# Patient Record
Sex: MALE | Race: WHITE | NOT HISPANIC OR LATINO | Employment: OTHER | URBAN - METROPOLITAN AREA
[De-identification: names, ages, dates, MRNs, and addresses within clinical notes are randomized per-mention and may not be internally consistent; named-entity substitution may affect disease eponyms.]

---

## 2023-04-13 PROCEDURE — 99222 1ST HOSP IP/OBS MODERATE 55: CPT | Performed by: INTERNAL MEDICINE

## 2023-04-14 PROCEDURE — 99238 HOSP IP/OBS DSCHRG MGMT 30/<: CPT | Performed by: INTERNAL MEDICINE

## 2023-04-24 DIAGNOSIS — D64.9 ANEMIA, UNSPECIFIED TYPE: Primary | ICD-10-CM

## 2023-04-24 RX ORDER — FERROUS SULFATE TAB 325 MG (65 MG ELEMENTAL FE) 325 (65 FE) MG
1 TAB ORAL DAILY
COMMUNITY
End: 2023-04-25 | Stop reason: SDUPTHER

## 2023-04-25 DIAGNOSIS — D64.9 ANEMIA, UNSPECIFIED: ICD-10-CM

## 2023-04-25 RX ORDER — FERROUS SULFATE TAB 325 MG (65 MG ELEMENTAL FE) 325 (65 FE) MG
1 TAB ORAL DAILY
Qty: 90 TABLET | Refills: 1 | Status: SHIPPED | OUTPATIENT
Start: 2023-04-25 | End: 2023-11-01 | Stop reason: SDUPTHER

## 2023-04-25 RX ORDER — FERROUS SULFATE TAB 325 MG (65 MG ELEMENTAL FE) 325 (65 FE) MG
TAB ORAL
Qty: 90 TABLET | Refills: 0 | Status: SHIPPED | OUTPATIENT
Start: 2023-04-25 | End: 2023-05-01 | Stop reason: SDUPTHER

## 2023-05-01 ENCOUNTER — LAB (OUTPATIENT)
Dept: LAB | Facility: LAB | Age: 73
End: 2023-05-01
Payer: MEDICARE

## 2023-05-01 ENCOUNTER — OFFICE VISIT (OUTPATIENT)
Dept: PRIMARY CARE | Facility: CLINIC | Age: 73
End: 2023-05-01
Payer: MEDICARE

## 2023-05-01 VITALS
DIASTOLIC BLOOD PRESSURE: 58 MMHG | SYSTOLIC BLOOD PRESSURE: 144 MMHG | WEIGHT: 159 LBS | HEIGHT: 68 IN | BODY MASS INDEX: 24.1 KG/M2

## 2023-05-01 DIAGNOSIS — E78.5 HYPERLIPIDEMIA, UNSPECIFIED HYPERLIPIDEMIA TYPE: ICD-10-CM

## 2023-05-01 DIAGNOSIS — I12.9 BENIGN HYPERTENSION WITH CKD (CHRONIC KIDNEY DISEASE) STAGE IV (MULTI): ICD-10-CM

## 2023-05-01 DIAGNOSIS — I10 PRIMARY HYPERTENSION: Primary | ICD-10-CM

## 2023-05-01 DIAGNOSIS — E11.22 TYPE 2 DIABETES MELLITUS WITH CHRONIC KIDNEY DISEASE ON CHRONIC DIALYSIS, WITH LONG-TERM CURRENT USE OF INSULIN (MULTI): ICD-10-CM

## 2023-05-01 DIAGNOSIS — I10 PRIMARY HYPERTENSION: ICD-10-CM

## 2023-05-01 DIAGNOSIS — Z99.2 TYPE 2 DIABETES MELLITUS WITH CHRONIC KIDNEY DISEASE ON CHRONIC DIALYSIS, WITH LONG-TERM CURRENT USE OF INSULIN (MULTI): ICD-10-CM

## 2023-05-01 DIAGNOSIS — Z79.4 TYPE 2 DIABETES MELLITUS WITH CHRONIC KIDNEY DISEASE ON CHRONIC DIALYSIS, WITH LONG-TERM CURRENT USE OF INSULIN (MULTI): ICD-10-CM

## 2023-05-01 DIAGNOSIS — N18.6 TYPE 2 DIABETES MELLITUS WITH CHRONIC KIDNEY DISEASE ON CHRONIC DIALYSIS, WITH LONG-TERM CURRENT USE OF INSULIN (MULTI): ICD-10-CM

## 2023-05-01 DIAGNOSIS — N18.4 BENIGN HYPERTENSION WITH CKD (CHRONIC KIDNEY DISEASE) STAGE IV (MULTI): ICD-10-CM

## 2023-05-01 DIAGNOSIS — I25.10 CORONARY ARTERY DISEASE INVOLVING NATIVE HEART WITHOUT ANGINA PECTORIS, UNSPECIFIED VESSEL OR LESION TYPE: ICD-10-CM

## 2023-05-01 DIAGNOSIS — G62.89 OTHER POLYNEUROPATHY: ICD-10-CM

## 2023-05-01 DIAGNOSIS — K21.9 GASTROESOPHAGEAL REFLUX DISEASE WITHOUT ESOPHAGITIS: ICD-10-CM

## 2023-05-01 PROBLEM — N18.5 CKD (CHRONIC KIDNEY DISEASE), STAGE V (MULTI): Status: ACTIVE | Noted: 2023-05-01

## 2023-05-01 PROBLEM — E11.9 DIABETES MELLITUS (MULTI): Status: ACTIVE | Noted: 2023-05-01

## 2023-05-01 PROBLEM — N18.30 STAGE III CHRONIC KIDNEY DISEASE (MULTI): Status: ACTIVE | Noted: 2023-05-01

## 2023-05-01 PROBLEM — R09.89 BRUIT: Status: ACTIVE | Noted: 2023-05-01

## 2023-05-01 PROBLEM — D64.9 ANEMIA: Status: ACTIVE | Noted: 2023-05-01

## 2023-05-01 PROBLEM — R05.9 COUGH: Status: ACTIVE | Noted: 2023-05-01

## 2023-05-01 PROBLEM — R31.21 ASYMPTOMATIC MICROSCOPIC HEMATURIA: Status: ACTIVE | Noted: 2023-05-01

## 2023-05-01 PROBLEM — R53.83 FATIGUE: Status: ACTIVE | Noted: 2023-05-01

## 2023-05-01 PROBLEM — R80.9 PROTEINURIA: Status: ACTIVE | Noted: 2023-05-01

## 2023-05-01 PROBLEM — R06.02 SHORTNESS OF BREATH: Status: ACTIVE | Noted: 2023-05-01

## 2023-05-01 PROBLEM — E55.9 VITAMIN D DEFICIENCY: Status: ACTIVE | Noted: 2023-05-01

## 2023-05-01 PROBLEM — D31.90 NEVUS OF EYE: Status: ACTIVE | Noted: 2023-05-01

## 2023-05-01 PROBLEM — K59.00 CONSTIPATION: Status: ACTIVE | Noted: 2023-05-01

## 2023-05-01 PROBLEM — M25.569 KNEE PAIN: Status: ACTIVE | Noted: 2023-05-01

## 2023-05-01 PROBLEM — I25.118 STABLE ANGINA PECTORIS DUE TO ARTERIOSCLEROSIS OF CORONARY ARTERY (CMS-HCC): Status: ACTIVE | Noted: 2023-05-01

## 2023-05-01 PROBLEM — N40.1 BPH WITH OBSTRUCTION/LOWER URINARY TRACT SYMPTOMS: Status: ACTIVE | Noted: 2023-05-01

## 2023-05-01 PROBLEM — G62.9 PERIPHERAL NEUROPATHY: Status: ACTIVE | Noted: 2023-05-01

## 2023-05-01 PROBLEM — R07.9 CHEST PAIN: Status: ACTIVE | Noted: 2023-05-01

## 2023-05-01 PROBLEM — H34.8110 CENTRAL RETINAL VEIN OCCLUSION WITH MACULAR EDEMA OF RIGHT EYE (CMS-HCC): Status: ACTIVE | Noted: 2023-05-01

## 2023-05-01 PROBLEM — R60.9 EDEMA: Status: ACTIVE | Noted: 2023-05-01

## 2023-05-01 PROBLEM — H40.009 GLAUCOMA SUSPECT: Status: ACTIVE | Noted: 2023-05-01

## 2023-05-01 PROBLEM — R97.20 ELEVATED PROSTATE SPECIFIC ANTIGEN (PSA): Status: ACTIVE | Noted: 2023-05-01

## 2023-05-01 PROBLEM — N13.8 BPH WITH OBSTRUCTION/LOWER URINARY TRACT SYMPTOMS: Status: ACTIVE | Noted: 2023-05-01

## 2023-05-01 PROBLEM — C61 PROSTATE CANCER (MULTI): Status: ACTIVE | Noted: 2023-05-01

## 2023-05-01 PROBLEM — R01.1 MURMUR, CARDIAC: Status: ACTIVE | Noted: 2023-05-01

## 2023-05-01 PROBLEM — Z97.3 WEARS EYEGLASSES: Status: ACTIVE | Noted: 2023-05-01

## 2023-05-01 PROBLEM — H25.13 AGE-RELATED NUCLEAR CATARACT, BILATERAL: Status: ACTIVE | Noted: 2023-05-01

## 2023-05-01 LAB
ALANINE AMINOTRANSFERASE (SGPT) (U/L) IN SER/PLAS: 9 U/L (ref 10–52)
ALBUMIN (G/DL) IN SER/PLAS: 4.4 G/DL (ref 3.4–5)
ALKALINE PHOSPHATASE (U/L) IN SER/PLAS: 91 U/L (ref 33–136)
ANION GAP IN SER/PLAS: 18 MMOL/L (ref 10–20)
ASPARTATE AMINOTRANSFERASE (SGOT) (U/L) IN SER/PLAS: 16 U/L (ref 9–39)
BILIRUBIN TOTAL (MG/DL) IN SER/PLAS: 0.6 MG/DL (ref 0–1.2)
CALCIDIOL (25 OH VITAMIN D3) (NG/ML) IN SER/PLAS: 88 NG/ML
CALCIUM (MG/DL) IN SER/PLAS: 9.4 MG/DL (ref 8.6–10.6)
CARBON DIOXIDE, TOTAL (MMOL/L) IN SER/PLAS: 24 MMOL/L (ref 21–32)
CHLORIDE (MMOL/L) IN SER/PLAS: 107 MMOL/L (ref 98–107)
CHOLESTEROL (MG/DL) IN SER/PLAS: 117 MG/DL (ref 0–199)
CHOLESTEROL IN HDL (MG/DL) IN SER/PLAS: 29.2 MG/DL
CHOLESTEROL/HDL RATIO: 4
CREATININE (MG/DL) IN SER/PLAS: 9.15 MG/DL (ref 0.5–1.3)
ERYTHROCYTE DISTRIBUTION WIDTH (RATIO) BY AUTOMATED COUNT: 15.9 % (ref 11.5–14.5)
ERYTHROCYTE MEAN CORPUSCULAR HEMOGLOBIN CONCENTRATION (G/DL) BY AUTOMATED: 32.3 G/DL (ref 32–36)
ERYTHROCYTE MEAN CORPUSCULAR VOLUME (FL) BY AUTOMATED COUNT: 100 FL (ref 80–100)
ERYTHROCYTES (10*6/UL) IN BLOOD BY AUTOMATED COUNT: 3.23 X10E12/L (ref 4.5–5.9)
ESTIMATED AVERAGE GLUCOSE FOR HBA1C: 174 MG/DL
GFR MALE: 6 ML/MIN/1.73M2
GLUCOSE (MG/DL) IN SER/PLAS: 190 MG/DL (ref 74–99)
HEMATOCRIT (%) IN BLOOD BY AUTOMATED COUNT: 32.2 % (ref 41–52)
HEMOGLOBIN (G/DL) IN BLOOD: 10.4 G/DL (ref 13.5–17.5)
HEMOGLOBIN A1C/HEMOGLOBIN TOTAL IN BLOOD: 7.7 %
LDL: 31 MG/DL (ref 0–99)
LEUKOCYTES (10*3/UL) IN BLOOD BY AUTOMATED COUNT: 9.1 X10E9/L (ref 4.4–11.3)
NON HDL CHOLESTEROL: 88 MG/DL
NRBC (PER 100 WBCS) BY AUTOMATED COUNT: 0 /100 WBC (ref 0–0)
PLATELETS (10*3/UL) IN BLOOD AUTOMATED COUNT: 222 X10E9/L (ref 150–450)
POTASSIUM (MMOL/L) IN SER/PLAS: 5.7 MMOL/L (ref 3.5–5.3)
PROTEIN TOTAL: 6.5 G/DL (ref 6.4–8.2)
SODIUM (MMOL/L) IN SER/PLAS: 143 MMOL/L (ref 136–145)
THYROTROPIN (MIU/L) IN SER/PLAS BY DETECTION LIMIT <= 0.05 MIU/L: 2.5 MIU/L (ref 0.44–3.98)
TRIGLYCERIDE (MG/DL) IN SER/PLAS: 285 MG/DL (ref 0–149)
UREA NITROGEN (MG/DL) IN SER/PLAS: 53 MG/DL (ref 6–23)
VLDL: 57 MG/DL (ref 0–40)

## 2023-05-01 PROCEDURE — 1159F MED LIST DOCD IN RCRD: CPT | Performed by: INTERNAL MEDICINE

## 2023-05-01 PROCEDURE — 36415 COLL VENOUS BLD VENIPUNCTURE: CPT

## 2023-05-01 PROCEDURE — 80061 LIPID PANEL: CPT

## 2023-05-01 PROCEDURE — 83036 HEMOGLOBIN GLYCOSYLATED A1C: CPT

## 2023-05-01 PROCEDURE — 99213 OFFICE O/P EST LOW 20 MIN: CPT | Performed by: INTERNAL MEDICINE

## 2023-05-01 PROCEDURE — 4010F ACE/ARB THERAPY RXD/TAKEN: CPT | Performed by: INTERNAL MEDICINE

## 2023-05-01 PROCEDURE — 3077F SYST BP >= 140 MM HG: CPT | Performed by: INTERNAL MEDICINE

## 2023-05-01 PROCEDURE — 84443 ASSAY THYROID STIM HORMONE: CPT

## 2023-05-01 PROCEDURE — 82306 VITAMIN D 25 HYDROXY: CPT

## 2023-05-01 PROCEDURE — 85027 COMPLETE CBC AUTOMATED: CPT

## 2023-05-01 PROCEDURE — 80053 COMPREHEN METABOLIC PANEL: CPT

## 2023-05-01 PROCEDURE — 3051F HG A1C>EQUAL 7.0%<8.0%: CPT | Performed by: INTERNAL MEDICINE

## 2023-05-01 PROCEDURE — 3078F DIAST BP <80 MM HG: CPT | Performed by: INTERNAL MEDICINE

## 2023-05-01 RX ORDER — GABAPENTIN 300 MG/1
300 CAPSULE ORAL 2 TIMES DAILY
Qty: 180 CAPSULE | Refills: 1 | Status: SHIPPED | OUTPATIENT
Start: 2023-05-01 | End: 2024-02-13 | Stop reason: SDUPTHER

## 2023-05-01 RX ORDER — LOSARTAN POTASSIUM 50 MG/1
1 TABLET ORAL DAILY
COMMUNITY
Start: 2022-10-20

## 2023-05-01 RX ORDER — GABAPENTIN 300 MG/1
300 CAPSULE ORAL 2 TIMES DAILY
COMMUNITY
Start: 2023-01-19 | End: 2023-05-01 | Stop reason: SDUPTHER

## 2023-05-01 RX ORDER — AMLODIPINE BESYLATE 5 MG/1
5 TABLET ORAL DAILY
COMMUNITY
Start: 2023-03-06 | End: 2023-12-07 | Stop reason: SDUPTHER

## 2023-05-01 RX ORDER — INSULIN LISPRO 100 [IU]/ML
10 INJECTION, SOLUTION INTRAVENOUS; SUBCUTANEOUS
COMMUNITY
Start: 2018-09-19

## 2023-05-01 RX ORDER — ALLOPURINOL 100 MG/1
100 TABLET ORAL DAILY
COMMUNITY
Start: 2013-01-26 | End: 2024-04-01

## 2023-05-01 RX ORDER — CLOPIDOGREL BISULFATE 75 MG/1
75 TABLET ORAL 2 TIMES DAILY
COMMUNITY
End: 2023-05-19 | Stop reason: ALTCHOICE

## 2023-05-01 RX ORDER — ERGOCALCIFEROL 1.25 MG/1
50000 CAPSULE ORAL
COMMUNITY

## 2023-05-01 RX ORDER — NITROGLYCERIN 0.4 MG/1
0.4 TABLET SUBLINGUAL EVERY 5 MIN PRN
COMMUNITY
Start: 2022-09-05

## 2023-05-01 RX ORDER — PEN NEEDLE, DIABETIC 31 GX5/16"
NEEDLE, DISPOSABLE MISCELLANEOUS
COMMUNITY
Start: 2022-10-17 | End: 2024-02-23 | Stop reason: SDUPTHER

## 2023-05-01 RX ORDER — EZETIMIBE 10 MG/1
10 TABLET ORAL DAILY
COMMUNITY
End: 2023-05-01 | Stop reason: SDUPTHER

## 2023-05-01 RX ORDER — PANTOPRAZOLE SODIUM 40 MG/1
40 TABLET, DELAYED RELEASE ORAL DAILY
COMMUNITY

## 2023-05-01 RX ORDER — CARVEDILOL 12.5 MG/1
12.5 TABLET ORAL
COMMUNITY
End: 2024-05-31 | Stop reason: SDUPTHER

## 2023-05-01 RX ORDER — ATORVASTATIN CALCIUM 80 MG/1
80 TABLET, FILM COATED ORAL NIGHTLY
COMMUNITY
End: 2024-01-02 | Stop reason: SDUPTHER

## 2023-05-01 RX ORDER — INSULIN GLARGINE 100 [IU]/ML
INJECTION, SOLUTION SUBCUTANEOUS
COMMUNITY
Start: 2014-01-25

## 2023-05-01 RX ORDER — SITAGLIPTIN 25 MG/1
25 TABLET, FILM COATED ORAL DAILY
COMMUNITY
End: 2023-10-03 | Stop reason: SDUPTHER

## 2023-05-01 RX ORDER — EZETIMIBE 10 MG/1
10 TABLET ORAL DAILY
Qty: 90 TABLET | Refills: 1 | Status: SHIPPED | OUTPATIENT
Start: 2023-05-01 | End: 2023-11-10 | Stop reason: ALTCHOICE

## 2023-05-01 RX ORDER — ASPIRIN 81 MG/1
81 TABLET ORAL DAILY
COMMUNITY
Start: 2020-10-01 | End: 2023-11-10 | Stop reason: ALTCHOICE

## 2023-05-01 NOTE — PROGRESS NOTES
Subjective   Patient ID: William Franke is a 72 y.o. male who presents for Hospital Follow-up.    HPI   Patient is here for hospital follow-up  He was admitted for bleeding AV fistula.  Required blood transfusion.  Needs refill on gabapentin and Zetia  Complains of having stiffness in the hands  Doing hemodialysis 3 times a week      Past recap patient was hospitalized and had another heart attack at [facility]  He is still complaining of having a lot of shortness of breath and cough  His kidney functions did deteriorate but because he is making urine did not get started on the dialysis yet  Is doing blood work today to reevaluate his kidney function  But his main concern is his cough        past recap  Patient went to the hospital and had MI  He was catheterized again and had stent placed  Since he came home he is having very poor appetite not eating drinking not feeling good and blood pressure is running high in 180s and 200s  He had stent placed 3 weeks ago     Patient here for follow-up on diabetes hypertension high cholesterol chronic kidney disease and anemia  Follow-up on blood work  Patient was hospitalized for another non-ST elevation MI  Since discharge his chest pain is doing better but he still getting off-and-on chest pain  Feels very anxious  Feels very limited in his activity        Patient is here with complaints of rectal bleed this morning he started bleeding did not realize his bleeding until it messed up his underwear and pants. He had similar episode 5 days ago  He is having some discomfort in the lower abdomen denies any fever or chills  He had problems with hemorrhoids many years ago. But he has no pain and denies constipation     Patient is here for hospital follow-up  He presented with chest pain and acute MI. He had to have cardiac cath done with close monitoring of kidney function  Cardiac stable  Patient had stent placed and did not require dialysis  He is here for follow-up on  "blood work for kidney  His blood sugars were doing better in the hospital. Now they are running high again  He is getting Procrit every 2 weeks            Virtual visit  Patient here for follow-up on diabetes   Patient is eating little better but not able to exercise because  Blood sugars are running high  The pressure is running little high but she is coming to see Dr. Ardon on Thursday  He is managing with his insulin blood sugar little better  Finished radiation treatment for prostate cancer  Works outdoors  Did blood work needs medications refilled   refuses to see the dietitian  Does not take Humalog insulin because of fear has appointment with the urologist   Review of Systems    Objective   /58   Ht 1.727 m (5' 8\")   Wt 72.1 kg (159 lb)   BMI 24.18 kg/m²     Physical Exam  Vitals reviewed.   Constitutional:       Appearance: Normal appearance.   HENT:      Head: Normocephalic and atraumatic.      Right Ear: Tympanic membrane, ear canal and external ear normal.      Left Ear: Tympanic membrane, ear canal and external ear normal.      Nose: Nose normal.      Mouth/Throat:      Pharynx: Oropharynx is clear.   Eyes:      Extraocular Movements: Extraocular movements intact.      Conjunctiva/sclera: Conjunctivae normal.      Pupils: Pupils are equal, round, and reactive to light.   Cardiovascular:      Rate and Rhythm: Normal rate and regular rhythm.      Pulses: Normal pulses.      Heart sounds: Normal heart sounds.   Pulmonary:      Effort: Pulmonary effort is normal.      Breath sounds: Normal breath sounds.   Abdominal:      General: Abdomen is flat. Bowel sounds are normal.      Palpations: Abdomen is soft.   Musculoskeletal:      Cervical back: Normal range of motion and neck supple.   Skin:     General: Skin is warm and dry.   Neurological:      General: No focal deficit present.      Mental Status: He is alert and oriented to person, place, and time.   Psychiatric:         Mood and Affect: Mood " normal.         Assessment/Plan   Problem List Items Addressed This Visit          Nervous    Peripheral neuropathy    Relevant Medications    gabapentin (Neurontin) 300 mg capsule       Circulatory    Benign hypertension with CKD (chronic kidney disease) stage IV (CMS/[facility])    Hypertension - Primary    Relevant Orders    Comprehensive Metabolic Panel (Completed)    TSH with reflex to Free T4 if abnormal (Completed)    CAD (coronary artery disease)    Relevant Medications    amLODIPine (Norvasc) 5 mg tablet    carvedilol (Coreg) 12.5 mg tablet    clopidogrel (Plavix) 75 mg tablet    nitroglycerin (Nitrostat) 0.4 mg SL tablet       Digestive    Esophageal reflux       Endocrine/Metabolic    Diabetes mellitus (CMS/[facility])    Relevant Orders    Hemoglobin A1C (Completed)    Vitamin D 25-Hydroxy,Total (Completed)    CBC (Completed)       Other    Hyperlipidemia    Relevant Medications    ezetimibe (Zetia) 10 mg tablet    Other Relevant Orders    Lipid Panel (Completed)           10/14  Call Dr. Palmer  He is trying to arrange outpatient follow-up for cardiac catheterization and carotid stent which she needs  Because of his anemia and severe kidney disease he is planning in stages  He asked me to order CBC BMP and he will call tomorrow to schedule for Next week discussed with the patient and the wife agreed with the plan  Advised him to go to the emergency room if symptoms persist  Continue diet exercise follow-up in 3 months     11/2  Concerned that patient might be getting anemic or his kidney function would be getting worse  Stat CBC BMP ordered  Increase Coreg to 2 tablets twice a day  Blood work results reviewed  Kidney functions are in fact better anemia stable  Patient could be not feeling good because of elevated blood pressure  Recheck in 2 weeks     9/9  Will get x-ray chest  Tessalon cough drops albuterol inhaler  Cholesterol medication refill  Blood pressure is stable  Will see if kidney functions looks okay  to hold off dialysis  Patient wife is planning to be able to go to [place] before start of dialysis    5/1/2023  Will order blood work to make sure anemia is stable  Patient is under care of endocrinologist for diabetes  He is under care of nephrologist for kidneys  He follows up with cardiology regularly and stable  Refills given on iron and gabapentin and Zetia

## 2023-05-15 ENCOUNTER — APPOINTMENT (OUTPATIENT)
Dept: PRIMARY CARE | Facility: CLINIC | Age: 73
End: 2023-05-15
Payer: MEDICARE

## 2023-05-19 ENCOUNTER — OFFICE VISIT (OUTPATIENT)
Dept: PRIMARY CARE | Facility: CLINIC | Age: 73
End: 2023-05-19
Payer: MEDICARE

## 2023-05-19 VITALS
HEIGHT: 68 IN | SYSTOLIC BLOOD PRESSURE: 126 MMHG | WEIGHT: 180 LBS | BODY MASS INDEX: 27.28 KG/M2 | DIASTOLIC BLOOD PRESSURE: 58 MMHG

## 2023-05-19 DIAGNOSIS — Z99.2 TYPE 2 DIABETES MELLITUS WITH CHRONIC KIDNEY DISEASE ON CHRONIC DIALYSIS, WITH LONG-TERM CURRENT USE OF INSULIN (MULTI): ICD-10-CM

## 2023-05-19 DIAGNOSIS — E55.9 VITAMIN D DEFICIENCY: ICD-10-CM

## 2023-05-19 DIAGNOSIS — I10 PRIMARY HYPERTENSION: ICD-10-CM

## 2023-05-19 DIAGNOSIS — E78.5 HYPERLIPIDEMIA, UNSPECIFIED HYPERLIPIDEMIA TYPE: ICD-10-CM

## 2023-05-19 DIAGNOSIS — Z79.4 TYPE 2 DIABETES MELLITUS WITH CHRONIC KIDNEY DISEASE ON CHRONIC DIALYSIS, WITH LONG-TERM CURRENT USE OF INSULIN (MULTI): ICD-10-CM

## 2023-05-19 DIAGNOSIS — E11.22 TYPE 2 DIABETES MELLITUS WITH CHRONIC KIDNEY DISEASE ON CHRONIC DIALYSIS, WITH LONG-TERM CURRENT USE OF INSULIN (MULTI): ICD-10-CM

## 2023-05-19 DIAGNOSIS — N18.6 TYPE 2 DIABETES MELLITUS WITH CHRONIC KIDNEY DISEASE ON CHRONIC DIALYSIS, WITH LONG-TERM CURRENT USE OF INSULIN (MULTI): ICD-10-CM

## 2023-05-19 PROBLEM — Z98.890 S/P ARTERIOVENOUS (AV) FISTULA CREATION: Status: ACTIVE | Noted: 2022-05-26

## 2023-05-19 PROBLEM — T82.858A ARTERIOVENOUS FISTULA STENOSIS (CMS-HCC): Status: ACTIVE | Noted: 2022-11-28

## 2023-05-19 PROBLEM — I25.10 CORONARY ARTERY DISEASE INVOLVING NATIVE HEART WITHOUT ANGINA PECTORIS: Status: ACTIVE | Noted: 2022-04-28

## 2023-05-19 PROBLEM — D63.8 ANEMIA OF CHRONIC DISEASE: Status: ACTIVE | Noted: 2022-04-28

## 2023-05-19 PROBLEM — K21.9 GASTROESOPHAGEAL REFLUX DISEASE: Status: ACTIVE | Noted: 2022-04-28

## 2023-05-19 PROBLEM — N20.0 KIDNEY STONES: Status: ACTIVE | Noted: 2022-04-28

## 2023-05-19 PROBLEM — M19.041 OSTEOARTHRITIS OF HANDS, BILATERAL: Status: ACTIVE | Noted: 2023-05-19

## 2023-05-19 PROBLEM — Z95.5 STENTED CORONARY ARTERY: Status: ACTIVE | Noted: 2022-04-28

## 2023-05-19 PROBLEM — N18.4 CHRONIC KIDNEY DISEASE (CKD), STAGE IV (SEVERE) (MULTI): Status: ACTIVE | Noted: 2022-03-21

## 2023-05-19 PROBLEM — T82.590A DIALYSIS AV FISTULA MALFUNCTION (CMS-HCC): Status: ACTIVE | Noted: 2023-04-17

## 2023-05-19 PROBLEM — M19.042 OSTEOARTHRITIS OF HANDS, BILATERAL: Status: ACTIVE | Noted: 2023-05-19

## 2023-05-19 PROCEDURE — 4010F ACE/ARB THERAPY RXD/TAKEN: CPT | Performed by: INTERNAL MEDICINE

## 2023-05-19 PROCEDURE — 1036F TOBACCO NON-USER: CPT | Performed by: INTERNAL MEDICINE

## 2023-05-19 PROCEDURE — 99213 OFFICE O/P EST LOW 20 MIN: CPT | Performed by: INTERNAL MEDICINE

## 2023-05-19 PROCEDURE — 1159F MED LIST DOCD IN RCRD: CPT | Performed by: INTERNAL MEDICINE

## 2023-05-19 PROCEDURE — 3074F SYST BP LT 130 MM HG: CPT | Performed by: INTERNAL MEDICINE

## 2023-05-19 PROCEDURE — 3078F DIAST BP <80 MM HG: CPT | Performed by: INTERNAL MEDICINE

## 2023-05-19 PROCEDURE — 3051F HG A1C>EQUAL 7.0%<8.0%: CPT | Performed by: INTERNAL MEDICINE

## 2023-05-19 RX ORDER — PROMETHAZINE HYDROCHLORIDE 25 MG/1
TABLET ORAL
COMMUNITY
Start: 2008-04-30

## 2023-05-19 RX ORDER — ESOMEPRAZOLE MAGNESIUM 20 MG/1
40 GRANULE, DELAYED RELEASE ORAL
COMMUNITY
Start: 2020-11-04 | End: 2023-05-19 | Stop reason: ALTCHOICE

## 2023-05-19 RX ORDER — ISOSORBIDE MONONITRATE 60 MG/1
60 TABLET, EXTENDED RELEASE ORAL
COMMUNITY
End: 2023-05-19 | Stop reason: ALTCHOICE

## 2023-05-19 RX ORDER — SODIUM BICARBONATE 650 MG/1
650 TABLET ORAL 3 TIMES DAILY
COMMUNITY
Start: 2022-11-01 | End: 2023-05-19 | Stop reason: ALTCHOICE

## 2023-05-19 RX ORDER — DILTIAZEM HYDROCHLORIDE 360 MG/1
CAPSULE, EXTENDED RELEASE ORAL
COMMUNITY
Start: 2008-04-30 | End: 2023-05-19 | Stop reason: ALTCHOICE

## 2023-05-19 RX ORDER — FUROSEMIDE 80 MG/1
1 TABLET ORAL 2 TIMES DAILY
COMMUNITY
Start: 2022-09-07 | End: 2023-05-19 | Stop reason: ALTCHOICE

## 2023-05-19 RX ORDER — HYDRALAZINE HYDROCHLORIDE 50 MG/1
100 TABLET, FILM COATED ORAL 2 TIMES DAILY
COMMUNITY
End: 2023-05-19 | Stop reason: ALTCHOICE

## 2023-05-19 RX ORDER — SEVELAMER CARBONATE 800 MG/1
TABLET, FILM COATED ORAL
COMMUNITY
Start: 2023-03-07

## 2023-05-19 RX ORDER — HYDROCHLOROTHIAZIDE 25 MG/1
TABLET ORAL
COMMUNITY
Start: 2008-04-30 | End: 2023-05-19 | Stop reason: ALTCHOICE

## 2023-05-26 LAB — PROSTATE SPECIFIC AG (NG/ML) IN SER/PLAS: 0.25 NG/ML (ref 0–4)

## 2023-06-09 NOTE — PROGRESS NOTES
Subjective   Patient ID: William Franke is a 72 y.o. male who presents for Follow-up and Med Refill.    Med Refill    Patient is here for follow-up  His fistula still has bleeding off-and-on but not as much  Follow-up on diabetes hypertension high cholesterol  He is getting dialysis 3 times a week  Overall doing fine     patient is here for hospital follow-up  He was admitted for bleeding AV fistula.  Required blood transfusion.  Needs refill on gabapentin and Zetia  Complains of having stiffness in the hands  Doing hemodialysis 3 times a week        Past recap patient was hospitalized and had another heart attack at [facility], [place], operated by [facility]  He is still complaining of having a lot of shortness of breath and cough  His kidney functions did deteriorate but because he is making urine did not get started on the dialysis yet  Is doing blood work today to reevaluate his kidney function  But his main concern is his cough        past recap  Patient went to the hospital and had MI  He was catheterized again and had stent placed  Since he came home he is having very poor appetite not eating drinking not feeling good and blood pressure is running high in 180s and 200s  He had stent placed 3 weeks ago     Patient here for follow-up on diabetes hypertension high cholesterol chronic kidney disease and anemia  Follow-up on blood work  Patient was hospitalized for another non-ST elevation MI  Since discharge his chest pain is doing better but he still getting off-and-on chest pain  Feels very anxious  Feels very limited in his activity        Patient is here with complaints of rectal bleed this morning he started bleeding did not realize his bleeding until it messed up his underwear and pants. He had similar episode 5 days ago  He is having some discomfort in the lower abdomen denies any fever or chills  He had problems with hemorrhoids many years ago. But he has no pain and denies constipation     Patient is here for hospital  "follow-up  He presented with chest pain and acute MI. He had to have cardiac cath done with close monitoring of kidney function  Cardiac stable  Patient had stent placed and did not require dialysis  He is here for follow-up on blood work for kidney  His blood sugars were doing better in the hospital. Now they are running high again  He is getting Procrit every 2 weeks            Virtual visit  Patient here for follow-up on diabetes   Patient is eating little better but not able to exercise because  Blood sugars are running high  The pressure is running little high but she is coming to see Dr. Ardon on Thursday  He is managing with his insulin blood sugar little better  Finished radiation treatment for prostate cancer  Works outdoors  Did blood work needs medications refilled   refuses to see the dietitian  Does not take Humalog insulin because of fear has appointment with the urologist     Review of Systems    Objective   /58   Ht 1.727 m (5' 8\")   Wt 81.6 kg (180 lb)   BMI 27.37 kg/m²     Physical Exam  Vitals reviewed.   Constitutional:       Appearance: Normal appearance.   HENT:      Head: Normocephalic and atraumatic.      Right Ear: Tympanic membrane, ear canal and external ear normal.      Left Ear: Tympanic membrane, ear canal and external ear normal.      Nose: Nose normal.      Mouth/Throat:      Pharynx: Oropharynx is clear.   Eyes:      Extraocular Movements: Extraocular movements intact.      Conjunctiva/sclera: Conjunctivae normal.      Pupils: Pupils are equal, round, and reactive to light.   Cardiovascular:      Rate and Rhythm: Normal rate and regular rhythm.      Pulses: Normal pulses.      Heart sounds: Normal heart sounds.   Pulmonary:      Effort: Pulmonary effort is normal.      Breath sounds: Normal breath sounds.   Abdominal:      General: Abdomen is flat. Bowel sounds are normal.      Palpations: Abdomen is soft.   Musculoskeletal:      Cervical back: Normal range of motion and " neck supple.   Skin:     General: Skin is warm and dry.   Neurological:      General: No focal deficit present.      Mental Status: He is alert and oriented to person, place, and time.   Psychiatric:         Mood and Affect: Mood normal.         Assessment/Plan   Problem List Items Addressed This Visit          Circulatory    Hypertension    Relevant Orders    CBC    Comprehensive Metabolic Panel    Hemoglobin A1C    Lipid Panel    Thyroid Stimulating Hormone       Endocrine/Metabolic    Diabetes mellitus (CMS/HCC)    Relevant Orders    CBC    Comprehensive Metabolic Panel    Hemoglobin A1C    Lipid Panel    Thyroid Stimulating Hormone    Vitamin D deficiency    Relevant Orders    CBC    Comprehensive Metabolic Panel    Hemoglobin A1C    Lipid Panel    Thyroid Stimulating Hormone       Other    Hyperlipidemia    Relevant Orders    CBC    Comprehensive Metabolic Panel    Hemoglobin A1C    Lipid Panel    Thyroid Stimulating Hormone                   10/14  Call Dr. Palmer  He is trying to arrange outpatient follow-up for cardiac catheterization and carotid stent which she needs  Because of his anemia and severe kidney disease he is planning in stages  He asked me to order CBC BMP and he will call tomorrow to schedule for Next week discussed with the patient and the wife agreed with the plan  Advised him to go to the emergency room if symptoms persist  Continue diet exercise follow-up in 3 months     11/2  Concerned that patient might be getting anemic or his kidney function would be getting worse  Stat CBC BMP ordered  Increase Coreg to 2 tablets twice a day  Blood work results reviewed  Kidney functions are in fact better anemia stable  Patient could be not feeling good because of elevated blood pressure  Recheck in 2 weeks     9/9  Will get x-ray chest  Tessalon cough drops albuterol inhaler  Cholesterol medication refill  Blood pressure is stable  Will see if kidney functions looks okay to hold off  dialysis  Patient wife is planning to be able to go to [place] before start of dialysis     5/1/2023  Will order blood work to make sure anemia is stable  Patient is under care of endocrinologist for diabetes  He is under care of nephrologist for kidneys  He follows up with cardiology regularly and stable  Refills given on iron and gabapentin and Zetia     5/19/2023  Clinically patient is stable blood pressure is stable  Routine blood work ordered for 3 months  His diabetes is doing better  Medications refilled

## 2023-06-23 ENCOUNTER — DOCUMENTATION (OUTPATIENT)
Dept: PRIMARY CARE | Facility: CLINIC | Age: 73
End: 2023-06-23
Payer: MEDICARE

## 2023-08-10 ENCOUNTER — APPOINTMENT (OUTPATIENT)
Dept: PRIMARY CARE | Facility: CLINIC | Age: 73
End: 2023-08-10
Payer: MEDICARE

## 2023-08-11 ENCOUNTER — APPOINTMENT (OUTPATIENT)
Dept: PRIMARY CARE | Facility: CLINIC | Age: 73
End: 2023-08-11
Payer: MEDICARE

## 2023-08-14 ENCOUNTER — LAB (OUTPATIENT)
Dept: LAB | Facility: LAB | Age: 73
End: 2023-08-14
Payer: MEDICARE

## 2023-08-14 DIAGNOSIS — Z99.2 TYPE 2 DIABETES MELLITUS WITH CHRONIC KIDNEY DISEASE ON CHRONIC DIALYSIS, WITH LONG-TERM CURRENT USE OF INSULIN (MULTI): ICD-10-CM

## 2023-08-14 DIAGNOSIS — E55.9 VITAMIN D DEFICIENCY: ICD-10-CM

## 2023-08-14 DIAGNOSIS — I10 PRIMARY HYPERTENSION: ICD-10-CM

## 2023-08-14 DIAGNOSIS — Z79.4 TYPE 2 DIABETES MELLITUS WITH CHRONIC KIDNEY DISEASE ON CHRONIC DIALYSIS, WITH LONG-TERM CURRENT USE OF INSULIN (MULTI): ICD-10-CM

## 2023-08-14 DIAGNOSIS — E11.22 TYPE 2 DIABETES MELLITUS WITH CHRONIC KIDNEY DISEASE ON CHRONIC DIALYSIS, WITH LONG-TERM CURRENT USE OF INSULIN (MULTI): ICD-10-CM

## 2023-08-14 DIAGNOSIS — E78.5 HYPERLIPIDEMIA, UNSPECIFIED HYPERLIPIDEMIA TYPE: ICD-10-CM

## 2023-08-14 DIAGNOSIS — N18.6 TYPE 2 DIABETES MELLITUS WITH CHRONIC KIDNEY DISEASE ON CHRONIC DIALYSIS, WITH LONG-TERM CURRENT USE OF INSULIN (MULTI): ICD-10-CM

## 2023-08-14 LAB
ALANINE AMINOTRANSFERASE (SGPT) (U/L) IN SER/PLAS: 14 U/L (ref 10–52)
ALBUMIN (G/DL) IN SER/PLAS: 4.3 G/DL (ref 3.4–5)
ALKALINE PHOSPHATASE (U/L) IN SER/PLAS: 92 U/L (ref 33–136)
ANION GAP IN SER/PLAS: 23 MMOL/L (ref 10–20)
ASPARTATE AMINOTRANSFERASE (SGOT) (U/L) IN SER/PLAS: 16 U/L (ref 9–39)
BILIRUBIN TOTAL (MG/DL) IN SER/PLAS: 0.6 MG/DL (ref 0–1.2)
CALCIUM (MG/DL) IN SER/PLAS: 9.4 MG/DL (ref 8.6–10.6)
CARBON DIOXIDE, TOTAL (MMOL/L) IN SER/PLAS: 28 MMOL/L (ref 21–32)
CHLORIDE (MMOL/L) IN SER/PLAS: 98 MMOL/L (ref 98–107)
CHOLESTEROL (MG/DL) IN SER/PLAS: 114 MG/DL (ref 0–199)
CHOLESTEROL IN HDL (MG/DL) IN SER/PLAS: 27.4 MG/DL
CHOLESTEROL/HDL RATIO: 4.2
CREATININE (MG/DL) IN SER/PLAS: 10.38 MG/DL (ref 0.5–1.3)
ERYTHROCYTE DISTRIBUTION WIDTH (RATIO) BY AUTOMATED COUNT: 15.8 % (ref 11.5–14.5)
ERYTHROCYTE MEAN CORPUSCULAR HEMOGLOBIN CONCENTRATION (G/DL) BY AUTOMATED: 32.5 G/DL (ref 32–36)
ERYTHROCYTE MEAN CORPUSCULAR VOLUME (FL) BY AUTOMATED COUNT: 99 FL (ref 80–100)
ERYTHROCYTES (10*6/UL) IN BLOOD BY AUTOMATED COUNT: 3.68 X10E12/L (ref 4.5–5.9)
ESTIMATED AVERAGE GLUCOSE FOR HBA1C: 171 MG/DL
GFR MALE: 5 ML/MIN/1.73M2
GLUCOSE (MG/DL) IN SER/PLAS: 155 MG/DL (ref 74–99)
HEMATOCRIT (%) IN BLOOD BY AUTOMATED COUNT: 36.3 % (ref 41–52)
HEMOGLOBIN (G/DL) IN BLOOD: 11.8 G/DL (ref 13.5–17.5)
HEMOGLOBIN A1C/HEMOGLOBIN TOTAL IN BLOOD: 7.6 %
LDL: 13 MG/DL (ref 0–99)
LEUKOCYTES (10*3/UL) IN BLOOD BY AUTOMATED COUNT: 7 X10E9/L (ref 4.4–11.3)
NON HDL CHOLESTEROL: 87 MG/DL
NRBC (PER 100 WBCS) BY AUTOMATED COUNT: 0 /100 WBC (ref 0–0)
PLATELETS (10*3/UL) IN BLOOD AUTOMATED COUNT: 163 X10E9/L (ref 150–450)
POTASSIUM (MMOL/L) IN SER/PLAS: 5.5 MMOL/L (ref 3.5–5.3)
PROTEIN TOTAL: 6.4 G/DL (ref 6.4–8.2)
SODIUM (MMOL/L) IN SER/PLAS: 143 MMOL/L (ref 136–145)
THYROTROPIN (MIU/L) IN SER/PLAS BY DETECTION LIMIT <= 0.05 MIU/L: 1.46 MIU/L (ref 0.44–3.98)
TRIGLYCERIDE (MG/DL) IN SER/PLAS: 370 MG/DL (ref 0–149)
UREA NITROGEN (MG/DL) IN SER/PLAS: 67 MG/DL (ref 6–23)
VLDL: 74 MG/DL (ref 0–40)

## 2023-08-14 PROCEDURE — 80053 COMPREHEN METABOLIC PANEL: CPT

## 2023-08-14 PROCEDURE — 84443 ASSAY THYROID STIM HORMONE: CPT

## 2023-08-14 PROCEDURE — 80061 LIPID PANEL: CPT

## 2023-08-14 PROCEDURE — 36415 COLL VENOUS BLD VENIPUNCTURE: CPT

## 2023-08-14 PROCEDURE — 85027 COMPLETE CBC AUTOMATED: CPT

## 2023-08-14 PROCEDURE — 83036 HEMOGLOBIN GLYCOSYLATED A1C: CPT

## 2023-08-18 ENCOUNTER — OFFICE VISIT (OUTPATIENT)
Dept: PRIMARY CARE | Facility: CLINIC | Age: 73
End: 2023-08-18
Payer: MEDICARE

## 2023-08-18 VITALS
WEIGHT: 181 LBS | BODY MASS INDEX: 27.43 KG/M2 | SYSTOLIC BLOOD PRESSURE: 116 MMHG | HEIGHT: 68 IN | DIASTOLIC BLOOD PRESSURE: 58 MMHG

## 2023-08-18 DIAGNOSIS — E78.49 OTHER HYPERLIPIDEMIA: ICD-10-CM

## 2023-08-18 DIAGNOSIS — N18.6 ESRD ON HEMODIALYSIS (MULTI): Primary | ICD-10-CM

## 2023-08-18 DIAGNOSIS — N18.6 TYPE 2 DIABETES MELLITUS WITH CHRONIC KIDNEY DISEASE ON CHRONIC DIALYSIS, WITH LONG-TERM CURRENT USE OF INSULIN (MULTI): ICD-10-CM

## 2023-08-18 DIAGNOSIS — Z99.2 ESRD ON HEMODIALYSIS (MULTI): Primary | ICD-10-CM

## 2023-08-18 DIAGNOSIS — E11.22 TYPE 2 DIABETES MELLITUS WITH CHRONIC KIDNEY DISEASE ON CHRONIC DIALYSIS, WITH LONG-TERM CURRENT USE OF INSULIN (MULTI): ICD-10-CM

## 2023-08-18 DIAGNOSIS — I10 PRIMARY HYPERTENSION: ICD-10-CM

## 2023-08-18 DIAGNOSIS — Z79.4 TYPE 2 DIABETES MELLITUS WITH CHRONIC KIDNEY DISEASE ON CHRONIC DIALYSIS, WITH LONG-TERM CURRENT USE OF INSULIN (MULTI): ICD-10-CM

## 2023-08-18 DIAGNOSIS — Z99.2 TYPE 2 DIABETES MELLITUS WITH CHRONIC KIDNEY DISEASE ON CHRONIC DIALYSIS, WITH LONG-TERM CURRENT USE OF INSULIN (MULTI): ICD-10-CM

## 2023-08-18 PROCEDURE — 3074F SYST BP LT 130 MM HG: CPT | Performed by: INTERNAL MEDICINE

## 2023-08-18 PROCEDURE — 99213 OFFICE O/P EST LOW 20 MIN: CPT | Performed by: INTERNAL MEDICINE

## 2023-08-18 PROCEDURE — 1126F AMNT PAIN NOTED NONE PRSNT: CPT | Performed by: INTERNAL MEDICINE

## 2023-08-18 PROCEDURE — 3078F DIAST BP <80 MM HG: CPT | Performed by: INTERNAL MEDICINE

## 2023-08-18 PROCEDURE — 1159F MED LIST DOCD IN RCRD: CPT | Performed by: INTERNAL MEDICINE

## 2023-08-18 PROCEDURE — 1036F TOBACCO NON-USER: CPT | Performed by: INTERNAL MEDICINE

## 2023-08-18 PROCEDURE — 3051F HG A1C>EQUAL 7.0%<8.0%: CPT | Performed by: INTERNAL MEDICINE

## 2023-08-18 PROCEDURE — 4010F ACE/ARB THERAPY RXD/TAKEN: CPT | Performed by: INTERNAL MEDICINE

## 2023-08-18 ASSESSMENT — ENCOUNTER SYMPTOMS
LOSS OF SENSATION IN FEET: 0
OCCASIONAL FEELINGS OF UNSTEADINESS: 0
DEPRESSION: 0

## 2023-08-19 NOTE — PROGRESS NOTES
Subjective   Patient ID: William A Franke is a 72 y.o. male who presents for Follow-up and Med Refill.    Med Refill    Patient is here for follow-up  Follow-up on diabetes hypertension high cholesterol  Doing hemodialysis for end-stage renal disease     patient is here for follow-up  His fistula still has bleeding off-and-on but not as much  Follow-up on diabetes hypertension high cholesterol  He is getting dialysis 3 times a week  Overall doing fine      patient is here for hospital follow-up  He was admitted for bleeding AV fistula.  Required blood transfusion.  Needs refill on gabapentin and Zetia  Complains of having stiffness in the hands  Doing hemodialysis 3 times a week    patient was hospitalized and had another heart attack at [facility]  He is still complaining of having a lot of shortness of breath and cough  His kidney functions did deteriorate but because he is making urine did not get started on the dialysis yet  Is doing blood work today to reevaluate his kidney function  But his main concern is his cough     Patient went to the hospital and had MI  He was catheterized again and had stent placed  Since he came home he is having very poor appetite not eating drinking not feeling good and blood pressure is running high in 180s and 200s  He had stent placed 3 weeks ago     Patient here for follow-up on diabetes hypertension high cholesterol chronic kidney disease and anemia  Follow-up on blood work  Patient was hospitalized for another non-ST elevation MI  Since discharge his chest pain is doing better but he still getting off-and-on chest pain  Feels very anxious  Feels very limited in his activity        Patient is here with complaints of rectal bleed this morning he started bleeding did not realize his bleeding until it messed up his underwear and pants. He had similar episode 5 days ago  He is having some discomfort in the lower abdomen denies any fever or chills  He had problems with  "hemorrhoids many years ago. But he has no pain and denies constipation     Patient is here for hospital follow-up  He presented with chest pain and acute MI. He had to have cardiac cath done with close monitoring of kidney function  Cardiac stable  Patient had stent placed and did not require dialysis  He is here for follow-up on blood work for kidney  His blood sugars were doing better in the hospital. Now they are running high again  He is getting Procrit every 2 weeks            Virtual visit  Patient here for follow-up on diabetes   Patient is eating little better but not able to exercise because  Blood sugars are running high  The pressure is running little high but she is coming to see Dr. rAdon on Thursday  He is managing with his insulin blood sugar little better  Finished radiation treatment for prostate cancer  Works outdoors  Did blood work needs medications refilled   refuses to see the dietitian  Does not take Humalog insulin because of fear has appointment with the urologist        Review of Systems    Objective   /58   Ht 1.727 m (5' 8\")   Wt 82.1 kg (181 lb)   BMI 27.52 kg/m²     Physical Exam  Vitals reviewed.   Constitutional:       Appearance: Normal appearance.   HENT:      Head: Normocephalic and atraumatic.      Right Ear: Tympanic membrane, ear canal and external ear normal.      Left Ear: Tympanic membrane, ear canal and external ear normal.      Nose: Nose normal.      Mouth/Throat:      Pharynx: Oropharynx is clear.   Eyes:      Extraocular Movements: Extraocular movements intact.      Conjunctiva/sclera: Conjunctivae normal.      Pupils: Pupils are equal, round, and reactive to light.   Cardiovascular:      Rate and Rhythm: Normal rate and regular rhythm.      Pulses: Normal pulses.      Heart sounds: Normal heart sounds.   Pulmonary:      Effort: Pulmonary effort is normal.      Breath sounds: Normal breath sounds.   Abdominal:      General: Abdomen is flat. Bowel sounds are " normal.      Palpations: Abdomen is soft.   Musculoskeletal:      Cervical back: Normal range of motion and neck supple.   Skin:     General: Skin is warm and dry.   Neurological:      General: No focal deficit present.      Mental Status: He is alert and oriented to person, place, and time.   Psychiatric:         Mood and Affect: Mood normal.         Assessment/Plan   Problem List Items Addressed This Visit          Cardiac and Vasculature    Hypertension    Relevant Orders    CBC    Comprehensive Metabolic Panel    Hemoglobin A1C    Lipid Panel    Hyperlipidemia    Relevant Orders    CBC    Comprehensive Metabolic Panel    Hemoglobin A1C    Lipid Panel       Endocrine/Metabolic    Diabetes mellitus (CMS/[facility])    Relevant Orders    CBC    Comprehensive Metabolic Panel    Hemoglobin A1C    Lipid Panel       Genitourinary and Reproductive    ESRD on hemodialysis (CMS/[facility]) - Primary   10/14  Call Dr. Palmer  He is trying to arrange outpatient follow-up for cardiac catheterization and carotid stent which she needs  Because of his anemia and severe kidney disease he is planning in stages  He asked me to order CBC BMP and he will call tomorrow to schedule for Next week discussed with the patient and the wife agreed with the plan  Advised him to go to the emergency room if symptoms persist  Continue diet exercise follow-up in 3 months     11/2  Concerned that patient might be getting anemic or his kidney function would be getting worse  Stat CBC BMP ordered  Increase Coreg to 2 tablets twice a day  Blood work results reviewed  Kidney functions are in fact better anemia stable  Patient could be not feeling good because of elevated blood pressure  Recheck in 2 weeks     9/9  Will get x-ray chest  Tessalon cough drops albuterol inhaler  Cholesterol medication refill  Blood pressure is stable  Will see if kidney functions looks okay to hold off dialysis  Patient wife is planning to be able to go to [place] before start of  dialysis     5/1/2023  Will order blood work to make sure anemia is stable  Patient is under care of endocrinologist for diabetes  He is under care of nephrologist for kidneys  He follows up with cardiology regularly and stable  Refills given on iron and gabapentin and Zetia     5/19/2023  Clinically patient is stable blood pressure is stable  Routine blood work ordered for 3 months  His diabetes is doing better  Medications refilled    8/18/2023  Blood work reviewed  A1c still high  Blood pressure stable  Cholesterol okay  Discussed different food options  Patient eats a lot of processed food and to eat out a lot  Discussed better options  Continue current medications  Follow-up blood work in 3 months

## 2023-11-01 ENCOUNTER — TELEPHONE (OUTPATIENT)
Dept: PRIMARY CARE | Facility: CLINIC | Age: 73
End: 2023-11-01
Payer: MEDICARE

## 2023-11-01 DIAGNOSIS — D64.9 ANEMIA, UNSPECIFIED TYPE: ICD-10-CM

## 2023-11-01 RX ORDER — FERROUS SULFATE TAB 325 MG (65 MG ELEMENTAL FE) 325 (65 FE) MG
1 TAB ORAL DAILY
Qty: 90 TABLET | Refills: 1 | Status: SHIPPED | OUTPATIENT
Start: 2023-11-01 | End: 2024-05-06 | Stop reason: SDUPTHER

## 2023-11-01 NOTE — TELEPHONE ENCOUNTER
----- Message from Marianna Galloway MD sent at 11/1/2023  2:28 PM EDT -----  Okay to order  ----- Message -----  From: Otilia Philip MA  Sent: 11/1/2023   1:09 PM EDT  To: Marianna Galloway MD    Patient is asking for a 90 day supply FeroSuL 325 mg (65 mg iron) tablet    LOV 08/08/2023

## 2023-11-06 ENCOUNTER — LAB (OUTPATIENT)
Dept: LAB | Facility: LAB | Age: 73
End: 2023-11-06
Payer: MEDICARE

## 2023-11-06 DIAGNOSIS — Z79.4 TYPE 2 DIABETES MELLITUS WITH CHRONIC KIDNEY DISEASE ON CHRONIC DIALYSIS, WITH LONG-TERM CURRENT USE OF INSULIN (MULTI): ICD-10-CM

## 2023-11-06 DIAGNOSIS — E78.49 OTHER HYPERLIPIDEMIA: ICD-10-CM

## 2023-11-06 DIAGNOSIS — I10 PRIMARY HYPERTENSION: ICD-10-CM

## 2023-11-06 DIAGNOSIS — E11.22 TYPE 2 DIABETES MELLITUS WITH CHRONIC KIDNEY DISEASE ON CHRONIC DIALYSIS, WITH LONG-TERM CURRENT USE OF INSULIN (MULTI): ICD-10-CM

## 2023-11-06 DIAGNOSIS — N18.6 TYPE 2 DIABETES MELLITUS WITH CHRONIC KIDNEY DISEASE ON CHRONIC DIALYSIS, WITH LONG-TERM CURRENT USE OF INSULIN (MULTI): ICD-10-CM

## 2023-11-06 DIAGNOSIS — Z99.2 TYPE 2 DIABETES MELLITUS WITH CHRONIC KIDNEY DISEASE ON CHRONIC DIALYSIS, WITH LONG-TERM CURRENT USE OF INSULIN (MULTI): ICD-10-CM

## 2023-11-06 LAB
ALBUMIN SERPL BCP-MCNC: 4.3 G/DL (ref 3.4–5)
ALP SERPL-CCNC: 103 U/L (ref 33–136)
ALT SERPL W P-5'-P-CCNC: 15 U/L (ref 10–52)
ANION GAP SERPL CALC-SCNC: 25 MMOL/L (ref 10–20)
AST SERPL W P-5'-P-CCNC: 16 U/L (ref 9–39)
BILIRUB SERPL-MCNC: 0.5 MG/DL (ref 0–1.2)
BUN SERPL-MCNC: 62 MG/DL (ref 6–23)
CALCIUM SERPL-MCNC: 9.3 MG/DL (ref 8.6–10.6)
CHLORIDE SERPL-SCNC: 95 MMOL/L (ref 98–107)
CHOLEST SERPL-MCNC: 114 MG/DL (ref 0–199)
CHOLESTEROL/HDL RATIO: 3.8
CO2 SERPL-SCNC: 26 MMOL/L (ref 21–32)
CREAT SERPL-MCNC: 11.93 MG/DL (ref 0.5–1.3)
ERYTHROCYTE [DISTWIDTH] IN BLOOD BY AUTOMATED COUNT: 14.6 % (ref 11.5–14.5)
EST. AVERAGE GLUCOSE BLD GHB EST-MCNC: 174 MG/DL
GFR SERPL CREATININE-BSD FRML MDRD: 4 ML/MIN/1.73M*2
GLUCOSE SERPL-MCNC: 201 MG/DL (ref 74–99)
HBA1C MFR BLD: 7.7 %
HCT VFR BLD AUTO: 32.8 % (ref 41–52)
HDLC SERPL-MCNC: 29.7 MG/DL
HGB BLD-MCNC: 10.5 G/DL (ref 13.5–17.5)
LDLC SERPL CALC-MCNC: 7 MG/DL
MCH RBC QN AUTO: 33.2 PG (ref 26–34)
MCHC RBC AUTO-ENTMCNC: 32 G/DL (ref 32–36)
MCV RBC AUTO: 104 FL (ref 80–100)
NON HDL CHOLESTEROL: 84 MG/DL (ref 0–149)
NRBC BLD-RTO: 0 /100 WBCS (ref 0–0)
PLATELET # BLD AUTO: 168 X10*3/UL (ref 150–450)
POTASSIUM SERPL-SCNC: 4.9 MMOL/L (ref 3.5–5.3)
PROT SERPL-MCNC: 6.4 G/DL (ref 6.4–8.2)
RBC # BLD AUTO: 3.16 X10*6/UL (ref 4.5–5.9)
SODIUM SERPL-SCNC: 141 MMOL/L (ref 136–145)
TRIGL SERPL-MCNC: 388 MG/DL (ref 0–149)
VLDL: 78 MG/DL (ref 0–40)
WBC # BLD AUTO: 10.4 X10*3/UL (ref 4.4–11.3)

## 2023-11-06 PROCEDURE — 36415 COLL VENOUS BLD VENIPUNCTURE: CPT

## 2023-11-06 PROCEDURE — 80061 LIPID PANEL: CPT

## 2023-11-06 PROCEDURE — 80053 COMPREHEN METABOLIC PANEL: CPT

## 2023-11-06 PROCEDURE — 85027 COMPLETE CBC AUTOMATED: CPT

## 2023-11-06 PROCEDURE — 83036 HEMOGLOBIN GLYCOSYLATED A1C: CPT

## 2023-11-10 ENCOUNTER — OFFICE VISIT (OUTPATIENT)
Dept: PRIMARY CARE | Facility: CLINIC | Age: 73
End: 2023-11-10
Payer: MEDICARE

## 2023-11-10 VITALS
SYSTOLIC BLOOD PRESSURE: 120 MMHG | DIASTOLIC BLOOD PRESSURE: 72 MMHG | BODY MASS INDEX: 28.1 KG/M2 | WEIGHT: 185.4 LBS | HEIGHT: 68 IN

## 2023-11-10 DIAGNOSIS — Z99.2 TYPE 2 DIABETES MELLITUS WITH CHRONIC KIDNEY DISEASE ON CHRONIC DIALYSIS, WITH LONG-TERM CURRENT USE OF INSULIN (MULTI): ICD-10-CM

## 2023-11-10 DIAGNOSIS — Z79.4 TYPE 2 DIABETES MELLITUS WITH CHRONIC KIDNEY DISEASE ON CHRONIC DIALYSIS, WITH LONG-TERM CURRENT USE OF INSULIN (MULTI): ICD-10-CM

## 2023-11-10 DIAGNOSIS — I10 PRIMARY HYPERTENSION: ICD-10-CM

## 2023-11-10 DIAGNOSIS — N18.6 ESRD ON HEMODIALYSIS (MULTI): ICD-10-CM

## 2023-11-10 DIAGNOSIS — E11.22 TYPE 2 DIABETES MELLITUS WITH CHRONIC KIDNEY DISEASE ON CHRONIC DIALYSIS, WITH LONG-TERM CURRENT USE OF INSULIN (MULTI): ICD-10-CM

## 2023-11-10 DIAGNOSIS — Z99.2 ESRD ON HEMODIALYSIS (MULTI): ICD-10-CM

## 2023-11-10 DIAGNOSIS — N18.6 TYPE 2 DIABETES MELLITUS WITH CHRONIC KIDNEY DISEASE ON CHRONIC DIALYSIS, WITH LONG-TERM CURRENT USE OF INSULIN (MULTI): ICD-10-CM

## 2023-11-10 DIAGNOSIS — I25.10 CORONARY ARTERY DISEASE INVOLVING NATIVE HEART WITHOUT ANGINA PECTORIS, UNSPECIFIED VESSEL OR LESION TYPE: Primary | ICD-10-CM

## 2023-11-10 DIAGNOSIS — E78.49 OTHER HYPERLIPIDEMIA: ICD-10-CM

## 2023-11-10 PROBLEM — E86.0 DEHYDRATION: Status: ACTIVE | Noted: 2023-11-10

## 2023-11-10 PROBLEM — F41.9 ANXIETY: Status: ACTIVE | Noted: 2023-11-10

## 2023-11-10 PROBLEM — K62.5 RECTAL HEMORRHAGE: Status: ACTIVE | Noted: 2023-11-10

## 2023-11-10 PROBLEM — R25.2 CRAMP IN LOWER LEG: Status: ACTIVE | Noted: 2023-11-10

## 2023-11-10 PROBLEM — E87.5 HYPERKALEMIA: Status: ACTIVE | Noted: 2023-11-10

## 2023-11-10 PROBLEM — G47.33 OSA (OBSTRUCTIVE SLEEP APNEA): Status: ACTIVE | Noted: 2023-09-20

## 2023-11-10 PROBLEM — R55 VASOVAGAL SYNCOPE: Status: ACTIVE | Noted: 2023-11-10

## 2023-11-10 PROBLEM — B34.2 CORONAVIRUS INFECTION: Status: ACTIVE | Noted: 2023-11-10

## 2023-11-10 PROBLEM — J81.1 PULMONARY EDEMA (HHS-HCC): Status: ACTIVE | Noted: 2023-11-10

## 2023-11-10 PROBLEM — K92.1 HEMATOCHEZIA: Status: ACTIVE | Noted: 2023-11-10

## 2023-11-10 PROBLEM — M62.82 RHABDOMYOLYSIS: Status: ACTIVE | Noted: 2023-11-10

## 2023-11-10 PROBLEM — I27.20 PULMONARY HYPERTENSION (MULTI): Status: ACTIVE | Noted: 2023-11-10

## 2023-11-10 PROBLEM — Z98.61 HISTORY OF PERCUTANEOUS TRANSLUMINAL CORONARY ANGIOPLASTY: Status: ACTIVE | Noted: 2023-11-10

## 2023-11-10 PROBLEM — M25.469 KNEE JOINT EFFUSION: Status: ACTIVE | Noted: 2023-11-10

## 2023-11-10 PROBLEM — I16.0 HYPERTENSIVE URGENCY: Status: ACTIVE | Noted: 2023-11-10

## 2023-11-10 PROBLEM — I50.9 CONGESTIVE HEART FAILURE (MULTI): Status: ACTIVE | Noted: 2023-11-10

## 2023-11-10 PROBLEM — I21.4 ACUTE NON-ST SEGMENT ELEVATION MYOCARDIAL INFARCTION (MULTI): Status: ACTIVE | Noted: 2023-11-10

## 2023-11-10 PROBLEM — E23.2 DIABETES INSIPIDUS (MULTI): Status: ACTIVE | Noted: 2023-11-10

## 2023-11-10 PROBLEM — S89.90XA INJURY OF KNEE: Status: ACTIVE | Noted: 2023-11-10

## 2023-11-10 PROBLEM — T82.858A: Status: ACTIVE | Noted: 2023-09-20

## 2023-11-10 PROBLEM — I35.0 AORTIC STENOSIS: Status: ACTIVE | Noted: 2023-09-20

## 2023-11-10 PROCEDURE — 1126F AMNT PAIN NOTED NONE PRSNT: CPT | Performed by: INTERNAL MEDICINE

## 2023-11-10 PROCEDURE — 99214 OFFICE O/P EST MOD 30 MIN: CPT | Performed by: INTERNAL MEDICINE

## 2023-11-10 PROCEDURE — 1159F MED LIST DOCD IN RCRD: CPT | Performed by: INTERNAL MEDICINE

## 2023-11-10 PROCEDURE — 4010F ACE/ARB THERAPY RXD/TAKEN: CPT | Performed by: INTERNAL MEDICINE

## 2023-11-10 PROCEDURE — 3051F HG A1C>EQUAL 7.0%<8.0%: CPT | Performed by: INTERNAL MEDICINE

## 2023-11-10 PROCEDURE — 3078F DIAST BP <80 MM HG: CPT | Performed by: INTERNAL MEDICINE

## 2023-11-10 PROCEDURE — 3074F SYST BP LT 130 MM HG: CPT | Performed by: INTERNAL MEDICINE

## 2023-11-10 PROCEDURE — 3048F LDL-C <100 MG/DL: CPT | Performed by: INTERNAL MEDICINE

## 2023-11-10 PROCEDURE — 1036F TOBACCO NON-USER: CPT | Performed by: INTERNAL MEDICINE

## 2023-11-10 RX ORDER — EZETIMIBE 10 MG/1
10 TABLET ORAL DAILY
Qty: 90 TABLET | Refills: 0 | Status: SHIPPED | OUTPATIENT
Start: 2023-11-10 | End: 2024-02-13 | Stop reason: SDUPTHER

## 2023-11-10 RX ORDER — FENOFIBRATE 145 MG/1
145 TABLET, FILM COATED ORAL DAILY
Qty: 90 TABLET | Refills: 0 | Status: SHIPPED | OUTPATIENT
Start: 2023-11-10 | End: 2024-02-13 | Stop reason: SDUPTHER

## 2023-11-10 ASSESSMENT — ENCOUNTER SYMPTOMS
OCCASIONAL FEELINGS OF UNSTEADINESS: 0
LOSS OF SENSATION IN FEET: 0
DEPRESSION: 0

## 2023-11-10 NOTE — PROGRESS NOTES
Subjective   Patient ID: William A Franke is a 72 y.o. male who presents for Follow-up.    Med Refill    Patient is here for follow-up on diabetes hypertension high cholesterol  He is not on any aspirin or any blood thinner because he bleeds during dialysis  He is worried about his stents closing up     Past recap   patient is here for follow-up  Follow-up on diabetes hypertension high cholesterol  Doing hemodialysis for end-stage renal disease     patient is here for follow-up  His fistula still has bleeding off-and-on but not as much  Follow-up on diabetes hypertension high cholesterol  He is getting dialysis 3 times a week  Overall doing fine      patient is here for hospital follow-up  He was admitted for bleeding AV fistula.  Required blood transfusion.  Needs refill on gabapentin and Zetia  Complains of having stiffness in the hands  Doing hemodialysis 3 times a week    patient was hospitalized and had another heart attack at [facility]  He is still complaining of having a lot of shortness of breath and cough  His kidney functions did deteriorate but because he is making urine did not get started on the dialysis yet  Is doing blood work today to reevaluate his kidney function  But his main concern is his cough     Patient went to the hospital and had MI  He was catheterized again and had stent placed  Since he came home he is having very poor appetite not eating drinking not feeling good and blood pressure is running high in 180s and 200s  He had stent placed 3 weeks ago     Patient here for follow-up on diabetes hypertension high cholesterol chronic kidney disease and anemia  Follow-up on blood work  Patient was hospitalized for another non-ST elevation MI  Since discharge his chest pain is doing better but he still getting off-and-on chest pain  Feels very anxious  Feels very limited in his activity        Patient is here with complaints of rectal bleed this morning he started bleeding did not  "realize his bleeding until it messed up his underwear and pants. He had similar episode 5 days ago  He is having some discomfort in the lower abdomen denies any fever or chills  He had problems with hemorrhoids many years ago. But he has no pain and denies constipation     Patient is here for hospital follow-up  He presented with chest pain and acute MI. He had to have cardiac cath done with close monitoring of kidney function  Cardiac stable  Patient had stent placed and did not require dialysis  He is here for follow-up on blood work for kidney  His blood sugars were doing better in the hospital. Now they are running high again  He is getting Procrit every 2 weeks            Virtual visit  Patient here for follow-up on diabetes   Patient is eating little better but not able to exercise because  Blood sugars are running high  The pressure is running little high but she is coming to see Dr. Ardon on Thursday  He is managing with his insulin blood sugar little better  Finished radiation treatment for prostate cancer  Works outdoors  Did blood work needs medications refilled   refuses to see the dietitian  Does not take Humalog insulin because of fear has appointment with the urologist        Review of Systems    Objective   /72   Ht 1.727 m (5' 8\")   Wt 84.1 kg (185 lb 6.4 oz)   BMI 28.19 kg/m²     Physical Exam  Vitals reviewed.   Constitutional:       Appearance: Normal appearance.   HENT:      Head: Normocephalic and atraumatic.      Right Ear: Tympanic membrane, ear canal and external ear normal.      Left Ear: Tympanic membrane, ear canal and external ear normal.      Nose: Nose normal.      Mouth/Throat:      Pharynx: Oropharynx is clear.   Eyes:      Extraocular Movements: Extraocular movements intact.      Conjunctiva/sclera: Conjunctivae normal.      Pupils: Pupils are equal, round, and reactive to light.   Cardiovascular:      Rate and Rhythm: Normal rate and regular rhythm.      Pulses: Normal " pulses.      Heart sounds: Normal heart sounds.   Pulmonary:      Effort: Pulmonary effort is normal.      Breath sounds: Normal breath sounds.   Abdominal:      General: Abdomen is flat. Bowel sounds are normal.      Palpations: Abdomen is soft.   Musculoskeletal:      Cervical back: Normal range of motion and neck supple.   Skin:     General: Skin is warm and dry.   Neurological:      General: No focal deficit present.      Mental Status: He is alert and oriented to person, place, and time.   Psychiatric:         Mood and Affect: Mood normal.         Assessment/Plan   Problem List Items Addressed This Visit          Cardiac and Vasculature    Hypertension    Relevant Medications    fenofibrate (Tricor) 145 mg tablet    Other Relevant Orders    CBC    Comprehensive metabolic panel    Hemoglobin A1c    CAD (coronary artery disease) - Primary    Hyperlipidemia    Relevant Medications    ezetimibe (Zetia) 10 mg tablet    fenofibrate (Tricor) 145 mg tablet    Other Relevant Orders    CBC    Comprehensive metabolic panel    Hemoglobin A1c       Endocrine/Metabolic    Diabetes mellitus (CMS/HCC)    Relevant Medications    fenofibrate (Tricor) 145 mg tablet    Other Relevant Orders    CBC    Comprehensive metabolic panel    Hemoglobin A1c       Genitourinary and Reproductive    ESRD on hemodialysis (CMS/HCC)   10/14  Call Dr. Palmer  He is trying to arrange outpatient follow-up for cardiac catheterization and carotid stent which she needs  Because of his anemia and severe kidney disease he is planning in stages  He asked me to order CBC BMP and he will call tomorrow to schedule for Next week discussed with the patient and the wife agreed with the plan  Advised him to go to the emergency room if symptoms persist  Continue diet exercise follow-up in 3 months     11/2  Concerned that patient might be getting anemic or his kidney function would be getting worse  Stat CBC BMP ordered  Increase Coreg to 2 tablets twice a  day  Blood work results reviewed  Kidney functions are in fact better anemia stable  Patient could be not feeling good because of elevated blood pressure  Recheck in 2 weeks     9/9  Will get x-ray chest  Tessalon cough drops albuterol inhaler  Cholesterol medication refill  Blood pressure is stable  Will see if kidney functions looks okay to hold off dialysis  Patient wife is planning to be able to go to [place] before start of dialysis     5/1/2023  Will order blood work to make sure anemia is stable  Patient is under care of endocrinologist for diabetes  He is under care of nephrologist for kidneys  He follows up with cardiology regularly and stable  Refills given on iron and gabapentin and Zetia     5/19/2023  Clinically patient is stable blood pressure is stable  Routine blood work ordered for 3 months  His diabetes is doing better  Medications refilled    8/18/2023  Blood work reviewed  A1c still high  Blood pressure stable  Cholesterol okay  Discussed different food options  Patient eats a lot of processed food and to eat out a lot  Discussed better options  Continue current medications  Follow-up blood work in 3 months    11/10/2023  Blood work reviewed  Triglycerides have gone up to 388  A1c still 7.7 patient is under care of endocrinologist  Start fenofibrate  Discussed high triglycerides this will put him at risk for blockage in coronary arteries at this time  Again discussed diet and exercise  Follow-up blood work in 3 months

## 2023-12-01 ENCOUNTER — TELEPHONE (OUTPATIENT)
Dept: RADIATION ONCOLOGY | Facility: HOSPITAL | Age: 73
End: 2023-12-01

## 2023-12-01 ENCOUNTER — LAB (OUTPATIENT)
Dept: LAB | Facility: LAB | Age: 73
End: 2023-12-01
Payer: MEDICARE

## 2023-12-01 DIAGNOSIS — R69 DIAGNOSIS UNKNOWN: Primary | ICD-10-CM

## 2023-12-01 DIAGNOSIS — C61 MALIGNANT NEOPLASM OF PROSTATE (MULTI): Primary | ICD-10-CM

## 2023-12-01 LAB — PSA SERPL-MCNC: 0.26 NG/ML

## 2023-12-01 PROCEDURE — 84153 ASSAY OF PSA TOTAL: CPT

## 2023-12-01 PROCEDURE — 36415 COLL VENOUS BLD VENIPUNCTURE: CPT

## 2023-12-06 ENCOUNTER — TELEPHONE (OUTPATIENT)
Dept: RADIATION ONCOLOGY | Facility: HOSPITAL | Age: 73
End: 2023-12-06
Payer: MEDICARE

## 2023-12-06 NOTE — PROGRESS NOTES
Cancer synopsis:  Rad/onc: Dr. Gordon/ Weisent CNP    73 -year-old male returns to the radiation oncology clinic for ongoing consultation regarding his unfavorable intermediate risk prostate cancer.  MRI of  the pelvis was completed on 1/4/2020 which demonstrated a 5.3 x 3.8 x 3.7 cm prostate.  There was a PIRADS 4 lesion in the right peripheral zone extending from the mid gland to the apex.  There was no evidence of extra prosthetic extension.  There was  no involvement of the seminal vesicles.  There was no evidence of an enlarged pelvic lymph nodes.  As stated at the prior visit, the patient denies any prior history of cancer, chemotherapy, radiotherapy, autoimmune or connective tissue disorder, or cardiac  device.  He completed a screening colonoscopy in November 2018.     05/05/2022: Prostate and SV VMAT    History of presenting illness:    Patient ID: [id number]     William A Franke is a 73 y.o. male who presents for his UIR prostate cancer GS 4+3=7, IPSA <10 now s/p RT    RT Site: Prostate and SV  RT Date: 05/05/2022  Hormone therapy: No  Hot Flushes: Denies  Fatigue: Denies  Bone pain: Denies  SAMARA: n/a   ED: Denies changes in erectile function since last visit.  ED medications: No  IPSS: n/a virtual  Urinary symptoms: Denies dysuria, hematuria, frequency, urgency or urine leakage. Recently had dialysis port placed and currently is receiving dialysis, does still produce urine per patient.  Urinary Medications: No  Rectal bleeding: Denies  Other systems: Denies SOB, CP or fever      Review of systems:  Review of Systems   Constitutional:  Negative for fatigue, fever and unexpected weight change.   Respiratory:  Negative for cough, chest tightness and shortness of breath.    Cardiovascular:  Negative for chest pain, palpitations and leg swelling.   Gastrointestinal:  Negative for abdominal pain, anal bleeding, blood in stool, constipation, diarrhea and rectal pain.   Endocrine: Negative for cold intolerance,  "heat intolerance and polyuria.   Genitourinary:  Negative for decreased urine volume, difficulty urinating, dysuria, frequency, hematuria and urgency.   Musculoskeletal:  Negative for arthralgias, back pain, gait problem and joint swelling.   Skin: Negative.    Allergic/Immunologic: Negative.    Neurological:  Negative for dizziness, syncope and weakness.   Psychiatric/Behavioral: Negative.         Past Medical history  Past Medical History:   Diagnosis Date    Personal history of other endocrine, nutritional and metabolic disease     History of hypercholesterolemia    Personal history of other specified conditions 08/28/2020    History of chest pain        Surgical/family history  No family history on file.   Past Surgical History:   Procedure Laterality Date    KNEE ARTHROSCOPY W/ DEBRIDEMENT  04/15/2013    Arthroscopy Knee    SHOULDER SURGERY  04/15/2013    Shoulder Surgery        Social History  Tobacco Use: Low Risk  (11/10/2023)    Patient History     Smoking Tobacco Use: Never     Smokeless Tobacco Use: Never     Passive Exposure: Not on file         Current med list:  Current Outpatient Medications   Medication Instructions    allopurinol (ZYLOPRIM) 100 mg, oral, Daily    amLODIPine (NORVASC) 5 mg, oral, Daily    atorvastatin (LIPITOR) 80 mg, oral, Nightly    BD Ultra-Fine Mini Pen Needle 31 gauge x 3/16\" needle USE AS DIRECTED 4 times a day    carvedilol (COREG) 12.5 mg, oral, 2 times daily with meals    ergocalciferol (VITAMIN D-2) 50,000 Units, oral, Weekly    ezetimibe (ZETIA) 10 mg, oral, Daily    fenofibrate (TRICOR) 145 mg, oral, Daily    FeroSuL 325 mg, oral, Daily    gabapentin (NEURONTIN) 300 mg, oral, 2 times daily    insulin glargine (Lantus) 100 unit/mL (3 mL) pen subcutaneous    insulin lispro (HUMALOG) 10 Units, subcutaneous, 3 times daily with meals    Januvia 25 mg, oral, Daily    losartan (Cozaar) 50 mg tablet 1 tablet, oral, Daily    nitroglycerin (NITROSTAT) 0.4 mg, sublingual, Every 5 " min PRN    pantoprazole (PROTONIX) 40 mg, oral, Daily    promethazine (Phenergan) 25 mg tablet oral    sevelamer carbonate (Renvela) 800 mg tablet take 1 tablet by mouth every day before meals        Last recorded vital:  N/a virtual    Physical exam  N/a virtual    Pertinent labs:  CBC Differential Path Review   Date/Time Value Ref Range Status   10/20/2022 05:45 AM GINA  Final     Comment:      By her/his signature above, the Pathologist   listed as making the final interpretation   certifies that she/he has personally reviewed    this case.  ----------------------------------------------   MICROCYTIC ANEMIA WITH ANISOPOIKILOCYTOSIS AND POLYCHROMASIA.  IRON, B12 AND FOLATE STUDIES MAYBE CONSIDERED IF CLINICALLY INDICATED.       Prostate Specific AG   Date/Time Value Ref Range Status   12/01/2023 03:13 PM 0.26 <=4.00 ng/mL Final         Dx:  Problem List Items Addressed This Visit    None  Visit Diagnoses       Malignant neoplasm of prostate (CMS/HCC)    -  Primary    Relevant Orders    Clinic Appointment Request Follow Up; [facility] (virtual); [facility] [address] [facility] (virtual)    Prostate Specific Antigen           PSA of 0.26 was reviewed and is stable. Review of latent SE including rectal bleeding, hematuria, urinary strictures, ED where reviewed as well as how to contact office if s/s present. Denies latent SE. NCCN guidelines where reviewed and routine FUV of every 3m for first year and every 6m for four years for a total of five years was discussed. Patient verbalized understanding.        PLAN:  FUV 6m  Labs PSA in 6m  Imaging none  FUV other providers: PCP for routine evals        Please contact office with any concerns:  [facility] CNP  [phone]

## 2023-12-07 ENCOUNTER — HOSPITAL ENCOUNTER (OUTPATIENT)
Dept: RADIATION ONCOLOGY | Facility: HOSPITAL | Age: 73
Setting detail: RADIATION/ONCOLOGY SERIES
Discharge: HOME | End: 2023-12-07
Payer: MEDICARE

## 2023-12-07 DIAGNOSIS — I10 HYPERTENSION: Primary | ICD-10-CM

## 2023-12-07 DIAGNOSIS — C61 MALIGNANT NEOPLASM OF PROSTATE (MULTI): Primary | ICD-10-CM

## 2023-12-07 PROCEDURE — 99213 OFFICE O/P EST LOW 20 MIN: CPT

## 2023-12-07 RX ORDER — AMLODIPINE BESYLATE 5 MG/1
5 TABLET ORAL DAILY
Qty: 90 TABLET | Refills: 3 | Status: SHIPPED | OUTPATIENT
Start: 2023-12-07 | End: 2024-05-31 | Stop reason: SDUPTHER

## 2023-12-07 ASSESSMENT — ENCOUNTER SYMPTOMS
FEVER: 0
ANAL BLEEDING: 0
ARTHRALGIAS: 0
HEMATURIA: 0
PSYCHIATRIC NEGATIVE: 1
DIZZINESS: 0
FREQUENCY: 0
BACK PAIN: 0
PALPITATIONS: 0
RECTAL PAIN: 0
SHORTNESS OF BREATH: 0
JOINT SWELLING: 0
COUGH: 0
WEAKNESS: 0
BLOOD IN STOOL: 0
DIARRHEA: 0
FATIGUE: 0
DIFFICULTY URINATING: 0
CHEST TIGHTNESS: 0
ABDOMINAL PAIN: 0
UNEXPECTED WEIGHT CHANGE: 0
CONSTIPATION: 0
DYSURIA: 0
ALLERGIC/IMMUNOLOGIC NEGATIVE: 1

## 2023-12-08 ENCOUNTER — APPOINTMENT (OUTPATIENT)
Dept: RADIATION ONCOLOGY | Facility: HOSPITAL | Age: 73
End: 2023-12-08
Payer: MEDICARE

## 2024-01-02 DIAGNOSIS — E78.5 HYPERLIPIDEMIA: Primary | ICD-10-CM

## 2024-01-02 RX ORDER — ATORVASTATIN CALCIUM 80 MG/1
80 TABLET, FILM COATED ORAL NIGHTLY
Qty: 90 TABLET | Refills: 3 | Status: SHIPPED | OUTPATIENT
Start: 2024-01-02 | End: 2024-05-31 | Stop reason: SDUPTHER

## 2024-01-22 ENCOUNTER — TELEPHONE (OUTPATIENT)
Dept: PRIMARY CARE | Facility: CLINIC | Age: 74
End: 2024-01-22

## 2024-02-06 PROBLEM — H25.9 SENILE CATARACT: Status: ACTIVE | Noted: 2023-11-27

## 2024-02-06 PROBLEM — H52.4 PRESBYOPIA: Status: ACTIVE | Noted: 2023-11-27

## 2024-02-06 PROBLEM — H43.811 PVD (POSTERIOR VITREOUS DETACHMENT), RIGHT EYE: Status: ACTIVE | Noted: 2023-11-29

## 2024-02-06 PROBLEM — H35.059 CHOROIDAL NEOVASCULARIZATION: Status: ACTIVE | Noted: 2023-11-27

## 2024-02-07 ENCOUNTER — LAB (OUTPATIENT)
Dept: LAB | Facility: LAB | Age: 74
End: 2024-02-07
Payer: MEDICARE

## 2024-02-07 DIAGNOSIS — E78.49 OTHER HYPERLIPIDEMIA: ICD-10-CM

## 2024-02-07 DIAGNOSIS — Z99.2 TYPE 2 DIABETES MELLITUS WITH CHRONIC KIDNEY DISEASE ON CHRONIC DIALYSIS, WITH LONG-TERM CURRENT USE OF INSULIN (MULTI): ICD-10-CM

## 2024-02-07 DIAGNOSIS — E11.22 TYPE 2 DIABETES MELLITUS WITH CHRONIC KIDNEY DISEASE ON CHRONIC DIALYSIS, WITH LONG-TERM CURRENT USE OF INSULIN (MULTI): ICD-10-CM

## 2024-02-07 DIAGNOSIS — Z79.4 TYPE 2 DIABETES MELLITUS WITH CHRONIC KIDNEY DISEASE ON CHRONIC DIALYSIS, WITH LONG-TERM CURRENT USE OF INSULIN (MULTI): ICD-10-CM

## 2024-02-07 DIAGNOSIS — N18.6 TYPE 2 DIABETES MELLITUS WITH CHRONIC KIDNEY DISEASE ON CHRONIC DIALYSIS, WITH LONG-TERM CURRENT USE OF INSULIN (MULTI): ICD-10-CM

## 2024-02-07 DIAGNOSIS — I10 PRIMARY HYPERTENSION: ICD-10-CM

## 2024-02-07 LAB
ALBUMIN SERPL BCP-MCNC: 4.2 G/DL (ref 3.4–5)
ALP SERPL-CCNC: 62 U/L (ref 33–136)
ALT SERPL W P-5'-P-CCNC: 14 U/L (ref 10–52)
ANION GAP SERPL CALC-SCNC: 18 MMOL/L (ref 10–20)
AST SERPL W P-5'-P-CCNC: 22 U/L (ref 9–39)
BILIRUB SERPL-MCNC: 0.5 MG/DL (ref 0–1.2)
BUN SERPL-MCNC: 41 MG/DL (ref 6–23)
CALCIUM SERPL-MCNC: 8.8 MG/DL (ref 8.6–10.6)
CHLORIDE SERPL-SCNC: 102 MMOL/L (ref 98–107)
CO2 SERPL-SCNC: 28 MMOL/L (ref 21–32)
CREAT SERPL-MCNC: 8.54 MG/DL (ref 0.5–1.3)
EGFRCR SERPLBLD CKD-EPI 2021: 6 ML/MIN/1.73M*2
ERYTHROCYTE [DISTWIDTH] IN BLOOD BY AUTOMATED COUNT: 17 % (ref 11.5–14.5)
EST. AVERAGE GLUCOSE BLD GHB EST-MCNC: 151 MG/DL
GLUCOSE SERPL-MCNC: 203 MG/DL (ref 74–99)
HBA1C MFR BLD: 6.9 %
HCT VFR BLD AUTO: 32.5 % (ref 41–52)
HGB BLD-MCNC: 10.3 G/DL (ref 13.5–17.5)
MCH RBC QN AUTO: 31.6 PG (ref 26–34)
MCHC RBC AUTO-ENTMCNC: 31.7 G/DL (ref 32–36)
MCV RBC AUTO: 100 FL (ref 80–100)
NRBC BLD-RTO: 0 /100 WBCS (ref 0–0)
PLATELET # BLD AUTO: 220 X10*3/UL (ref 150–450)
POTASSIUM SERPL-SCNC: 5.6 MMOL/L (ref 3.5–5.3)
PROT SERPL-MCNC: 6.2 G/DL (ref 6.4–8.2)
RBC # BLD AUTO: 3.26 X10*6/UL (ref 4.5–5.9)
SODIUM SERPL-SCNC: 142 MMOL/L (ref 136–145)
WBC # BLD AUTO: 5.4 X10*3/UL (ref 4.4–11.3)

## 2024-02-07 PROCEDURE — 83036 HEMOGLOBIN GLYCOSYLATED A1C: CPT

## 2024-02-07 PROCEDURE — 80053 COMPREHEN METABOLIC PANEL: CPT

## 2024-02-07 PROCEDURE — 36415 COLL VENOUS BLD VENIPUNCTURE: CPT

## 2024-02-07 PROCEDURE — 85027 COMPLETE CBC AUTOMATED: CPT

## 2024-02-12 ENCOUNTER — OFFICE VISIT (OUTPATIENT)
Dept: PRIMARY CARE | Facility: CLINIC | Age: 74
End: 2024-02-12
Payer: MEDICARE

## 2024-02-12 VITALS
DIASTOLIC BLOOD PRESSURE: 74 MMHG | SYSTOLIC BLOOD PRESSURE: 130 MMHG | WEIGHT: 188.8 LBS | HEIGHT: 68 IN | BODY MASS INDEX: 28.61 KG/M2

## 2024-02-12 DIAGNOSIS — N18.6 TYPE 2 DIABETES MELLITUS WITH CHRONIC KIDNEY DISEASE ON CHRONIC DIALYSIS, WITH LONG-TERM CURRENT USE OF INSULIN (MULTI): ICD-10-CM

## 2024-02-12 DIAGNOSIS — I10 PRIMARY HYPERTENSION: ICD-10-CM

## 2024-02-12 DIAGNOSIS — Z79.4 TYPE 2 DIABETES MELLITUS WITH CHRONIC KIDNEY DISEASE ON CHRONIC DIALYSIS, WITH LONG-TERM CURRENT USE OF INSULIN (MULTI): ICD-10-CM

## 2024-02-12 DIAGNOSIS — Z99.2 TYPE 2 DIABETES MELLITUS WITH CHRONIC KIDNEY DISEASE ON CHRONIC DIALYSIS, WITH LONG-TERM CURRENT USE OF INSULIN (MULTI): ICD-10-CM

## 2024-02-12 DIAGNOSIS — E11.22 TYPE 2 DIABETES MELLITUS WITH CHRONIC KIDNEY DISEASE ON CHRONIC DIALYSIS, WITH LONG-TERM CURRENT USE OF INSULIN (MULTI): ICD-10-CM

## 2024-02-12 DIAGNOSIS — E55.9 VITAMIN D DEFICIENCY: ICD-10-CM

## 2024-02-12 DIAGNOSIS — E78.2 MIXED HYPERLIPIDEMIA: ICD-10-CM

## 2024-02-12 PROCEDURE — 3078F DIAST BP <80 MM HG: CPT | Performed by: INTERNAL MEDICINE

## 2024-02-12 PROCEDURE — 1159F MED LIST DOCD IN RCRD: CPT | Performed by: INTERNAL MEDICINE

## 2024-02-12 PROCEDURE — 3075F SYST BP GE 130 - 139MM HG: CPT | Performed by: INTERNAL MEDICINE

## 2024-02-12 PROCEDURE — 99213 OFFICE O/P EST LOW 20 MIN: CPT | Performed by: INTERNAL MEDICINE

## 2024-02-12 PROCEDURE — 1036F TOBACCO NON-USER: CPT | Performed by: INTERNAL MEDICINE

## 2024-02-12 PROCEDURE — 1126F AMNT PAIN NOTED NONE PRSNT: CPT | Performed by: INTERNAL MEDICINE

## 2024-02-12 PROCEDURE — 4010F ACE/ARB THERAPY RXD/TAKEN: CPT | Performed by: INTERNAL MEDICINE

## 2024-02-12 PROCEDURE — 3044F HG A1C LEVEL LT 7.0%: CPT | Performed by: INTERNAL MEDICINE

## 2024-02-12 ASSESSMENT — ENCOUNTER SYMPTOMS
DEPRESSION: 0
OCCASIONAL FEELINGS OF UNSTEADINESS: 0
LOSS OF SENSATION IN FEET: 0

## 2024-02-13 DIAGNOSIS — Z99.2 TYPE 2 DIABETES MELLITUS WITH CHRONIC KIDNEY DISEASE ON CHRONIC DIALYSIS, WITH LONG-TERM CURRENT USE OF INSULIN (MULTI): ICD-10-CM

## 2024-02-13 DIAGNOSIS — G62.89 OTHER POLYNEUROPATHY: ICD-10-CM

## 2024-02-13 DIAGNOSIS — I10 PRIMARY HYPERTENSION: ICD-10-CM

## 2024-02-13 DIAGNOSIS — Z79.4 TYPE 2 DIABETES MELLITUS WITH CHRONIC KIDNEY DISEASE ON CHRONIC DIALYSIS, WITH LONG-TERM CURRENT USE OF INSULIN (MULTI): ICD-10-CM

## 2024-02-13 DIAGNOSIS — E78.49 OTHER HYPERLIPIDEMIA: ICD-10-CM

## 2024-02-13 DIAGNOSIS — E11.22 TYPE 2 DIABETES MELLITUS WITH CHRONIC KIDNEY DISEASE ON CHRONIC DIALYSIS, WITH LONG-TERM CURRENT USE OF INSULIN (MULTI): ICD-10-CM

## 2024-02-13 DIAGNOSIS — E78.5 HYPERLIPIDEMIA: ICD-10-CM

## 2024-02-13 DIAGNOSIS — I10 HYPERTENSION: ICD-10-CM

## 2024-02-13 DIAGNOSIS — N18.6 TYPE 2 DIABETES MELLITUS WITH CHRONIC KIDNEY DISEASE ON CHRONIC DIALYSIS, WITH LONG-TERM CURRENT USE OF INSULIN (MULTI): ICD-10-CM

## 2024-02-13 RX ORDER — FENOFIBRATE 145 MG/1
145 TABLET, FILM COATED ORAL DAILY
Qty: 90 TABLET | Refills: 0 | Status: SHIPPED | OUTPATIENT
Start: 2024-02-13 | End: 2025-02-12

## 2024-02-13 RX ORDER — GABAPENTIN 300 MG/1
300 CAPSULE ORAL 2 TIMES DAILY
Qty: 180 CAPSULE | Refills: 1 | Status: SHIPPED | OUTPATIENT
Start: 2024-02-13 | End: 2024-05-31 | Stop reason: SDUPTHER

## 2024-02-13 RX ORDER — FENOFIBRATE 145 MG/1
145 TABLET, FILM COATED ORAL DAILY
Qty: 90 TABLET | Refills: 0 | Status: SHIPPED | OUTPATIENT
Start: 2024-02-13 | End: 2024-05-31 | Stop reason: SDUPTHER

## 2024-02-13 RX ORDER — GABAPENTIN 300 MG/1
300 CAPSULE ORAL 2 TIMES DAILY
Qty: 180 CAPSULE | Refills: 0 | Status: SHIPPED | OUTPATIENT
Start: 2024-02-13

## 2024-02-13 RX ORDER — EZETIMIBE 10 MG/1
10 TABLET ORAL DAILY
Qty: 90 TABLET | Refills: 0 | Status: SHIPPED | OUTPATIENT
Start: 2024-02-13 | End: 2024-08-11

## 2024-02-13 RX ORDER — EZETIMIBE 10 MG/1
10 TABLET ORAL DAILY
Qty: 90 TABLET | Refills: 0 | Status: SHIPPED | OUTPATIENT
Start: 2024-02-13 | End: 2024-05-31 | Stop reason: SDUPTHER

## 2024-02-20 NOTE — PROGRESS NOTES
Subjective   Patient ID: William A Franke is a 73 y.o. male who presents for Follow-up and Med Refill.    Med Refill    Patient is here for follow-up on diabetes hypertension high cholesterol  He is not on any aspirin or any blood thinner because he bleeds during dialysis  He is worried about his stents closing up  Overall he is doing well  He manages his diabetes by taking extra insulin whenever he eats at home.  He does not watch his diet    Past recap   patient is here for follow-up  Follow-up on diabetes hypertension high cholesterol  Doing hemodialysis for end-stage renal disease     patient is here for follow-up  His fistula still has bleeding off-and-on but not as much  Follow-up on diabetes hypertension high cholesterol  He is getting dialysis 3 times a week  Overall doing fine      patient is here for hospital follow-up  He was admitted for bleeding AV fistula.  Required blood transfusion.  Needs refill on gabapentin and Zetia  Complains of having stiffness in the hands  Doing hemodialysis 3 times a week    patient was hospitalized and had another heart attack at [facility]  He is still complaining of having a lot of shortness of breath and cough  His kidney functions did deteriorate but because he is making urine did not get started on the dialysis yet  Is doing blood work today to reevaluate his kidney function  But his main concern is his cough     Patient went to the hospital and had MI  He was catheterized again and had stent placed  Since he came home he is having very poor appetite not eating drinking not feeling good and blood pressure is running high in 180s and 200s  He had stent placed 3 weeks ago     Patient here for follow-up on diabetes hypertension high cholesterol chronic kidney disease and anemia  Follow-up on blood work  Patient was hospitalized for another non-ST elevation MI  Since discharge his chest pain is doing better but he still getting off-and-on chest pain  Feels very  "anxious  Feels very limited in his activity        Patient is here with complaints of rectal bleed this morning he started bleeding did not realize his bleeding until it messed up his underwear and pants. He had similar episode 5 days ago  He is having some discomfort in the lower abdomen denies any fever or chills  He had problems with hemorrhoids many years ago. But he has no pain and denies constipation     Patient is here for hospital follow-up  He presented with chest pain and acute MI. He had to have cardiac cath done with close monitoring of kidney function  Cardiac stable  Patient had stent placed and did not require dialysis  He is here for follow-up on blood work for kidney  His blood sugars were doing better in the hospital. Now they are running high again  He is getting Procrit every 2 weeks            Virtual visit  Patient here for follow-up on diabetes   Patient is eating little better but not able to exercise because  Blood sugars are running high  The pressure is running little high but she is coming to see Dr. Ardon on Thursday  He is managing with his insulin blood sugar little better  Finished radiation treatment for prostate cancer  Works outdoors  Did blood work needs medications refilled   refuses to see the dietitian  Does not take Humalog insulin because of fear has appointment with the urologist        Review of Systems    Objective   /74   Ht 1.727 m (5' 8\")   Wt 85.6 kg (188 lb 12.8 oz)   BMI 28.71 kg/m²     Physical Exam  Vitals reviewed.   Constitutional:       Appearance: Normal appearance.   HENT:      Head: Normocephalic and atraumatic.      Right Ear: Tympanic membrane, ear canal and external ear normal.      Left Ear: Tympanic membrane, ear canal and external ear normal.      Nose: Nose normal.      Mouth/Throat:      Pharynx: Oropharynx is clear.   Eyes:      Extraocular Movements: Extraocular movements intact.      Conjunctiva/sclera: Conjunctivae normal.      Pupils: " Pupils are equal, round, and reactive to light.   Cardiovascular:      Rate and Rhythm: Normal rate and regular rhythm.      Pulses: Normal pulses.      Heart sounds: Normal heart sounds.   Pulmonary:      Effort: Pulmonary effort is normal.      Breath sounds: Normal breath sounds.   Abdominal:      General: Abdomen is flat. Bowel sounds are normal.      Palpations: Abdomen is soft.   Musculoskeletal:      Cervical back: Normal range of motion and neck supple.   Skin:     General: Skin is warm and dry.   Neurological:      General: No focal deficit present.      Mental Status: He is alert and oriented to person, place, and time.   Psychiatric:         Mood and Affect: Mood normal.         Assessment/Plan   Problem List Items Addressed This Visit          Cardiac and Vasculature    Hypertension    Relevant Orders    CBC    Comprehensive Metabolic Panel    Lipid Panel    Vitamin D 25-Hydroxy,Total (for eval of Vitamin D levels)    Hemoglobin A1C    Hyperlipidemia    Relevant Orders    CBC    Comprehensive Metabolic Panel    Lipid Panel    Vitamin D 25-Hydroxy,Total (for eval of Vitamin D levels)    Hemoglobin A1C       Endocrine/Metabolic    Diabetes mellitus (CMS/HCC)    Relevant Orders    CBC    Comprehensive Metabolic Panel    Lipid Panel    Vitamin D 25-Hydroxy,Total (for eval of Vitamin D levels)    Hemoglobin A1C    Vitamin D deficiency    Relevant Orders    CBC    Comprehensive Metabolic Panel    Lipid Panel    Vitamin D 25-Hydroxy,Total (for eval of Vitamin D levels)    Hemoglobin A1C   10/14  Call Dr. Palmer  He is trying to arrange outpatient follow-up for cardiac catheterization and carotid stent which she needs  Because of his anemia and severe kidney disease he is planning in stages  He asked me to order CBC BMP and he will call tomorrow to schedule for Next week discussed with the patient and the wife agreed with the plan  Advised him to go to the emergency room if symptoms persist  Continue diet  exercise follow-up in 3 months     11/2  Concerned that patient might be getting anemic or his kidney function would be getting worse  Stat CBC BMP ordered  Increase Coreg to 2 tablets twice a day  Blood work results reviewed  Kidney functions are in fact better anemia stable  Patient could be not feeling good because of elevated blood pressure  Recheck in 2 weeks     9/9  Will get x-ray chest  Tessalon cough drops albuterol inhaler  Cholesterol medication refill  Blood pressure is stable  Will see if kidney functions looks okay to hold off dialysis  Patient wife is planning to be able to go to [place] before start of dialysis     5/1/2023  Will order blood work to make sure anemia is stable  Patient is under care of endocrinologist for diabetes  He is under care of nephrologist for kidneys  He follows up with cardiology regularly and stable  Refills given on iron and gabapentin and Zetia     5/19/2023  Clinically patient is stable blood pressure is stable  Routine blood work ordered for 3 months  His diabetes is doing better  Medications refilled    8/18/2023  Blood work reviewed  A1c still high  Blood pressure stable  Cholesterol okay  Discussed different food options  Patient eats a lot of processed food and to eat out a lot  Discussed better options  Continue current medications  Follow-up blood work in 3 months    11/10/2023  Blood work reviewed  Triglycerides have gone up to 388  A1c still 7.7 patient is under care of endocrinologist  Start fenofibrate  Discussed high triglycerides this will put him at risk for blockage in coronary arteries at this time  Again discussed diet and exercise  Follow-up blood work in 3 months    2/12/2024  Blood work reviewed  A1c down to 6.9 anemia stable  Cholesterol good but triglycerides always higher  Follow-up blood work in 3 months continue current medications  Patient is doing dialysis

## 2024-02-23 ENCOUNTER — OFFICE VISIT (OUTPATIENT)
Dept: ENDOCRINOLOGY | Facility: CLINIC | Age: 74
End: 2024-02-23
Payer: MEDICARE

## 2024-02-23 VITALS — SYSTOLIC BLOOD PRESSURE: 134 MMHG | WEIGHT: 189 LBS | DIASTOLIC BLOOD PRESSURE: 76 MMHG | BODY MASS INDEX: 28.74 KG/M2

## 2024-02-23 DIAGNOSIS — Z99.2 TYPE 2 DIABETES MELLITUS WITH CHRONIC KIDNEY DISEASE ON CHRONIC DIALYSIS, WITH LONG-TERM CURRENT USE OF INSULIN (MULTI): Primary | ICD-10-CM

## 2024-02-23 DIAGNOSIS — N18.6 TYPE 2 DIABETES MELLITUS WITH CHRONIC KIDNEY DISEASE ON CHRONIC DIALYSIS, WITH LONG-TERM CURRENT USE OF INSULIN (MULTI): Primary | ICD-10-CM

## 2024-02-23 DIAGNOSIS — Z79.4 TYPE 2 DIABETES MELLITUS WITH CHRONIC KIDNEY DISEASE ON CHRONIC DIALYSIS, WITH LONG-TERM CURRENT USE OF INSULIN (MULTI): Primary | ICD-10-CM

## 2024-02-23 DIAGNOSIS — E11.22 TYPE 2 DIABETES MELLITUS WITH CHRONIC KIDNEY DISEASE ON CHRONIC DIALYSIS, WITH LONG-TERM CURRENT USE OF INSULIN (MULTI): Primary | ICD-10-CM

## 2024-02-23 DIAGNOSIS — I10 PRIMARY HYPERTENSION: ICD-10-CM

## 2024-02-23 DIAGNOSIS — E78.5 HYPERLIPIDEMIA, UNSPECIFIED HYPERLIPIDEMIA TYPE: ICD-10-CM

## 2024-02-23 PROCEDURE — 1126F AMNT PAIN NOTED NONE PRSNT: CPT | Performed by: INTERNAL MEDICINE

## 2024-02-23 PROCEDURE — 4010F ACE/ARB THERAPY RXD/TAKEN: CPT | Performed by: INTERNAL MEDICINE

## 2024-02-23 PROCEDURE — 3044F HG A1C LEVEL LT 7.0%: CPT | Performed by: INTERNAL MEDICINE

## 2024-02-23 PROCEDURE — 1036F TOBACCO NON-USER: CPT | Performed by: INTERNAL MEDICINE

## 2024-02-23 PROCEDURE — 3078F DIAST BP <80 MM HG: CPT | Performed by: INTERNAL MEDICINE

## 2024-02-23 PROCEDURE — 3075F SYST BP GE 130 - 139MM HG: CPT | Performed by: INTERNAL MEDICINE

## 2024-02-23 PROCEDURE — 1159F MED LIST DOCD IN RCRD: CPT | Performed by: INTERNAL MEDICINE

## 2024-02-23 PROCEDURE — 99214 OFFICE O/P EST MOD 30 MIN: CPT | Performed by: INTERNAL MEDICINE

## 2024-02-23 RX ORDER — PEN NEEDLE, DIABETIC 31 GX5/16"
NEEDLE, DISPOSABLE MISCELLANEOUS
Qty: 400 EACH | Refills: 2 | Status: SHIPPED | OUTPATIENT
Start: 2024-02-23

## 2024-02-23 NOTE — PROGRESS NOTES
"Patient ID: William A Franke is a 73 y.o. male who presents for Follow-up.  HPI  The patient comes in for follow up.    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    He continues on Lantus 0 to 30 units daily Humalog 4 to 5 units and Januvia.    He continues taking the mealtime insulin postmeal.    He has not been getting any low blood sugars.    He continues using the freestyle shahbaz but did not bring the reader.    Physically he has no complaints.    ROS  Comprehensive review of systems is negative.    Objective   Physical Exam  Visit Vitals  /76      Vitals:    02/23/24 0901   Weight: 85.7 kg (189 lb)      Body mass index is 28.74 kg/m².      Weight 189 up 6 pounds    ENT normal. No adenopathy  Fundi normal  Thyroid palpable and normal. No nodules  Chest clear to auscultation  Heart sounds are normal  Abdomen nontender. Bowel sounds normal. No organomegaly  Feet are okay  Normal vibration and monofilament sensation normal pulses, no lesions    Current Outpatient Medications   Medication Sig Dispense Refill    allopurinol (Zyloprim) 100 mg tablet Take 1 tablet (100 mg) by mouth once daily.      amLODIPine (Norvasc) 5 mg tablet Take 1 tablet (5 mg) by mouth once daily. 90 tablet 3    atorvastatin (Lipitor) 80 mg tablet Take 1 tablet (80 mg) by mouth once daily at bedtime. 90 tablet 3    BD Ultra-Fine Mini Pen Needle 31 gauge x 3/16\" needle USE AS DIRECTED 4 times a day      carvedilol (Coreg) 12.5 mg tablet Take 1 tablet (12.5 mg) by mouth 2 times a day with meals.      ergocalciferol (Vitamin D-2) 1.25 MG (89979 UT) capsule Take 1 capsule (50,000 Units) by mouth 1 (one) time per week.      ezetimibe (Zetia) 10 mg tablet Take 1 tablet (10 mg) by mouth once daily. 90 tablet 0    ezetimibe (Zetia) 10 mg tablet Take 1 tablet (10 mg) by mouth once daily. 90 tablet 0    fenofibrate (Tricor) 145 mg tablet Take 1 tablet (145 mg) by mouth once daily. 90 tablet 0    fenofibrate " (Tricor) 145 mg tablet Take 1 tablet (145 mg) by mouth once daily. 90 tablet 0    FeroSuL 325 mg (65 mg iron) tablet Take 1 tablet (325 mg) by mouth once daily. 90 tablet 1    gabapentin (Neurontin) 300 mg capsule Take 1 capsule (300 mg) by mouth 2 times a day. 180 capsule 1    gabapentin (Neurontin) 300 mg capsule Take 1 capsule (300 mg) by mouth 2 times a day. 180 capsule 0    insulin glargine (Lantus) 100 unit/mL (3 mL) pen Inject under the skin.      insulin lispro (HumaLOG) 100 unit/mL injection Inject 10 Units under the skin 3 times a day with meals.      Januvia 25 mg tablet Take 1 tablet (25 mg) by mouth once daily. 30 tablet 11    losartan (Cozaar) 50 mg tablet Take 1 tablet (50 mg) by mouth once daily.      nitroglycerin (Nitrostat) 0.4 mg SL tablet Place 1 tablet (0.4 mg) under the tongue every 5 minutes if needed for chest pain.      pantoprazole (ProtoNix) 40 mg EC tablet Take 1 tablet (40 mg) by mouth once daily.      promethazine (Phenergan) 25 mg tablet Take by mouth.      sevelamer carbonate (Renvela) 800 mg tablet take 1 tablet by mouth every day before meals       No current facility-administered medications for this visit.       Assessment/Plan     1.  Type 2 diabetes on insulin  2.  Renal insufficiency  3.  Hypertension  4.  Hyperlipidemia    We reviewed his most recent blood work.    He will continue his current regimen.    He will continue to keep a close eye on blood sugars and make adjustments if warranted.    He will follow-up with me in 6 months sooner as needed.

## 2024-03-08 ENCOUNTER — OFFICE VISIT (OUTPATIENT)
Dept: CARDIOLOGY | Facility: CLINIC | Age: 74
End: 2024-03-08
Payer: MEDICARE

## 2024-03-08 VITALS
OXYGEN SATURATION: 96 % | HEART RATE: 60 BPM | DIASTOLIC BLOOD PRESSURE: 65 MMHG | BODY MASS INDEX: 27.89 KG/M2 | WEIGHT: 184 LBS | HEIGHT: 68 IN | SYSTOLIC BLOOD PRESSURE: 125 MMHG

## 2024-03-08 DIAGNOSIS — I25.10 CORONARY ARTERY DISEASE INVOLVING NATIVE HEART WITHOUT ANGINA PECTORIS, UNSPECIFIED VESSEL OR LESION TYPE: Primary | ICD-10-CM

## 2024-03-08 PROCEDURE — 3044F HG A1C LEVEL LT 7.0%: CPT | Performed by: NURSE PRACTITIONER

## 2024-03-08 PROCEDURE — 1126F AMNT PAIN NOTED NONE PRSNT: CPT | Performed by: NURSE PRACTITIONER

## 2024-03-08 PROCEDURE — 1160F RVW MEDS BY RX/DR IN RCRD: CPT | Performed by: NURSE PRACTITIONER

## 2024-03-08 PROCEDURE — 4010F ACE/ARB THERAPY RXD/TAKEN: CPT | Performed by: NURSE PRACTITIONER

## 2024-03-08 PROCEDURE — 99214 OFFICE O/P EST MOD 30 MIN: CPT | Performed by: NURSE PRACTITIONER

## 2024-03-08 PROCEDURE — 3074F SYST BP LT 130 MM HG: CPT | Performed by: NURSE PRACTITIONER

## 2024-03-08 PROCEDURE — 3078F DIAST BP <80 MM HG: CPT | Performed by: NURSE PRACTITIONER

## 2024-03-08 PROCEDURE — 1159F MED LIST DOCD IN RCRD: CPT | Performed by: NURSE PRACTITIONER

## 2024-03-08 PROCEDURE — 1036F TOBACCO NON-USER: CPT | Performed by: NURSE PRACTITIONER

## 2024-03-08 RX ORDER — ASPIRIN 81 MG/1
81 TABLET ORAL DAILY
COMMUNITY

## 2024-03-08 ASSESSMENT — LIFESTYLE VARIABLES
HOW OFTEN DO YOU HAVE A DRINK CONTAINING ALCOHOL: MONTHLY OR LESS
SKIP TO QUESTIONS 9-10: 1
HOW MANY STANDARD DRINKS CONTAINING ALCOHOL DO YOU HAVE ON A TYPICAL DAY: 1 OR 2
HOW OFTEN DO YOU HAVE SIX OR MORE DRINKS ON ONE OCCASION: NEVER
AUDIT-C TOTAL SCORE: 1

## 2024-03-08 ASSESSMENT — ENCOUNTER SYMPTOMS
RESPIRATORY NEGATIVE: 1
LOSS OF SENSATION IN FEET: 0
OCCASIONAL FEELINGS OF UNSTEADINESS: 0
MUSCULOSKELETAL NEGATIVE: 1
NEUROLOGICAL NEGATIVE: 1
DEPRESSION: 0
GASTROINTESTINAL NEGATIVE: 1
CARDIOVASCULAR NEGATIVE: 1
CONSTITUTIONAL NEGATIVE: 1

## 2024-03-08 ASSESSMENT — PAIN SCALES - GENERAL: PAINLEVEL: 0-NO PAIN

## 2024-03-08 NOTE — PROGRESS NOTES
"Chief Complaint:   Follow-up (Last seen in 2022)    History Of Present Illness:    .Mr Franke returns in follow up.  Denies chest pain, sob, palpitations or pedal edema.  Has not been in for about 2 yrs.         Last Recorded Vitals:  Blood pressure 125/65, pulse 60, height 1.727 m (5' 8\"), weight 83.5 kg (184 lb), SpO2 96 %.     Past Medical History:  Past Medical History:   Diagnosis Date    Personal history of other endocrine, nutritional and metabolic disease     History of hypercholesterolemia    Personal history of other specified conditions 08/28/2020    History of chest pain        Past Surgical History:  Past Surgical History:   Procedure Laterality Date    KNEE ARTHROSCOPY W/ DEBRIDEMENT  04/15/2013    Arthroscopy Knee    SHOULDER SURGERY  04/15/2013    Shoulder Surgery       Social History:  Social History     Socioeconomic History    Marital status:      Spouse name: None    Number of children: None    Years of education: None    Highest education level: None   Occupational History    None   Tobacco Use    Smoking status: Never    Smokeless tobacco: Never   Substance and Sexual Activity    Alcohol use: Never    Drug use: Never    Sexual activity: None   Other Topics Concern    None   Social History Narrative    None     Social Determinants of Health     Financial Resource Strain: Not on file   Food Insecurity: Not on file   Transportation Needs: Not on file   Physical Activity: Not on file   Stress: Not on file   Social Connections: Not on file   Intimate Partner Violence: Not on file   Housing Stability: Not on file       Family History:  No family history on file.      Allergies:  Patient has no known allergies.    Outpatient Medications:  Current Outpatient Medications   Medication Sig Dispense Refill    allopurinol (Zyloprim) 100 mg tablet Take 1 tablet (100 mg) by mouth once daily.      amLODIPine (Norvasc) 5 mg tablet Take 1 tablet (5 mg) by mouth once daily. 90 tablet 3    aspirin 81 mg EC " "tablet Take 1 tablet (81 mg) by mouth once daily.      atorvastatin (Lipitor) 80 mg tablet Take 1 tablet (80 mg) by mouth once daily at bedtime. 90 tablet 3    B complex-vitamin C-folic acid (Nephro-Shea) 0.8 mg tablet Take 1 tablet by mouth once daily.      BD Ultra-Fine Mini Pen Needle 31 gauge x 3/16\" needle USE AS DIRECTED 4 times a day 400 each 2    carvedilol (Coreg) 12.5 mg tablet Take 1 tablet (12.5 mg) by mouth 2 times a day with meals.      ergocalciferol (Vitamin D-2) 1.25 MG (44596 UT) capsule Take 1 capsule (50,000 Units) by mouth 1 (one) time per week.      ezetimibe (Zetia) 10 mg tablet Take 1 tablet (10 mg) by mouth once daily. 90 tablet 0    ezetimibe (Zetia) 10 mg tablet Take 1 tablet (10 mg) by mouth once daily. 90 tablet 0    fenofibrate (Tricor) 145 mg tablet Take 1 tablet (145 mg) by mouth once daily. 90 tablet 0    fenofibrate (Tricor) 145 mg tablet Take 1 tablet (145 mg) by mouth once daily. 90 tablet 0    FeroSuL 325 mg (65 mg iron) tablet Take 1 tablet (325 mg) by mouth once daily. 90 tablet 1    gabapentin (Neurontin) 300 mg capsule Take 1 capsule (300 mg) by mouth 2 times a day. 180 capsule 1    gabapentin (Neurontin) 300 mg capsule Take 1 capsule (300 mg) by mouth 2 times a day. 180 capsule 0    insulin glargine (Lantus) 100 unit/mL (3 mL) pen Inject under the skin.      insulin lispro (HumaLOG) 100 unit/mL injection Inject 10 Units under the skin 3 times a day with meals.      Januvia 25 mg tablet Take 1 tablet (25 mg) by mouth once daily. 30 tablet 11    losartan (Cozaar) 50 mg tablet Take 1 tablet (50 mg) by mouth once daily.      nitroglycerin (Nitrostat) 0.4 mg SL tablet Place 1 tablet (0.4 mg) under the tongue every 5 minutes if needed for chest pain.      pantoprazole (ProtoNix) 40 mg EC tablet Take 1 tablet (40 mg) by mouth once daily.      promethazine (Phenergan) 25 mg tablet Take by mouth.      sevelamer carbonate (Renvela) 800 mg tablet take 1 tablet by mouth every day before " meals       No current facility-administered medications for this visit.        Physical Exam:  Cardiovascular:      PMI at left midclavicular line. Normal rate. Regular rhythm. Normal S1. Normal S2.       Murmurs: There is a grade 2/6 systolic murmur.      No gallop.  No click. No rub.   Pulses:     Intact distal pulses.   Edema:     Peripheral edema absent.         ROS:  Review of Systems   Constitutional: Negative.   Cardiovascular: Negative.    Respiratory: Negative.     Skin: Negative.    Musculoskeletal: Negative.    Gastrointestinal: Negative.    Genitourinary: Negative.    Neurological: Negative.           Last Labs:  CBC -  Lab Results   Component Value Date    WBC 5.4 02/07/2024    HGB 10.3 (L) 02/07/2024    HCT 32.5 (L) 02/07/2024     02/07/2024     02/07/2024       CMP -  Lab Results   Component Value Date    CALCIUM 8.8 02/07/2024    PHOS 5.3 (H) 10/31/2022    PROT 6.2 (L) 02/07/2024    ALBUMIN 4.2 02/07/2024    AST 22 02/07/2024    ALT 14 02/07/2024    ALKPHOS 62 02/07/2024    BILITOT 0.5 02/07/2024       LIPID PANEL -   Lab Results   Component Value Date    CHOL 114 11/06/2023    TRIG 388 (H) 11/06/2023    HDL 29.7 11/06/2023    CHHDL 3.8 11/06/2023    LDLF 13 08/14/2023    VLDL 78 (H) 11/06/2023    NHDL 84 11/06/2023       RENAL FUNCTION PANEL -   Lab Results   Component Value Date    GLUCOSE 203 (H) 02/07/2024     02/07/2024    K 5.6 (H) 02/07/2024     02/07/2024    CO2 28 02/07/2024    ANIONGAP 18 02/07/2024    BUN 41 (H) 02/07/2024    CREATININE 8.54 (H) 02/07/2024    GFRMALE 5 (A) 08/14/2023    CALCIUM 8.8 02/07/2024    PHOS 5.3 (H) 10/31/2022    ALBUMIN 4.2 02/07/2024        Lab Results   Component Value Date    HGBA1C 6.9 (H) 02/07/2024         Assessment/Plan   Problem List Items Addressed This Visit    None    Assessment:     1. Hypertension. The patient's blood pressure is elevated on atenolol 25 mg daily, amlodipine 10 mg daily, and Benicar HCT 20/12.5 mg daily.  The patient's recent lab work from 04/17/2020 shows some ongoing progression of chronic kidney disease and his creatinine is now 3.34 The patient completed a course of treatment for prostate carcinoma. He has noted some increased lower extremity edema. Given his progressing chronic kidney disease the benicar and furosemide were stopped. He wias started on hydralazine 50 mg twice a day and continue amlodipine 10 mg daily. He had an echocardiogram done 9/2/2020 which showed an EF of 65%, left atrium mild to moderately dilated with a PASP of 36 mmHg. The patient's blood pressure presently is well within the range of normal. He presently is on high-dose hydralazine 100 mg 3 times daily isosorbide mononitrate 120 mg daily amlodipine 5 mg daily carvedilol 12.5 mg twice daily. If blood pressure readings remained in current range may be able to reduce the dose of hydralazine and possibly the isosorbide as well. Is off both hydralazine and isosorbide.  Echo 08/2022  CONCLUSIONS:  1. Left ventricular systolic function is mildly decreased with a 45-50%  estimated ejection fraction.  2. There is mild hypokinesis of the anteroseptal wall.  3. Mild mitral valve regurgitation.  4. Mild to moderate tricuspid regurgitation.  5. Moderate to severely elevated right ventricular systolic pressure.  6. Moderate to severely elevated pulmonary artery pressure.  7. The estimated PASP is 59 mmHg.  Lexiscan   IMPRESSION:  1. Findings suggestive of mild ischemia involving the cardiac apex.  2. Normal-sized left ventricle, with mildly depressed systolic left  ventricular function and a calculated ejection fraction of 46 %.   ECG portion showed LVH.   2. Hyperlipidemia Recent lipid panel from 11/2020 includes cholesterol 192 LDL 96 HDL 37 triglyceride 289. These results are somewhat worse than previous lipid panels and may be related to the fact that the patient for uncertain reason is taking atorvastatin 10 mg daily rather than the intended 40  mg daily. The dose will be increased to 80 mg daily. Will add Zetia. Recent FLP was optimized.     3. Coronary artery disease status post multiple PCI procedures. Patient had a stress test in 2008 that was negative for ischemia. The patient had a CT score of 447.8. The patient did have a subsequent nuclear exercise treadmill stress test on 02/18/2019 which demonstrated normal myocardial perfusion. Cardiac cath done 09/22/2020 for angina showed triple-vessel CAD with proximal left anterior descending involvement, 90% ostial stenosis of first diagonal branch, middle third of the proximal circumflex coronary artery showed 10 to 30% stenosis, middle third of the mid right coronary artery showed 60% stenosis. Patient was seen by Dr. Montez Palmer to consider possible PCI. Medical management was initially recommended but then the patient was admitted to [facility] on 2/28/2021 with a non-STEMI. He was transferred to [facility] and on 2/24/2021 underwent PCI and stent deployment to the proximal and mid LAD and a [place] angioplasty of a ostial first diagonal branch. An echocardiogram performed at that time demonstrated a preserved LV ejection fraction at 60-65% with moderate left ventricular hypertrophy and severe left atrial enlargement with mild aortic valve stenosis and mild elevation of the PA systolic pressure. The patient presented back to [facility] emergency room on 9/12/2021 with intermittent chest discomfort. Of 2 weeks. The high-sensitivity troponins elevated and fashion consistent with a non-STEMI. EKG showed left ventricular hypertrophy with anterolateral downsloping ST abnormality consistent with either left ventricular repolarization abnormality versus ischemia. The patient underwent cardiac catheterization and this identified a probable non-STEMI related to a culprit high-grade proximal LCx stenosis that had significantly progressed since the previous study. The  patient was transferred to [facility] and on 9/17/2021 underwent a successful OCT guided PCI of a culprit LCx stenosis with deployment of a drug-eluting stent. A staged PCI to the RCA was recommended on an outpatient basis due to the patient's advanced chronic kidney disease. The patient had an echocardiogram performed 9/18/2021 which again confirmed preserved LV ejection fraction 60% with moderate concentric LV hypertrophy mild aortic valve stenosis. The patient was readmitted to [facility] on 10/6/2021 with recurrent anginal type chest discomfort marginal troponin elevation. His creatinine was stable at 3.9 and he had a chronic anemia with hematocrit of 25.4. He was continued on dual antiplatelet therapy given intravenous iron infusion. He subsequently was transferred to [facility] and on 10/20/2021 underwent successful OCT guided PCI with rotational atherectomy of a severely calcified 90% mid to distal RCA stenosis and deployment of a 3.0 x 38 mm Synergy drug-eluting stent. The patient is actually doing clinically quite well and is without anginal symptoms. He feels improved and has not required the use of nitroglycerin. He is going to the [facility] routinely in the mornings and riding a bike without any symptoms.     4. Type two diabetes He will continue to follow up with his primary care doctor.      5. GERD      6. History of gout the patient is currently on Allopurinol 300 mg daily but the dose will be reduced to 100 mg daily because of his progressive kidney disease.     7. History of shoulder surgery      8. Chronic kidney disease. The patient's most recent creatinine was 5.64 when checked recently. He also has chronic anemia most likely due to his chronic kidney disease. Has appointment with Dr Gomez. Is on procrit and iron tablets. H&H is improving. Most recent lab work from 1/14/2022 includes creatinine 4.80 hemoglobin 10.4. He is having his hemoglobin checked every other week and  will receive a Procrit injection for hemoglobins of less than 10. Has fistula to left arm.      9. Arthroscopic left knee surgery with Dr. Menard on June 21, 2016 at [facility].     10. Prostate carcinoma. The patient had a prostate biopsy in 11/25/2019 which was positive for the presence of carcinoma. MRI of the prostate in 01/04/2020 which evidently was negative for any evidence of metastatic disease. He subsequently underwent a 5 week course of external radiation therapy which was completed in mid 4/2020. His PSA is declining from a maximum value of 8.0 now at 3.66 and presumably declining.     11. Carotid US done 09/2020 showed < 50% stenosis.      12. Hx of covid-19 vaccine #1 and #2.      13. History of rectal bleeding. Patient was at [facility] April 2021 to receive 1 unit packed red blood cells. He complains of rectal bleeding x2 episodes and underwent a colonoscopy and found to have internal hemorrhoids and 2 polyps. He had a polypectomy done and required 2 clips.     14. Chronic anemia.            Elvia Brunner, APRN-CNP

## 2024-04-01 DIAGNOSIS — M1A.9XX0 CHRONIC GOUT WITHOUT TOPHUS, UNSPECIFIED CAUSE, UNSPECIFIED SITE: Primary | ICD-10-CM

## 2024-04-01 RX ORDER — ALLOPURINOL 100 MG/1
100 TABLET ORAL DAILY
Qty: 90 TABLET | Refills: 3 | Status: SHIPPED | OUTPATIENT
Start: 2024-04-01 | End: 2024-05-31 | Stop reason: SDUPTHER

## 2024-05-06 DIAGNOSIS — D64.9 ANEMIA, UNSPECIFIED TYPE: ICD-10-CM

## 2024-05-06 RX ORDER — FERROUS SULFATE TAB 325 MG (65 MG ELEMENTAL FE) 325 (65 FE) MG
1 TAB ORAL DAILY
Qty: 90 TABLET | Refills: 1 | Status: SHIPPED | OUTPATIENT
Start: 2024-05-06

## 2024-05-13 ENCOUNTER — APPOINTMENT (OUTPATIENT)
Dept: PRIMARY CARE | Facility: CLINIC | Age: 74
End: 2024-05-13
Payer: MEDICARE

## 2024-05-22 ENCOUNTER — APPOINTMENT (OUTPATIENT)
Dept: PRIMARY CARE | Facility: CLINIC | Age: 74
End: 2024-05-22
Payer: MEDICARE

## 2024-05-29 ENCOUNTER — LAB (OUTPATIENT)
Dept: LAB | Facility: LAB | Age: 74
End: 2024-05-29
Payer: MEDICARE

## 2024-05-29 DIAGNOSIS — I10 PRIMARY HYPERTENSION: ICD-10-CM

## 2024-05-29 DIAGNOSIS — E78.2 MIXED HYPERLIPIDEMIA: ICD-10-CM

## 2024-05-29 DIAGNOSIS — Z99.2 TYPE 2 DIABETES MELLITUS WITH CHRONIC KIDNEY DISEASE ON CHRONIC DIALYSIS, WITH LONG-TERM CURRENT USE OF INSULIN (MULTI): ICD-10-CM

## 2024-05-29 DIAGNOSIS — E55.9 VITAMIN D DEFICIENCY: ICD-10-CM

## 2024-05-29 DIAGNOSIS — N18.6 TYPE 2 DIABETES MELLITUS WITH CHRONIC KIDNEY DISEASE ON CHRONIC DIALYSIS, WITH LONG-TERM CURRENT USE OF INSULIN (MULTI): ICD-10-CM

## 2024-05-29 DIAGNOSIS — C61 MALIGNANT NEOPLASM OF PROSTATE (MULTI): ICD-10-CM

## 2024-05-29 DIAGNOSIS — E11.22 TYPE 2 DIABETES MELLITUS WITH CHRONIC KIDNEY DISEASE ON CHRONIC DIALYSIS, WITH LONG-TERM CURRENT USE OF INSULIN (MULTI): ICD-10-CM

## 2024-05-29 DIAGNOSIS — Z79.4 TYPE 2 DIABETES MELLITUS WITH CHRONIC KIDNEY DISEASE ON CHRONIC DIALYSIS, WITH LONG-TERM CURRENT USE OF INSULIN (MULTI): ICD-10-CM

## 2024-05-29 LAB
25(OH)D3 SERPL-MCNC: 48 NG/ML (ref 30–100)
ALBUMIN SERPL BCP-MCNC: 4 G/DL (ref 3.4–5)
ALP SERPL-CCNC: 49 U/L (ref 33–136)
ALT SERPL W P-5'-P-CCNC: 4 U/L (ref 10–52)
ANION GAP SERPL CALC-SCNC: 19 MMOL/L (ref 10–20)
AST SERPL W P-5'-P-CCNC: 21 U/L (ref 9–39)
BILIRUB SERPL-MCNC: 0.6 MG/DL (ref 0–1.2)
BUN SERPL-MCNC: 44 MG/DL (ref 6–23)
CALCIUM SERPL-MCNC: 8.8 MG/DL (ref 8.6–10.6)
CHLORIDE SERPL-SCNC: 99 MMOL/L (ref 98–107)
CHOLEST SERPL-MCNC: 121 MG/DL (ref 0–199)
CHOLESTEROL/HDL RATIO: 3.9
CO2 SERPL-SCNC: 29 MMOL/L (ref 21–32)
CREAT SERPL-MCNC: 8.6 MG/DL (ref 0.5–1.3)
EGFRCR SERPLBLD CKD-EPI 2021: 6 ML/MIN/1.73M*2
ERYTHROCYTE [DISTWIDTH] IN BLOOD BY AUTOMATED COUNT: 15.2 % (ref 11.5–14.5)
EST. AVERAGE GLUCOSE BLD GHB EST-MCNC: 157 MG/DL
GLUCOSE SERPL-MCNC: 215 MG/DL (ref 74–99)
HBA1C MFR BLD: 7.1 %
HCT VFR BLD AUTO: 28.8 % (ref 41–52)
HDLC SERPL-MCNC: 30.7 MG/DL
HGB BLD-MCNC: 9.1 G/DL (ref 13.5–17.5)
LDLC SERPL CALC-MCNC: 37 MG/DL
MCH RBC QN AUTO: 32.5 PG (ref 26–34)
MCHC RBC AUTO-ENTMCNC: 31.6 G/DL (ref 32–36)
MCV RBC AUTO: 103 FL (ref 80–100)
NON HDL CHOLESTEROL: 90 MG/DL (ref 0–149)
NRBC BLD-RTO: 0 /100 WBCS (ref 0–0)
PLATELET # BLD AUTO: 242 X10*3/UL (ref 150–450)
POTASSIUM SERPL-SCNC: 4.9 MMOL/L (ref 3.5–5.3)
PROT SERPL-MCNC: 6.1 G/DL (ref 6.4–8.2)
PSA SERPL-MCNC: 0.42 NG/ML
RBC # BLD AUTO: 2.8 X10*6/UL (ref 4.5–5.9)
SODIUM SERPL-SCNC: 142 MMOL/L (ref 136–145)
TRIGL SERPL-MCNC: 266 MG/DL (ref 0–149)
VLDL: 53 MG/DL (ref 0–40)
WBC # BLD AUTO: 5.5 X10*3/UL (ref 4.4–11.3)

## 2024-05-29 PROCEDURE — 36415 COLL VENOUS BLD VENIPUNCTURE: CPT

## 2024-05-29 PROCEDURE — 85027 COMPLETE CBC AUTOMATED: CPT

## 2024-05-29 PROCEDURE — 80061 LIPID PANEL: CPT

## 2024-05-29 PROCEDURE — 84153 ASSAY OF PSA TOTAL: CPT

## 2024-05-29 PROCEDURE — 80053 COMPREHEN METABOLIC PANEL: CPT

## 2024-05-29 PROCEDURE — 83036 HEMOGLOBIN GLYCOSYLATED A1C: CPT

## 2024-05-29 PROCEDURE — 82306 VITAMIN D 25 HYDROXY: CPT

## 2024-05-31 ENCOUNTER — OFFICE VISIT (OUTPATIENT)
Dept: PRIMARY CARE | Facility: CLINIC | Age: 74
End: 2024-05-31
Payer: MEDICARE

## 2024-05-31 VITALS
BODY MASS INDEX: 27.89 KG/M2 | WEIGHT: 184 LBS | DIASTOLIC BLOOD PRESSURE: 68 MMHG | SYSTOLIC BLOOD PRESSURE: 126 MMHG | HEIGHT: 68 IN

## 2024-05-31 DIAGNOSIS — Z79.4 TYPE 2 DIABETES MELLITUS WITH CHRONIC KIDNEY DISEASE ON CHRONIC DIALYSIS, WITH LONG-TERM CURRENT USE OF INSULIN (MULTI): ICD-10-CM

## 2024-05-31 DIAGNOSIS — E11.42 DIABETIC POLYNEUROPATHY ASSOCIATED WITH TYPE 2 DIABETES MELLITUS (MULTI): ICD-10-CM

## 2024-05-31 DIAGNOSIS — E11.22 TYPE 2 DIABETES MELLITUS WITH CHRONIC KIDNEY DISEASE ON CHRONIC DIALYSIS, WITH LONG-TERM CURRENT USE OF INSULIN (MULTI): ICD-10-CM

## 2024-05-31 DIAGNOSIS — I10 PRIMARY HYPERTENSION: ICD-10-CM

## 2024-05-31 DIAGNOSIS — M1A.9XX0 CHRONIC GOUT WITHOUT TOPHUS, UNSPECIFIED CAUSE, UNSPECIFIED SITE: ICD-10-CM

## 2024-05-31 DIAGNOSIS — L84 CALLUS: Primary | ICD-10-CM

## 2024-05-31 DIAGNOSIS — E78.49 OTHER HYPERLIPIDEMIA: ICD-10-CM

## 2024-05-31 DIAGNOSIS — Z99.2 TYPE 2 DIABETES MELLITUS WITH CHRONIC KIDNEY DISEASE ON CHRONIC DIALYSIS, WITH LONG-TERM CURRENT USE OF INSULIN (MULTI): ICD-10-CM

## 2024-05-31 DIAGNOSIS — I10 HYPERTENSION: ICD-10-CM

## 2024-05-31 DIAGNOSIS — E78.5 HYPERLIPIDEMIA: ICD-10-CM

## 2024-05-31 DIAGNOSIS — N18.6 TYPE 2 DIABETES MELLITUS WITH CHRONIC KIDNEY DISEASE ON CHRONIC DIALYSIS, WITH LONG-TERM CURRENT USE OF INSULIN (MULTI): ICD-10-CM

## 2024-05-31 PROCEDURE — 3048F LDL-C <100 MG/DL: CPT | Performed by: INTERNAL MEDICINE

## 2024-05-31 PROCEDURE — 3078F DIAST BP <80 MM HG: CPT | Performed by: INTERNAL MEDICINE

## 2024-05-31 PROCEDURE — 3051F HG A1C>EQUAL 7.0%<8.0%: CPT | Performed by: INTERNAL MEDICINE

## 2024-05-31 PROCEDURE — 99214 OFFICE O/P EST MOD 30 MIN: CPT | Performed by: INTERNAL MEDICINE

## 2024-05-31 PROCEDURE — 1159F MED LIST DOCD IN RCRD: CPT | Performed by: INTERNAL MEDICINE

## 2024-05-31 PROCEDURE — 4010F ACE/ARB THERAPY RXD/TAKEN: CPT | Performed by: INTERNAL MEDICINE

## 2024-05-31 PROCEDURE — 3074F SYST BP LT 130 MM HG: CPT | Performed by: INTERNAL MEDICINE

## 2024-05-31 RX ORDER — CARVEDILOL 12.5 MG/1
12.5 TABLET ORAL
Qty: 180 TABLET | Refills: 0 | Status: SHIPPED | OUTPATIENT
Start: 2024-05-31

## 2024-05-31 RX ORDER — FENOFIBRATE 145 MG/1
145 TABLET, FILM COATED ORAL DAILY
Qty: 90 TABLET | Refills: 0 | Status: SHIPPED | OUTPATIENT
Start: 2024-05-31 | End: 2025-05-31

## 2024-05-31 RX ORDER — AMLODIPINE BESYLATE 5 MG/1
5 TABLET ORAL DAILY
Qty: 90 TABLET | Refills: 0 | Status: SHIPPED | OUTPATIENT
Start: 2024-05-31 | End: 2025-05-31

## 2024-05-31 RX ORDER — ALLOPURINOL 100 MG/1
100 TABLET ORAL DAILY
Qty: 90 TABLET | Refills: 0 | Status: SHIPPED | OUTPATIENT
Start: 2024-05-31

## 2024-05-31 RX ORDER — GABAPENTIN 300 MG/1
300 CAPSULE ORAL 2 TIMES DAILY
Qty: 180 CAPSULE | Refills: 0 | Status: SHIPPED | OUTPATIENT
Start: 2024-05-31

## 2024-05-31 RX ORDER — ATORVASTATIN CALCIUM 80 MG/1
80 TABLET, FILM COATED ORAL NIGHTLY
Qty: 90 TABLET | Refills: 0 | Status: SHIPPED | OUTPATIENT
Start: 2024-05-31 | End: 2025-05-31

## 2024-05-31 RX ORDER — EZETIMIBE 10 MG/1
10 TABLET ORAL DAILY
Qty: 90 TABLET | Refills: 0 | Status: SHIPPED | OUTPATIENT
Start: 2024-05-31 | End: 2024-11-27

## 2024-06-07 ENCOUNTER — APPOINTMENT (OUTPATIENT)
Dept: RADIATION ONCOLOGY | Facility: HOSPITAL | Age: 74
End: 2024-06-07
Payer: MEDICARE

## 2024-06-11 NOTE — PROGRESS NOTES
Subjective   Patient ID: William A Franke is a 73 y.o. male who presents for Follow-up and Med Refill.    Med Refill    Patient is here for follow-up on diabetes hypertension high cholesterol  He is not on any aspirin or any blood thinner because he bleeds during dialysis  He is worried about his stents closing up  Overall he is doing well  He manages his diabetes by taking extra insulin whenever he eats at home.  He does not watch his diet  He needs diabetic shoes because he has neuropathy and having some calluses buildup    Past recap   patient is here for follow-up  Follow-up on diabetes hypertension high cholesterol  Doing hemodialysis for end-stage renal disease     patient is here for follow-up  His fistula still has bleeding off-and-on but not as much  Follow-up on diabetes hypertension high cholesterol  He is getting dialysis 3 times a week  Overall doing fine      patient is here for hospital follow-up  He was admitted for bleeding AV fistula.  Required blood transfusion.  Needs refill on gabapentin and Zetia  Complains of having stiffness in the hands  Doing hemodialysis 3 times a week    patient was hospitalized and had another heart attack at [facility]  He is still complaining of having a lot of shortness of breath and cough  His kidney functions did deteriorate but because he is making urine did not get started on the dialysis yet  Is doing blood work today to reevaluate his kidney function  But his main concern is his cough     Patient went to the hospital and had MI  He was catheterized again and had stent placed  Since he came home he is having very poor appetite not eating drinking not feeling good and blood pressure is running high in 180s and 200s  He had stent placed 3 weeks ago     Patient here for follow-up on diabetes hypertension high cholesterol chronic kidney disease and anemia  Follow-up on blood work  Patient was hospitalized for another non-ST elevation MI  Since discharge his  "chest pain is doing better but he still getting off-and-on chest pain  Feels very anxious  Feels very limited in his activity        Patient is here with complaints of rectal bleed this morning he started bleeding did not realize his bleeding until it messed up his underwear and pants. He had similar episode 5 days ago  He is having some discomfort in the lower abdomen denies any fever or chills  He had problems with hemorrhoids many years ago. But he has no pain and denies constipation     Patient is here for hospital follow-up  He presented with chest pain and acute MI. He had to have cardiac cath done with close monitoring of kidney function  Cardiac stable  Patient had stent placed and did not require dialysis  He is here for follow-up on blood work for kidney  His blood sugars were doing better in the hospital. Now they are running high again  He is getting Procrit every 2 weeks            Virtual visit  Patient here for follow-up on diabetes   Patient is eating little better but not able to exercise because  Blood sugars are running high  The pressure is running little high but she is coming to see Dr. Ardon on Thursday  He is managing with his insulin blood sugar little better  Finished radiation treatment for prostate cancer  Works outdoors  Did blood work needs medications refilled   refuses to see the dietitian  Does not take Humalog insulin because of fear has appointment with the urologist        Review of Systems    Objective   /68   Ht 1.727 m (5' 8\")   Wt 83.5 kg (184 lb)   BMI 27.98 kg/m²     Physical Exam  Vitals reviewed.   Constitutional:       Appearance: Normal appearance.   HENT:      Head: Normocephalic and atraumatic.      Right Ear: Tympanic membrane, ear canal and external ear normal.      Left Ear: Tympanic membrane, ear canal and external ear normal.      Nose: Nose normal.      Mouth/Throat:      Pharynx: Oropharynx is clear.   Eyes:      Extraocular Movements: Extraocular " movements intact.      Conjunctiva/sclera: Conjunctivae normal.      Pupils: Pupils are equal, round, and reactive to light.   Cardiovascular:      Rate and Rhythm: Normal rate and regular rhythm.      Pulses: Normal pulses.      Heart sounds: Normal heart sounds.   Pulmonary:      Effort: Pulmonary effort is normal.      Breath sounds: Normal breath sounds.   Abdominal:      General: Abdomen is flat. Bowel sounds are normal.      Palpations: Abdomen is soft.   Musculoskeletal:      Cervical back: Normal range of motion and neck supple.   Skin:     General: Skin is warm and dry.      Comments: Callus on the bottom of the feet   Neurological:      General: No focal deficit present.      Mental Status: He is alert and oriented to person, place, and time.   Psychiatric:         Mood and Affect: Mood normal.         Assessment/Plan   Problem List Items Addressed This Visit          Cardiac and Vasculature    Hypertension    Relevant Medications    fenofibrate (Tricor) 145 mg tablet    amLODIPine (Norvasc) 5 mg tablet    carvedilol (Coreg) 12.5 mg tablet    Other Relevant Orders    CBC    Comprehensive Metabolic Panel    Hemoglobin A1C    Lipid Panel    Thyroid Stimulating Hormone    CBC    Comprehensive Metabolic Panel    Hemoglobin A1C    Lipid Panel    Thyroid Stimulating Hormone    Hyperlipidemia    Relevant Medications    ezetimibe (Zetia) 10 mg tablet    fenofibrate (Tricor) 145 mg tablet    atorvastatin (Lipitor) 80 mg tablet    Other Relevant Orders    CBC    Comprehensive Metabolic Panel    Hemoglobin A1C    Lipid Panel    Thyroid Stimulating Hormone    CBC    Comprehensive Metabolic Panel    Hemoglobin A1C    Lipid Panel    Thyroid Stimulating Hormone       Endocrine/Metabolic    Diabetes mellitus (Multi)    Relevant Medications    fenofibrate (Tricor) 145 mg tablet    Other Relevant Orders    CBC    Comprehensive Metabolic Panel    Hemoglobin A1C    Lipid Panel    Thyroid Stimulating Hormone       Neuro     Peripheral neuropathy    Relevant Medications    gabapentin (Neurontin) 300 mg capsule     Other Visit Diagnoses       Callus    -  Primary    Chronic gout without tophus, unspecified cause, unspecified site        Relevant Medications    allopurinol (Zyloprim) 100 mg tablet    Other Relevant Orders    CBC    Comprehensive Metabolic Panel    Hemoglobin A1C    Lipid Panel    Thyroid Stimulating Hormone        10/14  Call Dr. Palmer  He is trying to arrange outpatient follow-up for cardiac catheterization and carotid stent which she needs  Because of his anemia and severe kidney disease he is planning in stages  He asked me to order CBC BMP and he will call tomorrow to schedule for Next week discussed with the patient and the wife agreed with the plan  Advised him to go to the emergency room if symptoms persist  Continue diet exercise follow-up in 3 months     11/2  Concerned that patient might be getting anemic or his kidney function would be getting worse  Stat CBC BMP ordered  Increase Coreg to 2 tablets twice a day  Blood work results reviewed  Kidney functions are in fact better anemia stable  Patient could be not feeling good because of elevated blood pressure  Recheck in 2 weeks     9/9  Will get x-ray chest  Tessalon cough drops albuterol inhaler  Cholesterol medication refill  Blood pressure is stable  Will see if kidney functions looks okay to hold off dialysis  Patient wife is planning to be able to go to [place] before start of dialysis     5/1/2023  Will order blood work to make sure anemia is stable  Patient is under care of endocrinologist for diabetes  He is under care of nephrologist for kidneys  He follows up with cardiology regularly and stable  Refills given on iron and gabapentin and Zetia     5/19/2023  Clinically patient is stable blood pressure is stable  Routine blood work ordered for 3 months  His diabetes is doing better  Medications refilled    8/18/2023  Blood work reviewed  A1c still  high  Blood pressure stable  Cholesterol okay  Discussed different food options  Patient eats a lot of processed food and to eat out a lot  Discussed better options  Continue current medications  Follow-up blood work in 3 months    11/10/2023  Blood work reviewed  Triglycerides have gone up to 388  A1c still 7.7 patient is under care of endocrinologist  Start fenofibrate  Discussed high triglycerides this will put him at risk for blockage in coronary arteries at this time  Again discussed diet and exercise  Follow-up blood work in 3 months    2/12/2024  Blood work reviewed  A1c down to 6.9 anemia stable  Cholesterol good but triglycerides always higher  Follow-up blood work in 3 months continue current medications  Patient is doing dialysis    5/31/2024  Blood pressure stable  Cardiac status is stable.  Patient denies any angina  A1c 7.1 well-controlled  Hemoglobin 9.1 stable gets epo shots  Triglycerides elevated  Discussed diet and exercise  Stable on current medications  Diabetic shoes ordered for diabetic neuropathy and callus issues  Follow-up blood work in 3 months

## 2024-06-14 ENCOUNTER — OFFICE VISIT (OUTPATIENT)
Dept: PRIMARY CARE | Facility: CLINIC | Age: 74
End: 2024-06-14
Payer: MEDICARE

## 2024-06-14 VITALS
BODY MASS INDEX: 27.28 KG/M2 | DIASTOLIC BLOOD PRESSURE: 64 MMHG | WEIGHT: 180 LBS | HEIGHT: 68 IN | SYSTOLIC BLOOD PRESSURE: 128 MMHG

## 2024-06-14 DIAGNOSIS — Z79.4 TYPE 2 DIABETES MELLITUS WITHOUT COMPLICATION, WITH LONG-TERM CURRENT USE OF INSULIN (MULTI): ICD-10-CM

## 2024-06-14 DIAGNOSIS — R05.9 COUGH, UNSPECIFIED TYPE: ICD-10-CM

## 2024-06-14 DIAGNOSIS — E11.9 TYPE 2 DIABETES MELLITUS WITHOUT COMPLICATION, WITH LONG-TERM CURRENT USE OF INSULIN (MULTI): ICD-10-CM

## 2024-06-14 DIAGNOSIS — J02.9 PHARYNGITIS, UNSPECIFIED ETIOLOGY: Primary | ICD-10-CM

## 2024-06-14 DIAGNOSIS — R09.82 POSTNASAL DRIP: ICD-10-CM

## 2024-06-14 PROCEDURE — 3074F SYST BP LT 130 MM HG: CPT | Performed by: INTERNAL MEDICINE

## 2024-06-14 PROCEDURE — 99214 OFFICE O/P EST MOD 30 MIN: CPT | Performed by: INTERNAL MEDICINE

## 2024-06-14 PROCEDURE — 4010F ACE/ARB THERAPY RXD/TAKEN: CPT | Performed by: INTERNAL MEDICINE

## 2024-06-14 PROCEDURE — 3048F LDL-C <100 MG/DL: CPT | Performed by: INTERNAL MEDICINE

## 2024-06-14 PROCEDURE — 3078F DIAST BP <80 MM HG: CPT | Performed by: INTERNAL MEDICINE

## 2024-06-14 PROCEDURE — 3051F HG A1C>EQUAL 7.0%<8.0%: CPT | Performed by: INTERNAL MEDICINE

## 2024-06-14 PROCEDURE — 1159F MED LIST DOCD IN RCRD: CPT | Performed by: INTERNAL MEDICINE

## 2024-06-14 RX ORDER — AMOXICILLIN AND CLAVULANATE POTASSIUM 875; 125 MG/1; MG/1
875 TABLET, FILM COATED ORAL 2 TIMES DAILY
Qty: 20 TABLET | Refills: 0 | Status: SHIPPED | OUTPATIENT
Start: 2024-06-14 | End: 2024-06-24

## 2024-06-14 RX ORDER — CARVEDILOL 6.25 MG/1
6.25 TABLET ORAL
COMMUNITY
Start: 2024-05-28

## 2024-06-14 RX ORDER — TENAPANOR 31.9 MG/1
1 TABLET, FILM COATED ORAL
COMMUNITY
Start: 2024-06-07

## 2024-06-14 ASSESSMENT — ENCOUNTER SYMPTOMS
OCCASIONAL FEELINGS OF UNSTEADINESS: 0
LOSS OF SENSATION IN FEET: 0
DEPRESSION: 0

## 2024-06-15 NOTE — PROGRESS NOTES
"Subjective   Patient ID: William A Franke is a 73 y.o. male who presents for sore throat, congestion, postnasal drip.    William Franke today came here for sore throat, congestion, postnasal drip going on for the last several days.  Over-the-counter medications not helping.  He does not recall any exposure to strep etc.  He came here for follow-up on various conditions.    I have personally reviewed the patient's Past Medical History, Medications, Allergies, Social History, and Family History in the EMR.    Review of Systems   All other systems reviewed and are negative.    Objective   /64   Ht 1.727 m (5' 8\")   Wt 81.6 kg (180 lb)   BMI 27.37 kg/m²     Physical Exam  Vitals reviewed.   HENT:      Nose:      Comments: Postnasal drip.     Mouth/Throat:      Comments: Throat congested.  Cardiovascular:      Heart sounds: Normal heart sounds, S1 normal and S2 normal. No murmur heard.     No friction rub.   Pulmonary:      Effort: Pulmonary effort is normal.      Breath sounds: Wheezing (Bilateral) present.   Abdominal:      Palpations: There is no hepatomegaly, splenomegaly or mass.   Musculoskeletal:      Right lower leg: No edema.      Left lower leg: No edema.   Lymphadenopathy:      Lower Body: No right inguinal adenopathy. No left inguinal adenopathy.   Neurological:      Cranial Nerves: Cranial nerves 2-12 are intact.      Sensory: No sensory deficit.      Motor: Motor function is intact.      Deep Tendon Reflexes: Reflexes are normal and symmetric.     LAB WORK:  Laboratory testing discussed.    Assessment/Plan   Problem List Items Addressed This Visit             ICD-10-CM       Endocrine/Metabolic    Diabetes mellitus (Multi) E11.9       Pulmonary and Pneumonias    Cough R05.9    Relevant Medications    amoxicillin-pot clavulanate (Augmentin) 875-125 mg tablet     Other Visit Diagnoses         Codes    Pharyngitis, unspecified etiology    -  Primary J02.9    Postnasal drip     R09.82        1. " Pharyngitis, postnasal drip, and congestion.  Advised saline gargles and steam inhalation.  Augmentin, Claritin, Robitussin, and Flonase.  Follow up in two weeks if not better.  Supportive care.  2. Diabetes.  Blood sugar okay.  3. He follows Mrs. Galloway and he will continue to do so.  I will be happy to serve him anytime if necessary.    Scribe Attestation  By signing my name below, I, Genesis Spencer attest that this documentation has been prepared under the direction and in the presence of Candido Galloway MD.

## 2024-06-17 ASSESSMENT — ENCOUNTER SYMPTOMS
FREQUENCY: 0
UNEXPECTED WEIGHT CHANGE: 0
CHEST TIGHTNESS: 0
WEAKNESS: 0
DIZZINESS: 0
RECTAL PAIN: 0
FATIGUE: 0
ARTHRALGIAS: 0
JOINT SWELLING: 0
CONSTIPATION: 0
ALLERGIC/IMMUNOLOGIC NEGATIVE: 1
SHORTNESS OF BREATH: 0
DIARRHEA: 0
HEMATURIA: 0
PSYCHIATRIC NEGATIVE: 1
FEVER: 0
BLOOD IN STOOL: 0
DIFFICULTY URINATING: 0
ANAL BLEEDING: 0
COUGH: 0
ABDOMINAL PAIN: 0
BACK PAIN: 0
PALPITATIONS: 0
DYSURIA: 0

## 2024-06-17 NOTE — PROGRESS NOTES
Cancer synopsis:  Rad/onc: Dr. Gordon/ Weisent CNP    73 -year-old male returns to the radiation oncology clinic for ongoing consultation regarding his unfavorable intermediate risk prostate cancer.  MRI of  the pelvis was completed on 1/4/2020 which demonstrated a 5.3 x 3.8 x 3.7 cm prostate.  There was a PIRADS 4 lesion in the right peripheral zone extending from the mid gland to the apex.  There was no evidence of extra prosthetic extension.  There was  no involvement of the seminal vesicles.  There was no evidence of an enlarged pelvic lymph nodes.  As stated at the prior visit, the patient denies any prior history of cancer, chemotherapy, radiotherapy, autoimmune or connective tissue disorder, or cardiac  device.  He completed a screening colonoscopy in November 2018.     05/05/2022: Prostate and SV VMAT    PMH:  DM2, constipation, CKD III (dialysis), glaucoma, HLD, BPH, pulmonary HTN, LIAM    Recent imaging:  none    History of presenting illness:    Patient ID: [id number]     William A Franke is a 73 y.o. male who presents for his UIR prostate cancer GS 4+3=7, IPSA <10 now s/p RT    RT Site: Prostate and SV  RT Date: 05/05/2022  Hormone therapy: No  Hot Flushes: Denies  Fatigue: Denies  Bone pain: Denies  SAMARA: n/a   ED: Denies changes in erectile function since last visit.  ED medications: No  IPSS: n/a virtual  Urinary symptoms: Denies dysuria, hematuria, frequency, urgency or urine leakage. Recently had dialysis port placed and currently is receiving dialysis, does still produce urine per patient.  Urinary Medications: No  Rectal bleeding: Denies  Colonoscopy: 04/2021: bleeding internal hemmoirds, 1 polyp removed next in 5yrs  Other systems: Denies SOB, CP or fever    Review of systems:  Review of Systems   Constitutional:  Negative for fatigue, fever and unexpected weight change.   Respiratory:  Negative for cough, chest tightness and shortness of breath.    Cardiovascular:  Negative for chest pain,  "palpitations and leg swelling.   Gastrointestinal:  Negative for abdominal pain, anal bleeding, blood in stool, constipation, diarrhea and rectal pain.   Endocrine: Negative for cold intolerance, heat intolerance and polyuria.   Genitourinary:  Negative for decreased urine volume, difficulty urinating, dysuria, frequency, hematuria and urgency.   Musculoskeletal:  Negative for arthralgias, back pain, gait problem and joint swelling.   Skin: Negative.    Allergic/Immunologic: Negative.    Neurological:  Negative for dizziness, syncope and weakness.   Psychiatric/Behavioral: Negative.         Past Medical history  Past Medical History:   Diagnosis Date    Personal history of other endocrine, nutritional and metabolic disease     History of hypercholesterolemia    Personal history of other specified conditions 08/28/2020    History of chest pain        Surgical/family history  No family history on file.   Past Surgical History:   Procedure Laterality Date    KNEE ARTHROSCOPY W/ DEBRIDEMENT  04/15/2013    Arthroscopy Knee    SHOULDER SURGERY  04/15/2013    Shoulder Surgery        Social History  Tobacco Use: Low Risk  (5/31/2024)    Patient History     Smoking Tobacco Use: Never     Smokeless Tobacco Use: Never     Passive Exposure: Not on file         Current med list:  Current Outpatient Medications   Medication Instructions    allopurinol (ZYLOPRIM) 100 mg, oral, Daily    amLODIPine (NORVASC) 10 mg, oral, Daily    amLODIPine (NORVASC) 5 mg, oral, Daily    amoxicillin-pot clavulanate (Augmentin) 875-125 mg tablet 875 mg, oral, 2 times daily    aspirin 81 mg, oral, Daily    atorvastatin (LIPITOR) 80 mg, oral, Nightly    B complex-vitamin C-folic acid (Nephro-Shea) 0.8 mg tablet 0.8 mg, oral, Daily    BD Ultra-Fine Mini Pen Needle 31 gauge x 3/16\" needle USE AS DIRECTED 4 times a day    carvedilol (COREG) 12.5 mg, oral, 2 times daily (morning and late afternoon)    carvedilol (COREG) 6.25 mg, oral, 2 times daily " (morning and late afternoon)    ergocalciferol (VITAMIN D-2) 50,000 Units, oral, Once Weekly    ezetimibe (ZETIA) 10 mg, oral, Daily    ezetimibe (ZETIA) 10 mg, oral, Daily    fenofibrate (TRICOR) 145 mg, oral, Daily    fenofibrate (TRICOR) 145 mg, oral, Daily    FeroSuL tablet 1 tablet, oral, Daily    gabapentin (NEURONTIN) 300 mg, oral, 2 times daily    gabapentin (NEURONTIN) 300 mg, oral, 2 times daily    insulin glargine (Lantus) 100 unit/mL (3 mL) pen subcutaneous    insulin lispro (HUMALOG) 10 Units, subcutaneous, 3 times daily (morning, midday, late afternoon)    Januvia 25 mg, oral, Daily    losartan (Cozaar) 50 mg tablet 1 tablet, oral, Daily    nitroglycerin (NITROSTAT) 0.4 mg, sublingual, Every 5 min PRN    pantoprazole (PROTONIX) 40 mg, oral, Daily    promethazine (Phenergan) 25 mg tablet oral    sevelamer carbonate (Renvela) 800 mg tablet take 1 tablet by mouth every day before meals    Xphozah 30 mg tablet 1 tablet, oral, Every 12 hours scheduled (0630,1830)        Last recorded vital:  N/a virtual    Physical exam  N/a virtual    Pertinent labs:  CBC Differential Path Review   Date/Time Value Ref Range Status   10/20/2022 05:45 AM GINA  Final     Comment:      By her/his signature above, the Pathologist   listed as making the final interpretation   certifies that she/he has personally reviewed    this case.  ----------------------------------------------   MICROCYTIC ANEMIA WITH ANISOPOIKILOCYTOSIS AND POLYCHROMASIA.  IRON, B12 AND FOLATE STUDIES MAYBE CONSIDERED IF CLINICALLY INDICATED.       Prostate Specific AG   Date/Time Value Ref Range Status   05/29/2024 06:55 AM 0.42 <=4.00 ng/mL Final     Dx:  Problem List Items Addressed This Visit    None  Visit Diagnoses       Malignant neoplasm of prostate (Multi)    -  Primary    Relevant Orders    Clinic Appointment Request Follow Up; RUPINDER ROBINS (virtual); [facility] [address] [facility] (virtual) (Completed)        PSA of 0.42 was reviewed and is increased  since last Psa however remains in normal range for patient. Will continue q6m PSA Review of latent SE including rectal bleeding, hematuria, urinary strictures, ED where reviewed as well as how to contact office if s/s present. Denies latent SE. NCCN guidelines where reviewed and routine FUV of every 3m for first year and every 6m for four years for a total of five years was discussed. Patient verbalized understanding.      PLAN:  FUV 6m  Labs PSA in 6m  Imaging none  FUV other providers: PCP for routine evals    Please contact office with any concerns:  Minh Armando CNP  [phone]    I performed this visit using realtime telehealth tools, including an audio/video OR telephone connection between  patient’s name and location Franke, William and Minh Samson CNP.    2. POS 10: Telehealth provided in patient's home.  o Patient is located in their home (which is a location other than a hospital or other  facility where the patient receives care in a private residence) when receiving  health services or health related services through telecommunication technology.

## 2024-06-18 ENCOUNTER — HOSPITAL ENCOUNTER (OUTPATIENT)
Dept: RADIATION ONCOLOGY | Facility: HOSPITAL | Age: 74
Setting detail: RADIATION/ONCOLOGY SERIES
Discharge: HOME | End: 2024-06-18
Payer: MEDICARE

## 2024-06-18 DIAGNOSIS — C61 MALIGNANT NEOPLASM OF PROSTATE (MULTI): Primary | ICD-10-CM

## 2024-06-18 PROCEDURE — 99213 OFFICE O/P EST LOW 20 MIN: CPT

## 2024-06-28 DIAGNOSIS — Z99.2 TYPE 2 DIABETES MELLITUS WITH CHRONIC KIDNEY DISEASE ON CHRONIC DIALYSIS, WITH LONG-TERM CURRENT USE OF INSULIN (MULTI): ICD-10-CM

## 2024-06-28 DIAGNOSIS — I10 PRIMARY HYPERTENSION: ICD-10-CM

## 2024-06-28 DIAGNOSIS — E11.22 TYPE 2 DIABETES MELLITUS WITH CHRONIC KIDNEY DISEASE ON CHRONIC DIALYSIS, WITH LONG-TERM CURRENT USE OF INSULIN (MULTI): ICD-10-CM

## 2024-06-28 DIAGNOSIS — N18.6 TYPE 2 DIABETES MELLITUS WITH CHRONIC KIDNEY DISEASE ON CHRONIC DIALYSIS, WITH LONG-TERM CURRENT USE OF INSULIN (MULTI): ICD-10-CM

## 2024-06-28 DIAGNOSIS — Z79.4 TYPE 2 DIABETES MELLITUS WITH CHRONIC KIDNEY DISEASE ON CHRONIC DIALYSIS, WITH LONG-TERM CURRENT USE OF INSULIN (MULTI): ICD-10-CM

## 2024-06-28 DIAGNOSIS — E78.49 OTHER HYPERLIPIDEMIA: ICD-10-CM

## 2024-07-01 RX ORDER — FENOFIBRATE 145 MG/1
145 TABLET, FILM COATED ORAL DAILY
Qty: 90 TABLET | Refills: 0 | Status: SHIPPED | OUTPATIENT
Start: 2024-07-01 | End: 2025-07-01

## 2024-08-02 ENCOUNTER — OFFICE VISIT (OUTPATIENT)
Dept: CARDIOLOGY | Facility: CLINIC | Age: 74
End: 2024-08-02
Payer: MEDICARE

## 2024-08-02 VITALS
DIASTOLIC BLOOD PRESSURE: 65 MMHG | BODY MASS INDEX: 27.16 KG/M2 | SYSTOLIC BLOOD PRESSURE: 177 MMHG | HEART RATE: 63 BPM | OXYGEN SATURATION: 93 % | WEIGHT: 179.2 LBS | HEIGHT: 68 IN

## 2024-08-02 DIAGNOSIS — I10 HYPERTENSION, UNSPECIFIED TYPE: ICD-10-CM

## 2024-08-02 PROCEDURE — 1036F TOBACCO NON-USER: CPT | Performed by: NURSE PRACTITIONER

## 2024-08-02 PROCEDURE — 1159F MED LIST DOCD IN RCRD: CPT | Performed by: NURSE PRACTITIONER

## 2024-08-02 PROCEDURE — 4010F ACE/ARB THERAPY RXD/TAKEN: CPT | Performed by: NURSE PRACTITIONER

## 2024-08-02 PROCEDURE — 3051F HG A1C>EQUAL 7.0%<8.0%: CPT | Performed by: NURSE PRACTITIONER

## 2024-08-02 PROCEDURE — 1126F AMNT PAIN NOTED NONE PRSNT: CPT | Performed by: NURSE PRACTITIONER

## 2024-08-02 PROCEDURE — 1160F RVW MEDS BY RX/DR IN RCRD: CPT | Performed by: NURSE PRACTITIONER

## 2024-08-02 PROCEDURE — 99214 OFFICE O/P EST MOD 30 MIN: CPT | Performed by: NURSE PRACTITIONER

## 2024-08-02 PROCEDURE — 3048F LDL-C <100 MG/DL: CPT | Performed by: NURSE PRACTITIONER

## 2024-08-02 PROCEDURE — 3077F SYST BP >= 140 MM HG: CPT | Performed by: NURSE PRACTITIONER

## 2024-08-02 PROCEDURE — 3078F DIAST BP <80 MM HG: CPT | Performed by: NURSE PRACTITIONER

## 2024-08-02 RX ORDER — LOSARTAN POTASSIUM 100 MG/1
100 TABLET ORAL DAILY
COMMUNITY

## 2024-08-02 RX ORDER — AMLODIPINE BESYLATE 10 MG/1
10 TABLET ORAL DAILY
Qty: 90 TABLET | Refills: 3 | Status: SHIPPED | OUTPATIENT
Start: 2024-08-02

## 2024-08-02 ASSESSMENT — ENCOUNTER SYMPTOMS
MUSCULOSKELETAL NEGATIVE: 1
GASTROINTESTINAL NEGATIVE: 1
RESPIRATORY NEGATIVE: 1
NEUROLOGICAL NEGATIVE: 1
CARDIOVASCULAR NEGATIVE: 1
CONSTITUTIONAL NEGATIVE: 1

## 2024-08-02 ASSESSMENT — PATIENT HEALTH QUESTIONNAIRE - PHQ9
1. LITTLE INTEREST OR PLEASURE IN DOING THINGS: NOT AT ALL
2. FEELING DOWN, DEPRESSED OR HOPELESS: NOT AT ALL
SUM OF ALL RESPONSES TO PHQ9 QUESTIONS 1 AND 2: 0

## 2024-08-02 ASSESSMENT — COLUMBIA-SUICIDE SEVERITY RATING SCALE - C-SSRS
1. IN THE PAST MONTH, HAVE YOU WISHED YOU WERE DEAD OR WISHED YOU COULD GO TO SLEEP AND NOT WAKE UP?: NO
2. HAVE YOU ACTUALLY HAD ANY THOUGHTS OF KILLING YOURSELF?: NO
6. HAVE YOU EVER DONE ANYTHING, STARTED TO DO ANYTHING, OR PREPARED TO DO ANYTHING TO END YOUR LIFE?: NO

## 2024-08-02 ASSESSMENT — PAIN SCALES - GENERAL: PAINLEVEL: 0-NO PAIN

## 2024-08-02 NOTE — PROGRESS NOTES
"Chief Complaint:   Follow-up (Follow up visit.  Denies any chest discomfort or shortness of breath. )    History Of Present Illness:    .Mr Franke returns in follow up.  Denies chest pain, sob, palpitations or pedal edema.  He just had his fistula revised.         Last Recorded Vitals:  Blood pressure 177/65, pulse 63, height 1.727 m (5' 8\"), weight 81.3 kg (179 lb 3.2 oz), SpO2 93%.     Past Medical History:  Past Medical History:   Diagnosis Date    Personal history of other endocrine, nutritional and metabolic disease     History of hypercholesterolemia    Personal history of other specified conditions 08/28/2020    History of chest pain        Past Surgical History:  Past Surgical History:   Procedure Laterality Date    KNEE ARTHROSCOPY W/ DEBRIDEMENT  04/15/2013    Arthroscopy Knee    SHOULDER SURGERY  04/15/2013    Shoulder Surgery       Social History:  Social History     Socioeconomic History    Marital status:    Tobacco Use    Smoking status: Never    Smokeless tobacco: Never   Substance and Sexual Activity    Alcohol use: Never    Drug use: Never     Social Determinants of Health     Food Insecurity: No Food Insecurity (7/5/2024)    Received from [facility]    Hunger Vital Sign     Worried About Running Out of Food in the Last Year: Never true     Ran Out of Food in the Last Year: Never true   Transportation Needs: No Transportation Needs (7/5/2024)    Received from [facility]    PRAPARE - Transportation     Lack of Transportation (Medical): No     Lack of Transportation (Non-Medical): No   Housing Stability: Unknown (7/5/2024)    Received from [facility]    Housing Stability Vital Sign     Unable to Pay for Housing in the Last Year: No     In the last 12 months, was there a time when you did not have a steady place to sleep or slept in a shelter (including now)?: No       Family History:  No family history on file.    " "  Allergies:  Patient has no known allergies.    Outpatient Medications:  Current Outpatient Medications   Medication Sig Dispense Refill    allopurinol (Zyloprim) 100 mg tablet Take 1 tablet (100 mg) by mouth once daily. 90 tablet 0    amLODIPine (Norvasc) 5 mg tablet Take 1 tablet (5 mg) by mouth once daily. 90 tablet 0    aspirin 81 mg EC tablet Take 1 tablet (81 mg) by mouth once daily.      atorvastatin (Lipitor) 80 mg tablet Take 1 tablet (80 mg) by mouth once daily at bedtime. 90 tablet 0    BD Ultra-Fine Mini Pen Needle 31 gauge x 3/16\" needle USE AS DIRECTED 4 times a day 400 each 2    carvedilol (Coreg) 6.25 mg tablet Take 1 tablet (6.25 mg) by mouth 2 times daily (morning and late afternoon).      ergocalciferol (Vitamin D-2) 1.25 MG (74213 UT) capsule Take 1 capsule (50,000 Units) by mouth 1 (one) time per week.      ezetimibe (Zetia) 10 mg tablet Take 1 tablet (10 mg) by mouth once daily. 90 tablet 0    ezetimibe (Zetia) 10 mg tablet Take 1 tablet (10 mg) by mouth once daily. 90 tablet 0    fenofibrate (Tricor) 145 mg tablet Take 1 tablet (145 mg) by mouth once daily. 90 tablet 0    fenofibrate (Tricor) 145 mg tablet Take 1 tablet (145 mg) by mouth once daily. 90 tablet 0    FeroSuL tablet Take 1 tablet by mouth once daily. 90 tablet 1    gabapentin (Neurontin) 300 mg capsule Take 1 capsule (300 mg) by mouth 2 times a day. 180 capsule 0    gabapentin (Neurontin) 300 mg capsule Take 1 capsule (300 mg) by mouth 2 times a day. 180 capsule 0    insulin glargine (Lantus) 100 unit/mL (3 mL) pen Inject under the skin.      insulin lispro (HumaLOG) 100 unit/mL injection Inject 10 Units under the skin 3 times daily (morning, midday, late afternoon).      Januvia 25 mg tablet Take 1 tablet (25 mg) by mouth once daily. 30 tablet 11    losartan (Cozaar) 100 mg tablet Take 1 tablet (100 mg) by mouth once daily.      nitroglycerin (Nitrostat) 0.4 mg SL tablet Place 1 tablet (0.4 mg) under the tongue every 5 minutes " if needed for chest pain.      pantoprazole (ProtoNix) 40 mg EC tablet Take 1 tablet (40 mg) by mouth once daily.      promethazine (Phenergan) 25 mg tablet Take by mouth.      sevelamer carbonate (Renvela) 800 mg tablet take 1 tablet by mouth every day before meals      amLODIPine (Norvasc) 10 mg tablet TAKE 1 TABLET DAILY (Patient not taking: Reported on 8/2/2024) 90 tablet 3    B complex-vitamin C-folic acid (Nephro-Shea) 0.8 mg tablet Take 1 tablet by mouth once daily.      carvedilol (Coreg) 12.5 mg tablet Take 1 tablet (12.5 mg) by mouth 2 times daily (morning and late afternoon). (Patient not taking: Reported on 8/2/2024) 180 tablet 0    losartan (Cozaar) 50 mg tablet Take 1 tablet (50 mg) by mouth once daily.      Xphozah 30 mg tablet Take 1 tablet by mouth every 12 hours.       No current facility-administered medications for this visit.        Physical Exam:  Cardiovascular:      PMI at left midclavicular line. Normal rate. Regular rhythm. Normal S1. Normal S2.       Murmurs: There is a grade 2/6 systolic murmur.      No gallop.  No click. No rub.   Pulses:     Intact distal pulses.   Edema:     Peripheral edema absent.         ROS:  Review of Systems   Constitutional: Negative.   Cardiovascular: Negative.    Respiratory: Negative.     Skin: Negative.    Musculoskeletal: Negative.    Gastrointestinal: Negative.    Genitourinary: Negative.    Neurological: Negative.           Last Labs:  CBC -  Lab Results   Component Value Date    WBC 5.5 05/29/2024    HGB 9.1 (L) 05/29/2024    HCT 28.8 (L) 05/29/2024     (H) 05/29/2024     05/29/2024       CMP -  Lab Results   Component Value Date    CALCIUM 8.8 05/29/2024    PHOS 5.3 (H) 10/31/2022    PROT 6.1 (L) 05/29/2024    ALBUMIN 4.0 05/29/2024    AST 21 05/29/2024    ALT 4 (L) 05/29/2024    ALKPHOS 49 05/29/2024    BILITOT 0.6 05/29/2024       LIPID PANEL -   Lab Results   Component Value Date    CHOL 121 05/29/2024    TRIG 266 (H) 05/29/2024    HDL  30.7 05/29/2024    CHHDL 3.9 05/29/2024    LDLF 13 08/14/2023    VLDL 53 (H) 05/29/2024    NHDL 90 05/29/2024       RENAL FUNCTION PANEL -   Lab Results   Component Value Date    GLUCOSE 215 (H) 05/29/2024     05/29/2024    K 4.9 05/29/2024    CL 99 05/29/2024    CO2 29 05/29/2024    ANIONGAP 19 05/29/2024    BUN 44 (H) 05/29/2024    CREATININE 8.60 (H) 05/29/2024    GFRMALE 5 (A) 08/14/2023    CALCIUM 8.8 05/29/2024    PHOS 5.3 (H) 10/31/2022    ALBUMIN 4.0 05/29/2024        Lab Results   Component Value Date    HGBA1C 6.6 (H) 07/04/2024    HGBA1C 7.1 (H) 05/29/2024         Assessment/Plan   Problem List Items Addressed This Visit    None  Assessment:     1. Hypertension. The patient's blood pressure is elevated on atenolol 25 mg daily, amlodipine 10 mg daily, and Benicar HCT 20/12.5 mg daily. The patient's recent lab work from 04/17/2020 shows some ongoing progression of chronic kidney disease and his creatinine is now 3.34 The patient completed a course of treatment for prostate carcinoma. He has noted some increased lower extremity edema. Given his progressing chronic kidney disease the benicar and furosemide were stopped. He wias started on hydralazine 50 mg twice a day and continue amlodipine 10 mg daily. He had an echocardiogram done 9/2/2020 which showed an EF of 65%, left atrium mild to moderately dilated with a PASP of 36 mmHg. The patient's blood pressure presently is well within the range of normal. He presently is on high-dose hydralazine 100 mg 3 times daily isosorbide mononitrate 120 mg daily amlodipine 5 mg daily carvedilol 12.5 mg twice daily. If blood pressure readings remained in current range may be able to reduce the dose of hydralazine and possibly the isosorbide as well. Is off both hydralazine and isosorbide.  Echo 08/2022  CONCLUSIONS:  1. Left ventricular systolic function is mildly decreased with a 45-50%  estimated ejection fraction.  2. There is mild hypokinesis of the anteroseptal  wall.  3. Mild mitral valve regurgitation.  4. Mild to moderate tricuspid regurgitation.  5. Moderate to severely elevated right ventricular systolic pressure.  6. Moderate to severely elevated pulmonary artery pressure.  7. The estimated PASP is 59 mmHg.  Lexiscan   IMPRESSION:  1. Findings suggestive of mild ischemia involving the cardiac apex.  2. Normal-sized left ventricle, with mildly depressed systolic left  ventricular function and a calculated ejection fraction of 46 %.   ECG portion showed LVH.   2. Hyperlipidemia Recent lipid panel from 11/2020 includes cholesterol 192 LDL 96 HDL 37 triglyceride 289. These results are somewhat worse than previous lipid panels and may be related to the fact that the patient for uncertain reason is taking atorvastatin 10 mg daily rather than the intended 40 mg daily. The dose will be increased to 80 mg daily. Will add Zetia. Recent FLP was optimized.     3. Coronary artery disease status post multiple PCI procedures. Patient had a stress test in 2008 that was negative for ischemia. The patient had a CT score of 447.8. The patient did have a subsequent nuclear exercise treadmill stress test on 02/18/2019 which demonstrated normal myocardial perfusion. Cardiac cath done 09/22/2020 for angina showed triple-vessel CAD with proximal left anterior descending involvement, 90% ostial stenosis of first diagonal branch, middle third of the proximal circumflex coronary artery showed 10 to 30% stenosis, middle third of the mid right coronary artery showed 60% stenosis. Patient was seen by Dr. Montez Palmer to consider possible PCI. Medical management was initially recommended but then the patient was admitted to [facility] on 2/28/2021 with a non-STEMI. He was transferred to [facility] and on 2/24/2021 underwent PCI and stent deployment to the proximal and mid LAD and a [place]oon angioplasty of a ostial first diagonal branch. An echocardiogram performed at  that time demonstrated a preserved LV ejection fraction at 60-65% with moderate left ventricular hypertrophy and severe left atrial enlargement with mild aortic valve stenosis and mild elevation of the PA systolic pressure. The patient presented back to [facility] emergency room on 9/12/2021 with intermittent chest discomfort. Of 2 weeks. The high-sensitivity troponins elevated and fashion consistent with a non-STEMI. EKG showed left ventricular hypertrophy with anterolateral downsloping ST abnormality consistent with either left ventricular repolarization abnormality versus ischemia. The patient underwent cardiac catheterization and this identified a probable non-STEMI related to a culprit high-grade proximal LCx stenosis that had significantly progressed since the previous study. The patient was transferred to [facility] and on 9/17/2021 underwent a successful OCT guided PCI of a culprit LCx stenosis with deployment of a drug-eluting stent. A staged PCI to the RCA was recommended on an outpatient basis due to the patient's advanced chronic kidney disease. The patient had an echocardiogram performed 9/18/2021 which again confirmed preserved LV ejection fraction 60% with moderate concentric LV hypertrophy mild aortic valve stenosis. The patient was readmitted to [facility] on 10/6/2021 with recurrent anginal type chest discomfort marginal troponin elevation. His creatinine was stable at 3.9 and he had a chronic anemia with hematocrit of 25.4. He was continued on dual antiplatelet therapy given intravenous iron infusion. He subsequently was transferred to [facility] and on 10/20/2021 underwent successful OCT guided PCI with rotational atherectomy of a severely calcified 90% mid to distal RCA stenosis and deployment of a 3.0 x 38 mm Synergy drug-eluting stent. The patient is actually doing clinically quite well and is without anginal symptoms. He feels improved and has not  required the use of nitroglycerin. He is going to the [facility] routinely in the mornings and riding a bike without any symptoms.     4. Type two diabetes He will continue to follow up with his primary care doctor.      5. GERD      6. History of gout the patient is currently on Allopurinol 300 mg daily but the dose will be reduced to 100 mg daily because of his progressive kidney disease.     7. History of shoulder surgery      8. Chronic kidney disease. The patient's most recent creatinine was 5.64 when checked recently. He also has chronic anemia most likely due to his chronic kidney disease. Has appointment with Dr Gomez. Is on procrit and iron tablets. H&H is improving. Most recent lab work from 1/14/2022 includes creatinine 4.80 hemoglobin 10.4. He is having his hemoglobin checked every other week and will receive a Procrit injection for hemoglobins of less than 10. Has fistula to left arm.      9. Arthroscopic left knee surgery with Dr. Menard on June 21, 2016 at [facility].     10. Prostate carcinoma. The patient had a prostate biopsy in 11/25/2019 which was positive for the presence of carcinoma. MRI of the prostate in 01/04/2020 which evidently was negative for any evidence of metastatic disease. He subsequently underwent a 5 week course of external radiation therapy which was completed in mid 4/2020. His PSA is declining from a maximum value of 8.0 now at 3.66 and presumably declining.     11. Carotid US done 09/2020 showed < 50% stenosis.      12. Hx of covid-19 vaccine #1 and #2.      13. History of rectal bleeding. Patient was at [facility] April 2021 to receive 1 unit packed red blood cells. He complains of rectal bleeding x2 episodes and underwent a colonoscopy and found to have internal hemorrhoids and 2 polyps. He had a polypectomy done and required 2 clips.     14. Chronic anemia.     15.  Hyperkalemia.    Return in four months.    Elvia Brunner, APRN-CNP

## 2024-08-09 ENCOUNTER — LAB (OUTPATIENT)
Dept: LAB | Facility: LAB | Age: 74
End: 2024-08-09
Payer: MEDICARE

## 2024-08-09 DIAGNOSIS — E78.5 HYPERLIPIDEMIA: ICD-10-CM

## 2024-08-09 DIAGNOSIS — E11.42 DIABETIC POLYNEUROPATHY ASSOCIATED WITH TYPE 2 DIABETES MELLITUS (MULTI): ICD-10-CM

## 2024-08-09 DIAGNOSIS — Z99.2 TYPE 2 DIABETES MELLITUS WITH CHRONIC KIDNEY DISEASE ON CHRONIC DIALYSIS, WITH LONG-TERM CURRENT USE OF INSULIN (MULTI): ICD-10-CM

## 2024-08-09 DIAGNOSIS — N18.6 TYPE 2 DIABETES MELLITUS WITH CHRONIC KIDNEY DISEASE ON CHRONIC DIALYSIS, WITH LONG-TERM CURRENT USE OF INSULIN (MULTI): ICD-10-CM

## 2024-08-09 DIAGNOSIS — E11.22 TYPE 2 DIABETES MELLITUS WITH CHRONIC KIDNEY DISEASE ON CHRONIC DIALYSIS, WITH LONG-TERM CURRENT USE OF INSULIN (MULTI): ICD-10-CM

## 2024-08-09 DIAGNOSIS — I10 PRIMARY HYPERTENSION: ICD-10-CM

## 2024-08-09 DIAGNOSIS — I10 HYPERTENSION: ICD-10-CM

## 2024-08-09 DIAGNOSIS — E78.49 OTHER HYPERLIPIDEMIA: ICD-10-CM

## 2024-08-09 DIAGNOSIS — Z79.4 TYPE 2 DIABETES MELLITUS WITH CHRONIC KIDNEY DISEASE ON CHRONIC DIALYSIS, WITH LONG-TERM CURRENT USE OF INSULIN (MULTI): ICD-10-CM

## 2024-08-09 DIAGNOSIS — M1A.9XX0 CHRONIC GOUT WITHOUT TOPHUS, UNSPECIFIED CAUSE, UNSPECIFIED SITE: ICD-10-CM

## 2024-08-09 LAB
ALBUMIN SERPL BCP-MCNC: 3.9 G/DL (ref 3.4–5)
ALP SERPL-CCNC: 57 U/L (ref 33–136)
ALT SERPL W P-5'-P-CCNC: 7 U/L (ref 10–52)
ANION GAP SERPL CALC-SCNC: 18 MMOL/L (ref 10–20)
AST SERPL W P-5'-P-CCNC: 18 U/L (ref 9–39)
BILIRUB SERPL-MCNC: 0.6 MG/DL (ref 0–1.2)
BUN SERPL-MCNC: 42 MG/DL (ref 6–23)
CALCIUM SERPL-MCNC: 9 MG/DL (ref 8.6–10.6)
CHLORIDE SERPL-SCNC: 102 MMOL/L (ref 98–107)
CHOLEST SERPL-MCNC: 102 MG/DL (ref 0–199)
CHOLESTEROL/HDL RATIO: 3.3
CO2 SERPL-SCNC: 26 MMOL/L (ref 21–32)
CREAT SERPL-MCNC: 9.04 MG/DL (ref 0.5–1.3)
EGFRCR SERPLBLD CKD-EPI 2021: 6 ML/MIN/1.73M*2
ERYTHROCYTE [DISTWIDTH] IN BLOOD BY AUTOMATED COUNT: 15.7 % (ref 11.5–14.5)
EST. AVERAGE GLUCOSE BLD GHB EST-MCNC: 140 MG/DL
GLUCOSE SERPL-MCNC: 215 MG/DL (ref 74–99)
HBA1C MFR BLD: 6.5 %
HCT VFR BLD AUTO: 27.2 % (ref 41–52)
HDLC SERPL-MCNC: 30.6 MG/DL
HGB BLD-MCNC: 8.5 G/DL (ref 13.5–17.5)
LDLC SERPL CALC-MCNC: 33 MG/DL
MCH RBC QN AUTO: 31 PG (ref 26–34)
MCHC RBC AUTO-ENTMCNC: 31.3 G/DL (ref 32–36)
MCV RBC AUTO: 99 FL (ref 80–100)
NON HDL CHOLESTEROL: 71 MG/DL (ref 0–149)
NRBC BLD-RTO: 0 /100 WBCS (ref 0–0)
PLATELET # BLD AUTO: 179 X10*3/UL (ref 150–450)
POTASSIUM SERPL-SCNC: 4.8 MMOL/L (ref 3.5–5.3)
PROT SERPL-MCNC: 6.1 G/DL (ref 6.4–8.2)
RBC # BLD AUTO: 2.74 X10*6/UL (ref 4.5–5.9)
SODIUM SERPL-SCNC: 141 MMOL/L (ref 136–145)
TRIGL SERPL-MCNC: 193 MG/DL (ref 0–149)
TSH SERPL-ACNC: 1.09 MIU/L (ref 0.44–3.98)
VLDL: 39 MG/DL (ref 0–40)
WBC # BLD AUTO: 5.4 X10*3/UL (ref 4.4–11.3)

## 2024-08-09 PROCEDURE — 80061 LIPID PANEL: CPT

## 2024-08-09 PROCEDURE — 80053 COMPREHEN METABOLIC PANEL: CPT

## 2024-08-09 PROCEDURE — 85027 COMPLETE CBC AUTOMATED: CPT

## 2024-08-09 PROCEDURE — 84443 ASSAY THYROID STIM HORMONE: CPT

## 2024-08-09 PROCEDURE — 36415 COLL VENOUS BLD VENIPUNCTURE: CPT

## 2024-08-09 PROCEDURE — 83036 HEMOGLOBIN GLYCOSYLATED A1C: CPT

## 2024-08-15 ENCOUNTER — TELEPHONE (OUTPATIENT)
Dept: CARDIOLOGY | Facility: CLINIC | Age: 74
End: 2024-08-15
Payer: MEDICARE

## 2024-08-15 DIAGNOSIS — I10 HYPERTENSION, UNSPECIFIED TYPE: Primary | ICD-10-CM

## 2024-08-15 RX ORDER — LOSARTAN POTASSIUM 100 MG/1
100 TABLET ORAL DAILY
Qty: 90 TABLET | Refills: 3 | Status: SHIPPED | OUTPATIENT
Start: 2024-08-15 | End: 2025-08-15

## 2024-08-19 ENCOUNTER — APPOINTMENT (OUTPATIENT)
Dept: PRIMARY CARE | Facility: CLINIC | Age: 74
End: 2024-08-19
Payer: MEDICARE

## 2024-08-19 VITALS
DIASTOLIC BLOOD PRESSURE: 68 MMHG | SYSTOLIC BLOOD PRESSURE: 132 MMHG | HEIGHT: 68 IN | WEIGHT: 181 LBS | BODY MASS INDEX: 27.43 KG/M2

## 2024-08-19 DIAGNOSIS — Z79.4 TYPE 2 DIABETES MELLITUS WITHOUT COMPLICATION, WITH LONG-TERM CURRENT USE OF INSULIN (MULTI): ICD-10-CM

## 2024-08-19 DIAGNOSIS — G62.89 OTHER POLYNEUROPATHY: Primary | ICD-10-CM

## 2024-08-19 DIAGNOSIS — E11.9 TYPE 2 DIABETES MELLITUS WITHOUT COMPLICATION, WITH LONG-TERM CURRENT USE OF INSULIN (MULTI): ICD-10-CM

## 2024-08-19 DIAGNOSIS — E78.5 HYPERLIPIDEMIA, UNSPECIFIED HYPERLIPIDEMIA TYPE: ICD-10-CM

## 2024-08-19 DIAGNOSIS — I15.9 SECONDARY HYPERTENSION: ICD-10-CM

## 2024-08-19 PROCEDURE — 99214 OFFICE O/P EST MOD 30 MIN: CPT | Performed by: INTERNAL MEDICINE

## 2024-08-19 PROCEDURE — 3078F DIAST BP <80 MM HG: CPT | Performed by: INTERNAL MEDICINE

## 2024-08-19 PROCEDURE — 4010F ACE/ARB THERAPY RXD/TAKEN: CPT | Performed by: INTERNAL MEDICINE

## 2024-08-19 PROCEDURE — 3075F SYST BP GE 130 - 139MM HG: CPT | Performed by: INTERNAL MEDICINE

## 2024-08-19 PROCEDURE — 3048F LDL-C <100 MG/DL: CPT | Performed by: INTERNAL MEDICINE

## 2024-08-19 PROCEDURE — 3044F HG A1C LEVEL LT 7.0%: CPT | Performed by: INTERNAL MEDICINE

## 2024-08-22 DIAGNOSIS — Z99.2 TYPE 2 DIABETES MELLITUS WITH CHRONIC KIDNEY DISEASE ON CHRONIC DIALYSIS, WITH LONG-TERM CURRENT USE OF INSULIN (MULTI): ICD-10-CM

## 2024-08-22 DIAGNOSIS — I10 PRIMARY HYPERTENSION: ICD-10-CM

## 2024-08-22 DIAGNOSIS — E11.9 TYPE 2 DIABETES MELLITUS WITHOUT COMPLICATION, WITHOUT LONG-TERM CURRENT USE OF INSULIN (MULTI): ICD-10-CM

## 2024-08-22 DIAGNOSIS — E78.5 HYPERLIPIDEMIA: ICD-10-CM

## 2024-08-22 DIAGNOSIS — N18.6 TYPE 2 DIABETES MELLITUS WITH CHRONIC KIDNEY DISEASE ON CHRONIC DIALYSIS, WITH LONG-TERM CURRENT USE OF INSULIN (MULTI): ICD-10-CM

## 2024-08-22 DIAGNOSIS — E11.22 TYPE 2 DIABETES MELLITUS WITH CHRONIC KIDNEY DISEASE ON CHRONIC DIALYSIS, WITH LONG-TERM CURRENT USE OF INSULIN (MULTI): ICD-10-CM

## 2024-08-22 DIAGNOSIS — G62.89 OTHER POLYNEUROPATHY: ICD-10-CM

## 2024-08-22 DIAGNOSIS — I10 HYPERTENSION, UNSPECIFIED TYPE: ICD-10-CM

## 2024-08-22 DIAGNOSIS — M1A.9XX0 CHRONIC GOUT WITHOUT TOPHUS, UNSPECIFIED CAUSE, UNSPECIFIED SITE: ICD-10-CM

## 2024-08-22 DIAGNOSIS — E78.49 OTHER HYPERLIPIDEMIA: ICD-10-CM

## 2024-08-22 DIAGNOSIS — Z79.4 TYPE 2 DIABETES MELLITUS WITH CHRONIC KIDNEY DISEASE ON CHRONIC DIALYSIS, WITH LONG-TERM CURRENT USE OF INSULIN (MULTI): ICD-10-CM

## 2024-08-22 RX ORDER — ALLOPURINOL 100 MG/1
100 TABLET ORAL DAILY
Qty: 90 TABLET | Refills: 0 | Status: SHIPPED | OUTPATIENT
Start: 2024-08-22

## 2024-08-22 RX ORDER — LOSARTAN POTASSIUM 100 MG/1
100 TABLET ORAL DAILY
Qty: 90 TABLET | Refills: 3 | Status: SHIPPED | OUTPATIENT
Start: 2024-08-22 | End: 2025-08-22

## 2024-08-22 RX ORDER — ATORVASTATIN CALCIUM 80 MG/1
80 TABLET, FILM COATED ORAL NIGHTLY
Qty: 90 TABLET | Refills: 0 | Status: SHIPPED | OUTPATIENT
Start: 2024-08-22 | End: 2025-08-22

## 2024-08-22 RX ORDER — GABAPENTIN 300 MG/1
300 CAPSULE ORAL 2 TIMES DAILY
Qty: 180 CAPSULE | Refills: 0 | Status: SHIPPED | OUTPATIENT
Start: 2024-08-22

## 2024-08-22 RX ORDER — SITAGLIPTIN 25 MG/1
25 TABLET, FILM COATED ORAL DAILY
Qty: 30 TABLET | Refills: 11 | Status: SHIPPED | OUTPATIENT
Start: 2024-08-22

## 2024-08-22 RX ORDER — EZETIMIBE 10 MG/1
10 TABLET ORAL DAILY
Qty: 90 TABLET | Refills: 0 | Status: SHIPPED | OUTPATIENT
Start: 2024-08-22 | End: 2025-02-18

## 2024-08-22 RX ORDER — FENOFIBRATE 145 MG/1
145 TABLET, FILM COATED ORAL DAILY
Qty: 90 TABLET | Refills: 0 | Status: SHIPPED | OUTPATIENT
Start: 2024-08-22 | End: 2025-08-22

## 2024-08-22 RX ORDER — PEN NEEDLE, DIABETIC 31 GX5/16"
NEEDLE, DISPOSABLE MISCELLANEOUS
Qty: 400 EACH | Refills: 2 | Status: SHIPPED | OUTPATIENT
Start: 2024-08-22

## 2024-08-23 ENCOUNTER — APPOINTMENT (OUTPATIENT)
Dept: ENDOCRINOLOGY | Facility: CLINIC | Age: 74
End: 2024-08-23
Payer: MEDICARE

## 2024-08-23 ENCOUNTER — APPOINTMENT (OUTPATIENT)
Dept: PRIMARY CARE | Facility: CLINIC | Age: 74
End: 2024-08-23
Payer: MEDICARE

## 2024-08-23 VITALS — BODY MASS INDEX: 27.22 KG/M2 | SYSTOLIC BLOOD PRESSURE: 138 MMHG | WEIGHT: 179 LBS | DIASTOLIC BLOOD PRESSURE: 78 MMHG

## 2024-08-23 DIAGNOSIS — E11.22 TYPE 2 DIABETES MELLITUS WITH CHRONIC KIDNEY DISEASE ON CHRONIC DIALYSIS, WITH LONG-TERM CURRENT USE OF INSULIN (MULTI): Primary | ICD-10-CM

## 2024-08-23 DIAGNOSIS — E78.5 HYPERLIPIDEMIA, UNSPECIFIED HYPERLIPIDEMIA TYPE: ICD-10-CM

## 2024-08-23 DIAGNOSIS — Z79.4 TYPE 2 DIABETES MELLITUS WITH CHRONIC KIDNEY DISEASE ON CHRONIC DIALYSIS, WITH LONG-TERM CURRENT USE OF INSULIN (MULTI): Primary | ICD-10-CM

## 2024-08-23 DIAGNOSIS — I10 PRIMARY HYPERTENSION: ICD-10-CM

## 2024-08-23 DIAGNOSIS — N18.6 TYPE 2 DIABETES MELLITUS WITH CHRONIC KIDNEY DISEASE ON CHRONIC DIALYSIS, WITH LONG-TERM CURRENT USE OF INSULIN (MULTI): Primary | ICD-10-CM

## 2024-08-23 DIAGNOSIS — Z99.2 TYPE 2 DIABETES MELLITUS WITH CHRONIC KIDNEY DISEASE ON CHRONIC DIALYSIS, WITH LONG-TERM CURRENT USE OF INSULIN (MULTI): Primary | ICD-10-CM

## 2024-08-23 PROCEDURE — 3048F LDL-C <100 MG/DL: CPT | Performed by: INTERNAL MEDICINE

## 2024-08-23 PROCEDURE — 3078F DIAST BP <80 MM HG: CPT | Performed by: INTERNAL MEDICINE

## 2024-08-23 PROCEDURE — 3075F SYST BP GE 130 - 139MM HG: CPT | Performed by: INTERNAL MEDICINE

## 2024-08-23 PROCEDURE — 3044F HG A1C LEVEL LT 7.0%: CPT | Performed by: INTERNAL MEDICINE

## 2024-08-23 PROCEDURE — 4010F ACE/ARB THERAPY RXD/TAKEN: CPT | Performed by: INTERNAL MEDICINE

## 2024-08-23 PROCEDURE — 99214 OFFICE O/P EST MOD 30 MIN: CPT | Performed by: INTERNAL MEDICINE

## 2024-08-23 NOTE — PROGRESS NOTES
"Patient ID: William A Franke is a 73 y.o. male who presents for Follow-up.  HPI  The patient comes in for follow up.    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    He continues on Lantus 0 to 30 units daily Humalog 4 to 5 units and Januvia.    He continues taking the mealtime insulin postmeal.    He has very rare low blood sugars.    He continues using the freestyle shahbaz but again did not bring the reader.    He notes that if he is higher at bedtime he actually takes less Lantus otherwise he will get low.    Physically he has no complaints.    ROS  Comprehensive review of systems is negative.    Objective   Physical Exam  Visit Vitals  /78      Vitals:    08/23/24 0830   Weight: 81.2 kg (179 lb)      Body mass index is 27.22 kg/m².      Weight 179 down 10 pounds    ENT normal. No adenopathy  Fundi normal  Thyroid palpable and normal. No nodules  Chest clear to auscultation  Heart sounds are normal  Abdomen nontender. Bowel sounds normal. No organomegaly  Feet are okay  Normal vibration and monofilament sensation normal pulses, no lesions    Current Outpatient Medications   Medication Sig Dispense Refill    allopurinol (Zyloprim) 100 mg tablet Take 1 tablet (100 mg) by mouth once daily. 90 tablet 0    amLODIPine (Norvasc) 10 mg tablet Take 1 tablet (10 mg) by mouth once daily. 90 tablet 3    aspirin 81 mg EC tablet Take 1 tablet (81 mg) by mouth once daily.      atorvastatin (Lipitor) 80 mg tablet Take 1 tablet (80 mg) by mouth once daily at bedtime. 90 tablet 0    B complex-vitamin C-folic acid (Nephro-Shea) 0.8 mg tablet Take 1 tablet by mouth once daily.      BD Ultra-Fine Mini Pen Needle 31 gauge x 3/16\" needle USE AS DIRECTED 4 times a day 400 each 2    carvedilol (Coreg) 6.25 mg tablet Take 1 tablet (6.25 mg) by mouth 2 times daily (morning and late afternoon).      ergocalciferol (Vitamin D-2) 1.25 MG (16230 UT) capsule Take 1 capsule (50,000 Units) by mouth 1 " (one) time per week.      ezetimibe (Zetia) 10 mg tablet Take 1 tablet (10 mg) by mouth once daily. 90 tablet 0    fenofibrate (Tricor) 145 mg tablet Take 1 tablet (145 mg) by mouth once daily. 90 tablet 0    FeroSuL tablet Take 1 tablet by mouth once daily. 90 tablet 1    gabapentin (Neurontin) 300 mg capsule Take 1 capsule (300 mg) by mouth 2 times a day. 180 capsule 0    insulin glargine (Lantus) 100 unit/mL (3 mL) pen Inject under the skin.      insulin lispro (HumaLOG) 100 unit/mL injection Inject 10 Units under the skin 3 times daily (morning, midday, late afternoon).      Januvia 25 mg tablet Take 1 tablet (25 mg) by mouth once daily. 30 tablet 11    losartan (Cozaar) 100 mg tablet Take 1 tablet (100 mg) by mouth once daily. 90 tablet 3    nitroglycerin (Nitrostat) 0.4 mg SL tablet Place 1 tablet (0.4 mg) under the tongue every 5 minutes if needed for chest pain.      pantoprazole (ProtoNix) 40 mg EC tablet Take 1 tablet (40 mg) by mouth once daily.      promethazine (Phenergan) 25 mg tablet Take by mouth.      sevelamer carbonate (Renvela) 800 mg tablet take 1 tablet by mouth every day before meals       No current facility-administered medications for this visit.       Assessment/Plan     1.  Type 2 diabetes  2.  End-stage renal disease  3.  Hypertension  4.  Hyperlipidemia    We reviewed his most recent blood work.    We discussed that without the numbers it is hard to gauge what should be done however since he is not having any low blood sugars and his hemoglobin A1c is acceptable he will maintain his current regimen.    We again discussed the timing of his mealtime insulin.    He will follow-up with me in 6 months sooner as needed.

## 2024-09-04 NOTE — PROGRESS NOTES
Subjective   Patient ID: William A Franke is a 73 y.o. male who presents for Follow-up and Med Refill.    Med Refill    Patient is here for follow-up on diabetes hypertension high cholesterol end-stage renal disease on hemodialysis  Overall patient is doing well  He needs refills on fenofibric gabapentin anxiety  His neuropathy is stable    Past recap  Patient is here for follow-up on diabetes hypertension high cholesterol  He is not on any aspirin or any blood thinner because he bleeds during dialysis  He is worried about his stents closing up  Overall he is doing well  He manages his diabetes by taking extra insulin whenever he eats at home.  He does not watch his diet  He needs diabetic shoes because he has neuropathy and having some calluses buildup    Past recap   patient is here for follow-up  Follow-up on diabetes hypertension high cholesterol  Doing hemodialysis for end-stage renal disease     patient is here for follow-up  His fistula still has bleeding off-and-on but not as much  Follow-up on diabetes hypertension high cholesterol  He is getting dialysis 3 times a week  Overall doing fine      patient is here for hospital follow-up  He was admitted for bleeding AV fistula.  Required blood transfusion.  Needs refill on gabapentin and Zetia  Complains of having stiffness in the hands  Doing hemodialysis 3 times a week    patient was hospitalized and had another heart attack at [facility]  He is still complaining of having a lot of shortness of breath and cough  His kidney functions did deteriorate but because he is making urine did not get started on the dialysis yet  Is doing blood work today to reevaluate his kidney function  But his main concern is his cough     Patient went to the hospital and had MI  He was catheterized again and had stent placed  Since he came home he is having very poor appetite not eating drinking not feeling good and blood pressure is running high in 180s and 200s  He had  "stent placed 3 weeks ago     Patient here for follow-up on diabetes hypertension high cholesterol chronic kidney disease and anemia  Follow-up on blood work  Patient was hospitalized for another non-ST elevation MI  Since discharge his chest pain is doing better but he still getting off-and-on chest pain  Feels very anxious  Feels very limited in his activity        Patient is here with complaints of rectal bleed this morning he started bleeding did not realize his bleeding until it messed up his underwear and pants. He had similar episode 5 days ago  He is having some discomfort in the lower abdomen denies any fever or chills  He had problems with hemorrhoids many years ago. But he has no pain and denies constipation     Patient is here for hospital follow-up  He presented with chest pain and acute MI. He had to have cardiac cath done with close monitoring of kidney function  Cardiac stable  Patient had stent placed and did not require dialysis  He is here for follow-up on blood work for kidney  His blood sugars were doing better in the hospital. Now they are running high again  He is getting Procrit every 2 weeks            Virtual visit  Patient here for follow-up on diabetes   Patient is eating little better but not able to exercise because  Blood sugars are running high  The pressure is running little high but she is coming to see Dr. Ardon on Thursday  He is managing with his insulin blood sugar little better  Finished radiation treatment for prostate cancer  Works outdoors  Did blood work needs medications refilled   refuses to see the dietitian  Does not take Humalog insulin because of fear has appointment with the urologist        Review of Systems    Objective   /68   Ht 1.727 m (5' 8\")   Wt 82.1 kg (181 lb)   BMI 27.52 kg/m²     Physical Exam  Vitals reviewed.   Constitutional:       Appearance: Normal appearance.   HENT:      Head: Normocephalic and atraumatic.      Right Ear: Tympanic " membrane, ear canal and external ear normal.      Left Ear: Tympanic membrane, ear canal and external ear normal.      Nose: Nose normal.      Mouth/Throat:      Pharynx: Oropharynx is clear.   Eyes:      Extraocular Movements: Extraocular movements intact.      Conjunctiva/sclera: Conjunctivae normal.      Pupils: Pupils are equal, round, and reactive to light.   Cardiovascular:      Rate and Rhythm: Normal rate and regular rhythm.      Pulses: Normal pulses.      Heart sounds: Normal heart sounds.   Pulmonary:      Effort: Pulmonary effort is normal.      Breath sounds: Normal breath sounds.   Abdominal:      General: Abdomen is flat. Bowel sounds are normal.      Palpations: Abdomen is soft.   Musculoskeletal:      Cervical back: Normal range of motion and neck supple.   Skin:     General: Skin is warm and dry.      Comments: Callus on the bottom of the feet   Neurological:      General: No focal deficit present.      Mental Status: He is alert and oriented to person, place, and time.   Psychiatric:         Mood and Affect: Mood normal.         Assessment/Plan   Problem List Items Addressed This Visit          Cardiac and Vasculature    Hypertension    Relevant Orders    CBC    Comprehensive Metabolic Panel    Lipid Panel    Hemoglobin A1C    Hyperlipidemia    Relevant Orders    Lipid Panel       Endocrine/Metabolic    Diabetes mellitus (Multi)    Relevant Orders    Hemoglobin A1C       Neuro    Peripheral neuropathy - Primary   10/14  Call Dr. Palmer  He is trying to arrange outpatient follow-up for cardiac catheterization and carotid stent which she needs  Because of his anemia and severe kidney disease he is planning in stages  He asked me to order CBC BMP and he will call tomorrow to schedule for Next week discussed with the patient and the wife agreed with the plan  Advised him to go to the emergency room if symptoms persist  Continue diet exercise follow-up in 3 months     11/2  Concerned that patient  might be getting anemic or his kidney function would be getting worse  Stat CBC BMP ordered  Increase Coreg to 2 tablets twice a day  Blood work results reviewed  Kidney functions are in fact better anemia stable  Patient could be not feeling good because of elevated blood pressure  Recheck in 2 weeks     9/9  Will get x-ray chest  Tessalon cough drops albuterol inhaler  Cholesterol medication refill  Blood pressure is stable  Will see if kidney functions looks okay to hold off dialysis  Patient wife is planning to be able to go to [place] before start of dialysis     5/1/2023  Will order blood work to make sure anemia is stable  Patient is under care of endocrinologist for diabetes  He is under care of nephrologist for kidneys  He follows up with cardiology regularly and stable  Refills given on iron and gabapentin and Zetia     5/19/2023  Clinically patient is stable blood pressure is stable  Routine blood work ordered for 3 months  His diabetes is doing better  Medications refilled    8/18/2023  Blood work reviewed  A1c still high  Blood pressure stable  Cholesterol okay  Discussed different food options  Patient eats a lot of processed food and to eat out a lot  Discussed better options  Continue current medications  Follow-up blood work in 3 months    11/10/2023  Blood work reviewed  Triglycerides have gone up to 388  A1c still 7.7 patient is under care of endocrinologist  Start fenofibrate  Discussed high triglycerides this will put him at risk for blockage in coronary arteries at this time  Again discussed diet and exercise  Follow-up blood work in 3 months    2/12/2024  Blood work reviewed  A1c down to 6.9 anemia stable  Cholesterol good but triglycerides always higher  Follow-up blood work in 3 months continue current medications  Patient is doing dialysis    5/31/2024  Blood pressure stable  Cardiac status is stable.  Patient denies any angina  A1c 7.1 well-controlled  Hemoglobin 9.1 stable gets epo  shots  Triglycerides elevated  Discussed diet and exercise  Stable on current medications  Diabetic shoes ordered for diabetic neuropathy and callus issues  Follow-up blood work in 3 months    8/19/2024  Patient's blood work reviewed  A1c 6.5 well-controlled  Cholesterol well-controlled triglycerides slightly elevated  Chronic anemia stable  End-stage renal disease on hemodialysis  Medications refilled  Neuropathy stable with gabapentin  Follow-up blood work in 3 months

## 2024-09-10 DIAGNOSIS — I10 BENIGN ESSENTIAL HYPERTENSION: Primary | ICD-10-CM

## 2024-09-10 RX ORDER — CARVEDILOL 6.25 MG/1
6.25 TABLET ORAL
Qty: 180 TABLET | Refills: 3 | Status: SHIPPED | OUTPATIENT
Start: 2024-09-10 | End: 2025-09-10

## 2024-09-13 ENCOUNTER — OFFICE VISIT (OUTPATIENT)
Dept: PRIMARY CARE | Facility: CLINIC | Age: 74
End: 2024-09-13
Payer: MEDICARE

## 2024-09-13 VITALS
WEIGHT: 182 LBS | DIASTOLIC BLOOD PRESSURE: 64 MMHG | HEIGHT: 68 IN | BODY MASS INDEX: 27.58 KG/M2 | SYSTOLIC BLOOD PRESSURE: 156 MMHG

## 2024-09-13 DIAGNOSIS — E86.0 DEHYDRATION: ICD-10-CM

## 2024-09-13 DIAGNOSIS — R05.9 COUGH, UNSPECIFIED TYPE: ICD-10-CM

## 2024-09-13 DIAGNOSIS — J45.909 ACUTE ASTHMATIC BRONCHITIS (HHS-HCC): Primary | ICD-10-CM

## 2024-09-13 PROCEDURE — 1159F MED LIST DOCD IN RCRD: CPT | Performed by: INTERNAL MEDICINE

## 2024-09-13 PROCEDURE — 3078F DIAST BP <80 MM HG: CPT | Performed by: INTERNAL MEDICINE

## 2024-09-13 PROCEDURE — 4010F ACE/ARB THERAPY RXD/TAKEN: CPT | Performed by: INTERNAL MEDICINE

## 2024-09-13 PROCEDURE — 3048F LDL-C <100 MG/DL: CPT | Performed by: INTERNAL MEDICINE

## 2024-09-13 PROCEDURE — 3044F HG A1C LEVEL LT 7.0%: CPT | Performed by: INTERNAL MEDICINE

## 2024-09-13 PROCEDURE — 99214 OFFICE O/P EST MOD 30 MIN: CPT | Performed by: INTERNAL MEDICINE

## 2024-09-13 PROCEDURE — 3077F SYST BP >= 140 MM HG: CPT | Performed by: INTERNAL MEDICINE

## 2024-09-13 RX ORDER — LORATADINE 10 MG/1
10 TABLET ORAL DAILY
Qty: 30 TABLET | Refills: 0 | Status: SHIPPED | OUTPATIENT
Start: 2024-09-13 | End: 2024-12-12

## 2024-09-13 RX ORDER — LEVOFLOXACIN 500 MG/1
500 TABLET, FILM COATED ORAL DAILY
Qty: 10 TABLET | Refills: 0 | Status: SHIPPED | OUTPATIENT
Start: 2024-09-13 | End: 2024-09-23

## 2024-09-13 RX ORDER — BENZONATATE 100 MG/1
100 CAPSULE ORAL 3 TIMES DAILY PRN
Qty: 42 CAPSULE | Refills: 0 | Status: SHIPPED | OUTPATIENT
Start: 2024-09-13 | End: 2024-10-13

## 2024-09-13 RX ORDER — GUAIFENESIN 100 MG/5ML
200 SOLUTION ORAL 3 TIMES DAILY PRN
Qty: 120 ML | Refills: 0 | Status: SHIPPED | OUTPATIENT
Start: 2024-09-13 | End: 2024-09-23

## 2024-09-13 ASSESSMENT — ENCOUNTER SYMPTOMS: COUGH: 1

## 2024-09-14 NOTE — PROGRESS NOTES
"Subjective   Patient ID: William A Franke is a 73 y.o. male who presents for Cough and Nasal Congestion.    This gentleman today came here for cough, yellow sputum, sinus congestion, bronchitis, headache going on for the last several days.  Over-the-counter medications not helping.  He will do COVID test.  He came here for follow-up.    I have personally reviewed the patient's Past Medical History, Medications, Allergies, Social History, and Family History in the EMR.    Cough    Review of Systems   Respiratory:  Positive for cough.    All other systems reviewed and are negative.    Objective   /64   Ht 1.727 m (5' 8\")   Wt 82.6 kg (182 lb)   BMI 27.67 kg/m²     Physical Exam  Vitals reviewed.   HENT:      Nose: Congestion present.      Comments: Postnasal drip.     Mouth/Throat:      Comments: Throat congested.  Cardiovascular:      Heart sounds: Normal heart sounds, S1 normal and S2 normal. No murmur heard.     No friction rub.   Pulmonary:      Effort: Pulmonary effort is normal.      Breath sounds: Normal air entry. Wheezing (Bilateral) present.   Abdominal:      Palpations: There is no hepatomegaly, splenomegaly or mass.   Musculoskeletal:      Right lower leg: No edema.      Left lower leg: No edema.   Lymphadenopathy:      Lower Body: No right inguinal adenopathy. No left inguinal adenopathy.   Neurological:      Cranial Nerves: Cranial nerves 2-12 are intact.      Sensory: No sensory deficit.      Motor: Motor function is intact.      Deep Tendon Reflexes: Reflexes are normal and symmetric.     LAB WORK:  Laboratory testing discussed.    Assessment/Plan   Problem List Items Addressed This Visit             ICD-10-CM       Genitourinary and Reproductive    Dehydration E86.0       Pulmonary and Pneumonias    Cough R05.9    Relevant Medications    levoFLOXacin (Levaquin) 500 mg tablet    loratadine (Claritin) 10 mg tablet    guaiFENesin (Robitussin) 100 mg/5 mL syrup    benzonatate (Tessalon) 100 mg " capsule     Other Visit Diagnoses         Codes    Acute asthmatic bronchitis ([facility])    -  Primary J45.909        1. Acute asthmatic bronchitis and cough.  Levaquin, Claritin, Robitussin, Flonase, albuterol, Tessalon Perles.  I told him to do COVID test.  If positive, will call me.  2. Mild dehydration.  I urged him to stay hydrated.    Scribe Attestation  By signing my name below, I, Genesis Spencer attest that this documentation has been prepared under the direction and in the presence of Candido Galloway MD.

## 2024-10-30 DIAGNOSIS — E11.9 TYPE 2 DIABETES MELLITUS WITHOUT COMPLICATION, WITHOUT LONG-TERM CURRENT USE OF INSULIN (MULTI): ICD-10-CM

## 2024-10-30 RX ORDER — SITAGLIPTIN 25 MG/1
25 TABLET, FILM COATED ORAL DAILY
Qty: 90 TABLET | Refills: 3 | Status: SHIPPED | OUTPATIENT
Start: 2024-10-30

## 2024-11-04 DIAGNOSIS — D64.9 ANEMIA, UNSPECIFIED TYPE: ICD-10-CM

## 2024-11-04 RX ORDER — FERROUS SULFATE TAB 325 MG (65 MG ELEMENTAL FE) 325 (65 FE) MG
1 TAB ORAL DAILY
Qty: 90 TABLET | Refills: 1 | Status: SHIPPED | OUTPATIENT
Start: 2024-11-04 | End: 2024-11-15 | Stop reason: HOSPADM

## 2024-11-08 ENCOUNTER — LAB (OUTPATIENT)
Dept: LAB | Facility: LAB | Age: 74
End: 2024-11-08
Payer: MEDICARE

## 2024-11-08 DIAGNOSIS — Z79.4 TYPE 2 DIABETES MELLITUS WITHOUT COMPLICATION, WITH LONG-TERM CURRENT USE OF INSULIN (MULTI): ICD-10-CM

## 2024-11-08 DIAGNOSIS — E78.5 HYPERLIPIDEMIA, UNSPECIFIED HYPERLIPIDEMIA TYPE: ICD-10-CM

## 2024-11-08 DIAGNOSIS — I15.9 SECONDARY HYPERTENSION: ICD-10-CM

## 2024-11-08 DIAGNOSIS — E11.9 TYPE 2 DIABETES MELLITUS WITHOUT COMPLICATION, WITH LONG-TERM CURRENT USE OF INSULIN (MULTI): ICD-10-CM

## 2024-11-08 LAB
ALBUMIN SERPL BCP-MCNC: 4.3 G/DL (ref 3.4–5)
ALP SERPL-CCNC: 56 U/L (ref 33–136)
ALT SERPL W P-5'-P-CCNC: 12 U/L (ref 10–52)
ANION GAP SERPL CALC-SCNC: 18 MMOL/L (ref 10–20)
AST SERPL W P-5'-P-CCNC: 19 U/L (ref 9–39)
BILIRUB SERPL-MCNC: 0.7 MG/DL (ref 0–1.2)
BUN SERPL-MCNC: 30 MG/DL (ref 6–23)
CALCIUM SERPL-MCNC: 9.4 MG/DL (ref 8.6–10.6)
CHLORIDE SERPL-SCNC: 101 MMOL/L (ref 98–107)
CHOLEST SERPL-MCNC: 123 MG/DL (ref 0–199)
CHOLESTEROL/HDL RATIO: 3.4
CO2 SERPL-SCNC: 29 MMOL/L (ref 21–32)
CREAT SERPL-MCNC: 6.72 MG/DL (ref 0.5–1.3)
EGFRCR SERPLBLD CKD-EPI 2021: 8 ML/MIN/1.73M*2
EST. AVERAGE GLUCOSE BLD GHB EST-MCNC: 134 MG/DL
GLUCOSE SERPL-MCNC: 103 MG/DL (ref 74–99)
HBA1C MFR BLD: 6.3 %
HDLC SERPL-MCNC: 36.7 MG/DL
LDLC SERPL CALC-MCNC: 52 MG/DL
NON HDL CHOLESTEROL: 86 MG/DL (ref 0–149)
POTASSIUM SERPL-SCNC: 4.9 MMOL/L (ref 3.5–5.3)
PROT SERPL-MCNC: 6.4 G/DL (ref 6.4–8.2)
SODIUM SERPL-SCNC: 143 MMOL/L (ref 136–145)
TRIGL SERPL-MCNC: 170 MG/DL (ref 0–149)
VLDL: 34 MG/DL (ref 0–40)

## 2024-11-08 PROCEDURE — 36415 COLL VENOUS BLD VENIPUNCTURE: CPT

## 2024-11-08 PROCEDURE — 80053 COMPREHEN METABOLIC PANEL: CPT

## 2024-11-08 PROCEDURE — 80061 LIPID PANEL: CPT

## 2024-11-08 PROCEDURE — 83036 HEMOGLOBIN GLYCOSYLATED A1C: CPT

## 2024-11-11 ENCOUNTER — APPOINTMENT (OUTPATIENT)
Dept: PRIMARY CARE | Facility: CLINIC | Age: 74
End: 2024-11-11
Payer: MEDICARE

## 2024-11-11 ENCOUNTER — OFFICE VISIT (OUTPATIENT)
Dept: PRIMARY CARE | Facility: CLINIC | Age: 74
End: 2024-11-11
Payer: MEDICARE

## 2024-11-11 VITALS
DIASTOLIC BLOOD PRESSURE: 78 MMHG | WEIGHT: 183 LBS | BODY MASS INDEX: 27.74 KG/M2 | HEIGHT: 68 IN | SYSTOLIC BLOOD PRESSURE: 182 MMHG

## 2024-11-11 DIAGNOSIS — Z79.4 TYPE 2 DIABETES MELLITUS WITH CHRONIC KIDNEY DISEASE ON CHRONIC DIALYSIS, WITH LONG-TERM CURRENT USE OF INSULIN (MULTI): ICD-10-CM

## 2024-11-11 DIAGNOSIS — N18.6 TYPE 2 DIABETES MELLITUS WITH CHRONIC KIDNEY DISEASE ON CHRONIC DIALYSIS, WITH LONG-TERM CURRENT USE OF INSULIN (MULTI): ICD-10-CM

## 2024-11-11 DIAGNOSIS — E78.49 OTHER HYPERLIPIDEMIA: ICD-10-CM

## 2024-11-11 DIAGNOSIS — M1A.9XX0 CHRONIC GOUT WITHOUT TOPHUS, UNSPECIFIED CAUSE, UNSPECIFIED SITE: ICD-10-CM

## 2024-11-11 DIAGNOSIS — E11.22 TYPE 2 DIABETES MELLITUS WITH CHRONIC KIDNEY DISEASE ON CHRONIC DIALYSIS, WITH LONG-TERM CURRENT USE OF INSULIN (MULTI): ICD-10-CM

## 2024-11-11 DIAGNOSIS — G62.89 OTHER POLYNEUROPATHY: ICD-10-CM

## 2024-11-11 DIAGNOSIS — E11.9 TYPE 2 DIABETES MELLITUS WITHOUT COMPLICATION, WITHOUT LONG-TERM CURRENT USE OF INSULIN (MULTI): ICD-10-CM

## 2024-11-11 DIAGNOSIS — Z99.2 TYPE 2 DIABETES MELLITUS WITH CHRONIC KIDNEY DISEASE ON CHRONIC DIALYSIS, WITH LONG-TERM CURRENT USE OF INSULIN (MULTI): ICD-10-CM

## 2024-11-11 DIAGNOSIS — E78.5 HYPERLIPIDEMIA: ICD-10-CM

## 2024-11-11 DIAGNOSIS — I10 HYPERTENSION, UNSPECIFIED TYPE: ICD-10-CM

## 2024-11-11 DIAGNOSIS — I10 PRIMARY HYPERTENSION: ICD-10-CM

## 2024-11-11 PROCEDURE — 3078F DIAST BP <80 MM HG: CPT | Performed by: INTERNAL MEDICINE

## 2024-11-11 PROCEDURE — 3048F LDL-C <100 MG/DL: CPT | Performed by: INTERNAL MEDICINE

## 2024-11-11 PROCEDURE — 99213 OFFICE O/P EST LOW 20 MIN: CPT | Performed by: INTERNAL MEDICINE

## 2024-11-11 PROCEDURE — 3044F HG A1C LEVEL LT 7.0%: CPT | Performed by: INTERNAL MEDICINE

## 2024-11-11 PROCEDURE — 1124F ACP DISCUSS-NO DSCNMKR DOCD: CPT | Performed by: INTERNAL MEDICINE

## 2024-11-11 PROCEDURE — 3077F SYST BP >= 140 MM HG: CPT | Performed by: INTERNAL MEDICINE

## 2024-11-11 RX ORDER — EZETIMIBE 10 MG/1
10 TABLET ORAL DAILY
Qty: 90 TABLET | Refills: 0 | Status: ON HOLD | OUTPATIENT
Start: 2024-11-11 | End: 2025-05-10

## 2024-11-11 RX ORDER — GABAPENTIN 300 MG/1
300 CAPSULE ORAL 2 TIMES DAILY
Qty: 180 CAPSULE | Refills: 0 | Status: SHIPPED | OUTPATIENT
Start: 2024-11-11 | End: 2024-11-15 | Stop reason: HOSPADM

## 2024-11-11 RX ORDER — ALLOPURINOL 100 MG/1
100 TABLET ORAL DAILY
Qty: 90 TABLET | Refills: 0 | Status: ON HOLD | OUTPATIENT
Start: 2024-11-11

## 2024-11-11 RX ORDER — ATORVASTATIN CALCIUM 80 MG/1
80 TABLET, FILM COATED ORAL NIGHTLY
Qty: 90 TABLET | Refills: 0 | Status: ON HOLD | OUTPATIENT
Start: 2024-11-11 | End: 2025-11-11

## 2024-11-11 RX ORDER — FENOFIBRATE 145 MG/1
145 TABLET, FILM COATED ORAL DAILY
Qty: 90 TABLET | Refills: 0 | Status: SHIPPED | OUTPATIENT
Start: 2024-11-11 | End: 2024-11-15 | Stop reason: HOSPADM

## 2024-11-12 ENCOUNTER — OFFICE VISIT (OUTPATIENT)
Dept: CARDIOLOGY | Facility: CLINIC | Age: 74
End: 2024-11-12
Payer: MEDICARE

## 2024-11-12 VITALS
BODY MASS INDEX: 27.71 KG/M2 | HEART RATE: 60 BPM | HEIGHT: 68 IN | OXYGEN SATURATION: 89 % | SYSTOLIC BLOOD PRESSURE: 130 MMHG | WEIGHT: 182.8 LBS | DIASTOLIC BLOOD PRESSURE: 62 MMHG

## 2024-11-12 DIAGNOSIS — R06.02 SHORTNESS OF BREATH: Primary | ICD-10-CM

## 2024-11-12 DIAGNOSIS — R09.89 CHEST CONGESTION: ICD-10-CM

## 2024-11-12 PROCEDURE — 3078F DIAST BP <80 MM HG: CPT | Performed by: INTERNAL MEDICINE

## 2024-11-12 PROCEDURE — 3075F SYST BP GE 130 - 139MM HG: CPT | Performed by: INTERNAL MEDICINE

## 2024-11-12 PROCEDURE — 99214 OFFICE O/P EST MOD 30 MIN: CPT | Performed by: INTERNAL MEDICINE

## 2024-11-12 PROCEDURE — 1126F AMNT PAIN NOTED NONE PRSNT: CPT | Performed by: INTERNAL MEDICINE

## 2024-11-12 PROCEDURE — 1159F MED LIST DOCD IN RCRD: CPT | Performed by: INTERNAL MEDICINE

## 2024-11-12 PROCEDURE — 1160F RVW MEDS BY RX/DR IN RCRD: CPT | Performed by: INTERNAL MEDICINE

## 2024-11-12 PROCEDURE — 3044F HG A1C LEVEL LT 7.0%: CPT | Performed by: INTERNAL MEDICINE

## 2024-11-12 PROCEDURE — 1036F TOBACCO NON-USER: CPT | Performed by: INTERNAL MEDICINE

## 2024-11-12 PROCEDURE — 1123F ACP DISCUSS/DSCN MKR DOCD: CPT | Performed by: INTERNAL MEDICINE

## 2024-11-12 PROCEDURE — 3048F LDL-C <100 MG/DL: CPT | Performed by: INTERNAL MEDICINE

## 2024-11-12 PROCEDURE — 4010F ACE/ARB THERAPY RXD/TAKEN: CPT | Performed by: INTERNAL MEDICINE

## 2024-11-12 ASSESSMENT — ENCOUNTER SYMPTOMS
DEPRESSION: 0
OCCASIONAL FEELINGS OF UNSTEADINESS: 0
LOSS OF SENSATION IN FEET: 0

## 2024-11-12 ASSESSMENT — PAIN SCALES - GENERAL: PAINLEVEL_OUTOF10: 0-NO PAIN

## 2024-11-12 NOTE — PROGRESS NOTES
"Primary Care Physician: Marianna Galloway MD  Date of Visit: 11/12/2024  3:30 PM EST  Location of visit: [facility]     Chief Complaint:   Chief Complaint   Patient presents with    Follow-up    Hypertension    Coronary Artery Disease    Heart Murmur      Chest pain     HPI / Summary:   William A Franke is a 73 y.o. male presents for follow up      ROS    Medical History:   He has a past medical history of Personal history of other endocrine, nutritional and metabolic disease and Personal history of other specified conditions (08/28/2020).  Surgical Hx:   He has a past surgical history that includes Shoulder surgery (04/15/2013) and Knee arthroscopy w/ debridement (04/15/2013).   Social Hx:   He reports that he has never smoked. He has never used smokeless tobacco. He reports that he does not drink alcohol and does not use drugs.  Family Hx:   His family history is not on file.   Allergies:  No Known Allergies  Outpatient Medications:  Current Outpatient Medications   Medication Instructions    allopurinol (ZYLOPRIM) 100 mg, oral, Daily    amLODIPine (NORVASC) 10 mg, oral, Daily    aspirin 81 mg, Daily    atorvastatin (LIPITOR) 80 mg, oral, Nightly    B complex-vitamin C-folic acid (Nephro-Shea) 0.8 mg tablet 0.8 mg, Daily    BD Ultra-Fine Mini Pen Needle 31 gauge x 3/16\" needle USE AS DIRECTED 4 times a day    carvedilol (COREG) 6.25 mg, oral, 2 times daily (morning and late afternoon)    ergocalciferol (VITAMIN D-2) 50,000 Units, Once Weekly    ezetimibe (ZETIA) 10 mg, oral, Daily    fenofibrate (TRICOR) 145 mg, oral, Daily    FeroSuL 325 mg, oral, Daily    gabapentin (NEURONTIN) 300 mg, oral, 2 times daily    insulin glargine (Lantus) 100 unit/mL (3 mL) pen Inject under the skin.    insulin lispro (HUMALOG) 10 Units, 3 times daily (morning, midday, late afternoon)    Januvia 25 mg, oral, Daily    loratadine (CLARITIN) 10 mg, oral, Daily    losartan (COZAAR) 100 mg, oral, Daily    nitroglycerin " "(NITROSTAT) 0.4 mg, Every 5 min PRN    pantoprazole (PROTONIX) 40 mg, Daily    promethazine (Phenergan) 25 mg tablet Take by mouth.    sevelamer carbonate (Renvela) 800 mg tablet take 1 tablet by mouth every day before meals     Physical Exam:  Vitals:    11/12/24 1520   BP: (!) 210/70   BP Location: Right arm   Patient Position: Sitting   BP Cuff Size: Adult   Pulse: 60   SpO2: (!) 89%   Weight: 82.9 kg (182 lb 12.8 oz)   Height: 1.727 m (5' 8\")     Wt Readings from Last 5 Encounters:   11/12/24 82.9 kg (182 lb 12.8 oz)   11/11/24 83 kg (183 lb)   09/13/24 82.6 kg (182 lb)   08/23/24 81.2 kg (179 lb)   08/19/24 82.1 kg (181 lb)     Physical Exam  JVP not elevated. Carotid impulses are 2+ without overlying bruit.   Chest exhibits fair to good air movement with completely clear breath sounds.   The cardiac rhythm is regular with no premature beats.   Normal S1 and S2. No gallop, murmur or rub, or click.   Abdomen is soft and benign without focal tenderness.   With no lower leg edema. The pedal pulses are intact.     Last Labs:  Lab on 11/08/2024   Component Date Value    Glucose 11/08/2024 103 (H)     Sodium 11/08/2024 143     Potassium 11/08/2024 4.9     Chloride 11/08/2024 101     Bicarbonate 11/08/2024 29     Anion Gap 11/08/2024 18     Urea Nitrogen 11/08/2024 30 (H)     Creatinine 11/08/2024 6.72 (H)     eGFR 11/08/2024 8 (L)     Calcium 11/08/2024 9.4     Albumin 11/08/2024 4.3     Alkaline Phosphatase 11/08/2024 56     Total Protein 11/08/2024 6.4     AST 11/08/2024 19     Bilirubin, Total 11/08/2024 0.7     ALT 11/08/2024 12     Cholesterol 11/08/2024 123     HDL-Cholesterol 11/08/2024 36.7     Cholesterol/HDL Ratio 11/08/2024 3.4     LDL Calculated 11/08/2024 52     VLDL 11/08/2024 34     Triglycerides 11/08/2024 170 (H)     Non HDL Cholesterol 11/08/2024 86     Hemoglobin A1C 11/08/2024 6.3 (H)     Estimated Average Glucose 11/08/2024 134    Lab on 08/09/2024   Component Date Value    WBC 08/09/2024 5.4     " nRBC 08/09/2024 0.0     RBC 08/09/2024 2.74 (L)     Hemoglobin 08/09/2024 8.5 (L)     Hematocrit 08/09/2024 27.2 (L)     MCV 08/09/2024 99     MCH 08/09/2024 31.0     MCHC 08/09/2024 31.3 (L)     RDW 08/09/2024 15.7 (H)     Platelets 08/09/2024 179     Glucose 08/09/2024 215 (H)     Sodium 08/09/2024 141     Potassium 08/09/2024 4.8     Chloride 08/09/2024 102     Bicarbonate 08/09/2024 26     Anion Gap 08/09/2024 18     Urea Nitrogen 08/09/2024 42 (H)     Creatinine 08/09/2024 9.04 (H)     eGFR 08/09/2024 6 (L)     Calcium 08/09/2024 9.0     Albumin 08/09/2024 3.9     Alkaline Phosphatase 08/09/2024 57     Total Protein 08/09/2024 6.1 (L)     AST 08/09/2024 18     Bilirubin, Total 08/09/2024 0.6     ALT 08/09/2024 7 (L)     Hemoglobin A1C 08/09/2024 6.5 (H)     Estimated Average Glucose 08/09/2024 140     Cholesterol 08/09/2024 102     HDL-Cholesterol 08/09/2024 30.6     Cholesterol/HDL Ratio 08/09/2024 3.3     LDL Calculated 08/09/2024 33     VLDL 08/09/2024 39     Triglycerides 08/09/2024 193 (H)     Non HDL Cholesterol 08/09/2024 71     Thyroid Stimulating Horm* 08/09/2024 1.09    Lab on 05/29/2024   Component Date Value    WBC 05/29/2024 5.5     nRBC 05/29/2024 0.0     RBC 05/29/2024 2.80 (L)     Hemoglobin 05/29/2024 9.1 (L)     Hematocrit 05/29/2024 28.8 (L)     MCV 05/29/2024 103 (H)     MCH 05/29/2024 32.5     MCHC 05/29/2024 31.6 (L)     RDW 05/29/2024 15.2 (H)     Platelets 05/29/2024 242     Glucose 05/29/2024 215 (H)     Sodium 05/29/2024 142     Potassium 05/29/2024 4.9     Chloride 05/29/2024 99     Bicarbonate 05/29/2024 29     Anion Gap 05/29/2024 19     Urea Nitrogen 05/29/2024 44 (H)     Creatinine 05/29/2024 8.60 (H)     eGFR 05/29/2024 6 (L)     Calcium 05/29/2024 8.8     Albumin 05/29/2024 4.0     Alkaline Phosphatase 05/29/2024 49     Total Protein 05/29/2024 6.1 (L)     AST 05/29/2024 21     Bilirubin, Total 05/29/2024 0.6     ALT 05/29/2024 4 (L)     Cholesterol 05/29/2024 121      HDL-Cholesterol 05/29/2024 30.7     Cholesterol/HDL Ratio 05/29/2024 3.9     LDL Calculated 05/29/2024 37     VLDL 05/29/2024 53 (H)     Triglycerides 05/29/2024 266 (H)     Non HDL Cholesterol 05/29/2024 90     Vitamin D, 25-Hydroxy, T* 05/29/2024 48     Hemoglobin A1C 05/29/2024 7.1 (H)     Estimated Average Glucose 05/29/2024 157     Prostate Specific AG 05/29/2024 0.42         Assessment/Plan   1. Hypertension. The patient's blood pressure is elevated on atenolol 25 mg daily, amlodipine 10 mg daily, and Benicar HCT 20/12.5 mg daily. The patient's recent lab work from 04/17/2020 shows some ongoing progression of chronic kidney disease and his creatinine is now 3.34 The patient completed a course of treatment for prostate carcinoma. He has noted some increased lower extremity edema. Given his progressing chronic kidney disease the benicar and furosemide were stopped. He wias started on hydralazine 50 mg twice a day and continue amlodipine 10 mg daily. He had an echocardiogram done 9/2/2020 which showed an EF of 65%, left atrium mild to moderately dilated with a PASP of 36 mmHg. The patient's blood pressure presently is well within the range of normal. He presently is on high-dose hydralazine 100 mg 3 times daily isosorbide mononitrate 120 mg daily amlodipine 5 mg daily carvedilol 12.5 mg twice daily. If blood pressure readings remained in current range may be able to reduce the dose of hydralazine and possibly the isosorbide as well. Is off both hydralazine and isosorbide.  Echo 08/2022  CONCLUSIONS:  1. Left ventricular systolic function is mildly decreased with a 45-50%  estimated ejection fraction.  2. There is mild hypokinesis of the anteroseptal wall.  3. Mild mitral valve regurgitation.  4. Mild to moderate tricuspid regurgitation.  5. Moderate to severely elevated right ventricular systolic pressure.  6. Moderate to severely elevated pulmonary artery pressure.  7. The estimated PASP is 59 mmHg.  Lexiscan    IMPRESSION:  1. Findings suggestive of mild ischemia involving the cardiac apex.  2. Normal-sized left ventricle, with mildly depressed systolic left  ventricular function and a calculated ejection fraction of 46 %.   ECG portion showed LVH.   2. Hyperlipidemia Recent lipid panel from 11/2020 includes cholesterol 192 LDL 96 HDL 37 triglyceride 289. These results are somewhat worse than previous lipid panels and may be related to the fact that the patient for uncertain reason is taking atorvastatin 10 mg daily rather than the intended 40 mg daily. The dose will be increased to 80 mg daily. Will add Zetia. Recent FLP was optimized.  Recent lipid panel from 11/8/2024 satisfactory cholesterol 123 LDL 52 HDL 36 triglyceride 170.     3. Coronary artery disease status post multiple PCI procedures. Patient had a stress test in 2008 that was negative for ischemia. The patient had a CT score of 447.8. The patient did have a subsequent nuclear exercise treadmill stress test on 02/18/2019 which demonstrated normal myocardial perfusion. Cardiac cath done 09/22/2020 for angina showed triple-vessel CAD with proximal left anterior descending involvement, 90% ostial stenosis of first diagonal branch, middle third of the proximal circumflex coronary artery showed 10 to 30% stenosis, middle third of the mid right coronary artery showed 60% stenosis. Patient was seen by Dr. Monetz Palmer to consider possible PCI. Medical management was initially recommended but then the patient was admitted to [facility] on 2/28/2021 with a non-STEMI. He was transferred to [facility] and on 2/24/2021 underwent PCI and stent deployment to the proximal and mid LAD and a [place]oon angioplasty of a ostial first diagonal branch. An echocardiogram performed at that time demonstrated a preserved LV ejection fraction at 60-65% with moderate left ventricular hypertrophy and severe left atrial enlargement with mild aortic valve  stenosis and mild elevation of the PA systolic pressure. The patient presented back to [facility] emergency room on 9/12/2021 with intermittent chest discomfort. Of 2 weeks. The high-sensitivity troponins elevated and fashion consistent with a non-STEMI. EKG showed left ventricular hypertrophy with anterolateral downsloping ST abnormality consistent with either left ventricular repolarization abnormality versus ischemia. The patient underwent cardiac catheterization and this identified a probable non-STEMI related to a culprit high-grade proximal LCx stenosis that had significantly progressed since the previous study. The patient was transferred to [facility] and on 9/17/2021 underwent a successful OCT guided PCI of a culprit LCx stenosis with deployment of a drug-eluting stent. A staged PCI to the RCA was recommended on an outpatient basis due to the patient's advanced chronic kidney disease. The patient had an echocardiogram performed 9/18/2021 which again confirmed preserved LV ejection fraction 60% with moderate concentric LV hypertrophy mild aortic valve stenosis. The patient was readmitted to [facility] on 10/6/2021 with recurrent anginal type chest discomfort marginal troponin elevation. His creatinine was stable at 3.9 and he had a chronic anemia with hematocrit of 25.4. He was continued on dual antiplatelet therapy given intravenous iron infusion. He subsequently was transferred to [facility] and on 10/20/2021 underwent successful OCT guided PCI with rotational atherectomy of a severely calcified 90% mid to distal RCA stenosis and deployment of a 3.0 x 38 mm Synergy drug-eluting stent. The patient is actually doing clinically quite well and is without anginal symptoms. He feels improved and has not required the use of nitroglycerin. He is going to the [facility] routinely in the mornings and riding a bike without any symptoms.  The patient did have a pharmacological  nuclear stress test on 8/30/2022 which showed mild ischemia involving the cardiac apex normal LV chamber size and her LV ejection fraction of 46%.  Patient currently experiencing some very minor chest discomfort.  The patient had been raking leaves several days ago currently has some chest congestion and cough over the past 24 hours along with some minor diarrhea.  The chest pain is actually been present for approximately 1 month but only happens 1-2 times every few weeks and comes and goes.  He is not certain as to whether or not it resembles his original anginal syndrome.  Will defer repeat stress testing or cardiac catheterization pending further additional follow-up.  He will return in 2 months for to reassess his symptoms.  Will check chest x-ray today to assess findings of decreased breath sounds right base.     4. Type two diabetes He will continue to follow up with his primary care doctor.  Lab work 11/8/2024 includes acceptable glycohemoglobin of 6.3%.     5. GERD      6. History of gout the patient is currently on Allopurinol 300 mg daily but the dose will be reduced to 100 mg daily because of his progressive kidney disease.     7. History of shoulder surgery      8. Chronic kidney disease. The patient's most recent creatinine was 5.64 when checked recently. He also has chronic anemia most likely due to his chronic kidney disease. Has appointment with Dr Gomez. Is on procrit and iron tablets. H&H is improving. Most recent lab work from 1/14/2022 includes creatinine 4.80 hemoglobin 10.4. He is having his hemoglobin checked every other week and will receive a Procrit injection for hemoglobins of less than 10. Has fistula to left arm.  Patient had dialysis port removed in early 11/2024 and has a fully functional AV graft in the left upper extremity.  Lab work 11/8/2024 includes an SMA panel creatinine is 6.72.     9. Arthroscopic left knee surgery with Dr. Menard on June 21, 2016 at [facility].      10. Prostate carcinoma. The patient had a prostate biopsy in 11/25/2019 which was positive for the presence of carcinoma. MRI of the prostate in 01/04/2020 which evidently was negative for any evidence of metastatic disease. He subsequently underwent a 5 week course of external radiation therapy which was completed in mid 4/2020. His PSA is declining from a maximum value of 8.0 now at 3.66 and presumably declining.     11. Carotid US done 09/2020 showed < 50% stenosis.      12. Hx of covid-19 vaccine #1 and #2.      13. History of rectal bleeding. Patient was at [facility] April 2021 to receive 1 unit packed red blood cells. He complains of rectal bleeding x2 episodes and underwent a colonoscopy and found to have internal hemorrhoids and 2 polyps. He had a polypectomy done and required 2 clips.     14. Chronic anemia.         15.  Hyperkalemia.        Orders:  No orders of the defined types were placed in this encounter.     Followup Appts:  Future Appointments   Date Time Provider Department Center   11/12/2024  3:30 PM Kyle Ardon MD [id number] [facility]   12/13/2024  8:00 AM Elvia Brunner APRN-CNP [id number] [facility]   12/27/2024  9:30 AM VERÓNICA Barnes-CNP [id number] [facility]   2/10/2025 10:15 AM Marianna Galloway MD [id number] [facility]   2/21/2025  9:30 AM Elvia Brunner APRN-CNP [id number] [facility]   2/24/2025  9:00 AM Abad Briceno MD [id number] [facility]           ____________________________________________________________  Kyle Ardon MD  [facility]  Assistant Clinical Professor, [employer]  [facility]

## 2024-11-13 ENCOUNTER — APPOINTMENT (OUTPATIENT)
Dept: CARDIOLOGY | Facility: HOSPITAL | Age: 74
End: 2024-11-13
Payer: MEDICARE

## 2024-11-13 ENCOUNTER — HOSPITAL ENCOUNTER (INPATIENT)
Facility: HOSPITAL | Age: 74
LOS: 2 days | Discharge: HOME | End: 2024-11-15
Admitting: INTERNAL MEDICINE
Payer: MEDICARE

## 2024-11-13 ENCOUNTER — APPOINTMENT (OUTPATIENT)
Dept: DIALYSIS | Facility: HOSPITAL | Age: 74
End: 2024-11-13
Payer: MEDICARE

## 2024-11-13 ENCOUNTER — OFFICE VISIT (OUTPATIENT)
Dept: URGENT CARE | Age: 74
End: 2024-11-13
Payer: MEDICARE

## 2024-11-13 ENCOUNTER — APPOINTMENT (OUTPATIENT)
Dept: RADIOLOGY | Facility: HOSPITAL | Age: 74
End: 2024-11-13
Payer: MEDICARE

## 2024-11-13 VITALS
DIASTOLIC BLOOD PRESSURE: 63 MMHG | TEMPERATURE: 96.6 F | OXYGEN SATURATION: 88 % | HEART RATE: 60 BPM | HEIGHT: 68 IN | WEIGHT: 180 LBS | BODY MASS INDEX: 27.28 KG/M2 | SYSTOLIC BLOOD PRESSURE: 198 MMHG

## 2024-11-13 DIAGNOSIS — J90 BILATERAL PLEURAL EFFUSION: ICD-10-CM

## 2024-11-13 DIAGNOSIS — E83.39 HYPERPHOSPHATEMIA: ICD-10-CM

## 2024-11-13 DIAGNOSIS — I50.23 HYPERTENSIVE HEART DISEASE WITH ACUTE ON CHRONIC SYSTOLIC CONGESTIVE HEART FAILURE: ICD-10-CM

## 2024-11-13 DIAGNOSIS — I11.0 HYPERTENSIVE HEART DISEASE WITH ACUTE ON CHRONIC SYSTOLIC CONGESTIVE HEART FAILURE: ICD-10-CM

## 2024-11-13 DIAGNOSIS — R07.9 CHEST PAIN, UNSPECIFIED TYPE: Primary | ICD-10-CM

## 2024-11-13 DIAGNOSIS — E87.5 HYPERKALEMIA: ICD-10-CM

## 2024-11-13 DIAGNOSIS — R07.89 OTHER CHEST PAIN: ICD-10-CM

## 2024-11-13 DIAGNOSIS — R06.03 RESPIRATORY DISTRESS: Primary | ICD-10-CM

## 2024-11-13 LAB
ALBUMIN SERPL BCP-MCNC: 4.2 G/DL (ref 3.4–5)
ALP SERPL-CCNC: 38 U/L (ref 33–136)
ALT SERPL W P-5'-P-CCNC: 12 U/L (ref 10–52)
ANION GAP SERPL CALCULATED.3IONS-SCNC: 20 MMOL/L (ref 10–20)
APPEARANCE UR: CLEAR
AST SERPL W P-5'-P-CCNC: 27 U/L (ref 9–39)
BASOPHILS # BLD AUTO: 0.01 X10*3/UL (ref 0–0.1)
BASOPHILS NFR BLD AUTO: 0.1 %
BILIRUB SERPL-MCNC: 0.8 MG/DL (ref 0–1.2)
BILIRUB UR STRIP.AUTO-MCNC: NEGATIVE MG/DL
BNP SERPL-MCNC: 977 PG/ML (ref 0–99)
BUN SERPL-MCNC: 86 MG/DL (ref 6–23)
CALCIUM SERPL-MCNC: 9.3 MG/DL (ref 8.6–10.3)
CARDIAC TROPONIN I PNL SERPL HS: 54 NG/L (ref 0–20)
CARDIAC TROPONIN I PNL SERPL HS: 54 NG/L (ref 0–20)
CHLORIDE SERPL-SCNC: 106 MMOL/L (ref 98–107)
CO2 SERPL-SCNC: 21 MMOL/L (ref 21–32)
COLOR UR: ABNORMAL
CREAT SERPL-MCNC: 11.3 MG/DL (ref 0.5–1.3)
EGFRCR SERPLBLD CKD-EPI 2021: 4 ML/MIN/1.73M*2
EOSINOPHIL # BLD AUTO: 0.01 X10*3/UL (ref 0–0.4)
EOSINOPHIL NFR BLD AUTO: 0.1 %
ERYTHROCYTE [DISTWIDTH] IN BLOOD BY AUTOMATED COUNT: 17.8 % (ref 11.5–14.5)
FLUAV RNA RESP QL NAA+PROBE: NOT DETECTED
FLUBV RNA RESP QL NAA+PROBE: NOT DETECTED
GLUCOSE SERPL-MCNC: 159 MG/DL (ref 74–99)
GLUCOSE UR STRIP.AUTO-MCNC: ABNORMAL MG/DL
HCT VFR BLD AUTO: 36.4 % (ref 41–52)
HGB BLD-MCNC: 11.3 G/DL (ref 13.5–17.5)
HYALINE CASTS #/AREA URNS AUTO: ABNORMAL /LPF
IMM GRANULOCYTES # BLD AUTO: 0.03 X10*3/UL (ref 0–0.5)
IMM GRANULOCYTES NFR BLD AUTO: 0.4 % (ref 0–0.9)
KETONES UR STRIP.AUTO-MCNC: NEGATIVE MG/DL
LEUKOCYTE ESTERASE UR QL STRIP.AUTO: NEGATIVE
LYMPHOCYTES # BLD AUTO: 0.55 X10*3/UL (ref 0.8–3)
LYMPHOCYTES NFR BLD AUTO: 6.4 %
MAGNESIUM SERPL-MCNC: 1.72 MG/DL (ref 1.6–2.4)
MCH RBC QN AUTO: 30.2 PG (ref 26–34)
MCHC RBC AUTO-ENTMCNC: 31 G/DL (ref 32–36)
MCV RBC AUTO: 97 FL (ref 80–100)
MONOCYTES # BLD AUTO: 0.42 X10*3/UL (ref 0.05–0.8)
MONOCYTES NFR BLD AUTO: 4.9 %
MUCOUS THREADS #/AREA URNS AUTO: ABNORMAL /LPF
NEUTROPHILS # BLD AUTO: 7.52 X10*3/UL (ref 1.6–5.5)
NEUTROPHILS NFR BLD AUTO: 88.1 %
NITRITE UR QL STRIP.AUTO: NEGATIVE
NRBC BLD-RTO: 0 /100 WBCS (ref 0–0)
PH UR STRIP.AUTO: 6.5 [PH]
PHOSPHATE SERPL-MCNC: 8.1 MG/DL (ref 2.5–4.9)
PLATELET # BLD AUTO: 179 X10*3/UL (ref 150–450)
POTASSIUM SERPL-SCNC: 7.3 MMOL/L (ref 3.5–5.3)
PROT SERPL-MCNC: 6.8 G/DL (ref 6.4–8.2)
PROT UR STRIP.AUTO-MCNC: ABNORMAL MG/DL
RBC # BLD AUTO: 3.74 X10*6/UL (ref 4.5–5.9)
RBC # UR STRIP.AUTO: ABNORMAL /UL
RBC #/AREA URNS AUTO: ABNORMAL /HPF
SARS-COV-2 RNA RESP QL NAA+PROBE: NOT DETECTED
SODIUM SERPL-SCNC: 140 MMOL/L (ref 136–145)
SP GR UR STRIP.AUTO: 1.01
UROBILINOGEN UR STRIP.AUTO-MCNC: NORMAL MG/DL
WBC # BLD AUTO: 8.5 X10*3/UL (ref 4.4–11.3)
WBC #/AREA URNS AUTO: ABNORMAL /HPF

## 2024-11-13 PROCEDURE — 99222 1ST HOSP IP/OBS MODERATE 55: CPT | Performed by: INTERNAL MEDICINE

## 2024-11-13 PROCEDURE — 84484 ASSAY OF TROPONIN QUANT: CPT

## 2024-11-13 PROCEDURE — 8010000001 HC DIALYSIS - HEMODIALYSIS PER DAY

## 2024-11-13 PROCEDURE — 93005 ELECTROCARDIOGRAM TRACING: CPT

## 2024-11-13 PROCEDURE — 2500000004 HC RX 250 GENERAL PHARMACY W/ HCPCS (ALT 636 FOR OP/ED)

## 2024-11-13 PROCEDURE — 71046 X-RAY EXAM CHEST 2 VIEWS: CPT | Performed by: RADIOLOGY

## 2024-11-13 PROCEDURE — 87340 HEPATITIS B SURFACE AG IA: CPT | Mod: WESLAB | Performed by: INTERNAL MEDICINE

## 2024-11-13 PROCEDURE — 84100 ASSAY OF PHOSPHORUS: CPT

## 2024-11-13 PROCEDURE — 86706 HEP B SURFACE ANTIBODY: CPT | Mod: WESLAB | Performed by: INTERNAL MEDICINE

## 2024-11-13 PROCEDURE — 2500000005 HC RX 250 GENERAL PHARMACY W/O HCPCS: Performed by: INTERNAL MEDICINE

## 2024-11-13 PROCEDURE — 93010 ELECTROCARDIOGRAM REPORT: CPT | Performed by: INTERNAL MEDICINE

## 2024-11-13 PROCEDURE — 5A1D70Z PERFORMANCE OF URINARY FILTRATION, INTERMITTENT, LESS THAN 6 HOURS PER DAY: ICD-10-PCS | Performed by: INTERNAL MEDICINE

## 2024-11-13 PROCEDURE — 85025 COMPLETE CBC W/AUTO DIFF WBC: CPT

## 2024-11-13 PROCEDURE — 71046 X-RAY EXAM CHEST 2 VIEWS: CPT

## 2024-11-13 PROCEDURE — 2060000001 HC INTERMEDIATE ICU ROOM DAILY

## 2024-11-13 PROCEDURE — 87636 SARSCOV2 & INF A&B AMP PRB: CPT

## 2024-11-13 PROCEDURE — 83735 ASSAY OF MAGNESIUM: CPT

## 2024-11-13 PROCEDURE — 99285 EMERGENCY DEPT VISIT HI MDM: CPT | Mod: 25

## 2024-11-13 PROCEDURE — 83880 ASSAY OF NATRIURETIC PEPTIDE: CPT

## 2024-11-13 PROCEDURE — 96374 THER/PROPH/DIAG INJ IV PUSH: CPT | Mod: 59

## 2024-11-13 PROCEDURE — 81001 URINALYSIS AUTO W/SCOPE: CPT

## 2024-11-13 PROCEDURE — 36415 COLL VENOUS BLD VENIPUNCTURE: CPT

## 2024-11-13 PROCEDURE — 2500000002 HC RX 250 W HCPCS SELF ADMINISTERED DRUGS (ALT 637 FOR MEDICARE OP, ALT 636 FOR OP/ED)

## 2024-11-13 PROCEDURE — 80053 COMPREHEN METABOLIC PANEL: CPT

## 2024-11-13 PROCEDURE — 2500000001 HC RX 250 WO HCPCS SELF ADMINISTERED DRUGS (ALT 637 FOR MEDICARE OP): Performed by: INTERNAL MEDICINE

## 2024-11-13 RX ORDER — ONDANSETRON HYDROCHLORIDE 2 MG/ML
4 INJECTION, SOLUTION INTRAVENOUS EVERY 8 HOURS PRN
Status: DISCONTINUED | OUTPATIENT
Start: 2024-11-13 | End: 2024-11-15 | Stop reason: HOSPADM

## 2024-11-13 RX ORDER — LOSARTAN POTASSIUM 100 MG/1
100 TABLET ORAL DAILY
Status: DISCONTINUED | OUTPATIENT
Start: 2024-11-14 | End: 2024-11-13

## 2024-11-13 RX ORDER — POLYETHYLENE GLYCOL 3350 17 G/17G
17 POWDER, FOR SOLUTION ORAL DAILY
Status: DISCONTINUED | OUTPATIENT
Start: 2024-11-13 | End: 2024-11-15 | Stop reason: HOSPADM

## 2024-11-13 RX ORDER — DEXTROSE MONOHYDRATE 100 MG/ML
50 INJECTION, SOLUTION INTRAVENOUS CONTINUOUS
Status: DISCONTINUED | OUTPATIENT
Start: 2024-11-13 | End: 2024-11-13

## 2024-11-13 RX ORDER — ACETAMINOPHEN 650 MG/1
650 SUPPOSITORY RECTAL EVERY 4 HOURS PRN
Status: DISCONTINUED | OUTPATIENT
Start: 2024-11-13 | End: 2024-11-15 | Stop reason: HOSPADM

## 2024-11-13 RX ORDER — SEVELAMER CARBONATE 800 MG/1
1600 TABLET, FILM COATED ORAL
Status: DISCONTINUED | OUTPATIENT
Start: 2024-11-14 | End: 2024-11-15 | Stop reason: HOSPADM

## 2024-11-13 RX ORDER — FUROSEMIDE 10 MG/ML
40 INJECTION INTRAMUSCULAR; INTRAVENOUS ONCE
Status: COMPLETED | OUTPATIENT
Start: 2024-11-13 | End: 2024-11-13

## 2024-11-13 RX ORDER — GUAIFENESIN 600 MG/1
600 TABLET, EXTENDED RELEASE ORAL EVERY 12 HOURS PRN
Status: DISCONTINUED | OUTPATIENT
Start: 2024-11-13 | End: 2024-11-15 | Stop reason: HOSPADM

## 2024-11-13 RX ORDER — EZETIMIBE 10 MG/1
10 TABLET ORAL DAILY
Status: DISCONTINUED | OUTPATIENT
Start: 2024-11-14 | End: 2024-11-15 | Stop reason: HOSPADM

## 2024-11-13 RX ORDER — ATORVASTATIN CALCIUM 80 MG/1
80 TABLET, FILM COATED ORAL NIGHTLY
Status: DISCONTINUED | OUTPATIENT
Start: 2024-11-13 | End: 2024-11-15 | Stop reason: HOSPADM

## 2024-11-13 RX ORDER — AMLODIPINE BESYLATE 10 MG/1
10 TABLET ORAL DAILY
Status: DISCONTINUED | OUTPATIENT
Start: 2024-11-14 | End: 2024-11-15 | Stop reason: HOSPADM

## 2024-11-13 RX ORDER — CALCIUM GLUCONATE 20 MG/ML
1 INJECTION, SOLUTION INTRAVENOUS ONCE
Status: COMPLETED | OUTPATIENT
Start: 2024-11-13 | End: 2024-11-14

## 2024-11-13 RX ORDER — DEXTROSE 50 % IN WATER (D50W) INTRAVENOUS SYRINGE
25 ONCE
Status: COMPLETED | OUTPATIENT
Start: 2024-11-13 | End: 2024-11-14

## 2024-11-13 RX ORDER — ONDANSETRON 4 MG/1
4 TABLET, FILM COATED ORAL EVERY 8 HOURS PRN
Status: DISCONTINUED | OUTPATIENT
Start: 2024-11-13 | End: 2024-11-15 | Stop reason: HOSPADM

## 2024-11-13 RX ORDER — GABAPENTIN 300 MG/1
300 CAPSULE ORAL DAILY
Status: DISCONTINUED | OUTPATIENT
Start: 2024-11-14 | End: 2024-11-15 | Stop reason: HOSPADM

## 2024-11-13 RX ORDER — CARVEDILOL 6.25 MG/1
6.25 TABLET ORAL
Status: DISCONTINUED | OUTPATIENT
Start: 2024-11-14 | End: 2024-11-15 | Stop reason: HOSPADM

## 2024-11-13 RX ORDER — PANTOPRAZOLE SODIUM 40 MG/1
40 TABLET, DELAYED RELEASE ORAL DAILY
Status: DISCONTINUED | OUTPATIENT
Start: 2024-11-14 | End: 2024-11-15 | Stop reason: HOSPADM

## 2024-11-13 RX ORDER — ACETAMINOPHEN 160 MG/5ML
650 SOLUTION ORAL EVERY 4 HOURS PRN
Status: DISCONTINUED | OUTPATIENT
Start: 2024-11-13 | End: 2024-11-15 | Stop reason: HOSPADM

## 2024-11-13 RX ORDER — GUAIFENESIN/DEXTROMETHORPHAN 100-10MG/5
5 SYRUP ORAL EVERY 4 HOURS PRN
Status: DISCONTINUED | OUTPATIENT
Start: 2024-11-13 | End: 2024-11-15 | Stop reason: HOSPADM

## 2024-11-13 RX ORDER — ASPIRIN 81 MG/1
81 TABLET ORAL DAILY
Status: DISCONTINUED | OUTPATIENT
Start: 2024-11-14 | End: 2024-11-15 | Stop reason: HOSPADM

## 2024-11-13 RX ORDER — DOCUSATE SODIUM 100 MG/1
100 CAPSULE, LIQUID FILLED ORAL 2 TIMES DAILY
Status: DISCONTINUED | OUTPATIENT
Start: 2024-11-13 | End: 2024-11-15 | Stop reason: HOSPADM

## 2024-11-13 RX ORDER — ERGOCALCIFEROL 1.25 MG/1
50000 CAPSULE ORAL
Status: DISCONTINUED | OUTPATIENT
Start: 2024-11-24 | End: 2024-11-15 | Stop reason: HOSPADM

## 2024-11-13 RX ORDER — HEPARIN SODIUM 5000 [USP'U]/ML
5000 INJECTION, SOLUTION INTRAVENOUS; SUBCUTANEOUS EVERY 8 HOURS SCHEDULED
Status: DISCONTINUED | OUTPATIENT
Start: 2024-11-13 | End: 2024-11-15 | Stop reason: HOSPADM

## 2024-11-13 RX ORDER — ALLOPURINOL 100 MG/1
100 TABLET ORAL DAILY
Status: DISCONTINUED | OUTPATIENT
Start: 2024-11-14 | End: 2024-11-15 | Stop reason: HOSPADM

## 2024-11-13 RX ORDER — NITROGLYCERIN 0.4 MG/1
0.4 TABLET SUBLINGUAL EVERY 5 MIN PRN
Status: DISCONTINUED | OUTPATIENT
Start: 2024-11-13 | End: 2024-11-15 | Stop reason: HOSPADM

## 2024-11-13 RX ORDER — ACETAMINOPHEN 325 MG/1
650 TABLET ORAL EVERY 4 HOURS PRN
Status: DISCONTINUED | OUTPATIENT
Start: 2024-11-13 | End: 2024-11-15 | Stop reason: HOSPADM

## 2024-11-13 SDOH — HEALTH STABILITY: PHYSICAL HEALTH
HOW OFTEN DO YOU NEED TO HAVE SOMEONE HELP YOU WHEN YOU READ INSTRUCTIONS, PAMPHLETS, OR OTHER WRITTEN MATERIAL FROM YOUR DOCTOR OR PHARMACY?: NEVER

## 2024-11-13 SDOH — SOCIAL STABILITY: SOCIAL INSECURITY
WITHIN THE LAST YEAR, HAVE YOU BEEN KICKED, HIT, SLAPPED, OR OTHERWISE PHYSICALLY HURT BY YOUR PARTNER OR EX-PARTNER?: NO

## 2024-11-13 SDOH — SOCIAL STABILITY: SOCIAL INSECURITY: ABUSE: ADULT

## 2024-11-13 SDOH — ECONOMIC STABILITY: HOUSING INSECURITY: IN THE LAST 12 MONTHS, WAS THERE A TIME WHEN YOU WERE NOT ABLE TO PAY THE MORTGAGE OR RENT ON TIME?: NO

## 2024-11-13 SDOH — SOCIAL STABILITY: SOCIAL INSECURITY: ARE YOU MARRIED, WIDOWED, DIVORCED, SEPARATED, NEVER MARRIED, OR LIVING WITH A PARTNER?: MARRIED

## 2024-11-13 SDOH — SOCIAL STABILITY: SOCIAL NETWORK
DO YOU BELONG TO ANY CLUBS OR ORGANIZATIONS SUCH AS CHURCH GROUPS, UNIONS, FRATERNAL OR ATHLETIC GROUPS, OR SCHOOL GROUPS?: NO

## 2024-11-13 SDOH — ECONOMIC STABILITY: TRANSPORTATION INSECURITY: IN THE PAST 12 MONTHS, HAS LACK OF TRANSPORTATION KEPT YOU FROM MEDICAL APPOINTMENTS OR FROM GETTING MEDICATIONS?: NO

## 2024-11-13 SDOH — SOCIAL STABILITY: SOCIAL INSECURITY: DO YOU FEEL ANYONE HAS EXPLOITED OR TAKEN ADVANTAGE OF YOU FINANCIALLY OR OF YOUR PERSONAL PROPERTY?: NO

## 2024-11-13 SDOH — HEALTH STABILITY: MENTAL HEALTH
DO YOU FEEL STRESS - TENSE, RESTLESS, NERVOUS, OR ANXIOUS, OR UNABLE TO SLEEP AT NIGHT BECAUSE YOUR MIND IS TROUBLED ALL THE TIME - THESE DAYS?: NOT AT ALL

## 2024-11-13 SDOH — SOCIAL STABILITY: SOCIAL INSECURITY: WITHIN THE LAST YEAR, HAVE YOU BEEN HUMILIATED OR EMOTIONALLY ABUSED IN OTHER WAYS BY YOUR PARTNER OR EX-PARTNER?: NO

## 2024-11-13 SDOH — SOCIAL STABILITY: SOCIAL INSECURITY: DO YOU FEEL UNSAFE GOING BACK TO THE PLACE WHERE YOU ARE LIVING?: NO

## 2024-11-13 SDOH — ECONOMIC STABILITY: HOUSING INSECURITY: IN THE PAST 12 MONTHS, HOW MANY TIMES HAVE YOU MOVED WHERE YOU WERE LIVING?: 0

## 2024-11-13 SDOH — SOCIAL STABILITY: SOCIAL NETWORK: HOW OFTEN DO YOU ATTEND CHURCH OR RELIGIOUS SERVICES?: MORE THAN 4 TIMES PER YEAR

## 2024-11-13 SDOH — SOCIAL STABILITY: SOCIAL INSECURITY: ARE YOU OR HAVE YOU BEEN THREATENED OR ABUSED PHYSICALLY, EMOTIONALLY, OR SEXUALLY BY ANYONE?: NO

## 2024-11-13 SDOH — HEALTH STABILITY: PHYSICAL HEALTH: ON AVERAGE, HOW MANY MINUTES DO YOU ENGAGE IN EXERCISE AT THIS LEVEL?: 60 MIN

## 2024-11-13 SDOH — SOCIAL STABILITY: SOCIAL INSECURITY: HAS ANYONE EVER THREATENED TO HURT YOUR FAMILY OR YOUR PETS?: NO

## 2024-11-13 SDOH — SOCIAL STABILITY: SOCIAL INSECURITY: WITHIN THE LAST YEAR, HAVE YOU BEEN AFRAID OF YOUR PARTNER OR EX-PARTNER?: NO

## 2024-11-13 SDOH — ECONOMIC STABILITY: INCOME INSECURITY: IN THE PAST 12 MONTHS HAS THE ELECTRIC, GAS, OIL, OR WATER COMPANY THREATENED TO SHUT OFF SERVICES IN YOUR HOME?: NO

## 2024-11-13 SDOH — HEALTH STABILITY: MENTAL HEALTH: HOW OFTEN DO YOU HAVE A DRINK CONTAINING ALCOHOL?: NEVER

## 2024-11-13 SDOH — ECONOMIC STABILITY: FOOD INSECURITY: WITHIN THE PAST 12 MONTHS, YOU WORRIED THAT YOUR FOOD WOULD RUN OUT BEFORE YOU GOT THE MONEY TO BUY MORE.: NEVER TRUE

## 2024-11-13 SDOH — HEALTH STABILITY: MENTAL HEALTH: HOW OFTEN DO YOU HAVE SIX OR MORE DRINKS ON ONE OCCASION?: NEVER

## 2024-11-13 SDOH — SOCIAL STABILITY: SOCIAL NETWORK: HOW OFTEN DO YOU GET TOGETHER WITH FRIENDS OR RELATIVES?: MORE THAN THREE TIMES A WEEK

## 2024-11-13 SDOH — SOCIAL STABILITY: SOCIAL INSECURITY
WITHIN THE LAST YEAR, HAVE YOU BEEN RAPED OR FORCED TO HAVE ANY KIND OF SEXUAL ACTIVITY BY YOUR PARTNER OR EX-PARTNER?: NO

## 2024-11-13 SDOH — ECONOMIC STABILITY: FOOD INSECURITY: WITHIN THE PAST 12 MONTHS, THE FOOD YOU BOUGHT JUST DIDN'T LAST AND YOU DIDN'T HAVE MONEY TO GET MORE.: NEVER TRUE

## 2024-11-13 SDOH — SOCIAL STABILITY: SOCIAL NETWORK: HOW OFTEN DO YOU ATTEND MEETINGS OF THE CLUBS OR ORGANIZATIONS YOU BELONG TO?: NEVER

## 2024-11-13 SDOH — ECONOMIC STABILITY: HOUSING INSECURITY: AT ANY TIME IN THE PAST 12 MONTHS, WERE YOU HOMELESS OR LIVING IN A SHELTER (INCLUDING NOW)?: NO

## 2024-11-13 SDOH — HEALTH STABILITY: PHYSICAL HEALTH: ON AVERAGE, HOW MANY DAYS PER WEEK DO YOU ENGAGE IN MODERATE TO STRENUOUS EXERCISE (LIKE A BRISK WALK)?: 6 DAYS

## 2024-11-13 SDOH — SOCIAL STABILITY: SOCIAL INSECURITY: HAVE YOU HAD THOUGHTS OF HARMING ANYONE ELSE?: NO

## 2024-11-13 SDOH — SOCIAL STABILITY: SOCIAL INSECURITY: HAVE YOU HAD ANY THOUGHTS OF HARMING ANYONE ELSE?: NO

## 2024-11-13 SDOH — ECONOMIC STABILITY: FOOD INSECURITY: HOW HARD IS IT FOR YOU TO PAY FOR THE VERY BASICS LIKE FOOD, HOUSING, MEDICAL CARE, AND HEATING?: NOT HARD AT ALL

## 2024-11-13 SDOH — SOCIAL STABILITY: SOCIAL INSECURITY: DOES ANYONE TRY TO KEEP YOU FROM HAVING/CONTACTING OTHER FRIENDS OR DOING THINGS OUTSIDE YOUR HOME?: NO

## 2024-11-13 SDOH — HEALTH STABILITY: MENTAL HEALTH: HOW MANY DRINKS CONTAINING ALCOHOL DO YOU HAVE ON A TYPICAL DAY WHEN YOU ARE DRINKING?: PATIENT DOES NOT DRINK

## 2024-11-13 SDOH — SOCIAL STABILITY: SOCIAL INSECURITY: ARE THERE ANY APPARENT SIGNS OF INJURIES/BEHAVIORS THAT COULD BE RELATED TO ABUSE/NEGLECT?: NO

## 2024-11-13 ASSESSMENT — COGNITIVE AND FUNCTIONAL STATUS - GENERAL
MOVING TO AND FROM BED TO CHAIR: A LITTLE
DAILY ACTIVITIY SCORE: 24
CLIMB 3 TO 5 STEPS WITH RAILING: A LOT
PATIENT BASELINE BEDBOUND: NO
WALKING IN HOSPITAL ROOM: A LITTLE
MOBILITY SCORE: 19
STANDING UP FROM CHAIR USING ARMS: A LITTLE

## 2024-11-13 ASSESSMENT — LIFESTYLE VARIABLES
AUDIT-C TOTAL SCORE: 0
TOTAL SCORE: 0
HAVE YOU EVER FELT YOU SHOULD CUT DOWN ON YOUR DRINKING: NO
HOW OFTEN DO YOU HAVE 6 OR MORE DRINKS ON ONE OCCASION: NEVER
HOW MANY STANDARD DRINKS CONTAINING ALCOHOL DO YOU HAVE ON A TYPICAL DAY: PATIENT DOES NOT DRINK
EVER FELT BAD OR GUILTY ABOUT YOUR DRINKING: NO
AUDIT-C TOTAL SCORE: 0
HAVE PEOPLE ANNOYED YOU BY CRITICIZING YOUR DRINKING: NO
AUDIT-C TOTAL SCORE: 0
EVER HAD A DRINK FIRST THING IN THE MORNING TO STEADY YOUR NERVES TO GET RID OF A HANGOVER: NO
SUBSTANCE_ABUSE_PAST_12_MONTHS: NO
PRESCIPTION_ABUSE_PAST_12_MONTHS: NO
SKIP TO QUESTIONS 9-10: 1
HOW OFTEN DO YOU HAVE A DRINK CONTAINING ALCOHOL: NEVER
SKIP TO QUESTIONS 9-10: 1

## 2024-11-13 ASSESSMENT — ACTIVITIES OF DAILY LIVING (ADL)
LACK_OF_TRANSPORTATION: NO
LACK_OF_TRANSPORTATION: NO

## 2024-11-13 ASSESSMENT — ENCOUNTER SYMPTOMS
COUGH: 1
CHEST TIGHTNESS: 1
SHORTNESS OF BREATH: 1

## 2024-11-13 ASSESSMENT — PATIENT HEALTH QUESTIONNAIRE - PHQ9
1. LITTLE INTEREST OR PLEASURE IN DOING THINGS: NOT AT ALL
SUM OF ALL RESPONSES TO PHQ9 QUESTIONS 1 & 2: 0
2. FEELING DOWN, DEPRESSED OR HOPELESS: NOT AT ALL

## 2024-11-13 ASSESSMENT — PAIN SCALES - GENERAL
PAINLEVEL_OUTOF10: 0 - NO PAIN
PAINLEVEL_OUTOF10: 0 - NO PAIN

## 2024-11-13 ASSESSMENT — PAIN - FUNCTIONAL ASSESSMENT: PAIN_FUNCTIONAL_ASSESSMENT: 0-10

## 2024-11-13 NOTE — PROGRESS NOTES
11/13/24 1634   Discharge Planning   Living Arrangements Spouse/significant other   Support Systems Spouse/significant other   Type of Residence Private residence   Number of Stairs to Enter Residence 2   Number of Stairs Within Residence 12   Do you have animals or pets at home? No   Who is requesting discharge planning? Provider   Home or Post Acute Services Other (Comment)  (No discharge plan at this time)   Does the patient need discharge transport arranged? No   Financial Resource Strain   How hard is it for you to pay for the very basics like food, housing, medical care, and heating? Not hard   Housing Stability   In the last 12 months, was there a time when you were not able to pay the mortgage or rent on time? N   In the past 12 months, how many times have you moved where you were living? 0   At any time in the past 12 months, were you homeless or living in a shelter (including now)? N   Transportation Needs   In the past 12 months, has lack of transportation kept you from medical appointments or from getting medications? no   In the past 12 months, has lack of transportation kept you from meetings, work, or from getting things needed for daily living? No   Patient Choice   Patient / Family choosing to utilize agency / facility established prior to hospitalization No   Stroke Family Assessment   Stroke Family Assessment Needed No   Intensity of Service   Intensity of Service 0-30 min

## 2024-11-13 NOTE — PROGRESS NOTES
11/13/24 1632   Physical Activity   On average, how many days per week do you engage in moderate to strenuous exercise (like a brisk walk)? 6 days   On average, how many minutes do you engage in exercise at this level? 60 min   Financial Resource Strain   How hard is it for you to pay for the very basics like food, housing, medical care, and heating? Not hard   Housing Stability   In the last 12 months, was there a time when you were not able to pay the mortgage or rent on time? N   In the past 12 months, how many times have you moved where you were living? 0   At any time in the past 12 months, were you homeless or living in a shelter (including now)? N   Transportation Needs   In the past 12 months, has lack of transportation kept you from medical appointments or from getting medications? no   In the past 12 months, has lack of transportation kept you from meetings, work, or from getting things needed for daily living? No   Food Insecurity   Within the past 12 months, you worried that your food would run out before you got the money to buy more. Never true   Within the past 12 months, the food you bought just didn't last and you didn't have money to get more. Never true   Stress   Do you feel stress - tense, restless, nervous, or anxious, or unable to sleep at night because your mind is troubled all the time - these days? Not at all   Social Connections   How often do you get together with friends or relatives? More than 3   How often do you attend Episcopalian or Episcopal services? More than 4   Do you belong to any clubs or organizations such as Episcopalian groups, unions, fraternal or athletic groups, or school groups? No   How often do you attend meetings of the clubs or organizations you belong to? Never   Are you , , , , never , or living with a partner?    Intimate Partner Violence   Within the last year, have you been afraid of your partner or ex-partner? No   Within  the last year, have you been humiliated or emotionally abused in other ways by your partner or ex-partner? No   Within the last year, have you been kicked, hit, slapped, or otherwise physically hurt by your partner or ex-partner? No   Within the last year, have you been raped or forced to have any kind of sexual activity by your partner or ex-partner? No   Alcohol Use   Q1: How often do you have a drink containing alcohol? Never   Q2: How many drinks containing alcohol do you have on a typical day when you are drinking? None   Q3: How often do you have six or more drinks on one occasion? Never   Utilities   In the past 12 months has the electric, gas, oil, or water company threatened to shut off services in your home? No   Health Literacy   How often do you need to have someone help you when you read instructions, pamphlets, or other written material from your doctor or pharmacy? Never

## 2024-11-13 NOTE — PROGRESS NOTES
"Pharmacy Medication History Review    William A Franke is a 73 y.o. male admitted for chest pain and shortness of breath.     Pharmacy reviewed the patient's [id number] medications and allergies for accuracy.    Medications ADDED:  none  Medications CHANGED:  Vitamin D 50,000 units - every other week  Gabapentin 300 mg - takes once daily  Lantus insulin - states he takes 25 units (but not sure)  Loratadine 10 mg - NOT taking  Sevelamer carbonate 800 mg - takes 2 tablets three times daily with meals  Medications REMOVED:   Promethazine 25 mg tablets     The list below reflects the updated PTA list. Comments regarding how patient may be taking medications differently can be found in the Admit Orders Activity  Prior to Admission Medications   Prescriptions Last Dose Informant   B complex-vitamin C-folic acid (Nephro-Shea) 0.8 mg tablet 11/13/2024    Sig: Take 1 tablet by mouth once daily.   BD Ultra-Fine Mini Pen Needle 31 gauge x 3/16\" needle     Sig: USE AS DIRECTED 4 times a day   FeroSuL tablet 11/13/2024    Sig: Take 1 tablet (325 mg) by mouth once daily.   Januvia 25 mg tablet 11/13/2024    Sig: Take 1 tablet (25 mg) by mouth once daily.   allopurinol (Zyloprim) 100 mg tablet 11/13/2024    Sig: Take 1 tablet (100 mg) by mouth once daily.   amLODIPine (Norvasc) 10 mg tablet 11/13/2024    Sig: Take 1 tablet (10 mg) by mouth once daily.   aspirin 81 mg EC tablet 11/13/2024    Sig: Take 1 tablet (81 mg) by mouth once daily.   atorvastatin (Lipitor) 80 mg tablet 11/12/2024    Sig: Take 1 tablet (80 mg) by mouth once daily at bedtime.   carvedilol (Coreg) 6.25 mg tablet 11/13/2024    Sig: Take 1 tablet (6.25 mg) by mouth 2 times daily (morning and late afternoon).   ergocalciferol (Vitamin D-2) 1.25 MG (56416 UT) capsule 11/10/2024    Sig: Take 1 capsule (50,000 Units) by mouth every 14 (fourteen) days. Takes every other Sunday   ezetimibe (Zetia) 10 mg tablet 11/13/2024    Sig: Take 1 tablet (10 mg) by mouth " once daily.   fenofibrate (Tricor) 145 mg tablet 11/13/2024    Sig: Take 1 tablet (145 mg) by mouth once daily.   gabapentin (Neurontin) 300 mg capsule 11/13/2024    Sig: Take 1 capsule (300 mg) by mouth 2 times a day.   Patient taking differently: Take 1 capsule (300 mg) by mouth once daily.   insulin glargine (Lantus) 100 unit/mL (3 mL) pen 11/12/2024    Sig: Inject 25 Units under the skin once daily in the morning.   insulin lispro (HumaLOG) 100 unit/mL injection     Sig: Inject 10 Units under the skin 3 times daily (morning, midday, late afternoon).   loratadine (Claritin) 10 mg tablet     Sig: Take 1 tablet (10 mg) by mouth once daily.   Patient not taking: Reported on 11/12/2024   losartan (Cozaar) 100 mg tablet 11/13/2024    Sig: Take 1 tablet (100 mg) by mouth once daily.   nitroglycerin (Nitrostat) 0.4 mg SL tablet     Sig: Place 1 tablet (0.4 mg) under the tongue every 5 minutes if needed for chest pain.   oxygen (O2) gas therapy     Sig: Inhale 1 each continuously.   pantoprazole (ProtoNix) 40 mg EC tablet 11/13/2024    Sig: Take 1 tablet (40 mg) by mouth once daily.   sevelamer carbonate (Renvela) 800 mg tablet 11/13/2024    Sig: Take 2 tablets (1,600 mg) by mouth 3 times daily (morning, midday, late afternoon). Three times daily with meals      Facility-Administered Medications: None        The list below reflects the updated allergy list. Please review each documented allergy for additional clarification and justification.  Allergies  Reviewed by Odilia Kebede RN on 11/13/2024   No Known Allergies         Pharmacy has been updated to [facility].    Sources used to complete the med history include PTA list, dispense history, patient and spouse interview, paper med list from home; they are fair historians    Below are additional concerns with the patient's PTA list.  **Pt is unclear about how much insulin he uses; he states he takes 25 units of Lantus insulin every day then a sliding scale of  Humalog insulin with meals**    Aga Smalls, PharmD  Please reach out via [employer] Secure Chat for questions

## 2024-11-13 NOTE — CARE PLAN
Pt has a POA and Living Will --not on file; per wife, she is the POA and their son Thomas Franke is alternate  ADOD: 3 days    Pt lives at home with his wife Martina ([phone]) in a ranch home with a basement; pt is normally okay with stairs. There are 2 steps to climb to enter the home  Pt does not use home 02, cpap or bipap. He does not use any assistive device for ambulation  He goes to the [facility] almost daily and works out for 1 hr.  He is able to perform ADL's and he drives  He is a dialysis pt and he has dialysis on Tues-Thurs-Sat at [facility] on [facility] and his chair time is 5am   No hx of depression or anxiety; no falls in the last 6 months  His PCP is Dr. KRUPA Galloway and he uses [facility] in [place] for his scripts and he can afford his meds  Pt is here for cough and SOB; per ED notes his P.O was 88% -90%    DISCHARGE PLAN: TBD--PT DOES NOT HAVE A DISCHARGE PLAN AT THIS TIME; DO NOT DISCHARGE PATIENT BEFORE SPEAKING WITH CARE COORDINATION

## 2024-11-13 NOTE — PROGRESS NOTES
11/13/24 1636   [facility] Disability Status   Are you deaf or do you have serious difficulty hearing? N   Are you blind or do you have serious difficulty seeing, even when wearing glasses? N   Because of a physical, mental, or emotional condition, do you have serious difficulty concentrating, remembering, or making decisions? (5 years old or older) N   Do you have serious difficulty walking or climbing stairs? N   Do you have serious difficulty dressing or bathing? N   Because of a physical, mental, or emotional condition, do you have serious difficulty doing errands alone such as visiting the doctor? N                                    11/13/24 1636   [facility] Disability Status   Are you deaf or do you have serious difficulty hearing? N   Are you blind or do you have serious difficulty seeing, even when wearing glasses? N   Because of a physical, mental, or emotional condition, do you have serious difficulty concentrating, remembering, or making decisions? (5 years old or older) N   Do you have serious difficulty walking or climbing stairs? N   Do you have serious difficulty dressing or bathing? N   Because of a physical, mental, or emotional condition, do you have serious difficulty doing errands alone such as visiting the doctor? N

## 2024-11-13 NOTE — PROGRESS NOTES
"Subjective   Patient ID: William A Franke is a 73 y.o. male. They present today with a chief complaint of Cough (Hard time breathing. Yesterday morning started. /Coughing and mucous. Chills ).    History of Present Illness  William A Franke is a 73 y.o. male who presents to the clinic for SOB, chest congestion, trouble breathing. Pt states it started yesterday. Pt missed his day of dialysis yesterday. In clinic pt pulse ox is 88-90%. Pt denies any hx of COPD.   Pt denies any chest pain, sob, N/V at this time in clinic.             Past Medical History  Allergies as of 11/13/2024    (No Known Allergies)       (Not in a hospital admission)       Past Medical History:   Diagnosis Date    Personal history of other endocrine, nutritional and metabolic disease     History of hypercholesterolemia    Personal history of other specified conditions 08/28/2020    History of chest pain       Past Surgical History:   Procedure Laterality Date    KNEE ARTHROSCOPY W/ DEBRIDEMENT  04/15/2013    Arthroscopy Knee    SHOULDER SURGERY  04/15/2013    Shoulder Surgery        reports that he has never smoked. He has never used smokeless tobacco. He reports that he does not drink alcohol and does not use drugs.    Review of Systems  Review of Systems   Respiratory:  Positive for cough, chest tightness and shortness of breath.    All other systems reviewed and are negative.                                 Objective    Vitals:    11/13/24 1412   BP: (!) 198/63   Pulse: 60   Temp: 35.9 °C (96.6 °F)   TempSrc: Temporal   SpO2: (!) 88%   Weight: 81.6 kg (180 lb)   Height: 1.727 m (5' 8\")     No LMP for male patient.    Physical Exam  Constitutional:       Appearance: He is ill-appearing.   Cardiovascular:      Rate and Rhythm: Normal rate and regular rhythm.   Pulmonary:      Effort: Pulmonary effort is normal.      Breath sounds: Rales present.      Comments: Rales- bases  Neurological:      General: No focal deficit present.      Mental " Status: He is alert and oriented to person, place, and time. Mental status is at baseline.   Psychiatric:         Mood and Affect: Mood normal.         Behavior: Behavior normal.         Procedures    Point of Care Test & Imaging Results from this visit  No results found for this visit on 11/13/24.   No results found.    Diagnostic study results (if any) were reviewed by ERIKA Roberts.    Assessment/Plan   Allergies, medications, history, and pertinent labs/EKGs/Imaging reviewed by ERIKA Roberts.     Medical Decision Making  Pt will be transported to ER via EMS. Updated Dr. Arvizu. Concern for respiratory distress. Pt placed on 2L NC till EMS arrives.      Orders and Diagnoses  There are no diagnoses linked to this encounter.    Medical Admin Record      Patient disposition: ED    Electronically signed by ERIKA Roberts  2:24 PM

## 2024-11-13 NOTE — ED PROVIDER NOTES
HPI   Chief Complaint   Patient presents with   • Chest Pain   • Shortness of Breath       Patient is 73-year-old male who presented with chest pain, shortness of breath.  Patient is to be having worsening shortness of breath for last few days, patient is a dialysis patient, Tuesday Thursday Saturday, he did miss his Tuesday appointment, states he is having worsening shortness of breath, went to see his cardiologist yesterday, with his worsening symptoms, decided to present to the emergency department.  Patient denies any chest pain at this current point, does feel short breath, no 7 to urgent care, was found to be mildly hypoxic in the upper 80s, was started on supplemental oxygen, patient does not wear oxygen at his baseline, patient has no other acute complaints noted at this time.      History provided by:  Patient and EMS personnel          Patient History   Past Medical History:   Diagnosis Date   • Personal history of other endocrine, nutritional and metabolic disease     History of hypercholesterolemia   • Personal history of other specified conditions 08/28/2020    History of chest pain     Past Surgical History:   Procedure Laterality Date   • KNEE ARTHROSCOPY W/ DEBRIDEMENT  04/15/2013    Arthroscopy Knee   • SHOULDER SURGERY  04/15/2013    Shoulder Surgery     No family history on file.  Social History     Tobacco Use   • Smoking status: Never   • Smokeless tobacco: Never   Substance Use Topics   • Alcohol use: Never   • Drug use: Never       Physical Exam   ED Triage Vitals   Temp Pulse Resp BP   -- -- -- --      SpO2 Temp src Heart Rate Source Patient Position   -- -- -- --      BP Location FiO2 (%)     -- --       Physical Exam  Vitals and nursing note reviewed. Exam conducted with a chaperone present.   Constitutional:       General: He is not in acute distress.     Appearance: Normal appearance. He is normal weight. He is not ill-appearing, toxic-appearing or diaphoretic.   HENT:      Head:  Normocephalic and atraumatic.      Nose: Nose normal.      Mouth/Throat:      Mouth: Mucous membranes are moist.      Pharynx: Oropharynx is clear.   Eyes:      Extraocular Movements: Extraocular movements intact.      Conjunctiva/sclera: Conjunctivae normal.      Pupils: Pupils are equal, round, and reactive to light.   Cardiovascular:      Rate and Rhythm: Normal rate and regular rhythm.      Pulses: Normal pulses.      Heart sounds: Normal heart sounds.   Pulmonary:      Effort: Pulmonary effort is normal.      Breath sounds: Normal breath sounds.   Abdominal:      General: Abdomen is flat. Bowel sounds are normal.      Palpations: Abdomen is soft.   Musculoskeletal:         General: Normal range of motion.   Skin:     General: Skin is warm and dry.      Capillary Refill: Capillary refill takes less than 2 seconds.   Neurological:      General: No focal deficit present.      Mental Status: He is alert and oriented to person, place, and time. Mental status is at baseline.   Psychiatric:         Mood and Affect: Mood normal.         Thought Content: Thought content normal.         Judgment: Judgment normal.           ED Course & MDM   ED Course as of 11/13/24 1640   Wed Nov 13, 2024   1515 EKG interpreted by myself independently, EKG shows sinus bradycardia, rate of 56 bpm, OR interval 166, , , QTc 434, patient has no ST elevations or depressions, negative for acute MI. [WADE]      ED Course User Index  [WADE] Reinier Aguilera,          Diagnoses as of 11/13/24 1640   Chest pain, unspecified type   Hyperkalemia   Hyperphosphatemia   Bilateral pleural effusion                 No data recorded     [place] Coma Scale Score: 15 (11/13/24 1445 : Odilia Kebede RN)                           Medical Decision Making  Patient seen and evaluated at bedside, patient is in no acute distress.  I will order a CBC, CMP, x-ray chest, COVID, flu, BNP, EKG CBC, CMP, magnesium, urinalysis,. Differential diagnosis includes but is  not limited to ACS, pleural effusions, pneumonia, COVID, flu, CHF exacerbation.  Patient's lab work and imaging this below, patient is of a potassium of 7.1, elevated phosphorus as well, patient given Lokelma, no EKG changes consistent with hyperkalemia, patient does appear to be clinically fluid overloaded given Lasix, patient admitted to general medicine service under the care of Dr. Evie Case, his primary care provider, with a consultation to nephrology.  Return precautions were discussed with the patient, he is agreeable this discharge plan.    Diagnosis: Shortness of breath, chest discomfort, hyperkalemia  XR chest 2 views   Final Result    1. Worsening perihilar and bibasilar infiltrates and effusions for    which continued follow-up recommended.                      MACRO:    None          Signed by: Ruben Cevallos 11/13/2024 4:21 PM    Dictation workstation:   [id number]     Results for orders placed or performed during the hospital encounter of 11/13/24  -CBC and Auto Differential:   Collection Time: 11/13/24  2:56 PM       Result                      Value             Ref Range           WBC                         8.5               4.4 - 11.3 x*       nRBC                        0.0               0.0 - 0.0 /1*       RBC                         3.74 (L)          4.50 - 5.90 *       Hemoglobin                  11.3 (L)          13.5 - 17.5 *       Hematocrit                  36.4 (L)          41.0 - 52.0 %       MCV                         97                80 - 100 fL         MCH                         30.2              26.0 - 34.0 *       MCHC                        31.0 (L)          32.0 - 36.0 *       RDW                         17.8 (H)          11.5 - 14.5 %       Platelets                   179               150 - 450 x1*       Neutrophils %               88.1              40.0 - 80.0 %       Immature Granulocytes *     0.4               0.0 - 0.9 %         Lymphocytes %               6.4                13.0 - 44.0 %       Monocytes %                 4.9               2.0 - 10.0 %        Eosinophils %               0.1               0.0 - 6.0 %         Basophils %                 0.1               0.0 - 2.0 %         Neutrophils Absolute        7.52 (H)          1.60 - 5.50 *       Immature Granulocytes *     0.03              0.00 - 0.50 *       Lymphocytes Absolute        0.55 (L)          0.80 - 3.00 *       Monocytes Absolute          0.42              0.05 - 0.80 *       Eosinophils Absolute        0.01              0.00 - 0.40 *       Basophils Absolute          0.01              0.00 - 0.10 *  -Magnesium:   Collection Time: 11/13/24  2:56 PM       Result                      Value             Ref Range           Magnesium                   1.72              1.60 - 2.40 *  -Phosphorus:   Collection Time: 11/13/24  2:56 PM       Result                      Value             Ref Range           Phosphorus                  8.1 (H)           2.5 - 4.9 mg*  -Comprehensive metabolic panel:   Collection Time: 11/13/24  2:56 PM       Result                      Value             Ref Range           Glucose                     159 (H)           74 - 99 mg/dL       Sodium                      140               136 - 145 mm*       Potassium                   7.3 (HH)          3.5 - 5.3 mm*       Chloride                    106               98 - 107 mmo*       Bicarbonate                 21                21 - 32 mmol*       Anion Gap                   20                10 - 20 mmol*       Urea Nitrogen               86 (H)            6 - 23 mg/dL        Creatinine                  11.30 (H)         0.50 - 1.30 *       eGFR                        4 (L)             >60 mL/min/1*       Calcium                     9.3               8.6 - 10.3 m*       Albumin                     4.2               3.4 - 5.0 g/*       Alkaline Phosphatase        38                33 - 136 U/L        Total Protein               6.8                6.4 - 8.2 g/*       AST                         27                9 - 39 U/L          Bilirubin, Total            0.8               0.0 - 1.2 mg*       ALT                         12                10 - 52 U/L    -B-Type Natriuretic Peptide:   Collection Time: 11/13/24  2:56 PM       Result                      Value             Ref Range           BNP                         977 (H)           0 - 99 pg/mL   -Influenza A, and B PCR:   Collection Time: 11/13/24  2:56 PM       Result                      Value             Ref Range           Flu A Result                Not Detected      Not Detected        Flu B Result                Not Detected      Not Detected   -Sars-CoV-2 PCR:   Collection Time: 11/13/24  2:56 PM       Result                      Value             Ref Range           Coronavirus 2019, PCR       Not Detected      Not Detected   -Troponin I, High Sensitivity, Initial:   Collection Time: 11/13/24  2:56 PM       Result                      Value             Ref Range           Troponin I, High Sensi*     54 (HH)           0 - 20 ng/L      *Note: Due to a large number of results and/or encounters for the requested time period, some results have not been displayed. A complete set of results can be found in Results Review.          Procedure  Procedures  Sections of this report were created using voice-to-text technology and may contain errors in translation    Reinier Aguilera DO  Emergency Medicine         Reinier Aguilera DO  11/13/24 1640

## 2024-11-14 ENCOUNTER — APPOINTMENT (OUTPATIENT)
Dept: CARDIOLOGY | Facility: HOSPITAL | Age: 74
End: 2024-11-14
Payer: MEDICARE

## 2024-11-14 PROBLEM — E87.70 VOLUME OVERLOAD: Status: ACTIVE | Noted: 2024-11-14

## 2024-11-14 LAB
ALBUMIN SERPL BCP-MCNC: 4 G/DL (ref 3.4–5)
ALP SERPL-CCNC: 36 U/L (ref 33–136)
ALT SERPL W P-5'-P-CCNC: 12 U/L (ref 10–52)
ANION GAP SERPL CALCULATED.3IONS-SCNC: 18 MMOL/L (ref 10–20)
AORTIC VALVE MEAN GRADIENT: 7 MMHG
AORTIC VALVE PEAK VELOCITY: 1.87 M/S
AST SERPL W P-5'-P-CCNC: 28 U/L (ref 9–39)
ATRIAL RATE: 56 BPM
AV PEAK GRADIENT: 14 MMHG
AVA (PEAK VEL): 1.94 CM2
AVA (VTI): 2.03 CM2
BILIRUB SERPL-MCNC: 1 MG/DL (ref 0–1.2)
BUN SERPL-MCNC: 36 MG/DL (ref 6–23)
CALCIUM SERPL-MCNC: 9.3 MG/DL (ref 8.6–10.3)
CHLORIDE SERPL-SCNC: 99 MMOL/L (ref 98–107)
CO2 SERPL-SCNC: 27 MMOL/L (ref 21–32)
CREAT SERPL-MCNC: 6.15 MG/DL (ref 0.5–1.3)
EGFRCR SERPLBLD CKD-EPI 2021: 9 ML/MIN/1.73M*2
EJECTION FRACTION APICAL 4 CHAMBER: 52.5
EJECTION FRACTION: 58 %
ERYTHROCYTE [DISTWIDTH] IN BLOOD BY AUTOMATED COUNT: 17.4 % (ref 11.5–14.5)
GLUCOSE BLD MANUAL STRIP-MCNC: 84 MG/DL (ref 74–99)
GLUCOSE SERPL-MCNC: 97 MG/DL (ref 74–99)
HBV SURFACE AB SER-ACNC: 5.7 MIU/ML
HBV SURFACE AG SERPL QL IA: NONREACTIVE
HCT VFR BLD AUTO: 33.1 % (ref 41–52)
HGB BLD-MCNC: 10.4 G/DL (ref 13.5–17.5)
HOLD SPECIMEN: NORMAL
LEFT VENTRICLE INTERNAL DIMENSION DIASTOLE: 5.04 CM (ref 3.5–6)
LEFT VENTRICULAR OUTFLOW TRACT DIAMETER: 1.96 CM
LV EJECTION FRACTION BIPLANE: 58 %
MCH RBC QN AUTO: 29.5 PG (ref 26–34)
MCHC RBC AUTO-ENTMCNC: 31.4 G/DL (ref 32–36)
MCV RBC AUTO: 94 FL (ref 80–100)
MITRAL VALVE E/A RATIO: 1.03
NRBC BLD-RTO: 0 /100 WBCS (ref 0–0)
P AXIS: 63 DEGREES
P OFFSET: 168 MS
P ONSET: 133 MS
PLATELET # BLD AUTO: 174 X10*3/UL (ref 150–450)
POTASSIUM SERPL-SCNC: 3.9 MMOL/L (ref 3.5–5.3)
PR INTERVAL: 166 MS
PROT SERPL-MCNC: 6.3 G/DL (ref 6.4–8.2)
Q ONSET: 216 MS
QRS COUNT: 9 BEATS
QRS DURATION: 108 MS
QT INTERVAL: 450 MS
QTC CALCULATION(BAZETT): 434 MS
QTC FREDERICIA: 440 MS
R AXIS: 66 DEGREES
RBC # BLD AUTO: 3.53 X10*6/UL (ref 4.5–5.9)
SODIUM SERPL-SCNC: 140 MMOL/L (ref 136–145)
T AXIS: 63 DEGREES
T OFFSET: 441 MS
VENTRICULAR RATE: 56 BPM
WBC # BLD AUTO: 7.3 X10*3/UL (ref 4.4–11.3)

## 2024-11-14 PROCEDURE — 2500000002 HC RX 250 W HCPCS SELF ADMINISTERED DRUGS (ALT 637 FOR MEDICARE OP, ALT 636 FOR OP/ED): Performed by: INTERNAL MEDICINE

## 2024-11-14 PROCEDURE — 93306 TTE W/DOPPLER COMPLETE: CPT | Performed by: INTERNAL MEDICINE

## 2024-11-14 PROCEDURE — 2500000001 HC RX 250 WO HCPCS SELF ADMINISTERED DRUGS (ALT 637 FOR MEDICARE OP): Performed by: INTERNAL MEDICINE

## 2024-11-14 PROCEDURE — 2500000004 HC RX 250 GENERAL PHARMACY W/ HCPCS (ALT 636 FOR OP/ED): Performed by: INTERNAL MEDICINE

## 2024-11-14 PROCEDURE — 85027 COMPLETE CBC AUTOMATED: CPT | Performed by: INTERNAL MEDICINE

## 2024-11-14 PROCEDURE — 93306 TTE W/DOPPLER COMPLETE: CPT

## 2024-11-14 PROCEDURE — 99223 1ST HOSP IP/OBS HIGH 75: CPT | Performed by: NURSE PRACTITIONER

## 2024-11-14 PROCEDURE — 97166 OT EVAL MOD COMPLEX 45 MIN: CPT | Mod: GO

## 2024-11-14 PROCEDURE — 82947 ASSAY GLUCOSE BLOOD QUANT: CPT

## 2024-11-14 PROCEDURE — 99232 SBSQ HOSP IP/OBS MODERATE 35: CPT | Performed by: INTERNAL MEDICINE

## 2024-11-14 PROCEDURE — 80053 COMPREHEN METABOLIC PANEL: CPT | Performed by: INTERNAL MEDICINE

## 2024-11-14 PROCEDURE — 36415 COLL VENOUS BLD VENIPUNCTURE: CPT | Performed by: INTERNAL MEDICINE

## 2024-11-14 PROCEDURE — 97162 PT EVAL MOD COMPLEX 30 MIN: CPT | Mod: GP

## 2024-11-14 PROCEDURE — 2500000005 HC RX 250 GENERAL PHARMACY W/O HCPCS: Performed by: INTERNAL MEDICINE

## 2024-11-14 PROCEDURE — 2500000004 HC RX 250 GENERAL PHARMACY W/ HCPCS (ALT 636 FOR OP/ED): Mod: JZ | Performed by: INTERNAL MEDICINE

## 2024-11-14 PROCEDURE — 2500000001 HC RX 250 WO HCPCS SELF ADMINISTERED DRUGS (ALT 637 FOR MEDICARE OP): Performed by: NURSE PRACTITIONER

## 2024-11-14 PROCEDURE — 97116 GAIT TRAINING THERAPY: CPT | Mod: GP

## 2024-11-14 PROCEDURE — 2060000001 HC INTERMEDIATE ICU ROOM DAILY

## 2024-11-14 RX ORDER — ISOSORBIDE MONONITRATE 120 MG/1
120 TABLET, EXTENDED RELEASE ORAL DAILY
Status: DISCONTINUED | OUTPATIENT
Start: 2024-11-14 | End: 2024-11-15 | Stop reason: HOSPADM

## 2024-11-14 RX ORDER — DOXAZOSIN 2 MG/1
1 TABLET ORAL DAILY
Status: DISCONTINUED | OUTPATIENT
Start: 2024-11-14 | End: 2024-11-15 | Stop reason: HOSPADM

## 2024-11-14 RX ORDER — HYDRALAZINE HYDROCHLORIDE 20 MG/ML
10 INJECTION INTRAMUSCULAR; INTRAVENOUS EVERY 6 HOURS PRN
Status: DISCONTINUED | OUTPATIENT
Start: 2024-11-14 | End: 2024-11-15 | Stop reason: HOSPADM

## 2024-11-14 RX ORDER — HYDRALAZINE HYDROCHLORIDE 50 MG/1
100 TABLET, FILM COATED ORAL 3 TIMES DAILY
Status: DISCONTINUED | OUTPATIENT
Start: 2024-11-14 | End: 2024-11-15 | Stop reason: HOSPADM

## 2024-11-14 RX ORDER — DOXAZOSIN 2 MG/1
1 TABLET ORAL DAILY
Status: DISCONTINUED | OUTPATIENT
Start: 2024-11-14 | End: 2024-11-14

## 2024-11-14 ASSESSMENT — COGNITIVE AND FUNCTIONAL STATUS - GENERAL
MOVING TO AND FROM BED TO CHAIR: A LITTLE
CLIMB 3 TO 5 STEPS WITH RAILING: A LITTLE
STANDING UP FROM CHAIR USING ARMS: A LITTLE
DAILY ACTIVITIY SCORE: 18
TURNING FROM BACK TO SIDE WHILE IN FLAT BAD: A LITTLE
MOBILITY SCORE: 18
WALKING IN HOSPITAL ROOM: A LITTLE
EATING MEALS: A LITTLE
HELP NEEDED FOR BATHING: A LITTLE
DRESSING REGULAR UPPER BODY CLOTHING: A LITTLE
PERSONAL GROOMING: A LITTLE
TOILETING: A LITTLE
MOVING FROM LYING ON BACK TO SITTING ON SIDE OF FLAT BED WITH BEDRAILS: A LITTLE
DRESSING REGULAR LOWER BODY CLOTHING: A LITTLE

## 2024-11-14 ASSESSMENT — PAIN - FUNCTIONAL ASSESSMENT
PAIN_FUNCTIONAL_ASSESSMENT: 0-10
PAIN_FUNCTIONAL_ASSESSMENT: NO/DENIES PAIN
PAIN_FUNCTIONAL_ASSESSMENT: FLACC (FACE, LEGS, ACTIVITY, CRY, CONSOLABILITY)
PAIN_FUNCTIONAL_ASSESSMENT: 0-10

## 2024-11-14 ASSESSMENT — ACTIVITIES OF DAILY LIVING (ADL)
ADL_ASSISTANCE: INDEPENDENT
ADL_ASSISTANCE: INDEPENDENT
ADLS_ADDRESSED: NO
HEARING - LEFT EAR: FUNCTIONAL
FEEDING YOURSELF: INDEPENDENT
GROOMING: INDEPENDENT
TOILETING: NEEDS ASSISTANCE
ADEQUATE_TO_COMPLETE_ADL: YES
BATHING_ASSISTANCE: MINIMAL
PATIENT'S MEMORY ADEQUATE TO SAFELY COMPLETE DAILY ACTIVITIES?: YES
JUDGMENT_ADEQUATE_SAFELY_COMPLETE_DAILY_ACTIVITIES: YES
HEARING - RIGHT EAR: FUNCTIONAL
WALKS IN HOME: NEEDS ASSISTANCE
DRESSING YOURSELF: INDEPENDENT
BATHING: INDEPENDENT

## 2024-11-14 ASSESSMENT — PAIN SCALES - GENERAL
PAINLEVEL_OUTOF10: 0 - NO PAIN
PAINLEVEL_OUTOF10: 0 - NO PAIN

## 2024-11-14 NOTE — CARE PLAN
The patient's goals for the shift include  feel better    The clinical goals for the shift include vss

## 2024-11-14 NOTE — PROGRESS NOTES
Spiritual Care Visit    Clinical Encounter Type  Visited With: Patient not available  Routine Visit: Introduction  Continue Visiting: Yes     Marcos Day

## 2024-11-14 NOTE — PROGRESS NOTES
Occupational Therapy    Evaluation    Patient Name: William A Franke  MRN: [id number]  Department: [facility] 3 E  Room: 18/18  Today's Date: 11/14/24  Time Calculation  Start Time: 0952  Stop Time: 1012  Time Calculation (min): 20 min       Assessment:  OT Assessment: Pt would benefit from acute OT services to address deficits in ADLs, functional mobility, and transfers  End of Session Communication: Bedside nurse  End of Session Patient Position: Up in chair, Alarm on (family present, all needs in reach)  OT Assessment Results: Decreased ADL status, Decreased upper extremity strength, Decreased endurance, Decreased functional mobility, Decreased IADLs  Strengths: Support of extended family/friends, Premorbid level of function, Housing layout, Support and attitude of living partners  Barriers to Participation: Comorbidities  Plan:  Treatment Interventions: ADL retraining, Functional transfer training, UE strengthening/ROM, Endurance training, Patient/family training, Equipment evaluation/education  OT Frequency: 3 times per week  OT Discharge Recommendations: Low intensity level of continued care (recommend assist/supervision upon d/c)  OT - OK to Discharge: Yes  Treatment Interventions: ADL retraining, Functional transfer training, UE strengthening/ROM, Endurance training, Patient/family training, Equipment evaluation/education    Subjective   Current Problem:  1. Chest pain, unspecified type        2. Hyperkalemia        3. Hyperphosphatemia        4. Bilateral pleural effusion        5. Hypertensive heart disease with acute on chronic systolic congestive heart failure  Transthoracic Echo (TTE) Complete    Transthoracic Echo (TTE) Complete        General:   OT Received On: 11/14/24  General  Reason for Referral: Impaired ADLs. Pt admitted with c/o chest pain, SOB, and cough  Referred By: Marianna Galloway MD  Past Medical History Relevant to Rehab: ESRD on HD, HTN, DM, CAD, CHF,  hypercholesterolemia, BPH, CKD, prostate CA,  OSAGERD  Family/Caregiver Present: Yes  Caregiver Feedback: spouse and brother  Co-Treatment: PT  Prior to Session Communication: Bedside nurse  Patient Position Received: Bed, 3 rail up, Alarm off, not on at start of session  General Comment: Cleared by nursing. Pt agreeable to therapy  Precautions:  Hearing/Visual Limitations: glasses  Medical Precautions: Fall precautions, Oxygen therapy device and L/min (2LO2)    Vital Signs Comment: HR 69; SpO2 93% on 2LO2, initial /39 map 70, BP end of session seated OOB in chair 135/81 map 89     Pain:  Pain Assessment  Pain Assessment: 0-10  0-10 (Numeric) Pain Score: 0 - No pain    Objective   Cognition:  Overall Cognitive Status:  (AOX3, however demonstrated delayed responses to posed questions)  Cognition Comments: pt with flat affect  Processing Speed: Delayed         Home Living:  Type of Home: House  Lives With: Spouse  Home Adaptive Equipment: None  Home Layout: Two level, Full bath main level, Able to live on main level with bedroom/bathroom (11 steps with 1 rail to basement)  Home Access: Stairs to enter without rails  Entrance Stairs-Rails: None  Entrance Stairs-Number of Steps: 2  Prior Function:  Level of [place]: Independent with ADLs and functional transfers, Independent with homemaking with ambulation  Receives Help From: Family  ADL Assistance: Independent  Homemaking Assistance: Independent  Driving/Transportation: Independent  Ambulatory Assistance: Independent  Leisure: pt goes to [facility] multiple times per week to workout  Hand Dominance: Right  Prior Function Comments: Pt reports indep PTA, denies h/o falls    ADL:  Eating Assistance: Stand by  Grooming Assistance: Stand by  Bathing Assistance: Minimal  UE Dressing Assistance: Stand by  LE Dressing Assistance: Minimal  Toileting Assistance with Device: Minimal  Activity Tolerance:  Endurance: Tolerates less than 10 min exercise, no significant change in vital signs  Bed Mobility/Transfers: Bed  Mobility  Bed Mobility:  (min A for trunk upright supine>seated EOB with HOB elevated and use of bed rail)    Transfers  Transfer:  (CGA for balance/safety sit<>stand bed and chair level, VCs for safe hand and leg placement)     Functional Mobility:  Functional Mobility  Functional Mobility Performed:  (min A for balance/safety for functional mobility extended household distance without use of AD, slow movements this date)  Sitting Balance:  Static Sitting Balance  Static Sitting-Level of Assistance: Close supervision    Sensation:  Sensation Comment: pt denied numbness/paresthesia BUEs  Strength:  Strength Comments: WFL  Hand Function:  Hand Function  Gross Grasp: Functional  Coordination: Functional  Extremities: RUE   RUE : Within Functional Limits and LUE   LUE: Within Functional Limits    Outcome Measures: [facility] Daily Activity  Putting on and taking off regular lower body clothing: A little  Bathing (including washing, rinsing, drying): A little  Putting on and taking off regular upper body clothing: A little  Toileting, which includes using toilet, bedpan or urinal: A little  Taking care of personal grooming such as brushing teeth: A little  Eating Meals: A little  Daily Activity - Total Score: 18    Education Documentation  ADL Training, taught by Indu Tellez OT at 11/14/2024  1:13 PM.  Learner: Patient  Readiness: Acceptance  Method: Explanation, Demonstration  Response: Verbalizes Understanding, Needs Reinforcement    Education Comments  No comments found.    Goals:  Encounter Problems       Encounter Problems (Active)       ADLs       Pt will complete ADL tasks at mod I with use of AE prn  (Progressing)       Start:  11/14/24    Expected End:  12/19/24               Functional Mobility       Pt will perform functional transfers at mod I with use of LRAD.   (Progressing)       Start:  11/14/24    Expected End:  12/19/24               OT Transfers       Pt will perform functional transfers at  mod I. (Progressing)       Start:  11/14/24    Expected End:  12/19/24

## 2024-11-14 NOTE — PROGRESS NOTES
"William A Franke is a 73 y.o. male on day 1 of admission presenting with Chest pain, unspecified type.    Subjective   Feeling little better after dialysis yesterday       Objective     Physical Exam  Vitals reviewed.   Constitutional:       Appearance: Normal appearance.   HENT:      Head: Normocephalic and atraumatic.      Right Ear: Tympanic membrane, ear canal and external ear normal.      Left Ear: Tympanic membrane, ear canal and external ear normal.      Nose: Nose normal.      Mouth/Throat:      Pharynx: Oropharynx is clear.   Eyes:      Extraocular Movements: Extraocular movements intact.      Conjunctiva/sclera: Conjunctivae normal.      Pupils: Pupils are equal, round, and reactive to light.   Cardiovascular:      Rate and Rhythm: Normal rate and regular rhythm.      Pulses: Normal pulses.      Heart sounds: Normal heart sounds.   Pulmonary:      Effort: Pulmonary effort is normal.      Breath sounds: Normal breath sounds.   Abdominal:      General: Abdomen is flat. Bowel sounds are normal.      Palpations: Abdomen is soft.   Musculoskeletal:      Cervical back: Normal range of motion and neck supple.   Skin:     General: Skin is warm and dry.   Neurological:      General: No focal deficit present.      Mental Status: He is alert and oriented to person, place, and time.   Psychiatric:         Mood and Affect: Mood normal.         Last Recorded Vitals  Blood pressure 135/50, pulse 57, temperature 36.1 °C (97 °F), temperature source Temporal, resp. rate 21, height 1.727 m (5' 7.99\"), weight 76.7 kg (169 lb 1.5 oz), SpO2 97%.  Intake/Output last 3 Shifts:  I/O last 3 completed shifts:  In: 1250 (16.3 mL/kg) [I.V.:800 (10.4 mL/kg); Other:400; IV Piggyback:50]  Out: 2902 (37.8 mL/kg) [Other:2902]  Weight: 76.7 kg     Relevant Results               Scheduled medications  allopurinol, 100 mg, oral, Daily  amLODIPine, 10 mg, oral, Daily  aspirin, 81 mg, oral, Daily  atorvastatin, 80 mg, oral, Nightly  carvedilol, " 6.25 mg, oral, BID  docusate sodium, 100 mg, oral, BID  doxazosin, 1 mg, oral, Daily  [START ON 11/24/2024] ergocalciferol, 50,000 Units, oral, q14 days  ezetimibe, 10 mg, oral, Daily  gabapentin, 300 mg, oral, Daily  heparin (porcine), 5,000 Units, subcutaneous, q8h LUKE  hydrALAZINE, 100 mg, oral, TID  isosorbide mononitrate ER, 120 mg, oral, Daily  pantoprazole, 40 mg, oral, Daily  perflutren lipid microspheres, 0.5-10 mL of dilution, intravenous, Once in imaging  polyethylene glycol, 17 g, oral, Daily  sevelamer carbonate, 1,600 mg, oral, TID  SITagliptin phosphate, 25 mg, oral, Daily      Continuous medications  oxygen, , Last Rate: 2 L/min (11/13/24 6723)      PRN medications  PRN medications: acetaminophen **OR** acetaminophen **OR** acetaminophen, benzocaine-menthol, dextromethorphan-guaifenesin, guaiFENesin, hydrALAZINE, nitroglycerin, ondansetron **OR** ondansetron  Results for orders placed or performed during the hospital encounter of 11/13/24 (from the past 24 hours)   CBC and Auto Differential   Result Value Ref Range    WBC 8.5 4.4 - 11.3 x10*3/uL    nRBC 0.0 0.0 - 0.0 /100 WBCs    RBC 3.74 (L) 4.50 - 5.90 x10*6/uL    Hemoglobin 11.3 (L) 13.5 - 17.5 g/dL    Hematocrit 36.4 (L) 41.0 - 52.0 %    MCV 97 80 - 100 fL    MCH 30.2 26.0 - 34.0 pg    MCHC 31.0 (L) 32.0 - 36.0 g/dL    RDW 17.8 (H) 11.5 - 14.5 %    Platelets 179 150 - 450 x10*3/uL    Neutrophils % 88.1 40.0 - 80.0 %    Immature Granulocytes %, Automated 0.4 0.0 - 0.9 %    Lymphocytes % 6.4 13.0 - 44.0 %    Monocytes % 4.9 2.0 - 10.0 %    Eosinophils % 0.1 0.0 - 6.0 %    Basophils % 0.1 0.0 - 2.0 %    Neutrophils Absolute 7.52 (H) 1.60 - 5.50 x10*3/uL    Immature Granulocytes Absolute, Automated 0.03 0.00 - 0.50 x10*3/uL    Lymphocytes Absolute 0.55 (L) 0.80 - 3.00 x10*3/uL    Monocytes Absolute 0.42 0.05 - 0.80 x10*3/uL    Eosinophils Absolute 0.01 0.00 - 0.40 x10*3/uL    Basophils Absolute 0.01 0.00 - 0.10 x10*3/uL   Magnesium   Result Value Ref  Range    Magnesium 1.72 1.60 - 2.40 mg/dL   Phosphorus   Result Value Ref Range    Phosphorus 8.1 (H) 2.5 - 4.9 mg/dL   Comprehensive metabolic panel   Result Value Ref Range    Glucose 159 (H) 74 - 99 mg/dL    Sodium 140 136 - 145 mmol/L    Potassium 7.3 (HH) 3.5 - 5.3 mmol/L    Chloride 106 98 - 107 mmol/L    Bicarbonate 21 21 - 32 mmol/L    Anion Gap 20 10 - 20 mmol/L    Urea Nitrogen 86 (H) 6 - 23 mg/dL    Creatinine 11.30 (H) 0.50 - 1.30 mg/dL    eGFR 4 (L) >60 mL/min/1.73m*2    Calcium 9.3 8.6 - 10.3 mg/dL    Albumin 4.2 3.4 - 5.0 g/dL    Alkaline Phosphatase 38 33 - 136 U/L    Total Protein 6.8 6.4 - 8.2 g/dL    AST 27 9 - 39 U/L    Bilirubin, Total 0.8 0.0 - 1.2 mg/dL    ALT 12 10 - 52 U/L   B-Type Natriuretic Peptide   Result Value Ref Range     (H) 0 - 99 pg/mL   Influenza A, and B PCR   Result Value Ref Range    Flu A Result Not Detected Not Detected    Flu B Result Not Detected Not Detected   Sars-CoV-2 PCR   Result Value Ref Range    Coronavirus 2019, PCR Not Detected Not Detected   Troponin I, High Sensitivity, Initial   Result Value Ref Range    Troponin I, High Sensitivity 54 (HH) 0 - 20 ng/L   ECG 12 lead   Result Value Ref Range    Ventricular Rate 56 BPM    Atrial Rate 56 BPM    DC Interval 166 ms    QRS Duration 108 ms    QT Interval 450 ms    QTC Calculation(Bazett) 434 ms    P Axis 63 degrees    R Axis 66 degrees    T Axis 63 degrees    QRS Count 9 beats    Q Onset 216 ms    P Onset 133 ms    P Offset 168 ms    T Offset 441 ms    QTC Fredericia 440 ms   Troponin, High Sensitivity, 1 Hour   Result Value Ref Range    Troponin I, High Sensitivity 54 (HH) 0 - 20 ng/L   Urinalysis with Reflex Culture and Microscopic   Result Value Ref Range    Color, Urine Light-Yellow Light-Yellow, Yellow, Dark-Yellow    Appearance, Urine Clear Clear    Specific Gravity, Urine 1.014 1.005 - 1.035    pH, Urine 6.5 5.0, 5.5, 6.0, 6.5, 7.0, 7.5, 8.0    Protein, Urine 200 (2+) (A) NEGATIVE, 10 (TRACE), 20  (TRACE) mg/dL    Glucose, Urine 150 (2+) (A) Normal mg/dL    Blood, Urine 0.5 (2+) (A) NEGATIVE    Ketones, Urine NEGATIVE NEGATIVE mg/dL    Bilirubin, Urine NEGATIVE NEGATIVE    Urobilinogen, Urine Normal Normal mg/dL    Nitrite, Urine NEGATIVE NEGATIVE    Leukocyte Esterase, Urine NEGATIVE NEGATIVE   Extra Urine Gray Tube   Result Value Ref Range    Extra Tube Hold for add-ons.    Urinalysis Microscopic   Result Value Ref Range    WBC, Urine 1-5 1-5, NONE /HPF    RBC, Urine NONE NONE, 1-2, 3-5 /HPF    Mucus, Urine FEW Reference range not established. /LPF    Hyaline Casts, Urine 3+ (A) NONE /LPF   Hepatitis B surface antibody   Result Value Ref Range    Hepatitis B Surface AB 5.7 <10.0 mIU/mL   Hepatitis B surface antigen   Result Value Ref Range    Hepatitis B Surface AG Nonreactive Nonreactive   CBC   Result Value Ref Range    WBC 7.3 4.4 - 11.3 x10*3/uL    nRBC 0.0 0.0 - 0.0 /100 WBCs    RBC 3.53 (L) 4.50 - 5.90 x10*6/uL    Hemoglobin 10.4 (L) 13.5 - 17.5 g/dL    Hematocrit 33.1 (L) 41.0 - 52.0 %    MCV 94 80 - 100 fL    MCH 29.5 26.0 - 34.0 pg    MCHC 31.4 (L) 32.0 - 36.0 g/dL    RDW 17.4 (H) 11.5 - 14.5 %    Platelets 174 150 - 450 x10*3/uL   Comprehensive metabolic panel   Result Value Ref Range    Glucose 97 74 - 99 mg/dL    Sodium 140 136 - 145 mmol/L    Potassium 3.9 3.5 - 5.3 mmol/L    Chloride 99 98 - 107 mmol/L    Bicarbonate 27 21 - 32 mmol/L    Anion Gap 18 10 - 20 mmol/L    Urea Nitrogen 36 (H) 6 - 23 mg/dL    Creatinine 6.15 (H) 0.50 - 1.30 mg/dL    eGFR 9 (L) >60 mL/min/1.73m*2    Calcium 9.3 8.6 - 10.3 mg/dL    Albumin 4.0 3.4 - 5.0 g/dL    Alkaline Phosphatase 36 33 - 136 U/L    Total Protein 6.3 (L) 6.4 - 8.2 g/dL    AST 28 9 - 39 U/L    Bilirubin, Total 1.0 0.0 - 1.2 mg/dL    ALT 12 10 - 52 U/L   POCT GLUCOSE   Result Value Ref Range    POCT Glucose 84 74 - 99 mg/dL     *Note: Due to a large number of results and/or encounters for the requested time period, some results have not been  displayed. A complete set of results can be found in Results Review.                    Assessment/Plan   Assessment & Plan  Chest pain, unspecified type    Shortness of breath    Volume overload    Hypertension    Hyperlipidemia    ESRD on dialysis (Multi)    Hyperkalemia    Diabetes mellitus (Multi)    Hold home losartan  Will discuss with nephrology and cardiology if patient should be resumed on that  Discussed with patient low potassium diet  He will be receiving dialysis   Blood pressure is improving blood sugars okay       I spent  minutes in the professional and overall care of this patient.      Marianna Galloway MD

## 2024-11-14 NOTE — NURSING NOTE
Assumed care of pt, pt is awake lying in bed, on 2LNC, pt has elevated potassium of 7.3, pt c/o shortness of breath, family at bedside, pt transferred from ED, bed locked and lowered, call light and belongings w/in reach.

## 2024-11-14 NOTE — CONSULTS
Inpatient consult to Cardiology  Consult performed by: VERÓNICA Le-CNP  Consult ordered by: Marianna Galloway MD  Reason for consult: Exacerbation of CHF, chest discomfort        History Of Present Illness:    William A Franke is a 73 y.o. male presenting with exacerbation of CHF and chest discomfort.  Current with Dr. Ardon.  Past medical history of end-stage renal disease with hemodialysis, hypertensive disorder, coronary artery disease with history of cardiac stenting diabetes mellitus type 2,anemia of chronic disease.  Patient presents the [facility] with chief complaint of shortness of breath and some mild chest discomfort.  He missed his dialysis session on Tuesday and does not feel well.  Patient has a poor medical recall and has been noncompliance with his medications and his dialysis treatment.  Initial creatinine above 11.  Chest x-ray reveals pleural effusions as well as congestive failure.  Patient received 1 dose of IV furosemide as he does continue to make urine and received dialysis last night.  Notably his systolic blood pressure has been significantly elevated above 200.     Last Recorded Vitals:  Vitals:    11/14/24 0038 11/14/24 0046 11/14/24 0436 11/14/24 0833   BP: 177/59  (!) 182/57 (!) 215/52   BP Location: Right arm  Right arm    Patient Position: Lying  Lying    Pulse:   66 57   Resp:   18 21   Temp:  36.6 °C (97.9 °F) 36.7 °C (98.1 °F)    TempSrc:  Temporal Temporal    SpO2:   94% 97%   Weight:   76.7 kg (169 lb 1.5 oz)    Height:           Last Labs:  CBC - 11/14/2024:  4:29 AM  7.3 10.4 174    33.1      CMP - 11/14/2024:  4:29 AM  9.3 6.3 28 --- 1.0   8.1 4.0 12 36      PTT - No results in last year.  _   _ _     Troponin I, High Sensitivity   Date/Time Value Ref Range Status   11/13/2024 04:03 PM 54 (HH) 0 - 20 ng/L Final     Comment:     Previous result verified on 11/13/2024 1543 on specimen/case [id number] called with component [facility] for procedure Troponin I,  High Sensitivity, Initial with value 54 ng/L.   11/13/2024 02:56 PM 54 (HH) 0 - 20 ng/L Final     BNP   Date/Time Value Ref Range Status   11/13/2024 02:56  (H) 0 - 99 pg/mL Final     Hemoglobin A1C   Date/Time Value Ref Range Status   11/08/2024 07:11 AM 6.3 (H) See comment % Final   08/09/2024 09:54 AM 6.5 (H) see below % Final     LDL Calculated   Date/Time Value Ref Range Status   11/08/2024 07:11 AM 52 <=99 mg/dL Final     Comment:                                 Near   Borderline      AGE      Desirable  Optimal    High     High     Very High     0-19 Y     0 - 109     ---    110-129   >/= 130     ----    20-24 Y     0 - 119     ---    120-159   >/= 160     ----      >24 Y     0 -  99   100-129  130-159   160-189     >/=190     08/09/2024 09:54 AM 33 <=99 mg/dL Final     Comment:                                 Near   Borderline      AGE      Desirable  Optimal    High     High     Very High     0-19 Y     0 - 109     ---    110-129   >/= 130     ----    20-24 Y     0 - 119     ---    120-159   >/= 160     ----      >24 Y     0 -  99   100-129  130-159   160-189     >/=190     05/29/2024 06:55 AM 37 <=99 mg/dL Final     Comment:                                 Near   Borderline      AGE      Desirable  Optimal    High     High     Very High     0-19 Y     0 - 109     ---    110-129   >/= 130     ----    20-24 Y     0 - 119     ---    120-159   >/= 160     ----      >24 Y     0 -  99   100-129  130-159   160-189     >/=190       VLDL   Date/Time Value Ref Range Status   11/08/2024 07:11 AM 34 0 - 40 mg/dL Final   08/09/2024 09:54 AM 39 0 - 40 mg/dL Final   05/29/2024 06:55 AM 53 (H) 0 - 40 mg/dL Final      Results for orders placed or performed during the hospital encounter of 11/13/24 (from the past 24 hours)   CBC and Auto Differential   Result Value Ref Range    WBC 8.5 4.4 - 11.3 x10*3/uL    nRBC 0.0 0.0 - 0.0 /100 WBCs    RBC 3.74 (L) 4.50 - 5.90 x10*6/uL    Hemoglobin 11.3 (L) 13.5 - 17.5 g/dL     Hematocrit 36.4 (L) 41.0 - 52.0 %    MCV 97 80 - 100 fL    MCH 30.2 26.0 - 34.0 pg    MCHC 31.0 (L) 32.0 - 36.0 g/dL    RDW 17.8 (H) 11.5 - 14.5 %    Platelets 179 150 - 450 x10*3/uL    Neutrophils % 88.1 40.0 - 80.0 %    Immature Granulocytes %, Automated 0.4 0.0 - 0.9 %    Lymphocytes % 6.4 13.0 - 44.0 %    Monocytes % 4.9 2.0 - 10.0 %    Eosinophils % 0.1 0.0 - 6.0 %    Basophils % 0.1 0.0 - 2.0 %    Neutrophils Absolute 7.52 (H) 1.60 - 5.50 x10*3/uL    Immature Granulocytes Absolute, Automated 0.03 0.00 - 0.50 x10*3/uL    Lymphocytes Absolute 0.55 (L) 0.80 - 3.00 x10*3/uL    Monocytes Absolute 0.42 0.05 - 0.80 x10*3/uL    Eosinophils Absolute 0.01 0.00 - 0.40 x10*3/uL    Basophils Absolute 0.01 0.00 - 0.10 x10*3/uL   Magnesium   Result Value Ref Range    Magnesium 1.72 1.60 - 2.40 mg/dL   Phosphorus   Result Value Ref Range    Phosphorus 8.1 (H) 2.5 - 4.9 mg/dL   Comprehensive metabolic panel   Result Value Ref Range    Glucose 159 (H) 74 - 99 mg/dL    Sodium 140 136 - 145 mmol/L    Potassium 7.3 (HH) 3.5 - 5.3 mmol/L    Chloride 106 98 - 107 mmol/L    Bicarbonate 21 21 - 32 mmol/L    Anion Gap 20 10 - 20 mmol/L    Urea Nitrogen 86 (H) 6 - 23 mg/dL    Creatinine 11.30 (H) 0.50 - 1.30 mg/dL    eGFR 4 (L) >60 mL/min/1.73m*2    Calcium 9.3 8.6 - 10.3 mg/dL    Albumin 4.2 3.4 - 5.0 g/dL    Alkaline Phosphatase 38 33 - 136 U/L    Total Protein 6.8 6.4 - 8.2 g/dL    AST 27 9 - 39 U/L    Bilirubin, Total 0.8 0.0 - 1.2 mg/dL    ALT 12 10 - 52 U/L   B-Type Natriuretic Peptide   Result Value Ref Range     (H) 0 - 99 pg/mL   Influenza A, and B PCR   Result Value Ref Range    Flu A Result Not Detected Not Detected    Flu B Result Not Detected Not Detected   Sars-CoV-2 PCR   Result Value Ref Range    Coronavirus 2019, PCR Not Detected Not Detected   Troponin I, High Sensitivity, Initial   Result Value Ref Range    Troponin I, High Sensitivity 54 (HH) 0 - 20 ng/L   Troponin, High Sensitivity, 1 Hour   Result Value Ref  Range    Troponin I, High Sensitivity 54 (HH) 0 - 20 ng/L   Urinalysis with Reflex Culture and Microscopic   Result Value Ref Range    Color, Urine Light-Yellow Light-Yellow, Yellow, Dark-Yellow    Appearance, Urine Clear Clear    Specific Gravity, Urine 1.014 1.005 - 1.035    pH, Urine 6.5 5.0, 5.5, 6.0, 6.5, 7.0, 7.5, 8.0    Protein, Urine 200 (2+) (A) NEGATIVE, 10 (TRACE), 20 (TRACE) mg/dL    Glucose, Urine 150 (2+) (A) Normal mg/dL    Blood, Urine 0.5 (2+) (A) NEGATIVE    Ketones, Urine NEGATIVE NEGATIVE mg/dL    Bilirubin, Urine NEGATIVE NEGATIVE    Urobilinogen, Urine Normal Normal mg/dL    Nitrite, Urine NEGATIVE NEGATIVE    Leukocyte Esterase, Urine NEGATIVE NEGATIVE   Extra Urine Gray Tube   Result Value Ref Range    Extra Tube Hold for add-ons.    Urinalysis Microscopic   Result Value Ref Range    WBC, Urine 1-5 1-5, NONE /HPF    RBC, Urine NONE NONE, 1-2, 3-5 /HPF    Mucus, Urine FEW Reference range not established. /LPF    Hyaline Casts, Urine 3+ (A) NONE /LPF   Hepatitis B surface antibody   Result Value Ref Range    Hepatitis B Surface AB 5.7 <10.0 mIU/mL   Hepatitis B surface antigen   Result Value Ref Range    Hepatitis B Surface AG Nonreactive Nonreactive   CBC   Result Value Ref Range    WBC 7.3 4.4 - 11.3 x10*3/uL    nRBC 0.0 0.0 - 0.0 /100 WBCs    RBC 3.53 (L) 4.50 - 5.90 x10*6/uL    Hemoglobin 10.4 (L) 13.5 - 17.5 g/dL    Hematocrit 33.1 (L) 41.0 - 52.0 %    MCV 94 80 - 100 fL    MCH 29.5 26.0 - 34.0 pg    MCHC 31.4 (L) 32.0 - 36.0 g/dL    RDW 17.4 (H) 11.5 - 14.5 %    Platelets 174 150 - 450 x10*3/uL   Comprehensive metabolic panel   Result Value Ref Range    Glucose 97 74 - 99 mg/dL    Sodium 140 136 - 145 mmol/L    Potassium 3.9 3.5 - 5.3 mmol/L    Chloride 99 98 - 107 mmol/L    Bicarbonate 27 21 - 32 mmol/L    Anion Gap 18 10 - 20 mmol/L    Urea Nitrogen 36 (H) 6 - 23 mg/dL    Creatinine 6.15 (H) 0.50 - 1.30 mg/dL    eGFR 9 (L) >60 mL/min/1.73m*2    Calcium 9.3 8.6 - 10.3 mg/dL    Albumin  4.0 3.4 - 5.0 g/dL    Alkaline Phosphatase 36 33 - 136 U/L    Total Protein 6.3 (L) 6.4 - 8.2 g/dL    AST 28 9 - 39 U/L    Bilirubin, Total 1.0 0.0 - 1.2 mg/dL    ALT 12 10 - 52 U/L   POCT GLUCOSE   Result Value Ref Range    POCT Glucose 84 74 - 99 mg/dL     *Note: Due to a large number of results and/or encounters for the requested time period, some results have not been displayed. A complete set of results can be found in Results Review.       Last I/O:  I/O last 3 completed shifts:  In: 1250 (16.3 mL/kg) [I.V.:800 (10.4 mL/kg); Other:400; IV Piggyback:50]  Out: 2902 (37.8 mL/kg) [Other:2902]  Weight: 76.7 kg     Past Cardiology Tests (Last 3 Years):  EKG: Sinus bradycardia  Echo: No echocardiogram results found for the past 12 months  8/22: Ejection fraction 45 to 50%, hypokinesis of anterior septal wall, mild mitral regurgitation, moderate tricuspid regurgitation, elevated right ventricular systolic pressure elevated pulmonary artery pressure       Past Medical History:  He has a past medical history of Personal history of other endocrine, nutritional and metabolic disease and Personal history of other specified conditions (08/28/2020).    Past Surgical History:  He has a past surgical history that includes Shoulder surgery (04/15/2013) and Knee arthroscopy w/ debridement (04/15/2013).      Social History:  He reports that he has never smoked. He has never used smokeless tobacco. He reports that he does not drink alcohol and does not use drugs.    Family History:  No family history on file.     Allergies:  Patient has no known allergies.    Inpatient Medications:  Scheduled medications   Medication Dose Route Frequency    allopurinol  100 mg oral Daily    amLODIPine  10 mg oral Daily    aspirin  81 mg oral Daily    atorvastatin  80 mg oral Nightly    carvedilol  6.25 mg oral BID    docusate sodium  100 mg oral BID    [START ON 11/24/2024] ergocalciferol  50,000 Units oral q14 days    ezetimibe  10 mg oral Daily  "   gabapentin  300 mg oral Daily    heparin (porcine)  5,000 Units subcutaneous q8h LUKE    hydrALAZINE  100 mg oral TID    isosorbide mononitrate ER  120 mg oral Daily    pantoprazole  40 mg oral Daily    polyethylene glycol  17 g oral Daily    sevelamer carbonate  1,600 mg oral TID    SITagliptin phosphate  25 mg oral Daily     PRN medications   Medication    acetaminophen    Or    acetaminophen    Or    acetaminophen    benzocaine-menthol    dextromethorphan-guaifenesin    guaiFENesin    nitroglycerin    ondansetron    Or    ondansetron     Continuous Medications   Medication Dose Last Rate    oxygen   2 L/min (11/13/24 9308)     Outpatient Medications:  Current Outpatient Medications   Medication Instructions    allopurinol (ZYLOPRIM) 100 mg, oral, Daily    amLODIPine (NORVASC) 10 mg, oral, Daily    aspirin 81 mg, Daily    atorvastatin (LIPITOR) 80 mg, oral, Nightly    B complex-vitamin C-folic acid (Nephro-Shea) 0.8 mg tablet 0.8 mg, Daily    BD Ultra-Fine Mini Pen Needle 31 gauge x 3/16\" needle USE AS DIRECTED 4 times a day    carvedilol (COREG) 6.25 mg, oral, 2 times daily (morning and late afternoon)    ergocalciferol (VITAMIN D-2) 50,000 Units, Every 14 days    ezetimibe (ZETIA) 10 mg, oral, Daily    fenofibrate (TRICOR) 145 mg, oral, Daily    FeroSuL 325 mg, oral, Daily    gabapentin (NEURONTIN) 300 mg, oral, 2 times daily    insulin glargine (LANTUS) 25 Units, Every morning    insulin lispro (HUMALOG) 10 Units, 3 times daily (morning, midday, late afternoon)    Januvia 25 mg, oral, Daily    loratadine (CLARITIN) 10 mg, oral, Daily    losartan (COZAAR) 100 mg, oral, Daily    nitroglycerin (NITROSTAT) 0.4 mg, Every 5 min PRN    oxygen (O2) gas therapy 1 each, inhalation, Continuous    pantoprazole (PROTONIX) 40 mg, Daily    sevelamer carbonate (Renvela) 800 mg tablet Take 2 tablets (1,600 mg) by mouth 3 times daily (morning, midday, late afternoon). Three times daily with meals       Physical Exam:  General: " alert, oriented to self and place, very poor recall  HEENT: normal cephalic, atraumatic, no scleral icterus,  Neck: No JVD, bruit or thrill, masses or tenderness   Heart: S1/S2, Rate 50, Rhythm regular, no s3 or s4, 1 out of 6 systolic murmur, no thrill, or heaves at PMI.   Lungs: Clear, equal expansion and excursion, no wheezes, crackles, rhales or rhonci.  Oxygen via nasal cannula.  No significant conversational dyspnea appreciated.   Abdomen: bowel sounds x 4, soft, non-tender   Genitourinary: deferred   Extremities: No significant upper or lower extremity edema appreciated.     Assessment/Plan     Hypertensive emergency  Hypertensive chest pain  Acute on chronic systolic heart failure  End-stage renal disease with hemodialysis  Altered mentation  Hypertensive disorder  Ischemic cardiomyopathy  Anemia of chronic disease  Medical noncompliance    Overall impression:    11/14: Consulted for combination of exacerbated CHF and chest discomfort.  Patient is a very poor historian and I certainly suspect underlying dementia.  He missed his most recent dialysis session and appears to have not been taking his medications at home.  He did undergo dialysis yesterday and received 1 dose of IV Lasix.  He is breathing comfortably nasal cannula oxygen with no significant JVD or peripheral edema.  His systolic blood pressure is significantly elevated with an average over 200.  His home medication list did not include his home hydralazine at 100 mg 3 times daily as well as isosorbide 120 g daily.  I have reinitiated these.  I believe his chest discomfort is certainly relative to a hypertensive emergency.  As the patient appears to be breathing comfortably nasal cannula oxygen at this time principal means of fluid management will likely be via dialysis.  Will focus on this.  Resume his home medications otherwise including amlodipine, aspirin, atorvastatin, carvedilol unable to increase carvedilol given a low resting heart rate.   Otherwise the patient does not appear to be under any distress at this time will follow with you.      Code Status:  Full Code    I spent 80 minutes in the professional and overall care of this patient.        Darrell Blackwell, APRN-CNP

## 2024-11-14 NOTE — PROGRESS NOTES
11/14/24 1737   Discharge Planning   Expected Discharge Disposition Home   Does the patient need discharge transport arranged? No     Met with patient and his spouse at bedside.  PT recommendation is for low intensity rehab with 24 hour supervision.  Spouse is declining HHC.  States she is home with patient. Spouse will provide transportation home at time of discharge.

## 2024-11-14 NOTE — PROGRESS NOTES
Physical Therapy    Physical Therapy Evaluation & Treatment    Patient Name: William A Franke  MRN: [id number]  Department: [address]  Room: 18/18  Today's Date: 11/14/2024   Time Calculation  Start Time: 0953  Stop Time: 1014  Time Calculation (min): 21 min    Assessment/Plan   PT Assessment  PT Assessment Results: Decreased strength, Decreased endurance, Impaired balance, Decreased mobility, Decreased coordination, Decreased safety awareness  Rehab Prognosis: Good  Barriers to Discharge: none  Evaluation/Treatment Tolerance: Patient limited by fatigue  Medical Staff Made Aware: Yes  Strengths: Support and attitude of living partners, Support of Caregivers, Premorbid level of function, Living arrangement secure  Barriers to Participation: Comorbidities, Insight into problems  End of Session Communication: Bedside nurse  Assessment Comment: pt with very mild deficits with functional mobiltiy at this time. pt will benefit form continued skilled therpay services to improve his funcitonal performance and maximize safety prior to discharge home. pt will benefit from 24 hour supervision at home.  End of Session Patient Position: Up in chair, Alarm on (needs in reach, family present)   IP OR SWING BED PT PLAN  Inpatient or Swing Bed: Inpatient  PT Plan  Treatment/Interventions: Bed mobility, Transfer training, Gait training, Balance training, Strengthening, Endurance training, Therapeutic exercise, Therapeutic activity  PT Plan: Ongoing PT  PT Frequency: 4 times per week  PT Discharge Recommendations: Low intensity level of continued care  PT Recommended Transfer Status: Assist x1  PT - OK to Discharge: Yes      Subjective     General Visit Information:  General  Reason for Referral: Impaired Mobility  Referred By: Marianna Galloway MD  Past Medical History Relevant to Rehab: ESRD on HD, HTN, DM, CAD, CHF,  hypercholesterolemia, BPH, CKD, prostate CA, LIAM, GERD  Family/Caregiver Present: Yes  Caregiver Feedback: spouse and  brother  Co-Treatment: OT  Co-Treatment Reason: otpimization of patient outcomes  Prior to Session Communication: Bedside nurse  Patient Position Received: Bed, 3 rail up, Alarm off, not on at start of session  Preferred Learning Style: verbal  General Comment: 73 y.o. male with c/o chest pain and SOB after missing a dialysis session.pt received stat dialysis upon arrival due to potassium level of 7.3.  Home Living:  Home Living  Type of Home: House  Lives With: Spouse  Home Adaptive Equipment: None  Home Layout: Two level, Full bath main level, Able to live on main level with bedroom/bathroom  Home Access: Stairs to enter without rails  Entrance Stairs-Rails: None  Entrance Stairs-Number of Steps: 2  Bathroom Shower/Tub: Tub/shower unit  Bathroom Toilet: Standard  Prior Level of Function:  Prior Function Per Pt/Caregiver Report  Level of [place]: Independent with ADLs and functional transfers, Independent with homemaking with ambulation  Receives Help From: Family  ADL Assistance: Independent  Homemaking Assistance: Independent  Driving/Transportation: Independent  Ambulatory Assistance: Independent  Vocational: Retired  Leisure: pt goes to [employer] multiple times per week to workout  Hand Dominance: Right  Prior Function Comments: Pt reports indep PTA, denies h/o falls  Precautions:  Precautions  Hearing/Visual Limitations: glasses  Medical Precautions: Fall precautions, Oxygen therapy device and L/min (2L O2 via NC)    Vital Signs Comment: HR 69; SpO2 93% on 2LO2, initial /39 map 70, BP end of session seated OOB in chair 135/81 map 89    Objective   Pain:  Pain Assessment  Pain Assessment: 0-10  0-10 (Numeric) Pain Score: 0 - No pain  Cognition:  Cognition  Overall Cognitive Status: Impaired  Orientation Level: Oriented X4  Following Commands: Follows one step commands with increased time  Cognition Comments: flat affect, long delay in responses  Processing Speed: Delayed    General Assessments:  General  Observation  General Observation: visible skin intact, L UE fistula with bandage dry and intact. skin very dry.    Activity Tolerance  Endurance: Decreased tolerance for upright activites  Activity Tolerance Comments: fair  Rate of Perceived Exertion (RPE): 3    Sensation  Proprioception: No apparent deficits  Sensation Comment: denies paresthesias    Strength  Strength Comments: LE strength equal bilaterally, 4/5  Strength  Strength Comments: LE strength equal bilaterally, 4/5    Perception  Motor Planning:  (delay in motor processing)    Coordination  Coordination Comment: decreased rate and accuracy of movement    Postural Control  Posture Comment: forward head posture    Static Sitting Balance  Static Sitting-Balance Support: Bilateral upper extremity supported, Feet supported  Static Sitting-Level of Assistance: Modified independent  Static Sitting-Comment/Number of Minutes: good seated in chair  Dynamic Sitting Balance  Dynamic Sitting-Balance Support: Bilateral upper extremity supported, Feet supported  Dynamic Sitting-Level of Assistance: Distant supervision  Dynamic Sitting-Comments: good seated at EOB    Static Standing Balance  Static Standing-Balance Support: No upper extremity supported  Static Standing-Level of Assistance: Close supervision  Static Standing-Comment/Number of Minutes: good- with static stance  Dynamic Standing Balance  Dynamic Standing-Balance Support: Left upper extremity supported  Dynamic Standing-Level of Assistance: Contact guard  Dynamic Standing-Comments: fair+ to good- balance with ambulation without assistive device  Functional Assessments:  ADL  ADL's Addressed: No    Bed Mobility  Bed Mobility: Yes  Bed Mobility 1  Bed Mobility 1: Supine to sitting  Level of Assistance 1: Minimum assistance  Bed Mobility Comments 1: delay in response to sit on EOB, requested pt to sit up twice, min A to complete guiding B LEs off EOB, min A for trunk support to sit up, HOB elevated and use of  bed rail.    Transfers  Transfer: Yes  Transfer 1  Transfer From 1: Bed to  Transfer to 1: Chair with arms  Technique 1: Sit to stand, Stand to sit  Transfer Level of Assistance 1: Contact guard  Trials/Comments 1: assist to steady trunk and verbal cues to direct pt to chair  Transfers 2  Transfer From 2: Chair with arms to, Stand to  Transfer to 2: Stand, Chair with arms  Technique 2: Sit to stand, Stand to sit  Transfer Level of Assistance 2: Close supervision  Trials/Comments 2: supervision for balance and safety when standing form and returning to chair    Ambulation/Gait Training  Ambulation/Gait Training Performed: Yes  Ambulation/Gait Training 1  Surface 1: Level tile  Device 1: No device  Assistance 1: Contact guard  Quality of Gait 1: Narrow base of support, Diminished heel strike, Decreased step length, Shuffling gait  Comments/Distance (ft) 1: 35' x 2, slow carmina, poor hip extension, very short step length, no B UE reciprocal arm swing. verbal cues for posture and pace. reporting leg cramps prior to returning to room.    Stairs  Stairs: No  Extremity/Trunk Assessments:  RUE   RUE : Within Functional Limits  LUE   LUE: Within Functional Limits  RLE   RLE : Within Functional Limits  LLE   LLE : Within Functional Limits  Treatments:  Therapeutic Activity  Therapeutic Activity Performed: Yes  Therapeutic Activity 1: see balance assessment, bed mobility, transfers, and gait training for full details.  Therapeutic Activity 2: pt and family educated on increasing activity in the hospital with nrusing assistnace, reinforced call button usage. verbalized understanding.  Outcome Measures:  [facility] Basic Mobility  Turning from your back to your side while in a flat bed without using bedrails: A little  Moving from lying on your back to sitting on the side of a flat bed without using bedrails: A little  Moving to and from bed to chair (including a wheelchair): A little  Standing up from a chair using your arms (e.g.  wheelchair or bedside chair): A little  To walk in hospital room: A little  Climbing 3-5 steps with railing: A little  Basic Mobility - Total Score: 18    Encounter Problems       Encounter Problems (Active)       PT STG Problem       Patient will transfer supine to sit and sit to supine with independent assist to facilitate mobility. (Progressing)       Start:  11/14/24    Expected End:  11/28/24            Patient will transfer sit to stand and stand to sit with independent assist to facilitate mobility. (Progressing)       Start:  11/14/24    Expected End:  11/28/24            Patient will transfer bed to chair and chair to bed with independent assist to facilitate mobility. (Progressing)       Start:  11/14/24    Expected End:  11/28/24            Patient will amb 300 feet no device including two turns on even surface with independent assist to facilitate safe mobility.   (Progressing)       Start:  11/14/24    Expected End:  11/28/24            Patient will increase B LE strength to 4+/5 to improve functional mobility.  (Progressing)       Start:  11/14/24    Expected End:  11/28/24                   Education Documentation  Mobility Training, taught by Irene Herbert, PT at 11/14/2024  2:59 PM.  Learner: Significant Other, Family, Patient  Readiness: Acceptance  Method: Explanation  Response: Verbalizes Understanding    Education Comments  No comments found.

## 2024-11-14 NOTE — H&P
History Of Present Illness  William A Franke is a 73 y.o. male presenting with shortness of breath.  Patient missed his dialysis on Tuesday because he did not feel good.  Today he presented with chest pain shortness of breath and worsening shortness of breath requiring oxygen.  Overall he is not a good historian.  Patient has history of diabetes hypertension high cholesterol coronary artery disease, end-stage renal disease on hemodialysis     Past Medical History  Past Medical History:   Diagnosis Date    Personal history of other endocrine, nutritional and metabolic disease     History of hypercholesterolemia    Personal history of other specified conditions 08/28/2020    History of chest pain       Surgical History  Past Surgical History:   Procedure Laterality Date    KNEE ARTHROSCOPY W/ DEBRIDEMENT  04/15/2013    Arthroscopy Knee    SHOULDER SURGERY  04/15/2013    Shoulder Surgery        Social History  He reports that he has never smoked. He has never used smokeless tobacco. He reports that he does not drink alcohol and does not use drugs.    Family History  No family history on file.     Allergies  Patient has no known allergies.    Review of Systems     Physical Exam  Vitals reviewed.   Constitutional:       Appearance: Normal appearance.   HENT:      Head: Normocephalic and atraumatic.      Right Ear: Tympanic membrane, ear canal and external ear normal.      Left Ear: Tympanic membrane, ear canal and external ear normal.      Nose: Nose normal.      Mouth/Throat:      Pharynx: Oropharynx is clear.   Eyes:      Extraocular Movements: Extraocular movements intact.      Conjunctiva/sclera: Conjunctivae normal.      Pupils: Pupils are equal, round, and reactive to light.   Cardiovascular:      Rate and Rhythm: Normal rate and regular rhythm.      Pulses: Normal pulses.      Heart sounds: Normal heart sounds.   Pulmonary:      Effort: Pulmonary effort is normal.      Breath sounds: Normal breath sounds.  "  Abdominal:      General: Abdomen is flat. Bowel sounds are normal.      Palpations: Abdomen is soft.   Musculoskeletal:      Cervical back: Normal range of motion and neck supple.      Right lower leg: Edema present.      Left lower leg: Edema present.   Skin:     General: Skin is warm and dry.   Neurological:      General: No focal deficit present.      Mental Status: He is alert and oriented to person, place, and time.   Psychiatric:         Mood and Affect: Mood normal.          Last Recorded Vitals  Blood pressure (!) 191/66, pulse 53, temperature 36.7 °C (98.1 °F), temperature source Temporal, resp. rate 18, height 1.727 m (5' 7.99\"), weight 79.6 kg (175 lb 7.8 oz), SpO2 93%.    Relevant Results        Scheduled medications  [START ON 11/14/2024] allopurinol, 100 mg, oral, Daily  [START ON 11/14/2024] amLODIPine, 10 mg, oral, Daily  [START ON 11/14/2024] aspirin, 81 mg, oral, Daily  atorvastatin, 80 mg, oral, Nightly  calcium gluconate, 1 g, intravenous, Once  [START ON 11/14/2024] carvedilol, 6.25 mg, oral, BID  insulin regular, 10 Units, intravenous, Once   Followed by  dextrose, 25 g, intravenous, Once  docusate sodium, 100 mg, oral, BID  [START ON 11/24/2024] ergocalciferol, 50,000 Units, oral, q14 days  [START ON 11/14/2024] ezetimibe, 10 mg, oral, Daily  [START ON 11/14/2024] gabapentin, 300 mg, oral, Daily  heparin (porcine), 5,000 Units, subcutaneous, q8h LUKE  [START ON 11/14/2024] pantoprazole, 40 mg, oral, Daily  polyethylene glycol, 17 g, oral, Daily  [START ON 11/14/2024] sevelamer carbonate, 1,600 mg, oral, TID  [START ON 11/14/2024] SITagliptin phosphate, 25 mg, oral, Daily      Continuous medications  oxygen, , Last Rate: 2 L/min (11/13/24 2157)      PRN medications  PRN medications: acetaminophen **OR** acetaminophen **OR** acetaminophen, benzocaine-menthol, dextromethorphan-guaifenesin, guaiFENesin, nitroglycerin, ondansetron **OR** ondansetron  Results for orders placed or performed during " the hospital encounter of 11/13/24 (from the past 24 hours)   CBC and Auto Differential   Result Value Ref Range    WBC 8.5 4.4 - 11.3 x10*3/uL    nRBC 0.0 0.0 - 0.0 /100 WBCs    RBC 3.74 (L) 4.50 - 5.90 x10*6/uL    Hemoglobin 11.3 (L) 13.5 - 17.5 g/dL    Hematocrit 36.4 (L) 41.0 - 52.0 %    MCV 97 80 - 100 fL    MCH 30.2 26.0 - 34.0 pg    MCHC 31.0 (L) 32.0 - 36.0 g/dL    RDW 17.8 (H) 11.5 - 14.5 %    Platelets 179 150 - 450 x10*3/uL    Neutrophils % 88.1 40.0 - 80.0 %    Immature Granulocytes %, Automated 0.4 0.0 - 0.9 %    Lymphocytes % 6.4 13.0 - 44.0 %    Monocytes % 4.9 2.0 - 10.0 %    Eosinophils % 0.1 0.0 - 6.0 %    Basophils % 0.1 0.0 - 2.0 %    Neutrophils Absolute 7.52 (H) 1.60 - 5.50 x10*3/uL    Immature Granulocytes Absolute, Automated 0.03 0.00 - 0.50 x10*3/uL    Lymphocytes Absolute 0.55 (L) 0.80 - 3.00 x10*3/uL    Monocytes Absolute 0.42 0.05 - 0.80 x10*3/uL    Eosinophils Absolute 0.01 0.00 - 0.40 x10*3/uL    Basophils Absolute 0.01 0.00 - 0.10 x10*3/uL   Magnesium   Result Value Ref Range    Magnesium 1.72 1.60 - 2.40 mg/dL   Phosphorus   Result Value Ref Range    Phosphorus 8.1 (H) 2.5 - 4.9 mg/dL   Comprehensive metabolic panel   Result Value Ref Range    Glucose 159 (H) 74 - 99 mg/dL    Sodium 140 136 - 145 mmol/L    Potassium 7.3 (HH) 3.5 - 5.3 mmol/L    Chloride 106 98 - 107 mmol/L    Bicarbonate 21 21 - 32 mmol/L    Anion Gap 20 10 - 20 mmol/L    Urea Nitrogen 86 (H) 6 - 23 mg/dL    Creatinine 11.30 (H) 0.50 - 1.30 mg/dL    eGFR 4 (L) >60 mL/min/1.73m*2    Calcium 9.3 8.6 - 10.3 mg/dL    Albumin 4.2 3.4 - 5.0 g/dL    Alkaline Phosphatase 38 33 - 136 U/L    Total Protein 6.8 6.4 - 8.2 g/dL    AST 27 9 - 39 U/L    Bilirubin, Total 0.8 0.0 - 1.2 mg/dL    ALT 12 10 - 52 U/L   B-Type Natriuretic Peptide   Result Value Ref Range     (H) 0 - 99 pg/mL   Influenza A, and B PCR   Result Value Ref Range    Flu A Result Not Detected Not Detected    Flu B Result Not Detected Not Detected    Sars-CoV-2 PCR   Result Value Ref Range    Coronavirus 2019, PCR Not Detected Not Detected   Troponin I, High Sensitivity, Initial   Result Value Ref Range    Troponin I, High Sensitivity 54 (HH) 0 - 20 ng/L   Troponin, High Sensitivity, 1 Hour   Result Value Ref Range    Troponin I, High Sensitivity 54 (HH) 0 - 20 ng/L   Urinalysis with Reflex Culture and Microscopic   Result Value Ref Range    Color, Urine Light-Yellow Light-Yellow, Yellow, Dark-Yellow    Appearance, Urine Clear Clear    Specific Gravity, Urine 1.014 1.005 - 1.035    pH, Urine 6.5 5.0, 5.5, 6.0, 6.5, 7.0, 7.5, 8.0    Protein, Urine 200 (2+) (A) NEGATIVE, 10 (TRACE), 20 (TRACE) mg/dL    Glucose, Urine 150 (2+) (A) Normal mg/dL    Blood, Urine 0.5 (2+) (A) NEGATIVE    Ketones, Urine NEGATIVE NEGATIVE mg/dL    Bilirubin, Urine NEGATIVE NEGATIVE    Urobilinogen, Urine Normal Normal mg/dL    Nitrite, Urine NEGATIVE NEGATIVE    Leukocyte Esterase, Urine NEGATIVE NEGATIVE   Urinalysis Microscopic   Result Value Ref Range    WBC, Urine 1-5 1-5, NONE /HPF    RBC, Urine NONE NONE, 1-2, 3-5 /HPF    Mucus, Urine FEW Reference range not established. /LPF    Hyaline Casts, Urine 3+ (A) NONE /LPF     *Note: Due to a large number of results and/or encounters for the requested time period, some results have not been displayed. A complete set of results can be found in Results Review.     XR chest 2 views    Result Date: 11/13/2024  Interpreted By:  Ruben Cevallos, STUDY: XR CHEST 2 VIEWS;  11/13/2024 3:58 pm   INDICATION: Signs/Symptoms:sob  low o2.   10/29/2022   COMPARISON: None.   ACCESSION NUMBER(S): [id number]   ORDERING CLINICIAN: LUDMILA CLARK   FINDINGS: Status post removal of the right-sided chest tube. Perihilar infiltrates, worse since the prior exam. Blunting of both costophrenic angles likely due to new bibasilar pleural effusions.       1. Worsening perihilar and bibasilar infiltrates and effusions for which continued follow-up recommended.        MACRO: None   Signed by: Ruben Cevallos 11/13/2024 4:21 PM Dictation workstation:   [id number]          Assessment/Plan   Assessment & Plan  Chest pain, unspecified type    Shortness of breath    Volume overload    Hypertension    Hyperlipidemia    ESRD on dialysis (Multi)    Hyperkalemia    Diabetes mellitus (Multi)      Emergency dialysis today  Pending nephrology consult  Cardiology consult chest pain most likely from missing the dialysis  Dialysis for volume overload  Continue home medications  May need dialysis again tomorrow  Discussed compliance with the patient       I spent  minutes in the professional and overall care of this patient.      Marianna Galloway MD

## 2024-11-14 NOTE — NURSING NOTE
Dr. Woodson notified of consult and labs, stat dialysis ordered, supervisor notified.   Dialysis tech notified, patient to be moved to room 18 for dialysis.   Urine collected and sent to lab.  Hep B antigen and antibody added to labs at the request of the dialysis tech.

## 2024-11-14 NOTE — CONSULTS
Reason For Consult  End-stage renal disease on hemodialysis    History Of Present Illness  William A Franke is a 73 y.o. male with a past medical history of end-stage renal disease on hemodialysis Tuesday Thursday Saturday under the care of Dr. Gomez who presented to the hospital with congestion, cough, chills, diarrhea, dyspnea, overall not feeling well and therefore he missed dialysis, he presented to the hospital was found to be hyperkalemic with a potassium of 7.3 and with fluid overload on his chest x-ray.  Notably we were never called or notified by the emergency room.  Last night the nurse contacted me about the patient's potassium and I immediately ordered stat dialysis which he received overnight and his potassium has now normalized.  He also presented with hypertensive urgency which is improved, with today systolic blood pressure now in the 130s.  We are consulted for management of end-stage renal disease.     Past Medical History  He has a past medical history of Personal history of other endocrine, nutritional and metabolic disease and Personal history of other specified conditions (08/28/2020).    Surgical History  He has a past surgical history that includes Shoulder surgery (04/15/2013) and Knee arthroscopy w/ debridement (04/15/2013).     Social History  He reports that he has never smoked. He has never used smokeless tobacco. He reports that he does not drink alcohol and does not use drugs.    Family History  No family history on file.     Allergies  Patient has no known allergies.    Review of Systems  10 point review of systems was obtained and is negative other than what is indicated above in the HPI     Physical Exam  General: Awake, alert, no acute distress  Head/ears/nose/throat:  Normocephalic, atraumatic, moist mucous membranes  Neck:  No jugular venous distention, neck supple  Respiratory:  Clear to auscultation bilaterally, normal respiratory effort  Cardiovascular:  S1 and S2, no  "rubs  Gastrointestinal:  Soft, positive bowel sounds, no rebound or guarding  Extremities:  No edema, cyanosis. left upper extremity AV fistula with positive bruit  Musculoskeletal:  No injury or deformity noted  Neurologic:  Alert, responsive, cooperative with exam  Skin:  No ulcers noted, dry          I&O 24HR    Intake/Output Summary (Last 24 hours) at 11/14/2024 1238  Last data filed at 11/14/2024 0700  Gross per 24 hour   Intake 1250 ml   Output 2902 ml   Net -1652 ml       Vitals 24HR  Heart Rate:  [53-89]   Temp:  [35.9 °C (96.6 °F)-36.7 °C (98.1 °F)]   Resp:  [17-26]   BP: (135-215)/(39-87)   Height:  [172.7 cm (5' 7.99\")-172.7 cm (5' 8\")]   Weight:  [76.7 kg (169 lb 1.5 oz)-81.6 kg (180 lb)]   SpO2:  [88 %-97 %]       Relevant Results  Results for orders placed or performed during the hospital encounter of 11/13/24 (from the past 24 hours)   CBC and Auto Differential   Result Value Ref Range    WBC 8.5 4.4 - 11.3 x10*3/uL    nRBC 0.0 0.0 - 0.0 /100 WBCs    RBC 3.74 (L) 4.50 - 5.90 x10*6/uL    Hemoglobin 11.3 (L) 13.5 - 17.5 g/dL    Hematocrit 36.4 (L) 41.0 - 52.0 %    MCV 97 80 - 100 fL    MCH 30.2 26.0 - 34.0 pg    MCHC 31.0 (L) 32.0 - 36.0 g/dL    RDW 17.8 (H) 11.5 - 14.5 %    Platelets 179 150 - 450 x10*3/uL    Neutrophils % 88.1 40.0 - 80.0 %    Immature Granulocytes %, Automated 0.4 0.0 - 0.9 %    Lymphocytes % 6.4 13.0 - 44.0 %    Monocytes % 4.9 2.0 - 10.0 %    Eosinophils % 0.1 0.0 - 6.0 %    Basophils % 0.1 0.0 - 2.0 %    Neutrophils Absolute 7.52 (H) 1.60 - 5.50 x10*3/uL    Immature Granulocytes Absolute, Automated 0.03 0.00 - 0.50 x10*3/uL    Lymphocytes Absolute 0.55 (L) 0.80 - 3.00 x10*3/uL    Monocytes Absolute 0.42 0.05 - 0.80 x10*3/uL    Eosinophils Absolute 0.01 0.00 - 0.40 x10*3/uL    Basophils Absolute 0.01 0.00 - 0.10 x10*3/uL   Magnesium   Result Value Ref Range    Magnesium 1.72 1.60 - 2.40 mg/dL   Phosphorus   Result Value Ref Range    Phosphorus 8.1 (H) 2.5 - 4.9 mg/dL "   Comprehensive metabolic panel   Result Value Ref Range    Glucose 159 (H) 74 - 99 mg/dL    Sodium 140 136 - 145 mmol/L    Potassium 7.3 (HH) 3.5 - 5.3 mmol/L    Chloride 106 98 - 107 mmol/L    Bicarbonate 21 21 - 32 mmol/L    Anion Gap 20 10 - 20 mmol/L    Urea Nitrogen 86 (H) 6 - 23 mg/dL    Creatinine 11.30 (H) 0.50 - 1.30 mg/dL    eGFR 4 (L) >60 mL/min/1.73m*2    Calcium 9.3 8.6 - 10.3 mg/dL    Albumin 4.2 3.4 - 5.0 g/dL    Alkaline Phosphatase 38 33 - 136 U/L    Total Protein 6.8 6.4 - 8.2 g/dL    AST 27 9 - 39 U/L    Bilirubin, Total 0.8 0.0 - 1.2 mg/dL    ALT 12 10 - 52 U/L   B-Type Natriuretic Peptide   Result Value Ref Range     (H) 0 - 99 pg/mL   Influenza A, and B PCR   Result Value Ref Range    Flu A Result Not Detected Not Detected    Flu B Result Not Detected Not Detected   Sars-CoV-2 PCR   Result Value Ref Range    Coronavirus 2019, PCR Not Detected Not Detected   Troponin I, High Sensitivity, Initial   Result Value Ref Range    Troponin I, High Sensitivity 54 (HH) 0 - 20 ng/L   ECG 12 lead   Result Value Ref Range    Ventricular Rate 56 BPM    Atrial Rate 56 BPM    VA Interval 166 ms    QRS Duration 108 ms    QT Interval 450 ms    QTC Calculation(Bazett) 434 ms    P Axis 63 degrees    R Axis 66 degrees    T Axis 63 degrees    QRS Count 9 beats    Q Onset 216 ms    P Onset 133 ms    P Offset 168 ms    T Offset 441 ms    QTC Fredericia 440 ms   Troponin, High Sensitivity, 1 Hour   Result Value Ref Range    Troponin I, High Sensitivity 54 (HH) 0 - 20 ng/L   Urinalysis with Reflex Culture and Microscopic   Result Value Ref Range    Color, Urine Light-Yellow Light-Yellow, Yellow, Dark-Yellow    Appearance, Urine Clear Clear    Specific Gravity, Urine 1.014 1.005 - 1.035    pH, Urine 6.5 5.0, 5.5, 6.0, 6.5, 7.0, 7.5, 8.0    Protein, Urine 200 (2+) (A) NEGATIVE, 10 (TRACE), 20 (TRACE) mg/dL    Glucose, Urine 150 (2+) (A) Normal mg/dL    Blood, Urine 0.5 (2+) (A) NEGATIVE    Ketones, Urine NEGATIVE  NEGATIVE mg/dL    Bilirubin, Urine NEGATIVE NEGATIVE    Urobilinogen, Urine Normal Normal mg/dL    Nitrite, Urine NEGATIVE NEGATIVE    Leukocyte Esterase, Urine NEGATIVE NEGATIVE   Extra Urine Gray Tube   Result Value Ref Range    Extra Tube Hold for add-ons.    Urinalysis Microscopic   Result Value Ref Range    WBC, Urine 1-5 1-5, NONE /HPF    RBC, Urine NONE NONE, 1-2, 3-5 /HPF    Mucus, Urine FEW Reference range not established. /LPF    Hyaline Casts, Urine 3+ (A) NONE /LPF   Hepatitis B surface antibody   Result Value Ref Range    Hepatitis B Surface AB 5.7 <10.0 mIU/mL   Hepatitis B surface antigen   Result Value Ref Range    Hepatitis B Surface AG Nonreactive Nonreactive   CBC   Result Value Ref Range    WBC 7.3 4.4 - 11.3 x10*3/uL    nRBC 0.0 0.0 - 0.0 /100 WBCs    RBC 3.53 (L) 4.50 - 5.90 x10*6/uL    Hemoglobin 10.4 (L) 13.5 - 17.5 g/dL    Hematocrit 33.1 (L) 41.0 - 52.0 %    MCV 94 80 - 100 fL    MCH 29.5 26.0 - 34.0 pg    MCHC 31.4 (L) 32.0 - 36.0 g/dL    RDW 17.4 (H) 11.5 - 14.5 %    Platelets 174 150 - 450 x10*3/uL   Comprehensive metabolic panel   Result Value Ref Range    Glucose 97 74 - 99 mg/dL    Sodium 140 136 - 145 mmol/L    Potassium 3.9 3.5 - 5.3 mmol/L    Chloride 99 98 - 107 mmol/L    Bicarbonate 27 21 - 32 mmol/L    Anion Gap 18 10 - 20 mmol/L    Urea Nitrogen 36 (H) 6 - 23 mg/dL    Creatinine 6.15 (H) 0.50 - 1.30 mg/dL    eGFR 9 (L) >60 mL/min/1.73m*2    Calcium 9.3 8.6 - 10.3 mg/dL    Albumin 4.0 3.4 - 5.0 g/dL    Alkaline Phosphatase 36 33 - 136 U/L    Total Protein 6.3 (L) 6.4 - 8.2 g/dL    AST 28 9 - 39 U/L    Bilirubin, Total 1.0 0.0 - 1.2 mg/dL    ALT 12 10 - 52 U/L   POCT GLUCOSE   Result Value Ref Range    POCT Glucose 84 74 - 99 mg/dL     *Note: Due to a large number of results and/or encounters for the requested time period, some results have not been displayed. A complete set of results can be found in Results Review.          Assessment/Plan   End-stage renal disease on  hemodialysis Tuesday Thursday Saturday  Hyperkalemia in the setting of missed dialysis, resolved  Hypertension  Diabetes mellitus    Plan: Patient underwent urgent dialysis last night, potassium normalized.  Continue to hold the ARB for now.  Patient was counseled on a renal low potassium diet.  Will plan for dialysis again tomorrow and then get him back on his regular schedule.  Blood pressure improved, for now will watch his blood pressure and likely switch losartan out for Cardura given the recent severe hyperkalemia.  Continue sevelamer for hyperphosphatemia.  Thank you for your consultation.    Marco Woodson MD

## 2024-11-14 NOTE — CARE PLAN
The patient's goals for the shift include      The clinical goals for the shift include feel better    Over the shift, the patient did not make progress toward the following goals. Barriers to progression include high potassium, missed dialysis. Recommendations to address these barriers include patient receiving dialysis at this time.

## 2024-11-14 NOTE — PRE-PROCEDURE NOTE
.Report from Sending RN:    Report From: Carlton Shepard  Recent Surgery of Procedure: No  Baseline Level of Consciousness (LOC): A/O x's 4  Oxygen Use: Yes  Type: Nasal Cannula  Diabetic: Yes  Last BP Med Given Day of Dialysis: See Mar  Last Pain Med Given: See Mar  Lab Tests to be Obtained with Dialysis: Yes  Blood Transfusion to be Given During Dialysis: No  Available IV Access: Yes  Medications to be Administered During Dialysis: No  Continuous IV Infusion Running: No  Restraints on Currently or in the Last 24 Hours: No  Hand-Off Communication: patient admitted due to shortness of breath and missed dialysis  Dialysis Catheter Dressing: N/A patient has AV Graft  Last Dressing Change: N/A

## 2024-11-14 NOTE — POST-PROCEDURE NOTE
.Report to Receiving RN:    Report To: Carlton Shepard  Time Report Called: Given at bedside 00:54  Hand-Off Communication: patient tolerated treatment without issues. Pt stated he feels better. Pt removed 2.5 liters of fluid. Current vitals are 195/66 HR 66. Temp 36.6  Complications During Treatment: No  Ultrafiltration Treatment: No  Medications Administered During Dialysis: No  Blood Products Administered During Dialysis: No  Labs Sent During Dialysis: No  Heparin Drip Rate Changes: No  Dialysis Catheter Dressing: N/A  Last Dressing Change: N/A    Electronic Signatures:   (Signed Sherly Palma)    Last Updated: 12:56 AM by SHERLY PALMA

## 2024-11-15 ENCOUNTER — APPOINTMENT (OUTPATIENT)
Dept: DIALYSIS | Facility: HOSPITAL | Age: 74
End: 2024-11-15
Payer: MEDICARE

## 2024-11-15 VITALS
RESPIRATION RATE: 16 BRPM | TEMPERATURE: 97.3 F | HEART RATE: 65 BPM | BODY MASS INDEX: 24.96 KG/M2 | OXYGEN SATURATION: 95 % | DIASTOLIC BLOOD PRESSURE: 40 MMHG | WEIGHT: 164.68 LBS | SYSTOLIC BLOOD PRESSURE: 125 MMHG | HEIGHT: 68 IN

## 2024-11-15 LAB
ALBUMIN SERPL BCP-MCNC: 3.5 G/DL (ref 3.4–5)
ANION GAP SERPL CALCULATED.3IONS-SCNC: 18 MMOL/L (ref 10–20)
BUN SERPL-MCNC: 63 MG/DL (ref 6–23)
CALCIUM SERPL-MCNC: 8.6 MG/DL (ref 8.6–10.3)
CHLORIDE SERPL-SCNC: 100 MMOL/L (ref 98–107)
CO2 SERPL-SCNC: 26 MMOL/L (ref 21–32)
CREAT SERPL-MCNC: 9.18 MG/DL (ref 0.5–1.3)
EGFRCR SERPLBLD CKD-EPI 2021: 5 ML/MIN/1.73M*2
ERYTHROCYTE [DISTWIDTH] IN BLOOD BY AUTOMATED COUNT: 17.9 % (ref 11.5–14.5)
GLUCOSE BLD MANUAL STRIP-MCNC: 168 MG/DL (ref 74–99)
GLUCOSE BLD MANUAL STRIP-MCNC: 98 MG/DL (ref 74–99)
GLUCOSE SERPL-MCNC: 93 MG/DL (ref 74–99)
HCT VFR BLD AUTO: 31.3 % (ref 41–52)
HGB BLD-MCNC: 10.2 G/DL (ref 13.5–17.5)
MCH RBC QN AUTO: 30.1 PG (ref 26–34)
MCHC RBC AUTO-ENTMCNC: 32.6 G/DL (ref 32–36)
MCV RBC AUTO: 92 FL (ref 80–100)
NRBC BLD-RTO: 0 /100 WBCS (ref 0–0)
PHOSPHATE SERPL-MCNC: 6.6 MG/DL (ref 2.5–4.9)
PLATELET # BLD AUTO: 195 X10*3/UL (ref 150–450)
POTASSIUM SERPL-SCNC: 4 MMOL/L (ref 3.5–5.3)
RBC # BLD AUTO: 3.39 X10*6/UL (ref 4.5–5.9)
SODIUM SERPL-SCNC: 140 MMOL/L (ref 136–145)
WBC # BLD AUTO: 5.6 X10*3/UL (ref 4.4–11.3)

## 2024-11-15 PROCEDURE — 8010000001 HC DIALYSIS - HEMODIALYSIS PER DAY

## 2024-11-15 PROCEDURE — 36415 COLL VENOUS BLD VENIPUNCTURE: CPT | Performed by: INTERNAL MEDICINE

## 2024-11-15 PROCEDURE — 99232 SBSQ HOSP IP/OBS MODERATE 35: CPT | Performed by: NURSE PRACTITIONER

## 2024-11-15 PROCEDURE — 80069 RENAL FUNCTION PANEL: CPT | Performed by: INTERNAL MEDICINE

## 2024-11-15 PROCEDURE — 85027 COMPLETE CBC AUTOMATED: CPT | Performed by: INTERNAL MEDICINE

## 2024-11-15 PROCEDURE — 2500000001 HC RX 250 WO HCPCS SELF ADMINISTERED DRUGS (ALT 637 FOR MEDICARE OP): Performed by: NURSE PRACTITIONER

## 2024-11-15 PROCEDURE — 97535 SELF CARE MNGMENT TRAINING: CPT | Mod: GO,CO

## 2024-11-15 PROCEDURE — 2500000001 HC RX 250 WO HCPCS SELF ADMINISTERED DRUGS (ALT 637 FOR MEDICARE OP): Performed by: INTERNAL MEDICINE

## 2024-11-15 PROCEDURE — 82947 ASSAY GLUCOSE BLOOD QUANT: CPT

## 2024-11-15 PROCEDURE — 2500000002 HC RX 250 W HCPCS SELF ADMINISTERED DRUGS (ALT 637 FOR MEDICARE OP, ALT 636 FOR OP/ED): Performed by: INTERNAL MEDICINE

## 2024-11-15 PROCEDURE — 2500000004 HC RX 250 GENERAL PHARMACY W/ HCPCS (ALT 636 FOR OP/ED): Performed by: INTERNAL MEDICINE

## 2024-11-15 RX ORDER — DOXAZOSIN 4 MG/1
2 TABLET ORAL DAILY
Qty: 15 TABLET | Refills: 0 | Status: SHIPPED | OUTPATIENT
Start: 2024-11-16 | End: 2024-11-16 | Stop reason: SINTOL

## 2024-11-15 RX ORDER — GABAPENTIN 300 MG/1
300 CAPSULE ORAL DAILY
Qty: 30 CAPSULE | Refills: 0 | Status: SHIPPED | OUTPATIENT
Start: 2024-11-16 | End: 2024-11-16 | Stop reason: SINTOL

## 2024-11-15 RX ORDER — ISOSORBIDE MONONITRATE 120 MG/1
120 TABLET, EXTENDED RELEASE ORAL DAILY
Qty: 30 TABLET | Refills: 0 | Status: SHIPPED | OUTPATIENT
Start: 2024-11-16

## 2024-11-15 RX ORDER — HYDROMORPHONE HYDROCHLORIDE 0.2 MG/ML
0.2 INJECTION INTRAMUSCULAR; INTRAVENOUS; SUBCUTANEOUS
Status: DISCONTINUED | OUTPATIENT
Start: 2024-11-15 | End: 2024-11-15 | Stop reason: HOSPADM

## 2024-11-15 RX ORDER — HYDRALAZINE HYDROCHLORIDE 100 MG/1
100 TABLET, FILM COATED ORAL 3 TIMES DAILY
Qty: 90 TABLET | Refills: 0 | Status: SHIPPED | OUTPATIENT
Start: 2024-11-15

## 2024-11-15 ASSESSMENT — COGNITIVE AND FUNCTIONAL STATUS - GENERAL
DAILY ACTIVITIY SCORE: 18
WALKING IN HOSPITAL ROOM: A LITTLE
TOILETING: A LITTLE
DRESSING REGULAR LOWER BODY CLOTHING: A LITTLE
MOBILITY SCORE: 19
STANDING UP FROM CHAIR USING ARMS: A LITTLE
CLIMB 3 TO 5 STEPS WITH RAILING: A LOT
DRESSING REGULAR UPPER BODY CLOTHING: A LITTLE
HELP NEEDED FOR BATHING: A LITTLE
MOVING TO AND FROM BED TO CHAIR: A LITTLE
PERSONAL GROOMING: A LITTLE
EATING MEALS: A LITTLE

## 2024-11-15 ASSESSMENT — ACTIVITIES OF DAILY LIVING (ADL)
HOME_MANAGEMENT_TIME_ENTRY: 26
BATHING_LEVEL_OF_ASSISTANCE: CLOSE SUPERVISION

## 2024-11-15 ASSESSMENT — PAIN - FUNCTIONAL ASSESSMENT
PAIN_FUNCTIONAL_ASSESSMENT: NO/DENIES PAIN
PAIN_FUNCTIONAL_ASSESSMENT: 0-10
PAIN_FUNCTIONAL_ASSESSMENT: 0-10

## 2024-11-15 ASSESSMENT — PAIN SCALES - GENERAL
PAINLEVEL_OUTOF10: 0 - NO PAIN

## 2024-11-15 NOTE — POST-PROCEDURE NOTE
Report to Receiving RN:    Report To: chirag   Time Report Called: 3069  Hand-Off Communication: 2 liters removed post /49/65  Complications During Treatment: No  Ultrafiltration Treatment: No  Medications Administered During Dialysis: No  Blood Products Administered During Dialysis: No  Labs Sent During Dialysis: No  Heparin Drip Rate Changes: N/A      Electronic Signatures:  Becky page     Last Updated: 12:14 PM by NAVJOT PAGE

## 2024-11-15 NOTE — PROGRESS NOTES
Physical Therapy                 Therapy Communication Note    Patient Name: William A Franke  MRN: [id number]  Department: [facility] DIALYSIS  Room: 18/18-A  Today's Date: 11/15/2024     Discipline: Physical Therapy    Missed Visit Reason: Other (Comment)   (Patient is currently at dialysis. Will hold follow-up.)    Missed Time: Attempt    Comment:

## 2024-11-15 NOTE — NURSING NOTE
Assumed care of pt, pt is awake lying in bed, HR is 87 SR on tele, pt denies pain, on 2LNC for comfort, bedside shift report given by Nayeli CHACON, call light and belongings w/in reach.

## 2024-11-15 NOTE — PROGRESS NOTES
William A Franke is a 73 y.o. male on day 2 of admission presenting with Chest pain, unspecified type.      Subjective   Better, wants to go home       Objective     Last Recorded Vitals  BP (!) 125/40 (BP Location: Right arm, Patient Position: Lying)   Pulse 65   Temp 36.3 °C (97.3 °F) (Temporal)   Resp 16   Wt 74.7 kg (164 lb 10.9 oz)   SpO2 95%   Intake/Output last 3 Shifts:    Intake/Output Summary (Last 24 hours) at 11/15/2024 1802  Last data filed at 11/15/2024 1212  Gross per 24 hour   Intake 800 ml   Output 2402 ml   Net -1602 ml       Admission Weight  Weight: 79.6 kg (175 lb 7.8 oz) (11/13/24 1452)    Daily Weight  11/15/24 : 74.7 kg (164 lb 10.9 oz)    Image Results  Transthoracic Echo (TTE) Complete             [place] [id number]             Phone [phone]    TRANSTHORACIC ECHOCARDIOGRAM REPORT    Patient Name:       WILLIAM A FRANKE     Reading Physician:    [id number] Jared Rooney DO  Study Date:         11/14/2024           Ordering Provider:    [id number] LESLIE DENTON  MRN/PID:            [id number]             Fellow:  Accession#:         [id number]         Nurse:  Date of Birth/Age:  1950 / 73      Sonographer:          Rafael rg RDCS  Gender Assigned at  M                    Additional Staff:  Birth:  Height:             170.18 cm            Admit Date:  Weight:             77.11 kg             Admission Status:     Inpatient -                                                                 Routine  BSA / BMI:          1.89 m2 / 26.63      Department Location:  [facility]                      kg/m2  Blood Pressure: 215 /52 mmHg    Study Type:    TRANSTHORACIC ECHO (TTE) COMPLETE  Diagnosis/ICD: Other chest pain-R07.89  Indication:    Chest  Pain  CPT Codes:     Echo Complete w Full [id number]    Patient History:  Diabetes:          Yes  Pertinent History: HTN, Hyperlipidemia, CAD, Chest Pain, CHF, LE Edema, Dyspnea,                     Syncope and Murmur. ESRD, PCI 2022,HFrEF 45-50%, GERD.    Study Detail: The following Echo studies were performed: 2D, M-Mode, Doppler and                color flow. Technically challenging study due to poor acoustic                windows.       PHYSICIAN INTERPRETATION:  Left Ventricle: The left ventricular systolic function is normal, with a visually estimated ejection fraction of 55-60%. There is moderate concentric left ventricular hypertrophy. There are no regional wall motion abnormalities. The left ventricular cavity size is normal. There is mild increased septal and moderately increased posterior left ventricular wall thickness. Spectral Doppler shows a Grade II (pseudonormal pattern) of left ventricular diastolic filling with an elevated left atrial pressure.  Left Atrium: The left atrium is normal in size.  Right Ventricle: The right ventricle is normal in size. There is normal right ventricular global systolic function.  Right Atrium: The right atrium is normal in size.  Aortic Valve: The aortic valve is trileaflet. There is mild aortic valve cusp calcification. The aortic valve dimensionless index is 0.67. There is no evidence of aortic valve regurgitation. The peak instantaneous gradient of the aortic valve is 14 mmHg. The mean gradient of the aortic valve is 7 mmHg.  Mitral Valve: The mitral valve is normal in structure. There is mild mitral valve regurgitation.  Tricuspid Valve: The tricuspid valve is structurally normal. There is mild tricuspid regurgitation.  Pulmonic Valve: The pulmonic valve is structurally normal. There is trace pulmonic valve regurgitation.  Pericardium: No pericardial effusion noted.  Aorta: The aortic root is normal.       CONCLUSIONS:   1. The left ventricular systolic  function is normal, with a visually estimated ejection fraction of 55-60%.   2. Spectral Doppler shows a Grade II (pseudonormal pattern) of left ventricular diastolic filling with an elevated left atrial pressure.   3. There is normal right ventricular global systolic function.   4. Mild mitral valve regurgitation.   5. There is moderately increased posterior left ventricular wall thickness.    QUANTITATIVE DATA SUMMARY:     2D MEASUREMENTS:            Normal Ranges:  Ao Root s:       2.70 cm  IVSd:            1.16 cm    (0.6-1.1cm)  LVPWd:           1.33 cm    (0.6-1.1cm)  LVIDd:           5.04 cm    (3.9-5.9cm)  LVIDs:           3.62 cm  LV Mass Index:   138.7 g/m2  LV % FS          28.2 %       LA VOLUME:                   Normal Ranges:  LA Vol A4C:       71.5 ml  LA Vol A2C:       68.7 ml  LA Vol Index BSA: 37.2 ml/m2       RA VOLUME BY A/L METHOD:          Normal Ranges:  RA Area A4C:             11.3 cm2       LV SYSTOLIC FUNCTION BY 2D PLANIMETRY (MOD):                       Normal Ranges:  EF-A4C View:    52 % (>=55%)  EF-A2C View:    61 %  EF-Biplane:     58 %  EF-Visual:      58 %  EF-3DQ:         51 %  LV EF Reported: 58 %       LV DIASTOLIC FUNCTION:           Normal Ranges:  MV Peak E:             0.86 m/s  (0.7-1.2 m/s)  MV Peak A:             0.83 m/s  (0.42-0.7 m/s)  E/A Ratio:             1.03      (1.0-2.2)  MV e'                  0.063 m/s (>8.0)  MV lateral e'          0.07 m/s  MV medial e'           0.05 m/s  E/e' Ratio:            13.66     (<8.0)       MITRAL VALVE:          Normal Ranges:  MV DT:        291 msec (150-240msec)       AORTIC VALVE:                      Normal Ranges:  AoV Vmax:                1.87 m/s  (<=1.7m/s)  AoV Peak P.0 mmHg (<20mmHg)  AoV Mean P.3 mmHg  (1.7-11.5mmHg)  LVOT Max Joss:            1.20 m/s  (<=1.1m/s)  AoV VTI:                 40.32 cm  (18-25cm)  LVOT VTI:                27.10 cm  LVOT Diameter:           1.96 cm    (1.8-2.4cm)  AoV Area, VTI:           2.03 cm2  (2.5-5.5cm2)  AoV Area,Vmax:           1.94 cm2  (2.5-4.5cm2)  AoV Dimensionless Index: 0.67       RIGHT VENTRICLE:  RV Basal 3.10 cm  RV Mid   2.30 cm  RV Major 5.6 cm       TRICUSPID VALVE/RVSP:         Normal Ranges:  IVC Diam:             1.39 cm       PULMONIC VALVE:          Normal Ranges:  PV Max Joss:     1.6 m/s  (0.6-0.9m/s)  PV Max P.7 mmHg       [id number] Jared Rooney DO  Electronically signed on 2024 at 3:29:19 PM       ** Final **  ECG 12 lead  Sinus bradycardia with sinus arrhythmia  Otherwise normal ECG  When compared with ECG of 15-SEP-2022 03:39,  No significant change was found  Confirmed by Jared Rooney ([id number]) on 2024 2:04:59 PM      Physical Exam    Relevant Results               Assessment/Plan                  Assessment & Plan  Chest pain, unspecified type    Hypertension    Diabetes mellitus (Multi)    Hyperlipidemia    Shortness of breath    ESRD on dialysis (Multi)    Hyperkalemia    Volume overload    Family at bed side              Desirae Alicea MD

## 2024-11-15 NOTE — PROGRESS NOTES
William A Franke is a 73 y.o. male on day 2 of admission presenting with Chest pain, unspecified type.      Subjective   Patient tolerated dialysis well, no acute issues noted.       Objective          Vitals 24HR  Heart Rate:  [56-67]   Temp:  [36 °C (96.8 °F)-37.4 °C (99.3 °F)]   Resp:  [15-20]   BP: (125-169)/(40-55)   Weight:  [74.7 kg (164 lb 10.9 oz)]   SpO2:  [93 %-96 %]       Intake/Output last 3 Shifts:    Intake/Output Summary (Last 24 hours) at 11/15/2024 1837  Last data filed at 11/15/2024 1212  Gross per 24 hour   Intake 800 ml   Output 2402 ml   Net -1602 ml       Physical Exam  Constitutional:       General: He is awake. He is not in acute distress.  Cardiovascular:      Rate and Rhythm: Regular rhythm.      Heart sounds:      No friction rub.   Pulmonary:      Effort: Pulmonary effort is normal.      Comments: Mildly diminished breath sounds  Abdominal:      General: Bowel sounds are normal.      Palpations: Abdomen is soft.      Tenderness: There is no guarding or rebound.   Musculoskeletal:      Right lower leg: No edema.      Left lower leg: No edema.   Neurological:      Mental Status: He is alert.         Relevant Results  Results for orders placed or performed during the hospital encounter of 11/13/24 (from the past 24 hours)   Renal Function Panel   Result Value Ref Range    Glucose 93 74 - 99 mg/dL    Sodium 140 136 - 145 mmol/L    Potassium 4.0 3.5 - 5.3 mmol/L    Chloride 100 98 - 107 mmol/L    Bicarbonate 26 21 - 32 mmol/L    Anion Gap 18 10 - 20 mmol/L    Urea Nitrogen 63 (H) 6 - 23 mg/dL    Creatinine 9.18 (H) 0.50 - 1.30 mg/dL    eGFR 5 (L) >60 mL/min/1.73m*2    Calcium 8.6 8.6 - 10.3 mg/dL    Phosphorus 6.6 (H) 2.5 - 4.9 mg/dL    Albumin 3.5 3.4 - 5.0 g/dL   CBC   Result Value Ref Range    WBC 5.6 4.4 - 11.3 x10*3/uL    nRBC 0.0 0.0 - 0.0 /100 WBCs    RBC 3.39 (L) 4.50 - 5.90 x10*6/uL    Hemoglobin 10.2 (L) 13.5 - 17.5 g/dL    Hematocrit 31.3 (L) 41.0 - 52.0 %    MCV 92 80 - 100 fL     MCH 30.1 26.0 - 34.0 pg    MCHC 32.6 32.0 - 36.0 g/dL    RDW 17.9 (H) 11.5 - 14.5 %    Platelets 195 150 - 450 x10*3/uL   POCT GLUCOSE   Result Value Ref Range    POCT Glucose 98 74 - 99 mg/dL   POCT GLUCOSE   Result Value Ref Range    POCT Glucose 168 (H) 74 - 99 mg/dL     *Note: Due to a large number of results and/or encounters for the requested time period, some results have not been displayed. A complete set of results can be found in Results Review.            Assessment/Plan   End-stage renal disease on hemodialysis Tuesday Thursday Saturday  Hyperkalemia in the setting of missed dialysis, resolved  Hypertension  Diabetes mellitus     Plan: Tolerated dialysis today.  Continue to hold the ARB for now due to recent severe hyperkalemia and instead substituted Cardura, and since his blood pressure was still elevated I communicated to the primary team okay to increase to 2 mg once a day on discharge and he will need to follow-up with his outpatient nephrologist Dr. Gomez for further adjustments in antihypertensive medications.  Patient was counseled on a renal low potassium diet.  Ok for discharge today from my standpoint to resume his regular dialysis schedule.  Continue sevelamer for hyperphosphatemia.     Marco Woodson MD

## 2024-11-15 NOTE — CARE PLAN
Problem: Safety - Adult  Goal: Free from fall injury  Outcome: Progressing     Problem: Discharge Planning  Goal: Discharge to home or other facility with appropriate resources  Outcome: Progressing     Problem: Chronic Conditions and Co-morbidities  Goal: Patient's chronic conditions and co-morbidity symptoms are monitored and maintained or improved  Outcome: Progressing     Problem: Respiratory  Goal: No signs of respiratory distress (eg. Use of accessory muscles. Peds grunting)  Outcome: Progressing  Goal: Patent airway maintained this shift  Outcome: Progressing     Problem: Diabetes  Goal: Achieve decreasing blood glucose levels by end of shift  Outcome: Progressing  Goal: Increase stability of blood glucose readings by end of shift  Outcome: Progressing  Goal: Decrease in ketones present in urine by end of shift  Outcome: Progressing  Goal: Maintain electrolyte levels within acceptable range throughout shift  Outcome: Progressing  Goal: Maintain glucose levels >70mg/dl to <250mg/dl throughout shift  Outcome: Progressing  Goal: No changes in neurological exam by end of shift  Outcome: Progressing  Goal: Learn about and adhere to nutrition recommendations by end of shift  Outcome: Progressing  Goal: Vital signs within normal range for age by end of shift  Outcome: Progressing  Goal: Increase self care and/or family involovement by end of shift  Outcome: Progressing  Goal: Receive DSME education by end of shift  Outcome: Progressing   The patient's goals for the shift include      The clinical goals for the shift include vss    Over the shift, the patient did not make progress toward the following goals. Barriers to progression include . Recommendations to address these barriers include .

## 2024-11-15 NOTE — PROGRESS NOTES
11/15/24 1557   Discharge Planning   Expected Discharge Disposition Home     PT recommendation is for low intensity rehab with 24 hour supervision.  Spouse and patient  both decline HHC.  Spouse is home to take care of patient.  Spouse is aware that should they change their mind, HHC arrangements can be made through their PCP.

## 2024-11-15 NOTE — PROGRESS NOTES
Occupational Therapy    OT Treatment    Patient Name: William A Franke  MRN: [id number]  Department: [facility] 3 E  Room: 18/18  Today's Date: 11/15/2024  Time Calculation  Start Time: 1512  Stop Time: 1538  Time Calculation (min): 26 min        Assessment:  OT Assessment: Patient demonstrated gradual progress towards OT goals. Continue with OT POC to increase strength and functional tolerance to maximize independence during ADLS in the least restrictive environment.  Barriers to Discharge: None  Evaluation/Treatment Tolerance: Patient limited by fatigue  End of Session Communication: Bedside nurse  End of Session Patient Position: Bed, 2 rail up, Alarm off, not on at start of session (Brother and wife present, nurse notified of alarm off start of session, ok to remain off.)  OT Assessment Results: Decreased ADL status, Decreased upper extremity strength, Decreased functional mobility  Barriers to Discharge: None  Evaluation/Treatment Tolerance: Patient limited by fatigue  Plan:  Treatment Interventions: ADL retraining, Functional transfer training, UE strengthening/ROM, Endurance training, Patient/family training, Equipment evaluation/education  OT Frequency: 3 times per week  OT Discharge Recommendations: Low intensity level of continued care  OT - OK to Discharge: Yes  Treatment Interventions: ADL retraining, Functional transfer training, UE strengthening/ROM, Endurance training, Patient/family training, Equipment evaluation/education    Subjective   Previous Visit Info:  OT Last Visit  OT Received On: 11/15/24  General:  General  Reason for Referral: Impaired ADLs due to chest pain, shortness of breath and cough  Past Medical History Relevant to Rehab: ESRD on HD, HTN, DM, CAD, CHF,  hypercholesterolemia, BPH, CKD, prostate cancer, LIAM, GERD  Family/Caregiver Present: Yes (Brother and wife in room)  Prior to Session Communication: Bedside nurse  Patient Position Received: Bed, 3 rail up, Alarm off, not on at start of  session  General Comment: Patient cleared by nursing for OT session. Upon arrival, patient was supine and agreeable for OT services. Brother and wife present in room. Education on importance of OT, role of OT and OT POC. Increased time and effort for ADL completion due to fatigue.  Precautions:  Hearing/Visual Limitations: glasses  Medical Precautions: Fall precautions  Precautions Comment: +Tele    Vital Signs (Past 2hrs)        Date/Time Vitals Session Patient Position Pulse Resp SpO2 BP MAP (mmHg)    11/15/24 1549 --  --  --  --  --  125/40  66                         Pain:  Pain Assessment  Pain Assessment: 0-10  0-10 (Numeric) Pain Score: 0 - No pain  Clinical Progression: Not changed    Objective    Cognition:  Cognition  Overall Cognitive Status: Impaired  Safety/Judgement: Exceptions to WFL  Insight: Moderate  Impulsive: Mildly  Task Initiation: Initiates with cues  Processing Speed: Delayed  Coordination:  Movements are Fluid and Coordinated: No  Upper Body Coordination: WFL  Lower Body Coordination: Decreased rate and accuracy of BLE, notation of LOB during return from bathroom- CGA provided to bed.  Trunk Coordination: WFL  Activities of Daily Living: Grooming  Grooming Level of Assistance: Close supervision  Grooming Comments: Patient performed grooming task of combing hair standing at sinkside under close supervision. Patient required unilateral UE support during task.    UE Bathing  UE Bathing Level of Assistance: Minimum assistance  UE Bathing Comments: Patient performed UE bathing standing at sinkside with Eileen for line management for safety. Patient required unilateral UE support. After completing UE bathing, patient required a seated rest break of 2 minutes before returning to bed. Provided education on energy conservation techniques for self-pacing and importance of activity/rest.    LE Bathing  LE Bathing Level of Assistance: Close supervision  LE Bathing Comments: Patient performed LE bathing  seated on standard level toilet under close supervision. Patient performed perineal hygiene standing at sinkside under close supervision requiring unilateral UE support.    UE Dressing  UE Dressing Level of Assistance: Minimum assistance  UE Dressing Comments: Patient performed UE dressing of doffing gown and donning pullover garment seated on standard level toilet with Eileen for line management.    LE Dressing  LE Dressing: Yes  Pants Level of Assistance: Minimum assistance  Sock Level of Assistance: Close supervision  LE Dressing Where Assessed: Toilet  LE Dressing Comments: Patient performed LE dressing of don/doffing socks seated at standard level toilet needing Eileen, patient able to pull socks midway then required help to fully don sock and adjust  to plantar surface. Patient performed LE dressing of donning pants to knees seated on standard toilet then performed sit to stand to don pants over hips under close supervision.    Bed Mobility/Transfers: Bed Mobility  Bed Mobility: Yes  Bed Mobility 1  Bed Mobility 1: Supine to sitting  Level of Assistance 1: Minimum assistance  Bed Mobility Comments 1: Patient performed  bed mobility task of supine<>sit with moderately elevated HOB to and from standard level EOB with Eileen for line management and trunk elevation/descend. Patient able to manage BLE from and into bed.    Transfers  Transfer: Yes  Transfer 1  Transfer From 1: Bed to, Stand to  Transfer to 1: Stand, Bed  Technique 1: Sit to stand, Stand to sit  Transfer Level of Assistance 1: Minimum assistance  Trials/Comments 1: Patient performed sit <> stand to and from standard level EOB requiring Eileen for trunk elevation and descend. MinVC for hand placement on bed for safe transfer.  Transfers 2  Transfer From 2: Stand to, Toilet to  Transfer to 2: Toilet, Stand  Technique 2: Sit to stand, Stand to sit  Transfer Level of Assistance 2: Minimum assistance  Trials/Comments 2: Patient perfomed sit <> stand to and  from standard level toilet requiring Eileen for line management. MinVC for hand placement on grabrails and toilet for trunk elevation/descend.    Functional Mobility:  Functional Mobility  Functional Mobility Performed: Yes  Functional Mobility 1  Surface 1: Level tile  Device 1: No device  Assistance 1: Minimum assistance  Comments 1: Patient performed short household distance to and from bathroom with no AD with Eileen for line management. Notation of LOB only when coming from bathroom and able to self correct while maintaining Eileen. Patient may benefit from use of AD to decrease risk of falls. Provided education on energy conservation techniques for self-pacing and importance of activity/rest.    Outcome Measures:[facility] Daily Activity  Putting on and taking off regular lower body clothing: A little  Bathing (including washing, rinsing, drying): A little  Putting on and taking off regular upper body clothing: A little  Toileting, which includes using toilet, bedpan or urinal: A little  Taking care of personal grooming such as brushing teeth: A little  Eating Meals: A little  Daily Activity - Total Score: 18    Education Documentation  ADL Training, taught by CARLOS Gardiner at 11/15/2024  5:10 PM.  Learner: Patient  Readiness: Acceptance  Method: Explanation, Demonstration  Response: Needs Reinforcement    Education Comments  Provided education on importance of OT, role of OT and OT POC.    Provided education on energy conservation techniques for self-pacing and importance of activity/rest.      OP EDUCATION:       Goals:  Encounter Problems       Encounter Problems (Active)       ADLs       Pt will complete ADL tasks at mod I with use of AE prn  (Progressing)       Start:  11/14/24    Expected End:  12/19/24               Functional Mobility       Pt will perform functional transfers at mod I with use of LRAD.   (Progressing)       Start:  11/14/24    Expected End:  12/19/24               OT Transfers       Pt will  perform functional transfers at mod I. (Progressing)       Start:  11/14/24    Expected End:  12/19/24                   Treatment and documentation completed by TOMMY Gardiner under the direct Supervision of CHRISTINE Salvador/HUA

## 2024-11-15 NOTE — PROGRESS NOTES
"William A Franke is a 73 y.o. male on day 2 of admission presenting with Chest pain, unspecified type.    Subjective   Alert, oriented to self and place only.  Does not recall my assessment yesterday.  Does not remember that he had chest pain on admission.  No obvious distress.  Currently undergoing dialysis.       Objective     Physical Exam  Vitals and nursing note reviewed.   Constitutional:       General: He is not in acute distress.     Appearance: He is not ill-appearing or toxic-appearing.   HENT:      Head: Normocephalic and atraumatic.      Nose: Nose normal.      Mouth/Throat:      Mouth: Mucous membranes are moist.      Pharynx: Oropharynx is clear.   Cardiovascular:      Rate and Rhythm: Normal rate and regular rhythm.      Pulses: Normal pulses.      Heart sounds: Normal heart sounds. No murmur heard.     No friction rub. No gallop.   Pulmonary:      Effort: Pulmonary effort is normal.      Breath sounds: Normal breath sounds.   Abdominal:      General: Bowel sounds are normal.   Musculoskeletal:         General: Normal range of motion.      Cervical back: Normal range of motion.      Right lower leg: No edema.      Left lower leg: No edema.   Skin:     General: Skin is warm and dry.      Capillary Refill: Capillary refill takes less than 2 seconds.   Neurological:      Mental Status: He is alert. Mental status is at baseline. He is disoriented.         Last Recorded Vitals  Blood pressure 162/55, pulse 56, temperature 36.2 °C (97.2 °F), temperature source Tympanic, resp. rate 16, height 1.727 m (5' 7.99\"), weight 74.7 kg (164 lb 10.9 oz), SpO2 95%.  Intake/Output last 3 Shifts:  I/O last 3 completed shifts:  In: 1250 (16.7 mL/kg) [I.V.:800 (10.7 mL/kg); Other:400; IV Piggyback:50]  Out: 2902 (38.8 mL/kg) [Other:2902]  Weight: 74.7 kg     Relevant Results  Results for orders placed or performed during the hospital encounter of 11/13/24 (from the past 24 hours)   Transthoracic Echo (TTE) Complete "   Result Value Ref Range    AV pk mana 1.87 m/s    LVOT diam 1.96 cm    AV mn grad 7 mmHg    MV E/A ratio 1.03     LV Biplane EF 58 %    LV EF 58 %    LVIDd 5.04 cm    AV pk grad 14 mmHg    Aortic Valve Area by Continuity of VTI 2.03 cm2    Aortic Valve Area by Continuity of Peak Velocity 1.94 cm2    LV A4C EF 52.5    Renal Function Panel   Result Value Ref Range    Glucose 93 74 - 99 mg/dL    Sodium 140 136 - 145 mmol/L    Potassium 4.0 3.5 - 5.3 mmol/L    Chloride 100 98 - 107 mmol/L    Bicarbonate 26 21 - 32 mmol/L    Anion Gap 18 10 - 20 mmol/L    Urea Nitrogen 63 (H) 6 - 23 mg/dL    Creatinine 9.18 (H) 0.50 - 1.30 mg/dL    eGFR 5 (L) >60 mL/min/1.73m*2    Calcium 8.6 8.6 - 10.3 mg/dL    Phosphorus 6.6 (H) 2.5 - 4.9 mg/dL    Albumin 3.5 3.4 - 5.0 g/dL   CBC   Result Value Ref Range    WBC 5.6 4.4 - 11.3 x10*3/uL    nRBC 0.0 0.0 - 0.0 /100 WBCs    RBC 3.39 (L) 4.50 - 5.90 x10*6/uL    Hemoglobin 10.2 (L) 13.5 - 17.5 g/dL    Hematocrit 31.3 (L) 41.0 - 52.0 %    MCV 92 80 - 100 fL    MCH 30.1 26.0 - 34.0 pg    MCHC 32.6 32.0 - 36.0 g/dL    RDW 17.9 (H) 11.5 - 14.5 %    Platelets 195 150 - 450 x10*3/uL   POCT GLUCOSE   Result Value Ref Range    POCT Glucose 98 74 - 99 mg/dL     *Note: Due to a large number of results and/or encounters for the requested time period, some results have not been displayed. A complete set of results can be found in Results Review.         Assessment/Plan   Assessment & Plan  Chest pain, unspecified type    Hypertension    Diabetes mellitus (Multi)    Hyperlipidemia    Shortness of breath    ESRD on dialysis (Multi)    Hyperkalemia    Volume overload      Hypertensive emergency  Hypertensive chest pain  Acute on chronic systolic heart failure  End-stage renal disease with hemodialysis  Altered mentation  Hypertensive disorder  Ischemic cardiomyopathy  Anemia of chronic disease  Medical noncompliance     Overall impression:     11/14: Consulted for combination of exacerbated CHF and chest  discomfort.  Patient is a very poor historian and I certainly suspect underlying dementia.  He missed his most recent dialysis session and appears to have not been taking his medications at home.  He did undergo dialysis yesterday and received 1 dose of IV Lasix.  He is breathing comfortably nasal cannula oxygen with no significant JVD or peripheral edema.  His systolic blood pressure is significantly elevated with an average over 200.  His home medication list did not include his home hydralazine at 100 mg 3 times daily as well as isosorbide 120 g daily.  I have reinitiated these.  I believe his chest discomfort is certainly relative to a hypertensive emergency.  As the patient appears to be breathing comfortably nasal cannula oxygen at this time principal means of fluid management will likely be via dialysis.  Will focus on this.  Resume his home medications otherwise including amlodipine, aspirin, atorvastatin, carvedilol unable to increase carvedilol given a low resting heart rate.  Otherwise the patient does not appear to be under any distress at this time will follow with you.    11/15: Stable overnight.  Denies complaints chest pain or pressure palpitations or feeling rapid heart rate.  Currently undergoing dialysis.  His blood pressure is significantly improved with the reinitiation of his home medications.  On my assessment today the patient does not recall my conversations with him yesterday or my assessment yesterday.  He does not recall that he presented with chest pain yesterday.  He does not recall that he has missed dialysis recently and has not been taking his medications.  I believe the patient most likely has dementia which has not previously been diagnosed and this is causing the patient's noncompliance.  Defer workup of dementia and management to admitting team.  At this point the patient appears to be generally euvolemic.  He is breathing comfortably on room air.  His blood pressure is  significantly improved with most recent of 162/55.  No further IV diuretics.  Overall stable at this point in the cardiac perspective.  Will sign off.  Patient should follow-up with Dr. Ardon in the next 1 to 2 weeks for reassessment.     I spent 35 minutes in the professional and overall care of this patient.      Darrell Blackwell, APRN-CNP

## 2024-11-15 NOTE — PRE-PROCEDURE NOTE
..Report from Sending RN:    Report From: Susanna SUTTON RN  Recent Surgery of Procedure: No  Baseline Level of Consciousness (LOC): A&Ox4  Oxygen Use: Yes  Type: 2L nasal cannula  Diabetic: Yes  Last BP Med Given Day of Dialysis: see MAR  Last Pain Med Given: see MAR  Lab Tests to be Obtained with Dialysis: No  Blood Transfusion to be Given During Dialysis: No  Available IV Access: Yes  Medications to be Administered During Dialysis: No  Continuous IV Infusion Running: No  Restraints on Currently or in the Last 24 Hours: No  Hand-Off Communication: pt is stable to come for HD room for dialysis  Dialysis Catheter Dressing: N/A  Last Dressing Change: N/A

## 2024-11-15 NOTE — CARE PLAN
Problem: Safety - Adult  Goal: Free from fall injury  Outcome: Progressing     Problem: Discharge Planning  Goal: Discharge to home or other facility with appropriate resources  Outcome: Progressing     Problem: Chronic Conditions and Co-morbidities  Goal: Patient's chronic conditions and co-morbidity symptoms are monitored and maintained or improved  Outcome: Progressing     Problem: Respiratory  Goal: No signs of respiratory distress (eg. Use of accessory muscles. Peds grunting)  Outcome: Progressing  Goal: Patent airway maintained this shift  Outcome: Progressing     Problem: Diabetes  Goal: Achieve decreasing blood glucose levels by end of shift  Outcome: Progressing  Goal: Increase stability of blood glucose readings by end of shift  Outcome: Progressing  Goal: Decrease in ketones present in urine by end of shift  Outcome: Progressing  Goal: Maintain electrolyte levels within acceptable range throughout shift  Outcome: Progressing  Goal: Maintain glucose levels >70mg/dl to <250mg/dl throughout shift  Outcome: Progressing  Goal: No changes in neurological exam by end of shift  Outcome: Progressing  Goal: Learn about and adhere to nutrition recommendations by end of shift  Outcome: Progressing  Goal: Vital signs within normal range for age by end of shift  Outcome: Progressing  Goal: Increase self care and/or family involovement by end of shift  Outcome: Progressing  Goal: Receive DSME education by end of shift  Outcome: Progressing     Problem: Fall/Injury  Goal: Not fall by end of shift  Outcome: Progressing  Goal: Be free from injury by end of the shift  Outcome: Progressing  Goal: Verbalize understanding of personal risk factors for fall in the hospital  Outcome: Progressing  Goal: Verbalize understanding of risk factor reduction measures to prevent injury from fall in the home  Outcome: Progressing  Goal: Use assistive devices by end of the shift  Outcome: Progressing  Goal: Pace activities to prevent  fatigue by end of the shift  Outcome: Progressing     Problem: ADLs  Goal: Pt will complete ADL tasks at mod I with use of AE prn   Outcome: Progressing   The patient's goals for the shift include get stronger and go home     The clinical goals for the shift include remain HDS

## 2024-11-16 ENCOUNTER — APPOINTMENT (OUTPATIENT)
Dept: RADIOLOGY | Facility: HOSPITAL | Age: 74
End: 2024-11-16
Payer: MEDICARE

## 2024-11-16 ENCOUNTER — PHARMACY VISIT (OUTPATIENT)
Dept: PHARMACY | Facility: CLINIC | Age: 74
End: 2024-11-16
Payer: COMMERCIAL

## 2024-11-16 ENCOUNTER — APPOINTMENT (OUTPATIENT)
Dept: CARDIOLOGY | Facility: HOSPITAL | Age: 74
End: 2024-11-16
Payer: MEDICARE

## 2024-11-16 ENCOUNTER — HOSPITAL ENCOUNTER (OUTPATIENT)
Facility: HOSPITAL | Age: 74
Setting detail: OBSERVATION
End: 2024-11-16
Attending: STUDENT IN AN ORGANIZED HEALTH CARE EDUCATION/TRAINING PROGRAM | Admitting: INTERNAL MEDICINE
Payer: MEDICARE

## 2024-11-16 DIAGNOSIS — R55 SYNCOPE, UNSPECIFIED SYNCOPE TYPE: ICD-10-CM

## 2024-11-16 DIAGNOSIS — R41.0 DISORIENTATION: Primary | ICD-10-CM

## 2024-11-16 LAB
ALBUMIN SERPL BCP-MCNC: 4.5 G/DL (ref 3.4–5)
ALP SERPL-CCNC: 43 U/L (ref 33–136)
ALT SERPL W P-5'-P-CCNC: 16 U/L (ref 10–52)
ANION GAP SERPL CALCULATED.3IONS-SCNC: 18 MMOL/L (ref 10–20)
AST SERPL W P-5'-P-CCNC: 29 U/L (ref 9–39)
BASOPHILS # BLD AUTO: 0.03 X10*3/UL (ref 0–0.1)
BASOPHILS NFR BLD AUTO: 0.5 %
BILIRUB SERPL-MCNC: 1.1 MG/DL (ref 0–1.2)
BUN SERPL-MCNC: 21 MG/DL (ref 6–23)
CALCIUM SERPL-MCNC: 9.5 MG/DL (ref 8.6–10.3)
CHLORIDE SERPL-SCNC: 94 MMOL/L (ref 98–107)
CO2 SERPL-SCNC: 28 MMOL/L (ref 21–32)
CREAT SERPL-MCNC: 3.97 MG/DL (ref 0.5–1.3)
EGFRCR SERPLBLD CKD-EPI 2021: 15 ML/MIN/1.73M*2
EOSINOPHIL # BLD AUTO: 0.25 X10*3/UL (ref 0–0.4)
EOSINOPHIL NFR BLD AUTO: 3.8 %
ERYTHROCYTE [DISTWIDTH] IN BLOOD BY AUTOMATED COUNT: 17.2 % (ref 11.5–14.5)
GLUCOSE BLD MANUAL STRIP-MCNC: 118 MG/DL (ref 74–99)
GLUCOSE BLD MANUAL STRIP-MCNC: 128 MG/DL (ref 74–99)
GLUCOSE BLD MANUAL STRIP-MCNC: 128 MG/DL (ref 74–99)
GLUCOSE SERPL-MCNC: 116 MG/DL (ref 74–99)
HCT VFR BLD AUTO: 40.6 % (ref 41–52)
HGB BLD-MCNC: 12.9 G/DL (ref 13.5–17.5)
IMM GRANULOCYTES # BLD AUTO: 0.02 X10*3/UL (ref 0–0.5)
IMM GRANULOCYTES NFR BLD AUTO: 0.3 % (ref 0–0.9)
LACTATE SERPL-SCNC: 0.8 MMOL/L (ref 0.4–2)
LIPASE SERPL-CCNC: 61 U/L (ref 9–82)
LYMPHOCYTES # BLD AUTO: 0.67 X10*3/UL (ref 0.8–3)
LYMPHOCYTES NFR BLD AUTO: 10.3 %
MAGNESIUM SERPL-MCNC: 1.91 MG/DL (ref 1.6–2.4)
MCH RBC QN AUTO: 29.9 PG (ref 26–34)
MCHC RBC AUTO-ENTMCNC: 31.8 G/DL (ref 32–36)
MCV RBC AUTO: 94 FL (ref 80–100)
MONOCYTES # BLD AUTO: 0.7 X10*3/UL (ref 0.05–0.8)
MONOCYTES NFR BLD AUTO: 10.8 %
NEUTROPHILS # BLD AUTO: 4.83 X10*3/UL (ref 1.6–5.5)
NEUTROPHILS NFR BLD AUTO: 74.3 %
NRBC BLD-RTO: 0 /100 WBCS (ref 0–0)
PLATELET # BLD AUTO: 247 X10*3/UL (ref 150–450)
POTASSIUM SERPL-SCNC: 3.9 MMOL/L (ref 3.5–5.3)
PROT SERPL-MCNC: 7.6 G/DL (ref 6.4–8.2)
RBC # BLD AUTO: 4.31 X10*6/UL (ref 4.5–5.9)
SODIUM SERPL-SCNC: 136 MMOL/L (ref 136–145)
WBC # BLD AUTO: 6.5 X10*3/UL (ref 4.4–11.3)

## 2024-11-16 PROCEDURE — G0378 HOSPITAL OBSERVATION PER HR: HCPCS

## 2024-11-16 PROCEDURE — 82947 ASSAY GLUCOSE BLOOD QUANT: CPT | Mod: 59

## 2024-11-16 PROCEDURE — 85025 COMPLETE CBC W/AUTO DIFF WBC: CPT | Performed by: STUDENT IN AN ORGANIZED HEALTH CARE EDUCATION/TRAINING PROGRAM

## 2024-11-16 PROCEDURE — 83605 ASSAY OF LACTIC ACID: CPT | Performed by: STUDENT IN AN ORGANIZED HEALTH CARE EDUCATION/TRAINING PROGRAM

## 2024-11-16 PROCEDURE — 70450 CT HEAD/BRAIN W/O DYE: CPT

## 2024-11-16 PROCEDURE — 70450 CT HEAD/BRAIN W/O DYE: CPT | Performed by: RADIOLOGY

## 2024-11-16 PROCEDURE — 36415 COLL VENOUS BLD VENIPUNCTURE: CPT | Performed by: STUDENT IN AN ORGANIZED HEALTH CARE EDUCATION/TRAINING PROGRAM

## 2024-11-16 PROCEDURE — 71045 X-RAY EXAM CHEST 1 VIEW: CPT | Performed by: STUDENT IN AN ORGANIZED HEALTH CARE EDUCATION/TRAINING PROGRAM

## 2024-11-16 PROCEDURE — RXMED WILLOW AMBULATORY MEDICATION CHARGE

## 2024-11-16 PROCEDURE — 93005 ELECTROCARDIOGRAM TRACING: CPT

## 2024-11-16 PROCEDURE — 99285 EMERGENCY DEPT VISIT HI MDM: CPT | Mod: 25

## 2024-11-16 PROCEDURE — 2500000005 HC RX 250 GENERAL PHARMACY W/O HCPCS: Performed by: INTERNAL MEDICINE

## 2024-11-16 PROCEDURE — 71045 X-RAY EXAM CHEST 1 VIEW: CPT

## 2024-11-16 PROCEDURE — 82947 ASSAY GLUCOSE BLOOD QUANT: CPT

## 2024-11-16 PROCEDURE — 2500000002 HC RX 250 W HCPCS SELF ADMINISTERED DRUGS (ALT 637 FOR MEDICARE OP, ALT 636 FOR OP/ED): Performed by: INTERNAL MEDICINE

## 2024-11-16 PROCEDURE — 83735 ASSAY OF MAGNESIUM: CPT | Performed by: STUDENT IN AN ORGANIZED HEALTH CARE EDUCATION/TRAINING PROGRAM

## 2024-11-16 PROCEDURE — 83690 ASSAY OF LIPASE: CPT | Performed by: STUDENT IN AN ORGANIZED HEALTH CARE EDUCATION/TRAINING PROGRAM

## 2024-11-16 PROCEDURE — 93010 ELECTROCARDIOGRAM REPORT: CPT | Performed by: INTERNAL MEDICINE

## 2024-11-16 PROCEDURE — 2500000001 HC RX 250 WO HCPCS SELF ADMINISTERED DRUGS (ALT 637 FOR MEDICARE OP): Performed by: INTERNAL MEDICINE

## 2024-11-16 PROCEDURE — 80053 COMPREHEN METABOLIC PANEL: CPT | Performed by: STUDENT IN AN ORGANIZED HEALTH CARE EDUCATION/TRAINING PROGRAM

## 2024-11-16 RX ORDER — ISOSORBIDE MONONITRATE 120 MG/1
120 TABLET, EXTENDED RELEASE ORAL DAILY
Status: DISCONTINUED | OUTPATIENT
Start: 2024-11-16 | End: 2024-11-18 | Stop reason: HOSPADM

## 2024-11-16 RX ORDER — AMLODIPINE BESYLATE 10 MG/1
10 TABLET ORAL DAILY
Status: DISCONTINUED | OUTPATIENT
Start: 2024-11-16 | End: 2024-11-17

## 2024-11-16 RX ORDER — ALLOPURINOL 100 MG/1
100 TABLET ORAL DAILY
Status: DISCONTINUED | OUTPATIENT
Start: 2024-11-16 | End: 2024-11-18 | Stop reason: HOSPADM

## 2024-11-16 RX ORDER — NITROGLYCERIN 0.4 MG/1
0.4 TABLET SUBLINGUAL EVERY 5 MIN PRN
Status: DISCONTINUED | OUTPATIENT
Start: 2024-11-16 | End: 2024-11-18 | Stop reason: HOSPADM

## 2024-11-16 RX ORDER — ASPIRIN 81 MG/1
81 TABLET ORAL DAILY
Status: DISCONTINUED | OUTPATIENT
Start: 2024-11-16 | End: 2024-11-18 | Stop reason: HOSPADM

## 2024-11-16 RX ORDER — ATORVASTATIN CALCIUM 80 MG/1
80 TABLET, FILM COATED ORAL NIGHTLY
Status: DISCONTINUED | OUTPATIENT
Start: 2024-11-16 | End: 2024-11-18 | Stop reason: HOSPADM

## 2024-11-16 RX ORDER — INSULIN LISPRO 100 [IU]/ML
10 INJECTION, SOLUTION INTRAVENOUS; SUBCUTANEOUS
Status: DISCONTINUED | OUTPATIENT
Start: 2024-11-16 | End: 2024-11-18 | Stop reason: HOSPADM

## 2024-11-16 RX ORDER — SEVELAMER CARBONATE 800 MG/1
1600 TABLET, FILM COATED ORAL
Status: DISCONTINUED | OUTPATIENT
Start: 2024-11-16 | End: 2024-11-18 | Stop reason: HOSPADM

## 2024-11-16 RX ORDER — HYDRALAZINE HYDROCHLORIDE 50 MG/1
100 TABLET, FILM COATED ORAL 3 TIMES DAILY
Status: DISCONTINUED | OUTPATIENT
Start: 2024-11-16 | End: 2024-11-18 | Stop reason: HOSPADM

## 2024-11-16 RX ORDER — EZETIMIBE 10 MG/1
10 TABLET ORAL DAILY
Status: DISCONTINUED | OUTPATIENT
Start: 2024-11-16 | End: 2024-11-18 | Stop reason: HOSPADM

## 2024-11-16 RX ORDER — ERGOCALCIFEROL 1.25 MG/1
50000 CAPSULE ORAL
Status: DISCONTINUED | OUTPATIENT
Start: 2024-11-17 | End: 2024-11-18 | Stop reason: HOSPADM

## 2024-11-16 RX ORDER — ESCITALOPRAM OXALATE 10 MG/1
5 TABLET ORAL NIGHTLY
Status: DISCONTINUED | OUTPATIENT
Start: 2024-11-16 | End: 2024-11-18 | Stop reason: HOSPADM

## 2024-11-16 RX ORDER — CARVEDILOL 6.25 MG/1
6.25 TABLET ORAL
Status: DISCONTINUED | OUTPATIENT
Start: 2024-11-16 | End: 2024-11-18 | Stop reason: HOSPADM

## 2024-11-16 RX ORDER — PANTOPRAZOLE SODIUM 40 MG/1
40 TABLET, DELAYED RELEASE ORAL DAILY
Status: DISCONTINUED | OUTPATIENT
Start: 2024-11-16 | End: 2024-11-18 | Stop reason: HOSPADM

## 2024-11-16 RX ADMIN — AMLODIPINE BESYLATE 10 MG: 10 TABLET ORAL at 14:10

## 2024-11-16 RX ADMIN — SEVELAMER CARBONATE 1600 MG: 800 TABLET, FILM COATED ORAL at 17:38

## 2024-11-16 RX ADMIN — ISOSORBIDE MONONITRATE 120 MG: 120 TABLET, EXTENDED RELEASE ORAL at 14:10

## 2024-11-16 RX ADMIN — ALLOPURINOL 100 MG: 100 TABLET ORAL at 14:10

## 2024-11-16 RX ADMIN — HYDRALAZINE HYDROCHLORIDE 100 MG: 50 TABLET ORAL at 14:10

## 2024-11-16 RX ADMIN — SITAGLIPTIN 25 MG: 25 TABLET, FILM COATED ORAL at 14:12

## 2024-11-16 RX ADMIN — HYDRALAZINE HYDROCHLORIDE 100 MG: 50 TABLET ORAL at 21:18

## 2024-11-16 RX ADMIN — PANTOPRAZOLE SODIUM 40 MG: 40 TABLET, DELAYED RELEASE ORAL at 14:10

## 2024-11-16 RX ADMIN — CARVEDILOL 6.25 MG: 6.25 TABLET, FILM COATED ORAL at 17:38

## 2024-11-16 RX ADMIN — Medication 21 PERCENT: at 14:23

## 2024-11-16 RX ADMIN — ATORVASTATIN CALCIUM 80 MG: 80 TABLET, FILM COATED ORAL at 21:18

## 2024-11-16 RX ADMIN — RENO CAPS 1 CAPSULE: 100; 1.5; 1.7; 20; 10; 1; 150; 5; 6 CAPSULE ORAL at 14:11

## 2024-11-16 RX ADMIN — ASPIRIN 81 MG: 81 TABLET, COATED ORAL at 14:10

## 2024-11-16 RX ADMIN — EZETIMIBE 10 MG: 10 TABLET ORAL at 14:12

## 2024-11-16 SDOH — SOCIAL STABILITY: SOCIAL INSECURITY: ARE YOU OR HAVE YOU BEEN THREATENED OR ABUSED PHYSICALLY, EMOTIONALLY, OR SEXUALLY BY ANYONE?: NO

## 2024-11-16 SDOH — SOCIAL STABILITY: SOCIAL INSECURITY: ARE THERE ANY APPARENT SIGNS OF INJURIES/BEHAVIORS THAT COULD BE RELATED TO ABUSE/NEGLECT?: NO

## 2024-11-16 SDOH — SOCIAL STABILITY: SOCIAL INSECURITY: HAVE YOU HAD ANY THOUGHTS OF HARMING ANYONE ELSE?: NO

## 2024-11-16 SDOH — SOCIAL STABILITY: SOCIAL INSECURITY: DO YOU FEEL UNSAFE GOING BACK TO THE PLACE WHERE YOU ARE LIVING?: NO

## 2024-11-16 SDOH — SOCIAL STABILITY: SOCIAL INSECURITY: HAVE YOU HAD THOUGHTS OF HARMING ANYONE ELSE?: NO

## 2024-11-16 SDOH — SOCIAL STABILITY: SOCIAL INSECURITY: HAS ANYONE EVER THREATENED TO HURT YOUR FAMILY OR YOUR PETS?: NO

## 2024-11-16 SDOH — SOCIAL STABILITY: SOCIAL INSECURITY: DOES ANYONE TRY TO KEEP YOU FROM HAVING/CONTACTING OTHER FRIENDS OR DOING THINGS OUTSIDE YOUR HOME?: NO

## 2024-11-16 SDOH — SOCIAL STABILITY: SOCIAL INSECURITY: WERE YOU ABLE TO COMPLETE ALL THE BEHAVIORAL HEALTH SCREENINGS?: YES

## 2024-11-16 SDOH — SOCIAL STABILITY: SOCIAL INSECURITY: ABUSE: ADULT

## 2024-11-16 SDOH — SOCIAL STABILITY: SOCIAL INSECURITY: DO YOU FEEL ANYONE HAS EXPLOITED OR TAKEN ADVANTAGE OF YOU FINANCIALLY OR OF YOUR PERSONAL PROPERTY?: NO

## 2024-11-16 ASSESSMENT — COGNITIVE AND FUNCTIONAL STATUS - GENERAL
CLIMB 3 TO 5 STEPS WITH RAILING: A LOT
PATIENT BASELINE BEDBOUND: NO
DAILY ACTIVITIY SCORE: 24
MOBILITY SCORE: 19
MOVING TO AND FROM BED TO CHAIR: A LITTLE
STANDING UP FROM CHAIR USING ARMS: A LITTLE
WALKING IN HOSPITAL ROOM: A LITTLE

## 2024-11-16 ASSESSMENT — PAIN - FUNCTIONAL ASSESSMENT: PAIN_FUNCTIONAL_ASSESSMENT: 0-10

## 2024-11-16 ASSESSMENT — PATIENT HEALTH QUESTIONNAIRE - PHQ9
2. FEELING DOWN, DEPRESSED OR HOPELESS: NOT AT ALL
SUM OF ALL RESPONSES TO PHQ9 QUESTIONS 1 & 2: 0
1. LITTLE INTEREST OR PLEASURE IN DOING THINGS: NOT AT ALL

## 2024-11-16 ASSESSMENT — ENCOUNTER SYMPTOMS
CARDIOVASCULAR NEGATIVE: 1
EYES NEGATIVE: 1
FATIGUE: 1
PSYCHIATRIC NEGATIVE: 1
ENDOCRINE NEGATIVE: 1
RESPIRATORY NEGATIVE: 1
ACTIVITY CHANGE: 1
APPETITE CHANGE: 1
WEAKNESS: 1
ALLERGIC/IMMUNOLOGIC NEGATIVE: 1
GASTROINTESTINAL NEGATIVE: 1
HEMATOLOGIC/LYMPHATIC NEGATIVE: 1

## 2024-11-16 ASSESSMENT — ACTIVITIES OF DAILY LIVING (ADL)
JUDGMENT_ADEQUATE_SAFELY_COMPLETE_DAILY_ACTIVITIES: YES
BATHING: INDEPENDENT
FEEDING YOURSELF: INDEPENDENT
GROOMING: INDEPENDENT
HEARING - LEFT EAR: FUNCTIONAL
TOILETING: NEEDS ASSISTANCE
WALKS IN HOME: NEEDS ASSISTANCE
HEARING - RIGHT EAR: FUNCTIONAL
PATIENT'S MEMORY ADEQUATE TO SAFELY COMPLETE DAILY ACTIVITIES?: YES
ADEQUATE_TO_COMPLETE_ADL: YES
DRESSING YOURSELF: INDEPENDENT

## 2024-11-16 ASSESSMENT — LIFESTYLE VARIABLES
AUDIT-C TOTAL SCORE: 0
HOW MANY STANDARD DRINKS CONTAINING ALCOHOL DO YOU HAVE ON A TYPICAL DAY: PATIENT DOES NOT DRINK
HOW OFTEN DO YOU HAVE A DRINK CONTAINING ALCOHOL: NEVER
EVER FELT BAD OR GUILTY ABOUT YOUR DRINKING: NO
SKIP TO QUESTIONS 9-10: 1
EVER HAD A DRINK FIRST THING IN THE MORNING TO STEADY YOUR NERVES TO GET RID OF A HANGOVER: NO
HAVE YOU EVER FELT YOU SHOULD CUT DOWN ON YOUR DRINKING: NO
HAVE PEOPLE ANNOYED YOU BY CRITICIZING YOUR DRINKING: NO
AUDIT-C TOTAL SCORE: 0
HOW OFTEN DO YOU HAVE 6 OR MORE DRINKS ON ONE OCCASION: NEVER
TOTAL SCORE: 0

## 2024-11-16 ASSESSMENT — PAIN SCALES - GENERAL
PAINLEVEL_OUTOF10: 0 - NO PAIN
PAINLEVEL_OUTOF10: 0 - NO PAIN

## 2024-11-16 NOTE — PROGRESS NOTES
Subjective   Patient ID: William A Franke is a 73 y.o. male who presents for Follow-up and Med Refill.    Med Refill    Patient is here for follow-up on diabetes hypertension high cholesterol end-stage renal disease on hemodialysis  Overall patient is doing well  He needs refills on fenofibric gabapentin anxiety  His neuropathy is stable  Staying active and going to gym.    Past recap  Patient is here for follow-up on diabetes hypertension high cholesterol  He is not on any aspirin or any blood thinner because he bleeds during dialysis  He is worried about his stents closing up  Overall he is doing well  He manages his diabetes by taking extra insulin whenever he eats at home.  He does not watch his diet  He needs diabetic shoes because he has neuropathy and having some calluses buildup    Past recap   patient is here for follow-up  Follow-up on diabetes hypertension high cholesterol  Doing hemodialysis for end-stage renal disease     patient is here for follow-up  His fistula still has bleeding off-and-on but not as much  Follow-up on diabetes hypertension high cholesterol  He is getting dialysis 3 times a week  Overall doing fine      patient is here for hospital follow-up  He was admitted for bleeding AV fistula.  Required blood transfusion.  Needs refill on gabapentin and Zetia  Complains of having stiffness in the hands  Doing hemodialysis 3 times a week    patient was hospitalized and had another heart attack at [facility]  He is still complaining of having a lot of shortness of breath and cough  His kidney functions did deteriorate but because he is making urine did not get started on the dialysis yet  Is doing blood work today to reevaluate his kidney function  But his main concern is his cough     Patient went to the hospital and had MI  He was catheterized again and had stent placed  Since he came home he is having very poor appetite not eating drinking not feeling good and blood pressure is  "running high in 180s and 200s  He had stent placed 3 weeks ago     Patient here for follow-up on diabetes hypertension high cholesterol chronic kidney disease and anemia  Follow-up on blood work  Patient was hospitalized for another non-ST elevation MI  Since discharge his chest pain is doing better but he still getting off-and-on chest pain  Feels very anxious  Feels very limited in his activity        Patient is here with complaints of rectal bleed this morning he started bleeding did not realize his bleeding until it messed up his underwear and pants. He had similar episode 5 days ago  He is having some discomfort in the lower abdomen denies any fever or chills  He had problems with hemorrhoids many years ago. But he has no pain and denies constipation     Patient is here for hospital follow-up  He presented with chest pain and acute MI. He had to have cardiac cath done with close monitoring of kidney function  Cardiac stable  Patient had stent placed and did not require dialysis  He is here for follow-up on blood work for kidney  His blood sugars were doing better in the hospital. Now they are running high again  He is getting Procrit every 2 weeks            Virtual visit  Patient here for follow-up on diabetes   Patient is eating little better but not able to exercise because  Blood sugars are running high  The pressure is running little high but she is coming to see Dr. Ardon on Thursday  He is managing with his insulin blood sugar little better  Finished radiation treatment for prostate cancer  Works outdoors  Did blood work needs medications refilled   refuses to see the dietitian  Does not take Humalog insulin because of fear has appointment with the urologist        Review of Systems    Objective   BP (!) 182/78   Ht 1.727 m (5' 8\")   Wt 83 kg (183 lb)   BMI 27.83 kg/m²     Physical Exam  Vitals reviewed.   Constitutional:       Appearance: Normal appearance.   HENT:      Head: Normocephalic and " atraumatic.      Right Ear: Tympanic membrane, ear canal and external ear normal.      Left Ear: Tympanic membrane, ear canal and external ear normal.      Nose: Nose normal.      Mouth/Throat:      Pharynx: Oropharynx is clear.   Eyes:      Extraocular Movements: Extraocular movements intact.      Conjunctiva/sclera: Conjunctivae normal.      Pupils: Pupils are equal, round, and reactive to light.   Cardiovascular:      Rate and Rhythm: Normal rate and regular rhythm.      Pulses: Normal pulses.      Heart sounds: Normal heart sounds.   Pulmonary:      Effort: Pulmonary effort is normal.      Breath sounds: Normal breath sounds.   Abdominal:      General: Abdomen is flat. Bowel sounds are normal.      Palpations: Abdomen is soft.   Musculoskeletal:      Cervical back: Normal range of motion and neck supple.   Skin:     General: Skin is warm and dry.      Comments: Callus on the bottom of the feet   Neurological:      General: No focal deficit present.      Mental Status: He is alert and oriented to person, place, and time.   Psychiatric:         Mood and Affect: Mood normal.         Assessment/Plan   Problem List Items Addressed This Visit          Cardiac and Vasculature    Hypertension    Relevant Orders    CBC    Comprehensive Metabolic Panel    Hemoglobin A1C    Lipid Panel    Uric Acid    CBC    Comprehensive Metabolic Panel    Hemoglobin A1C    Lipid Panel    Uric Acid    Hyperlipidemia    Relevant Medications    ezetimibe (Zetia) 10 mg tablet    atorvastatin (Lipitor) 80 mg tablet    Other Relevant Orders    CBC    Comprehensive Metabolic Panel    Hemoglobin A1C    Lipid Panel    Uric Acid    CBC    Comprehensive Metabolic Panel    Hemoglobin A1C    Lipid Panel    Uric Acid       Endocrine/Metabolic    Diabetes mellitus (Multi)    Relevant Orders    CBC    Comprehensive Metabolic Panel    Hemoglobin A1C    Lipid Panel    Uric Acid    CBC    Comprehensive Metabolic Panel    Hemoglobin A1C    Lipid Panel     Uric Acid       Neuro    Peripheral neuropathy    Relevant Orders    CBC    Comprehensive Metabolic Panel    Hemoglobin A1C    Lipid Panel    Uric Acid     Other Visit Diagnoses       Chronic gout without tophus, unspecified cause, unspecified site        Relevant Medications    allopurinol (Zyloprim) 100 mg tablet    Other Relevant Orders    CBC    Comprehensive Metabolic Panel    Hemoglobin A1C    Lipid Panel    Uric Acid        10/14  Call Dr. Palmer  He is trying to arrange outpatient follow-up for cardiac catheterization and carotid stent which she needs  Because of his anemia and severe kidney disease he is planning in stages  He asked me to order CBC BMP and he will call tomorrow to schedule for Next week discussed with the patient and the wife agreed with the plan  Advised him to go to the emergency room if symptoms persist  Continue diet exercise follow-up in 3 months     11/2  Concerned that patient might be getting anemic or his kidney function would be getting worse  Stat CBC BMP ordered  Increase Coreg to 2 tablets twice a day  Blood work results reviewed  Kidney functions are in fact better anemia stable  Patient could be not feeling good because of elevated blood pressure  Recheck in 2 weeks     9/9  Will get x-ray chest  Tessalon cough drops albuterol inhaler  Cholesterol medication refill  Blood pressure is stable  Will see if kidney functions looks okay to hold off dialysis  Patient wife is planning to be able to go to [place] before start of dialysis     5/1/2023  Will order blood work to make sure anemia is stable  Patient is under care of endocrinologist for diabetes  He is under care of nephrologist for kidneys  He follows up with cardiology regularly and stable  Refills given on iron and gabapentin and Zetia     5/19/2023  Clinically patient is stable blood pressure is stable  Routine blood work ordered for 3 months  His diabetes is doing better  Medications refilled    8/18/2023  Blood  work reviewed  A1c still high  Blood pressure stable  Cholesterol okay  Discussed different food options  Patient eats a lot of processed food and to eat out a lot  Discussed better options  Continue current medications  Follow-up blood work in 3 months    11/10/2023  Blood work reviewed  Triglycerides have gone up to 388  A1c still 7.7 patient is under care of endocrinologist  Start fenofibrate  Discussed high triglycerides this will put him at risk for blockage in coronary arteries at this time  Again discussed diet and exercise  Follow-up blood work in 3 months    2/12/2024  Blood work reviewed  A1c down to 6.9 anemia stable  Cholesterol good but triglycerides always higher  Follow-up blood work in 3 months continue current medications  Patient is doing dialysis    5/31/2024  Blood pressure stable  Cardiac status is stable.  Patient denies any angina  A1c 7.1 well-controlled  Hemoglobin 9.1 stable gets epo shots  Triglycerides elevated  Discussed diet and exercise  Stable on current medications  Diabetic shoes ordered for diabetic neuropathy and callus issues  Follow-up blood work in 3 months    8/19/2024  Patient's blood work reviewed  A1c 6.5 well-controlled  Cholesterol well-controlled triglycerides slightly elevated  Chronic anemia stable  End-stage renal disease on hemodialysis  Medications refilled  Neuropathy stable with gabapentin  Follow-up blood work in 3 months    11/11/2024  Blood work reviewed  A1c down to 6.3  Triglycerides 172 cholesterol well-controlled  CAD stable  Blood pressure repeat reading stable  Dialysis scheduled for tomorrow which helps with his blood pressure  Medications refilled  Follow-up in 3 months

## 2024-11-16 NOTE — PROGRESS NOTES
William A Franke is a 73 y.o. male on day 0 of admission presenting with Syncope, unspecified syncope type.       11/16/24 1235   ACS Disability Status   Are you deaf or do you have serious difficulty hearing? N   Are you blind or do you have serious difficulty seeing, even when wearing glasses? N   Because of a physical, mental, or emotional condition, do you have serious difficulty concentrating, remembering, or making decisions? (5 years old or older) N   Do you have serious difficulty walking or climbing stairs? N   Do you have serious difficulty dressing or bathing? N         Yuko Orta RN

## 2024-11-16 NOTE — ED TRIAGE NOTES
Pt sent from dialysis he was called in as a full arrest but upon arrival pt was awake but altered he is usually A&OX4 but today is A&OX1

## 2024-11-16 NOTE — CONSULTS
Reason For Consult  End-stage renal disease on hemodialysis    History Of Present Illness  William A Franke is a 73 y.o. male   PMH; end-stage renal disease on hemodialysis Tuesday Thursday Saturday under the care of Dr. Gomez, hypertension, diabetes, coronary artery disease.   Patient presents emergency room for evaluation of altered mental status. Patient reportedly was at dialysis today and suddenly went unresponsive about [facility] through his dialysis session. Dialysis was stopped and patient started to become more alert by the time EMS arrived however was very confused. Patient typically alert and oriented x 4. Patient was discharged from the hospital yesterday after being admitted for CP and SOB.   Nephrology was consulted for ESRD and need for HD>      Past Medical History  He has a past medical history of Personal history of other endocrine, nutritional and metabolic disease and Personal history of other specified conditions (08/28/2020).    Surgical History  He has a past surgical history that includes Shoulder surgery (04/15/2013) and Knee arthroscopy w/ debridement (04/15/2013).     Social History  He reports that he has never smoked. He has never used smokeless tobacco. He reports that he does not drink alcohol and does not use drugs.    Family History  No family history on file.     Allergies  Patient has no known allergies.    Review of Systems  10 point review of systems was obtained and is negative other than what is indicated above in the HPI     Physical Exam  General: Awake, alert, no acute distress  Head/ears/nose/throat:  Normocephalic, atraumatic, moist mucous membranes  Neck:  No jugular venous distention, neck supple  Respiratory:  Clear to auscultation bilaterally, normal respiratory effort  Cardiovascular:  S1 and S2, no rubs  Gastrointestinal:  Soft, positive bowel sounds, no rebound or guarding  Extremities:  No edema, cyanosis. left upper extremity AV fistula with positive  "bruit  Musculoskeletal:  No injury or deformity noted  Neurologic:  Alert, responsive, cooperative with exam  Skin:  No ulcers noted, dry          I&O 24HR  No intake or output data in the 24 hours ending 11/16/24 1704      Vitals 24HR  Heart Rate:  [56-87]   Temperature:  [36.5 °C (97.7 °F)]   Respirations:  [18]   BP: (125-217)/(44-95)   Height:  [170.2 cm (5' 7\")]   Weight:  [74.4 kg (164 lb)]   Pulse Ox:  [95 %-97 %]       Relevant Results  Results for orders placed or performed during the hospital encounter of 11/16/24 (from the past 24 hours)   POCT GLUCOSE   Result Value Ref Range    POCT Glucose 128 (H) 74 - 99 mg/dL   CBC and Auto Differential   Result Value Ref Range    WBC 6.5 4.4 - 11.3 x10*3/uL    nRBC 0.0 0.0 - 0.0 /100 WBCs    RBC 4.31 (L) 4.50 - 5.90 x10*6/uL    Hemoglobin 12.9 (L) 13.5 - 17.5 g/dL    Hematocrit 40.6 (L) 41.0 - 52.0 %    MCV 94 80 - 100 fL    MCH 29.9 26.0 - 34.0 pg    MCHC 31.8 (L) 32.0 - 36.0 g/dL    RDW 17.2 (H) 11.5 - 14.5 %    Platelets 247 150 - 450 x10*3/uL    Neutrophils % 74.3 40.0 - 80.0 %    Immature Granulocytes %, Automated 0.3 0.0 - 0.9 %    Lymphocytes % 10.3 13.0 - 44.0 %    Monocytes % 10.8 2.0 - 10.0 %    Eosinophils % 3.8 0.0 - 6.0 %    Basophils % 0.5 0.0 - 2.0 %    Neutrophils Absolute 4.83 1.60 - 5.50 x10*3/uL    Immature Granulocytes Absolute, Automated 0.02 0.00 - 0.50 x10*3/uL    Lymphocytes Absolute 0.67 (L) 0.80 - 3.00 x10*3/uL    Monocytes Absolute 0.70 0.05 - 0.80 x10*3/uL    Eosinophils Absolute 0.25 0.00 - 0.40 x10*3/uL    Basophils Absolute 0.03 0.00 - 0.10 x10*3/uL   Comprehensive Metabolic Panel   Result Value Ref Range    Glucose 116 (H) 74 - 99 mg/dL    Sodium 136 136 - 145 mmol/L    Potassium 3.9 3.5 - 5.3 mmol/L    Chloride 94 (L) 98 - 107 mmol/L    Bicarbonate 28 21 - 32 mmol/L    Anion Gap 18 10 - 20 mmol/L    Urea Nitrogen 21 6 - 23 mg/dL    Creatinine 3.97 (H) 0.50 - 1.30 mg/dL    eGFR 15 (L) >60 mL/min/1.73m*2    Calcium 9.5 8.6 - 10.3 mg/dL "    Albumin 4.5 3.4 - 5.0 g/dL    Alkaline Phosphatase 43 33 - 136 U/L    Total Protein 7.6 6.4 - 8.2 g/dL    AST 29 9 - 39 U/L    Bilirubin, Total 1.1 0.0 - 1.2 mg/dL    ALT 16 10 - 52 U/L   Lipase   Result Value Ref Range    Lipase 61 9 - 82 U/L   Lactate   Result Value Ref Range    Lactate 0.8 0.4 - 2.0 mmol/L   Magnesium   Result Value Ref Range    Magnesium 1.91 1.60 - 2.40 mg/dL   POCT GLUCOSE   Result Value Ref Range    POCT Glucose 128 (H) 74 - 99 mg/dL     *Note: Due to a large number of results and/or encounters for the requested time period, some results have not been displayed. A complete set of results can be found in Results Review.          Assessment/Plan   End-stage renal disease on hemodialysis Tuesday Thursday Saturday  Hyperkalemia in the setting of missed dialysis, resolved  Hypertension  Diabetes mellitus    Plan.  Discussed with wife and brother at the bedside.  He finished only 1-1/2 to 2 hours of his dialysis today.  His electrolytes appear to be stable.  We will watch him over the weekend and plan for dialysis on Monday.    Artur Darnell MD

## 2024-11-16 NOTE — H&P
History Of Present Illness  William A Franke is a 73 y.o. male presenting with syncope remote, low bp in dyalisis after being dc home yesterday.     Past Medical History  He has a past medical history of Personal history of other endocrine, nutritional and metabolic disease and Personal history of other specified conditions (08/28/2020).    Surgical History  He has a past surgical history that includes Shoulder surgery (04/15/2013) and Knee arthroscopy w/ debridement (04/15/2013).     Social History  He reports that he has never smoked. He has never used smokeless tobacco. He reports that he does not drink alcohol and does not use drugs.    Family History  No family history on file.     Allergies  Patient has no known allergies.    Review of Systems   Constitutional:  Positive for activity change, appetite change and fatigue.   HENT: Negative.     Eyes: Negative.    Respiratory: Negative.     Cardiovascular: Negative.    Gastrointestinal: Negative.    Endocrine: Negative.    Genitourinary: Negative.    Musculoskeletal:         Weak legs   Skin: Negative.    Allergic/Immunologic: Negative.    Neurological:  Positive for weakness.   Hematological: Negative.    Psychiatric/Behavioral: Negative.          Physical Exam  Constitutional:       Appearance: He is obese.   HENT:      Head: Normocephalic and atraumatic.      Nose: Nose normal.      Mouth/Throat:      Mouth: Mucous membranes are moist.   Eyes:      Extraocular Movements: Extraocular movements intact.      Conjunctiva/sclera: Conjunctivae normal.      Pupils: Pupils are equal, round, and reactive to light.   Cardiovascular:      Rate and Rhythm: Normal rate and regular rhythm.      Pulses: Normal pulses.      Heart sounds: Normal heart sounds.      Comments: Low bp in dyalisis  Pulmonary:      Effort: Pulmonary effort is normal.      Breath sounds: Normal breath sounds.   Abdominal:      General: Bowel sounds are normal.      Palpations: Abdomen is soft.    Musculoskeletal:         General: Normal range of motion.      Cervical back: Normal range of motion and neck supple.   Skin:     General: Skin is warm.   Neurological:      General: No focal deficit present.      Mental Status: Mental status is at baseline.   Psychiatric:         Mood and Affect: Mood normal.          Last Recorded Vitals  BP (!) 164/44 (BP Location: Right arm)   Pulse 61   Temp 36.5 °C (97.7 °F) (Temporal)   Resp 18   Wt 74.4 kg (164 lb)   SpO2 95%     Relevant Results             Assessment/Plan   Assessment & Plan  Syncope, unspecified syncope type      Low bp after starting doxazosin, weak, syncope remote,check MRI brain r/o stroke,esrd on hd, anxiety/depression, escitalopram at night       Desirae Alicea MD

## 2024-11-16 NOTE — PROGRESS NOTES
William A Franke is a 73 y.o. male on day 0 of admission presenting with Syncope, unspecified syncope type.    Patient is a readmission.   He was admitted to [facility] 11/13-11/15/2024 with SOB, CP. He was discharged home, declined c.   He returns today, was sent after becoming unresponsive during dialysis.   Plan will be to discharge home with no needs, spouse will transport.     Yuko Orta RN

## 2024-11-16 NOTE — ED PROVIDER NOTES
HPI   No chief complaint on file.      Patient is a 73-year-old male with a history of end-stage renal disease on dialysis, hypertension, diabetes, coronary artery disease who presents emergency room for evaluation of altered mental status.  Patient reportedly was at dialysis today and suddenly went unresponsive about nursing home through his dialysis session.  Dialysis was stopped and patient started to become more alert by the time EMS arrived however was very confused.  Patient typically alert and oriented x 4.  Patient is a very poor historian due to his confusion however denies any chest pain, shortness of breath, abdominal pain, nausea or vomiting.      History provided by:  Patient and EMS personnel          Patient History   Past Medical History:   Diagnosis Date    Personal history of other endocrine, nutritional and metabolic disease     History of hypercholesterolemia    Personal history of other specified conditions 08/28/2020    History of chest pain     Past Surgical History:   Procedure Laterality Date    KNEE ARTHROSCOPY W/ DEBRIDEMENT  04/15/2013    Arthroscopy Knee    SHOULDER SURGERY  04/15/2013    Shoulder Surgery     No family history on file.  Social History     Tobacco Use    Smoking status: Never    Smokeless tobacco: Never   Substance Use Topics    Alcohol use: Never    Drug use: Never       Physical Exam   ED Triage Vitals   Temperature Heart Rate Respirations BP   11/16/24 0804 11/16/24 0804 11/16/24 0804 11/16/24 0808   36.5 °C (97.7 °F) 86 18 159/56      Pulse Ox Temp Source Heart Rate Source Patient Position   11/16/24 0804 11/16/24 0804 11/16/24 0804 11/16/24 0804   97 % Temporal Monitor Sitting      BP Location FiO2 (%)     11/16/24 0804 --     Right arm        Physical Exam  Vitals and nursing note reviewed.   Constitutional:       General: He is not in acute distress.     Appearance: He is well-developed. He is not ill-appearing.   HENT:      Head: Normocephalic and atraumatic.       Mouth/Throat:      Mouth: Mucous membranes are moist.   Eyes:      Extraocular Movements: Extraocular movements intact.      Pupils: Pupils are equal, round, and reactive to light.   Cardiovascular:      Rate and Rhythm: Normal rate and regular rhythm.   Pulmonary:      Effort: Pulmonary effort is normal. No respiratory distress.      Breath sounds: Normal breath sounds. No wheezing or rhonchi.   Abdominal:      General: There is no distension.      Palpations: Abdomen is soft.      Tenderness: There is no abdominal tenderness. There is no guarding.   Musculoskeletal:         General: Normal range of motion.      Cervical back: Normal range of motion and neck supple.   Skin:     General: Skin is warm and dry.      Capillary Refill: Capillary refill takes less than 2 seconds.   Neurological:      Mental Status: He is alert. He is disoriented.      GCS: GCS eye subscore is 4. GCS verbal subscore is 5. GCS motor subscore is 6.       Recent Results (from the past 24 hours)   POCT GLUCOSE    Collection Time: 11/15/24  2:39 PM   Result Value Ref Range    POCT Glucose 168 (H) 74 - 99 mg/dL   POCT GLUCOSE    Collection Time: 11/16/24  8:04 AM   Result Value Ref Range    POCT Glucose 128 (H) 74 - 99 mg/dL   CBC and Auto Differential    Collection Time: 11/16/24  8:17 AM   Result Value Ref Range    WBC 6.5 4.4 - 11.3 x10*3/uL    nRBC 0.0 0.0 - 0.0 /100 WBCs    RBC 4.31 (L) 4.50 - 5.90 x10*6/uL    Hemoglobin 12.9 (L) 13.5 - 17.5 g/dL    Hematocrit 40.6 (L) 41.0 - 52.0 %    MCV 94 80 - 100 fL    MCH 29.9 26.0 - 34.0 pg    MCHC 31.8 (L) 32.0 - 36.0 g/dL    RDW 17.2 (H) 11.5 - 14.5 %    Platelets 247 150 - 450 x10*3/uL    Neutrophils % 74.3 40.0 - 80.0 %    Immature Granulocytes %, Automated 0.3 0.0 - 0.9 %    Lymphocytes % 10.3 13.0 - 44.0 %    Monocytes % 10.8 2.0 - 10.0 %    Eosinophils % 3.8 0.0 - 6.0 %    Basophils % 0.5 0.0 - 2.0 %    Neutrophils Absolute 4.83 1.60 - 5.50 x10*3/uL    Immature Granulocytes Absolute,  Automated 0.02 0.00 - 0.50 x10*3/uL    Lymphocytes Absolute 0.67 (L) 0.80 - 3.00 x10*3/uL    Monocytes Absolute 0.70 0.05 - 0.80 x10*3/uL    Eosinophils Absolute 0.25 0.00 - 0.40 x10*3/uL    Basophils Absolute 0.03 0.00 - 0.10 x10*3/uL   Comprehensive Metabolic Panel    Collection Time: 11/16/24  8:17 AM   Result Value Ref Range    Glucose 116 (H) 74 - 99 mg/dL    Sodium 136 136 - 145 mmol/L    Potassium 3.9 3.5 - 5.3 mmol/L    Chloride 94 (L) 98 - 107 mmol/L    Bicarbonate 28 21 - 32 mmol/L    Anion Gap 18 10 - 20 mmol/L    Urea Nitrogen 21 6 - 23 mg/dL    Creatinine 3.97 (H) 0.50 - 1.30 mg/dL    eGFR 15 (L) >60 mL/min/1.73m*2    Calcium 9.5 8.6 - 10.3 mg/dL    Albumin 4.5 3.4 - 5.0 g/dL    Alkaline Phosphatase 43 33 - 136 U/L    Total Protein 7.6 6.4 - 8.2 g/dL    AST 29 9 - 39 U/L    Bilirubin, Total 1.1 0.0 - 1.2 mg/dL    ALT 16 10 - 52 U/L   Lipase    Collection Time: 11/16/24  8:17 AM   Result Value Ref Range    Lipase 61 9 - 82 U/L   Lactate    Collection Time: 11/16/24  8:17 AM   Result Value Ref Range    Lactate 0.8 0.4 - 2.0 mmol/L   Magnesium    Collection Time: 11/16/24  8:17 AM   Result Value Ref Range    Magnesium 1.91 1.60 - 2.40 mg/dL         ED Course & MDM   ED Course as of 11/16/24 1119   Sat Nov 16, 2024 0808 EKG Time:0803  EKG Interpretation time:0808  EKG Interpretation: EKG shows sinus rhythm with marked sinus arrhythmia with a rate of 73 bpm, normal axis, QTc 478, no evidence of STEMI.    EKG was interpreted by myself independently [JL]      ED Course User Index  [JL] Obie Cowan,          Diagnoses as of 11/16/24 1119   Disorientation   Syncope, unspecified syncope type                 No data recorded     [place] Coma Scale Score: 14 (11/16/24 0807 : Trinidad Johns RN)                           Medical Decision Making  Patient is a 73-year-old male that presents emergency room for evaluation of altered mental status.  He is very confused and slow to respond to questions on  presentation.  He does have an NIH stroke score of 1 however no code brain attack was called as patient mentation has reportedly significantly improved and there is no focal motor deficit present.  Blood work ordered including CBC, CMP, lipase, lactate, magnesium along with a CT scan of the head, chest x-ray and EKG.  Blood work was unremarkable including normal white count of 6.5 with no left shift, normal electrolytes.  He does have elevated creatinine of 3.9 consistent with his known history of end-stage renal disease on dialysis.  Lactate is normal at 0.8 and I have low suspicion for seizure.  On reevaluation he is back to baseline at this time.  Given the concerning episode of altered mental status with an unresponsive episode patient was brought in for further evaluation by neurology and observation.          Procedure  Procedures     Obie Cowan DO  11/16/24 1121

## 2024-11-16 NOTE — PROGRESS NOTES
William A Franke is a 73 y.o. male on day 0 of admission presenting with Syncope, unspecified syncope type.       11/16/24 1234   Discharge Planning   Living Arrangements Spouse/significant other   Support Systems Spouse/significant other   Assistance Needed none   Type of Residence Private residence   Number of Stairs to Enter Residence 2   Number of Stairs Within Residence 1  (one flight)   Do you have animals or pets at home? No   Who is requesting discharge planning? Provider   Home or Post Acute Services None   Expected Discharge Disposition Home   Does the patient need discharge transport arranged? No   Patient Choice   Provider Choice list and CMS website (https://medicare.gov/care-compare#search) for post-acute Quality and Resource Measure Data were provided and reviewed with: Other (Comment)  (declined [facility])   Patient / Family choosing to utilize agency / facility established prior to hospitalization No   Stroke Family Assessment   Stroke Family Assessment Needed No   Intensity of Service   Intensity of Service 0-30 min         Yuko Orta RN

## 2024-11-17 VITALS
WEIGHT: 164 LBS | RESPIRATION RATE: 16 BRPM | HEIGHT: 67 IN | OXYGEN SATURATION: 95 % | TEMPERATURE: 98.1 F | HEART RATE: 60 BPM | BODY MASS INDEX: 25.74 KG/M2 | DIASTOLIC BLOOD PRESSURE: 43 MMHG | SYSTOLIC BLOOD PRESSURE: 153 MMHG

## 2024-11-17 LAB
ANION GAP SERPL CALCULATED.3IONS-SCNC: 17 MMOL/L (ref 10–20)
BUN SERPL-MCNC: 41 MG/DL (ref 6–23)
CALCIUM SERPL-MCNC: 8.9 MG/DL (ref 8.6–10.3)
CHLORIDE SERPL-SCNC: 98 MMOL/L (ref 98–107)
CO2 SERPL-SCNC: 27 MMOL/L (ref 21–32)
CREAT SERPL-MCNC: 7.17 MG/DL (ref 0.5–1.3)
EGFRCR SERPLBLD CKD-EPI 2021: 7 ML/MIN/1.73M*2
ERYTHROCYTE [DISTWIDTH] IN BLOOD BY AUTOMATED COUNT: 16.9 % (ref 11.5–14.5)
GLUCOSE BLD MANUAL STRIP-MCNC: 100 MG/DL (ref 74–99)
GLUCOSE BLD MANUAL STRIP-MCNC: 107 MG/DL (ref 74–99)
GLUCOSE BLD MANUAL STRIP-MCNC: 149 MG/DL (ref 74–99)
GLUCOSE BLD MANUAL STRIP-MCNC: 72 MG/DL (ref 74–99)
GLUCOSE SERPL-MCNC: 96 MG/DL (ref 74–99)
HCT VFR BLD AUTO: 34.2 % (ref 41–52)
HGB BLD-MCNC: 11 G/DL (ref 13.5–17.5)
MCH RBC QN AUTO: 29.8 PG (ref 26–34)
MCHC RBC AUTO-ENTMCNC: 32.2 G/DL (ref 32–36)
MCV RBC AUTO: 93 FL (ref 80–100)
NRBC BLD-RTO: 0 /100 WBCS (ref 0–0)
PLATELET # BLD AUTO: 253 X10*3/UL (ref 150–450)
POTASSIUM SERPL-SCNC: 3.9 MMOL/L (ref 3.5–5.3)
RBC # BLD AUTO: 3.69 X10*6/UL (ref 4.5–5.9)
SODIUM SERPL-SCNC: 138 MMOL/L (ref 136–145)
WBC # BLD AUTO: 6.3 X10*3/UL (ref 4.4–11.3)

## 2024-11-17 PROCEDURE — 85027 COMPLETE CBC AUTOMATED: CPT | Performed by: INTERNAL MEDICINE

## 2024-11-17 PROCEDURE — 2500000002 HC RX 250 W HCPCS SELF ADMINISTERED DRUGS (ALT 637 FOR MEDICARE OP, ALT 636 FOR OP/ED): Performed by: INTERNAL MEDICINE

## 2024-11-17 PROCEDURE — 2500000001 HC RX 250 WO HCPCS SELF ADMINISTERED DRUGS (ALT 637 FOR MEDICARE OP): Performed by: INTERNAL MEDICINE

## 2024-11-17 PROCEDURE — 2500000001 HC RX 250 WO HCPCS SELF ADMINISTERED DRUGS (ALT 637 FOR MEDICARE OP): Performed by: NURSE PRACTITIONER

## 2024-11-17 PROCEDURE — 36415 COLL VENOUS BLD VENIPUNCTURE: CPT | Performed by: INTERNAL MEDICINE

## 2024-11-17 PROCEDURE — 82374 ASSAY BLOOD CARBON DIOXIDE: CPT | Performed by: INTERNAL MEDICINE

## 2024-11-17 PROCEDURE — 82947 ASSAY GLUCOSE BLOOD QUANT: CPT

## 2024-11-17 PROCEDURE — 99223 1ST HOSP IP/OBS HIGH 75: CPT | Performed by: STUDENT IN AN ORGANIZED HEALTH CARE EDUCATION/TRAINING PROGRAM

## 2024-11-17 PROCEDURE — 82947 ASSAY GLUCOSE BLOOD QUANT: CPT | Mod: 59

## 2024-11-17 PROCEDURE — 99222 1ST HOSP IP/OBS MODERATE 55: CPT | Performed by: NURSE PRACTITIONER

## 2024-11-17 PROCEDURE — G0378 HOSPITAL OBSERVATION PER HR: HCPCS

## 2024-11-17 RX ORDER — HEPARIN SODIUM 1000 [USP'U]/ML
1000 INJECTION, SOLUTION INTRAVENOUS; SUBCUTANEOUS
Status: DISCONTINUED | OUTPATIENT
Start: 2024-11-18 | End: 2024-11-18 | Stop reason: HOSPADM

## 2024-11-17 RX ORDER — AMLODIPINE BESYLATE 5 MG/1
5 TABLET ORAL DAILY
Status: DISCONTINUED | OUTPATIENT
Start: 2024-11-17 | End: 2024-11-18 | Stop reason: HOSPADM

## 2024-11-17 RX ADMIN — HYDRALAZINE HYDROCHLORIDE 100 MG: 50 TABLET ORAL at 09:05

## 2024-11-17 RX ADMIN — HYDRALAZINE HYDROCHLORIDE 100 MG: 50 TABLET ORAL at 20:22

## 2024-11-17 RX ADMIN — ESCITALOPRAM OXALATE 5 MG: 10 TABLET ORAL at 20:15

## 2024-11-17 RX ADMIN — SEVELAMER CARBONATE 1600 MG: 800 TABLET, FILM COATED ORAL at 09:04

## 2024-11-17 RX ADMIN — RENO CAPS 1 CAPSULE: 100; 1.5; 1.7; 20; 10; 1; 150; 5; 6 CAPSULE ORAL at 09:42

## 2024-11-17 RX ADMIN — SITAGLIPTIN 25 MG: 25 TABLET, FILM COATED ORAL at 09:43

## 2024-11-17 RX ADMIN — SEVELAMER CARBONATE 1600 MG: 800 TABLET, FILM COATED ORAL at 13:20

## 2024-11-17 RX ADMIN — CARVEDILOL 6.25 MG: 6.25 TABLET, FILM COATED ORAL at 09:05

## 2024-11-17 RX ADMIN — INSULIN LISPRO 10 UNITS: 100 INJECTION, SOLUTION INTRAVENOUS; SUBCUTANEOUS at 13:20

## 2024-11-17 RX ADMIN — EZETIMIBE 10 MG: 10 TABLET ORAL at 09:05

## 2024-11-17 RX ADMIN — ASPIRIN 81 MG: 81 TABLET, COATED ORAL at 09:05

## 2024-11-17 RX ADMIN — ALLOPURINOL 100 MG: 100 TABLET ORAL at 09:05

## 2024-11-17 RX ADMIN — SEVELAMER CARBONATE 1600 MG: 800 TABLET, FILM COATED ORAL at 17:48

## 2024-11-17 RX ADMIN — INSULIN LISPRO 10 UNITS: 100 INJECTION, SOLUTION INTRAVENOUS; SUBCUTANEOUS at 09:05

## 2024-11-17 RX ADMIN — ISOSORBIDE MONONITRATE 120 MG: 120 TABLET, EXTENDED RELEASE ORAL at 09:05

## 2024-11-17 RX ADMIN — ATORVASTATIN CALCIUM 80 MG: 80 TABLET, FILM COATED ORAL at 20:15

## 2024-11-17 RX ADMIN — PANTOPRAZOLE SODIUM 40 MG: 40 TABLET, DELAYED RELEASE ORAL at 09:05

## 2024-11-17 RX ADMIN — ERGOCALCIFEROL 50000 UNITS: 1.25 CAPSULE ORAL at 09:04

## 2024-11-17 RX ADMIN — AMLODIPINE BESYLATE 5 MG: 5 TABLET ORAL at 09:42

## 2024-11-17 SDOH — HEALTH STABILITY: MENTAL HEALTH: HOW OFTEN DO YOU HAVE A DRINK CONTAINING ALCOHOL?: NEVER

## 2024-11-17 SDOH — ECONOMIC STABILITY: FOOD INSECURITY: HOW HARD IS IT FOR YOU TO PAY FOR THE VERY BASICS LIKE FOOD, HOUSING, MEDICAL CARE, AND HEATING?: NOT HARD AT ALL

## 2024-11-17 SDOH — ECONOMIC STABILITY: TRANSPORTATION INSECURITY: IN THE PAST 12 MONTHS, HAS LACK OF TRANSPORTATION KEPT YOU FROM MEDICAL APPOINTMENTS OR FROM GETTING MEDICATIONS?: NO

## 2024-11-17 SDOH — ECONOMIC STABILITY: FOOD INSECURITY: WITHIN THE PAST 12 MONTHS, THE FOOD YOU BOUGHT JUST DIDN'T LAST AND YOU DIDN'T HAVE MONEY TO GET MORE.: NEVER TRUE

## 2024-11-17 SDOH — ECONOMIC STABILITY: HOUSING INSECURITY: IN THE LAST 12 MONTHS, WAS THERE A TIME WHEN YOU WERE NOT ABLE TO PAY THE MORTGAGE OR RENT ON TIME?: NO

## 2024-11-17 SDOH — ECONOMIC STABILITY: INCOME INSECURITY: IN THE PAST 12 MONTHS HAS THE ELECTRIC, GAS, OIL, OR WATER COMPANY THREATENED TO SHUT OFF SERVICES IN YOUR HOME?: NO

## 2024-11-17 SDOH — SOCIAL STABILITY: SOCIAL INSECURITY: WITHIN THE LAST YEAR, HAVE YOU BEEN HUMILIATED OR EMOTIONALLY ABUSED IN OTHER WAYS BY YOUR PARTNER OR EX-PARTNER?: NO

## 2024-11-17 SDOH — ECONOMIC STABILITY: HOUSING INSECURITY: AT ANY TIME IN THE PAST 12 MONTHS, WERE YOU HOMELESS OR LIVING IN A SHELTER (INCLUDING NOW)?: NO

## 2024-11-17 SDOH — HEALTH STABILITY: MENTAL HEALTH: HOW MANY DRINKS CONTAINING ALCOHOL DO YOU HAVE ON A TYPICAL DAY WHEN YOU ARE DRINKING?: PATIENT DOES NOT DRINK

## 2024-11-17 SDOH — HEALTH STABILITY: MENTAL HEALTH: HOW OFTEN DO YOU HAVE SIX OR MORE DRINKS ON ONE OCCASION?: NEVER

## 2024-11-17 SDOH — ECONOMIC STABILITY: FOOD INSECURITY: WITHIN THE PAST 12 MONTHS, YOU WORRIED THAT YOUR FOOD WOULD RUN OUT BEFORE YOU GOT THE MONEY TO BUY MORE.: NEVER TRUE

## 2024-11-17 SDOH — SOCIAL STABILITY: SOCIAL INSECURITY: WITHIN THE LAST YEAR, HAVE YOU BEEN AFRAID OF YOUR PARTNER OR EX-PARTNER?: NO

## 2024-11-17 SDOH — ECONOMIC STABILITY: HOUSING INSECURITY: IN THE PAST 12 MONTHS, HOW MANY TIMES HAVE YOU MOVED WHERE YOU WERE LIVING?: 0

## 2024-11-17 ASSESSMENT — LIFESTYLE VARIABLES
AUDIT-C TOTAL SCORE: 0
SKIP TO QUESTIONS 9-10: 1

## 2024-11-17 ASSESSMENT — PAIN SCALES - GENERAL
PAINLEVEL_OUTOF10: 0 - NO PAIN
PAINLEVEL_OUTOF10: 0 - NO PAIN

## 2024-11-17 ASSESSMENT — COGNITIVE AND FUNCTIONAL STATUS - GENERAL
MOBILITY SCORE: 24
STANDING UP FROM CHAIR USING ARMS: A LITTLE
MOVING TO AND FROM BED TO CHAIR: A LITTLE
DAILY ACTIVITIY SCORE: 24
DAILY ACTIVITIY SCORE: 24
CLIMB 3 TO 5 STEPS WITH RAILING: A LITTLE
WALKING IN HOSPITAL ROOM: A LITTLE
MOBILITY SCORE: 20

## 2024-11-17 ASSESSMENT — PAIN - FUNCTIONAL ASSESSMENT: PAIN_FUNCTIONAL_ASSESSMENT: 0-10

## 2024-11-17 ASSESSMENT — ACTIVITIES OF DAILY LIVING (ADL): LACK_OF_TRANSPORTATION: NO

## 2024-11-17 NOTE — NURSING NOTE
Received report from ED Nurse Leelee.    2336H: Patient arrived on the floor per wheelchair from ED.  No distress or discomfort noted.  Oriented to room.  All needs attended.  Safety measures ensured and call light in reach.

## 2024-11-17 NOTE — CARE PLAN
The patient's goals for the shift include      The clinical goals for the shift include maintain fall precautions    Problem: Diabetes  Goal: Decrease in ketones present in urine by end of shift  Outcome: Progressing  Goal: Maintain electrolyte levels within acceptable range throughout shift  Outcome: Progressing  Goal: Learn about and adhere to nutrition recommendations by end of shift  Outcome: Progressing  Goal: Vital signs within normal range for age by end of shift  Outcome: Progressing  Goal: Increase self care and/or family involovement by end of shift  Outcome: Progressing  Goal: Receive DSME education by end of shift  Outcome: Progressing     Problem: Fall/Injury  Goal: Not fall by end of shift  Outcome: Progressing  Goal: Be free from injury by end of the shift  Outcome: Progressing  Goal: Verbalize understanding of personal risk factors for fall in the hospital  Outcome: Progressing  Goal: Verbalize understanding of risk factor reduction measures to prevent injury from fall in the home  Outcome: Progressing  Goal: Use assistive devices by end of the shift  Outcome: Progressing  Goal: Pace activities to prevent fatigue by end of the shift  Outcome: Progressing

## 2024-11-17 NOTE — CARE PLAN
Over the shift, the patient did not make progress toward the following goals. Barriers to progression include . Recommendations to address these barriers include.      Problem: Diabetes  Goal: Decrease in ketones present in urine by end of shift  11/17/2024 0640 by Linwood Barlow RN  Outcome: Progressing  11/17/2024 0640 by Linwood Barlow RN  Outcome: Progressing  11/17/2024 0054 by Linwood Barlow RN  Outcome: Progressing  Goal: Maintain electrolyte levels within acceptable range throughout shift  11/17/2024 0640 by Linwood Barlow RN  Outcome: Progressing  11/17/2024 0640 by Linwood Barlow RN  Outcome: Progressing  11/17/2024 0054 by Linwood Barlow RN  Outcome: Progressing  Goal: Learn about and adhere to nutrition recommendations by end of shift  11/17/2024 0640 by Linwood Barlow RN  Outcome: Progressing  11/17/2024 0640 by Linwood Barlow RN  Outcome: Progressing  11/17/2024 0054 by Linwood Barlow RN  Outcome: Progressing  Goal: Vital signs within normal range for age by end of shift  11/17/2024 0640 by Linwood Barlow RN  Outcome: Progressing  11/17/2024 0640 by Linwood Barlow RN  Outcome: Progressing  11/17/2024 0054 by Linwood Barlow RN  Outcome: Progressing  Goal: Increase self care and/or family involovement by end of shift  11/17/2024 0640 by Linwood Barlow RN  Outcome: Progressing  11/17/2024 0640 by Linwood Barlow RN  Outcome: Progressing  11/17/2024 0054 by Linwood Barlow RN  Outcome: Progressing  Goal: Receive DSME education by end of shift  11/17/2024 0640 by Linwood Barlow RN  Outcome: Progressing  11/17/2024 0640 by Linwood Barlow RN  Outcome: Progressing  11/17/2024 0054 by Linwood Barlow RN  Outcome: Progressing

## 2024-11-17 NOTE — PROGRESS NOTES
William A Franke is a 73 y.o. male on day 0 of admission presenting with Syncope, unspecified syncope type.      Subjective   Better today , refussing breakfast       Objective     Last Recorded Vitals  BP (!) 162/47 (BP Location: Right arm, Patient Position: Lying)   Pulse 61   Temp 36.6 °C (97.9 °F) (Oral)   Resp 18   Wt 74.4 kg (164 lb)   SpO2 100%   Intake/Output last 3 Shifts:  No intake or output data in the 24 hours ending 11/17/24 0920    Admission Weight  Weight: 74.4 kg (164 lb) (11/16/24 0804)    Daily Weight  11/16/24 : 74.4 kg (164 lb)    Image Results  CT head wo IV contrast  Narrative: Interpreted By:  Sherita Simental,   STUDY:  CT HEAD WO IV CONTRAST;  11/16/2024 9:28 am      INDICATION:  Signs/Symptoms:Altered mental status.      COMPARISON:  09/13/2022      ACCESSION NUMBER(S):  [id number]      ORDERING CLINICIAN:  VIRA ANDERSEN      TECHNIQUE:  Examination was performed in the axial plane using soft tissue and  bone algorithm.      FINDINGS:  INTRACRANIAL:  There is prominence of the ventricular system and cerebral sulci  consistent with cerebral atrophy. There are periventricular  hypodensities consistent with  mild small vessel disease. No mass or  mass effect is identified. There is no hemorrhage or subdural fluid  collection. There is no acute infarct.          EXTRACRANIAL:  Visualized paranasal sinuses and mastoids are clear.      Impression: No acute intracranial pathology.      MACRO:  None      Signed by: Sherita Simental 11/16/2024 9:35 AM  Dictation workstation:   [id number]  XR chest 1 view  Narrative: Interpreted By:  Gavin Kuhn,   STUDY:  XR CHEST 1 VIEW;  11/16/2024 8:37 am      INDICATION:  Signs/Symptoms:Altered mental status.          COMPARISON:  11/13/2024      ACCESSION NUMBER(S):  [id number]      ORDERING CLINICIAN:  VIRA ANDERSEN      FINDINGS:  There is a left axillary brachial vascular stent.      The heart is enlarged. The pulmonary vasculature minimally  congested  centrally with significant improvement in the previous pulmonary  edema. There is a small right pleural effusion with adjacent  atelectasis.      No pneumothorax.          Impression: Marked improvement in pulmonary edema compared to 11/13/2024 with  residual minimal peribronchovascular interstitial edema.      Small right pleural effusion with adjacent atelectasis.      MACRO:  None.      Signed by: Gavin Kuhn 11/16/2024 8:46 AM  Dictation workstation:   [id number]      Physical Exam/better    Relevant Results               Assessment/Plan                  Assessment & Plan  Syncope, unspecified syncope type    Anxiety, esrd on hd              Desirae Alicea MD

## 2024-11-17 NOTE — CARE PLAN
The patient's goals for the shift include      The clinical goals for the shift include Fall precaution

## 2024-11-17 NOTE — PROGRESS NOTES
"William A Franke is a 73 y.o. male on day 0 of admission presenting with Syncope, unspecified syncope type.      Subjective      Patient seen and examined for end-stage renal disease on hemodialysis.  Family at bedside, patient able to converse appropriately but unable to tell the reason for coming to the hospital.  Patient is eager to go home.  Denies nausea vomiting diarrhea, chest pain shortness of breath abdominal pain, or changes in appetite.  In No acute distress    Objective      Physical Exam  General: Awake, alert, no acute distress  Head/ears/nose/throat:  Normocephalic, atraumatic, moist mucous membranes  Neck:  No jugular venous distention, neck supple  Respiratory:  Clear to auscultation bilaterally, normal respiratory effort  Cardiovascular:  S1 and S2, no rubs  Gastrointestinal:  Soft, positive bowel sounds, no rebound or guarding  Extremities:  No edema, cyanosis. left upper extremity AV fistula with positive bruit  Musculoskeletal:  No injury or deformity noted  Neurologic:  Alert, responsive, cooperative with exam  Skin:  No ulcers noted, dry    Visit Vitals  BP (!) 162/47 (BP Location: Right arm, Patient Position: Lying)   Pulse 61   Temp 36.6 °C (97.9 °F) (Oral)   Resp 18   Ht 1.702 m (5' 7\")   Wt 74.4 kg (164 lb)   SpO2 100%   BMI 25.69 kg/m²   Smoking Status Never   BSA 1.88 m²        Current Facility-Administered Medications:     allopurinol (Zyloprim) tablet 100 mg, 100 mg, oral, Daily, Desirae Alicea MD, 100 mg at 11/17/24 0905    amLODIPine (Norvasc) tablet 5 mg, 5 mg, oral, Daily, VERÓNICA Le-CNP, 5 mg at 11/17/24 0942    aspirin EC tablet 81 mg, 81 mg, oral, Daily, Desirae Alicea MD, 81 mg at 11/17/24 0905    atorvastatin (Lipitor) tablet 80 mg, 80 mg, oral, Nightly, Desirae Alicea MD, 80 mg at 11/16/24 2118    carvedilol (Coreg) tablet 6.25 mg, 6.25 mg, oral, BID, Desirae Alicea MD, 6.25 mg at 11/17/24 0905    ergocalciferol (Vitamin D-2) capsule 50,000 Units, " 50,000 Units, oral, q14 days, Desirae Alicea MD, 50,000 Units at 11/17/24 0904    escitalopram (Lexapro) tablet 5 mg, 5 mg, oral, Nightly, Desirae Alicea MD    ezetimibe (Zetia) tablet 10 mg, 10 mg, oral, Daily, Desirae Alicea MD, 10 mg at 11/17/24 0905    hydrALAZINE (Apresoline) tablet 100 mg, 100 mg, oral, TID, VERÓNICA Le-CNP, 100 mg at 11/17/24 0905    insulin lispro injection 10 Units, 10 Units, subcutaneous, TID, Desirae Alicea MD, 10 Units at 11/17/24 0905    isosorbide mononitrate ER (Imdur) 24 hr tablet 120 mg, 120 mg, oral, Daily, Desirae Alicea MD, 120 mg at 11/17/24 0905    nitroglycerin (Nitrostat) SL tablet 0.4 mg, 0.4 mg, sublingual, q5 min PRN, Desirae Alicea MD    oxygen (O2) therapy, , inhalation, Continuous, Desirae Alicea MD, 21 percent at 11/16/24 1423    pantoprazole (ProtoNix) EC tablet 40 mg, 40 mg, oral, Daily, Desirae Alicea MD, 40 mg at 11/17/24 0905    sevelamer carbonate (Renvela) tablet 1,600 mg, 1,600 mg, oral, TID, Desirae Alicea MD, 1,600 mg at 11/17/24 0904    SITagliptin phosphate (Januvia) tablet 25 mg, 25 mg, oral, Daily, Desirae Alicea MD, 25 mg at 11/17/24 0943    vitamin B complex-vitamin C-folic acid (Nephrocaps) capsule 1 capsule, 1 capsule, oral, Daily, Desirae Alicea MD, 1 capsule at 11/17/24 0942   Intake/Output last 3 Shifts:  No intake or output data in the 24 hours ending 11/17/24 1142         Assessment/Plan      End-stage renal disease on hemodialysis Tuesday Thursday Saturday. Electrolytes are stable today, plans for HD on Monday  Syncope-cardiology on board  2.    Hyperkalemia in the setting of missed dialysis, resolved  Hypertension  Diabetes mellitus       VERÓNICA Bucio-CNP    I have personally seen and examined the patient and discussed the case with the NP/PA    D/w family at the bedside.   Will do short HD session tomorrow 2.5-3 hours and then he can discharge.     Artur Darnell MD

## 2024-11-17 NOTE — CONSULTS
"Inpatient consult to Neurology  Consult performed by: Gisell Green MD  Consult ordered by: Desirae Alicea MD          History Of Present Illness  William A Franke is a 73 y.o. male with hx of ESRD on dialysis, HTN, DM, CAD presenting with AMS. Neuro consulted for \"r/o stroke\".    BP on presentation 159/56 soon dropped to as low as 110/60, reportedly BP was low in dialysis. Pt was just recently admitted 11/13-15th for chest pain, had BP of 191/66, home BP meds resumed, noncompliance suspected.  Discharged 11/15th, but returned immediately with unresponsiveness in dialysis. Pt had an episode of unresponsiveness followed by slurring of speech which quickly returned in 10-15min. Pt feels back to baseline.    Per chart review pt's BP has been stably persistently high since 8/2024. Outpt readings show SBP of 180-200. Then last admission pt presented with CP, discharge BP 120s. Pt reports lightheadedness. No new focal deficits. He is ambulating well.       Last known well: 11/16 during dialysis   Had stroke symptoms resolved at time of presentation: No      Past Medical History  Past Medical History:   Diagnosis Date    Personal history of other endocrine, nutritional and metabolic disease     History of hypercholesterolemia    Personal history of other specified conditions 08/28/2020    History of chest pain     Surgical History  Past Surgical History:   Procedure Laterality Date    KNEE ARTHROSCOPY W/ DEBRIDEMENT  04/15/2013    Arthroscopy Knee    SHOULDER SURGERY  04/15/2013    Shoulder Surgery     Social History  Social History     Tobacco Use    Smoking status: Never    Smokeless tobacco: Never   Substance Use Topics    Alcohol use: Never    Drug use: Never     Allergies  Patient has no known allergies.  Home Medications  Medications Prior to Admission   Medication Sig Dispense Refill Last Dose/Taking    allopurinol (Zyloprim) 100 mg tablet Take 1 tablet (100 mg) by mouth once daily. 90 tablet 0     amLODIPine " "(Norvasc) 10 mg tablet Take 1 tablet (10 mg) by mouth once daily. 90 tablet 3     aspirin 81 mg EC tablet Take 1 tablet (81 mg) by mouth once daily.       atorvastatin (Lipitor) 80 mg tablet Take 1 tablet (80 mg) by mouth once daily at bedtime. 90 tablet 0     B complex-vitamin C-folic acid (Nephro-Shea) 0.8 mg tablet Take 1 tablet by mouth once daily.       BD Ultra-Fine Mini Pen Needle 31 gauge x 3/16\" needle USE AS DIRECTED 4 times a day 400 each 2     carvedilol (Coreg) 6.25 mg tablet Take 1 tablet (6.25 mg) by mouth 2 times daily (morning and late afternoon). 180 tablet 3     ergocalciferol (Vitamin D-2) 1.25 MG (15007 UT) capsule Take 1 capsule (50,000 Units) by mouth every 14 (fourteen) days. Takes every other Sunday       ezetimibe (Zetia) 10 mg tablet Take 1 tablet (10 mg) by mouth once daily. 90 tablet 0     hydrALAZINE (Apresoline) 100 mg tablet Take 1 tablet (100 mg) by mouth 3 times a day. 90 tablet 0     insulin lispro (HumaLOG) 100 unit/mL injection Inject 10 Units under the skin 3 times daily (morning, midday, late afternoon).       isosorbide mononitrate ER (Imdur) 120 mg 24 hr tablet Take 1 tablet (120 mg) by mouth once daily. Do not crush or chew. 30 tablet 0     Januvia 25 mg tablet Take 1 tablet (25 mg) by mouth once daily. 90 tablet 3     nitroglycerin (Nitrostat) 0.4 mg SL tablet Place 1 tablet (0.4 mg) under the tongue every 5 minutes if needed for chest pain.       oxygen (O2) gas therapy Inhale 1 each continuously.       pantoprazole (ProtoNix) 40 mg EC tablet Take 1 tablet (40 mg) by mouth once daily.       sevelamer carbonate (Renvela) 800 mg tablet Take 2 tablets (1,600 mg) by mouth 3 times daily (morning, midday, late afternoon). Three times daily with meals          Review of Systems  Neurological Exam  Physical Exam  MENTAL STATUS:  General appearance: in NAD, does not cooperate well with exam   Orientation: [place] to self, time, place and condition   Language: Expression, repetition, " "naming, comprehension intact.   Concentration: Intact  Fund of knowledge: Appropriate    CRANIAL NERVES:  - Fundoscopic exam: Deferred   - II/III: PERRL  - II:  Visual fields intact to confrontation bilaterally   - III, IV, VI: EOMI to pursuit without nystagmus  - V: V1-V3 sensation intact bilaterally  - VII: Face muscles symmetric with smile and eye closure  - VIII: Intact to finger rub  - IX, X: Palate elevated symmetrically bilaterally, no hoarseness  - XI: 5/5 strength on shoulder shrugging bilaterally  - XII: Tongue midline without atrophy or fasciculation    MOTOR: Tone and bulk normal in all extremities  STRENGTH: 5/5 strength tested proximally and distally in BUE and BLE     REFLEXES: R L  Biceps   +2 +2  Brachioradialis +2 +2  Patellar   +2 +2  Achilles   +1 +1  Plantar   Mute mute   No clonus, frontal release signs or other pathologic reflexes present.     COORDINATION: Intact on finger to nose bl, intact on heel to shin bl, FREDDY intact bl  SENSORY: Intact to light touch in BUE and BLE  GAIT: deferred       Last Recorded Vitals  Blood pressure (!) 162/47, pulse 61, temperature 36.6 °C (97.9 °F), temperature source Oral, resp. rate 18, height 1.702 m (5' 7\"), weight 74.4 kg (164 lb), SpO2 100%.        Relevant Results      NIH Stroke Scale  1A. Level of Consciousness: Alert, Keenly Responsive  1B. Ask Month and Age: 1 Question Right  1C. Blink Eyes & Squeeze Hands: Performs Both Tasks  2. Best Gaze: Normal  3. Visual: No Visual Loss  4. Facial Palsy: Normal Symmetrical Movements  5A. Motor - Left Arm: No Drift  5B. Motor - Right Arm: No Drift  6A. Motor - Left Leg: No Drift  6B. Motor - Right Leg: No Drift  7. Limb Ataxia: Absent  8. Sensory Loss: Normal  9. Best Language: No Aphasia  10. Dysarthria: Normal  11. Extinction and Inattention: No Abnormality  NIH Stroke Scale: 1           Margaret Coma Scale  Best Eye Response: Spontaneous  Best Verbal Response: Oriented  Best Motor Response: Follows " commands  [place] Coma Scale Score: 15                No MRI head results found for the past 14 days  CT head wo IV contrast    Result Date: 11/16/2024  Interpreted By:  Sherita Simental, STUDY: CT HEAD WO IV CONTRAST;  11/16/2024 9:28 am   INDICATION: Signs/Symptoms:Altered mental status.   COMPARISON: 09/13/2022   ACCESSION NUMBER(S): [id number]   ORDERING CLINICIAN: VIRA ANDERSEN   TECHNIQUE: Examination was performed in the axial plane using soft tissue and bone algorithm.   FINDINGS: INTRACRANIAL: There is prominence of the ventricular system and cerebral sulci consistent with cerebral atrophy. There are periventricular hypodensities consistent with  mild small vessel disease. No mass or mass effect is identified. There is no hemorrhage or subdural fluid collection. There is no acute infarct.     EXTRACRANIAL: Visualized paranasal sinuses and mastoids are clear.       No acute intracranial pathology.   MACRO: None   Signed by: Sherita Simental 11/16/2024 9:35 AM Dictation workstation:   [id number]   Transthoracic Echo (TTE) Complete    Result Date: 11/14/2024           [place] [address]            Phone [phone] TRANSTHORACIC ECHOCARDIOGRAM REPORT Patient Name:       WILLIAM A FRANKE Reading Physician:    [id number] [facility] Study Date:         11/14/2024           Ordering Provider:    [id number] LESLIE DENTON MRN/PID:            [id number]             Fellow: Accession#:         [id number]         Nurse: Date of Birth/Age:  1950 / 73      Sonographer:          Rafael rg RDCS Gender Assigned at  M                    Additional Staff: Birth: Height:             170.18 cm            Admit Date: Weight:             77.11 kg             Admission Status:      Inpatient -                                                                Routine BSA / BMI:          1.89 m2 / 26.63      Department Location:  [facility]                     kg/m2 Blood Pressure: 215 /52 mmHg Study Type:    TRANSTHORACIC ECHO (TTE) COMPLETE Diagnosis/ICD: Other chest pain-R07.89 Indication:    Chest Pain CPT Codes:     Echo Complete w Full Doppler-34001 Patient History: Diabetes:          Yes Pertinent History: HTN, Hyperlipidemia, CAD, Chest Pain, CHF, LE Edema, Dyspnea,                    Syncope and Murmur. ESRD, PCI 2022,HFrEF 45-50%, GERD. Study Detail: The following Echo studies were performed: 2D, M-Mode, Doppler and               color flow. Technically challenging study due to poor acoustic               windows.  PHYSICIAN INTERPRETATION: Left Ventricle: The left ventricular systolic function is normal, with a visually estimated ejection fraction of 55-60%. There is moderate concentric left ventricular hypertrophy. There are no regional wall motion abnormalities. The left ventricular cavity size is normal. There is mild increased septal and moderately increased posterior left ventricular wall thickness. Spectral Doppler shows a Grade II (pseudonormal pattern) of left ventricular diastolic filling with an elevated left atrial pressure. Left Atrium: The left atrium is normal in size. Right Ventricle: The right ventricle is normal in size. There is normal right ventricular global systolic function. Right Atrium: The right atrium is normal in size. Aortic Valve: The aortic valve is trileaflet. There is mild aortic valve cusp calcification. The aortic valve dimensionless index is 0.67. There is no evidence of aortic valve regurgitation. The peak instantaneous gradient of the aortic valve is 14 mmHg. The mean gradient of the aortic valve is 7 mmHg. Mitral Valve: The mitral valve is normal in structure. There is mild mitral valve regurgitation. Tricuspid Valve: The tricuspid valve is  structurally normal. There is mild tricuspid regurgitation. Pulmonic Valve: The pulmonic valve is structurally normal. There is trace pulmonic valve regurgitation. Pericardium: No pericardial effusion noted. Aorta: The aortic root is normal.  CONCLUSIONS:  1. The left ventricular systolic function is normal, with a visually estimated ejection fraction of 55-60%.  2. Spectral Doppler shows a Grade II (pseudonormal pattern) of left ventricular diastolic filling with an elevated left atrial pressure.  3. There is normal right ventricular global systolic function.  4. Mild mitral valve regurgitation.  5. There is moderately increased posterior left ventricular wall thickness. QUANTITATIVE DATA SUMMARY:  2D MEASUREMENTS:            Normal Ranges: Ao Root s:       2.70 cm IVSd:            1.16 cm    (0.6-1.1cm) LVPWd:           1.33 cm    (0.6-1.1cm) LVIDd:           5.04 cm    (3.9-5.9cm) LVIDs:           3.62 cm LV Mass Index:   138.7 g/m2 LV % FS          28.2 %  LA VOLUME:                   Normal Ranges: LA Vol A4C:       71.5 ml LA Vol A2C:       68.7 ml LA Vol Index BSA: 37.2 ml/m2  RA VOLUME BY A/L METHOD:          Normal Ranges: RA Area A4C:             11.3 cm2  LV SYSTOLIC FUNCTION BY 2D PLANIMETRY (MOD):                      Normal Ranges: EF-A4C View:    52 % (>=55%) EF-A2C View:    61 % EF-Biplane:     58 % EF-Visual:      58 % EF-3DQ:         51 % LV EF Reported: 58 %  LV DIASTOLIC FUNCTION:           Normal Ranges: MV Peak E:             0.86 m/s  (0.7-1.2 m/s) MV Peak A:             0.83 m/s  (0.42-0.7 m/s) E/A Ratio:             1.03      (1.0-2.2) MV e'                  0.063 m/s (>8.0) MV lateral e'          0.07 m/s MV medial e'           0.05 m/s E/e' Ratio:            13.66     (<8.0)  MITRAL VALVE:          Normal Ranges: MV DT:        291 msec (150-240msec)  AORTIC VALVE:                      Normal Ranges: AoV Vmax:                1.87 m/s  (<=1.7m/s) AoV Peak P.0 mmHg (<20mmHg)  AoV Mean P.3 mmHg  (1.7-11.5mmHg) LVOT Max Joss:            1.20 m/s  (<=1.1m/s) AoV VTI:                 40.32 cm  (18-25cm) LVOT VTI:                27.10 cm LVOT Diameter:           1.96 cm   (1.8-2.4cm) AoV Area, VTI:           2.03 cm2  (2.5-5.5cm2) AoV Area,Vmax:           1.94 cm2  (2.5-4.5cm2) AoV Dimensionless Index: 0.67  RIGHT VENTRICLE: RV Basal 3.10 cm RV Mid   2.30 cm RV Major 5.6 cm  TRICUSPID VALVE/RVSP:         Normal Ranges: IVC Diam:             1.39 cm  PULMONIC VALVE:          Normal Ranges: PV Max Joss:     1.6 m/s  (0.6-0.9m/s) PV Max P.7 mmHg  [id number] [facility] Electronically signed on 2024 at 3:29:19 PM  ** Final **          BNP   Date/Time Value Ref Range Status   2024 02:56  (H) 0 - 99 pg/mL Final        I have personally reviewed the following imaging results CT head wo IV contrast    Result Date: 2024  Interpreted By:  Sherita Simental, STUDY: CT HEAD WO IV CONTRAST;  2024 9:28 am   INDICATION: Signs/Symptoms:Altered mental status.   COMPARISON: 2022   ACCESSION NUMBER(S): [id number]   ORDERING CLINICIAN: VIRA ANDERSEN   TECHNIQUE: Examination was performed in the axial plane using soft tissue and bone algorithm.   FINDINGS: INTRACRANIAL: There is prominence of the ventricular system and cerebral sulci consistent with cerebral atrophy. There are periventricular hypodensities consistent with  mild small vessel disease. No mass or mass effect is identified. There is no hemorrhage or subdural fluid collection. There is no acute infarct.     EXTRACRANIAL: Visualized paranasal sinuses and mastoids are clear.       No acute intracranial pathology.   MACRO: None   Signed by: Sherita Simental 2024 9:35 AM Dictation workstation:   [id number]    XR chest 1 view    Result Date: 2024  Interpreted By:  Gavin Kuhn, STUDY: XR CHEST 1 VIEW;  2024 8:37 am   INDICATION: Signs/Symptoms:Altered mental status.      COMPARISON: 11/13/2024   ACCESSION NUMBER(S): [id number]   ORDERING CLINICIAN: VIRA ANDERSEN   FINDINGS: There is a left axillary brachial vascular stent.   The heart is enlarged. The pulmonary vasculature minimally congested centrally with significant improvement in the previous pulmonary edema. There is a small right pleural effusion with adjacent atelectasis.   No pneumothorax.         Marked improvement in pulmonary edema compared to 11/13/2024 with residual minimal peribronchovascular interstitial edema.   Small right pleural effusion with adjacent atelectasis.   MACRO: None.   Signed by: Gavin Kuhn 11/16/2024 8:46 AM Dictation workstation:   [id number]    Transthoracic Echo (TTE) Complete    Result Date: 11/14/2024           [place] [address]            Phone [phone] TRANSTHORACIC ECHOCARDIOGRAM REPORT Patient Name:       SERJIO CARBAJAL FRANKE Reading Physician:    [id number] Jared Rooney DO Study Date:         11/14/2024           Ordering Provider:    [id number] LESLIE DENTON MRN/PID:            [id number]             Fellow: Accession#:         [id number]         Nurse: Date of Birth/Age:  1950 / 73      Sonographer:          Rafael rg                                      [place] Gender Assigned at  M                    Additional Staff: Birth: Height:             170.18 cm            Admit Date: Weight:             77.11 kg             Admission Status:     Inpatient -                                                                Routine BSA / BMI:          1.89 m2 / 26.63      Department Location:  [facility]                     kg/m2 Blood Pressure: 215 /52 mmHg Study Type:    TRANSTHORACIC ECHO (TTE) COMPLETE Diagnosis/ICD: Other chest pain-R07.89 Indication:    Chest Pain CPT Codes:      Echo Complete w Full Doppler-77007 Patient History: Diabetes:          Yes Pertinent History: HTN, Hyperlipidemia, CAD, Chest Pain, CHF, LE Edema, Dyspnea,                    Syncope and Murmur. ESRD, PCI 2022,HFrEF 45-50%, GERD. Study Detail: The following Echo studies were performed: 2D, M-Mode, Doppler and               color flow. Technically challenging study due to poor acoustic               windows.  PHYSICIAN INTERPRETATION: Left Ventricle: The left ventricular systolic function is normal, with a visually estimated ejection fraction of 55-60%. There is moderate concentric left ventricular hypertrophy. There are no regional wall motion abnormalities. The left ventricular cavity size is normal. There is mild increased septal and moderately increased posterior left ventricular wall thickness. Spectral Doppler shows a Grade II (pseudonormal pattern) of left ventricular diastolic filling with an elevated left atrial pressure. Left Atrium: The left atrium is normal in size. Right Ventricle: The right ventricle is normal in size. There is normal right ventricular global systolic function. Right Atrium: The right atrium is normal in size. Aortic Valve: The aortic valve is trileaflet. There is mild aortic valve cusp calcification. The aortic valve dimensionless index is 0.67. There is no evidence of aortic valve regurgitation. The peak instantaneous gradient of the aortic valve is 14 mmHg. The mean gradient of the aortic valve is 7 mmHg. Mitral Valve: The mitral valve is normal in structure. There is mild mitral valve regurgitation. Tricuspid Valve: The tricuspid valve is structurally normal. There is mild tricuspid regurgitation. Pulmonic Valve: The pulmonic valve is structurally normal. There is trace pulmonic valve regurgitation. Pericardium: No pericardial effusion noted. Aorta: The aortic root is normal.  CONCLUSIONS:  1. The left ventricular systolic function is normal, with a visually estimated ejection  fraction of 55-60%.  2. Spectral Doppler shows a Grade II (pseudonormal pattern) of left ventricular diastolic filling with an elevated left atrial pressure.  3. There is normal right ventricular global systolic function.  4. Mild mitral valve regurgitation.  5. There is moderately increased posterior left ventricular wall thickness. QUANTITATIVE DATA SUMMARY:  2D MEASUREMENTS:            Normal Ranges: Ao Root s:       2.70 cm IVSd:            1.16 cm    (0.6-1.1cm) LVPWd:           1.33 cm    (0.6-1.1cm) LVIDd:           5.04 cm    (3.9-5.9cm) LVIDs:           3.62 cm LV Mass Index:   138.7 g/m2 LV % FS          28.2 %  LA VOLUME:                   Normal Ranges: LA Vol A4C:       71.5 ml LA Vol A2C:       68.7 ml LA Vol Index BSA: 37.2 ml/m2  RA VOLUME BY A/L METHOD:          Normal Ranges: RA Area A4C:             11.3 cm2  LV SYSTOLIC FUNCTION BY 2D PLANIMETRY (MOD):                      Normal Ranges: EF-A4C View:    52 % (>=55%) EF-A2C View:    61 % EF-Biplane:     58 % EF-Visual:      58 % EF-3DQ:         51 % LV EF Reported: 58 %  LV DIASTOLIC FUNCTION:           Normal Ranges: MV Peak E:             0.86 m/s  (0.7-1.2 m/s) MV Peak A:             0.83 m/s  (0.42-0.7 m/s) E/A Ratio:             1.03      (1.0-2.2) MV e'                  0.063 m/s (>8.0) MV lateral e'          0.07 m/s MV medial e'           0.05 m/s E/e' Ratio:            13.66     (<8.0)  MITRAL VALVE:          Normal Ranges: MV DT:        291 msec (150-240msec)  AORTIC VALVE:                      Normal Ranges: AoV Vmax:                1.87 m/s  (<=1.7m/s) AoV Peak P.0 mmHg (<20mmHg) AoV Mean P.3 mmHg  (1.7-11.5mmHg) LVOT Max Joss:            1.20 m/s  (<=1.1m/s) AoV VTI:                 40.32 cm  (18-25cm) LVOT VTI:                27.10 cm LVOT Diameter:           1.96 cm   (1.8-2.4cm) AoV Area, VTI:           2.03 cm2  (2.5-5.5cm2) AoV Area,Vmax:           1.94 cm2  (2.5-4.5cm2) AoV Dimensionless Index:  "0.67  RIGHT VENTRICLE: RV Basal 3.10 cm RV Mid   2.30 cm RV Major 5.6 cm  TRICUSPID VALVE/RVSP:         Normal Ranges: IVC Diam:             1.39 cm  PULMONIC VALVE:          Normal Ranges: PV Max Joss:     1.6 m/s  (0.6-0.9m/s) PV Max P.7 mmHg  [id number] Jared Rooney DO Electronically signed on 2024 at 3:29:19 PM  ** Final **     ECG 12 lead    Result Date: 2024  Sinus bradycardia with sinus arrhythmia Otherwise normal ECG When compared with ECG of 15-SEP-2022 03:39, No significant change was found Confirmed by Jared Rooney ([id number]) on 2024 2:04:59 PM    XR chest 2 views    Result Date: 2024  Interpreted By:  Ruben Cevallos, STUDY: XR CHEST 2 VIEWS;  2024 3:58 pm   INDICATION: Signs/Symptoms:sob  low o2.   10/29/2022   COMPARISON: None.   ACCESSION NUMBER(S): [id number]   ORDERING CLINICIAN: LUDMILA CLARK   FINDINGS: Status post removal of the right-sided chest tube. Perihilar infiltrates, worse since the prior exam. Blunting of both costophrenic angles likely due to new bibasilar pleural effusions.       1. Worsening perihilar and bibasilar infiltrates and effusions for which continued follow-up recommended.       MACRO: None   Signed by: Ruben Cevallos 2024 4:21 PM Dictation workstation:   [id number]  .     Stroke Alert CT/MRI review: n/a not a stroke alert    IV Thrombolysis IV Thrombolysis Checklist      IV Thrombolysis Given: No; Thrombolysis contraindication reason: Working diagnosis is NOT a suspected ischemic stroke       Assessment/Plan   Assessment & Plan  Syncope, unspecified syncope type    William A Franke is a 73 y.o. male with hx of ESRD on dialysis, HTN, DM, CAD presenting with AMS. Neuro consulted for \"r/o stroke\".  Per chart review pt's BP has been stably persistently high since 2024. Outpt readings show SBP of 180-200. Then last admission pt presented with CP, discharge BP 120s. Pt reports lightheadedness. Pt never had stroke like sx.     Discussed that transient " slurring of speech after syncope is not a stroke / TIA sx. He did have acute BP lowering last admission from chronically high SBP ~200s down to 120s, explained to wife pt may continue to feel dizziness until brain adopts to the new blood pressure.     #non neurological syncope     - no further neurological evaluation is indicated  - continue management of antihypertensives per primary and cardiology    Thank you for the consult. Will sign off.      I spent 80 minutes in the professional and overall care of this patient including education provided for the patient and family.      Gisell Green MD

## 2024-11-17 NOTE — NURSING NOTE
Patient bp 106/34. Recheck via manual for 100/40. Notified [facility] about concern and informed of hypertensive meds being held.

## 2024-11-17 NOTE — CONSULTS
Inpatient consult to Cardiology  Consult performed by: VERÓNICA Le-CNP  Consult ordered by: Desirae Alicea MD  Reason for consult: Syncope        History Of Present Illness:    William A Franke is a 73 y.o. male presenting with syncope. Current with Dr. Ardon.  Past medical history of end-stage renal disease with hemodialysis, hypertensive disorder, coronary artery disease with history of cardiac stenting diabetes mellitus type 2,anemia of chronic disease.  Patient presents the hospital with chief complaint of syncope.  Patient was recently hospitalized for complaints of chest pain and shortness of breath and was discharged on 11/15/2024.  Patient was discharged to home and on the 16th underwent dialysis.  He was reported to have low blood pressure and dialysis and after being discharged to home.  On his previous hospitalization he was significant hypertensive with systolic above 200.  EKG a sinus arrhythmia without acute ST elevation or T wave inversion.  Potassium 3.9 with a sodium of 138.  Creatinine 7.17.  Hemoglobin 11.0.  Patient reports no chest pain or pressure palpitations or feeling rapid heart rate.  He was admitted on telemetry for further testing and treatment.    Last Recorded Vitals:  Vitals:    11/16/24 2230 11/16/24 2340 11/17/24 0512 11/17/24 0759   BP: 110/60 (!) 132/44 (!) 156/44 (!) 162/47   BP Location:  Right arm Right arm Right arm   Patient Position:  Lying Lying Lying   Pulse: 81 63 57 61   Resp: 16 18 17 18   Temp:  36.6 °C (97.9 °F)  36.6 °C (97.9 °F)   TempSrc:  Oral  Oral   SpO2: 94% 96% 95% 100%   Weight:       Height:           Last Labs:  CBC - 11/17/2024:  4:18 AM  6.3 11.0 253    34.2      CMP - 11/17/2024:  4:18 AM  8.9 7.6 29 --- 1.1   6.6 4.5 16 43      PTT - No results in last year.  _   _ _     Troponin I, High Sensitivity   Date/Time Value Ref Range Status   11/13/2024 04:03 PM 54 (HH) 0 - 20 ng/L Final     Comment:     Previous result verified on  11/13/2024 1543 on specimen/case [id number] called with component [facility] for procedure Troponin I, High Sensitivity, Initial with value 54 ng/L.   11/13/2024 02:56 PM 54 (HH) 0 - 20 ng/L Final     BNP   Date/Time Value Ref Range Status   11/13/2024 02:56  (H) 0 - 99 pg/mL Final     Hemoglobin A1C   Date/Time Value Ref Range Status   11/08/2024 07:11 AM 6.3 (H) See comment % Final   08/09/2024 09:54 AM 6.5 (H) see below % Final     LDL Calculated   Date/Time Value Ref Range Status   11/08/2024 07:11 AM 52 <=99 mg/dL Final     Comment:                                 Near   Borderline      AGE      Desirable  Optimal    High     High     Very High     0-19 Y     0 - 109     ---    110-129   >/= 130     ----    20-24 Y     0 - 119     ---    120-159   >/= 160     ----      >24 Y     0 -  99   100-129  130-159   160-189     >/=190     08/09/2024 09:54 AM 33 <=99 mg/dL Final     Comment:                                 Near   Borderline      AGE      Desirable  Optimal    High     High     Very High     0-19 Y     0 - 109     ---    110-129   >/= 130     ----    20-24 Y     0 - 119     ---    120-159   >/= 160     ----      >24 Y     0 -  99   100-129  130-159   160-189     >/=190     05/29/2024 06:55 AM 37 <=99 mg/dL Final     Comment:                                 Near   Borderline      AGE      Desirable  Optimal    High     High     Very High     0-19 Y     0 - 109     ---    110-129   >/= 130     ----    20-24 Y     0 - 119     ---    120-159   >/= 160     ----      >24 Y     0 -  99   100-129  130-159   160-189     >/=190       VLDL   Date/Time Value Ref Range Status   11/08/2024 07:11 AM 34 0 - 40 mg/dL Final   08/09/2024 09:54 AM 39 0 - 40 mg/dL Final   05/29/2024 06:55 AM 53 (H) 0 - 40 mg/dL Final      Results for orders placed or performed during the hospital encounter of 11/16/24 (from the past 24 hours)   POCT GLUCOSE   Result Value Ref Range    POCT Glucose 128 (H) 74 - 99 mg/dL   POCT GLUCOSE    Result Value Ref Range    POCT Glucose 118 (H) 74 - 99 mg/dL   CBC   Result Value Ref Range    WBC 6.3 4.4 - 11.3 x10*3/uL    nRBC 0.0 0.0 - 0.0 /100 WBCs    RBC 3.69 (L) 4.50 - 5.90 x10*6/uL    Hemoglobin 11.0 (L) 13.5 - 17.5 g/dL    Hematocrit 34.2 (L) 41.0 - 52.0 %    MCV 93 80 - 100 fL    MCH 29.8 26.0 - 34.0 pg    MCHC 32.2 32.0 - 36.0 g/dL    RDW 16.9 (H) 11.5 - 14.5 %    Platelets 253 150 - 450 x10*3/uL   Basic Metabolic Panel   Result Value Ref Range    Glucose 96 74 - 99 mg/dL    Sodium 138 136 - 145 mmol/L    Potassium 3.9 3.5 - 5.3 mmol/L    Chloride 98 98 - 107 mmol/L    Bicarbonate 27 21 - 32 mmol/L    Anion Gap 17 10 - 20 mmol/L    Urea Nitrogen 41 (H) 6 - 23 mg/dL    Creatinine 7.17 (H) 0.50 - 1.30 mg/dL    eGFR 7 (L) >60 mL/min/1.73m*2    Calcium 8.9 8.6 - 10.3 mg/dL   POCT GLUCOSE   Result Value Ref Range    POCT Glucose 107 (H) 74 - 99 mg/dL     *Note: Due to a large number of results and/or encounters for the requested time period, some results have not been displayed. A complete set of results can be found in Results Review.       Last I/O:  No intake/output data recorded.    Past Cardiology Tests (Last 3 Years):  EKG:  ECG 12 lead 11/16/2024: Virgil arrhythmia    Echo:  Transthoracic Echo (TTE) Complete 11/14/2024  Transthoracic Echo (TTE) Complete    Result Date: 11/14/2024           [place] [address]            Phone [phone] TRANSTHORACIC ECHOCARDIOGRAM REPORT Patient Name:       WILLIAM A FRANKE Reading Physician[facility]    [id number] [facility] Study Date:         11/14/2024           Ordering Provider:    [id number] LESLIE DENTON MRN/PID:            [id number]             Fellow: Accession#:         [id number]         Nurse: Date of Birth/Age:  1950 / 73      Sonographer:          Rafael Hines                      years                                      RDCS Gender Assigned at  M                    Additional Staff: Birth: Height:             170.18 cm            Admit Date: Weight:             77.11 kg             Admission Status:     Inpatient -                                                                Routine BSA / BMI:          1.89 m2 / 26.63      Department Location:  [facility]                     kg/m2 Blood Pressure: 215 /52 mmHg Study Type:    TRANSTHORACIC ECHO (TTE) COMPLETE Diagnosis/ICD: Other chest pain-R07.89 Indication:    Chest Pain CPT Codes:     Echo Complete w Full Doppler-19105 Patient History: Diabetes:          Yes Pertinent History: HTN, Hyperlipidemia, CAD, Chest Pain, CHF, LE Edema, Dyspnea,                    Syncope and Murmur. ESRD, PCI 2022,HFrEF 45-50%, GERD. Study Detail: The following Echo studies were performed: 2D, M-Mode, Doppler and               color flow. Technically challenging study due to poor acoustic               windows.  PHYSICIAN INTERPRETATION: Left Ventricle: The left ventricular systolic function is normal, with a visually estimated ejection fraction of 55-60%. There is moderate concentric left ventricular hypertrophy. There are no regional wall motion abnormalities. The left ventricular cavity size is normal. There is mild increased septal and moderately increased posterior left ventricular wall thickness. Spectral Doppler shows a Grade II (pseudonormal pattern) of left ventricular diastolic filling with an elevated left atrial pressure. Left Atrium: The left atrium is normal in size. Right Ventricle: The right ventricle is normal in size. There is normal right ventricular global systolic function. Right Atrium: The right atrium is normal in size. Aortic Valve: The aortic valve is trileaflet. There is mild aortic valve cusp calcification. The aortic valve dimensionless index is 0.67. There is no evidence of aortic valve regurgitation. The peak  instantaneous gradient of the aortic valve is 14 mmHg. The mean gradient of the aortic valve is 7 mmHg. Mitral Valve: The mitral valve is normal in structure. There is mild mitral valve regurgitation. Tricuspid Valve: The tricuspid valve is structurally normal. There is mild tricuspid regurgitation. Pulmonic Valve: The pulmonic valve is structurally normal. There is trace pulmonic valve regurgitation. Pericardium: No pericardial effusion noted. Aorta: The aortic root is normal.  CONCLUSIONS:  1. The left ventricular systolic function is normal, with a visually estimated ejection fraction of 55-60%.  2. Spectral Doppler shows a Grade II (pseudonormal pattern) of left ventricular diastolic filling with an elevated left atrial pressure.  3. There is normal right ventricular global systolic function.  4. Mild mitral valve regurgitation.  5. There is moderately increased posterior left ventricular wall thickness. QUANTITATIVE DATA SUMMARY:  2D MEASUREMENTS:            Normal Ranges: Ao Root s:       2.70 cm IVSd:            1.16 cm    (0.6-1.1cm) LVPWd:           1.33 cm    (0.6-1.1cm) LVIDd:           5.04 cm    (3.9-5.9cm) LVIDs:           3.62 cm LV Mass Index:   138.7 g/m2 LV % FS          28.2 %  LA VOLUME:                   Normal Ranges: LA Vol A4C:       71.5 ml LA Vol A2C:       68.7 ml LA Vol Index BSA: 37.2 ml/m2  RA VOLUME BY A/L METHOD:          Normal Ranges: RA Area A4C:             11.3 cm2  LV SYSTOLIC FUNCTION BY 2D PLANIMETRY (MOD):                      Normal Ranges: EF-A4C View:    52 % (>=55%) EF-A2C View:    61 % EF-Biplane:     58 % EF-Visual:      58 % EF-3DQ:         51 % LV EF Reported: 58 %  LV DIASTOLIC FUNCTION:           Normal Ranges: MV Peak E:             0.86 m/s  (0.7-1.2 m/s) MV Peak A:             0.83 m/s  (0.42-0.7 m/s) E/A Ratio:             1.03      (1.0-2.2) MV e'                  0.063 m/s (>8.0) MV lateral e'          0.07 m/s MV medial e'           0.05 m/s E/e' Ratio:             13.66     (<8.0)  MITRAL VALVE:          Normal Ranges: MV DT:        291 msec (150-240msec)  AORTIC VALVE:                      Normal Ranges: AoV Vmax:                1.87 m/s  (<=1.7m/s) AoV Peak P.0 mmHg (<20mmHg) AoV Mean P.3 mmHg  (1.7-11.5mmHg) LVOT Max Joss:            1.20 m/s  (<=1.1m/s) AoV VTI:                 40.32 cm  (18-25cm) LVOT VTI:                27.10 cm LVOT Diameter:           1.96 cm   (1.8-2.4cm) AoV Area, VTI:           2.03 cm2  (2.5-5.5cm2) AoV Area,Vmax:           1.94 cm2  (2.5-4.5cm2) AoV Dimensionless Index: 0.67  RIGHT VENTRICLE: RV Basal 3.10 cm RV Mid   2.30 cm RV Major 5.6 cm  TRICUSPID VALVE/RVSP:         Normal Ranges: IVC Diam:             1.39 cm  PULMONIC VALVE:          Normal Ranges: PV Max Joss:     1.6 m/s  (0.6-0.9m/s) PV Max P.7 mmHg  [id number] Jared Rooney DO Electronically signed on 2024 at 3:29:19 PM  ** Final **        Ejection Fractions:  EF   Date/Time Value Ref Range Status   2024 02:41 PM 58 %      Past Medical History:  He has a past medical history of Personal history of other endocrine, nutritional and metabolic disease and Personal history of other specified conditions (2020).    Past Surgical History:  He has a past surgical history that includes Shoulder surgery (04/15/2013) and Knee arthroscopy w/ debridement (04/15/2013).      Social History:  He reports that he has never smoked. He has never used smokeless tobacco. He reports that he does not drink alcohol and does not use drugs.    Family History:  No family history on file.     Allergies:  Patient has no known allergies.    Inpatient Medications:  Scheduled medications   Medication Dose Route Frequency    allopurinol  100 mg oral Daily    amLODIPine  10 mg oral Daily    aspirin  81 mg oral Daily    atorvastatin  80 mg oral Nightly    carvedilol  6.25 mg oral BID    ergocalciferol  50,000 Units oral q14 days    escitalopram  5 mg oral Nightly     "ezetimibe  10 mg oral Daily    hydrALAZINE  100 mg oral TID    insulin lispro  10 Units subcutaneous TID    isosorbide mononitrate ER  120 mg oral Daily    pantoprazole  40 mg oral Daily    sevelamer carbonate  1,600 mg oral TID    SITagliptin phosphate  25 mg oral Daily    vitamin B complex-vitamin C-folic acid  1 capsule oral Daily     PRN medications   Medication    nitroglycerin     Continuous Medications   Medication Dose Last Rate    oxygen         Outpatient Medications:  Current Outpatient Medications   Medication Instructions    allopurinol (ZYLOPRIM) 100 mg, oral, Daily    amLODIPine (NORVASC) 10 mg, oral, Daily    aspirin 81 mg, Daily    atorvastatin (LIPITOR) 80 mg, oral, Nightly    B complex-vitamin C-folic acid (Nephro-Shea) 0.8 mg tablet 0.8 mg, Daily    BD Ultra-Fine Mini Pen Needle 31 gauge x 3/16\" needle USE AS DIRECTED 4 times a day    carvedilol (COREG) 6.25 mg, oral, 2 times daily (morning and late afternoon)    ergocalciferol (VITAMIN D-2) 50,000 Units, Every 14 days    ezetimibe (ZETIA) 10 mg, oral, Daily    hydrALAZINE (APRESOLINE) 100 mg, oral, 3 times daily    insulin lispro (HUMALOG) 10 Units, 3 times daily (morning, midday, late afternoon)    isosorbide mononitrate ER (IMDUR) 120 mg, oral, Daily, Do not crush or chew.    Januvia 25 mg, oral, Daily    nitroglycerin (NITROSTAT) 0.4 mg, Every 5 min PRN    oxygen (O2) gas therapy 1 each, inhalation, Continuous    pantoprazole (PROTONIX) 40 mg, Daily    sevelamer carbonate (Renvela) 800 mg tablet Take 2 tablets (1,600 mg) by mouth 3 times daily (morning, midday, late afternoon). Three times daily with meals       Physical Exam:  General: alert, oriented x 2-3 with some confusion.  No obvious distress  HEENT: normal cephalic, atraumatic, no scleral icterus  Neck: No JVD, bruit or thrill, masses or tenderness   Heart: S1/S2, Rate 70, Rhythm irregular, no s3 or s4, no murmur, thrill, or heaves at PMI.   Lungs: Clear, equal expansion and " excursion, no wheezes, crackles, rhales or rhonci. Room air.  No conversational dyspnea appreciated.  No tachypnea.  No pain with deep inspiration  Addomen: Bowel sounds x 4, normoactive  Genitourinary: deferred   Extremities: No significant upper or lower extremity edema appreciated.       Assessment/Plan     Syncope  Hypotension postdialysis  chronic systolic heart failure  End-stage renal disease with hemodialysis  Altered mentation  Hypertensive disorder  Ischemic cardiomyopathy  Anemia of chronic disease  Medical noncompliance    Overall impression:    11/17: Patient represents the hospital less than 48 hours after discharge.  Previously was admitted for medical noncompliance, missed dialysis, and severe hypertension.  Now presents after dialysis with a postdialysis hypotension and syncopal episode.  Details are limited.  Patient is a poor historian.  Denies complaints chest pain or pressure palpitations or feeling of rapid heart rate.  After further discussion with ED provider, the patient presented in a significant altered mental status.  He was unable to answer questions appropriately and after initial treatment with some hydration the patient's mentation did improve.  I am suspicious at this point that the patient's previous noncompliance has led to increased blood pressure medications under the belief that the patient has been taking his medications.  Postdialysis his blood pressure was significantly low which I believe caused this near-syncope or altered mentation status.  On admission now his blood pressure remains labile but is improving.  For this patient I believe an ideal systolic blood pressure will likely be between 140 and 150 to allow room for dialysis.  At this point I would continue with his current dosing of carvedilol at 6.25 mg twice daily.  Will reduce his amlodipine to 5 mg daily.  Would continue with hydralazine 100 mg 3 times daily, however would advise the patient to not take this prior  to dialysis.  Otherwise the patient appears to be generally euvolemic and is chest pain-free.  As mentioned on previous hospitalization I do suspect the patient has some form of underlying dementia.  Would like to follow a further 24 hours to continue to monitor on telemetry monitor and monitor blood pressures.       Code Status:  Full Code    I spent 60 minutes in the professional and overall care of this patient.        Darrell Blackwell, APRN-CNP

## 2024-11-18 ENCOUNTER — PATIENT OUTREACH (OUTPATIENT)
Dept: CARE COORDINATION | Facility: CLINIC | Age: 74
End: 2024-11-18

## 2024-11-18 ENCOUNTER — APPOINTMENT (OUTPATIENT)
Dept: DIALYSIS | Facility: HOSPITAL | Age: 74
End: 2024-11-18
Payer: MEDICARE

## 2024-11-18 VITALS
HEART RATE: 66 BPM | RESPIRATION RATE: 16 BRPM | TEMPERATURE: 97.2 F | HEIGHT: 67 IN | DIASTOLIC BLOOD PRESSURE: 42 MMHG | BODY MASS INDEX: 25.74 KG/M2 | OXYGEN SATURATION: 96 % | SYSTOLIC BLOOD PRESSURE: 158 MMHG | WEIGHT: 164 LBS

## 2024-11-18 LAB
ALBUMIN SERPL BCP-MCNC: 3.7 G/DL (ref 3.4–5)
ANION GAP SERPL CALCULATED.3IONS-SCNC: 21 MMOL/L (ref 10–20)
ATRIAL RATE: 73 BPM
BUN SERPL-MCNC: 57 MG/DL (ref 6–23)
CALCIUM SERPL-MCNC: 8.5 MG/DL (ref 8.6–10.3)
CHLORIDE SERPL-SCNC: 97 MMOL/L (ref 98–107)
CO2 SERPL-SCNC: 23 MMOL/L (ref 21–32)
CREAT SERPL-MCNC: 9.62 MG/DL (ref 0.5–1.3)
EGFRCR SERPLBLD CKD-EPI 2021: 5 ML/MIN/1.73M*2
ERYTHROCYTE [DISTWIDTH] IN BLOOD BY AUTOMATED COUNT: 16.5 % (ref 11.5–14.5)
GLUCOSE BLD MANUAL STRIP-MCNC: 142 MG/DL (ref 74–99)
GLUCOSE SERPL-MCNC: 98 MG/DL (ref 74–99)
HCT VFR BLD AUTO: 31.4 % (ref 41–52)
HGB BLD-MCNC: 10.6 G/DL (ref 13.5–17.5)
MCH RBC QN AUTO: 29.7 PG (ref 26–34)
MCHC RBC AUTO-ENTMCNC: 33.8 G/DL (ref 32–36)
MCV RBC AUTO: 88 FL (ref 80–100)
NRBC BLD-RTO: 0 /100 WBCS (ref 0–0)
P AXIS: 61 DEGREES
P OFFSET: 191 MS
P ONSET: 136 MS
PHOSPHATE SERPL-MCNC: 7.5 MG/DL (ref 2.5–4.9)
PLATELET # BLD AUTO: 260 X10*3/UL (ref 150–450)
POTASSIUM SERPL-SCNC: 3.8 MMOL/L (ref 3.5–5.3)
PR INTERVAL: 166 MS
Q ONSET: 219 MS
QRS COUNT: 12 BEATS
QRS DURATION: 94 MS
QT INTERVAL: 434 MS
QTC CALCULATION(BAZETT): 478 MS
QTC FREDERICIA: 463 MS
R AXIS: 81 DEGREES
RBC # BLD AUTO: 3.57 X10*6/UL (ref 4.5–5.9)
SODIUM SERPL-SCNC: 137 MMOL/L (ref 136–145)
T AXIS: 41 DEGREES
T OFFSET: 436 MS
VENTRICULAR RATE: 73 BPM
WBC # BLD AUTO: 6.3 X10*3/UL (ref 4.4–11.3)

## 2024-11-18 PROCEDURE — 99232 SBSQ HOSP IP/OBS MODERATE 35: CPT | Performed by: NURSE PRACTITIONER

## 2024-11-18 PROCEDURE — 2500000002 HC RX 250 W HCPCS SELF ADMINISTERED DRUGS (ALT 637 FOR MEDICARE OP, ALT 636 FOR OP/ED): Mod: MUE | Performed by: INTERNAL MEDICINE

## 2024-11-18 PROCEDURE — 85027 COMPLETE CBC AUTOMATED: CPT

## 2024-11-18 PROCEDURE — 90937 HEMODIALYSIS REPEATED EVAL: CPT

## 2024-11-18 PROCEDURE — 2500000001 HC RX 250 WO HCPCS SELF ADMINISTERED DRUGS (ALT 637 FOR MEDICARE OP): Performed by: INTERNAL MEDICINE

## 2024-11-18 PROCEDURE — 99238 HOSP IP/OBS DSCHRG MGMT 30/<: CPT | Performed by: INTERNAL MEDICINE

## 2024-11-18 PROCEDURE — 82947 ASSAY GLUCOSE BLOOD QUANT: CPT | Mod: 59

## 2024-11-18 PROCEDURE — 80069 RENAL FUNCTION PANEL: CPT

## 2024-11-18 PROCEDURE — 2500000001 HC RX 250 WO HCPCS SELF ADMINISTERED DRUGS (ALT 637 FOR MEDICARE OP): Performed by: NURSE PRACTITIONER

## 2024-11-18 PROCEDURE — G0378 HOSPITAL OBSERVATION PER HR: HCPCS

## 2024-11-18 PROCEDURE — 36415 COLL VENOUS BLD VENIPUNCTURE: CPT

## 2024-11-18 RX ORDER — AMLODIPINE BESYLATE 5 MG/1
5 TABLET ORAL DAILY
Qty: 30 TABLET | Refills: 0 | Status: SHIPPED | OUTPATIENT
Start: 2024-11-19

## 2024-11-18 RX ORDER — ESCITALOPRAM OXALATE 5 MG/1
5 TABLET ORAL NIGHTLY
Qty: 30 TABLET | Refills: 0 | Status: SHIPPED | OUTPATIENT
Start: 2024-11-18

## 2024-11-18 RX ADMIN — AMLODIPINE BESYLATE 5 MG: 5 TABLET ORAL at 13:11

## 2024-11-18 RX ADMIN — SEVELAMER CARBONATE 1600 MG: 800 TABLET, FILM COATED ORAL at 13:10

## 2024-11-18 RX ADMIN — HYDRALAZINE HYDROCHLORIDE 100 MG: 50 TABLET ORAL at 13:10

## 2024-11-18 RX ADMIN — SITAGLIPTIN 25 MG: 25 TABLET, FILM COATED ORAL at 13:10

## 2024-11-18 RX ADMIN — CARVEDILOL 6.25 MG: 6.25 TABLET, FILM COATED ORAL at 13:11

## 2024-11-18 RX ADMIN — EZETIMIBE 10 MG: 10 TABLET ORAL at 13:10

## 2024-11-18 RX ADMIN — ALLOPURINOL 100 MG: 100 TABLET ORAL at 13:10

## 2024-11-18 RX ADMIN — ISOSORBIDE MONONITRATE 120 MG: 120 TABLET, EXTENDED RELEASE ORAL at 13:10

## 2024-11-18 RX ADMIN — RENO CAPS 1 CAPSULE: 100; 1.5; 1.7; 20; 10; 1; 150; 5; 6 CAPSULE ORAL at 13:10

## 2024-11-18 RX ADMIN — PANTOPRAZOLE SODIUM 40 MG: 40 TABLET, DELAYED RELEASE ORAL at 13:10

## 2024-11-18 RX ADMIN — HYDRALAZINE HYDROCHLORIDE 100 MG: 50 TABLET ORAL at 15:57

## 2024-11-18 RX ADMIN — ASPIRIN 81 MG: 81 TABLET, COATED ORAL at 13:11

## 2024-11-18 RX ADMIN — ATORVASTATIN CALCIUM 80 MG: 80 TABLET, FILM COATED ORAL at 13:11

## 2024-11-18 ASSESSMENT — PAIN SCALES - GENERAL: PAINLEVEL_OUTOF10: 0 - NO PAIN

## 2024-11-18 ASSESSMENT — PAIN - FUNCTIONAL ASSESSMENT: PAIN_FUNCTIONAL_ASSESSMENT: NO/DENIES PAIN

## 2024-11-18 NOTE — CARE PLAN
Over the shift, the patient did not make progress toward the following goals. Barriers to progression include . Recommendations to address these barriers include .      Problem: Diabetes  Goal: Decrease in ketones present in urine by end of shift  11/18/2024 0625 by Linwood Barlow RN  Outcome: Progressing  11/18/2024 0229 by Linwood Barlow RN  Outcome: Progressing  Goal: Maintain electrolyte levels within acceptable range throughout shift  11/18/2024 0625 by Linwood Barlow RN  Outcome: Progressing  11/18/2024 0229 by Linwood Barlow RN  Outcome: Progressing  Goal: Learn about and adhere to nutrition recommendations by end of shift  11/18/2024 0625 by Linwood Barlow RN  Outcome: Progressing  11/18/2024 0229 by Linwood Barlow RN  Outcome: Progressing  Goal: Receive DSME education by end of shift  11/18/2024 0625 by Linwood Bralow RN  Outcome: Progressing  11/18/2024 0229 by Linwood Barlow RN  Outcome: Progressing     Problem: Fall/Injury  Goal: Use assistive devices by end of the shift  11/18/2024 0625 by Linwood Barlow RN  Outcome: Progressing  11/18/2024 0229 by Linwood Barlow RN  Outcome: Progressing  Goal: Pace activities to prevent fatigue by end of the shift  11/18/2024 0625 by Linwood Barlow RN  Outcome: Progressing  11/18/2024 0229 by Linwood Barlow RN  Outcome: Progressing

## 2024-11-18 NOTE — POST-PROCEDURE NOTE
.Report to Receiving RN:    Report To: Alize Bullard RN  Time Report Called: 1154  Hand-Off Communication: post /55, HR 56  Complications During Treatment: No  Ultrafiltration Treatment: Yes, 1L fluid removed during dialysis, pt tolerated dialysis with no issues.  Medications Administered During Dialysis: No  Blood Products Administered During Dialysis: No  Labs Sent During Dialysis: No  Heparin Drip Rate Changes: No  Dialysis Catheter Dressing: N/A  Last Dressing Change: N/A    Electronic Signatures:   (Signed )   Authored:    (Signed )   Authored:     Last Updated: 12:01 PM by KURT MORAN

## 2024-11-18 NOTE — PROGRESS NOTES
11/18/24 1031   Discharge Planning   Who is requesting discharge planning? Provider   Home or Post Acute Services None   Expected Discharge Disposition Home     No skilled needs identified. Pt will dc home with no skilled needs. Please add PT/OT if you think pt needs it.     Safe dc plan home

## 2024-11-18 NOTE — PROGRESS NOTES
"William A Franke is a 73 y.o. male on day 0 of admission presenting with Syncope, unspecified syncope type.    Subjective   Seen for end-stage kidney disease he is dialyzed on Tuesday Thursday Saturday seen and examined on dialysis therapy tolerating procedure very well he is awake and responsive no more syncopal episodes       Objective     Physical Exam  Neck:      Vascular: No carotid bruit.   Cardiovascular:      Rate and Rhythm: Normal rate and regular rhythm.      Heart sounds: No murmur heard.     No friction rub. No gallop.   Pulmonary:      Breath sounds: No wheezing, rhonchi or rales.   Chest:      Chest wall: No tenderness.   Abdominal:      General: There is no distension.      Tenderness: There is no abdominal tenderness. There is no guarding or rebound.   Musculoskeletal:         General: No swelling or tenderness.      Cervical back: Neck supple.      Right lower leg: No edema.      Left lower leg: No edema.   Lymphadenopathy:      Cervical: No cervical adenopathy.         Last Recorded Vitals  Blood pressure (!) 158/42, pulse 66, temperature 36.2 °C (97.2 °F), temperature source Tympanic, resp. rate 16, height 1.702 m (5' 7\"), weight 74.4 kg (164 lb), SpO2 96%.    Intake/Output last 3 Shifts:  No intake/output data recorded.    Current Facility-Administered Medications:     allopurinol (Zyloprim) tablet 100 mg, 100 mg, oral, Daily, Desirae Alicea MD, 100 mg at 11/17/24 0905    amLODIPine (Norvasc) tablet 5 mg, 5 mg, oral, Daily, ERIKA Jeffries, CHRISTAL, 5 mg at 11/17/24 0942    aspirin EC tablet 81 mg, 81 mg, oral, Daily, Desirae Alicea MD, 81 mg at 11/17/24 0905    atorvastatin (Lipitor) tablet 80 mg, 80 mg, oral, Nightly, Desirae Alicea MD, 80 mg at 11/17/24 2015    carvedilol (Coreg) tablet 6.25 mg, 6.25 mg, oral, BID, ERIKA Jeffries DNP, 6.25 mg at 11/17/24 0905    ergocalciferol (Vitamin D-2) capsule 50,000 Units, 50,000 Units, oral, q14 days, Desirae Alicea MD, 50,000 " Units at 11/17/24 0904    escitalopram (Lexapro) tablet 5 mg, 5 mg, oral, Nightly, Desirae Alicea MD, 5 mg at 11/17/24 2015    ezetimibe (Zetia) tablet 10 mg, 10 mg, oral, Daily, Desirae Alicea MD, 10 mg at 11/17/24 0905    heparin 1,000 unit/mL injection 1,000 Units, 1,000 Units, intra-catheter, After Dialysis, ERIKA Bucio    heparin 1,000 unit/mL injection 1,000 Units, 1,000 Units, intra-catheter, After Dialysis, ERIKA Bucio    hydrALAZINE (Apresoline) tablet 100 mg, 100 mg, oral, TID, ERIKA Jeffries, DNP, 100 mg at 11/17/24 2022    insulin lispro injection 10 Units, 10 Units, subcutaneous, TID, Desirae Alicea MD, 10 Units at 11/17/24 1320    isosorbide mononitrate ER (Imdur) 24 hr tablet 120 mg, 120 mg, oral, Daily, ERIKA Jeffries, DNP, 120 mg at 11/17/24 0905    nitroglycerin (Nitrostat) SL tablet 0.4 mg, 0.4 mg, sublingual, q5 min PRN, Desirae Alicea MD    oxygen (O2) therapy, , inhalation, Continuous, Desirae Alicea MD, 21 percent at 11/16/24 1423    pantoprazole (ProtoNix) EC tablet 40 mg, 40 mg, oral, Daily, Desirae Alicea MD, 40 mg at 11/17/24 0905    sevelamer carbonate (Renvela) tablet 1,600 mg, 1,600 mg, oral, TID, Desirae Alicea MD, 1,600 mg at 11/17/24 1748    SITagliptin phosphate (Januvia) tablet 25 mg, 25 mg, oral, Daily, Desirae Alicea MD, 25 mg at 11/17/24 0943    vitamin B complex-vitamin C-folic acid (Nephrocaps) capsule 1 capsule, 1 capsule, oral, Daily, Desirae Alicea MD, 1 capsule at 11/17/24 0942   Relevant Results    Results for orders placed or performed during the hospital encounter of 11/16/24 (from the past 96 hours)   POCT GLUCOSE   Result Value Ref Range    POCT Glucose 128 (H) 74 - 99 mg/dL   CBC and Auto Differential   Result Value Ref Range    WBC 6.5 4.4 - 11.3 x10*3/uL    nRBC 0.0 0.0 - 0.0 /100 WBCs    RBC 4.31 (L) 4.50 - 5.90 x10*6/uL    Hemoglobin 12.9 (L) 13.5 - 17.5 g/dL    Hematocrit 40.6 (L) 41.0 - 52.0 %     MCV 94 80 - 100 fL    MCH 29.9 26.0 - 34.0 pg    MCHC 31.8 (L) 32.0 - 36.0 g/dL    RDW 17.2 (H) 11.5 - 14.5 %    Platelets 247 150 - 450 x10*3/uL    Neutrophils % 74.3 40.0 - 80.0 %    Immature Granulocytes %, Automated 0.3 0.0 - 0.9 %    Lymphocytes % 10.3 13.0 - 44.0 %    Monocytes % 10.8 2.0 - 10.0 %    Eosinophils % 3.8 0.0 - 6.0 %    Basophils % 0.5 0.0 - 2.0 %    Neutrophils Absolute 4.83 1.60 - 5.50 x10*3/uL    Immature Granulocytes Absolute, Automated 0.02 0.00 - 0.50 x10*3/uL    Lymphocytes Absolute 0.67 (L) 0.80 - 3.00 x10*3/uL    Monocytes Absolute 0.70 0.05 - 0.80 x10*3/uL    Eosinophils Absolute 0.25 0.00 - 0.40 x10*3/uL    Basophils Absolute 0.03 0.00 - 0.10 x10*3/uL   Comprehensive Metabolic Panel   Result Value Ref Range    Glucose 116 (H) 74 - 99 mg/dL    Sodium 136 136 - 145 mmol/L    Potassium 3.9 3.5 - 5.3 mmol/L    Chloride 94 (L) 98 - 107 mmol/L    Bicarbonate 28 21 - 32 mmol/L    Anion Gap 18 10 - 20 mmol/L    Urea Nitrogen 21 6 - 23 mg/dL    Creatinine 3.97 (H) 0.50 - 1.30 mg/dL    eGFR 15 (L) >60 mL/min/1.73m*2    Calcium 9.5 8.6 - 10.3 mg/dL    Albumin 4.5 3.4 - 5.0 g/dL    Alkaline Phosphatase 43 33 - 136 U/L    Total Protein 7.6 6.4 - 8.2 g/dL    AST 29 9 - 39 U/L    Bilirubin, Total 1.1 0.0 - 1.2 mg/dL    ALT 16 10 - 52 U/L   Lipase   Result Value Ref Range    Lipase 61 9 - 82 U/L   Lactate   Result Value Ref Range    Lactate 0.8 0.4 - 2.0 mmol/L   Magnesium   Result Value Ref Range    Magnesium 1.91 1.60 - 2.40 mg/dL   POCT GLUCOSE   Result Value Ref Range    POCT Glucose 128 (H) 74 - 99 mg/dL   POCT GLUCOSE   Result Value Ref Range    POCT Glucose 118 (H) 74 - 99 mg/dL   CBC   Result Value Ref Range    WBC 6.3 4.4 - 11.3 x10*3/uL    nRBC 0.0 0.0 - 0.0 /100 WBCs    RBC 3.69 (L) 4.50 - 5.90 x10*6/uL    Hemoglobin 11.0 (L) 13.5 - 17.5 g/dL    Hematocrit 34.2 (L) 41.0 - 52.0 %    MCV 93 80 - 100 fL    MCH 29.8 26.0 - 34.0 pg    MCHC 32.2 32.0 - 36.0 g/dL    RDW 16.9 (H) 11.5 - 14.5 %     Platelets 253 150 - 450 x10*3/uL   Basic Metabolic Panel   Result Value Ref Range    Glucose 96 74 - 99 mg/dL    Sodium 138 136 - 145 mmol/L    Potassium 3.9 3.5 - 5.3 mmol/L    Chloride 98 98 - 107 mmol/L    Bicarbonate 27 21 - 32 mmol/L    Anion Gap 17 10 - 20 mmol/L    Urea Nitrogen 41 (H) 6 - 23 mg/dL    Creatinine 7.17 (H) 0.50 - 1.30 mg/dL    eGFR 7 (L) >60 mL/min/1.73m*2    Calcium 8.9 8.6 - 10.3 mg/dL   POCT GLUCOSE   Result Value Ref Range    POCT Glucose 107 (H) 74 - 99 mg/dL   POCT GLUCOSE   Result Value Ref Range    POCT Glucose 100 (H) 74 - 99 mg/dL   POCT GLUCOSE   Result Value Ref Range    POCT Glucose 72 (L) 74 - 99 mg/dL   POCT GLUCOSE   Result Value Ref Range    POCT Glucose 149 (H) 74 - 99 mg/dL   Renal Function Panel   Result Value Ref Range    Glucose 98 74 - 99 mg/dL    Sodium 137 136 - 145 mmol/L    Potassium 3.8 3.5 - 5.3 mmol/L    Chloride 97 (L) 98 - 107 mmol/L    Bicarbonate 23 21 - 32 mmol/L    Anion Gap 21 (H) 10 - 20 mmol/L    Urea Nitrogen 57 (H) 6 - 23 mg/dL    Creatinine 9.62 (H) 0.50 - 1.30 mg/dL    eGFR 5 (L) >60 mL/min/1.73m*2    Calcium 8.5 (L) 8.6 - 10.3 mg/dL    Phosphorus 7.5 (H) 2.5 - 4.9 mg/dL    Albumin 3.7 3.4 - 5.0 g/dL   CBC   Result Value Ref Range    WBC 6.3 4.4 - 11.3 x10*3/uL    nRBC 0.0 0.0 - 0.0 /100 WBCs    RBC 3.57 (L) 4.50 - 5.90 x10*6/uL    Hemoglobin 10.6 (L) 13.5 - 17.5 g/dL    Hematocrit 31.4 (L) 41.0 - 52.0 %    MCV 88 80 - 100 fL    MCH 29.7 26.0 - 34.0 pg    MCHC 33.8 32.0 - 36.0 g/dL    RDW 16.5 (H) 11.5 - 14.5 %    Platelets 260 150 - 450 x10*3/uL       Assessment/Plan   End-stage renal disease continue dialysis on Tuesday Thursday Saturday   Syncope-cardiology on board  2.    Hyperkalemia in the setting of missed dialysis, resolved  Hypertension  Diabetes mellitus           Elier Woodson MD

## 2024-11-18 NOTE — CARE PLAN
The patient's goals for the shift include      The clinical goals for the shift include Fall precaution, monitor BP, adequate sleep

## 2024-11-18 NOTE — PROGRESS NOTES
Outreach call to patient to support a smooth transition of care from recent admission.  Left voicemail message for patient with my contact information.    Julisa AVALOS, RN, [facility]  O: [phone]        Preferred Name:   None  Female, 69 year old, 1949  Phone:   *M:[phone]; H:[phone]  Last Weight:   67.6 kg  PCP:   Shilpi Russ NP  Need Interp:   No  Language:   English  Allergies  No Known Allergies  Primary Ins:   [employer]  MRN:   [id number]  [facility]:   Active  Next Appt With Me:   None  Last Appt With Me:      FW: [facility] / 6/14/2018 / Suzanne   Received: Today   Message Contents   Federica Barajas, see Sirena's mesg. Ok to add. I will put case in the depot   Previous Messages        ----- Message -----   From: Sirena Medina RN   Sent: 5/31/2018   6:19 AM   To: Federica Hutchison   Subject: RE: [facility] / 6/14/2018 / Suzanne                     Ok to add   ----- Message -----   From: Federica Hutchison   Sent: 5/30/2018  12:57 PM   To: [facility] Charge Nurse Pool   Subject: FW: [facility] / 6/14/2018 / Suzanne Barajas, please see mesg below. thanks   ----- Message -----   From: Tiff Sorto   Sent: 5/30/2018  12:19 PM   To: Plastics Surg Schedule Pool , *   Subject: [facility] / 6/14/2018 / Suzanne Barajas there!     Would I be able to add 1 hour onto Georgia's case on 6/14 at 8:15am?  Dr. Palacios wants to add a lower lid blepharoplasty with fat repositioning to the case.  I don't think her block runs that long on Thursdays, so I wanted to make sure it would fit, before I ask you to take it off the grid so I can make adjustments.  If you would check with Sirena or Shantel and let me know, that would be great!  Thanks much!     Tiff   [phone]

## 2024-11-18 NOTE — NURSING NOTE
Assumed care.  Patient comfortably lying on bed, no distress or discomfort noted.  2 family members in the room.  Safety measures ensured and call light within reach.

## 2024-11-18 NOTE — PRE-PROCEDURE NOTE
.Report from Sending RN:    Report From: Alize Bullard RN  Recent Surgery of Procedure: No  Baseline Level of Consciousness (LOC): A&Ox4  Oxygen Use: No  Type: room air  Diabetic: Yes  Last BP Med Given Day of Dialysis: see MAR  Last Pain Med Given: see MAR  Lab Tests to be Obtained with Dialysis: No  Blood Transfusion to be Given During Dialysis: No  Available IV Access: Yes  Medications to be Administered During Dialysis: No  Continuous IV Infusion Running: No  Restraints on Currently or in the Last 24 Hours: No  Hand-Off Communication: pt is stable to come to HD room for dialysis.  Dialysis Catheter Dressing: N/A  Last Dressing Change: N/A

## 2024-11-18 NOTE — PROGRESS NOTES
"William A Franke is a 73 y.o. male on day 2 of admission presenting with Syncope, unspecified syncope type.    Subjective   Alert, oriented to self and place.  No obvious distress.  No chest pain or syncope overnight.       Objective     Physical Exam  Vitals and nursing note reviewed.   Constitutional:       General: He is not in acute distress.     Appearance: He is not ill-appearing or toxic-appearing.   HENT:      Head: Normocephalic and atraumatic.      Nose: Nose normal.      Mouth/Throat:      Mouth: Mucous membranes are moist.      Pharynx: Oropharynx is clear.   Cardiovascular:      Rate and Rhythm: Normal rate.      Pulses: Normal pulses.      Heart sounds: Normal heart sounds.   Pulmonary:      Effort: Pulmonary effort is normal.      Breath sounds: Normal breath sounds. No wheezing, rhonchi or rales.   Abdominal:      General: Bowel sounds are normal.      Palpations: Abdomen is soft.   Musculoskeletal:         General: Normal range of motion.      Cervical back: Normal range of motion.      Right lower leg: No edema.      Left lower leg: No edema.   Skin:     General: Skin is warm and dry.      Capillary Refill: Capillary refill takes less than 2 seconds.   Neurological:      Mental Status: He is alert. Mental status is at baseline. He is disoriented.         Last Recorded Vitals  Blood pressure (!) 158/42, pulse 58, temperature 36.7 °C (98.1 °F), temperature source Oral, resp. rate 16, height 1.702 m (5' 7\"), weight 74.4 kg (164 lb), SpO2 96%.  Intake/Output last 3 Shifts:  No intake/output data recorded.    Relevant Results  Results for orders placed or performed during the hospital encounter of 11/16/24 (from the past 24 hours)   POCT GLUCOSE   Result Value Ref Range    POCT Glucose 107 (H) 74 - 99 mg/dL   POCT GLUCOSE   Result Value Ref Range    POCT Glucose 100 (H) 74 - 99 mg/dL   POCT GLUCOSE   Result Value Ref Range    POCT Glucose 72 (L) 74 - 99 mg/dL   POCT GLUCOSE   Result Value Ref Range    " POCT Glucose 149 (H) 74 - 99 mg/dL   Renal Function Panel   Result Value Ref Range    Glucose 98 74 - 99 mg/dL    Sodium 137 136 - 145 mmol/L    Potassium 3.8 3.5 - 5.3 mmol/L    Chloride 97 (L) 98 - 107 mmol/L    Bicarbonate 23 21 - 32 mmol/L    Anion Gap 21 (H) 10 - 20 mmol/L    Urea Nitrogen 57 (H) 6 - 23 mg/dL    Creatinine 9.62 (H) 0.50 - 1.30 mg/dL    eGFR 5 (L) >60 mL/min/1.73m*2    Calcium 8.5 (L) 8.6 - 10.3 mg/dL    Phosphorus 7.5 (H) 2.5 - 4.9 mg/dL    Albumin 3.7 3.4 - 5.0 g/dL   CBC   Result Value Ref Range    WBC 6.3 4.4 - 11.3 x10*3/uL    nRBC 0.0 0.0 - 0.0 /100 WBCs    RBC 3.57 (L) 4.50 - 5.90 x10*6/uL    Hemoglobin 10.6 (L) 13.5 - 17.5 g/dL    Hematocrit 31.4 (L) 41.0 - 52.0 %    MCV 88 80 - 100 fL    MCH 29.7 26.0 - 34.0 pg    MCHC 33.8 32.0 - 36.0 g/dL    RDW 16.5 (H) 11.5 - 14.5 %    Platelets 260 150 - 450 x10*3/uL     *Note: Due to a large number of results and/or encounters for the requested time period, some results have not been displayed. A complete set of results can be found in Results Review.         Assessment/Plan   Assessment & Plan  Syncope, unspecified syncope type      Syncope  Hypotension postdialysis  chronic systolic heart failure  End-stage renal disease with hemodialysis  Altered mentation  Hypertensive disorder  Ischemic cardiomyopathy  Anemia of chronic disease  Medical noncompliance     Overall impression:     11/17: Patient represents the hospital less than 48 hours after discharge.  Previously was admitted for medical noncompliance, missed dialysis, and severe hypertension.  Now presents after dialysis with a postdialysis hypotension and syncopal episode.  Details are limited.  Patient is a poor historian.  Denies complaints chest pain or pressure palpitations or feeling of rapid heart rate.  After further discussion with ED provider, the patient presented in a significant altered mental status.  He was unable to answer questions appropriately and after initial treatment  with some hydration the patient's mentation did improve.  I am suspicious at this point that the patient's previous noncompliance has led to increased blood pressure medications under the belief that the patient has been taking his medications.  Postdialysis his blood pressure was significantly low which I believe caused this near-syncope or altered mentation status.  On admission now his blood pressure remains labile but is improving.  For this patient I believe an ideal systolic blood pressure will likely be between 140 and 150 to allow room for dialysis.  At this point I would continue with his current dosing of carvedilol at 6.25 mg twice daily.  Will reduce his amlodipine to 5 mg daily.  Would continue with hydralazine 100 mg 3 times daily, however would advise the patient to not take this prior to dialysis.  Otherwise the patient appears to be generally euvolemic and is chest pain-free.  As mentioned on previous hospitalization I do suspect the patient has some form of underlying dementia.  Would like to follow a further 24 hours to continue to monitor on telemetry monitor and monitor blood pressures.    11/18: Stable overnight.  Denies complaints chest pain or pressure palpitations or feeling of rapid heart rate.  Will go for dialysis this morning.  Vital signs have been stable and blood pressure has mildly increased as desired to current of 158/42.  I did with ablations blood pressure will between 140 and 150.  As noted above have placed parameter not to give hydralazine prior to dialysis.  And his amlodipine has been reduced.  He is euvolemic on examination.  Believe the patient requires assistance in the outpatient setting to ensure that he is taking his medications appropriately and is safe at home and going to his dialysis appropriately as I do not believe the patient has the mental capacity going forward to manage his life independently.  No further recommendations at this time.  Will sign off.  Patient  to follow-up with Dr. Ardon in the next 2 to 3 weeks.     I spent 35 minutes in the professional and overall care of this patient.      Darrell Blackwell, VERÓNICA-CNP

## 2024-11-18 NOTE — CONSULTS
"Nutrition Assessement Note    Nutrition Assessment    Reason for Assessment: Admission nursing screening (MST 1)    Malnutrition Screening Tool (MST)  Have you recently lost weight without trying?: Yes  If yes, how much weight have you lost?: Lost 2 - 13 pounds  Weight Loss Score: 1  Have you been eating poorly because of a decreased appetite?: No  Malnutrition Score: 1  Nutrition Screen  Stage 3 or 4 Pressure Injury or Multiple Non-Healing Wounds: No  Home Tube Feeding or Total Parenteral Nutrition (TPN): No  Dietitian Consult Needed: Yes (Comment)  Reason for Consult: Loss of apetite    Reason for Hospital Admission:  William A Franke is a 73 y.o. male who is admitted for syncope.    Pt is off floor receiving dialysis. Per MST, pt reported low appetite. Pt is admitted due to being unresponsive during dialysis session. Pt had hyperkalemia on 11/13 due to missed dialysis session. Will provide mighty shakes BID due to reported poor appetite.    Past Medical History:   Diagnosis Date    Personal history of other endocrine, nutritional and metabolic disease     History of hypercholesterolemia    Personal history of other specified conditions 08/28/2020    History of chest pain      Past Surgical History:   Procedure Laterality Date    KNEE ARTHROSCOPY W/ DEBRIDEMENT  04/15/2013    Arthroscopy Knee    SHOULDER SURGERY  04/15/2013    Shoulder Surgery       Nutrition History:  Food and Nutrient History:  (N/a)        Food Allergies/Intolerances:  None  GI Symptoms: None  Oral Problems: None    Anthropometrics:  Ht: 170.2 cm (5' 7\"), Wt: 74.4 kg (164 lb), BMI: 25.68  IBW/kg (Dietitian Calculated): 67.27 kg          Weight Change:  Daily Weight  11/16/24 : 74.4 kg (164 lb)  11/15/24 : 74.7 kg (164 lb 10.9 oz)  11/13/24 : 81.6 kg (180 lb)  11/12/24 : 82.9 kg (182 lb 12.8 oz)  11/11/24 : 83 kg (183 lb)  09/13/24 : 82.6 kg (182 lb)  08/23/24 : 81.2 kg (179 lb)  08/19/24 : 82.1 kg (181 lb)  08/02/24 : 81.3 kg (179 lb 3.2 " oz)  06/14/24 : 81.6 kg (180 lb)     Weight History / % Weight Change: records show a weight loss of 19# (10.4%) over 5 days. likely fluid loss from dialysis             Nutrition Focused Physical Exam Findings:                       Nutrition Significant Labs:  Lab Results   Component Value Date    WBC 6.3 11/18/2024    HGB 10.6 (L) 11/18/2024    HCT 31.4 (L) 11/18/2024     11/18/2024    CHOL 123 11/08/2024    TRIG 170 (H) 11/08/2024    HDL 36.7 11/08/2024    ALT 16 11/16/2024    AST 29 11/16/2024     11/18/2024    K 3.8 11/18/2024    CL 97 (L) 11/18/2024    CREATININE 9.62 (H) 11/18/2024    BUN 57 (H) 11/18/2024    CO2 23 11/18/2024    TSH 1.09 08/09/2024    PSA 0.42 05/29/2024    INR 1.2 (H) 04/13/2023    HGBA1C 6.3 (H) 11/08/2024     Nutrition Specific Medications:  allopurinol, 100 mg, oral, Daily  amLODIPine, 5 mg, oral, Daily  aspirin, 81 mg, oral, Daily  atorvastatin, 80 mg, oral, Nightly  carvedilol, 6.25 mg, oral, BID  ergocalciferol, 50,000 Units, oral, q14 days  escitalopram, 5 mg, oral, Nightly  ezetimibe, 10 mg, oral, Daily  heparin, 1,000 Units, intra-catheter, After Dialysis  heparin, 1,000 Units, intra-catheter, After Dialysis  hydrALAZINE, 100 mg, oral, TID  insulin lispro, 10 Units, subcutaneous, TID  isosorbide mononitrate ER, 120 mg, oral, Daily  pantoprazole, 40 mg, oral, Daily  sevelamer carbonate, 1,600 mg, oral, TID  SITagliptin phosphate, 25 mg, oral, Daily  vitamin B complex-vitamin C-folic acid, 1 capsule, oral, Daily      oxygen,         Dietary Orders (From admission, onward)       Start     Ordered    11/17/24 0043  May Participate in Room Service  ( ROOM SERVICE MAY PARTICIPATE)  Once        Question:  .  Answer:  Yes    11/17/24 0042    11/16/24 1326  Adult diet Renal; Potassium Restricted 2 gm (50mEq); 2 - 3 grams Sodium  Diet effective now        Question Answer Comment   Diet type Renal    Potassium restriction: Potassium Restricted 2 gm (50mEq)    Sodium  restriction: 2 - 3 grams Sodium        11/16/24 1326                  Estimated Needs:   Estimated Energy Needs  Total Energy Estimated Needs (kCal): 2018 kCal  Total Estimated Energy Need per Day (kCal/kg): 30 kCal/kg  Method for Estimating Needs: IBW    Estimated Protein Needs  Total Protein Estimated Needs (g): 81 g  Total Protein Estimated Needs (g/kg): 1.2 g/kg  Method for Estimating Needs: IBW    Estimated Fluid Needs  Total Fluid Estimated Needs (mL): 2018 mL  Method for Estimating Needs: 1 mL/kcal        Nutrition Diagnosis   Nutrition Diagnosis:       Nutrition Diagnosis  Patient has Nutrition Diagnosis: Yes  Diagnosis Status (1): New  Nutrition Diagnosis 1: Inadequate energy intake  Related to (1): decreased ability to consume sufficient energy  As Evidenced by (1): hemodialysis       Nutrition Interventions/Recommendations   Nutrition Interventions and Recommendations:    Nutrition Prescription:  Individualized Nutrition Prescription Provided for : 2018 kcals, 81 g protein via diet    Nutrition Interventions:   Food and/or Nutrient Delivery Interventions  Interventions: Meals and snacks, Medical food supplement  Meals and Snacks: Mineral-modified diet  Goal: provide diet as ordered  Medical Food Supplement: Commercial beverage  Goal: mighty shake BID to provide 200 kcals and 7g protein each    Education Documentation  No documentation found.           Nutrition Monitoring and Evaluation   Monitoring/Evaluation:   Food/Nutrient Related History Monitoring  Monitoring and Evaluation Plan: Energy intake  Energy Intake: Estimated energy intake  Criteria: pt to consume >/= 75% estimated needs    Body Composition/Growth/Weight History  Monitoring and Evaluation Plan: Weight  Weight: Measured weight  Criteria: maintain fluid-free weight            Time Spent/Follow-up:   Follow Up  Time Spent (min): 30 minutes  Last Date of Nutrition Visit: 11/18/24  Nutrition Follow-Up Needed?: 5-7 days  Follow up Comment:  11/22/24

## 2024-11-19 RX ORDER — GABAPENTIN 300 MG/1
300 CAPSULE ORAL 3 TIMES DAILY
COMMUNITY

## 2024-11-19 NOTE — DISCHARGE SUMMARY
Discharge Diagnosis  Syncope, unspecified syncope type    Issues Requiring Follow-Up  End-stage renal disease    Test Results Pending At Discharge  Pending Labs       No current pending labs.            Hospital Course   Patient on hemodialysis for end-stage renal disease presented with syncopal, low blood pressure on dialysis after being discharged home stable for.  Cardiology reduced dose for Norvasc.  Parameters placed to not give hydralazine before dialysis if blood pressure is low.  Patient discharged in stable condition    Pertinent Physical Exam At Time of Discharge  Physical Exam  Vitals reviewed.   Constitutional:       Appearance: Normal appearance.   HENT:      Head: Normocephalic and atraumatic.      Right Ear: Tympanic membrane, ear canal and external ear normal.      Left Ear: Tympanic membrane, ear canal and external ear normal.      Nose: Nose normal.      Mouth/Throat:      Pharynx: Oropharynx is clear.   Eyes:      Extraocular Movements: Extraocular movements intact.      Conjunctiva/sclera: Conjunctivae normal.      Pupils: Pupils are equal, round, and reactive to light.   Cardiovascular:      Rate and Rhythm: Normal rate and regular rhythm.      Pulses: Normal pulses.      Heart sounds: Normal heart sounds.   Pulmonary:      Effort: Pulmonary effort is normal.      Breath sounds: Normal breath sounds.   Abdominal:      General: Abdomen is flat. Bowel sounds are normal.      Palpations: Abdomen is soft.   Musculoskeletal:      Cervical back: Normal range of motion and neck supple.   Skin:     General: Skin is warm and dry.   Neurological:      General: No focal deficit present.      Mental Status: He is alert and oriented to person, place, and time.   Psychiatric:         Mood and Affect: Mood normal.         Home Medications     Medication List      START taking these medications     escitalopram 5 mg tablet; Commonly known as: Lexapro; Take 1 tablet (5   mg) by mouth once daily at bedtime.;  "Notes to patient: ONCE DAILY, AT   BEDTIME     CHANGE how you take these medications     amLODIPine 5 mg tablet; Commonly known as: Norvasc; Take 1 tablet (5 mg)   by mouth once daily.; Start taking on: November 19, 2024; What changed:   medication strength, how much to take; Notes to patient: ONCE DAILY     CONTINUE taking these medications     allopurinol 100 mg tablet; Commonly known as: Zyloprim; Take 1 tablet   (100 mg) by mouth once daily.; Notes to patient: ONCE DAILY   aspirin 81 mg EC tablet; Notes to patient: ONCE DAILY   atorvastatin 80 mg tablet; Commonly known as: Lipitor; Take 1 tablet (80   mg) by mouth once daily at bedtime.; Notes to patient: ONCE DAILY, AT   BEDTIME   B complex-vitamin C-folic acid 0.8 mg tablet; Commonly known as:   Nephro-Shea; Notes to patient: ONCE DAILY   BD Ultra-Fine Mini Pen Needle 31 gauge x 3/16\" needle; Generic drug: pen   needle, diabetic; USE AS DIRECTED 4 times a day; Notes to patient: 4 TIMES   DAILY   carvedilol 6.25 mg tablet; Commonly known as: Coreg; Take 1 tablet (6.25   mg) by mouth 2 times daily (morning and late afternoon).; Notes to   patient: 2 TIMES DAILY   ergocalciferol 1.25 MG (64563 UT) capsule; Commonly known as: Vitamin   D-2; Notes to patient: EVERY 14 DAYS (EVERY OTHER SUNDAY)   ezetimibe 10 mg tablet; Commonly known as: Zetia; Take 1 tablet (10 mg)   by mouth once daily.; Notes to patient: ONCE DAILY   hydrALAZINE 100 mg tablet; Commonly known as: Apresoline; Take 1 tablet   (100 mg) by mouth 3 times a day.; Notes to patient: 3 TIMES DAILY   insulin lispro 100 unit/mL injection; Commonly known as: HumaLOG; Notes   to patient: 3 TIMES DAILY   isosorbide mononitrate  mg 24 hr tablet; Commonly known as: Imdur;   Take 1 tablet (120 mg) by mouth once daily. Do not crush or chew.; Notes   to patient: ONCE DAILY   Januvia 25 mg tablet; Generic drug: SITagliptin phosphate; Take 1 tablet   (25 mg) by mouth once daily.; Notes to patient: ONCE DAILY   " nitroglycerin 0.4 mg SL tablet; Commonly known as: Nitrostat; Notes to   patient: IF NEEDED   oxygen gas therapy; Commonly known as: O2; Inhale 1 each continuously.   pantoprazole 40 mg EC tablet; Commonly known as: ProtoNix; Notes to   patient: ONCE DAILY   sevelamer carbonate 800 mg tablet; Commonly known as: Renvela; Notes to   patient: 3 TIMES DAILY       Outpatient Follow-Up  Future Appointments   Date Time Provider Department Center   12/27/2024  9:30 AM KAROL BarnesCNP [id number] [facility]   1/17/2025 11:30 AM Kyle Ardon MD [id number] [facility]   2/10/2025 10:15 AM Marianna Galloway MD [id number] [facility]   2/21/2025  9:30 AM ERIKA Austin [id number] [facility]   2/24/2025  9:00 AM Abad Briceno MD [id number] [facility]       Marianna Galloway MD

## 2024-11-19 NOTE — PROGRESS NOTES
"William A Franke is a 73 y.o. male on day 0 of admission presenting with Syncope, unspecified syncope type.    Subjective   Doing well no new complaints, tolerating dialysis       Objective     Physical Exam  Vitals reviewed.   Constitutional:       Appearance: Normal appearance.   HENT:      Head: Normocephalic and atraumatic.      Right Ear: Tympanic membrane, ear canal and external ear normal.      Left Ear: Tympanic membrane, ear canal and external ear normal.      Nose: Nose normal.      Mouth/Throat:      Pharynx: Oropharynx is clear.   Eyes:      Extraocular Movements: Extraocular movements intact.      Conjunctiva/sclera: Conjunctivae normal.      Pupils: Pupils are equal, round, and reactive to light.   Cardiovascular:      Rate and Rhythm: Normal rate and regular rhythm.      Pulses: Normal pulses.      Heart sounds: Normal heart sounds.   Pulmonary:      Effort: Pulmonary effort is normal.      Breath sounds: Normal breath sounds.   Abdominal:      General: Abdomen is flat. Bowel sounds are normal.      Palpations: Abdomen is soft.   Musculoskeletal:      Cervical back: Normal range of motion and neck supple.   Skin:     General: Skin is warm and dry.   Neurological:      General: No focal deficit present.      Mental Status: He is alert and oriented to person, place, and time.   Psychiatric:         Mood and Affect: Mood normal.         Last Recorded Vitals  Blood pressure (!) 158/42, pulse 66, temperature 36.2 °C (97.2 °F), temperature source Tympanic, resp. rate 16, height 1.702 m (5' 7\"), weight 74.4 kg (164 lb), SpO2 96%.  Intake/Output last 3 Shifts:  I/O last 3 completed shifts:  In: 1000 (13.4 mL/kg) [I.V.:600 (8.1 mL/kg); Other:400]  Out: 1400 (18.8 mL/kg) [Other:1400]  Weight: 74.4 kg     Relevant Results                              Assessment/Plan   Assessment & Plan  Syncope, unspecified syncope type    Plan discharge once dialysis is finished  Blood pressure is stable   blood sugar stable   "     I spent  minutes in the professional and overall care of this patient.      Marianna Galloway MD

## 2024-11-20 ENCOUNTER — APPOINTMENT (OUTPATIENT)
Dept: DIALYSIS | Facility: HOSPITAL | Age: 74
End: 2024-11-20
Payer: MEDICARE

## 2024-11-21 NOTE — DISCHARGE SUMMARY
"Discharge Diagnosis  Chest pain, unspecified type    Issues Requiring Follow-Up  Chest pain    Discharge Meds     Medication List      START taking these medications     hydrALAZINE 100 mg tablet; Commonly known as: Apresoline; Take 1 tablet   (100 mg) by mouth 3 times a day.   isosorbide mononitrate  mg 24 hr tablet; Commonly known as: Imdur;   Take 1 tablet (120 mg) by mouth once daily. Do not crush or chew.     CONTINUE taking these medications     allopurinol 100 mg tablet; Commonly known as: Zyloprim; Take 1 tablet   (100 mg) by mouth once daily.   aspirin 81 mg EC tablet   atorvastatin 80 mg tablet; Commonly known as: Lipitor; Take 1 tablet (80   mg) by mouth once daily at bedtime.   B complex-vitamin C-folic acid 0.8 mg tablet; Commonly known as:   Nephro-Shea   BD Ultra-Fine Mini Pen Needle 31 gauge x 3/16\" needle; Generic drug: pen   needle, diabetic; USE AS DIRECTED 4 times a day   carvedilol 6.25 mg tablet; Commonly known as: Coreg; Take 1 tablet (6.25   mg) by mouth 2 times daily (morning and late afternoon).   ergocalciferol 1.25 MG (51523 UT) capsule; Commonly known as: Vitamin   D-2   ezetimibe 10 mg tablet; Commonly known as: Zetia; Take 1 tablet (10 mg)   by mouth once daily.   insulin lispro 100 unit/mL injection; Commonly known as: HumaLOG   Januvia 25 mg tablet; Generic drug: SITagliptin phosphate; Take 1 tablet   (25 mg) by mouth once daily.   nitroglycerin 0.4 mg SL tablet; Commonly known as: Nitrostat   oxygen gas therapy; Commonly known as: O2; Inhale 1 each continuously.   pantoprazole 40 mg EC tablet; Commonly known as: ProtoNix   sevelamer carbonate 800 mg tablet; Commonly known as: Renvela     STOP taking these medications     fenofibrate 145 mg tablet; Commonly known as: Tricor   FeroSuL tablet; Generic drug: ferrous sulfate (325 mg ferrous sulfate)   gabapentin 300 mg capsule; Commonly known as: Neurontin   loratadine 10 mg tablet; Commonly known as: Claritin   losartan 100 mg " tablet; Commonly known as: Cozaar       Test Results Pending At Discharge  Pending Labs       No current pending labs.            Hospital Course   Feeling better    Pertinent Physical Exam At Time of Discharge  Physical Exam    Outpatient Follow-Up  Future Appointments   Date Time Provider Department Center   11/22/2024 12:15 PM Marianna Galloway MD [id number] [facility]   12/10/2024  3:15 PM Kyle Ardon MD [id number] [facility]   12/27/2024  9:30 AM VERÓNICA Barnes-CNP [id number] [facility]   1/17/2025 11:30 AM Kyle Ardon MD [id number] [facility]   2/10/2025 10:15 AM Marianna Galloway MD [id number] [facility]   2/21/2025  9:30 AM ERIKA Austin [id number] [facility]   2/24/2025  9:00 AM Abad Briceno MD [id number] [facility]         Desirae Alicea MD

## 2024-11-22 ENCOUNTER — PATIENT OUTREACH (OUTPATIENT)
Dept: CARE COORDINATION | Facility: CLINIC | Age: 74
End: 2024-11-22

## 2024-11-22 ENCOUNTER — APPOINTMENT (OUTPATIENT)
Dept: PRIMARY CARE | Facility: CLINIC | Age: 74
End: 2024-11-22
Payer: MEDICARE

## 2024-11-22 NOTE — PROGRESS NOTES
Outreach call to patient to support a smooth transition of care from recent admission.  Spoke with patients wife, Jose was sleeping.  No questions about  discharge medications, discharge instructions, and provided education on importance of follow-up appointment with provider.  Will continue to monitor through transition period.    Julisa ANDRADEN, RN, [facility]  Accountable Care Organization  O: [phone]

## 2024-11-26 DIAGNOSIS — E11.22 TYPE 2 DIABETES MELLITUS WITH CHRONIC KIDNEY DISEASE ON CHRONIC DIALYSIS, WITH LONG-TERM CURRENT USE OF INSULIN (MULTI): Primary | ICD-10-CM

## 2024-11-26 DIAGNOSIS — I50.9 CONGESTIVE HEART FAILURE, UNSPECIFIED HF CHRONICITY, UNSPECIFIED HEART FAILURE TYPE: ICD-10-CM

## 2024-11-26 DIAGNOSIS — Z99.2 TYPE 2 DIABETES MELLITUS WITH CHRONIC KIDNEY DISEASE ON CHRONIC DIALYSIS, WITH LONG-TERM CURRENT USE OF INSULIN (MULTI): Primary | ICD-10-CM

## 2024-11-26 DIAGNOSIS — Z79.4 TYPE 2 DIABETES MELLITUS WITH CHRONIC KIDNEY DISEASE ON CHRONIC DIALYSIS, WITH LONG-TERM CURRENT USE OF INSULIN (MULTI): Primary | ICD-10-CM

## 2024-11-26 DIAGNOSIS — N18.6 TYPE 2 DIABETES MELLITUS WITH CHRONIC KIDNEY DISEASE ON CHRONIC DIALYSIS, WITH LONG-TERM CURRENT USE OF INSULIN (MULTI): Primary | ICD-10-CM

## 2024-12-09 DIAGNOSIS — R41.0 DISORIENTATION: ICD-10-CM

## 2024-12-09 RX ORDER — AMLODIPINE BESYLATE 5 MG/1
5 TABLET ORAL DAILY
Qty: 90 TABLET | Refills: 0 | Status: ON HOLD | OUTPATIENT
Start: 2024-12-09

## 2024-12-10 ENCOUNTER — LAB (OUTPATIENT)
Dept: LAB | Facility: LAB | Age: 74
End: 2024-12-10
Payer: MEDICARE

## 2024-12-10 ENCOUNTER — OFFICE VISIT (OUTPATIENT)
Dept: CARDIOLOGY | Facility: CLINIC | Age: 74
End: 2024-12-10
Payer: MEDICARE

## 2024-12-10 VITALS
OXYGEN SATURATION: 95 % | WEIGHT: 180 LBS | HEIGHT: 67 IN | HEART RATE: 62 BPM | BODY MASS INDEX: 28.25 KG/M2 | SYSTOLIC BLOOD PRESSURE: 196 MMHG | DIASTOLIC BLOOD PRESSURE: 50 MMHG

## 2024-12-10 DIAGNOSIS — R07.9 CHEST PAIN, UNSPECIFIED TYPE: Primary | ICD-10-CM

## 2024-12-10 DIAGNOSIS — C61 MALIGNANT NEOPLASM OF PROSTATE (MULTI): ICD-10-CM

## 2024-12-10 DIAGNOSIS — I10 HYPERTENSION, UNSPECIFIED TYPE: ICD-10-CM

## 2024-12-10 DIAGNOSIS — R41.0 DISORIENTATION: ICD-10-CM

## 2024-12-10 PROCEDURE — 1111F DSCHRG MED/CURRENT MED MERGE: CPT | Performed by: INTERNAL MEDICINE

## 2024-12-10 PROCEDURE — 1160F RVW MEDS BY RX/DR IN RCRD: CPT | Performed by: INTERNAL MEDICINE

## 2024-12-10 PROCEDURE — 36415 COLL VENOUS BLD VENIPUNCTURE: CPT

## 2024-12-10 PROCEDURE — 1123F ACP DISCUSS/DSCN MKR DOCD: CPT | Performed by: INTERNAL MEDICINE

## 2024-12-10 PROCEDURE — 99214 OFFICE O/P EST MOD 30 MIN: CPT | Performed by: INTERNAL MEDICINE

## 2024-12-10 PROCEDURE — 84153 ASSAY OF PSA TOTAL: CPT

## 2024-12-10 PROCEDURE — 3077F SYST BP >= 140 MM HG: CPT | Performed by: INTERNAL MEDICINE

## 2024-12-10 PROCEDURE — 1159F MED LIST DOCD IN RCRD: CPT | Performed by: INTERNAL MEDICINE

## 2024-12-10 PROCEDURE — 1126F AMNT PAIN NOTED NONE PRSNT: CPT | Performed by: INTERNAL MEDICINE

## 2024-12-10 PROCEDURE — 3078F DIAST BP <80 MM HG: CPT | Performed by: INTERNAL MEDICINE

## 2024-12-10 ASSESSMENT — COLUMBIA-SUICIDE SEVERITY RATING SCALE - C-SSRS
6. HAVE YOU EVER DONE ANYTHING, STARTED TO DO ANYTHING, OR PREPARED TO DO ANYTHING TO END YOUR LIFE?: NO
2. HAVE YOU ACTUALLY HAD ANY THOUGHTS OF KILLING YOURSELF?: NO
1. IN THE PAST MONTH, HAVE YOU WISHED YOU WERE DEAD OR WISHED YOU COULD GO TO SLEEP AND NOT WAKE UP?: NO

## 2024-12-10 ASSESSMENT — PAIN SCALES - GENERAL: PAINLEVEL_OUTOF10: 0-NO PAIN

## 2024-12-10 NOTE — PROGRESS NOTES
"Primary Care Physician: Marianna Galloway MD  Date of Visit: 12/10/2024  3:15 PM EST  Location of visit: [facility]     Chief Complaint:   Chief Complaint   Patient presents with    Coronary Artery Disease    Hypertension    Hospital Follow-up      Chest pain     HPI / Summary:   William A Franke is a 74 y.o. male presents for follow up      ROS    Medical History:   He has a past medical history of Personal history of other endocrine, nutritional and metabolic disease and Personal history of other specified conditions (08/28/2020).  Surgical Hx:   He has a past surgical history that includes Shoulder surgery (04/15/2013) and Knee arthroscopy w/ debridement (04/15/2013).   Social Hx:   He reports that he has never smoked. He has never used smokeless tobacco. He reports that he does not currently use alcohol. He reports that he does not use drugs.  Family Hx:   His family history is not on file.   Allergies:  No Known Allergies  Outpatient Medications:  Current Outpatient Medications   Medication Instructions    allopurinol (ZYLOPRIM) 100 mg, oral, Daily    amLODIPine (NORVASC) 5 mg, oral, Daily    aspirin 81 mg, Daily    atorvastatin (LIPITOR) 80 mg, oral, Nightly    B complex-vitamin C-folic acid (Nephro-Shea) 0.8 mg tablet 0.8 mg, Daily    BD Ultra-Fine Mini Pen Needle 31 gauge x 3/16\" needle USE AS DIRECTED 4 times a day    carvedilol (COREG) 6.25 mg, oral, 2 times daily (morning and late afternoon)    ergocalciferol (VITAMIN D-2) 50,000 Units, Every 14 days    escitalopram (LEXAPRO) 5 mg, oral, Nightly    ezetimibe (ZETIA) 10 mg, oral, Daily    gabapentin (NEURONTIN) 300 mg, 3 times daily    hydrALAZINE (APRESOLINE) 100 mg, oral, 3 times daily    insulin lispro (HUMALOG) 10 Units, 3 times daily (morning, midday, late afternoon)    isosorbide mononitrate ER (IMDUR) 120 mg, oral, Daily, Do not crush or chew.    Januvia 25 mg, oral, Daily    nitroglycerin (NITROSTAT) 0.4 mg, Every 5 min PRN    oxygen (O2) " "gas therapy 1 each, inhalation, Continuous    pantoprazole (PROTONIX) 40 mg, Daily    sevelamer carbonate (Renvela) 800 mg tablet Take 2 tablets (1,600 mg) by mouth 3 times daily (morning, midday, late afternoon). Three times daily with meals     Physical Exam:  Vitals:    12/10/24 1519   BP: (!) 201/77   BP Location: Right arm   Patient Position: Sitting   BP Cuff Size: Adult   Pulse: 63   SpO2: 95%   Weight: 81.6 kg (180 lb)   Height: 1.702 m (5' 7\")     Wt Readings from Last 5 Encounters:   12/10/24 81.6 kg (180 lb)   11/16/24 74.4 kg (164 lb)   11/15/24 74.7 kg (164 lb 10.9 oz)   11/13/24 81.6 kg (180 lb)   11/12/24 82.9 kg (182 lb 12.8 oz)     Physical Exam  JVP not elevated. Carotid impulses are 2+ without overlying bruit.   Chest exhibits fair to good air movement with completely clear breath sounds.   The cardiac rhythm is regular with no premature beats.   Normal S1 and S2. No gallop, murmur or rub, or click.   Abdomen is soft and benign without focal tenderness.   With no lower leg edema. The pedal pulses are intact.     Last Labs:  Admission on 11/16/2024, Discharged on 11/18/2024   Component Date Value    POCT Glucose 11/16/2024 128 (H)     WBC 11/16/2024 6.5     nRBC 11/16/2024 0.0     RBC 11/16/2024 4.31 (L)     Hemoglobin 11/16/2024 12.9 (L)     Hematocrit 11/16/2024 40.6 (L)     MCV 11/16/2024 94     MCH 11/16/2024 29.9     MCHC 11/16/2024 31.8 (L)     RDW 11/16/2024 17.2 (H)     Platelets 11/16/2024 247     Neutrophils % 11/16/2024 74.3     Immature Granulocytes %,* 11/16/2024 0.3     Lymphocytes % 11/16/2024 10.3     Monocytes % 11/16/2024 10.8     Eosinophils % 11/16/2024 3.8     Basophils % 11/16/2024 0.5     Neutrophils Absolute 11/16/2024 4.83     Immature Granulocytes Ab* 11/16/2024 0.02     Lymphocytes Absolute 11/16/2024 0.67 (L)     Monocytes Absolute 11/16/2024 0.70     Eosinophils Absolute 11/16/2024 0.25     Basophils Absolute 11/16/2024 0.03     Glucose 11/16/2024 116 (H)     Sodium " 11/16/2024 136     Potassium 11/16/2024 3.9     Chloride 11/16/2024 94 (L)     Bicarbonate 11/16/2024 28     Anion Gap 11/16/2024 18     Urea Nitrogen 11/16/2024 21     Creatinine 11/16/2024 3.97 (H)     eGFR 11/16/2024 15 (L)     Calcium 11/16/2024 9.5     Albumin 11/16/2024 4.5     Alkaline Phosphatase 11/16/2024 43     Total Protein 11/16/2024 7.6     AST 11/16/2024 29     Bilirubin, Total 11/16/2024 1.1     ALT 11/16/2024 16     Lipase 11/16/2024 61     Lactate 11/16/2024 0.8     Magnesium 11/16/2024 1.91     POCT Glucose 11/16/2024 128 (H)     WBC 11/17/2024 6.3     nRBC 11/17/2024 0.0     RBC 11/17/2024 3.69 (L)     Hemoglobin 11/17/2024 11.0 (L)     Hematocrit 11/17/2024 34.2 (L)     MCV 11/17/2024 93     MCH 11/17/2024 29.8     MCHC 11/17/2024 32.2     RDW 11/17/2024 16.9 (H)     Platelets 11/17/2024 253     Glucose 11/17/2024 96     Sodium 11/17/2024 138     Potassium 11/17/2024 3.9     Chloride 11/17/2024 98     Bicarbonate 11/17/2024 27     Anion Gap 11/17/2024 17     Urea Nitrogen 11/17/2024 41 (H)     Creatinine 11/17/2024 7.17 (H)     eGFR 11/17/2024 7 (L)     Calcium 11/17/2024 8.9     POCT Glucose 11/16/2024 118 (H)     POCT Glucose 11/17/2024 107 (H)     POCT Glucose 11/17/2024 100 (H)     POCT Glucose 11/17/2024 72 (L)     Glucose 11/18/2024 98     Sodium 11/18/2024 137     Potassium 11/18/2024 3.8     Chloride 11/18/2024 97 (L)     Bicarbonate 11/18/2024 23     Anion Gap 11/18/2024 21 (H)     Urea Nitrogen 11/18/2024 57 (H)     Creatinine 11/18/2024 9.62 (H)     eGFR 11/18/2024 5 (L)     Calcium 11/18/2024 8.5 (L)     Phosphorus 11/18/2024 7.5 (H)     Albumin 11/18/2024 3.7     WBC 11/18/2024 6.3     nRBC 11/18/2024 0.0     RBC 11/18/2024 3.57 (L)     Hemoglobin 11/18/2024 10.6 (L)     Hematocrit 11/18/2024 31.4 (L)     MCV 11/18/2024 88     MCH 11/18/2024 29.7     MCHC 11/18/2024 33.8     RDW 11/18/2024 16.5 (H)     Platelets 11/18/2024 260     POCT Glucose 11/17/2024 149 (H)     POCT Glucose  11/18/2024 142 (H)    Admission on 11/13/2024, Discharged on 11/15/2024   Component Date Value    Ventricular Rate 11/13/2024 56     Atrial Rate 11/13/2024 56     [place] Interval 11/13/2024 166     QRS Duration 11/13/2024 108     QT Interval 11/13/2024 450     QTC Calculation(Bazett) 11/13/2024 434     P Axis 11/13/2024 63     R [place] 11/13/2024 66     T [place] 11/13/2024 63     QRS Count 11/13/2024 9     Q Onset 11/13/2024 216     P Onset 11/13/2024 133     P Offset 11/13/2024 168     T Offset 11/13/2024 441     QTC Fredericia 11/13/2024 440     WBC 11/13/2024 8.5     nRBC 11/13/2024 0.0     RBC 11/13/2024 3.74 (L)     Hemoglobin 11/13/2024 11.3 (L)     Hematocrit 11/13/2024 36.4 (L)     MCV 11/13/2024 97     MCH 11/13/2024 30.2     MCHC 11/13/2024 31.0 (L)     RDW 11/13/2024 17.8 (H)     Platelets 11/13/2024 179     Neutrophils % 11/13/2024 88.1     Immature Granulocytes %,* 11/13/2024 0.4     Lymphocytes % 11/13/2024 6.4     Monocytes % 11/13/2024 4.9     Eosinophils % 11/13/2024 0.1     Basophils % 11/13/2024 0.1     Neutrophils Absolute 11/13/2024 7.52 (H)     Immature Granulocytes Ab* 11/13/2024 0.03     Lymphocytes Absolute 11/13/2024 0.55 (L)     Monocytes Absolute 11/13/2024 0.42     Eosinophils Absolute 11/13/2024 0.01     Basophils Absolute 11/13/2024 0.01     Magnesium 11/13/2024 1.72     Phosphorus 11/13/2024 8.1 (H)     Glucose 11/13/2024 159 (H)     Sodium 11/13/2024 140     Potassium 11/13/2024 7.3 (HH)     Chloride 11/13/2024 106     Bicarbonate 11/13/2024 21     Anion Gap 11/13/2024 20     Urea Nitrogen 11/13/2024 86 (H)     Creatinine 11/13/2024 11.30 (H)     eGFR 11/13/2024 4 (L)     Calcium 11/13/2024 9.3     Albumin 11/13/2024 4.2     Alkaline Phosphatase 11/13/2024 38     Total Protein 11/13/2024 6.8     AST 11/13/2024 27     Bilirubin, Total 11/13/2024 0.8     ALT 11/13/2024 12     BNP 11/13/2024 977 (H)     Flu A Result 11/13/2024 Not Detected     Flu B Result 11/13/2024 Not Detected      Coronavirus 2019, PCR 11/13/2024 Not Detected     Troponin I, High Sensiti* 11/13/2024 54 (HH)     Color, Urine 11/13/2024 Light-Yellow     Appearance, Urine 11/13/2024 Clear     Specific Gravity, Urine 11/13/2024 1.014     pH, Urine 11/13/2024 6.5     Protein, Urine 11/13/2024 200 (2+) (A)     Glucose, Urine 11/13/2024 150 (2+) (A)     Blood, Urine 11/13/2024 0.5 (2+) (A)     Ketones, Urine 11/13/2024 NEGATIVE     Bilirubin, Urine 11/13/2024 NEGATIVE     Urobilinogen, Urine 11/13/2024 Normal     Nitrite, Urine 11/13/2024 NEGATIVE     Leukocyte Esterase, Urine 11/13/2024 NEGATIVE     Extra Tube 11/13/2024 Hold for add-ons.     Troponin I, High Sensiti* 11/13/2024 54 (HH)     Hepatitis B Surface AB 11/13/2024 5.7     Hepatitis B Surface AG 11/13/2024 Nonreactive     WBC, Urine 11/13/2024 1-5     RBC, Urine 11/13/2024 NONE     Mucus, Urine 11/13/2024 FEW     Hyaline Casts, Urine 11/13/2024 3+ (A)     WBC 11/14/2024 7.3     nRBC 11/14/2024 0.0     RBC 11/14/2024 3.53 (L)     Hemoglobin 11/14/2024 10.4 (L)     Hematocrit 11/14/2024 33.1 (L)     MCV 11/14/2024 94     MCH 11/14/2024 29.5     MCHC 11/14/2024 31.4 (L)     RDW 11/14/2024 17.4 (H)     Platelets 11/14/2024 174     Glucose 11/14/2024 97     Sodium 11/14/2024 140     Potassium 11/14/2024 3.9     Chloride 11/14/2024 99     Bicarbonate 11/14/2024 27     Anion Gap 11/14/2024 18     Urea Nitrogen 11/14/2024 36 (H)     Creatinine 11/14/2024 6.15 (H)     eGFR 11/14/2024 9 (L)     Calcium 11/14/2024 9.3     Albumin 11/14/2024 4.0     Alkaline Phosphatase 11/14/2024 36     Total Protein 11/14/2024 6.3 (L)     AST 11/14/2024 28     Bilirubin, Total 11/14/2024 1.0     ALT 11/14/2024 12     POCT Glucose 11/14/2024 84     AV pk mana 11/14/2024 1.87     LVOT diam 11/14/2024 1.96     AV mn grad 11/14/2024 7     MV E/A ratio 11/14/2024 1.03     LV Biplane EF 11/14/2024 58     LV EF 11/14/2024 58     LVIDd 11/14/2024 5.04     AV pk grad 11/14/2024 14     Aortic Valve Area by  Con* 11/14/2024 2.03     Aortic Valve Area by Con* 11/14/2024 1.94     LV A4C EF 11/14/2024 52.5     Ventricular Rate 11/16/2024 73     Atrial Rate 11/16/2024 73     [place] Interval 11/16/2024 166     QRS Duration 11/16/2024 94     QT Interval 11/16/2024 434     QTC Calculation(Bazett) 11/16/2024 478     P Axis 11/16/2024 61     R [place] 11/16/2024 81     T Axis 11/16/2024 41     QRS Count 11/16/2024 12     Q Onset 11/16/2024 219     P Onset 11/16/2024 136     P Offset 11/16/2024 191     T Offset 11/16/2024 436     QTC Fredericia 11/16/2024 463     Glucose 11/15/2024 93     Sodium 11/15/2024 140     Potassium 11/15/2024 4.0     Chloride 11/15/2024 100     Bicarbonate 11/15/2024 26     Anion Gap 11/15/2024 18     Urea Nitrogen 11/15/2024 63 (H)     Creatinine 11/15/2024 9.18 (H)     eGFR 11/15/2024 5 (L)     Calcium 11/15/2024 8.6     Phosphorus 11/15/2024 6.6 (H)     Albumin 11/15/2024 3.5     WBC 11/15/2024 5.6     nRBC 11/15/2024 0.0     RBC 11/15/2024 3.39 (L)     Hemoglobin 11/15/2024 10.2 (L)     Hematocrit 11/15/2024 31.3 (L)     MCV 11/15/2024 92     MCH 11/15/2024 30.1     MCHC 11/15/2024 32.6     RDW 11/15/2024 17.9 (H)     Platelets 11/15/2024 195     POCT Glucose 11/15/2024 98     POCT Glucose 11/15/2024 168 (H)    Lab on 11/08/2024   Component Date Value    Glucose 11/08/2024 103 (H)     Sodium 11/08/2024 143     Potassium 11/08/2024 4.9     Chloride 11/08/2024 101     Bicarbonate 11/08/2024 29     Anion Gap 11/08/2024 18     Urea Nitrogen 11/08/2024 30 (H)     Creatinine 11/08/2024 6.72 (H)     eGFR 11/08/2024 8 (L)     Calcium 11/08/2024 9.4     Albumin 11/08/2024 4.3     Alkaline Phosphatase 11/08/2024 56     Total Protein 11/08/2024 6.4     AST 11/08/2024 19     Bilirubin, Total 11/08/2024 0.7     ALT 11/08/2024 12     Cholesterol 11/08/2024 123     HDL-Cholesterol 11/08/2024 36.7     Cholesterol/HDL Ratio 11/08/2024 3.4     LDL Calculated 11/08/2024 52     VLDL 11/08/2024 34     Triglycerides 11/08/2024  170 (H)     Non HDL Cholesterol 11/08/2024 86     Hemoglobin A1C 11/08/2024 6.3 (H)     Estimated Average Glucose 11/08/2024 134    Lab on 08/09/2024   Component Date Value    WBC 08/09/2024 5.4     nRBC 08/09/2024 0.0     RBC 08/09/2024 2.74 (L)     Hemoglobin 08/09/2024 8.5 (L)     Hematocrit 08/09/2024 27.2 (L)     MCV 08/09/2024 99     MCH 08/09/2024 31.0     MCHC 08/09/2024 31.3 (L)     RDW 08/09/2024 15.7 (H)     Platelets 08/09/2024 179     Glucose 08/09/2024 215 (H)     Sodium 08/09/2024 141     Potassium 08/09/2024 4.8     Chloride 08/09/2024 102     Bicarbonate 08/09/2024 26     Anion Gap 08/09/2024 18     Urea Nitrogen 08/09/2024 42 (H)     Creatinine 08/09/2024 9.04 (H)     eGFR 08/09/2024 6 (L)     Calcium 08/09/2024 9.0     Albumin 08/09/2024 3.9     Alkaline Phosphatase 08/09/2024 57     Total Protein 08/09/2024 6.1 (L)     AST 08/09/2024 18     Bilirubin, Total 08/09/2024 0.6     ALT 08/09/2024 7 (L)     Hemoglobin A1C 08/09/2024 6.5 (H)     Estimated Average Glucose 08/09/2024 140     Cholesterol 08/09/2024 102     HDL-Cholesterol 08/09/2024 30.6     Cholesterol/HDL Ratio 08/09/2024 3.3     LDL Calculated 08/09/2024 33     VLDL 08/09/2024 39     Triglycerides 08/09/2024 193 (H)     Non HDL Cholesterol 08/09/2024 71     Thyroid Stimulating Horm* 08/09/2024 1.09         Assessment/Plan   1. Hypertension. The patient's blood pressure is elevated on atenolol 25 mg daily, amlodipine 10 mg daily, and Benicar HCT 20/12.5 mg daily. The patient's recent lab work from 04/17/2020 shows some ongoing progression of chronic kidney disease and his creatinine is now 3.34 The patient completed a course of treatment for prostate carcinoma. He has noted some increased lower extremity edema. Given his progressing chronic kidney disease the benicar and furosemide were stopped. He wias started on hydralazine 50 mg twice a day and continue amlodipine 10 mg daily. He had an echocardiogram done 9/2/2020 which showed an EF  of 65%, left atrium mild to moderately dilated with a PASP of 36 mmHg. The patient's blood pressure presently is well within the range of normal. He presently is on high-dose hydralazine 100 mg 3 times daily isosorbide mononitrate 120 mg daily amlodipine 5 mg daily carvedilol 12.5 mg twice daily. If blood pressure readings remained in current range may be able to reduce the dose of hydralazine and possibly the isosorbide as well. Is off both hydralazine and isosorbide.  Patient's blood pressure recently has been somewhat elevated in the 180 mmHg range systolic.  Lab work 11/18/2024 included creatinine of 9.62 hematocrit 31.4.  Nephrology had discontinued his hydralazine and Imdur.  Systolic blood pressure 196 mmHg office visit 12/10/2024.  Will increase amlodipine from 5 to 10 mg daily restart Imdur at 60 mg daily not 120 mg daily and restart hydralazine 50 mg 3 times daily not 100 mg 3 times daily.  Return to office 2 months.  Echo 08/2022  CONCLUSIONS:  1. Left ventricular systolic function is mildly decreased with a 45-50%  estimated ejection fraction.  2. There is mild hypokinesis of the anteroseptal wall.  3. Mild mitral valve regurgitation.  4. Mild to moderate tricuspid regurgitation.  5. Moderate to severely elevated right ventricular systolic pressure.  6. Moderate to severely elevated pulmonary artery pressure.  7. The estimated PASP is 59 mmHg.  Lexiscan   IMPRESSION:  1. Findings suggestive of mild ischemia involving the cardiac apex.  2. Normal-sized left ventricle, with mildly depressed systolic left  ventricular function and a calculated ejection fraction of 46 %.   ECG portion showed LVH.   2. Hyperlipidemia Recent lipid panel from 11/2020 includes cholesterol 192 LDL 96 HDL 37 triglyceride 289. These results are somewhat worse than previous lipid panels and may be related to the fact that the patient for uncertain reason is taking atorvastatin 10 mg daily rather than the intended 40 mg daily. The  dose will be increased to 80 mg daily. Will add Zetia. Recent FLP was optimized.  Recent lipid panel from 11/8/2024 satisfactory cholesterol 123 LDL 52 HDL 36 triglyceride 170.     3. Coronary artery disease status post multiple PCI procedures. Patient had a stress test in 2008 that was negative for ischemia. The patient had a CT score of 447.8. The patient did have a subsequent nuclear exercise treadmill stress test on 02/18/2019 which demonstrated normal myocardial perfusion. Cardiac cath done 09/22/2020 for angina showed triple-vessel CAD with proximal left anterior descending involvement, 90% ostial stenosis of first diagonal branch, middle third of the proximal circumflex coronary artery showed 10 to 30% stenosis, middle third of the mid right coronary artery showed 60% stenosis. Patient was seen by Dr. Montez Palmer to consider possible PCI. Medical management was initially recommended but then the patient was admitted to [facility] on 2/28/2021 with a non-STEMI. He was transferred to [facility] and on 2/24/2021 underwent PCI and stent deployment to the proximal and mid LAD and a [place] angioplasty of a ostial first diagonal branch. An echocardiogram performed at that time demonstrated a preserved LV ejection fraction at 60-65% with moderate left ventricular hypertrophy and severe left atrial enlargement with mild aortic valve stenosis and mild elevation of the PA systolic pressure. The patient presented back to [facility] emergency room on 9/12/2021 with intermittent chest discomfort. Of 2 weeks. The high-sensitivity troponins elevated and fashion consistent with a non-STEMI. EKG showed left ventricular hypertrophy with anterolateral downsloping ST abnormality consistent with either left ventricular repolarization abnormality versus ischemia. The patient underwent cardiac catheterization and this identified a probable non-STEMI related to a culprit high-grade  proximal LCx stenosis that had significantly progressed since the previous study. The patient was transferred to [facility] and on 9/17/2021 underwent a successful OCT guided PCI of a culprit LCx stenosis with deployment of a drug-eluting stent. A staged PCI to the RCA was recommended on an outpatient basis due to the patient's advanced chronic kidney disease. The patient had an echocardiogram performed 9/18/2021 which again confirmed preserved LV ejection fraction 60% with moderate concentric LV hypertrophy mild aortic valve stenosis. The patient was readmitted to [facility] on 10/6/2021 with recurrent anginal type chest discomfort marginal troponin elevation. His creatinine was stable at 3.9 and he had a chronic anemia with hematocrit of 25.4. He was continued on dual antiplatelet therapy given intravenous iron infusion. He subsequently was transferred to [facility] and on 10/20/2021 underwent successful OCT guided PCI with rotational atherectomy of a severely calcified 90% mid to distal RCA stenosis and deployment of a 3.0 x 38 mm Synergy drug-eluting stent. The patient is actually doing clinically quite well and is without anginal symptoms. He feels improved and has not required the use of nitroglycerin. He is going to the [facility] routinely in the mornings and riding a bike without any symptoms.  The patient did have a pharmacological nuclear stress test on 8/30/2022 which showed mild ischemia involving the cardiac apex normal LV chamber size and her LV ejection fraction of 46%.  Patient currently experiencing some very minor chest discomfort.  The patient had been raking leaves several days ago currently has some chest congestion and cough over the past 24 hours along with some minor diarrhea.  The chest pain is actually been present for approximately 1 month but only happens 1-2 times every few weeks and comes and goes.  He is not certain as to whether or not it resembles his original  anginal syndrome.  Will defer repeat stress testing or cardiac catheterization pending further additional follow-up.  He will return in 2 months for to reassess his symptoms.  Will check chest x-ray today to assess findings of decreased breath sounds right base.  Patient with some occasional chest pain nothing extreme 3-4 times per week no pattern duration 10 to 20 minutes takes Tylenol with relief.  Patient was admitted to [facility] 11/14/2024 - 11/18/2024 with some shortness of breath related to CHF with high blood pressure at that time.  Patient does have his dialysis at [facility] where his systolic blood pressure readings have also been between 180-200 mmHg and as such we will be restarted on lower doses of the Imdur 60 mg daily hydralazine 50 mg 3 times daily with increase in amlodipine from 5 to 10 mg daily.     4. Type two diabetes He will continue to follow up with his primary care doctor.  Lab work 11/8/2024 includes acceptable glycohemoglobin of 6.3%.     5. GERD      6. History of gout the patient is currently on Allopurinol 300 mg daily but the dose will be reduced to 100 mg daily because of his progressive kidney disease.     7. History of shoulder surgery      8. Chronic kidney disease. The patient's most recent creatinine was 5.64 when checked recently. He also has chronic anemia most likely due to his chronic kidney disease. Has appointment with Dr Gomez. Is on procrit and iron tablets. H&H is improving. Most recent lab work from 1/14/2022 includes creatinine 4.80 hemoglobin 10.4. He is having his hemoglobin checked every other week and will receive a Procrit injection for hemoglobins of less than 10. Has fistula to left arm.  Patient had dialysis port removed in early 11/2024 and has a fully functional AV graft in the left upper extremity.  Lab work 11/8/2024 includes an SMA panel creatinine is 6.72.     9. Arthroscopic left knee surgery with Dr. Menard on June 21, 2016 at [facility].     10. Prostate carcinoma. The patient had a prostate biopsy in 11/25/2019 which was positive for the presence of carcinoma. MRI of the prostate in 01/04/2020 which evidently was negative for any evidence of metastatic disease. He subsequently underwent a 5 week course of external radiation therapy which was completed in mid 4/2020. His PSA is declining from a maximum value of 8.0 now at 3.66 and presumably declining.     11. Carotid US done 09/2020 showed < 50% stenosis.      12. Hx of covid-19 vaccine #1 and #2.      13. History of rectal bleeding. Patient was at [facility] April 2021 to receive 1 unit packed red blood cells. He complains of rectal bleeding x2 episodes and underwent a colonoscopy and found to have internal hemorrhoids and 2 polyps. He had a polypectomy done and required 2 clips.     14. Chronic anemia.         15.  Hyperkalemia.        Orders:  No orders of the defined types were placed in this encounter.     Followup Appts:  Future Appointments   Date Time Provider Department Center   12/27/2024  9:30 AM VERÓNICA Barnes-CNP [id number] [facility]   1/17/2025 11:30 AM Kyle Ardon MD [id number] [facility]   2/10/2025 10:15 AM Marianna Galloway MD [id number] [facility]   2/21/2025  9:30 AM VERÓNICA Austin-CNP [id number] [facility]   2/24/2025  9:00 AM Abad Briceno MD [id number] [facility]           ____________________________________________________________  Kyle Ardon MD  [facility]  Assistant Clinical Professor, [employer]  [place]

## 2024-12-10 NOTE — PATIENT INSTRUCTIONS
Change amlodipine to 10 mg once daily  Change imdur to 60 mg once daily  Change hydralazine to 50 mg three times daily  Labs done this year   Follow up in 2 months

## 2024-12-11 ENCOUNTER — PATIENT OUTREACH (OUTPATIENT)
Dept: CARE COORDINATION | Facility: CLINIC | Age: 74
End: 2024-12-11
Payer: MEDICARE

## 2024-12-11 LAB — PSA SERPL-MCNC: 0.55 NG/ML

## 2024-12-11 NOTE — PROGRESS NOTES
Attempted outreach call to patient following up on an appointment with the care provider.  Left a voice message with my contact information.   Will continue to follow.        Julisa AVALOS, RN, [facility]  O: [phone]

## 2024-12-12 DIAGNOSIS — I10 HYPERTENSION: Primary | ICD-10-CM

## 2024-12-12 RX ORDER — HYDRALAZINE HYDROCHLORIDE 50 MG/1
50 TABLET, FILM COATED ORAL 3 TIMES DAILY
Qty: 270 TABLET | Refills: 3 | Status: SHIPPED | OUTPATIENT
Start: 2024-12-12 | End: 2025-12-12

## 2024-12-12 RX ORDER — ISOSORBIDE MONONITRATE 60 MG/1
60 TABLET, EXTENDED RELEASE ORAL DAILY
Qty: 90 TABLET | Refills: 3 | Status: SHIPPED | OUTPATIENT
Start: 2024-12-12 | End: 2025-12-12

## 2024-12-12 RX ORDER — HYDRALAZINE HYDROCHLORIDE 50 MG/1
50 TABLET, FILM COATED ORAL 3 TIMES DAILY
COMMUNITY
End: 2024-12-12 | Stop reason: SDUPTHER

## 2024-12-12 RX ORDER — ISOSORBIDE MONONITRATE 60 MG/1
60 TABLET, EXTENDED RELEASE ORAL DAILY
COMMUNITY
End: 2024-12-12 | Stop reason: SDUPTHER

## 2024-12-13 ENCOUNTER — APPOINTMENT (OUTPATIENT)
Dept: CARDIOLOGY | Facility: CLINIC | Age: 74
End: 2024-12-13
Payer: MEDICARE

## 2024-12-17 ENCOUNTER — PATIENT OUTREACH (OUTPATIENT)
Dept: CARE COORDINATION | Facility: CLINIC | Age: 74
End: 2024-12-17
Payer: MEDICARE

## 2024-12-17 NOTE — PROGRESS NOTES
Outreach call to patient to check in 30 days after hospital discharge to support smooth transition of care.  Patient with no additional needs noted. No additional outreach needed at this time.     Julisa ANDRADEN, RN, [facility]  O: [phone]

## 2024-12-21 ENCOUNTER — APPOINTMENT (OUTPATIENT)
Dept: CARDIOLOGY | Facility: HOSPITAL | Age: 74
DRG: 871 | End: 2024-12-21
Payer: MEDICARE

## 2024-12-21 ENCOUNTER — APPOINTMENT (OUTPATIENT)
Dept: RADIOLOGY | Facility: HOSPITAL | Age: 74
DRG: 871 | End: 2024-12-21
Payer: MEDICARE

## 2024-12-21 ENCOUNTER — HOSPITAL ENCOUNTER (INPATIENT)
Facility: HOSPITAL | Age: 74
DRG: 871 | End: 2024-12-21
Attending: EMERGENCY MEDICINE | Admitting: INTERNAL MEDICINE
Payer: MEDICARE

## 2024-12-21 DIAGNOSIS — B95.61 STAPHYLOCOCCUS AUREUS BACTEREMIA: ICD-10-CM

## 2024-12-21 DIAGNOSIS — G93.41 SEPSIS WITH ENCEPHALOPATHY WITHOUT SEPTIC SHOCK, DUE TO UNSPECIFIED ORGANISM (MULTI): ICD-10-CM

## 2024-12-21 DIAGNOSIS — I50.30 DIASTOLIC CONGESTIVE HEART FAILURE, UNSPECIFIED HF CHRONICITY: Primary | ICD-10-CM

## 2024-12-21 DIAGNOSIS — R78.81 STAPHYLOCOCCUS AUREUS BACTEREMIA: ICD-10-CM

## 2024-12-21 DIAGNOSIS — R41.0 DISORIENTATION: ICD-10-CM

## 2024-12-21 DIAGNOSIS — R65.20 SEPSIS WITH ENCEPHALOPATHY WITHOUT SEPTIC SHOCK, DUE TO UNSPECIFIED ORGANISM (MULTI): ICD-10-CM

## 2024-12-21 DIAGNOSIS — B95.61 ENDOCARDITIS DUE TO METHICILLIN SUSCEPTIBLE STAPHYLOCOCCUS AUREUS (MSSA) (HHS-HCC): ICD-10-CM

## 2024-12-21 DIAGNOSIS — A41.9 SEPSIS WITH ENCEPHALOPATHY WITHOUT SEPTIC SHOCK, DUE TO UNSPECIFIED ORGANISM (MULTI): ICD-10-CM

## 2024-12-21 DIAGNOSIS — G93.40 ACUTE ENCEPHALOPATHY: ICD-10-CM

## 2024-12-21 DIAGNOSIS — I38 ENDOCARDITIS, VALVE UNSPECIFIED: ICD-10-CM

## 2024-12-21 DIAGNOSIS — I33.0 ENDOCARDITIS DUE TO METHICILLIN SUSCEPTIBLE STAPHYLOCOCCUS AUREUS (MSSA) (HHS-HCC): ICD-10-CM

## 2024-12-21 LAB
ALBUMIN SERPL BCP-MCNC: 4.4 G/DL (ref 3.4–5)
ALP SERPL-CCNC: 64 U/L (ref 33–136)
ALT SERPL W P-5'-P-CCNC: 12 U/L (ref 10–52)
ANION GAP SERPL CALCULATED.3IONS-SCNC: 14 MMOL/L (ref 10–20)
AST SERPL W P-5'-P-CCNC: 21 U/L (ref 9–39)
BASOPHILS # BLD AUTO: 0.05 X10*3/UL (ref 0–0.1)
BASOPHILS NFR BLD AUTO: 0.5 %
BILIRUB SERPL-MCNC: 1.3 MG/DL (ref 0–1.2)
BUN SERPL-MCNC: 12 MG/DL (ref 6–23)
CALCIUM SERPL-MCNC: 9.1 MG/DL (ref 8.6–10.3)
CHLORIDE SERPL-SCNC: 95 MMOL/L (ref 98–107)
CO2 SERPL-SCNC: 31 MMOL/L (ref 21–32)
CREAT SERPL-MCNC: 4.15 MG/DL (ref 0.5–1.3)
EGFRCR SERPLBLD CKD-EPI 2021: 14 ML/MIN/1.73M*2
EOSINOPHIL # BLD AUTO: 0.09 X10*3/UL (ref 0–0.4)
EOSINOPHIL NFR BLD AUTO: 1 %
ERYTHROCYTE [DISTWIDTH] IN BLOOD BY AUTOMATED COUNT: 18.1 % (ref 11.5–14.5)
FLUAV RNA RESP QL NAA+PROBE: NOT DETECTED
FLUBV RNA RESP QL NAA+PROBE: NOT DETECTED
GLUCOSE BLD MANUAL STRIP-MCNC: 115 MG/DL (ref 74–99)
GLUCOSE BLD MANUAL STRIP-MCNC: 142 MG/DL (ref 74–99)
GLUCOSE SERPL-MCNC: 163 MG/DL (ref 74–99)
HCT VFR BLD AUTO: 33.8 % (ref 41–52)
HGB BLD-MCNC: 11.1 G/DL (ref 13.5–17.5)
IMM GRANULOCYTES # BLD AUTO: 0.05 X10*3/UL (ref 0–0.5)
IMM GRANULOCYTES NFR BLD AUTO: 0.5 % (ref 0–0.9)
LACTATE SERPL-SCNC: 1.3 MMOL/L (ref 0.4–2)
LYMPHOCYTES # BLD AUTO: 0.38 X10*3/UL (ref 0.8–3)
LYMPHOCYTES NFR BLD AUTO: 4 %
MCH RBC QN AUTO: 31.6 PG (ref 26–34)
MCHC RBC AUTO-ENTMCNC: 32.8 G/DL (ref 32–36)
MCV RBC AUTO: 96 FL (ref 80–100)
MONOCYTES # BLD AUTO: 0.58 X10*3/UL (ref 0.05–0.8)
MONOCYTES NFR BLD AUTO: 6.1 %
NEUTROPHILS # BLD AUTO: 8.3 X10*3/UL (ref 1.6–5.5)
NEUTROPHILS NFR BLD AUTO: 87.9 %
NRBC BLD-RTO: 0 /100 WBCS (ref 0–0)
PLATELET # BLD AUTO: 157 X10*3/UL (ref 150–450)
POTASSIUM SERPL-SCNC: 3.7 MMOL/L (ref 3.5–5.3)
PROT SERPL-MCNC: 6.8 G/DL (ref 6.4–8.2)
RBC # BLD AUTO: 3.51 X10*6/UL (ref 4.5–5.9)
RSV RNA RESP QL NAA+PROBE: NOT DETECTED
SARS-COV-2 RNA RESP QL NAA+PROBE: NOT DETECTED
SODIUM SERPL-SCNC: 136 MMOL/L (ref 136–145)
WBC # BLD AUTO: 9.5 X10*3/UL (ref 4.4–11.3)

## 2024-12-21 PROCEDURE — 2500000004 HC RX 250 GENERAL PHARMACY W/ HCPCS (ALT 636 FOR OP/ED): Performed by: EMERGENCY MEDICINE

## 2024-12-21 PROCEDURE — 2500000001 HC RX 250 WO HCPCS SELF ADMINISTERED DRUGS (ALT 637 FOR MEDICARE OP): Performed by: EMERGENCY MEDICINE

## 2024-12-21 PROCEDURE — 70450 CT HEAD/BRAIN W/O DYE: CPT

## 2024-12-21 PROCEDURE — 87077 CULTURE AEROBIC IDENTIFY: CPT | Mod: WESLAB | Performed by: EMERGENCY MEDICINE

## 2024-12-21 PROCEDURE — 71045 X-RAY EXAM CHEST 1 VIEW: CPT | Mod: FOREIGN READ | Performed by: RADIOLOGY

## 2024-12-21 PROCEDURE — 80053 COMPREHEN METABOLIC PANEL: CPT | Performed by: EMERGENCY MEDICINE

## 2024-12-21 PROCEDURE — 71045 X-RAY EXAM CHEST 1 VIEW: CPT

## 2024-12-21 PROCEDURE — 2500000002 HC RX 250 W HCPCS SELF ADMINISTERED DRUGS (ALT 637 FOR MEDICARE OP, ALT 636 FOR OP/ED): Performed by: NURSE PRACTITIONER

## 2024-12-21 PROCEDURE — 82947 ASSAY GLUCOSE BLOOD QUANT: CPT

## 2024-12-21 PROCEDURE — 2500000005 HC RX 250 GENERAL PHARMACY W/O HCPCS: Performed by: NURSE PRACTITIONER

## 2024-12-21 PROCEDURE — 96367 TX/PROPH/DG ADDL SEQ IV INF: CPT

## 2024-12-21 PROCEDURE — 99222 1ST HOSP IP/OBS MODERATE 55: CPT | Performed by: NURSE PRACTITIONER

## 2024-12-21 PROCEDURE — 96365 THER/PROPH/DIAG IV INF INIT: CPT

## 2024-12-21 PROCEDURE — 93010 ELECTROCARDIOGRAM REPORT: CPT | Performed by: INTERNAL MEDICINE

## 2024-12-21 PROCEDURE — 87637 SARSCOV2&INF A&B&RSV AMP PRB: CPT | Performed by: EMERGENCY MEDICINE

## 2024-12-21 PROCEDURE — 96361 HYDRATE IV INFUSION ADD-ON: CPT

## 2024-12-21 PROCEDURE — 2500000004 HC RX 250 GENERAL PHARMACY W/ HCPCS (ALT 636 FOR OP/ED): Performed by: NURSE PRACTITIONER

## 2024-12-21 PROCEDURE — 83605 ASSAY OF LACTIC ACID: CPT | Performed by: EMERGENCY MEDICINE

## 2024-12-21 PROCEDURE — 93005 ELECTROCARDIOGRAM TRACING: CPT

## 2024-12-21 PROCEDURE — 1200000002 HC GENERAL ROOM WITH TELEMETRY DAILY

## 2024-12-21 PROCEDURE — 85025 COMPLETE CBC W/AUTO DIFF WBC: CPT | Performed by: EMERGENCY MEDICINE

## 2024-12-21 PROCEDURE — 99291 CRITICAL CARE FIRST HOUR: CPT | Performed by: EMERGENCY MEDICINE

## 2024-12-21 PROCEDURE — 2500000001 HC RX 250 WO HCPCS SELF ADMINISTERED DRUGS (ALT 637 FOR MEDICARE OP): Performed by: NURSE PRACTITIONER

## 2024-12-21 PROCEDURE — 36415 COLL VENOUS BLD VENIPUNCTURE: CPT | Performed by: EMERGENCY MEDICINE

## 2024-12-21 RX ORDER — SEVELAMER CARBONATE 800 MG/1
1600 TABLET, FILM COATED ORAL
Status: DISCONTINUED | OUTPATIENT
Start: 2024-12-21 | End: 2024-12-31 | Stop reason: HOSPADM

## 2024-12-21 RX ORDER — PANTOPRAZOLE SODIUM 40 MG/1
40 TABLET, DELAYED RELEASE ORAL DAILY
Status: DISCONTINUED | OUTPATIENT
Start: 2024-12-21 | End: 2024-12-31 | Stop reason: HOSPADM

## 2024-12-21 RX ORDER — ACETAMINOPHEN 325 MG/1
975 TABLET ORAL ONCE
Status: COMPLETED | OUTPATIENT
Start: 2024-12-21 | End: 2024-12-21

## 2024-12-21 RX ORDER — CEFTRIAXONE 1 G/50ML
1 INJECTION, SOLUTION INTRAVENOUS EVERY 24 HOURS
Status: DISCONTINUED | OUTPATIENT
Start: 2024-12-22 | End: 2024-12-25

## 2024-12-21 RX ORDER — AMLODIPINE BESYLATE 5 MG/1
5 TABLET ORAL DAILY
Status: DISCONTINUED | OUTPATIENT
Start: 2024-12-21 | End: 2024-12-31 | Stop reason: HOSPADM

## 2024-12-21 RX ORDER — ONDANSETRON HYDROCHLORIDE 2 MG/ML
4 INJECTION, SOLUTION INTRAVENOUS EVERY 8 HOURS PRN
Status: DISCONTINUED | OUTPATIENT
Start: 2024-12-21 | End: 2024-12-22

## 2024-12-21 RX ORDER — CEFTRIAXONE 1 G/50ML
1 INJECTION, SOLUTION INTRAVENOUS ONCE
Status: COMPLETED | OUTPATIENT
Start: 2024-12-21 | End: 2024-12-21

## 2024-12-21 RX ORDER — GUAIFENESIN 600 MG/1
600 TABLET, EXTENDED RELEASE ORAL 2 TIMES DAILY PRN
Status: DISCONTINUED | OUTPATIENT
Start: 2024-12-21 | End: 2024-12-31 | Stop reason: HOSPADM

## 2024-12-21 RX ORDER — ACETAMINOPHEN 650 MG/1
650 SUPPOSITORY RECTAL EVERY 4 HOURS PRN
Status: DISCONTINUED | OUTPATIENT
Start: 2024-12-21 | End: 2024-12-31 | Stop reason: HOSPADM

## 2024-12-21 RX ORDER — INSULIN LISPRO 100 [IU]/ML
0-10 INJECTION, SOLUTION INTRAVENOUS; SUBCUTANEOUS
Status: DISCONTINUED | OUTPATIENT
Start: 2024-12-21 | End: 2024-12-31 | Stop reason: HOSPADM

## 2024-12-21 RX ORDER — ACETAMINOPHEN 160 MG/5ML
650 SOLUTION ORAL EVERY 4 HOURS PRN
Status: DISCONTINUED | OUTPATIENT
Start: 2024-12-21 | End: 2024-12-31 | Stop reason: HOSPADM

## 2024-12-21 RX ORDER — DEXTROSE 50 % IN WATER (D50W) INTRAVENOUS SYRINGE
25
Status: DISCONTINUED | OUTPATIENT
Start: 2024-12-21 | End: 2024-12-31 | Stop reason: HOSPADM

## 2024-12-21 RX ORDER — ACETAMINOPHEN 325 MG/1
650 TABLET ORAL EVERY 4 HOURS PRN
Status: DISCONTINUED | OUTPATIENT
Start: 2024-12-21 | End: 2024-12-31 | Stop reason: HOSPADM

## 2024-12-21 RX ORDER — CARVEDILOL 6.25 MG/1
6.25 TABLET ORAL
Status: DISCONTINUED | OUTPATIENT
Start: 2024-12-21 | End: 2024-12-31 | Stop reason: HOSPADM

## 2024-12-21 RX ORDER — EZETIMIBE 10 MG/1
10 TABLET ORAL DAILY
Status: DISCONTINUED | OUTPATIENT
Start: 2024-12-21 | End: 2024-12-31 | Stop reason: HOSPADM

## 2024-12-21 RX ORDER — ALLOPURINOL 100 MG/1
100 TABLET ORAL DAILY
Status: DISCONTINUED | OUTPATIENT
Start: 2024-12-21 | End: 2024-12-31 | Stop reason: HOSPADM

## 2024-12-21 RX ORDER — NITROGLYCERIN 0.4 MG/1
0.4 TABLET SUBLINGUAL EVERY 5 MIN PRN
Status: DISCONTINUED | OUTPATIENT
Start: 2024-12-21 | End: 2024-12-31 | Stop reason: HOSPADM

## 2024-12-21 RX ORDER — HYDRALAZINE HYDROCHLORIDE 50 MG/1
50 TABLET, FILM COATED ORAL 3 TIMES DAILY
Status: DISCONTINUED | OUTPATIENT
Start: 2024-12-21 | End: 2024-12-31 | Stop reason: HOSPADM

## 2024-12-21 RX ORDER — ONDANSETRON 4 MG/1
4 TABLET, FILM COATED ORAL EVERY 8 HOURS PRN
Status: DISCONTINUED | OUTPATIENT
Start: 2024-12-21 | End: 2024-12-22

## 2024-12-21 RX ORDER — HEPARIN SODIUM 5000 [USP'U]/ML
5000 INJECTION, SOLUTION INTRAVENOUS; SUBCUTANEOUS EVERY 12 HOURS
Status: DISCONTINUED | OUTPATIENT
Start: 2024-12-21 | End: 2024-12-31 | Stop reason: HOSPADM

## 2024-12-21 RX ORDER — DEXTROSE 50 % IN WATER (D50W) INTRAVENOUS SYRINGE
12.5
Status: DISCONTINUED | OUTPATIENT
Start: 2024-12-21 | End: 2024-12-31 | Stop reason: HOSPADM

## 2024-12-21 RX ORDER — ISOSORBIDE MONONITRATE 60 MG/1
60 TABLET, EXTENDED RELEASE ORAL DAILY
Status: DISCONTINUED | OUTPATIENT
Start: 2024-12-21 | End: 2024-12-31 | Stop reason: HOSPADM

## 2024-12-21 RX ORDER — POLYETHYLENE GLYCOL 3350 17 G/17G
17 POWDER, FOR SOLUTION ORAL DAILY PRN
Status: DISCONTINUED | OUTPATIENT
Start: 2024-12-21 | End: 2024-12-31 | Stop reason: HOSPADM

## 2024-12-21 RX ORDER — ASPIRIN 81 MG/1
81 TABLET ORAL DAILY
Status: DISCONTINUED | OUTPATIENT
Start: 2024-12-21 | End: 2024-12-31 | Stop reason: HOSPADM

## 2024-12-21 RX ORDER — ATORVASTATIN CALCIUM 80 MG/1
80 TABLET, FILM COATED ORAL NIGHTLY
Status: DISCONTINUED | OUTPATIENT
Start: 2024-12-21 | End: 2024-12-31 | Stop reason: HOSPADM

## 2024-12-21 RX ADMIN — EZETIMIBE 10 MG: 10 TABLET ORAL at 16:59

## 2024-12-21 RX ADMIN — SEVELAMER CARBONATE 1600 MG: 800 TABLET, FILM COATED ORAL at 17:01

## 2024-12-21 RX ADMIN — ALLOPURINOL 100 MG: 100 TABLET ORAL at 17:00

## 2024-12-21 RX ADMIN — Medication 21 PERCENT: at 15:08

## 2024-12-21 RX ADMIN — AMLODIPINE BESYLATE 5 MG: 5 TABLET ORAL at 17:00

## 2024-12-21 RX ADMIN — PANTOPRAZOLE SODIUM 40 MG: 40 TABLET, DELAYED RELEASE ORAL at 17:00

## 2024-12-21 RX ADMIN — ATORVASTATIN CALCIUM 80 MG: 80 TABLET, FILM COATED ORAL at 20:03

## 2024-12-21 RX ADMIN — ACETAMINOPHEN 650 MG: 325 TABLET ORAL at 20:02

## 2024-12-21 RX ADMIN — ASPIRIN 81 MG: 81 TABLET, COATED ORAL at 17:00

## 2024-12-21 RX ADMIN — CARVEDILOL 6.25 MG: 6.25 TABLET, FILM COATED ORAL at 17:00

## 2024-12-21 RX ADMIN — CEFTRIAXONE SODIUM 1 G: 1 INJECTION, SOLUTION INTRAVENOUS at 13:03

## 2024-12-21 RX ADMIN — ISOSORBIDE MONONITRATE 60 MG: 60 TABLET, EXTENDED RELEASE ORAL at 16:59

## 2024-12-21 RX ADMIN — DEXTROSE MONOHYDRATE 500 MG: 50 INJECTION, SOLUTION INTRAVENOUS at 13:26

## 2024-12-21 RX ADMIN — SODIUM CHLORIDE 250 ML: 900 INJECTION, SOLUTION INTRAVENOUS at 11:01

## 2024-12-21 RX ADMIN — GUAIFENESIN 600 MG: 600 TABLET, MULTILAYER, EXTENDED RELEASE ORAL at 18:49

## 2024-12-21 RX ADMIN — SITAGLIPTIN 25 MG: 50 TABLET, FILM COATED ORAL at 17:00

## 2024-12-21 RX ADMIN — ACETAMINOPHEN 975 MG: 325 TABLET ORAL at 12:00

## 2024-12-21 RX ADMIN — HEPARIN SODIUM 5000 UNITS: 5000 INJECTION, SOLUTION INTRAVENOUS; SUBCUTANEOUS at 17:01

## 2024-12-21 SDOH — SOCIAL STABILITY: SOCIAL INSECURITY: ARE YOU OR HAVE YOU BEEN THREATENED OR ABUSED PHYSICALLY, EMOTIONALLY, OR SEXUALLY BY ANYONE?: NO

## 2024-12-21 SDOH — ECONOMIC STABILITY: FOOD INSECURITY: WITHIN THE PAST 12 MONTHS, THE FOOD YOU BOUGHT JUST DIDN'T LAST AND YOU DIDN'T HAVE MONEY TO GET MORE.: NEVER TRUE

## 2024-12-21 SDOH — ECONOMIC STABILITY: HOUSING INSECURITY: IN THE PAST 12 MONTHS, HOW MANY TIMES HAVE YOU MOVED WHERE YOU WERE LIVING?: 0

## 2024-12-21 SDOH — SOCIAL STABILITY: SOCIAL INSECURITY: WITHIN THE LAST YEAR, HAVE YOU BEEN AFRAID OF YOUR PARTNER OR EX-PARTNER?: NO

## 2024-12-21 SDOH — SOCIAL STABILITY: SOCIAL INSECURITY: WITHIN THE LAST YEAR, HAVE YOU BEEN HUMILIATED OR EMOTIONALLY ABUSED IN OTHER WAYS BY YOUR PARTNER OR EX-PARTNER?: NO

## 2024-12-21 SDOH — SOCIAL STABILITY: SOCIAL INSECURITY: HAS ANYONE EVER THREATENED TO HURT YOUR FAMILY OR YOUR PETS?: NO

## 2024-12-21 SDOH — SOCIAL STABILITY: SOCIAL INSECURITY: ABUSE: ADULT

## 2024-12-21 SDOH — ECONOMIC STABILITY: FOOD INSECURITY: WITHIN THE PAST 12 MONTHS, YOU WORRIED THAT YOUR FOOD WOULD RUN OUT BEFORE YOU GOT THE MONEY TO BUY MORE.: NEVER TRUE

## 2024-12-21 SDOH — SOCIAL STABILITY: SOCIAL INSECURITY: HAVE YOU HAD ANY THOUGHTS OF HARMING ANYONE ELSE?: NO

## 2024-12-21 SDOH — ECONOMIC STABILITY: FOOD INSECURITY: HOW HARD IS IT FOR YOU TO PAY FOR THE VERY BASICS LIKE FOOD, HOUSING, MEDICAL CARE, AND HEATING?: NOT HARD AT ALL

## 2024-12-21 SDOH — ECONOMIC STABILITY: HOUSING INSECURITY: AT ANY TIME IN THE PAST 12 MONTHS, WERE YOU HOMELESS OR LIVING IN A SHELTER (INCLUDING NOW)?: NO

## 2024-12-21 SDOH — SOCIAL STABILITY: SOCIAL INSECURITY: ARE THERE ANY APPARENT SIGNS OF INJURIES/BEHAVIORS THAT COULD BE RELATED TO ABUSE/NEGLECT?: NO

## 2024-12-21 SDOH — ECONOMIC STABILITY: TRANSPORTATION INSECURITY: IN THE PAST 12 MONTHS, HAS LACK OF TRANSPORTATION KEPT YOU FROM MEDICAL APPOINTMENTS OR FROM GETTING MEDICATIONS?: NO

## 2024-12-21 SDOH — ECONOMIC STABILITY: INCOME INSECURITY: IN THE PAST 12 MONTHS HAS THE ELECTRIC, GAS, OIL, OR WATER COMPANY THREATENED TO SHUT OFF SERVICES IN YOUR HOME?: NO

## 2024-12-21 SDOH — ECONOMIC STABILITY: HOUSING INSECURITY: IN THE LAST 12 MONTHS, WAS THERE A TIME WHEN YOU WERE NOT ABLE TO PAY THE MORTGAGE OR RENT ON TIME?: NO

## 2024-12-21 SDOH — SOCIAL STABILITY: SOCIAL INSECURITY: WERE YOU ABLE TO COMPLETE ALL THE BEHAVIORAL HEALTH SCREENINGS?: YES

## 2024-12-21 SDOH — SOCIAL STABILITY: SOCIAL INSECURITY: DO YOU FEEL ANYONE HAS EXPLOITED OR TAKEN ADVANTAGE OF YOU FINANCIALLY OR OF YOUR PERSONAL PROPERTY?: NO

## 2024-12-21 SDOH — SOCIAL STABILITY: SOCIAL INSECURITY: HAVE YOU HAD THOUGHTS OF HARMING ANYONE ELSE?: NO

## 2024-12-21 SDOH — SOCIAL STABILITY: SOCIAL INSECURITY: DOES ANYONE TRY TO KEEP YOU FROM HAVING/CONTACTING OTHER FRIENDS OR DOING THINGS OUTSIDE YOUR HOME?: NO

## 2024-12-21 SDOH — SOCIAL STABILITY: SOCIAL INSECURITY: DO YOU FEEL UNSAFE GOING BACK TO THE PLACE WHERE YOU ARE LIVING?: NO

## 2024-12-21 ASSESSMENT — LIFESTYLE VARIABLES
SKIP TO QUESTIONS 9-10: 1
EVER FELT BAD OR GUILTY ABOUT YOUR DRINKING: NO
TOTAL SCORE: 0
HAVE PEOPLE ANNOYED YOU BY CRITICIZING YOUR DRINKING: NO
HOW OFTEN DO YOU HAVE 6 OR MORE DRINKS ON ONE OCCASION: NEVER
HAVE YOU EVER FELT YOU SHOULD CUT DOWN ON YOUR DRINKING: NO
HOW OFTEN DO YOU HAVE A DRINK CONTAINING ALCOHOL: MONTHLY OR LESS
AUDIT-C TOTAL SCORE: 1
EVER HAD A DRINK FIRST THING IN THE MORNING TO STEADY YOUR NERVES TO GET RID OF A HANGOVER: NO
AUDIT-C TOTAL SCORE: 1
HOW MANY STANDARD DRINKS CONTAINING ALCOHOL DO YOU HAVE ON A TYPICAL DAY: 1 OR 2

## 2024-12-21 ASSESSMENT — ENCOUNTER SYMPTOMS
MUSCULOSKELETAL NEGATIVE: 1
GASTROINTESTINAL NEGATIVE: 1
ENDOCRINE NEGATIVE: 1
PSYCHIATRIC NEGATIVE: 1
ALLERGIC/IMMUNOLOGIC NEGATIVE: 1
NEUROLOGICAL NEGATIVE: 1
CARDIOVASCULAR NEGATIVE: 1
HEMATOLOGIC/LYMPHATIC NEGATIVE: 1
EYES NEGATIVE: 1
COUGH: 1
FATIGUE: 1

## 2024-12-21 ASSESSMENT — COGNITIVE AND FUNCTIONAL STATUS - GENERAL
TURNING FROM BACK TO SIDE WHILE IN FLAT BAD: A LITTLE
MOBILITY SCORE: 23
MOVING TO AND FROM BED TO CHAIR: A LITTLE
MOBILITY SCORE: 17
CLIMB 3 TO 5 STEPS WITH RAILING: A LOT
PERSONAL GROOMING: A LITTLE
TOILETING: A LITTLE
CLIMB 3 TO 5 STEPS WITH RAILING: A LITTLE
DRESSING REGULAR LOWER BODY CLOTHING: A LITTLE
STANDING UP FROM CHAIR USING ARMS: A LITTLE
WALKING IN HOSPITAL ROOM: A LOT
DAILY ACTIVITIY SCORE: 19
DRESSING REGULAR UPPER BODY CLOTHING: A LITTLE
PATIENT BASELINE BEDBOUND: NO
HELP NEEDED FOR BATHING: A LITTLE
DAILY ACTIVITIY SCORE: 24

## 2024-12-21 ASSESSMENT — ACTIVITIES OF DAILY LIVING (ADL)
HEARING - RIGHT EAR: FUNCTIONAL
LACK_OF_TRANSPORTATION: NO
HEARING - LEFT EAR: FUNCTIONAL
GROOMING: INDEPENDENT
PATIENT'S MEMORY ADEQUATE TO SAFELY COMPLETE DAILY ACTIVITIES?: YES
BATHING: INDEPENDENT
DRESSING YOURSELF: INDEPENDENT
TOILETING: INDEPENDENT
JUDGMENT_ADEQUATE_SAFELY_COMPLETE_DAILY_ACTIVITIES: NO
ADEQUATE_TO_COMPLETE_ADL: YES
LACK_OF_TRANSPORTATION: NO
LACK_OF_TRANSPORTATION: NO
FEEDING YOURSELF: INDEPENDENT
WALKS IN HOME: INDEPENDENT

## 2024-12-21 ASSESSMENT — PAIN SCALES - GENERAL
PAINLEVEL_OUTOF10: 0 - NO PAIN

## 2024-12-21 ASSESSMENT — COLUMBIA-SUICIDE SEVERITY RATING SCALE - C-SSRS
2. HAVE YOU ACTUALLY HAD ANY THOUGHTS OF KILLING YOURSELF?: NO
6. HAVE YOU EVER DONE ANYTHING, STARTED TO DO ANYTHING, OR PREPARED TO DO ANYTHING TO END YOUR LIFE?: NO
1. IN THE PAST MONTH, HAVE YOU WISHED YOU WERE DEAD OR WISHED YOU COULD GO TO SLEEP AND NOT WAKE UP?: NO

## 2024-12-21 ASSESSMENT — PAIN - FUNCTIONAL ASSESSMENT: PAIN_FUNCTIONAL_ASSESSMENT: 0-10

## 2024-12-21 NOTE — H&P
"Chief complaint: Confusion    History Of Present Illness  William A Franke is a 74 y.o. male with a past medical history of diabetes, hypertension, hyperlipidmeia, CAD, ESKD on HD. Patient presents to the ED today via EMS from dialysis for evaluation and treatment for alerted mental status. Patients wife and brother were at bedside who assisted in HPI. Per wife, states that she received a call from the dialysis center that patient had a \"drop in vitals\" with confusion. Wife states that the patient is alert and orientated at baseline, drove himself to out patient scheduled dialysis, and completed the full course of dialysis. However, about an hour prior to completing dialysis he was noted to have altered mental status by staff. Noted to be weak with needing assistance getting out of dialysis chair, tremors, and lethargic. Patients wife states that the patient was coughing some last night but denies any other concerning symptoms. Upon examination the patient is drowsy and but easily arousable. He is orientated to person, place, and time. He is able to identify the people in the room with him. He does not remember any events or what happened at dialysis that brought him to the ED. Patient denies any shortness of breath, trouble breathing, chest pains, fever, chills, nausea, or vomiting. No dysuria, no abdominal pain. Denies diarrhea or constipation. No blurred vision, light headed, or dizziness. At baseline he is independent, lives with his wife at home, and is fully functional.      In the ED the work-up revealed a blood glucose of 163, sodium 136, potassium 3.7, creatinine 4.15, GFR 14, ALT 12, AST 21. Lactate 1.3, WBC 9.5, Hemoglobin 11.1, hemoglobin 33.8, and platelets 157. Flu A, B, RSV, and COVID negative. Pending UA. Chest xray revealed Pulmonary vascular prominence which may represent mild pulmonary vascular congestion and blunting of the right costophrenic angle laterally which could represent a small pleural " effusion. CT of head revealed no evidence of an acute intracranial process. The patients temperature upon arrival was 101.1 F, /50. Sepsis criteria was activated. He was started on IV rocephin and azithromycin and IV fluid bolus of 250ml.      Past Medical History  Past Medical History:   Diagnosis Date    Chronic kidney disease     Diabetes mellitus (Multi)     ESRD (end stage renal disease) on dialysis (Multi)     Heart murmur     HLD (hyperlipidemia)     Hypertension     Personal history of other endocrine, nutritional and metabolic disease     History of hypercholesterolemia    Personal history of other specified conditions 08/28/2020    History of chest pain       Surgical History  Past Surgical History:   Procedure Laterality Date    AV FISTULA PLACEMENT Left 07/31/2024    left brachial artery to axillary vein AV graft on 7/31/2024    KNEE ARTHROSCOPY W/ DEBRIDEMENT  04/15/2013    Arthroscopy Knee    SHOULDER SURGERY  04/15/2013    Shoulder Surgery        Social History  He reports that he has never smoked. He has never used smokeless tobacco. He reports that he does not currently use alcohol. He reports that he does not use drugs.    Family History  Family History   Problem Relation Name Age of Onset    Alzheimer's disease Mother      Heart attack Mother      Diabetes Father      Heart attack Father          Allergies  Patient has no known allergies.    Review of Systems   Constitutional:  Positive for fatigue.   HENT: Negative.     Eyes: Negative.    Respiratory:  Positive for cough.    Cardiovascular: Negative.    Gastrointestinal: Negative.    Endocrine: Negative.    Genitourinary: Negative.    Musculoskeletal: Negative.    Skin: Negative.    Allergic/Immunologic: Negative.    Neurological: Negative.    Hematological: Negative.    Psychiatric/Behavioral: Negative.     All other systems reviewed and are negative.       Physical Exam  Vitals and nursing note reviewed.   Constitutional:        "Appearance: Normal appearance.   HENT:      Mouth/Throat:      Mouth: Mucous membranes are moist.      Pharynx: Oropharynx is clear.   Eyes:      Extraocular Movements: Extraocular movements intact.      Pupils: Pupils are equal, round, and reactive to light.   Cardiovascular:      Rate and Rhythm: Normal rate and regular rhythm.      Pulses: Normal pulses.      Heart sounds: Normal heart sounds.   Pulmonary:      Effort: Pulmonary effort is normal.      Breath sounds: Normal breath sounds.   Abdominal:      General: Abdomen is flat.      Palpations: Abdomen is soft.   Musculoskeletal:         General: Normal range of motion.   Skin:     General: Skin is warm and dry.      Capillary Refill: Capillary refill takes less than 2 seconds.      Comments: fistula left upper arm    Neurological:      General: No focal deficit present.      Mental Status: He is oriented to person, place, and time.      Motor: No weakness.      Comments: Alert to voice. Knows month, year, place, and people. Does not recall event; no focal deficits, moves all extremities     Psychiatric:         Mood and Affect: Mood normal.         Behavior: Behavior normal.          Last Recorded Vitals  Blood pressure 154/59, pulse 72, temperature 36.8 °C (98.2 °F), resp. rate 18, height 1.702 m (5' 7\"), weight 81.6 kg (180 lb), SpO2 96%.    Relevant Results  Results for orders placed or performed during the hospital encounter of 12/21/24 (from the past 24 hours)   CBC and Auto Differential   Result Value Ref Range    WBC 9.5 4.4 - 11.3 x10*3/uL    nRBC 0.0 0.0 - 0.0 /100 WBCs    RBC 3.51 (L) 4.50 - 5.90 x10*6/uL    Hemoglobin 11.1 (L) 13.5 - 17.5 g/dL    Hematocrit 33.8 (L) 41.0 - 52.0 %    MCV 96 80 - 100 fL    MCH 31.6 26.0 - 34.0 pg    MCHC 32.8 32.0 - 36.0 g/dL    RDW 18.1 (H) 11.5 - 14.5 %    Platelets 157 150 - 450 x10*3/uL    Neutrophils % 87.9 40.0 - 80.0 %    Immature Granulocytes %, Automated 0.5 0.0 - 0.9 %    Lymphocytes % 4.0 13.0 - 44.0 %    " Monocytes % 6.1 2.0 - 10.0 %    Eosinophils % 1.0 0.0 - 6.0 %    Basophils % 0.5 0.0 - 2.0 %    Neutrophils Absolute 8.30 (H) 1.60 - 5.50 x10*3/uL    Immature Granulocytes Absolute, Automated 0.05 0.00 - 0.50 x10*3/uL    Lymphocytes Absolute 0.38 (L) 0.80 - 3.00 x10*3/uL    Monocytes Absolute 0.58 0.05 - 0.80 x10*3/uL    Eosinophils Absolute 0.09 0.00 - 0.40 x10*3/uL    Basophils Absolute 0.05 0.00 - 0.10 x10*3/uL   Comprehensive Metabolic Panel   Result Value Ref Range    Glucose 163 (H) 74 - 99 mg/dL    Sodium 136 136 - 145 mmol/L    Potassium 3.7 3.5 - 5.3 mmol/L    Chloride 95 (L) 98 - 107 mmol/L    Bicarbonate 31 21 - 32 mmol/L    Anion Gap 14 10 - 20 mmol/L    Urea Nitrogen 12 6 - 23 mg/dL    Creatinine 4.15 (H) 0.50 - 1.30 mg/dL    eGFR 14 (L) >60 mL/min/1.73m*2    Calcium 9.1 8.6 - 10.3 mg/dL    Albumin 4.4 3.4 - 5.0 g/dL    Alkaline Phosphatase 64 33 - 136 U/L    Total Protein 6.8 6.4 - 8.2 g/dL    AST 21 9 - 39 U/L    Bilirubin, Total 1.3 (H) 0.0 - 1.2 mg/dL    ALT 12 10 - 52 U/L   Lactate   Result Value Ref Range    Lactate 1.3 0.4 - 2.0 mmol/L   Sars-CoV-2 and Influenza A/B PCR   Result Value Ref Range    Flu A Result Not Detected Not Detected    Flu B Result Not Detected Not Detected    Coronavirus 2019, PCR Not Detected Not Detected   RSV PCR   Result Value Ref Range    RSV PCR Not Detected Not Detected   POCT GLUCOSE   Result Value Ref Range    POCT Glucose 142 (H) 74 - 99 mg/dL     XR chest 1 view    Result Date: 12/21/2024  STUDY: Chest Radiograph;  [12-; 11:41 am] INDICATION: Altered. Cough. COMPARISON: XR chest 11- ACCESSION NUMBER(S): [id number] ORDERING CLINICIAN: FELIPE SINGH TECHNIQUE:  Frontal chest was obtained at 11:41 hours. FINDINGS: CARDIOMEDIASTINAL SILHOUETTE: The cardiomediastinal silhouette is enlarged but stable compared to previous imaging..  LUNGS: The lungs demonstrate mild pulmonary vascular prominence which may represent mild vascular congestion.  There is  blunting of the right costophrenic angle which could represent a possible small effusion..  ABDOMEN: No remarkable upper abdominal findings.  BONES: No acute osseous changes.  Multiple vascular stents are noted within the left upper extremity.    1.  Pulmonary vascular prominence which may represent mild pulmonary vascular congestion. 2.  Blunting of the right costophrenic angle laterally which could represent a small pleural effusion.. Signed by Tk Geller MD    CT head wo IV contrast    Result Date: 12/21/2024  Interpreted By:  Jonas Rinaldi, STUDY: CT HEAD WO IV CONTRAST;  12/21/2024 11:45 am   INDICATION: Signs/Symptoms:altered.     COMPARISON: 11/16/2024   ACCESSION NUMBER(S): [id number]   ORDERING CLINICIAN: FELIPE SINGH   TECHNIQUE: Unenhanced images were obtained through the brain.   FINDINGS: There is atrophy resulting in prominence of the ventricles and sulci. There are areas of decreased attenuation within the white matter which are nonspecific but are commonly associated with small vessel ischemic disease. There is no mass effect or midline shift. No acute intracranial hemorrhage is identified. No extra-axial fluid collections are seen. No intraparenchymal mass lesions are identified.  Bone windows demonstrate no evidence of an acute calvarial fracture. There is mucosal in the paranasal sinuses, most conspicuous involving the left maxillary sinus.       No evidence of an acute intracranial process.   MACRO: None.   Signed by: Jonas Rinaldi 12/21/2024 12:21 PM Dictation workstation:   [id number]       Assessment/Plan   Acute encephalopathy   Sepsis with encephalopathy without septic shock, due to unspecified organism. WBC 9.5, febrile, productive cough. XRAY of chest mild vascular congestion and possible small effusion  CT head revealed no evidence of acute intracranial process  -Continue IV Rocephin and Azithromycin   -Following blood cultures   -Consulted ID   -Consulted neurology     Hypertension    Current /59  -Monitor  -Continue home medications Norvasc and Coreg   -Ordered and holding Hydralazine for now    Weakness   -PT/OT to evaluate and treat     ESRD on HD  Patient did complete dialysis today   -Consulted nephrology     Diabetes Mellitus  -Monitor glucose AC/HS with SSI coverage   -Hypoglycemia protocol   -Last HGbA1C 6.3 on 11/08/2024    Hyperlipidemia   -Continue home medication     DVT prophylaxis   -Ordered Heparin      Aliya Patel, APRN-CNP

## 2024-12-21 NOTE — PROGRESS NOTES
"William A Franke is a 74 y.o. male on day 0 of admission presenting with Acute encephalopathy.    Subjective   Orders reviewed, discussed with patient and family at bedside       Objective     Physical Exam    Last Recorded Vitals  Blood pressure 154/59, pulse 72, temperature 36.8 °C (98.2 °F), resp. rate 18, height 1.702 m (5' 7\"), weight 81.6 kg (180 lb), SpO2 96%.  Intake/Output last 3 Shifts:  No intake/output data recorded.    Relevant Results                             Assessment/Plan   Toxic methabolic encephalopathy       I spent  minutes in the professional and overall care of this patient.      Desirae Alicea MD      "

## 2024-12-21 NOTE — ED TRIAGE NOTES
Pt BIBA from dialysis center for change in mental status after completing dialysis. Dialysis center reports patient was hypoxic at 87% RA. Pt normally does not wear oxygen. Pt is normally A&Ox3. Pt was reportedly A&Ox2.

## 2024-12-21 NOTE — Clinical Note
700cc of clear yellow fluid drained from right lung space, patient tolerated well with dressing in place

## 2024-12-21 NOTE — ED PROVIDER NOTES
EMERGENCY DEPARTMENT ENCOUNTER      Pt Name: William A Franke  MRN: [id number]  Birthdate 1950  Date of evaluation: 12/21/2024  ED Provider: Shara Donahue DO     CHIEF COMPLAINT       Chief Complaint   Patient presents with   • Altered Mental Status       HISTORY OF PRESENT ILLNESS    William A Franke is a 74 y.o. who presents to the emergency department via EMS after dialysis with altered mental status.  He drove himself to his normally scheduled dialysis session.  He completed the full course of the dialysis however about an hour prior to completing the course he was noted to be altered.  He normally is ANO x 3, functionally independent according to EMS report from the dialysis center.  The patient himself is able to tell me what month it is but is unable to say why he is in the emergency department or why he was brought.  He appears quiet and slow to respond to questions.  He did have a slightly productive cough but denies any shortness of breath.  History is otherwise limited secondary to his current mental status    REVIEW OF SYSTEMS     Limited secondary to current mental status    PAST MEDICAL HISTORY     Past Medical History:   Diagnosis Date   • Personal history of other endocrine, nutritional and metabolic disease     History of hypercholesterolemia   • Personal history of other specified conditions 08/28/2020    History of chest pain       SURGICAL HISTORY       Past Surgical History:   Procedure Laterality Date   • KNEE ARTHROSCOPY W/ DEBRIDEMENT  04/15/2013    Arthroscopy Knee   • SHOULDER SURGERY  04/15/2013    Shoulder Surgery       CURRENT MEDICATIONS       Previous Medications    ALLOPURINOL (ZYLOPRIM) 100 MG TABLET    Take 1 tablet (100 mg) by mouth once daily.    AMLODIPINE (NORVASC) 5 MG TABLET    Take 1 tablet (5 mg) by mouth once daily.    ASPIRIN 81 MG EC TABLET    Take 1 tablet (81 mg) by mouth once daily.    ATORVASTATIN (LIPITOR) 80 MG TABLET    Take 1 tablet (80 mg) by mouth once  "daily at bedtime.    B COMPLEX-VITAMIN C-FOLIC ACID (NEPHRO-PENELOPE) 0.8 MG TABLET    Take 1 tablet by mouth once daily.    BD ULTRA-FINE MINI PEN NEEDLE 31 GAUGE X 3/16\" NEEDLE    USE AS DIRECTED 4 times a day    CARVEDILOL (COREG) 6.25 MG TABLET    Take 1 tablet (6.25 mg) by mouth 2 times daily (morning and late afternoon).    ERGOCALCIFEROL (VITAMIN D-2) 1.25 MG (52902 UT) CAPSULE    Take 1 capsule (50,000 Units) by mouth every 14 (fourteen) days. Takes every other Sunday    ESCITALOPRAM (LEXAPRO) 5 MG TABLET    Take 1 tablet (5 mg) by mouth once daily at bedtime.    EZETIMIBE (ZETIA) 10 MG TABLET    Take 1 tablet (10 mg) by mouth once daily.    GABAPENTIN (NEURONTIN) 300 MG CAPSULE    Take 1 capsule (300 mg) by mouth 3 times a day.    HYDRALAZINE (APRESOLINE) 50 MG TABLET    Take 1 tablet (50 mg) by mouth 3 times a day.    INSULIN LISPRO (HUMALOG) 100 UNIT/ML INJECTION    Inject 10 Units under the skin 3 times daily (morning, midday, late afternoon).    ISOSORBIDE MONONITRATE ER (IMDUR) 60 MG 24 HR TABLET    Take 1 tablet (60 mg) by mouth once daily. Do not crush or chew.    JANUVIA 25 MG TABLET    Take 1 tablet (25 mg) by mouth once daily.    NITROGLYCERIN (NITROSTAT) 0.4 MG SL TABLET    Place 1 tablet (0.4 mg) under the tongue every 5 minutes if needed for chest pain.    OXYGEN (O2) GAS THERAPY    Inhale 1 each continuously.    PANTOPRAZOLE (PROTONIX) 40 MG EC TABLET    Take 1 tablet (40 mg) by mouth once daily.    SEVELAMER CARBONATE (RENVELA) 800 MG TABLET    Take 2 tablets (1,600 mg) by mouth 3 times daily (morning, midday, late afternoon). Three times daily with meals       ALLERGIES     Patient has no known allergies.    FAMILY HISTORY     No family history on file.     SOCIAL HISTORY       Social History     Socioeconomic History   • Marital status:    Tobacco Use   • Smoking status: Never   • Smokeless tobacco: Never   Substance and Sexual Activity   • Alcohol use: Not Currently     Comment: rarely "   • Drug use: Never     Social Drivers of Health     Financial Resource Strain: Low Risk  (11/17/2024)    Overall Financial Resource Strain (CARDIA)    • Difficulty of Paying Living Expenses: Not hard at all   Food Insecurity: No Food Insecurity (11/17/2024)    Hunger Vital Sign    • Worried About Running Out of Food in the Last Year: Never true    • Ran Out of Food in the Last Year: Never true   Transportation Needs: No Transportation Needs (11/17/2024)    PRAPARE - Transportation    • Lack of Transportation (Medical): No    • Lack of Transportation (Non-Medical): No   Physical Activity: Sufficiently Active (11/13/2024)    Exercise Vital Sign    • Days of Exercise per Week: 6 days    • Minutes of Exercise per Session: 60 min   Stress: No Stress Concern Present (11/13/2024)    [employer] - Occupational Stress Questionnaire    • Feeling of Stress : Not at all   Social Connections: Moderately Integrated (11/13/2024)    Social Connection and Isolation Panel [NHANES]    • Frequency of Social Gatherings with Friends and Family: More than three times a week    • Attends [employer] Services: More than 4 times per year    • Active Member of Clubs or Organizations: No    • Attends Club or Organization Meetings: Never    • Marital Status:    Intimate Partner Violence: Not At Risk (11/17/2024)    Humiliation, Afraid, Rape, and Kick questionnaire    • Fear of Current or Ex-Partner: No    • Emotionally Abused: No    • Physically Abused: No    • Sexually Abused: No   Housing Stability: Low Risk  (11/17/2024)    Housing Stability Vital Sign    • Unable to Pay for Housing in the Last Year: No    • Number of Times Moved in the Last Year: 0    • Homeless in the Last Year: No       PHYSICAL EXAM       ED Triage Vitals [12/21/24 1049]   Temperature Heart Rate Respirations BP   (!) 38.4 °C (101.1 °F) 78 18 (!) 188/50      Pulse Ox Temp src Heart Rate Source Patient Position   94 % -- -- --      BP  Location FiO2 (%)     -- --        General: Appears nontoxic, no acute distress, alert  Head: Head atraumatic; normocephalic  Eyes: normal inspection; no icterus  ENT: mucosa moist without lesion  Neck: Normal inspection, no meningeal signs  Resp: Normal breath sounds, no wheeze or crackles; No respiratory distress  Chest Wall: no tenderness or deformity  Heart: Heart rate and rhythm regular; 4 out of 6 murmur right upper sternal border  Abdomen: Soft, Non-tender; No distention, guarding, rigidity, or rebound  MSK: Normal appearance; Moves all extremities; No Pedal edema; fistula left upper arm without bleeding or tenderness  Neuro: Alert to voice, knows month but not year, place, or event; no focal deficits, moves all extremities  Psych: Mood and Affect normal  Skin: Color appropriate; warm; Dry    DIAGNOSTIC RESULTS   Lab and radiology results are independently interpreted unless noted below.  RADIOLOGY (Per Emergency Physician):     Interpretation per the Radiologist below, if available at the time of this note:  CT head wo IV contrast   Final Result   No evidence of an acute intracranial process.        MACRO:   None.        Signed by: Jonas Rinaldi 12/21/2024 12:21 PM   Dictation workstation:   [id number]      XR chest 1 view   Final Result   1.  Pulmonary vascular prominence which may represent mild pulmonary   vascular congestion.   2.  Blunting of the right costophrenic angle laterally which could   represent a small pleural effusion..   Signed by Tk Geller MD          LABS:  Abnormal Labs Reviewed   CBC WITH AUTO DIFFERENTIAL - Abnormal; Notable for the following components:       Result Value    RBC 3.51 (*)     Hemoglobin 11.1 (*)     Hematocrit 33.8 (*)     RDW 18.1 (*)     Neutrophils Absolute 8.30 (*)     Lymphocytes Absolute 0.38 (*)     All other components within normal limits   COMPREHENSIVE METABOLIC PANEL - Abnormal; Notable for the following components:    Glucose 163 (*)     Chloride 95 (*)      Creatinine 4.15 (*)     eGFR 14 (*)     Bilirubin, Total 1.3 (*)     All other components within normal limits       All other labs were within normal range or not returned as of this dictation.    EKG:  Personally interpreted by Shara Donahue DO  1100  Normal sinus rhythm ventricular rate 75 left axis deviation no acute ischemic changes    EMERGENCY DEPARTMENT COURSE/MDM   Patient presents with an acute change in mental status while at dialysis.  He did complete the full dialysis course.  The patient is warm on palpation and temperature does demonstrate elevated temperature of 101.1.  Septic workup is initiated.    Lab returned showing stable electrolytes and renal function, no leukocytosis.  Viral panel is negative.  Chest x-ray is personally interpreted and demonstrates no evidence of acute consolidation or infiltrate.  This performed on formal radiology read.  However given his fever, cough, and acute encephalopathy patient will be empirically covered with antibiotics for pneumonia.  Patient and family are updated.  Patient will be admitted to the hospital for further care.    Diagnoses as of 12/21/24 1333   Acute encephalopathy   Sepsis with encephalopathy without septic shock, due to unspecified organism (Multi)       Meds Administered:  Medications   azithromycin 500 mg in dextrose 5% 250 mL IV (500 mg intravenous New Bag 12/21/24 1326)   sodium chloride 0.9 % bolus 250 mL (0 mL intravenous Stopped 12/21/24 1142)   acetaminophen (Tylenol) tablet 975 mg (975 mg oral Given 12/21/24 1200)   cefTRIAXone (Rocephin) 1 g in dextrose (iso) IV 50 mL (0 g intravenous Stopped 12/21/24 1323)       PROCEDURES   Unless otherwise noted below, none  Critical Care    Performed by: Shara Donahue DO  Authorized by: Shara Donahue DO    Critical care provider statement:     Critical care time (minutes):  31    Critical care time was exclusive of:  Separately billable procedures and treating other patients    Critical care  was necessary to treat or prevent imminent or life-threatening deterioration of the following conditions:  Sepsis    Critical care was time spent personally by me on the following activities:  Ordering and review of laboratory studies, ordering and performing treatments and interventions, ordering and review of radiographic studies, pulse oximetry, re-evaluation of patient's condition, review of old charts, obtaining history from patient or surrogate, examination of patient, evaluation of patient's response to treatment, discussions with consultants and development of treatment plan with patient or surrogate    Care discussed with: admitting provider          FINAL IMPRESSION      1. Acute encephalopathy    2. Sepsis with encephalopathy without septic shock, due to unspecified organism (Multi)          DISPOSITION    Admit 12/21/2024 12:38:45 PM  As a result of their workup, the patient will require admission to the hospital.  The patient was informed of his diagnosis.  The patient was given the opportunity to ask questions and I answered them. The patient agreed to be admitted to the hospital.    Critical Care time: See Procedure Note    (Comment: Please note this report has been produced using speech recognition software and may contain errors related to that system including errors in grammar, punctuation, and spelling, as well as words and phrases that may be inappropriate.  If there are any questions or concerns please feel free to contact the dictating provider for clarification.)    Shara Donahue, DO (electronically signed)  Emergency Medicine Physician    History provided by: Patient, Family Member, and EMS  Limitations to History: None  External Records Reviewed with Brief Summary: None  Social Determinants of Health which Significantly Impact Care: None identified   EKG Independent Interpretation: EKG interpreted by myself. Please see ED Course for full interpretation.  Independent Result Review and  Interpretation: Relevant laboratory and radiographic results were reviewed and independently interpreted by myself.  As necessary, they are commented on in the ED Course.  Chronic conditions affecting the patient's care: As documented above in MDM  The patient was discussed with the following consultants/services: Dr. Alicea for admission  Care Considerations: As documented above in MDM               Shara Donahue DO  12/21/24 [id number]

## 2024-12-21 NOTE — Clinical Note
Procedure to be attempted tomorrow with assistance from anesthesia, since no availability from anesthesia right now.

## 2024-12-21 NOTE — CARE PLAN
The patient's goals for the shift include      The clinical goals for the shift include safety, monitor for s/sx of infection      Problem: Diabetes  Goal: Achieve decreasing blood glucose levels by end of shift  Outcome: Progressing  Goal: Increase stability of blood glucose readings by end of shift  Outcome: Progressing  Goal: Decrease in ketones present in urine by end of shift  Outcome: Progressing  Goal: Maintain electrolyte levels within acceptable range throughout shift  Outcome: Progressing  Goal: Maintain glucose levels >70mg/dl to <250mg/dl throughout shift  Outcome: Progressing  Goal: No changes in neurological exam by end of shift  Outcome: Progressing  Goal: Learn about and adhere to nutrition recommendations by end of shift  Outcome: Progressing  Goal: Vital signs within normal range for age by end of shift  Outcome: Progressing  Goal: Increase self care and/or family involovement by end of shift  Outcome: Progressing  Goal: Receive DSME education by end of shift  Outcome: Progressing

## 2024-12-22 VITALS
DIASTOLIC BLOOD PRESSURE: 41 MMHG | TEMPERATURE: 97.7 F | OXYGEN SATURATION: 99 % | WEIGHT: 180 LBS | SYSTOLIC BLOOD PRESSURE: 145 MMHG | HEIGHT: 67 IN | RESPIRATION RATE: 18 BRPM | BODY MASS INDEX: 28.25 KG/M2 | HEART RATE: 56 BPM

## 2024-12-22 LAB
ANION GAP SERPL CALCULATED.3IONS-SCNC: 15 MMOL/L (ref 10–20)
APPEARANCE UR: CLEAR
BACTERIA BLD AEROBE CULT: ABNORMAL
BACTERIA BLD AEROBE CULT: ABNORMAL
BACTERIA BLD CULT: ABNORMAL
BACTERIA BLD CULT: ABNORMAL
BILIRUB UR STRIP.AUTO-MCNC: NEGATIVE MG/DL
BUN SERPL-MCNC: 25 MG/DL (ref 6–23)
CALCIUM SERPL-MCNC: 8.7 MG/DL (ref 8.6–10.3)
CHLORIDE SERPL-SCNC: 96 MMOL/L (ref 98–107)
CO2 SERPL-SCNC: 28 MMOL/L (ref 21–32)
COLOR UR: YELLOW
CREAT SERPL-MCNC: 6.23 MG/DL (ref 0.5–1.3)
EGFRCR SERPLBLD CKD-EPI 2021: 9 ML/MIN/1.73M*2
ERYTHROCYTE [DISTWIDTH] IN BLOOD BY AUTOMATED COUNT: 17.7 % (ref 11.5–14.5)
GLUCOSE BLD MANUAL STRIP-MCNC: 147 MG/DL (ref 74–99)
GLUCOSE BLD MANUAL STRIP-MCNC: 149 MG/DL (ref 74–99)
GLUCOSE BLD MANUAL STRIP-MCNC: 168 MG/DL (ref 74–99)
GLUCOSE BLD MANUAL STRIP-MCNC: 99 MG/DL (ref 74–99)
GLUCOSE SERPL-MCNC: 103 MG/DL (ref 74–99)
GLUCOSE UR STRIP.AUTO-MCNC: ABNORMAL MG/DL
GRAM STN SPEC: ABNORMAL
HCT VFR BLD AUTO: 30.9 % (ref 41–52)
HGB BLD-MCNC: 10.3 G/DL (ref 13.5–17.5)
HOLD SPECIMEN: NORMAL
KETONES UR STRIP.AUTO-MCNC: NEGATIVE MG/DL
LEUKOCYTE ESTERASE UR QL STRIP.AUTO: NEGATIVE
MCH RBC QN AUTO: 31 PG (ref 26–34)
MCHC RBC AUTO-ENTMCNC: 33.3 G/DL (ref 32–36)
MCV RBC AUTO: 93 FL (ref 80–100)
NITRITE UR QL STRIP.AUTO: NEGATIVE
NRBC BLD-RTO: 0 /100 WBCS (ref 0–0)
PH UR STRIP.AUTO: 8 [PH]
PLATELET # BLD AUTO: 139 X10*3/UL (ref 150–450)
POTASSIUM SERPL-SCNC: 4 MMOL/L (ref 3.5–5.3)
PROT UR STRIP.AUTO-MCNC: ABNORMAL MG/DL
RBC # BLD AUTO: 3.32 X10*6/UL (ref 4.5–5.9)
RBC # UR STRIP.AUTO: ABNORMAL /UL
RBC #/AREA URNS AUTO: NORMAL /HPF
SODIUM SERPL-SCNC: 135 MMOL/L (ref 136–145)
SP GR UR STRIP.AUTO: 1.01
SQUAMOUS #/AREA URNS AUTO: NORMAL /HPF
UROBILINOGEN UR STRIP.AUTO-MCNC: NORMAL MG/DL
WBC # BLD AUTO: 9.1 X10*3/UL (ref 4.4–11.3)
WBC #/AREA URNS AUTO: NORMAL /HPF

## 2024-12-22 PROCEDURE — 36415 COLL VENOUS BLD VENIPUNCTURE: CPT | Performed by: INTERNAL MEDICINE

## 2024-12-22 PROCEDURE — 97165 OT EVAL LOW COMPLEX 30 MIN: CPT | Mod: GO

## 2024-12-22 PROCEDURE — 81003 URINALYSIS AUTO W/O SCOPE: CPT | Performed by: EMERGENCY MEDICINE

## 2024-12-22 PROCEDURE — 2500000004 HC RX 250 GENERAL PHARMACY W/ HCPCS (ALT 636 FOR OP/ED): Performed by: NURSE PRACTITIONER

## 2024-12-22 PROCEDURE — 1200000002 HC GENERAL ROOM WITH TELEMETRY DAILY

## 2024-12-22 PROCEDURE — 85027 COMPLETE CBC AUTOMATED: CPT | Performed by: NURSE PRACTITIONER

## 2024-12-22 PROCEDURE — 84145 PROCALCITONIN (PCT): CPT | Mod: WESLAB | Performed by: INTERNAL MEDICINE

## 2024-12-22 PROCEDURE — 2500000005 HC RX 250 GENERAL PHARMACY W/O HCPCS

## 2024-12-22 PROCEDURE — 99223 1ST HOSP IP/OBS HIGH 75: CPT | Performed by: STUDENT IN AN ORGANIZED HEALTH CARE EDUCATION/TRAINING PROGRAM

## 2024-12-22 PROCEDURE — 36415 COLL VENOUS BLD VENIPUNCTURE: CPT | Performed by: NURSE PRACTITIONER

## 2024-12-22 PROCEDURE — 87040 BLOOD CULTURE FOR BACTERIA: CPT | Mod: WESLAB | Performed by: INTERNAL MEDICINE

## 2024-12-22 PROCEDURE — 82947 ASSAY GLUCOSE BLOOD QUANT: CPT

## 2024-12-22 PROCEDURE — 2500000004 HC RX 250 GENERAL PHARMACY W/ HCPCS (ALT 636 FOR OP/ED)

## 2024-12-22 PROCEDURE — 97161 PT EVAL LOW COMPLEX 20 MIN: CPT | Mod: GP

## 2024-12-22 PROCEDURE — 2500000001 HC RX 250 WO HCPCS SELF ADMINISTERED DRUGS (ALT 637 FOR MEDICARE OP): Performed by: NURSE PRACTITIONER

## 2024-12-22 PROCEDURE — 82374 ASSAY BLOOD CARBON DIOXIDE: CPT | Performed by: NURSE PRACTITIONER

## 2024-12-22 PROCEDURE — 2500000002 HC RX 250 W HCPCS SELF ADMINISTERED DRUGS (ALT 637 FOR MEDICARE OP, ALT 636 FOR OP/ED): Performed by: NURSE PRACTITIONER

## 2024-12-22 RX ORDER — VANCOMYCIN 2 G/400ML
2 INJECTION, SOLUTION INTRAVENOUS ONCE
Status: DISCONTINUED | OUTPATIENT
Start: 2024-12-22 | End: 2024-12-22

## 2024-12-22 RX ORDER — VANCOMYCIN HYDROCHLORIDE 1 G/20ML
INJECTION, POWDER, LYOPHILIZED, FOR SOLUTION INTRAVENOUS DAILY PRN
Status: DISCONTINUED | OUTPATIENT
Start: 2024-12-22 | End: 2024-12-25

## 2024-12-22 RX ADMIN — AMLODIPINE BESYLATE 5 MG: 5 TABLET ORAL at 08:02

## 2024-12-22 RX ADMIN — ASPIRIN 81 MG: 81 TABLET, COATED ORAL at 08:01

## 2024-12-22 RX ADMIN — EZETIMIBE 10 MG: 10 TABLET ORAL at 08:02

## 2024-12-22 RX ADMIN — SEVELAMER CARBONATE 1600 MG: 800 TABLET, FILM COATED ORAL at 11:48

## 2024-12-22 RX ADMIN — ACETAMINOPHEN 650 MG: 325 TABLET ORAL at 07:58

## 2024-12-22 RX ADMIN — SEVELAMER CARBONATE 1600 MG: 800 TABLET, FILM COATED ORAL at 18:00

## 2024-12-22 RX ADMIN — HEPARIN SODIUM 5000 UNITS: 5000 INJECTION, SOLUTION INTRAVENOUS; SUBCUTANEOUS at 14:28

## 2024-12-22 RX ADMIN — PANTOPRAZOLE SODIUM 40 MG: 40 TABLET, DELAYED RELEASE ORAL at 08:02

## 2024-12-22 RX ADMIN — ATORVASTATIN CALCIUM 80 MG: 80 TABLET, FILM COATED ORAL at 22:46

## 2024-12-22 RX ADMIN — ISOSORBIDE MONONITRATE 60 MG: 60 TABLET, EXTENDED RELEASE ORAL at 08:02

## 2024-12-22 RX ADMIN — CARVEDILOL 6.25 MG: 6.25 TABLET, FILM COATED ORAL at 07:59

## 2024-12-22 RX ADMIN — SITAGLIPTIN 25 MG: 50 TABLET, FILM COATED ORAL at 08:02

## 2024-12-22 RX ADMIN — VANCOMYCIN HYDROCHLORIDE 2000 MG: 5 INJECTION, POWDER, LYOPHILIZED, FOR SOLUTION INTRAVENOUS at 09:42

## 2024-12-22 RX ADMIN — SEVELAMER CARBONATE 1600 MG: 800 TABLET, FILM COATED ORAL at 07:59

## 2024-12-22 RX ADMIN — CEFTRIAXONE SODIUM 1 G: 1 INJECTION, SOLUTION INTRAVENOUS at 12:46

## 2024-12-22 RX ADMIN — INSULIN LISPRO 2 UNITS: 100 INJECTION, SOLUTION INTRAVENOUS; SUBCUTANEOUS at 18:00

## 2024-12-22 RX ADMIN — ALLOPURINOL 100 MG: 100 TABLET ORAL at 08:02

## 2024-12-22 RX ADMIN — DEXTROSE MONOHYDRATE 500 MG: 50 INJECTION, SOLUTION INTRAVENOUS at 13:25

## 2024-12-22 SDOH — SOCIAL STABILITY: SOCIAL INSECURITY: WITHIN THE LAST YEAR, HAVE YOU BEEN AFRAID OF YOUR PARTNER OR EX-PARTNER?: NO

## 2024-12-22 SDOH — ECONOMIC STABILITY: HOUSING INSECURITY: AT ANY TIME IN THE PAST 12 MONTHS, WERE YOU HOMELESS OR LIVING IN A SHELTER (INCLUDING NOW)?: NO

## 2024-12-22 SDOH — ECONOMIC STABILITY: FOOD INSECURITY: HOW HARD IS IT FOR YOU TO PAY FOR THE VERY BASICS LIKE FOOD, HOUSING, MEDICAL CARE, AND HEATING?: NOT HARD AT ALL

## 2024-12-22 SDOH — HEALTH STABILITY: MENTAL HEALTH: HOW OFTEN DO YOU HAVE SIX OR MORE DRINKS ON ONE OCCASION?: NEVER

## 2024-12-22 SDOH — ECONOMIC STABILITY: FOOD INSECURITY: WITHIN THE PAST 12 MONTHS, YOU WORRIED THAT YOUR FOOD WOULD RUN OUT BEFORE YOU GOT THE MONEY TO BUY MORE.: NEVER TRUE

## 2024-12-22 SDOH — SOCIAL STABILITY: SOCIAL INSECURITY: ARE YOU MARRIED, WIDOWED, DIVORCED, SEPARATED, NEVER MARRIED, OR LIVING WITH A PARTNER?: MARRIED

## 2024-12-22 SDOH — ECONOMIC STABILITY: HOUSING INSECURITY: IN THE PAST 12 MONTHS, HOW MANY TIMES HAVE YOU MOVED WHERE YOU WERE LIVING?: 0

## 2024-12-22 SDOH — SOCIAL STABILITY: SOCIAL NETWORK: HOW OFTEN DO YOU ATTEND MEETINGS OF THE CLUBS OR ORGANIZATIONS YOU BELONG TO?: NEVER

## 2024-12-22 SDOH — HEALTH STABILITY: MENTAL HEALTH: HOW MANY DRINKS CONTAINING ALCOHOL DO YOU HAVE ON A TYPICAL DAY WHEN YOU ARE DRINKING?: 1 OR 2

## 2024-12-22 SDOH — SOCIAL STABILITY: SOCIAL NETWORK
IN A TYPICAL WEEK, HOW MANY TIMES DO YOU TALK ON THE PHONE WITH FAMILY, FRIENDS, OR NEIGHBORS?: MORE THAN THREE TIMES A WEEK

## 2024-12-22 SDOH — ECONOMIC STABILITY: INCOME INSECURITY: IN THE PAST 12 MONTHS HAS THE ELECTRIC, GAS, OIL, OR WATER COMPANY THREATENED TO SHUT OFF SERVICES IN YOUR HOME?: NO

## 2024-12-22 SDOH — HEALTH STABILITY: PHYSICAL HEALTH
HOW OFTEN DO YOU NEED TO HAVE SOMEONE HELP YOU WHEN YOU READ INSTRUCTIONS, PAMPHLETS, OR OTHER WRITTEN MATERIAL FROM YOUR DOCTOR OR PHARMACY?: RARELY

## 2024-12-22 SDOH — SOCIAL STABILITY: SOCIAL INSECURITY: WITHIN THE LAST YEAR, HAVE YOU BEEN HUMILIATED OR EMOTIONALLY ABUSED IN OTHER WAYS BY YOUR PARTNER OR EX-PARTNER?: NO

## 2024-12-22 SDOH — ECONOMIC STABILITY: HOUSING INSECURITY: IN THE LAST 12 MONTHS, WAS THERE A TIME WHEN YOU WERE NOT ABLE TO PAY THE MORTGAGE OR RENT ON TIME?: NO

## 2024-12-22 SDOH — ECONOMIC STABILITY: FOOD INSECURITY: WITHIN THE PAST 12 MONTHS, THE FOOD YOU BOUGHT JUST DIDN'T LAST AND YOU DIDN'T HAVE MONEY TO GET MORE.: NEVER TRUE

## 2024-12-22 SDOH — SOCIAL STABILITY: SOCIAL NETWORK: HOW OFTEN DO YOU ATTEND CHURCH OR RELIGIOUS SERVICES?: MORE THAN 4 TIMES PER YEAR

## 2024-12-22 SDOH — HEALTH STABILITY: PHYSICAL HEALTH: ON AVERAGE, HOW MANY DAYS PER WEEK DO YOU ENGAGE IN MODERATE TO STRENUOUS EXERCISE (LIKE A BRISK WALK)?: 6 DAYS

## 2024-12-22 SDOH — ECONOMIC STABILITY: TRANSPORTATION INSECURITY: IN THE PAST 12 MONTHS, HAS LACK OF TRANSPORTATION KEPT YOU FROM MEDICAL APPOINTMENTS OR FROM GETTING MEDICATIONS?: NO

## 2024-12-22 SDOH — SOCIAL STABILITY: SOCIAL NETWORK: HOW OFTEN DO YOU GET TOGETHER WITH FRIENDS OR RELATIVES?: MORE THAN THREE TIMES A WEEK

## 2024-12-22 SDOH — HEALTH STABILITY: PHYSICAL HEALTH: ON AVERAGE, HOW MANY MINUTES DO YOU ENGAGE IN EXERCISE AT THIS LEVEL?: 60 MIN

## 2024-12-22 ASSESSMENT — ACTIVITIES OF DAILY LIVING (ADL)
LACK_OF_TRANSPORTATION: NO
ADL_ASSISTANCE: INDEPENDENT
BATHING_ASSISTANCE: MINIMAL
ADL_ASSISTANCE: INDEPENDENT

## 2024-12-22 ASSESSMENT — COGNITIVE AND FUNCTIONAL STATUS - GENERAL
MOBILITY SCORE: 24
MOVING TO AND FROM BED TO CHAIR: A LITTLE
DRESSING REGULAR LOWER BODY CLOTHING: A LITTLE
WALKING IN HOSPITAL ROOM: A LITTLE
TOILETING: A LITTLE
DAILY ACTIVITIY SCORE: 24
TURNING FROM BACK TO SIDE WHILE IN FLAT BAD: A LITTLE
WALKING IN HOSPITAL ROOM: A LITTLE
CLIMB 3 TO 5 STEPS WITH RAILING: A LITTLE
DAILY ACTIVITIY SCORE: 20
MOVING FROM LYING ON BACK TO SITTING ON SIDE OF FLAT BED WITH BEDRAILS: A LITTLE
PERSONAL GROOMING: A LITTLE
CLIMB 3 TO 5 STEPS WITH RAILING: A LITTLE
DAILY ACTIVITIY SCORE: 24
STANDING UP FROM CHAIR USING ARMS: A LITTLE
STANDING UP FROM CHAIR USING ARMS: A LITTLE
HELP NEEDED FOR BATHING: A LITTLE
MOBILITY SCORE: 21
MOBILITY SCORE: 18

## 2024-12-22 ASSESSMENT — PAIN - FUNCTIONAL ASSESSMENT
PAIN_FUNCTIONAL_ASSESSMENT: 0-10
PAIN_FUNCTIONAL_ASSESSMENT: 0-10

## 2024-12-22 ASSESSMENT — PAIN SCALES - GENERAL
PAINLEVEL_OUTOF10: 0 - NO PAIN

## 2024-12-22 ASSESSMENT — ENCOUNTER SYMPTOMS
CHILLS: 1
NAUSEA: 0
FEVER: 1
SHORTNESS OF BREATH: 0
VOMITING: 0
FATIGUE: 1
ABDOMINAL PAIN: 0
DIARRHEA: 0
COUGH: 1

## 2024-12-22 NOTE — PROGRESS NOTES
12/22/24 1707   Discharge Planning   Living Arrangements Spouse/significant other   Support Systems Spouse/significant other   Assistance Needed none   Type of Residence Private residence   Number of Stairs to Enter Residence 2   Number of Stairs Within Residence 1  (one flight to basement)   Do you have animals or pets at home? No   Who is requesting discharge planning? Provider   Home or Post Acute Services None   Expected Discharge Disposition Home   Does the patient need discharge transport arranged? No   Patient Choice   Provider Choice list and CMS website (https://medicare.gov/care-compare#search) for post-acute Quality and Resource Measure Data were provided and reviewed with: Other (Comment)  (no skilled needs)   Patient / Family choosing to utilize agency / facility established prior to hospitalization No   Stroke Family Assessment   Stroke Family Assessment Needed No

## 2024-12-22 NOTE — CONSULTS
Inpatient consult to Infectious Diseases  Consult performed by: Dave Pathak MD  Consult ordered by: Aliya Patel, APRN-CNP            Primary MD: Marianna Galloway MD    Reason For Consult  Positive blood cultures    History Of Present Illness  William A Franke is a 74 y.o. male presenting with altered mental status.  He has a background history of end-stage renal disease on hemodialysis, coronary artery disease, type 2 diabetes.  He was seen with his wife and brother present.  He was reported to have been drowsy and was brought to the hospital for further evaluation and management.  He had interval temperature spike.  He is on low-flow oxygen.  He has nonproductive cough.  He denies any chest pain.  He is on vancomycin and ceftriaxone.       Past Medical History  He has a past medical history of Chronic kidney disease, Diabetes mellitus (Multi), ESRD (end stage renal disease) on dialysis (Multi), Heart murmur, HLD (hyperlipidemia), Hypertension, Personal history of other endocrine, nutritional and metabolic disease, and Personal history of other specified conditions (08/28/2020).    Surgical History  He has a past surgical history that includes Shoulder surgery (04/15/2013); Knee arthroscopy w/ debridement (04/15/2013); and AV fistula placement (Left, 07/31/2024).     Social History     Occupational History    Not on file   Tobacco Use    Smoking status: Never    Smokeless tobacco: Never   Substance and Sexual Activity    Alcohol use: Not Currently     Comment: rarely    Drug use: Never    Sexual activity: Not on file     Travel History   Travel since 11/22/24    No documented travel since 11/22/24           Family History  Family History   Problem Relation Name Age of Onset    Alzheimer's disease Mother      Heart attack Mother      Diabetes Father      Heart attack Father       Allergies  Patient has no known allergies.     Immunization History   Administered Date(s) Administered    Flu vaccine (IIV4),  "preservative free *Check age/dose* 09/24/2022    Flu vaccine, quadrivalent, high-dose, preservative free, age 65y+ (FLUZONE) 10/14/2021    Flu vaccine, trivalent, preservative free, HIGH-DOSE, age 65y+ (Fluzone) 11/14/2019    Flu vaccine, trivalent, preservative free, age 6 months and greater (Fluarix/Fluzone/Flulaval) 10/27/2017    Influenza, Unspecified 10/20/2012, 12/01/2020    Influenza, seasonal, injectable 10/22/2011, 10/25/2016    Moderna SARS-CoV-2 Vaccination 03/12/2021, 04/09/2021, 11/29/2021, 04/08/2022    Pfizer COVID-19 vaccine, 12 years and older, (30mcg/0.3mL) (Comirnaty) 12/04/2023, 05/22/2024    Pneumococcal conjugate vaccine, 13-valent (PREVNAR 13) 04/04/2019    Tdap vaccine, age 7 year and older (BOOSTRIX, ADACEL) 10/07/2014     Medications  Home medications:  Medications Prior to Admission   Medication Sig Dispense Refill Last Dose/Taking    allopurinol (Zyloprim) 100 mg tablet Take 1 tablet (100 mg) by mouth once daily. 90 tablet 0     amLODIPine (Norvasc) 5 mg tablet Take 1 tablet (5 mg) by mouth once daily. 90 tablet 0     aspirin 81 mg EC tablet Take 1 tablet (81 mg) by mouth once daily.       atorvastatin (Lipitor) 80 mg tablet Take 1 tablet (80 mg) by mouth once daily at bedtime. 90 tablet 0     B complex-vitamin C-folic acid (Nephro-Shea) 0.8 mg tablet Take 1 tablet by mouth once daily.       BD Ultra-Fine Mini Pen Needle 31 gauge x 3/16\" needle USE AS DIRECTED 4 times a day 400 each 2     carvedilol (Coreg) 6.25 mg tablet Take 1 tablet (6.25 mg) by mouth 2 times daily (morning and late afternoon). 180 tablet 3     ergocalciferol (Vitamin D-2) 1.25 MG (10479 UT) capsule Take 1 capsule (50,000 Units) by mouth every 14 (fourteen) days. Takes every other Sunday       escitalopram (Lexapro) 5 mg tablet Take 1 tablet (5 mg) by mouth once daily at bedtime. (Patient not taking: Reported on 12/10/2024) 30 tablet 0     ezetimibe (Zetia) 10 mg tablet Take 1 tablet (10 mg) by mouth once daily. 90 " tablet 0     gabapentin (Neurontin) 300 mg capsule Take 1 capsule (300 mg) by mouth once daily at bedtime.       hydrALAZINE (Apresoline) 50 mg tablet Take 1 tablet (50 mg) by mouth 3 times a day. 270 tablet 3     insulin lispro (HumaLOG) 100 unit/mL injection Inject 10 Units under the skin 3 times daily (morning, midday, late afternoon).       isosorbide mononitrate ER (Imdur) 60 mg 24 hr tablet Take 1 tablet (60 mg) by mouth once daily. Do not crush or chew. 90 tablet 3     Januvia 25 mg tablet Take 1 tablet (25 mg) by mouth once daily. 90 tablet 3     nitroglycerin (Nitrostat) 0.4 mg SL tablet Place 1 tablet (0.4 mg) under the tongue every 5 minutes if needed for chest pain.       oxygen (O2) gas therapy Inhale 1 each continuously. (Patient not taking: Reported on 12/10/2024)       pantoprazole (ProtoNix) 40 mg EC tablet Take 1 tablet (40 mg) by mouth once daily.       sevelamer carbonate (Renvela) 800 mg tablet Take 2 tablets (1,600 mg) by mouth 3 times daily (morning, midday, late afternoon). Three times daily with meals        Current medications:  Scheduled medications  allopurinol, 100 mg, oral, Daily  amLODIPine, 5 mg, oral, Daily  aspirin, 81 mg, oral, Daily  atorvastatin, 80 mg, oral, Nightly  azithromycin, 500 mg, intravenous, q24h  carvedilol, 6.25 mg, oral, BID  cefTRIAXone, 1 g, intravenous, q24h  [START ON 12/23/2024] epoetin jeison or biosimilar, 10,000 Units, subcutaneous, Once per day on Monday Wednesday Friday  ezetimibe, 10 mg, oral, Daily  heparin (porcine), 5,000 Units, subcutaneous, q12h  [Held by provider] hydrALAZINE, 50 mg, oral, TID  insulin lispro, 0-10 Units, subcutaneous, TID AC  isosorbide mononitrate ER, 60 mg, oral, Daily  pantoprazole, 40 mg, oral, Daily  sevelamer carbonate, 1,600 mg, oral, TID  SITagliptin phosphate, 25 mg, oral, Daily      Continuous medications  oxygen,       PRN medications  PRN medications: acetaminophen **OR** acetaminophen **OR** acetaminophen, acetaminophen  **OR** acetaminophen **OR** acetaminophen, dextrose, dextrose, glucagon, glucagon, guaiFENesin, nitroglycerin, ondansetron **OR** ondansetron, polyethylene glycol, vancomycin    Review of Systems   Constitutional:  Positive for chills, fatigue and fever.   Respiratory:  Positive for cough. Negative for shortness of breath.    Gastrointestinal:  Negative for abdominal pain, diarrhea, nausea and vomiting.   All other systems reviewed and are negative.       Objective  Range of Vitals (last 24 hours)  Heart Rate:  [64-75]   Temp:  [36.9 °C (98.4 °F)-38.3 °C (100.9 °F)]   Resp:  [17-19]   BP: (160-188)/(45-84)   SpO2:  [92 %-98 %]   Daily Weight  12/21/24 : 81.6 kg (180 lb)    Body mass index is 28.19 kg/m².     Physical Exam  Constitutional:       Appearance: Normal appearance.   HENT:      Head: Normocephalic and atraumatic.      Nose: Nose normal.   Eyes:      General: No scleral icterus.     Extraocular Movements: Extraocular movements intact.      Conjunctiva/sclera: Conjunctivae normal.   Cardiovascular:      Rate and Rhythm: Normal rate and regular rhythm.      Heart sounds: Normal heart sounds.   Pulmonary:      Effort: Pulmonary effort is normal.      Breath sounds: Normal breath sounds.   Abdominal:      General: Bowel sounds are normal.      Palpations: Abdomen is soft.   Musculoskeletal:      Cervical back: Normal range of motion and neck supple.      Right lower leg: No edema.      Left lower leg: No edema.   Skin:     General: Skin is warm and dry.   Neurological:      Mental Status: He is alert.   Psychiatric:         Behavior: Behavior is cooperative.          Relevant Results  Outside Hospital Results    Labs  Results from last 72 hours   Lab Units 12/22/24  0613 12/21/24  1056   WBC AUTO x10*3/uL 9.1 9.5   HEMOGLOBIN g/dL 10.3* 11.1*   HEMATOCRIT % 30.9* 33.8*   PLATELETS AUTO x10*3/uL 139* 157   NEUTROS PCT AUTO %  --  87.9   LYMPHS PCT AUTO %  --  4.0   MONOS PCT AUTO %  --  6.1   EOS PCT AUTO %  --   "1.0     Results from last 72 hours   Lab Units 12/22/24  0613 12/21/24  1056   SODIUM mmol/L 135* 136   POTASSIUM mmol/L 4.0 3.7   CHLORIDE mmol/L 96* 95*   CO2 mmol/L 28 31   BUN mg/dL 25* 12   CREATININE mg/dL 6.23* 4.15*   GLUCOSE mg/dL 103* 163*   CALCIUM mg/dL 8.7 9.1   ANION GAP mmol/L 15 14   EGFR mL/min/1.73m*2 9* 14*     Results from last 72 hours   Lab Units 12/21/24  1056   ALK PHOS U/L 64   BILIRUBIN TOTAL mg/dL 1.3*   PROTEIN TOTAL g/dL 6.8   ALT U/L 12   AST U/L 21   ALBUMIN g/dL 4.4     Estimated Creatinine Clearance: 10.3 mL/min (A) (by C-G formula based on SCr of 6.23 mg/dL (H)).  No results found for: \"CRP\", \"SEDRATE\"  No results found for: \"HIV1X2\", \"HIVCONF\", \"GDPCJZ0MR\"  No results found for: \"HEPCABINIT\", \"HEPCAB\", \"HCVPCRQUANT\"  Microbiology  Susceptibility data from last 90 days.  Collected Specimen Info Organism   12/21/24 Blood culture from Peripheral Venipuncture Staphylococcus aureus     Imaging  XR chest 1 view    Result Date: 12/21/2024  STUDY: Chest Radiograph;  [12-; 11:41 am] INDICATION: Altered. Cough. COMPARISON: XR chest 11- ACCESSION NUMBER(S): [id number] ORDERING CLINICIAN: FELIPE SINGH TECHNIQUE:  Frontal chest was obtained at 11:41 hours. FINDINGS: CARDIOMEDIASTINAL SILHOUETTE: The cardiomediastinal silhouette is enlarged but stable compared to previous imaging..  LUNGS: The lungs demonstrate mild pulmonary vascular prominence which may represent mild vascular congestion.  There is blunting of the right costophrenic angle which could represent a possible small effusion..  ABDOMEN: No remarkable upper abdominal findings.  BONES: No acute osseous changes.  Multiple vascular stents are noted within the left upper extremity.    1.  Pulmonary vascular prominence which may represent mild pulmonary vascular congestion. 2.  Blunting of the right costophrenic angle laterally which could represent a small pleural effusion.. Signed by Tk Geller MD    CT head wo IV " contrast    Result Date: 12/21/2024  Interpreted By:  Jonas Rinaldi, STUDY: CT HEAD WO IV CONTRAST;  12/21/2024 11:45 am   INDICATION: Signs/Symptoms:altered.     COMPARISON: 11/16/2024   ACCESSION NUMBER(S): [id number]   ORDERING CLINICIAN: FELIPE SINGH   TECHNIQUE: Unenhanced images were obtained through the brain.   FINDINGS: There is atrophy resulting in prominence of the ventricles and sulci. There are areas of decreased attenuation within the white matter which are nonspecific but are commonly associated with small vessel ischemic disease. There is no mass effect or midline shift. No acute intracranial hemorrhage is identified. No extra-axial fluid collections are seen. No intraparenchymal mass lesions are identified.  Bone windows demonstrate no evidence of an acute calvarial fracture. There is mucosal in the paranasal sinuses, most conspicuous involving the left maxillary sinus.       No evidence of an acute intracranial process.   MACRO: None.   Signed by: Jonas Rinaldi 12/21/2024 12:21 PM Dictation workstation:   [id number]     Assessment/Plan   Encephalopathy, septic  Staph aureus bacteremia, source unclear  Abnormal chest x-ray-congestion, rule out infection    IV vancomycin  IV ceftriaxone  IV azithromycin  Procalcitonin level  Repeat blood cultures today  Transthoracic echocardiogram  Repeat chest x-ray  Monitor temperature and WBC  Plan discussed with wife and brother    Dave Pathak MD

## 2024-12-22 NOTE — NURSING NOTE
Assumed care.  Seen patient on bed awake, no distress noted, 2 family members in the room.  Safety measures ensured and call light within reach.

## 2024-12-22 NOTE — PROGRESS NOTES
Occupational Therapy    Evaluation    Patient Name: William A Franke  MRN: [id number]  Department: [facility]  Room: [facility]  Today's Date: 12/22/2024  Time Calculation  Start Time: 0840  Stop Time: 0850  Time Calculation (min): 10 min        Assessment:  OT Assessment: pt presents with generalized weakness, reduced endurance and decreased balance which impedes ADL performance. pt would benefit from skilled OT services to address these deficits and to facilitate highest level of independence.  Prognosis: Fair  Barriers to Discharge Home: No anticipated barriers  Evaluation/Treatment Tolerance: Patient limited by fatigue  Medical Staff Made Aware: Yes  End of Session Communication: Bedside nurse  End of Session Patient Position: Up in chair, Alarm on  OT Assessment Results: Decreased ADL status, Decreased cognition, Decreased endurance, Decreased functional mobility  Prognosis: Fair  Barriers to Discharge: None  Evaluation/Treatment Tolerance: Patient limited by fatigue  Medical Staff Made Aware: Yes  Strengths: Ability to acquire knowledge, Attitude of self  Barriers to Participation: Comorbidities  Plan:  Treatment Interventions: ADL retraining, Functional transfer training, UE strengthening/ROM, Endurance training, Equipment evaluation/education, Compensatory technique education  OT Frequency: 3 times per week  OT Discharge Recommendations: Low intensity level of continued care  Equipment Recommended upon Discharge: Wheeled walker  OT Recommended Transfer Status: Assist of 1, Minimal assist  OT - OK to Discharge: Yes  Treatment Interventions: ADL retraining, Functional transfer training, UE strengthening/ROM, Endurance training, Equipment evaluation/education, Compensatory technique education    Subjective     General:  General  Reason for Referral: impaired ADLs; 74 y.o. male presenting with AMS; Sepsis with encephalopathy without septic shock after dialysis treatment.  Past Medical History Relevant to Rehab: DM,  "HTN, CAD, ESKD on HD  Family/Caregiver Present: No  Co-Treatment: PT  Co-Treatment Reason: to maximize safety, participation and mobility  Prior to Session Communication: Bedside nurse  Patient Position Received: Alarm on, Up in chair  Preferred Learning Style: verbal, visual  General Comment: pt agreeable with therapy, flat affect and delayed processing.  pt stating that he still feels \"off\"  Precautions:  Hearing/Visual Limitations: wears glasses  Medical Precautions: Fall precautions, Oxygen therapy device and L/min (2L)    Vital Sign (Past 2hrs)        Date/Time Vitals Session Patient Position Pulse Resp SpO2 BP MAP (mmHg)    12/22/24 0839 During PT  --  71  --  96 %  --  --     12/22/24 0840 During OT  --  71  --  96 %  --  --                         Pain:  Pain Assessment  Pain Assessment: 0-10  0-10 (Numeric) Pain Score: 0 - No pain    Objective   Cognition:  Overall Cognitive Status: Impaired  Orientation Level: Disoriented to place  Following Commands: Follows one step commands with increased time  Safety Judgment: Decreased awareness of need for assistance  Cognition Comments: Questionable historian, delayed processing, flat affect  Insight: Mild  Processing Speed: Delayed           Home Living:  Type of Home: House  Lives With: Significant other  Home Adaptive Equipment: Walker rolling or standard  Home Layout: Multi-level (bedroom/bathroom on first floor; does not use stairs inside home (wife does laundry in basement))  Home Access: Stairs to enter without rails  Entrance Stairs-Rails: None  Entrance Stairs-Number of Steps: 2  Bathroom Shower/Tub: Walk-in shower  Bathroom Toilet: Standard  Bathroom Equipment: None  Bathroom Accessibility: main level  Prior Function:  Level of [employer]: Independent with ADLs and functional transfers, Needs assistance with homemaking  Receives Help From: Family  ADL Assistance: Independent  Homemaking Assistance: Independent (shares with wife)  Ambulatory Assistance: " "Independent (no AD)  Vocational: Retired  Leisure: pt goes to [facility] multiple times per week to workout  Prior Function Comments: +drives; denies hx of falls     ADL:  Eating Assistance: Independent  Grooming Assistance: Stand by (anticipiated)  Bathing Assistance: Minimal (anticipiated)  UE Dressing Assistance: Stand by (anticipiated)  LE Dressing Assistance: Stand by (pt doffed/donned jose f socks while seated in chair via figure-four technique, no physical assistance however effortful and extended time to complete)  Toileting Assistance with Device: Minimal (anticipiated)  Activity Tolerance:  Endurance: Tolerates less than 10 min exercise, no significant change in vital signs  Bed Mobility/Transfers:      Transfers  Transfer: Yes  Transfer 1  Technique 1: Sit to stand, Stand to sit  Transfer Level of Assistance 1: Contact guard  Trials/Comments 1: from standard chair; fair control when ascending/descending but effortful  Transfers 2  Transfer to 2: Chair with arms  Technique 2: Stand pivot  Transfer Level of Assistance 2: Minimum assistance  Trials/Comments 2: pt transferred to chair at end of session after functional mobility task in hallway w/o a device      Functional Mobility:  Functional Mobility  Functional Mobility Performed: Yes  Functional Mobility 1  Surface 1: Level tile  Device 1: No device  Assistance 1: Minimum assistance  Comments 1: max house hold distances including throughout hospital room and out into hallway to nursing station w/o a device.  pt requiring MIN A for steady assist throughout due to lateral sway; pt reporting feeling \"unsteady\" due to not being out of bed often.  extended time to complete with slow gait speed, mild fatigue noted at end of trial  Sitting Balance:  Static Sitting Balance  Static Sitting-Balance Support: Feet supported  Static Sitting-Level of Assistance: Independent  Standing Balance:  Static Standing Balance  Static Standing-Balance Support: No upper extremity " supported  Static Standing-Level of Assistance: Contact guard   Modalities:     Vision:Vision - Basic Assessment  Current Vision: Wears glasses all the time  Sensation:  Light Touch: No apparent deficits  Strength:  Strength Comments: BUE grossly 4/5  Perception:  Inattention/Neglect: Appears intact  Coordination:  Movements are Fluid and Coordinated: No  Upper Body Coordination: decreased rate/accuracy of movements   Hand Function:  Gross Grasp: Functional  Coordination: Functional  Extremities: RUE   RUE : Within Functional Limits and LUE   LUE: Within Functional Limits    Outcome Measures:[facility] Daily Activity  Putting on and taking off regular lower body clothing: A little  Bathing (including washing, rinsing, drying): A little  Putting on and taking off regular upper body clothing: None  Toileting, which includes using toilet, bedpan or urinal: A little  Taking care of personal grooming such as brushing teeth: A little  Eating Meals: None  Daily Activity - Total Score: 20        Education Documentation  Body Mechanics, taught by Alvaro Ferrara OT at 12/22/2024  9:32 AM.  Learner: Patient  Readiness: Acceptance  Method: Explanation, Demonstration  Response: Verbalizes Understanding    ADL Training, taught by Alvaro Ferrara OT at 12/22/2024  9:32 AM.  Learner: Patient  Readiness: Acceptance  Method: Explanation, Demonstration  Response: Verbalizes Understanding    Education Comments  No comments found.        OP EDUCATION:       Goals:  Encounter Problems       Encounter Problems (Active)       ADLs       Patient with complete lower body dressing with modified independent level of assistance donning and doffing all LE clothes  with PRN adaptive equipment while edge of bed  (Progressing)       Start:  12/22/24    Expected End:  01/22/25            Patient will complete daily grooming tasks with modified independent level of assistance and PRN adaptive equipment while standing. (Progressing)       Start:  12/22/24     Expected End:  01/22/25            Patient will complete toileting including hygiene clothing management/hygiene with modified independent level of assistance and grab bars. (Progressing)       Start:  12/22/24    Expected End:  01/22/25               MOBILITY       Patient will perform Functional mobility max Household distances/Community Distances with modified independent level of assistance and least restrictive device in order to improve safety and functional mobility. (Progressing)       Start:  12/22/24    Expected End:  01/22/25               TRANSFERS       Patient will complete functional transfer to toilet with least restrictive device with modified independent level of assistance. (Progressing)       Start:  12/22/24    Expected End:  01/22/25

## 2024-12-22 NOTE — PROGRESS NOTES
William A Franke is a 74 y.o. male on day 1 of admission presenting with Acute encephalopathy.      Subjective   Alert ,low grade fever       Objective     Last Recorded Vitals  BP (!) 188/45 (BP Location: Right arm, Patient Position: Lying) Comment: NURSE NOTIFIED  Pulse 69   Temp (!) 38.3 °C (100.9 °F) (Oral) Comment: NURSE NOTIFIED  Resp 17   Wt 81.6 kg (180 lb)   SpO2 98%   Intake/Output last 3 Shifts:    Intake/Output Summary (Last 24 hours) at 12/22/2024 0901  Last data filed at 12/22/2024 0200  Gross per 24 hour   Intake --   Output 100 ml   Net -100 ml       Admission Weight  Weight: 81.6 kg (180 lb) (12/21/24 1049)    Daily Weight  12/21/24 : 81.6 kg (180 lb)    Image Results  XR chest 1 view  Narrative: STUDY:  Chest Radiograph;  [12-; 11:41 am]  INDICATION:  Altered. Cough.  COMPARISON:  XR chest 11-  ACCESSION NUMBER(S):  [id number]  ORDERING CLINICIAN:  FELIPE SINGH  TECHNIQUE:  Frontal chest was obtained at 11:41 hours.  FINDINGS:  CARDIOMEDIASTINAL SILHOUETTE:  The cardiomediastinal silhouette is enlarged but stable compared to  previous imaging..     LUNGS:  The lungs demonstrate mild pulmonary vascular prominence which may  represent mild vascular congestion.  There is blunting of the right  costophrenic angle which could represent a possible small effusion..     ABDOMEN:  No remarkable upper abdominal findings.     BONES:  No acute osseous changes.  Multiple vascular stents are noted within  the left upper extremity.  Impression: 1.  Pulmonary vascular prominence which may represent mild pulmonary  vascular congestion.  2.  Blunting of the right costophrenic angle laterally which could  represent a small pleural effusion..  Signed by Tk Geller MD  CT head wo IV contrast  Narrative: Interpreted By:  Jonas Rinaldi,   STUDY:  CT HEAD WO IV CONTRAST;  12/21/2024 11:45 am      INDICATION:  Signs/Symptoms:altered.          COMPARISON:  11/16/2024      ACCESSION  NUMBER(S):  [id number]      ORDERING CLINICIAN:  FELIPE SINGH      TECHNIQUE:  Unenhanced images were obtained through the brain.      FINDINGS:  There is atrophy resulting in prominence of the ventricles and sulci.  There are areas of decreased attenuation within the white matter  which are nonspecific but are commonly associated with small vessel  ischemic disease. There is no mass effect or midline shift. No acute  intracranial hemorrhage is identified. No extra-axial fluid  collections are seen. No intraparenchymal mass lesions are  identified.  Bone windows demonstrate no evidence of an acute  calvarial fracture. There is mucosal in the paranasal sinuses, most  conspicuous involving the left maxillary sinus.      Impression: No evidence of an acute intracranial process.      MACRO:  None.      Signed by: Jonas Rinaldi 12/21/2024 12:21 PM  Dictation workstation:   [id number]      Physical Exam    Relevant Results               Assessment/Plan                  Assessment & Plan  Acute encephalopathy    Positive blood cultures, start vanco with pharmacy dosing thanks              Desirae Alicea MD

## 2024-12-22 NOTE — PROGRESS NOTES
12/22/24 1704   Physical Activity   On average, how many days per week do you engage in moderate to strenuous exercise (like a brisk walk)? 6 days   On average, how many minutes do you engage in exercise at this level? 60 min   Financial Resource Strain   How hard is it for you to pay for the very basics like food, housing, medical care, and heating? Not hard   Housing Stability   In the last 12 months, was there a time when you were not able to pay the mortgage or rent on time? N   In the past 12 months, how many times have you moved where you were living? 0   At any time in the past 12 months, were you homeless or living in a shelter (including now)? N   Transportation Needs   In the past 12 months, has lack of transportation kept you from medical appointments or from getting medications? no   In the past 12 months, has lack of transportation kept you from meetings, work, or from getting things needed for daily living? No   Food Insecurity   Within the past 12 months, you worried that your food would run out before you got the money to buy more. Never true   Within the past 12 months, the food you bought just didn't last and you didn't have money to get more. Never true   Stress   Do you feel stress - tense, restless, nervous, or anxious, or unable to sleep at night because your mind is troubled all the time - these days? Not at all   Social Connections   In a typical week, how many times do you talk on the phone with family, friends, or neighbors? More than 3   How often do you get together with friends or relatives? More than 3   How often do you attend Shinto or Religion services? More than 4   Do you belong to any clubs or organizations such as Shinto groups, unions, fraternal or athletic groups, or school groups? No   How often do you attend meetings of the clubs or organizations you belong to? Never   Are you , , , , never , or living with a partner?     Intimate Partner Violence   Within the last year, have you been afraid of your partner or ex-partner? No   Within the last year, have you been humiliated or emotionally abused in other ways by your partner or ex-partner? No   Within the last year, have you been kicked, hit, slapped, or otherwise physically hurt by your partner or ex-partner? No   Within the last year, have you been raped or forced to have any kind of sexual activity by your partner or ex-partner? No   Alcohol Use   Q2: How many drinks containing alcohol do you have on a typical day when you are drinking? 1 or 2   Q3: How often do you have six or more drinks on one occasion? Never   Utilities   In the past 12 months has the electric, gas, oil, or water company threatened to shut off services in your home? No   Health Literacy   How often do you need to have someone help you when you read instructions, pamphlets, or other written material from your doctor or pharmacy? Rarely

## 2024-12-22 NOTE — CARE PLAN
The patient's goals for the shift include      The clinical goals for the shift include safety, monitor for mental status change      Problem: Diabetes  Goal: Decrease in ketones present in urine by end of shift  Outcome: Progressing  Goal: Maintain electrolyte levels within acceptable range throughout shift  Outcome: Progressing  Goal: No changes in neurological exam by end of shift  Outcome: Progressing  Goal: Learn about and adhere to nutrition recommendations by end of shift  Outcome: Progressing  Goal: Receive DSME education by end of shift  Outcome: Progressing

## 2024-12-22 NOTE — PROGRESS NOTES
12/22/24 1709   [facility] Disability Status   Are you deaf or do you have serious difficulty hearing? N   Are you blind or do you have serious difficulty seeing, even when wearing glasses? N   Because of a physical, mental, or emotional condition, do you have serious difficulty concentrating, remembering, or making decisions? (5 years old or older) N   Do you have serious difficulty walking or climbing stairs? N   Do you have serious difficulty dressing or bathing? N   Because of a physical, mental, or emotional condition, do you have serious difficulty doing errands alone such as visiting the doctor? N

## 2024-12-22 NOTE — CONSULTS
.Reason For Consult  End-stage kidney disease and dialysis therapy    History Of Present Illness  William A Franke is a 74 y.o. male is known to have end-stage kidney disease he is on dialysis 3 times a week at [facility] mentor, he is dialyzed through left upper arm AV fistula he also had a history of anemia of chronic kidney disease hypertension hyperlipidemia who basically after he finished dialysis yesterday he had an episode of hypotension and change in his condition so he was sent to the emergency room subsequently admitted to a regular floor he was found to have bacteremia patient is growing gram-positive cocci in the blood his symptoms according to his wife mainly shortness breath increased cough did have a fever and chills chest x-ray showed possible vascular congestion otherwise there was no jamshid infiltrate he is resting comfortably in bed without any distress     Review of Systems  10 points review of systems were done all negative except spots for the history of present illness    Past Medical History  He has a past medical history of Chronic kidney disease, Diabetes mellitus (Multi), ESRD (end stage renal disease) on dialysis (Multi), Heart murmur, HLD (hyperlipidemia), Hypertension, Personal history of other endocrine, nutritional and metabolic disease, and Personal history of other specified conditions (08/28/2020).    Surgical History  He has a past surgical history that includes Shoulder surgery (04/15/2013); Knee arthroscopy w/ debridement (04/15/2013); and AV fistula placement (Left, 07/31/2024).     Social History  He reports that he has never smoked. He has never used smokeless tobacco. He reports that he does not currently use alcohol. He reports that he does not use drugs.    Family History  Family History   Problem Relation Name Age of Onset    Alzheimer's disease Mother      Heart attack Mother      Diabetes Father      Heart attack Father          Current Facility-Administered Medications:      acetaminophen (Tylenol) tablet 650 mg, 650 mg, oral, q4h PRN, 650 mg at 12/22/24 0758 **OR** acetaminophen (Tylenol) oral liquid 650 mg, 650 mg, nasogastric tube, q4h PRN **OR** acetaminophen (Tylenol) suppository 650 mg, 650 mg, rectal, q4h PRN, VERÓNICA Russ-CNP    acetaminophen (Tylenol) tablet 650 mg, 650 mg, oral, q4h PRN **OR** acetaminophen (Tylenol) oral liquid 650 mg, 650 mg, oral, q4h PRN **OR** acetaminophen (Tylenol) suppository 650 mg, 650 mg, rectal, q4h PRN, VERÓNICA Russ-CNP    allopurinol (Zyloprim) tablet 100 mg, 100 mg, oral, Daily, VERÓNICA Russ-CNP, 100 mg at 12/22/24 0802    amLODIPine (Norvasc) tablet 5 mg, 5 mg, oral, Daily, VERÓNICA Russ-CNP, 5 mg at 12/22/24 0802    aspirin EC tablet 81 mg, 81 mg, oral, Daily, VERÓNICA Russ-CNP, 81 mg at 12/22/24 0801    atorvastatin (Lipitor) tablet 80 mg, 80 mg, oral, Nightly, VERÓNICA Russ-CNP, 80 mg at 12/21/24 2003    azithromycin 500 mg in dextrose 5% 250 mL IV, 500 mg, intravenous, q24h, VERÓNICA Russ-CNP    carvedilol (Coreg) tablet 6.25 mg, 6.25 mg, oral, BID, VERÓNICA Russ-CNP, 6.25 mg at 12/22/24 0759    cefTRIAXone (Rocephin) 1 g in dextrose (iso) IV 50 mL, 1 g, intravenous, q24h, VERÓNICA Russ-CNP    dextrose 50 % injection 12.5 g, 12.5 g, intravenous, q15 min PRN, VERÓNICA Russ-CNP    dextrose 50 % injection 25 g, 25 g, intravenous, q15 min PRN, VERÓNICA Russ-CNP    ezetimibe (Zetia) tablet 10 mg, 10 mg, oral, Daily, VERÓNICA Russ-CNP, 10 mg at 12/22/24 0802    glucagon (Glucagen) injection 1 mg, 1 mg, intramuscular, q15 min PRN, ERIKA Russ    glucagon (Glucagen) injection 1 mg, 1 mg, intramuscular, q15 min PRN, ERIKA Russ    guaiFENesin (Mucinex) 12 hr tablet 600 mg, 600 mg, oral, BID PRN, VERÓNICA Russ-CNP, 600 mg at 12/21/24 1849    heparin (porcine) injection 5,000 Units, 5,000  Units, subcutaneous, q12h, ERIKA Russ, 5,000 Units at 12/21/24 1701    [Held by provider] hydrALAZINE (Apresoline) tablet 50 mg, 50 mg, oral, TID, ERIKA Russ    insulin lispro injection 0-10 Units, 0-10 Units, subcutaneous, TID AC, ERIKA Russ    isosorbide mononitrate ER (Imdur) 24 hr tablet 60 mg, 60 mg, oral, Daily, VERÓNICA Russ-CNP, 60 mg at 12/22/24 0802    nitroglycerin (Nitrostat) SL tablet 0.4 mg, 0.4 mg, sublingual, q5 min PRN, ERIKA Russ    ondansetron (Zofran) tablet 4 mg, 4 mg, oral, q8h PRN **OR** ondansetron (Zofran) injection 4 mg, 4 mg, intravenous, q8h PRN, ERIKA Russ    oxygen (O2) therapy, , inhalation, Continuous, ERIKA Russ, 21 percent at 12/21/24 1508    pantoprazole (ProtoNix) EC tablet 40 mg, 40 mg, oral, Daily, VERÓNICA Russ-CNP, 40 mg at 12/22/24 0802    polyethylene glycol (Glycolax, Miralax) packet 17 g, 17 g, oral, Daily PRN, ERIKA Russ    sevelamer carbonate (Renvela) tablet 1,600 mg, 1,600 mg, oral, TID, VERÓNICA Russ-CNP, 1,600 mg at 12/22/24 1148    SITagliptin phosphate (Januvia) tablet 25 mg, 25 mg, oral, Daily, VERÓNICA Russ-CNP, 25 mg at 12/22/24 0802    vancomycin (Vancocin) pharmacy to dose - pharmacy monitoring, , miscellaneous, Daily PRN, Desirae Alicea MD   Allergies  Patient has no known allergies.         Physical Exam  Physical Exam         I&O 24HR    Intake/Output Summary (Last 24 hours) at 12/22/2024 1201  Last data filed at 12/22/2024 0938  Gross per 24 hour   Intake 250 ml   Output 100 ml   Net 150 ml       Vitals 24HR  Heart Rate:  [64-75]   Temp:  [36.8 °C (98.2 °F)-38.3 °C (100.9 °F)]   Resp:  [17-19]   BP: (154-188)/(45-84)   SpO2:  [92 %-98 %]     Relevant Results        Results for orders placed or performed during the hospital encounter of 12/21/24 (from the past 96 hours)   CBC and Auto Differential    Result Value Ref Range    WBC 9.5 4.4 - 11.3 x10*3/uL    nRBC 0.0 0.0 - 0.0 /100 WBCs    RBC 3.51 (L) 4.50 - 5.90 x10*6/uL    Hemoglobin 11.1 (L) 13.5 - 17.5 g/dL    Hematocrit 33.8 (L) 41.0 - 52.0 %    MCV 96 80 - 100 fL    MCH 31.6 26.0 - 34.0 pg    MCHC 32.8 32.0 - 36.0 g/dL    RDW 18.1 (H) 11.5 - 14.5 %    Platelets 157 150 - 450 x10*3/uL    Neutrophils % 87.9 40.0 - 80.0 %    Immature Granulocytes %, Automated 0.5 0.0 - 0.9 %    Lymphocytes % 4.0 13.0 - 44.0 %    Monocytes % 6.1 2.0 - 10.0 %    Eosinophils % 1.0 0.0 - 6.0 %    Basophils % 0.5 0.0 - 2.0 %    Neutrophils Absolute 8.30 (H) 1.60 - 5.50 x10*3/uL    Immature Granulocytes Absolute, Automated 0.05 0.00 - 0.50 x10*3/uL    Lymphocytes Absolute 0.38 (L) 0.80 - 3.00 x10*3/uL    Monocytes Absolute 0.58 0.05 - 0.80 x10*3/uL    Eosinophils Absolute 0.09 0.00 - 0.40 x10*3/uL    Basophils Absolute 0.05 0.00 - 0.10 x10*3/uL   Comprehensive Metabolic Panel   Result Value Ref Range    Glucose 163 (H) 74 - 99 mg/dL    Sodium 136 136 - 145 mmol/L    Potassium 3.7 3.5 - 5.3 mmol/L    Chloride 95 (L) 98 - 107 mmol/L    Bicarbonate 31 21 - 32 mmol/L    Anion Gap 14 10 - 20 mmol/L    Urea Nitrogen 12 6 - 23 mg/dL    Creatinine 4.15 (H) 0.50 - 1.30 mg/dL    eGFR 14 (L) >60 mL/min/1.73m*2    Calcium 9.1 8.6 - 10.3 mg/dL    Albumin 4.4 3.4 - 5.0 g/dL    Alkaline Phosphatase 64 33 - 136 U/L    Total Protein 6.8 6.4 - 8.2 g/dL    AST 21 9 - 39 U/L    Bilirubin, Total 1.3 (H) 0.0 - 1.2 mg/dL    ALT 12 10 - 52 U/L   Lactate   Result Value Ref Range    Lactate 1.3 0.4 - 2.0 mmol/L   Blood Culture    Specimen: Peripheral Venipuncture; Blood culture   Result Value Ref Range    Blood Culture       Identification and susceptibility testing to follow    Gram Stain Gram positive cocci, clusters (AA)     Gram Stain Gram positive cocci, clusters (AA)    Blood Culture    Specimen: Peripheral Venipuncture; Blood culture   Result Value Ref Range    Blood Culture       Identification and  susceptibility testing to follow    Gram Stain Gram positive cocci, clusters (AA)     Gram Stain Gram positive cocci, clusters (AA)    Sars-CoV-2 and Influenza A/B PCR   Result Value Ref Range    Flu A Result Not Detected Not Detected    Flu B Result Not Detected Not Detected    Coronavirus 2019, PCR Not Detected Not Detected   RSV PCR   Result Value Ref Range    RSV PCR Not Detected Not Detected   POCT GLUCOSE   Result Value Ref Range    POCT Glucose 142 (H) 74 - 99 mg/dL   POCT GLUCOSE   Result Value Ref Range    POCT Glucose 115 (H) 74 - 99 mg/dL   Urinalysis with Reflex Culture and Microscopic   Result Value Ref Range    Color, Urine Yellow Light-Yellow, Yellow, Dark-Yellow    Appearance, Urine Clear Clear    Specific Gravity, Urine 1.013 1.005 - 1.035    pH, Urine 8.0 5.0, 5.5, 6.0, 6.5, 7.0, 7.5, 8.0    Protein, Urine 300 (3+) (A) NEGATIVE, 10 (TRACE), 20 (TRACE) mg/dL    Glucose, Urine 50 (TRACE) (A) Normal mg/dL    Blood, Urine 0.06 (1+) (A) NEGATIVE    Ketones, Urine NEGATIVE NEGATIVE mg/dL    Bilirubin, Urine NEGATIVE NEGATIVE    Urobilinogen, Urine Normal Normal mg/dL    Nitrite, Urine NEGATIVE NEGATIVE    Leukocyte Esterase, Urine NEGATIVE NEGATIVE   Urinalysis Microscopic   Result Value Ref Range    WBC, Urine NONE 1-5, NONE /HPF    RBC, Urine 3-5 NONE, 1-2, 3-5 /HPF    Squamous Epithelial Cells, Urine 1-9 (SPARSE) Reference range not established. /HPF   CBC   Result Value Ref Range    WBC 9.1 4.4 - 11.3 x10*3/uL    nRBC 0.0 0.0 - 0.0 /100 WBCs    RBC 3.32 (L) 4.50 - 5.90 x10*6/uL    Hemoglobin 10.3 (L) 13.5 - 17.5 g/dL    Hematocrit 30.9 (L) 41.0 - 52.0 %    MCV 93 80 - 100 fL    MCH 31.0 26.0 - 34.0 pg    MCHC 33.3 32.0 - 36.0 g/dL    RDW 17.7 (H) 11.5 - 14.5 %    Platelets 139 (L) 150 - 450 x10*3/uL   Basic metabolic panel   Result Value Ref Range    Glucose 103 (H) 74 - 99 mg/dL    Sodium 135 (L) 136 - 145 mmol/L    Potassium 4.0 3.5 - 5.3 mmol/L    Chloride 96 (L) 98 - 107 mmol/L    Bicarbonate 28  21 - 32 mmol/L    Anion Gap 15 10 - 20 mmol/L    Urea Nitrogen 25 (H) 6 - 23 mg/dL    Creatinine 6.23 (H) 0.50 - 1.30 mg/dL    eGFR 9 (L) >60 mL/min/1.73m*2    Calcium 8.7 8.6 - 10.3 mg/dL   POCT GLUCOSE   Result Value Ref Range    POCT Glucose 99 74 - 99 mg/dL   POCT GLUCOSE   Result Value Ref Range    POCT Glucose 149 (H) 74 - 99 mg/dL          Assessment/Plan     XR chest 1 view    Result Date: 12/21/2024  STUDY: Chest Radiograph;  [12-; 11:41 am] INDICATION: Altered. Cough. COMPARISON: XR chest 11- ACCESSION NUMBER(S): [id number] ORDERING CLINICIAN: FELIPE SINGH TECHNIQUE:  Frontal chest was obtained at 11:41 hours. FINDINGS: CARDIOMEDIASTINAL SILHOUETTE: The cardiomediastinal silhouette is enlarged but stable compared to previous imaging..  LUNGS: The lungs demonstrate mild pulmonary vascular prominence which may represent mild vascular congestion.  There is blunting of the right costophrenic angle which could represent a possible small effusion..  ABDOMEN: No remarkable upper abdominal findings.  BONES: No acute osseous changes.  Multiple vascular stents are noted within the left upper extremity.    1.  Pulmonary vascular prominence which may represent mild pulmonary vascular congestion. 2.  Blunting of the right costophrenic angle laterally which could represent a small pleural effusion.. Signed by Tk Geller MD    CT head wo IV contrast    Result Date: 12/21/2024  Interpreted By:  Jonas Rinaldi, STUDY: CT HEAD WO IV CONTRAST;  12/21/2024 11:45 am   INDICATION: Signs/Symptoms:altered.     COMPARISON: 11/16/2024   ACCESSION NUMBER(S): [id number]   ORDERING CLINICIAN: FELIPE SINGH   TECHNIQUE: Unenhanced images were obtained through the brain.   FINDINGS: There is atrophy resulting in prominence of the ventricles and sulci. There are areas of decreased attenuation within the white matter which are nonspecific but are commonly associated with small vessel ischemic disease. There is no mass  effect or midline shift. No acute intracranial hemorrhage is identified. No extra-axial fluid collections are seen. No intraparenchymal mass lesions are identified.  Bone windows demonstrate no evidence of an acute calvarial fracture. There is mucosal in the paranasal sinuses, most conspicuous involving the left maxillary sinus.       No evidence of an acute intracranial process.   MACRO: None.   Signed by: Jonas Rinaldi 12/21/2024 12:21 PM Dictation workstation:   [id number]       Impression:  End-stage kidney disease  Gram-positive bacteremia source is not clear lung versus AV fistula  Anemia chronic kidney disease  Hypertension    Recommendations:  I will schedule patient for dialysis tomorrow morning  Continue with IV antibiotic therapy  ID consult  May need a vascular consult to evaluate the fistula if there is no clear source of infection    Thank you for your consultation  Disha Damon

## 2024-12-22 NOTE — CARE PLAN
The patient's goals for the shift include      The clinical goals for the shift include Fall precaution, re-orientation, Maintain normal sleep/ wake cycle.

## 2024-12-22 NOTE — CONSULTS
Neurology Consult    History Of Present Illness:  Mr. Franke is a 74 y.o. male admitted 12/21/2024 LOS day 1, consulted for altered mental status.    History is provided by the patient's family, wife, and the patient.  Last week he has been feeling a little off but generally fine, he then went to dialysis and was noted to have low blood pressure, however on presentation here he is hypertensive, as well as confusion.  He spiked a fever and then ultimately was sent to the hospital. Is brought in by his wife via EMS from dialysis due to encephalopathy.  There was some problem with his vitals and he became confused.     His wife corroborates that he had been in his normal state of health prior to this recent change.  He is normally functional lives at home, okay on ADLs, goes to the [facility].  Only mild memory complaints concerns.    He has the following medical problems: Bacteremia on antibiotics including vancomycin, ceftriaxone, azithromycin.    Reviewed relevant results, independent review of imaging:   CT head shows no acute process  Blood cultures positive    Past Medical History  diabetes, hypertension, hyperlipidmeia, CAD, ESKD on HD     Past Medical History:   Diagnosis Date    Chronic kidney disease     Diabetes mellitus (Multi)     ESRD (end stage renal disease) on dialysis (Multi)     Heart murmur     HLD (hyperlipidemia)     Hypertension     Personal history of other endocrine, nutritional and metabolic disease     History of hypercholesterolemia    Personal history of other specified conditions 08/28/2020    History of chest pain     Surgical History  Past Surgical History:   Procedure Laterality Date    AV FISTULA PLACEMENT Left 07/31/2024    left brachial artery to axillary vein AV graft on 7/31/2024    KNEE ARTHROSCOPY W/ DEBRIDEMENT  04/15/2013    Arthroscopy Knee    SHOULDER SURGERY  04/15/2013    Shoulder Surgery     Social History  Social History     Tobacco Use    Smoking status: Never    Smokeless  "tobacco: Never   Substance Use Topics    Alcohol use: Not Currently     Comment: rarely    Drug use: Never     Meds and Allergies  Reviewed in EMR  Current Outpatient Medications   Medication Instructions    allopurinol (ZYLOPRIM) 100 mg, oral, Daily    amLODIPine (NORVASC) 5 mg, oral, Daily    aspirin 81 mg, Daily    atorvastatin (LIPITOR) 80 mg, oral, Nightly    B complex-vitamin C-folic acid (Nephro-Shea) 0.8 mg tablet 0.8 mg, Daily    BD Ultra-Fine Mini Pen Needle 31 gauge x 3/16\" needle USE AS DIRECTED 4 times a day    carvedilol (COREG) 6.25 mg, oral, 2 times daily (morning and late afternoon)    ergocalciferol (VITAMIN D-2) 50,000 Units, Every 14 days    escitalopram (LEXAPRO) 5 mg, oral, Nightly    ezetimibe (ZETIA) 10 mg, oral, Daily    gabapentin (NEURONTIN) 300 mg, oral, Nightly    hydrALAZINE (APRESOLINE) 50 mg, oral, 3 times daily    insulin lispro (HUMALOG) 10 Units, 3 times daily (morning, midday, late afternoon)    isosorbide mononitrate ER (IMDUR) 60 mg, oral, Daily, Do not crush or chew.    Januvia 25 mg, oral, Daily    nitroglycerin (NITROSTAT) 0.4 mg, Every 5 min PRN    oxygen (O2) gas therapy 1 each, inhalation, Continuous    pantoprazole (PROTONIX) 40 mg, Daily    sevelamer carbonate (Renvela) 800 mg tablet Take 2 tablets (1,600 mg) by mouth 3 times daily (morning, midday, late afternoon). Three times daily with meals       Objective:  Blood pressure (!) 188/45, pulse 71, temperature (!) 38.3 °C (100.9 °F), temperature source Oral, resp. rate 17, height 1.702 m (5' 7\"), weight 81.6 kg (180 lb), SpO2 96%.    Physical Exam:  Neurological Exam:  General: NAD, cooperative   Neuro:  Drowsy, language normal, no dysarthria    EOM full range, lauryn 3-4 mm, no nystagmus   Smile symmetric   Tongue midline   Strength 5/5 throughout   Tone normal  Bulk normal   Reflexes:   Reflexes trace to 1+ throughout  Toes down  Sensory: normal    Reviewed relevant results, independent review of imaging:   Encounter " Date: 11/13/24   Electrocardiogram, 12-lead PRN ACS symptoms   Result Value    Ventricular Rate 73    Atrial Rate 73    WY Interval 166    QRS Duration 94    QT Interval 434    QTC Calculation(Bazett) 478    P Axis 61    R Axis 81    T Axis 41    QRS Count 12    Q Onset 219    P Onset 136    P Offset 191    T Offset 436    QTC Fredericia 463    Narrative    Sinus rhythm with marked sinus arrhythmia  Left ventricular hypertrophy with repolarization abnormality ( Sokolow-Barriga )  Abnormal ECG  When compared with ECG of 13-NOV-2024 15:15,  ST now depressed in Lateral leads  Confirmed by Jared Rooney ([id number]) on 11/18/2024 8:38:33 AM            BNP   Date/Time Value Ref Range Status   11/13/2024 02:56  (H) 0 - 99 pg/mL Final     Creatinine   Date Value Ref Range Status   12/22/2024 6.23 (H) 0.50 - 1.30 mg/dL Final     eGFR   Date Value Ref Range Status   12/22/2024 9 (L) >60 mL/min/1.73m*2 Final     Comment:     Calculations of estimated GFR are performed using the 2021 CKD-EPI Study Refit equation without the race variable for the IDMS-Traceable creatinine methods.  https://jasn.asnjournals.org/content/early/2021/09/22/ASN.6675153167          Assessment:   Mr. Franke is a 74 y.o. male with end-stage renal disease on dialysis presenting with new fevers, reported low blood pressure, and confusion in the context of suspected sepsis with positive blood cultures.  CT of the head shows no acute process, and his examination is unremarkable.  He is improving with antibiotics.  Encephalopathy is most likely metabolic secondary to his infection.  We would anticipate his recovery with treatment of the infection.    Recommendations:  No further neurological workup  If the patient does not improve back to baseline with treatment of the underlying infectious process, please call back neurology and consider ordering a brain MRI  Neurology will sign off for now please call back with any issues or if the patient fails to return to  baseline      Medical decision making was high complexity.  The patient has an acute neurologic change, and I independently interpreted his neuroimaging.    Nash Duckworth MD  Neurology and Neuromuscular Medicine   [facility]  [facility]

## 2024-12-22 NOTE — PROGRESS NOTES
William A Franke is a 74 y.o. male on day 1 of admission presenting with Acute encephalopathy.    Patient lives with his wife Sarah in a ranch style house with a basement. No use of assistive devices, no issues using the stairs. Patient is active at the [facility]. He is independent with ADLs, daily tasks, and drives. He goes to [facility] in [place] for dialysis on T,Th, and Sat. PCP is Dr SOLOMON Galloway.   ADOD 12/24/2024  [facility] scores: PY-18, OT-20  Plan is to discharge home with no needs, wife will transport.     Yuko Orta RN

## 2024-12-22 NOTE — CONSULTS
Vancomycin Dosing by Pharmacy- INITIAL (HEMODIALYSIS)    William A Franke is a 74 y.o. year old male who Pharmacy has been consulted for vancomycin dosing for endocarditis. Based on the patient's indication and renal status this patient will be dosed based on a Pre-HD level of 20-25 mcg/mL.     Patient is currently on hemodialysis User Schedule (TBD). Last HD session was 12-21    Visit Vitals  BP (!) 188/45 (BP Location: Right arm, Patient Position: Lying) Comment: NURSE NOTIFIED   Pulse 69   Temp (!) 38.3 °C (100.9 °F) (Oral) Comment: NURSE NOTIFIED   Resp 17           Lab Results   Component Value Date    CREATININE 6.23 (H) 12/22/2024    CREATININE 4.15 (H) 12/21/2024    CREATININE 9.62 (H) 11/18/2024    CREATININE 7.17 (H) 11/17/2024       I/O last 3 completed shifts:  In: - (0 mL/kg)   Out: 100 (1.2 mL/kg) [Urine:100 (0 mL/kg/hr)]  Weight: 81.6 kg     Assessment/Plan     Initial Loading Dosing:will be given a loading dose of 2000 mg   Vancomycin maintenance dose: 750 mg after each dialysis session User Schedule (TBD)  Pre-HD level will be obtained before second HD session. May be obtained sooner if clinically indicated.   Will continue to monitor renal function daily while on vancomycin and order serum creatinine at least every 48 hours if not already ordered.  Follow for continued vancomycin needs, clinical response, and signs/symptoms of toxicity.     DESIREE YUAN, PharmD

## 2024-12-22 NOTE — CARE PLAN
Over the shift, the patient did not make progress toward the following goals. Barriers to progression include . Recommendations to address these barriers include .

## 2024-12-22 NOTE — PROGRESS NOTES
Physical Therapy    Physical Therapy Evaluation    Patient Name: William A Franke  MRN: [id number]  Department: [address]  Room: [facility]  Today's Date: 12/22/2024   Time Calculation  Start Time: 0839  Stop Time: 0850  Time Calculation (min): 11 min    Assessment/Plan   PT Assessment  PT Assessment Results: Decreased strength, Impaired balance, Decreased mobility, Decreased coordination, Impaired judgement, Decreased safety awareness  Rehab Prognosis: Good  Barriers to Discharge Home: Physical needs  Physical Needs: Stair navigation into home limited by function/safety  Evaluation/Treatment Tolerance: Patient tolerated treatment well  Medical Staff Made Aware: Yes  Strengths: Support of extended family/friends  Barriers to Participation: Comorbidities, Attitude of self  End of Session Communication: Bedside nurse  Assessment Comment: pt demonstrates mild decline in functional mobility compared to baseline level; pt with impaired strength, balance and overall limited activity tolerance secondary to weakness and instability this date; pt would benefit from continued skilled PT prior to discharge to address the above deficits in order to maximize functional performance and return to PLOF.  End of Session Patient Position: Up in chair, Alarm on (call bell in reach, all needs met. RN aware)  IP OR SWING BED PT PLAN  Inpatient or Swing Bed: Inpatient  PT Plan  Treatment/Interventions: Bed mobility, Transfer training, Gait training, Stair training, Balance training, Strengthening, Endurance training, Therapeutic exercise, Therapeutic activity, Home exercise program  PT Plan: Ongoing PT  PT Frequency: 3 times per week  PT Discharge Recommendations: Low intensity level of continued care  PT Recommended Transfer Status: Assist x1  PT - OK to Discharge: Yes (PT POC initiated)    Subjective   General Visit Information:  General  Reason for Referral: impaired mobility; pt admitted to [facility] on 12/21 with acute  "encephalopathy  Referred By: VERÓNICA Russ-CNP  Past Medical History Relevant to Rehab: DM, HTN, CAD, ESKD on HD  Family/Caregiver Present: No  Co-Treatment: OT  Co-Treatment Reason: to maximize safety, participation and mobility  Prior to Session Communication: Bedside nurse  Patient Position Received: Alarm on, Up in chair  Preferred Learning Style: verbal, visual  General Comment: pt cleared for therapy via RN, agreeable to participate; (+) 2L O2 via NC  Home Living:  Home Living  Type of Home: House  Lives With: Significant other  Home Adaptive Equipment: Walker rolling or standard  Home Layout: Multi-level (bedroom/bathroom on first floor; does not use stairs inside home (wife does laundry in basement))  Home Access: Stairs to enter without rails  Entrance Stairs-Rails: None  Entrance Stairs-Number of Steps: 2  Bathroom Shower/Tub: Walk-in shower  Bathroom Toilet: Standard  Bathroom Equipment: None  Prior Level of Function:  Prior Function Per Pt/Caregiver Report  Level of [place]: Independent with ADLs and functional transfers, Needs assistance with homemaking  Receives Help From: Family (wife)  ADL Assistance: Independent  Homemaking Assistance:  (shares with wife)  Ambulatory Assistance: Independent (no AD)  Vocational: Retired  Leisure: pt goes to [email or web address] multiple times per week to workout  Prior Function Comments: +drives; denies hx of falls  Precautions:  Precautions  Hearing/Visual Limitations: wears glasses  Medical Precautions: Fall precautions, Oxygen therapy device and L/min (2L O2 via NC)     Vital Signs (Past 2hrs)        Date/Time Vitals Session Patient Position Pulse Resp SpO2 BP MAP (mmHg)    12/22/24 0839 During PT  --  71  --  96 %  --  --                         Objective   Pain:  Pain Assessment  Pain Assessment: 0-10  0-10 (Numeric) Pain Score: 0 - No pain  Cognition:  Cognition  Overall Cognitive Status: Impaired  Orientation Level: Disoriented to place (\"East\")  Following " Commands: Follows one step commands with increased time  Safety Judgment: Decreased awareness of need for safety precautions  Cognition Comments: patient is a questionable historian  Insight: Mild  Processing Speed: Delayed    General Assessments:  General Observation  General Observation: pt is cooperative throughout session; flat affect; minimally conversive       Activity Tolerance  Endurance: Tolerates less than 10 min exercise, no significant change in vital signs  Activity Tolerance Comments: limited due to weakness/instability  Rate of Perceived Exertion (RPE): 3/10    Sensation  Light Touch: No apparent deficits  Sensation Comment: denies paresthesias    Strength  Strength Comments: B LEs grossly >/= 3+/5  Strength  Strength Comments: B LEs grossly >/= 3+/5           Coordination  Movements are Fluid and Coordinated: No  Coordination Comment: decreased rate and accuracy of movement    Postural Control  Postural Control: Impaired  Posture Comment: rounded shoulders    Static Sitting Balance  Static Sitting-Balance Support: Feet supported  Static Sitting-Level of Assistance: Independent  Static Sitting-Comment/Number of Minutes: sitting in chair upon arrival with good+ balance    Static Standing Balance  Static Standing-Balance Support: No upper extremity supported  Static Standing-Level of Assistance: Contact guard  Static Standing-Comment/Number of Minutes: good- static standing balance with CGA for stability/safety  Dynamic Standing Balance  Dynamic Standing-Balance Support: No upper extremity supported  Dynamic Standing-Level of Assistance: Minimum assistance  Dynamic Standing-Comments: min A x 1 for stability and safety; +turns  Functional Assessments:  Bed Mobility  Bed Mobility: No    Transfers  Transfer: Yes  Transfer 1  Transfer From 1: Sit to, Stand to  Transfer to 1: Stand, Sit  Technique 1: Sit to stand, Stand to sit  Transfer Level of Assistance 1: Minimum assistance, Contact  guard  Trials/Comments 1: sit > stand with CGA for safety; VCs for safe hand placement; stand > sit with min A x 1 for controlled lowering into chair    Ambulation/Gait Training  Ambulation/Gait Training Performed: Yes  Ambulation/Gait Training 1  Surface 1: Level tile  Device 1: No device  Assistance 1: Minimum assistance  Quality of Gait 1: Diminished heel strike (mild LOB; reciprocal gait pattern)  Comments/Distance (ft) 1: pt amb 2x35' without AD, min A x 1 for stability and safety; +line management assist; mildly unsteady, +turns with increased time, decreased carmina    Stairs  Stairs: No  Extremity/Trunk Assessments:  RLE   RLE : Exceptions to WFL  Strength RLE  RLE Overall Strength: Greater than or equal to 3/5 as evidenced by functional mobility  LLE   LLE : Exceptions to WFL  Strength LLE  LLE Overall Strength: Greater than or equal to 3/5 as evidenced by functional mobility  Outcome Measures:  [facility] Basic Mobility  Turning from your back to your side while in a flat bed without using bedrails: A little  Moving from lying on your back to sitting on the side of a flat bed without using bedrails: A little  Moving to and from bed to chair (including a wheelchair): A little  Standing up from a chair using your arms (e.g. wheelchair or bedside chair): A little  To walk in hospital room: A little  Climbing 3-5 steps with railing: A little  Basic Mobility - Total Score: 18    Encounter Problems       Encounter Problems (Active)       Mobility       STG - Patient will ambulate 300' without assistive device, + turns, without LOB, provided close S        Start:  12/22/24    Expected End:  01/05/25            STG - Patient will ascend and descend 2 steps without handrails, provided close S for safety        Start:  12/22/24    Expected End:  01/05/25               PT Transfers       STG - Patient will transfer sit <> stand at an independent level        Start:  12/22/24    Expected End:  01/05/25                    Education Documentation  Body Mechanics, taught by Kashmir Last, PT at 12/22/2024  9:12 AM.  Learner: Patient  Readiness: Acceptance  Method: Explanation  Response: Verbalizes Understanding, Demonstrated Understanding    Mobility Training, taught by Kashmir Last, PT at 12/22/2024  9:12 AM.  Learner: Patient  Readiness: Acceptance  Method: Explanation  Response: Verbalizes Understanding, Demonstrated Understanding    Education Comments  No comments found.

## 2024-12-22 NOTE — NURSING NOTE
This Nurse went to check the patient.  The patient denies any discomfort and pain, denies shortness of breath.  Denies any needs at this time.  Patient is disoriented with situation, re-oriented.  Safety measures ensured with bed at lowest position with 2x siderails up and brakes on.  Re-oriented with alyce light, placed within reach.

## 2024-12-23 ENCOUNTER — DOCUMENTATION (OUTPATIENT)
Dept: RESEARCH | Age: 74
End: 2024-12-23

## 2024-12-23 ENCOUNTER — APPOINTMENT (OUTPATIENT)
Dept: DIALYSIS | Facility: HOSPITAL | Age: 74
End: 2024-12-23
Payer: MEDICARE

## 2024-12-23 ENCOUNTER — APPOINTMENT (OUTPATIENT)
Dept: CARDIOLOGY | Facility: HOSPITAL | Age: 74
DRG: 871 | End: 2024-12-23
Payer: MEDICARE

## 2024-12-23 LAB
ANION GAP SERPL CALCULATED.3IONS-SCNC: 15 MMOL/L (ref 10–20)
BUN SERPL-MCNC: 45 MG/DL (ref 6–23)
CALCIUM SERPL-MCNC: 8.7 MG/DL (ref 8.6–10.3)
CHLORIDE SERPL-SCNC: 97 MMOL/L (ref 98–107)
CO2 SERPL-SCNC: 28 MMOL/L (ref 21–32)
CREAT SERPL-MCNC: 8.71 MG/DL (ref 0.5–1.3)
EGFRCR SERPLBLD CKD-EPI 2021: 6 ML/MIN/1.73M*2
ERYTHROCYTE [DISTWIDTH] IN BLOOD BY AUTOMATED COUNT: 17.1 % (ref 11.5–14.5)
GLUCOSE BLD MANUAL STRIP-MCNC: 137 MG/DL (ref 74–99)
GLUCOSE BLD MANUAL STRIP-MCNC: 197 MG/DL (ref 74–99)
GLUCOSE BLD MANUAL STRIP-MCNC: 250 MG/DL (ref 74–99)
GLUCOSE BLD MANUAL STRIP-MCNC: 88 MG/DL (ref 74–99)
GLUCOSE SERPL-MCNC: 92 MG/DL (ref 74–99)
HCT VFR BLD AUTO: 26.3 % (ref 41–52)
HGB BLD-MCNC: 9.2 G/DL (ref 13.5–17.5)
MCH RBC QN AUTO: 31.1 PG (ref 26–34)
MCHC RBC AUTO-ENTMCNC: 35 G/DL (ref 32–36)
MCV RBC AUTO: 89 FL (ref 80–100)
NRBC BLD-RTO: 0 /100 WBCS (ref 0–0)
PLATELET # BLD AUTO: 161 X10*3/UL (ref 150–450)
POTASSIUM SERPL-SCNC: 3.7 MMOL/L (ref 3.5–5.3)
PROCALCITONIN SERPL-MCNC: 5.19 NG/ML
RBC # BLD AUTO: 2.96 X10*6/UL (ref 4.5–5.9)
SODIUM SERPL-SCNC: 136 MMOL/L (ref 136–145)
WBC # BLD AUTO: 8.3 X10*3/UL (ref 4.4–11.3)

## 2024-12-23 PROCEDURE — 82374 ASSAY BLOOD CARBON DIOXIDE: CPT | Performed by: NURSE PRACTITIONER

## 2024-12-23 PROCEDURE — 82947 ASSAY GLUCOSE BLOOD QUANT: CPT

## 2024-12-23 PROCEDURE — 99223 1ST HOSP IP/OBS HIGH 75: CPT | Performed by: NURSE PRACTITIONER

## 2024-12-23 PROCEDURE — 5A1D70Z PERFORMANCE OF URINARY FILTRATION, INTERMITTENT, LESS THAN 6 HOURS PER DAY: ICD-10-PCS | Performed by: INTERNAL MEDICINE

## 2024-12-23 PROCEDURE — 97535 SELF CARE MNGMENT TRAINING: CPT | Mod: GO

## 2024-12-23 PROCEDURE — 2500000004 HC RX 250 GENERAL PHARMACY W/ HCPCS (ALT 636 FOR OP/ED): Performed by: NURSE PRACTITIONER

## 2024-12-23 PROCEDURE — 8010000001 HC DIALYSIS - HEMODIALYSIS PER DAY

## 2024-12-23 PROCEDURE — 2500000001 HC RX 250 WO HCPCS SELF ADMINISTERED DRUGS (ALT 637 FOR MEDICARE OP): Performed by: NURSE PRACTITIONER

## 2024-12-23 PROCEDURE — 6350000001 HC RX 635 EPOETIN >10,000 UNITS: Mod: JZ | Performed by: INTERNAL MEDICINE

## 2024-12-23 PROCEDURE — 36415 COLL VENOUS BLD VENIPUNCTURE: CPT | Performed by: NURSE PRACTITIONER

## 2024-12-23 PROCEDURE — 85027 COMPLETE CBC AUTOMATED: CPT | Performed by: NURSE PRACTITIONER

## 2024-12-23 PROCEDURE — 1200000002 HC GENERAL ROOM WITH TELEMETRY DAILY

## 2024-12-23 PROCEDURE — 2500000004 HC RX 250 GENERAL PHARMACY W/ HCPCS (ALT 636 FOR OP/ED): Mod: JZ

## 2024-12-23 PROCEDURE — 2500000002 HC RX 250 W HCPCS SELF ADMINISTERED DRUGS (ALT 637 FOR MEDICARE OP, ALT 636 FOR OP/ED): Performed by: NURSE PRACTITIONER

## 2024-12-23 RX ORDER — VANCOMYCIN 750 MG/150ML
750 INJECTION, SOLUTION INTRAVENOUS ONCE
Status: COMPLETED | OUTPATIENT
Start: 2024-12-23 | End: 2024-12-23

## 2024-12-23 RX ORDER — VANCOMYCIN 750 MG/150ML
750 INJECTION, SOLUTION INTRAVENOUS
Status: DISCONTINUED | OUTPATIENT
Start: 2024-12-26 | End: 2024-12-25

## 2024-12-23 RX ADMIN — ATORVASTATIN CALCIUM 80 MG: 80 TABLET, FILM COATED ORAL at 20:30

## 2024-12-23 RX ADMIN — INSULIN LISPRO 2 UNITS: 100 INJECTION, SOLUTION INTRAVENOUS; SUBCUTANEOUS at 14:27

## 2024-12-23 RX ADMIN — CEFTRIAXONE SODIUM 1 G: 1 INJECTION, SOLUTION INTRAVENOUS at 14:21

## 2024-12-23 RX ADMIN — EPOETIN ALFA-EPBX 10000 UNITS: 10000 INJECTION, SOLUTION INTRAVENOUS; SUBCUTANEOUS at 14:27

## 2024-12-23 RX ADMIN — ALLOPURINOL 100 MG: 100 TABLET ORAL at 14:21

## 2024-12-23 RX ADMIN — ISOSORBIDE MONONITRATE 60 MG: 60 TABLET, EXTENDED RELEASE ORAL at 14:20

## 2024-12-23 RX ADMIN — DEXTROSE MONOHYDRATE 500 MG: 50 INJECTION, SOLUTION INTRAVENOUS at 15:31

## 2024-12-23 RX ADMIN — INSULIN LISPRO 4 UNITS: 100 INJECTION, SOLUTION INTRAVENOUS; SUBCUTANEOUS at 17:22

## 2024-12-23 RX ADMIN — VANCOMYCIN 750 MG: 750 INJECTION, SOLUTION INTRAVENOUS at 17:21

## 2024-12-23 RX ADMIN — ASPIRIN 81 MG: 81 TABLET, COATED ORAL at 14:21

## 2024-12-23 RX ADMIN — SEVELAMER CARBONATE 1600 MG: 800 TABLET, FILM COATED ORAL at 14:22

## 2024-12-23 RX ADMIN — CARVEDILOL 6.25 MG: 6.25 TABLET, FILM COATED ORAL at 17:22

## 2024-12-23 RX ADMIN — EZETIMIBE 10 MG: 10 TABLET ORAL at 14:20

## 2024-12-23 RX ADMIN — PANTOPRAZOLE SODIUM 40 MG: 40 TABLET, DELAYED RELEASE ORAL at 14:21

## 2024-12-23 RX ADMIN — AMLODIPINE BESYLATE 5 MG: 5 TABLET ORAL at 14:21

## 2024-12-23 RX ADMIN — CARVEDILOL 6.25 MG: 6.25 TABLET, FILM COATED ORAL at 08:17

## 2024-12-23 RX ADMIN — SITAGLIPTIN 25 MG: 50 TABLET, FILM COATED ORAL at 14:21

## 2024-12-23 RX ADMIN — SEVELAMER CARBONATE 1600 MG: 800 TABLET, FILM COATED ORAL at 18:15

## 2024-12-23 RX ADMIN — HEPARIN SODIUM 5000 UNITS: 5000 INJECTION, SOLUTION INTRAVENOUS; SUBCUTANEOUS at 02:57

## 2024-12-23 ASSESSMENT — COGNITIVE AND FUNCTIONAL STATUS - GENERAL
PERSONAL GROOMING: A LITTLE
MOBILITY SCORE: 23
DAILY ACTIVITIY SCORE: 24
HELP NEEDED FOR BATHING: A LITTLE
CLIMB 3 TO 5 STEPS WITH RAILING: A LITTLE
DRESSING REGULAR LOWER BODY CLOTHING: A LITTLE
TOILETING: A LITTLE
DAILY ACTIVITIY SCORE: 20
CLIMB 3 TO 5 STEPS WITH RAILING: A LITTLE
DAILY ACTIVITIY SCORE: 24
MOBILITY SCORE: 23

## 2024-12-23 ASSESSMENT — PAIN - FUNCTIONAL ASSESSMENT
PAIN_FUNCTIONAL_ASSESSMENT: 0-10
PAIN_FUNCTIONAL_ASSESSMENT: NO/DENIES PAIN

## 2024-12-23 ASSESSMENT — ACTIVITIES OF DAILY LIVING (ADL)
HOME_MANAGEMENT_TIME_ENTRY: 13
BATHING_COMMENTS: PT POLITELY DECLINED

## 2024-12-23 ASSESSMENT — PAIN SCALES - GENERAL
PAINLEVEL_OUTOF10: 0 - NO PAIN

## 2024-12-23 NOTE — PROGRESS NOTES
"Vancomycin Dosing by Pharmacy- FOLLOW-UP (HEMODIALYSIS)    William A Franke is a 74 y.o. year old male who Pharmacy has been consulted for vancomycin dosing for endocarditis. Based on the patient's indication and renal status this patient will be dosed based on a pre-HD level of 20-25 mcg/mL.     Patient is currently on hemodialysis User Schedule (TBD). Last dialysis 12-21. Usually on Tues Thurs Sat schedule, but dialysis scheduled for today.     Current vancomycin regimen or maintenance dose:  750 mg after each dialysis session       No results found for: \"VANCORANDOM\", \"VANCOTROUGH\"    Visit Vitals  BP (!) 186/46 (BP Location: Right arm, Patient Position: Lying) Comment: RN Notified!   Pulse 68   Temp 36.9 °C (98.4 °F) (Oral)   Resp 18        Lab Results   Component Value Date    CREATININE 8.71 (H) 12/23/2024    CREATININE 6.23 (H) 12/22/2024    CREATININE 4.15 (H) 12/21/2024    CREATININE 9.62 (H) 11/18/2024       I/O last 3 completed shifts:  In: 775 (9.5 mL/kg) [P.O.:525; IV Piggyback:250]  Out: 300 (3.7 mL/kg) [Urine:300 (0.1 mL/kg/hr)]  Weight: 81.6 kg     Assessment/Plan     One dose of 750 mg to be administered following the next HD session  Next pre-HD level will be obtained prior to next dialysis session (12-26). May be obtained sooner if clinically indicated.    Will continue to monitor renal function daily while on vancomycin and order serum creatinine at least every 48 hours if not already ordered.  Follow for continued vancomycin needs, clinical response, and signs/symptoms of toxicity.     DESIREE YUAN, PharmD  "

## 2024-12-23 NOTE — PROGRESS NOTES
"William A Franke is a 74 y.o. male on day 2 of admission presenting with Acute encephalopathy.    Subjective   Patient seen for end-stage kidney disease on dialysis therapy admitted with altered mental status and hypotension he does have staff aureus bacteremia is on antibiotics per infectious disease he is seen and examined on dialysis therapy is much more awake and responsive denies any specific pain at this time or any shortness of breath       Objective     Physical Exam  Neck:      Vascular: No carotid bruit.   Cardiovascular:      Rate and Rhythm: Normal rate and regular rhythm.      Heart sounds: No murmur heard.     No friction rub. No gallop.   Pulmonary:      Breath sounds: No wheezing, rhonchi or rales.   Chest:      Chest wall: No tenderness.   Abdominal:      General: There is no distension.      Tenderness: There is no abdominal tenderness. There is no guarding or rebound.   Musculoskeletal:         General: No swelling or tenderness.      Cervical back: Neck supple.      Right lower leg: No edema.      Left lower leg: No edema.      Comments: Very minimal redness over his AV fistula without any drainage or tenderness   Lymphadenopathy:      Cervical: No cervical adenopathy.         Last Recorded Vitals  Blood pressure (!) 186/46, pulse 68, temperature 36.9 °C (98.4 °F), temperature source Oral, resp. rate 18, height 1.702 m (5' 7\"), weight 81.6 kg (180 lb), SpO2 98%.    Intake/Output last 3 Shifts:  I/O last 3 completed shifts:  In: 775 (9.5 mL/kg) [P.O.:525; IV Piggyback:250]  Out: 300 (3.7 mL/kg) [Urine:300 (0.1 mL/kg/hr)]  Weight: 81.6 kg     Current Facility-Administered Medications:     acetaminophen (Tylenol) tablet 650 mg, 650 mg, oral, q4h PRN, 650 mg at 12/22/24 0758 **OR** acetaminophen (Tylenol) oral liquid 650 mg, 650 mg, nasogastric tube, q4h PRN **OR** acetaminophen (Tylenol) suppository 650 mg, 650 mg, rectal, q4h PRN, Aliya Patel, VERÓNICA-CNP    acetaminophen (Tylenol) tablet 650 " mg, 650 mg, oral, q4h PRN **OR** acetaminophen (Tylenol) oral liquid 650 mg, 650 mg, oral, q4h PRN **OR** acetaminophen (Tylenol) suppository 650 mg, 650 mg, rectal, q4h PRN, Aliya Patel APRN-CNP    allopurinol (Zyloprim) tablet 100 mg, 100 mg, oral, Daily, Aliya Patel APRN-CNP, 100 mg at 12/22/24 0802    amLODIPine (Norvasc) tablet 5 mg, 5 mg, oral, Daily, Aliya Patel APRN-CNP, 5 mg at 12/22/24 0802    aspirin EC tablet 81 mg, 81 mg, oral, Daily, Aliya Patel APRN-CNP, 81 mg at 12/22/24 0801    atorvastatin (Lipitor) tablet 80 mg, 80 mg, oral, Nightly, Aliya Patel APRN-CNP, 80 mg at 12/22/24 2246    azithromycin 500 mg in dextrose 5% 250 mL IV, 500 mg, intravenous, q24h, Aliya Patel APRN-CNP, Stopped at 12/22/24 1433    carvedilol (Coreg) tablet 6.25 mg, 6.25 mg, oral, BID, Aliya Patel APRN-CNP, 6.25 mg at 12/23/24 0817    cefTRIAXone (Rocephin) 1 g in dextrose (iso) IV 50 mL, 1 g, intravenous, q24h, Aliya Patel APRN-CNP, Stopped at 12/22/24 1329    dextrose 50 % injection 12.5 g, 12.5 g, intravenous, q15 min PRN, Aliya Patel APRN-CNP    dextrose 50 % injection 25 g, 25 g, intravenous, q15 min PRN, Aliya Patel APRN-CNP    epoetin jeison-epbx (Retacrit) injection 10,000 Units, 10,000 Units, subcutaneous, Once per day on Monday Wednesday Friday, Elier Woodson MD    ezetimibe (Zetia) tablet 10 mg, 10 mg, oral, Daily, Aliya Patel APRN-CNP, 10 mg at 12/22/24 0802    glucagon (Glucagen) injection 1 mg, 1 mg, intramuscular, q15 min PRN, ERIKA Russ    glucagon (Glucagen) injection 1 mg, 1 mg, intramuscular, q15 min PRN, ERIKA Russ    guaiFENesin (Mucinex) 12 hr tablet 600 mg, 600 mg, oral, BID PRN, ERIKA Russ, 600 mg at 12/21/24 8463    heparin (porcine) injection 5,000 Units, 5,000 Units, subcutaneous, q12h, ERIKA Russ, 5,000 Units at 12/23/24 0257    [Held by provider] hydrALAZINE  (Apresoline) tablet 50 mg, 50 mg, oral, TID, Desirae Alicea MD    insulin lispro injection 0-10 Units, 0-10 Units, subcutaneous, TID AC, ERIKA Russ, 2 Units at 12/22/24 1800    isosorbide mononitrate ER (Imdur) 24 hr tablet 60 mg, 60 mg, oral, Daily, ERIKA Russ, 60 mg at 12/22/24 0802    nitroglycerin (Nitrostat) SL tablet 0.4 mg, 0.4 mg, sublingual, q5 min PRN, ERIKA Russ    oxygen (O2) therapy, , inhalation, Continuous, ERIKA Russ, 21 percent at 12/21/24 1508    pantoprazole (ProtoNix) EC tablet 40 mg, 40 mg, oral, Daily, ERIKA Russ, 40 mg at 12/22/24 0802    polyethylene glycol (Glycolax, Miralax) packet 17 g, 17 g, oral, Daily PRN, ERIKA Russ    sevelamer carbonate (Renvela) tablet 1,600 mg, 1,600 mg, oral, TID, ERIKA Russ, 1,600 mg at 12/22/24 1800    SITagliptin phosphate (Januvia) tablet 25 mg, 25 mg, oral, Daily, ERIKA Russ, 25 mg at 12/22/24 0802    vancomycin (Vancocin) pharmacy to dose - pharmacy monitoring, , miscellaneous, Daily PRN, Desirae Alicea MD    vancomycin (Xellia) 750 mg in diluent combination  mL, 750 mg, intravenous, Once, Yasmin Son   Relevant Results    Results for orders placed or performed during the hospital encounter of 12/21/24 (from the past 96 hours)   CBC and Auto Differential   Result Value Ref Range    WBC 9.5 4.4 - 11.3 x10*3/uL    nRBC 0.0 0.0 - 0.0 /100 WBCs    RBC 3.51 (L) 4.50 - 5.90 x10*6/uL    Hemoglobin 11.1 (L) 13.5 - 17.5 g/dL    Hematocrit 33.8 (L) 41.0 - 52.0 %    MCV 96 80 - 100 fL    MCH 31.6 26.0 - 34.0 pg    MCHC 32.8 32.0 - 36.0 g/dL    RDW 18.1 (H) 11.5 - 14.5 %    Platelets 157 150 - 450 x10*3/uL    Neutrophils % 87.9 40.0 - 80.0 %    Immature Granulocytes %, Automated 0.5 0.0 - 0.9 %    Lymphocytes % 4.0 13.0 - 44.0 %    Monocytes % 6.1 2.0 - 10.0 %    Eosinophils % 1.0 0.0 - 6.0 %    Basophils % 0.5 0.0 - 2.0 %     Neutrophils Absolute 8.30 (H) 1.60 - 5.50 x10*3/uL    Immature Granulocytes Absolute, Automated 0.05 0.00 - 0.50 x10*3/uL    Lymphocytes Absolute 0.38 (L) 0.80 - 3.00 x10*3/uL    Monocytes Absolute 0.58 0.05 - 0.80 x10*3/uL    Eosinophils Absolute 0.09 0.00 - 0.40 x10*3/uL    Basophils Absolute 0.05 0.00 - 0.10 x10*3/uL   Comprehensive Metabolic Panel   Result Value Ref Range    Glucose 163 (H) 74 - 99 mg/dL    Sodium 136 136 - 145 mmol/L    Potassium 3.7 3.5 - 5.3 mmol/L    Chloride 95 (L) 98 - 107 mmol/L    Bicarbonate 31 21 - 32 mmol/L    Anion Gap 14 10 - 20 mmol/L    Urea Nitrogen 12 6 - 23 mg/dL    Creatinine 4.15 (H) 0.50 - 1.30 mg/dL    eGFR 14 (L) >60 mL/min/1.73m*2    Calcium 9.1 8.6 - 10.3 mg/dL    Albumin 4.4 3.4 - 5.0 g/dL    Alkaline Phosphatase 64 33 - 136 U/L    Total Protein 6.8 6.4 - 8.2 g/dL    AST 21 9 - 39 U/L    Bilirubin, Total 1.3 (H) 0.0 - 1.2 mg/dL    ALT 12 10 - 52 U/L   Lactate   Result Value Ref Range    Lactate 1.3 0.4 - 2.0 mmol/L   Blood Culture    Specimen: Peripheral Venipuncture; Blood culture   Result Value Ref Range    Blood Culture Staphylococcus aureus (A)     BLOOD CULTURE BOTTLE  Positive Aerobic and Anaerobic Bottle     Gram Stain Gram positive cocci, clusters (AA)     Gram Stain Gram positive cocci, clusters (AA)    Blood Culture    Specimen: Peripheral Venipuncture; Blood culture   Result Value Ref Range    Blood Culture Staphylococcus aureus (A)     BLOOD CULTURE BOTTLE  Positive Aerobic and Anaerobic Bottle     Gram Stain Gram positive cocci, clusters (AA)     Gram Stain Gram positive cocci, clusters (AA)    Sars-CoV-2 and Influenza A/B PCR   Result Value Ref Range    Flu A Result Not Detected Not Detected    Flu B Result Not Detected Not Detected    Coronavirus 2019, PCR Not Detected Not Detected   RSV PCR   Result Value Ref Range    RSV PCR Not Detected Not Detected   POCT GLUCOSE   Result Value Ref Range    POCT Glucose 142 (H) 74 - 99 mg/dL   POCT GLUCOSE   Result  Value Ref Range    POCT Glucose 115 (H) 74 - 99 mg/dL   Urinalysis with Reflex Culture and Microscopic   Result Value Ref Range    Color, Urine Yellow Light-Yellow, Yellow, Dark-Yellow    Appearance, Urine Clear Clear    Specific Gravity, Urine 1.013 1.005 - 1.035    pH, Urine 8.0 5.0, 5.5, 6.0, 6.5, 7.0, 7.5, 8.0    Protein, Urine 300 (3+) (A) NEGATIVE, 10 (TRACE), 20 (TRACE) mg/dL    Glucose, Urine 50 (TRACE) (A) Normal mg/dL    Blood, Urine 0.06 (1+) (A) NEGATIVE    Ketones, Urine NEGATIVE NEGATIVE mg/dL    Bilirubin, Urine NEGATIVE NEGATIVE    Urobilinogen, Urine Normal Normal mg/dL    Nitrite, Urine NEGATIVE NEGATIVE    Leukocyte Esterase, Urine NEGATIVE NEGATIVE   Extra Urine Gray Tube   Result Value Ref Range    Extra Tube Hold for add-ons.    Urinalysis Microscopic   Result Value Ref Range    WBC, Urine NONE 1-5, NONE /HPF    RBC, Urine 3-5 NONE, 1-2, 3-5 /HPF    Squamous Epithelial Cells, Urine 1-9 (SPARSE) Reference range not established. /HPF   CBC   Result Value Ref Range    WBC 9.1 4.4 - 11.3 x10*3/uL    nRBC 0.0 0.0 - 0.0 /100 WBCs    RBC 3.32 (L) 4.50 - 5.90 x10*6/uL    Hemoglobin 10.3 (L) 13.5 - 17.5 g/dL    Hematocrit 30.9 (L) 41.0 - 52.0 %    MCV 93 80 - 100 fL    MCH 31.0 26.0 - 34.0 pg    MCHC 33.3 32.0 - 36.0 g/dL    RDW 17.7 (H) 11.5 - 14.5 %    Platelets 139 (L) 150 - 450 x10*3/uL   Basic metabolic panel   Result Value Ref Range    Glucose 103 (H) 74 - 99 mg/dL    Sodium 135 (L) 136 - 145 mmol/L    Potassium 4.0 3.5 - 5.3 mmol/L    Chloride 96 (L) 98 - 107 mmol/L    Bicarbonate 28 21 - 32 mmol/L    Anion Gap 15 10 - 20 mmol/L    Urea Nitrogen 25 (H) 6 - 23 mg/dL    Creatinine 6.23 (H) 0.50 - 1.30 mg/dL    eGFR 9 (L) >60 mL/min/1.73m*2    Calcium 8.7 8.6 - 10.3 mg/dL   POCT GLUCOSE   Result Value Ref Range    POCT Glucose 99 74 - 99 mg/dL   POCT GLUCOSE   Result Value Ref Range    POCT Glucose 149 (H) 74 - 99 mg/dL   POCT GLUCOSE   Result Value Ref Range    POCT Glucose 168 (H) 74 - 99 mg/dL    POCT GLUCOSE   Result Value Ref Range    POCT Glucose 147 (H) 74 - 99 mg/dL   CBC   Result Value Ref Range    WBC 8.3 4.4 - 11.3 x10*3/uL    nRBC 0.0 0.0 - 0.0 /100 WBCs    RBC 2.96 (L) 4.50 - 5.90 x10*6/uL    Hemoglobin 9.2 (L) 13.5 - 17.5 g/dL    Hematocrit 26.3 (L) 41.0 - 52.0 %    MCV 89 80 - 100 fL    MCH 31.1 26.0 - 34.0 pg    MCHC 35.0 32.0 - 36.0 g/dL    RDW 17.1 (H) 11.5 - 14.5 %    Platelets 161 150 - 450 x10*3/uL   Basic metabolic panel   Result Value Ref Range    Glucose 92 74 - 99 mg/dL    Sodium 136 136 - 145 mmol/L    Potassium 3.7 3.5 - 5.3 mmol/L    Chloride 97 (L) 98 - 107 mmol/L    Bicarbonate 28 21 - 32 mmol/L    Anion Gap 15 10 - 20 mmol/L    Urea Nitrogen 45 (H) 6 - 23 mg/dL    Creatinine 8.71 (H) 0.50 - 1.30 mg/dL    eGFR 6 (L) >60 mL/min/1.73m*2    Calcium 8.7 8.6 - 10.3 mg/dL   POCT GLUCOSE   Result Value Ref Range    POCT Glucose 88 74 - 99 mg/dL       Assessment/Plan   End-stage kidney disease I will continue with his dialysis his next dialysis will be on Thursday  Staph aureus bacteremia source is not clear lung versus AV fistula plan to continue antibiotic therapy per infectious disease awaiting vascular input on the AV fistula evaluation for possible infection  Anemia chronic kidney disease continue with erythropoietin therapy  Hypertension           Elier Woodson MD

## 2024-12-23 NOTE — PRE-PROCEDURE NOTE
..Report from Sending RN:    Report From: Idalia Paula RN  Recent Surgery of Procedure: No  Baseline Level of Consciousness (LOC): A&Ox3  Oxygen Use: No  Type: room air  Diabetic: Yes  Last BP Med Given Day of Dialysis: see EMAR  Last Pain Med Given: see EMAR  Lab Tests to be Obtained with Dialysis: No  Blood Transfusion to be Given During Dialysis: No  Available IV Access: Yes  Medications to be Administered During Dialysis: No  Continuous IV Infusion Running: No  Restraints on Currently or in the Last 24 Hours: No  Hand-Off Communication: pt is stable to come to HD room for dialysis  Dialysis Catheter Dressing: N/A  Last Dressing Change: N/A

## 2024-12-23 NOTE — CONSULTS
"Reason For Consult  Evaluate AV fistula for possible infection    History Of Present Illness  William A Franke is a 74 y.o. male presenting with a history of type 2 diabetes, hypertension, hyperlipidemia, end-stage renal disease on hemodialysis.  He had a left brachiocephalic arteriovenous fistula created on May 13, 2022 and subsequent multiple interventions.  Fistula eventually thrombosed.  He underwent creation of left brachial axillary graft on 7/31/2024.  He has been obtaining dialysis through the graft with no difficulties until recently.  He had a \"drop in vitals\" with confusion during dialysis and was referred to the hospital for evaluation.  His wife notes that he has been coughing since his hospitalization.  His workup was essentially negative with the exception of positive blood cultures for MSSA.  He also had 1 positive reading of fever on 12/22/2024 of the 100.9 °F. There was concern about possible graft infection.  The patient denies any difficulties with the graft.  No tenderness, no erythema, no drainage, no induration.  No fevers or chills.     Past Medical History  He has a past medical history of Chronic kidney disease, Diabetes mellitus (Multi), ESRD (end stage renal disease) on dialysis (Multi), Heart murmur, HLD (hyperlipidemia), Hypertension, Personal history of other endocrine, nutritional and metabolic disease, and Personal history of other specified conditions (08/28/2020).    Surgical History  He has a past surgical history that includes Shoulder surgery (04/15/2013); Knee arthroscopy w/ debridement (04/15/2013); and AV fistula placement (Left, 07/31/2024).     Social History  He reports that he has never smoked. He has never used smokeless tobacco. He reports that he does not currently use alcohol. He reports that he does not use drugs.    Family History  Family History   Problem Relation Name Age of Onset    Alzheimer's disease Mother      Heart attack Mother      Diabetes Father      " "Heart attack Father          Allergies  Patient has no known allergies.    Review of Systems  CONSTITUTIONAL: Denies weight loss, fever and chills.    HEENT: Denies changes in vision and hearing.    RESPIRATORY: Denies SOB and cough.    CV: Denies palpitations and CP.    GI: Denies abdominal pain, nausea, vomiting and diarrhea.    : Denies dysuria and urinary frequency.    MSK: Denies myalgia and joint pain.    SKIN: Denies rash and pruritus.    VASC: Denies claudication, ischemic rest pain, or open wounds or sores    NEUROLOGICAL: Denies headache and syncope.    PSYCHIATRIC: Denies recent changes in mood. Denies anxiety and depression.     Physical Exam  General: Pt is alert and oriented x 3. Pleasant, conversive  HEENT: Head is atraumatic, normocephalic. PERRL. No cervical bruits  Cardiac: Normal S1-S2.  Regular rate and rhythm.  No murmurs.  Respiratory: Lungs clear to auscultation.  No adventitious sounds.  Abdomen: Soft, nondistended, nontender.  Bowel sounds x4 quadrants.  Pulse exam: Palpable brachial and radial pulses bilaterall. Bilateral upper and lower extremities are warm and well-perfused.  Extremities: Left upper extremity AV graft has a positive bruit.  There is no erythema or induration.  There is no drainage from my examination.  Although the patient had just had dialysis so I did not fully remove all the dressings due to concern for bleeding.  Neuro: Moves all extremities spontaneously.  No focal deficits.  Psych: Appropriate affect.  Answers questions appropriately.     Last Recorded Vitals  Blood pressure (!) 147/48, pulse 65, temperature 37.4 °C (99.3 °F), temperature source Oral, resp. rate 18, height 1.702 m (5' 7\"), weight 81.6 kg (180 lb), SpO2 97%.    Relevant Results  Electrocardiogram, 12-lead ACS symptoms    Result Date: 12/23/2024  Normal sinus rhythm Left ventricular hypertrophy with repolarization abnormality ( R in aVL , Sokolow-Barriga , [place] product , Romhilt-Thornton ) Abnormal ECG " No previous ECGs available    XR chest 1 view    Result Date: 12/21/2024  STUDY: Chest Radiograph;  [12-; 11:41 am] INDICATION: Altered. Cough. COMPARISON: XR chest 11- ACCESSION NUMBER(S): [id number] ORDERING CLINICIAN: FELIPE SINGH TECHNIQUE:  Frontal chest was obtained at 11:41 hours. FINDINGS: CARDIOMEDIASTINAL SILHOUETTE: The cardiomediastinal silhouette is enlarged but stable compared to previous imaging..  LUNGS: The lungs demonstrate mild pulmonary vascular prominence which may represent mild vascular congestion.  There is blunting of the right costophrenic angle which could represent a possible small effusion..  ABDOMEN: No remarkable upper abdominal findings.  BONES: No acute osseous changes.  Multiple vascular stents are noted within the left upper extremity.    1.  Pulmonary vascular prominence which may represent mild pulmonary vascular congestion. 2.  Blunting of the right costophrenic angle laterally which could represent a small pleural effusion.. Signed by Tk Geller MD    CT head wo IV contrast    Result Date: 12/21/2024  Interpreted By:  Jonas Rinaldi, STUDY: CT HEAD WO IV CONTRAST;  12/21/2024 11:45 am   INDICATION: Signs/Symptoms:altered.     COMPARISON: 11/16/2024   ACCESSION NUMBER(S): [id number]   ORDERING CLINICIAN: FELIPE SINGH   TECHNIQUE: Unenhanced images were obtained through the brain.   FINDINGS: There is atrophy resulting in prominence of the ventricles and sulci. There are areas of decreased attenuation within the white matter which are nonspecific but are commonly associated with small vessel ischemic disease. There is no mass effect or midline shift. No acute intracranial hemorrhage is identified. No extra-axial fluid collections are seen. No intraparenchymal mass lesions are identified.  Bone windows demonstrate no evidence of an acute calvarial fracture. There is mucosal in the paranasal sinuses, most conspicuous involving the left maxillary sinus.       No  evidence of an acute intracranial process.   MACRO: None.   Signed by: Jonas Rinaldi 12/21/2024 12:21 PM Dictation workstation:   [id number]      Results for orders placed or performed during the hospital encounter of 12/21/24 (from the past 24 hours)   Blood Culture    Specimen: Peripheral Venipuncture; Blood culture   Result Value Ref Range    Blood Culture Loaded on Instrument - Culture in progress    Blood Culture    Specimen: Peripheral Venipuncture; Blood culture   Result Value Ref Range    Blood Culture Loaded on Instrument - Culture in progress    Procalcitonin   Result Value Ref Range    Procalcitonin 5.19 (H) <=0.07 ng/mL   POCT GLUCOSE   Result Value Ref Range    POCT Glucose 147 (H) 74 - 99 mg/dL   CBC   Result Value Ref Range    WBC 8.3 4.4 - 11.3 x10*3/uL    nRBC 0.0 0.0 - 0.0 /100 WBCs    RBC 2.96 (L) 4.50 - 5.90 x10*6/uL    Hemoglobin 9.2 (L) 13.5 - 17.5 g/dL    Hematocrit 26.3 (L) 41.0 - 52.0 %    MCV 89 80 - 100 fL    MCH 31.1 26.0 - 34.0 pg    MCHC 35.0 32.0 - 36.0 g/dL    RDW 17.1 (H) 11.5 - 14.5 %    Platelets 161 150 - 450 x10*3/uL   Basic metabolic panel   Result Value Ref Range    Glucose 92 74 - 99 mg/dL    Sodium 136 136 - 145 mmol/L    Potassium 3.7 3.5 - 5.3 mmol/L    Chloride 97 (L) 98 - 107 mmol/L    Bicarbonate 28 21 - 32 mmol/L    Anion Gap 15 10 - 20 mmol/L    Urea Nitrogen 45 (H) 6 - 23 mg/dL    Creatinine 8.71 (H) 0.50 - 1.30 mg/dL    eGFR 6 (L) >60 mL/min/1.73m*2    Calcium 8.7 8.6 - 10.3 mg/dL   POCT GLUCOSE   Result Value Ref Range    POCT Glucose 88 74 - 99 mg/dL   POCT GLUCOSE   Result Value Ref Range    POCT Glucose 197 (H) 74 - 99 mg/dL   POCT GLUCOSE   Result Value Ref Range    POCT Glucose 250 (H) 74 - 99 mg/dL     *Note: Due to a large number of results and/or encounters for the requested time period, some results have not been displayed. A complete set of results can be found in Results Review.             Assessment/Plan   Bacteremia  Status post left brachial axillary  "graft placed July 2024    Patient admitted with a \"drop in vital signs\" with associated confusion and mental status changes.  This has since resolved.  On admission, lactate was 1.3, white blood cell count 9.5 ,and 1 episode of fever during hospitalization on 12/22/2024.    From a clinical standpoint, the patient has no evidence of AV graft infection.  There is no tenderness, induration, drainage, erythema.  Given unclear etiology of the MSSA bacteremia, recommend tagged white blood cell scan for further evaluation.  If patient needs any further surgery, would prefer transfer to [facility] which is where his vascular surgeon operates.      I spent 60 minutes in the professional and overall care of this patient.      Danny Slade, APRN-CNP    "

## 2024-12-23 NOTE — PROGRESS NOTES
Spiritual Care Visit  Spiritual Care Request    Reason for Visit:  Routine Visit: Introduction     Request Received From:       Focus of Care:  Visited With: Patient not available         Refer to :          Spiritual Care Assessment    Spiritual Assessment:                      Care Provided:       Sense of Community and or Oriental orthodox Affiliation:  Zoroastrianism   Values/Beliefs  Spiritual Requests During Hospitalization: shonda was having Patienr Care when i tried to see him today.     Addressed Needs/Concerns and/or Miguel A Through:          Outcome:        Advance Directives:         Spiritual Care Annotation    Annotation:  Shonda ws having Patient Care when I tried to see him today.  Marcos Day

## 2024-12-23 NOTE — PROGRESS NOTES
Patient seen and examined  William A Franke is a 74 y.o. male on day 2 of admission presenting with Acute encephalopathy.    Subjective   Interval History:   Afebrile, no chills  No cough, chest pain or shortness of breath  No nausea vomiting or diarrhea      Review of Systems   All other systems reviewed and are negative.      Objective   Range of Vitals (last 24 hours)  Heart Rate:  [56-68]   Temp:  [36.5 °C (97.7 °F)-36.9 °C (98.4 °F)]   Resp:  [18]   BP: (145-186)/(41-46)   SpO2:  [94 %-99 %]   Daily Weight  12/21/24 : 81.6 kg (180 lb)    Body mass index is 28.19 kg/m².    Physical Exam  Constitutional:       Appearance: Normal appearance.   HENT:      Head: Normocephalic and atraumatic.      Nose: Nose normal.   Eyes:      General: No scleral icterus.     Extraocular Movements: Extraocular movements intact.      Conjunctiva/sclera: Conjunctivae normal.   Cardiovascular:      Rate and Rhythm: Normal rate and regular rhythm.      Heart sounds: Normal heart sounds.   Pulmonary:      Effort: Pulmonary effort is normal.      Breath sounds: Normal breath sounds.   Abdominal:      General: Bowel sounds are normal.      Palpations: Abdomen is soft.   Musculoskeletal:      Cervical back: Normal range of motion and neck supple.      Right lower leg: No edema.      Left lower leg: No edema.   Skin:     General: Skin is warm and dry.   Neurological:      Mental Status: He is alert.   Psychiatric:         Behavior: Behavior is cooperative.     Antibiotics  azithromycin - 500 mg/250 mL  cefTRIAXone - 1 gram/50 mL  vancomycin - 750 mg/150 mL, 750 mg/150 mL    Relevant Results  Labs  Results from last 72 hours   Lab Units 12/23/24  0529 12/22/24  0613 12/21/24  1056   WBC AUTO x10*3/uL 8.3 9.1 9.5   HEMOGLOBIN g/dL 9.2* 10.3* 11.1*   HEMATOCRIT % 26.3* 30.9* 33.8*   PLATELETS AUTO x10*3/uL 161 139* 157   NEUTROS PCT AUTO %  --   --  87.9   LYMPHS PCT AUTO %  --   --  4.0   MONOS PCT AUTO %  --   --  6.1   EOS PCT AUTO %  --    "--  1.0     Results from last 72 hours   Lab Units 12/23/24  0529 12/22/24  0613 12/21/24  1056   SODIUM mmol/L 136 135* 136   POTASSIUM mmol/L 3.7 4.0 3.7   CHLORIDE mmol/L 97* 96* 95*   CO2 mmol/L 28 28 31   BUN mg/dL 45* 25* 12   CREATININE mg/dL 8.71* 6.23* 4.15*   GLUCOSE mg/dL 92 103* 163*   CALCIUM mg/dL 8.7 8.7 9.1   ANION GAP mmol/L 15 15 14   EGFR mL/min/1.73m*2 6* 9* 14*     Results from last 72 hours   Lab Units 12/21/24  1056   ALK PHOS U/L 64   BILIRUBIN TOTAL mg/dL 1.3*   PROTEIN TOTAL g/dL 6.8   ALT U/L 12   AST U/L 21   ALBUMIN g/dL 4.4     Estimated Creatinine Clearance: 7.4 mL/min (A) (by C-G formula based on SCr of 8.71 mg/dL (H)).  No results found for: \"CRP\"  Microbiology  Susceptibility data from last 14 days.  Collected Specimen Info Organism   12/21/24 Blood culture from Peripheral Venipuncture Staphylococcus aureus   12/21/24 Blood culture from Peripheral Venipuncture Staphylococcus aureus   Susceptibility data from last 90 days.  Collected Specimen Info Organism Clindamycin Erythromycin Oxacillin Tetracycline Trimethoprim/Sulfamethoxazole Vancomycin   12/21/24 Blood culture from Peripheral Venipuncture Methicillin Susceptible Staphylococcus aureus (MSSA)  S  S  S  S  S  S   12/21/24 Blood culture from Peripheral Venipuncture Staphylococcus aureus             Imaging  Electrocardiogram, 12-lead ACS symptoms    Result Date: 12/23/2024  Normal sinus rhythm Left ventricular hypertrophy with repolarization abnormality ( R in aVL , Sokolow-Barriga , [place] product , Romhilt-Thornton ) Abnormal ECG No previous ECGs available    XR chest 1 view    Result Date: 12/21/2024  STUDY: Chest Radiograph;  [12-; 11:41 am] INDICATION: Altered. Cough. COMPARISON: XR chest 11- ACCESSION NUMBER(S): [id number] ORDERING CLINICIAN: FELIPE SINGH TECHNIQUE:  Frontal chest was obtained at 11:41 hours. FINDINGS: CARDIOMEDIASTINAL SILHOUETTE: The cardiomediastinal silhouette is enlarged but stable compared " to previous imaging..  LUNGS: The lungs demonstrate mild pulmonary vascular prominence which may represent mild vascular congestion.  There is blunting of the right costophrenic angle which could represent a possible small effusion..  ABDOMEN: No remarkable upper abdominal findings.  BONES: No acute osseous changes.  Multiple vascular stents are noted within the left upper extremity.    1.  Pulmonary vascular prominence which may represent mild pulmonary vascular congestion. 2.  Blunting of the right costophrenic angle laterally which could represent a small pleural effusion.. Signed by Tk Geller MD    CT head wo IV contrast    Result Date: 12/21/2024  Interpreted By:  Jonas Rinaldi, STUDY: CT HEAD WO IV CONTRAST;  12/21/2024 11:45 am   INDICATION: Signs/Symptoms:altered.     COMPARISON: 11/16/2024   ACCESSION NUMBER(S): [id number]   ORDERING CLINICIAN: FELIPE SINGH   TECHNIQUE: Unenhanced images were obtained through the brain.   FINDINGS: There is atrophy resulting in prominence of the ventricles and sulci. There are areas of decreased attenuation within the white matter which are nonspecific but are commonly associated with small vessel ischemic disease. There is no mass effect or midline shift. No acute intracranial hemorrhage is identified. No extra-axial fluid collections are seen. No intraparenchymal mass lesions are identified.  Bone windows demonstrate no evidence of an acute calvarial fracture. There is mucosal in the paranasal sinuses, most conspicuous involving the left maxillary sinus.       No evidence of an acute intracranial process.   MACRO: None.   Signed by: Jonas Rinaldi 12/21/2024 12:21 PM Dictation workstation:   [id number]     Assessment/Plan   Encephalopathy, septic, resolved  MSSA bacteremia, source unclear  Abnormal chest x-ray-congestion, rule out infection-elevated procalcitonin     IV vancomycin  IV ceftriaxone  IV azithromycin  Follow-up repeat blood cultures  Transthoracic  echocardiogram  Repeat chest x-ray-a.m. 12/24/2024  Monitor temperature and WBC  Further recommendations based on culture results    Dave Pathak MD

## 2024-12-23 NOTE — PROGRESS NOTES
Occupational Therapy    OT Treatment    Patient Name: William A Franke  MRN: [id number]  Department: [address]  Room: [facility]432  Today's Date: 12/23/2024  Time Calculation  Start Time: 1329  Stop Time: 1348  Time Calculation (min): 19 min        Assessment:  OT Assessment:  (Pt is making progress towards established OT goals. Pt would benefit from continued acute OT services to address deficits in ADLs, functional mobility and transfers)  End of Session Communication: Bedside nurse  End of Session Patient Position: Up in chair, Alarm off, not on at start of session (alarm off per nsg, family present, instructed to call for assist))  OT Assessment Results: Decreased ADL status, Decreased cognition, Decreased endurance, Decreased functional mobility  Plan:  Treatment Interventions: ADL retraining, Functional transfer training, UE strengthening/ROM, Endurance training, Equipment evaluation/education, Compensatory technique education  OT Frequency: 3 times per week  OT Discharge Recommendations: Low intensity level of continued care  Equipment Recommended upon Discharge: Wheeled walker  OT Recommended Transfer Status: Assist of 1, Minimal assist  OT - OK to Discharge: Yes  Treatment Interventions: ADL retraining, Functional transfer training, UE strengthening/ROM, Endurance training, Equipment evaluation/education, Compensatory technique education    Subjective   Previous Visit Info:  OT Last Visit  OT Received On: 12/23/24  General:  General  Reason for Referral: impaired ADLs; 74 y.o. male presenting with AMS; Sepsis with encephalopathy without septic shock after dialysis treatment.  Referred By: ERIKA Russ  Past Medical History Relevant to Rehab: DM, HTN, CAD, ESKD on HD  Family/Caregiver Present: Yes  Caregiver Feedback: spouse and brother  Prior to Session Communication: Bedside nurse  Patient Position Received: Up in chair, Alarm off, not on at start of session  General Comment: Cleared by nursing. Pt  pleasant and agreeable to therapy  Precautions:  Hearing/Visual Limitations: glasses  Medical Precautions: Fall precautions    Pain:  Pain Assessment  Pain Assessment: 0-10  0-10 (Numeric) Pain Score: 0 - No pain    Objective       Activities of Daily Living:      Grooming  Grooming Level of Assistance: Close supervision  Grooming Where Assessed: Standing sinkside  Grooming Comments: brushing teeth and washing face    UE Bathing  UE Bathing Comments:  (pt politely declined)    LE Bathing  LE Bathing Comments: pt politely declined    UE Dressing  UE Dressing Level of Assistance: Setup  UE Dressing Where Assessed: Chair  [facility] D[facility]    LE Dressing  LE Dressing:  (pt politely declined)    Toileting  Toileting Comments:  (pt politely declined)     Bed Mobility/Transfers: Bed Mobility  Bed Mobility: No    Transfers  Transfer:  (close supervision for safety sit<>stand x2 trials chair level)  Functional Mobility:  Functional Mobility  Functional Mobility Performed:  (CGA for balance/safety for functional mobility extended household distance without use of AD, slightly unsteady this date with slow guarded movements, no LOB noted)     Standing Balance:  Dynamic Standing Balance  Dynamic Standing-Balance Support: Left upper extremity supported  Dynamic Standing-Level of Assistance: Close supervision  Dynamic Standing-Balance: Forward lean (ADL tasks standing at sink)    Outcome Measures:[facility] Daily Activity  Putting on and taking off regular lower body clothing: A little  Bathing (including washing, rinsing, drying): A little  Putting on and taking off regular upper body clothing: None  Toileting, which includes using toilet, bedpan or urinal: A little  Taking care of personal grooming such as brushing teeth: A little  Eating Meals: None  Daily Activity - Total Score: 20    Education Documentation  ADL Training, taught by Indu Tellez OT at 12/23/2024  3:00 PM.  Learner:  Patient  Readiness: Acceptance  Method: Demonstration, Explanation  Response: Verbalizes Understanding    Education Comments  No comments found.      Goals:  Encounter Problems       Encounter Problems (Active)       ADLs       Patient with complete lower body dressing with modified independent level of assistance donning and doffing all LE clothes  with PRN adaptive equipment while edge of bed  (Not Progressing)       Start:  12/22/24    Expected End:  01/22/25            Patient will complete daily grooming tasks with modified independent level of assistance and PRN adaptive equipment while standing. (Progressing)       Start:  12/22/24    Expected End:  01/22/25            Patient will complete toileting including hygiene clothing management/hygiene with modified independent level of assistance and grab bars. (Not Progressing)       Start:  12/22/24    Expected End:  01/22/25               MOBILITY       Patient will perform Functional mobility max Household distances/Community Distances with modified independent level of assistance and least restrictive device in order to improve safety and functional mobility. (Progressing)       Start:  12/22/24    Expected End:  01/22/25               TRANSFERS       Patient will complete functional transfer to toilet with least restrictive device with modified independent level of assistance. (Progressing)       Start:  12/22/24    Expected End:  01/22/25

## 2024-12-23 NOTE — CARE PLAN
The patient's goals for the shift include      The clinical goals for the shift include safety, monitor for mental status change    Over the shift, the patient did not make progress toward the following goals. Barriers to progression include . Recommendations to address these barriers include   Problem: Diabetes  Goal: Decrease in ketones present in urine by end of shift  Outcome: Progressing  Goal: Maintain electrolyte levels within acceptable range throughout shift  Outcome: Progressing  Goal: No changes in neurological exam by end of shift  Outcome: Progressing  Goal: Learn about and adhere to nutrition recommendations by end of shift  Outcome: Progressing  Goal: Receive DSME education by end of shift  Outcome: Progressing

## 2024-12-23 NOTE — POST-PROCEDURE NOTE
Report to Receiving RN:    Report To: valeriy   Time Report Called: 1205  Hand-Off Communication: elevated bp post hd tx , removed 2.5 liters of fluid, needles removed, patient alert voices no complaints   Complications During Treatment: No  Ultrafiltration Treatment: No  Medications Administered During Dialysis: No  Blood Products Administered During Dialysis: No  Labs Sent During Dialysis: No  Heparin Drip Rate Changes: N/A  Dialysis Catheter Dressing: n/a  Last Dressing Change: n/a    Electronic Signatures:  Aury page   Last Updated: 12:22 PM by AURY PAGE

## 2024-12-23 NOTE — PROGRESS NOTES
12/23/24 1132   Discharge Planning   Expected Discharge Disposition Home     Patient to return home with no skilled services, patient is active at [facility] and is not homebound for [facility] at this time     **Patient has a  safe discharge plan**

## 2024-12-23 NOTE — PROGRESS NOTES
Physical Therapy                 Therapy Communication Note    Patient Name: William A Franke  MRN: [id number]  Department: [facility] DIALYSIS  [facility]  Today's Date: 12/23/2024     Discipline: Physical Therapy    PT Missed Visit: Yes     Missed Visit Reason: Missed Visit Reason: Other (Comment) (Pt off the floor for dialysis.)

## 2024-12-24 ENCOUNTER — APPOINTMENT (OUTPATIENT)
Dept: CARDIOLOGY | Facility: HOSPITAL | Age: 74
DRG: 871 | End: 2024-12-24
Payer: MEDICARE

## 2024-12-24 ENCOUNTER — APPOINTMENT (OUTPATIENT)
Dept: RADIOLOGY | Facility: HOSPITAL | Age: 74
DRG: 871 | End: 2024-12-24
Payer: MEDICARE

## 2024-12-24 LAB
ANION GAP SERPL CALCULATED.3IONS-SCNC: 13 MMOL/L (ref 10–20)
AORTIC VALVE PEAK VELOCITY: 1.93 M/S
ATRIAL RATE: 75 BPM
AV PEAK GRADIENT: 15 MMHG
AVA (PEAK VEL): 1.91 CM2
BACTERIA BLD AEROBE CULT: ABNORMAL
BACTERIA BLD AEROBE CULT: ABNORMAL
BACTERIA BLD CULT: ABNORMAL
BACTERIA BLD CULT: ABNORMAL
BUN SERPL-MCNC: 28 MG/DL (ref 6–23)
CALCIUM SERPL-MCNC: 8.8 MG/DL (ref 8.6–10.3)
CHLORIDE SERPL-SCNC: 100 MMOL/L (ref 98–107)
CO2 SERPL-SCNC: 28 MMOL/L (ref 21–32)
CREAT SERPL-MCNC: 6.01 MG/DL (ref 0.5–1.3)
EGFRCR SERPLBLD CKD-EPI 2021: 9 ML/MIN/1.73M*2
EJECTION FRACTION APICAL 4 CHAMBER: 57.9
EJECTION FRACTION: 63 %
ERYTHROCYTE [DISTWIDTH] IN BLOOD BY AUTOMATED COUNT: 17 % (ref 11.5–14.5)
GLUCOSE BLD MANUAL STRIP-MCNC: 180 MG/DL (ref 74–99)
GLUCOSE BLD MANUAL STRIP-MCNC: 254 MG/DL (ref 74–99)
GLUCOSE BLD MANUAL STRIP-MCNC: 95 MG/DL (ref 74–99)
GLUCOSE SERPL-MCNC: 106 MG/DL (ref 74–99)
GRAM STN SPEC: ABNORMAL
HCT VFR BLD AUTO: 29 % (ref 41–52)
HGB BLD-MCNC: 9.7 G/DL (ref 13.5–17.5)
LEFT ATRIUM VOLUME AREA LENGTH INDEX BSA: 52.1 ML/M2
LEFT VENTRICLE INTERNAL DIMENSION DIASTOLE: 5 CM (ref 3.5–6)
LEFT VENTRICULAR OUTFLOW TRACT DIAMETER: 1.95 CM
LV EJECTION FRACTION BIPLANE: 60 %
MCH RBC QN AUTO: 31.1 PG (ref 26–34)
MCHC RBC AUTO-ENTMCNC: 33.4 G/DL (ref 32–36)
MCV RBC AUTO: 93 FL (ref 80–100)
MITRAL VALVE E/A RATIO: 0.74
MRSA DNA SPEC QL NAA+PROBE: NOT DETECTED
NRBC BLD-RTO: 0 /100 WBCS (ref 0–0)
P AXIS: 42 DEGREES
P OFFSET: 183 MS
P ONSET: 135 MS
PLATELET # BLD AUTO: 155 X10*3/UL (ref 150–450)
POTASSIUM SERPL-SCNC: 3.7 MMOL/L (ref 3.5–5.3)
PR INTERVAL: 168 MS
Q ONSET: 219 MS
QRS COUNT: 12 BEATS
QRS DURATION: 92 MS
QT INTERVAL: 388 MS
QTC CALCULATION(BAZETT): 433 MS
QTC FREDERICIA: 417 MS
R AXIS: -7 DEGREES
RBC # BLD AUTO: 3.12 X10*6/UL (ref 4.5–5.9)
RIGHT VENTRICLE PEAK SYSTOLIC PRESSURE: 35.1 MMHG
SODIUM SERPL-SCNC: 137 MMOL/L (ref 136–145)
T AXIS: 81 DEGREES
T OFFSET: 413 MS
VENTRICULAR RATE: 75 BPM
WBC # BLD AUTO: 7.9 X10*3/UL (ref 4.4–11.3)

## 2024-12-24 PROCEDURE — 93308 TTE F-UP OR LMTD: CPT | Performed by: INTERNAL MEDICINE

## 2024-12-24 PROCEDURE — 87641 MR-STAPH DNA AMP PROBE: CPT | Performed by: PHARMACIST

## 2024-12-24 PROCEDURE — 85027 COMPLETE CBC AUTOMATED: CPT | Performed by: NURSE PRACTITIONER

## 2024-12-24 PROCEDURE — 36415 COLL VENOUS BLD VENIPUNCTURE: CPT | Performed by: NURSE PRACTITIONER

## 2024-12-24 PROCEDURE — 82947 ASSAY GLUCOSE BLOOD QUANT: CPT

## 2024-12-24 PROCEDURE — 2500000001 HC RX 250 WO HCPCS SELF ADMINISTERED DRUGS (ALT 637 FOR MEDICARE OP): Performed by: NURSE PRACTITIONER

## 2024-12-24 PROCEDURE — 2500000004 HC RX 250 GENERAL PHARMACY W/ HCPCS (ALT 636 FOR OP/ED): Performed by: NURSE PRACTITIONER

## 2024-12-24 PROCEDURE — 71045 X-RAY EXAM CHEST 1 VIEW: CPT

## 2024-12-24 PROCEDURE — 1200000002 HC GENERAL ROOM WITH TELEMETRY DAILY

## 2024-12-24 PROCEDURE — 2500000002 HC RX 250 W HCPCS SELF ADMINISTERED DRUGS (ALT 637 FOR MEDICARE OP, ALT 636 FOR OP/ED): Performed by: NURSE PRACTITIONER

## 2024-12-24 PROCEDURE — 97535 SELF CARE MNGMENT TRAINING: CPT | Mod: GO,CO

## 2024-12-24 PROCEDURE — 93005 ELECTROCARDIOGRAM TRACING: CPT

## 2024-12-24 PROCEDURE — 93308 TTE F-UP OR LMTD: CPT

## 2024-12-24 PROCEDURE — 71045 X-RAY EXAM CHEST 1 VIEW: CPT | Performed by: RADIOLOGY

## 2024-12-24 PROCEDURE — 80048 BASIC METABOLIC PNL TOTAL CA: CPT | Performed by: NURSE PRACTITIONER

## 2024-12-24 PROCEDURE — 97530 THERAPEUTIC ACTIVITIES: CPT | Mod: GO,CO

## 2024-12-24 RX ADMIN — ALLOPURINOL 100 MG: 100 TABLET ORAL at 08:50

## 2024-12-24 RX ADMIN — CARVEDILOL 6.25 MG: 6.25 TABLET, FILM COATED ORAL at 08:51

## 2024-12-24 RX ADMIN — SITAGLIPTIN 25 MG: 50 TABLET, FILM COATED ORAL at 08:50

## 2024-12-24 RX ADMIN — EZETIMIBE 10 MG: 10 TABLET ORAL at 08:50

## 2024-12-24 RX ADMIN — ASPIRIN 81 MG: 81 TABLET, COATED ORAL at 08:50

## 2024-12-24 RX ADMIN — AMLODIPINE BESYLATE 5 MG: 5 TABLET ORAL at 08:50

## 2024-12-24 RX ADMIN — CARVEDILOL 6.25 MG: 6.25 TABLET, FILM COATED ORAL at 16:19

## 2024-12-24 RX ADMIN — ATORVASTATIN CALCIUM 80 MG: 80 TABLET, FILM COATED ORAL at 20:44

## 2024-12-24 RX ADMIN — HEPARIN SODIUM 5000 UNITS: 5000 INJECTION, SOLUTION INTRAVENOUS; SUBCUTANEOUS at 16:18

## 2024-12-24 RX ADMIN — PANTOPRAZOLE SODIUM 40 MG: 40 TABLET, DELAYED RELEASE ORAL at 08:50

## 2024-12-24 RX ADMIN — SEVELAMER CARBONATE 1600 MG: 800 TABLET, FILM COATED ORAL at 16:18

## 2024-12-24 RX ADMIN — ISOSORBIDE MONONITRATE 60 MG: 60 TABLET, EXTENDED RELEASE ORAL at 08:50

## 2024-12-24 RX ADMIN — DEXTROSE MONOHYDRATE 500 MG: 50 INJECTION, SOLUTION INTRAVENOUS at 13:52

## 2024-12-24 RX ADMIN — CEFTRIAXONE SODIUM 1 G: 1 INJECTION, SOLUTION INTRAVENOUS at 13:12

## 2024-12-24 RX ADMIN — SEVELAMER CARBONATE 1600 MG: 800 TABLET, FILM COATED ORAL at 08:50

## 2024-12-24 RX ADMIN — INSULIN LISPRO 2 UNITS: 100 INJECTION, SOLUTION INTRAVENOUS; SUBCUTANEOUS at 11:32

## 2024-12-24 RX ADMIN — HEPARIN SODIUM 5000 UNITS: 5000 INJECTION, SOLUTION INTRAVENOUS; SUBCUTANEOUS at 02:13

## 2024-12-24 RX ADMIN — INSULIN LISPRO 2 UNITS: 100 INJECTION, SOLUTION INTRAVENOUS; SUBCUTANEOUS at 16:19

## 2024-12-24 RX ADMIN — SEVELAMER CARBONATE 1600 MG: 800 TABLET, FILM COATED ORAL at 11:32

## 2024-12-24 ASSESSMENT — COGNITIVE AND FUNCTIONAL STATUS - GENERAL
CLIMB 3 TO 5 STEPS WITH RAILING: A LITTLE
TOILETING: A LITTLE
MOBILITY SCORE: 23
MOBILITY SCORE: 23
DRESSING REGULAR LOWER BODY CLOTHING: A LITTLE
DAILY ACTIVITIY SCORE: 24
HELP NEEDED FOR BATHING: A LITTLE
CLIMB 3 TO 5 STEPS WITH RAILING: A LITTLE
DAILY ACTIVITIY SCORE: 21
DAILY ACTIVITIY SCORE: 24

## 2024-12-24 ASSESSMENT — PAIN - FUNCTIONAL ASSESSMENT
PAIN_FUNCTIONAL_ASSESSMENT: 0-10
PAIN_FUNCTIONAL_ASSESSMENT: 0-10

## 2024-12-24 ASSESSMENT — ACTIVITIES OF DAILY LIVING (ADL)
HOME_MANAGEMENT_TIME_ENTRY: 12
BATHING_LEVEL_OF_ASSISTANCE: CLOSE SUPERVISION
BATHING_WHERE_ASSESSED: EDGE OF BED

## 2024-12-24 ASSESSMENT — PAIN SCALES - GENERAL
PAINLEVEL_OUTOF10: 0 - NO PAIN

## 2024-12-24 ASSESSMENT — PAIN SCALES - WONG BAKER: WONGBAKER_NUMERICALRESPONSE: NO HURT

## 2024-12-24 NOTE — PROGRESS NOTES
Physical Therapy                 Therapy Communication Note    Patient Name: William A Franke  MRN: [id number]  Department: [address]  Room: [address] [place], [place] [address]  Today's Date: 12/24/2024     Discipline: Physical Therapy    PT Missed Visit: Yes     Missed Visit Reason: Missed Visit Reason: Other (Comment) (Pt with several family members present. Pt just got put on IV and would like to delay therapy at this time. He requested return at a later time. He ambulated in hallway earlier this date without assist.  Encouraged frequent and safe mobility as able.)    Missed Time: Attempt

## 2024-12-24 NOTE — PROGRESS NOTES
Patient seen for end-stage kidney disease and dialysis therapy was dialyzed yesterday he is being treated with antibiotic therapy for bacteremia he will have an echocardiogram to rule out endocarditis also vascular evaluation of his access next dialysis will be Thursday

## 2024-12-24 NOTE — PROGRESS NOTES
William A Franke is a 74 y.o. male on day 2 of admission presenting with Acute encephalopathy.      Subjective   More awake today, treating infection       Objective     Last Recorded Vitals  BP (!) 147/48 (BP Location: Right arm, Patient Position: Sitting)   Pulse 65   Temp 37.4 °C (99.3 °F) (Oral)   Resp 18   Wt 81.6 kg (180 lb)   SpO2 97%   Intake/Output last 3 Shifts:    Intake/Output Summary (Last 24 hours) at 12/23/2024 2003  Last data filed at 12/23/2024 1159  Gross per 24 hour   Intake 650 ml   Output 2700 ml   Net -2050 ml       Admission Weight  Weight: 81.6 kg (180 lb) (12/21/24 1049)    Daily Weight  12/21/24 : 81.6 kg (180 lb)    Image Results  Electrocardiogram, 12-lead ACS symptoms  Normal sinus rhythm  Left ventricular hypertrophy with repolarization abnormality ( R in aVL , Sokolow-Barriga , [place] product , Romhilt-Thornton )  Abnormal ECG  No previous ECGs available      Physical Exam    Relevant Results               Assessment/Plan                  Assessment & Plan  Acute encephalopathy    Bacteremia, on vanco, dr Rogelio Alicea MD

## 2024-12-24 NOTE — CARE PLAN
The patient's goals for the shift include      The clinical goals for the shift include maintain safety    Problem: Diabetes  Goal: Decrease in ketones present in urine by end of shift  Outcome: Progressing  Goal: Maintain electrolyte levels within acceptable range throughout shift  Outcome: Progressing  Goal: No changes in neurological exam by end of shift  Outcome: Progressing  Goal: Learn about and adhere to nutrition recommendations by end of shift  Outcome: Progressing  Goal: Receive DSME education by end of shift  Outcome: Progressing     Problem: Pain - Adult  Goal: Verbalizes/displays adequate comfort level or baseline comfort level  Outcome: Progressing     Problem: Safety - Adult  Goal: Free from fall injury  Outcome: Progressing     Problem: Discharge Planning  Goal: Discharge to home or other facility with appropriate resources  Outcome: Progressing     Problem: Chronic Conditions and Co-morbidities  Goal: Patient's chronic conditions and co-morbidity symptoms are monitored and maintained or improved  Outcome: Progressing

## 2024-12-24 NOTE — PROGRESS NOTES
Occupational Therapy    OT Treatment    Patient Name: William A Franke  MRN: [id number]  Department: [address]  Room: [address] [place] [address]  Today's Date: 12/24/2024  Time Calculation  Start Time: 1341  Stop Time: 1405  Time Calculation (min): 24 min        Assessment:  OT Assessment: pt tolerated treatment well and is progressing towards Ot POC. Pt would benefit from continued OT services to increase stregth, balance, and activity tolerance for ADLs  Prognosis: Good  Barriers to Discharge Home: No anticipated barriers  Evaluation/Treatment Tolerance: Patient tolerated treatment well  Medical Staff Made Aware: Yes  End of Session Communication: Bedside nurse  End of Session Patient Position: Up in chair, Alarm off, not on at start of session (call light in reach; all needs met; RN made aware of pt position and OT session; family in room)  OT Assessment Results: Decreased ADL status, Decreased cognition, Decreased endurance, Decreased functional mobility  Prognosis: Good  Barriers to Discharge: None  Evaluation/Treatment Tolerance: Patient tolerated treatment well  Medical Staff Made Aware: Yes  Strengths: Ability to acquire knowledge  Barriers to Participation: Comorbidities  Plan:  Treatment Interventions: ADL retraining, Functional transfer training, UE strengthening/ROM, Endurance training, Equipment evaluation/education, Compensatory technique education  OT Frequency: 3 times per week  OT Discharge Recommendations: Low intensity level of continued care  Equipment Recommended upon Discharge: Wheeled walker  OT Recommended Transfer Status: Assist of 1, Minimal assist  OT - OK to Discharge: Yes  Treatment Interventions: ADL retraining, Functional transfer training, UE strengthening/ROM, Endurance training, Equipment evaluation/education, Compensatory technique education    Subjective   Previous Visit Info:  OT Last Visit  OT Received On: 12/24/24  General:  General  Reason for Referral: impaired ADLs; 74 y.o. male presenting with  AMS; Sepsis with encephalopathy without septic shock after dialysis treatment.  Referred By: ERIKA Russ  Past Medical History Relevant to Rehab: DM, HTN, CAD, ESKD on HD  Family/Caregiver Present: Yes  Caregiver Feedback:  (spouse and multiple extended family members)  Prior to Session Communication: Bedside nurse  Patient Position Received: Bed, 2 rail up, Alarm off, not on at start of session  Preferred Learning Style: verbal, visual  General Comment: cleared for OT treatment by nursing. Pt agreeable to therapy  Precautions:  Hearing/Visual Limitations: glasses  Medical Precautions: Fall precautions    Pain:  Pain Assessment  Pain Assessment: 0-10  0-10 (Numeric) Pain Score: 0 - No pain    Objective    Cognition:  Cognition  Orientation Level: Oriented X4    Activities of Daily Living:      Grooming  Grooming Level of Assistance: Close supervision  Grooming Where Assessed: Edge of bed  Grooming Comments: Pt engaged in washing face and combing hair while seated on EOB with supervision    UE Bathing  UE Bathing Level of Assistance: Close supervision  UE Bathing Where Assessed: Edge of bed  UE Bathing Comments: UB bathing tasks completed on EOB with supervision    LE Bathing  LE Bathing Level of Assistance: Close supervision  LE Bathing Where Assessed: Edge of bed  LE Bathing Comments: LB bathing tasks completed on EOB with supervision. Pt utilized figure four technique to bathe lower legs    UE Dressing  UE Dressing Level of Assistance: Distant supervision  UE Dressing Where Assessed: Edge of bed  UE Dressing Comments: donning/doffing gown and able to thread BUEs through gown sleeves    LE Dressing  LE Dressing: Yes  Sock Level of Assistance: Close supervision  LE Dressing Where Assessed: Edge of bed  LE Dressing Comments: donned/doffed socks with close supervision while utilizing figure four      Bed Mobility/Transfers:   Bed Mobility  Bed Mobility: Yes  Bed Mobility 1  Bed Mobility 1: Supine to  sitting  Level of Assistance 1: Distant supervision  Bed Mobility Comments 1: HOB elevated and utilized hand rails    Transfers  Transfer: Yes  Transfer 1  Transfer From 1: Bed to  Transfer to 1: Stand  Technique 1: Sit to stand  Transfer Level of Assistance 1: Close supervision  Trials/Comments 1: good hand placement and safety throughout  Transfers 2  Transfer From 2: Stand to  Transfer to 2: Chair with arms  Technique 2: Stand to sit  Transfer Level of Assistance 2: Close supervision  Trials/Comments 2: good hand placement and safety throughout    Functional Mobility:  Functional Mobility  Functional Mobility Performed: Yes  Functional Mobility 1  Surface 1: Level tile  Device 1: No device  Assistance 1: Close supervision  Comments 1: max household distances throughout hospital room and in hallway without a device with close supervision for safety. completed with increased time and effort    Sitting Balance:  Dynamic Sitting Balance  Dynamic Sitting-Balance Support: Feet supported  Dynamic Sitting-Level of Assistance: Close supervision  Dynamic Sitting-Balance: Forward lean, Reaching for objects, Reaching across midline  Dynamic Sitting-Comments: for bathing and dressing tasks; good balance and safety throughout      Outcome Measures:  [facility] Daily Activity  Putting on and taking off regular lower body clothing: A little  Bathing (including washing, rinsing, drying): A little  Putting on and taking off regular upper body clothing: None  Toileting, which includes using toilet, bedpan or urinal: A little  Taking care of personal grooming such as brushing teeth: None  Eating Meals: None  Daily Activity - Total Score: 21        Education Documentation  Body Mechanics, taught by CHRISTINE Gallego at 12/24/2024  3:11 PM.  Learner: Patient  Readiness: Acceptance  Method: Demonstration  Response: Verbalizes Understanding  Comment: Pt educated on OT POC and safety technqiues/body mechanics during ADL retraining    ADL  Training, taught by CHRISTINE Gallego at 12/24/2024  3:11 PM.  Learner: Patient  Readiness: Acceptance  Method: Demonstration  Response: Verbalizes Understanding  Comment: Pt educated on OT POC and safety technqiues/body mechanics during ADL retraining    Goals:  Encounter Problems       Encounter Problems (Active)       ADLs       Patient with complete lower body dressing with modified independent level of assistance donning and doffing all LE clothes  with PRN adaptive equipment while edge of bed  (Progressing)       Start:  12/22/24    Expected End:  01/22/25            Patient will complete daily grooming tasks with modified independent level of assistance and PRN adaptive equipment while standing. (Progressing)       Start:  12/22/24    Expected End:  01/22/25            Patient will complete toileting including hygiene clothing management/hygiene with modified independent level of assistance and grab bars. (Progressing)       Start:  12/22/24    Expected End:  01/22/25               MOBILITY       Patient will perform Functional mobility max Household distances/Community Distances with modified independent level of assistance and least restrictive device in order to improve safety and functional mobility. (Progressing)       Start:  12/22/24    Expected End:  01/22/25               TRANSFERS       Patient will complete functional transfer to toilet with least restrictive device with modified independent level of assistance. (Progressing)       Start:  12/22/24    Expected End:  01/22/25

## 2024-12-24 NOTE — CARE PLAN
The patient's goals for the shift include      The clinical goals for the shift include no falls overnight and vitals will remain stable.    Problem: Diabetes  Goal: Decrease in ketones present in urine by end of shift  Outcome: Progressing  Goal: Maintain electrolyte levels within acceptable range throughout shift  Outcome: Progressing  Goal: No changes in neurological exam by end of shift  Outcome: Progressing  Goal: Learn about and adhere to nutrition recommendations by end of shift  Outcome: Progressing  Goal: Receive DSME education by end of shift  Outcome: Progressing     Problem: Pain - Adult  Goal: Verbalizes/displays adequate comfort level or baseline comfort level  Outcome: Progressing     Problem: Safety - Adult  Goal: Free from fall injury  Outcome: Progressing     Problem: Discharge Planning  Goal: Discharge to home or other facility with appropriate resources  Outcome: Progressing     Problem: Chronic Conditions and Co-morbidities  Goal: Patient's chronic conditions and co-morbidity symptoms are monitored and maintained or improved  Outcome: Progressing

## 2024-12-24 NOTE — PROGRESS NOTES
"William A Franke is a 74 y.o. male on day 3 of admission presenting with Acute encephalopathy.    Subjective   Family at bedside, better, considering home with home health       Objective     Physical Exam    Last Recorded Vitals  Blood pressure (!) 141/43, pulse 61, temperature 36.5 °C (97.7 °F), temperature source Oral, resp. rate 18, height 1.702 m (5' 7\"), weight 81.6 kg (180 lb), SpO2 98%.  Intake/Output last 3 Shifts:  I/O last 3 completed shifts:  In: 650 (8 mL/kg) [P.O.:50; I.V.:200 (2.4 mL/kg); Other:400]  Out: 2700 (33.1 mL/kg) [Urine:200 (0.1 mL/kg/hr); Other:2500]  Weight: 81.6 kg     Relevant Results                              Assessment/Plan   Assessment & Plan  Acute encephalopathy       Mssa bacteremia, r/o pneumonia    I spent  minutes in the professional and overall care of this patient.      Desirae Alicea MD      "

## 2024-12-24 NOTE — PROGRESS NOTES
Spiritual Care Visit  Spiritual Care Request    Reason for Visit:  Routine Visit: Introduction     Request Received From:       Focus of Care:  Visited With: Patient and family together         Refer to :          Spiritual Care Assessment    Spiritual Assessment:                      Care Provided:  Intended Effects: Build relationship of care and support, Demonstrate caring and concern    Sense of Community and or Congregation Affiliation:  Yazidism   Values/Beliefs  Spiritual Requests During Hospitalization: I had a nice visit with Narciso and his wife Lucie and later on with his brother too. Narciso was anointed and received Communion today.     Addressed Needs/Concerns and/or Miguel A Through:     Sacramental Encounters  Communion: Patient wants communion  Communion Given Indicator: Yes  Sacrament of Sick-Anointing: Anointed    Outcome:        Advance Directives:         Spiritual Care Annotation    Annotation:  Narciso asked to be anointed and receive Communion with his wife and brother.  Marcos Day

## 2024-12-24 NOTE — NURSING NOTE
Assumed care of patient. Transport at bedside to take patient to echo. No need stated by patient.

## 2024-12-24 NOTE — PROGRESS NOTES
William A Franke is a 74 y.o. male on day 3 of admission presenting with Acute encephalopathy.    Subjective   Interval History:   Afebrile, no chills  No cough, chest pain or shortness of breath.  No nausea vomiting or diarrhea        Review of Systems   All other systems reviewed and are negative.      Objective   Range of Vitals (last 24 hours)  Heart Rate:  [63-74]   Temp:  [36.5 °C (97.7 °F)-37.4 °C (99.3 °F)]   Resp:  [17-18]   BP: (136-190)/(43-55)   SpO2:  [96 %-99 %]   Daily Weight  12/21/24 : 81.6 kg (180 lb)    Body mass index is 28.19 kg/m².    Physical Exam  Constitutional:       Appearance: Normal appearance.   HENT:      Head: Normocephalic and atraumatic.      Nose: Nose normal.   Eyes:      General: No scleral icterus.     Extraocular Movements: Extraocular movements intact.      Conjunctiva/sclera: Conjunctivae normal.   Cardiovascular:      Rate and Rhythm: Normal rate and regular rhythm.      Heart sounds: Normal heart sounds.   Pulmonary:      Effort: Pulmonary effort is normal.      Breath sounds: Normal breath sounds.   Abdominal:      General: Bowel sounds are normal.      Palpations: Abdomen is soft.   Musculoskeletal:      Cervical back: Normal range of motion and neck supple.      Right lower leg: No edema.      Left lower leg: No edema.   Skin:     General: Skin is warm and dry.   Neurological:      Mental Status: He is alert.   Psychiatric:         Behavior: Behavior is cooperative.     Antibiotics  azithromycin - 500 mg/250 mL  cefTRIAXone - 1 gram/50 mL  vancomycin - 750 mg/150 mL    Relevant Results  Labs  Results from last 72 hours   Lab Units 12/24/24  0520 12/23/24  0529 12/22/24  0613   WBC AUTO x10*3/uL 7.9 8.3 9.1   HEMOGLOBIN g/dL 9.7* 9.2* 10.3*   HEMATOCRIT % 29.0* 26.3* 30.9*   PLATELETS AUTO x10*3/uL 155 161 139*     Results from last 72 hours   Lab Units 12/24/24  0521 12/23/24  0529 12/22/24  0613   SODIUM mmol/L 137 136 135*   POTASSIUM mmol/L 3.7 3.7 4.0   CHLORIDE  "mmol/L 100 97* 96*   CO2 mmol/L 28 28 28   BUN mg/dL 28* 45* 25*   CREATININE mg/dL 6.01* 8.71* 6.23*   GLUCOSE mg/dL 106* 92 103*   CALCIUM mg/dL 8.8 8.7 8.7   ANION GAP mmol/L 13 15 15   EGFR mL/min/1.73m*2 9* 6* 9*         Estimated Creatinine Clearance: 10.7 mL/min (A) (by C-G formula based on SCr of 6.01 mg/dL (H)).  No results found for: \"CRP\"  Microbiology  Susceptibility data from last 14 days.  Collected Specimen Info Organism Clindamycin Erythromycin Oxacillin Tetracycline Trimethoprim/Sulfamethoxazole Vancomycin   12/21/24 Blood culture from Peripheral Venipuncture Methicillin Susceptible Staphylococcus aureus (MSSA)  S  S  S  S  S  S   12/21/24 Blood culture from Peripheral Venipuncture Staphylococcus aureus           Imaging  Electrocardiogram, 12-lead ACS symptoms    Result Date: 12/24/2024  Normal sinus rhythm Left ventricular hypertrophy with repolarization abnormality ( R in aVL , Sokolow-Barriga , David product , Romhilt-Thornton ) Abnormal ECG No previous ECGs available Confirmed by Jonathan Masters ([id number]) on 12/24/2024 2:27:57 PM    XR chest 1 view    Result Date: 12/24/2024  Interpreted By:  Frank Dang, STUDY: XR CHEST 1 VIEW; 12/24/2024 9:51 am   INDICATION: Signs/Symptoms:Pneumonia   COMPARISON: 12 21 2024.   ACCESSION NUMBER(S): [id number]   ORDERING CLINICIAN: BAHMAN BLANK   FINDINGS: The study is limited due to  rotation/lordotic projection. The cardiomediastinal silhouette is within normal limits for the technique. There is no pneumothorax or confluence infiltrates. There is blunting of the right costophrenic angle, due to improving small right pleural effusion. The osseous structures are unremarkable.       Improving small right pleural effusion.   Signed by: Frank Dang 12/24/2024 12:53 PM Dictation workstation:   [id number]    Transthoracic Echo (TTE) Limited    Result Date: 12/24/2024           [place] [address]            " Phone [phone] TRANSTHORACIC ECHOCARDIOGRAM REPORT Patient Name:       WILLIAM A FRANKE    Ian Physician:    [id number] aJred Rooney DO Study Date:         12/24/2024          Ordering Provider:    [id number] BAHMAN BLANK MRN/PID:            [id number]            Fellow: Accession#:         [id number]        Nurse: Date of Birth/Age:  1950 / 74     Sonographer:          Nakita PUENTES Gender Assigned at  M                   Additional Staff: Birth: Height:             170.20 cm           Admit Date:           12/22/2024 Weight:             81.65 kg            Admission Status:     Inpatient -                                                               Routine BSA / BMI:          1.93 m2 / 28.19     Department Location:                     kg/m2 Blood Pressure: 169 /55 mmHg Study Type:    TRANSTHORACIC ECHO (TTE) LIMITED Diagnosis/ICD: Bacteremia-R78.81 Indication:    Endocarditis CPT Codes:     Echo Limited-51602 Patient History: Pertinent History: CDK stageIV Stable Angina HTN CP HLD CAD AS CHF Chronic Liver                    Disease NSTEMI Atrteriovenous Fistula Stenosis. Study Detail: The following Echo studies were performed: 2D, M-Mode, Doppler and               color flow. Unable to obtain suprasternal notch and subcostal               view.  PHYSICIAN INTERPRETATION: Left Ventricle: The left ventricular systolic function is normal, with a visually estimated ejection fraction of 60-65%. There are no regional wall motion abnormalities. The left ventricular cavity size is normal. There is normal septal and mildly increased posterior left ventricular wall thickness. There is left ventricular concentric remodeling. Left ventricular diastolic filling was not assessed. Left Atrium: The left atrium is upper limits of  normal in size. Right Ventricle: The right ventricle is normal in size. There is normal right ventricular global systolic function. Right Atrium: The right atrium is normal in size. Aortic Valve: The aortic valve is trileaflet. There is no evidence of aortic valve regurgitation. The peak instantaneous gradient of the aortic valve is 15 mmHg. Mitral Valve: The mitral valve is normal in structure. There is trace to mild mitral valve regurgitation. Tricuspid Valve: The tricuspid valve is structurally normal. No evidence of tricuspid regurgitation. Pulmonic Valve: The pulmonic valve is structurally normal. There is trace pulmonic valve regurgitation. Pericardium: No pericardial effusion noted. Aorta: The aortic root is normal.  CONCLUSIONS:  1. The left ventricular systolic function is normal, with a visually estimated ejection fraction of 60-65%.  2. There is normal right ventricular global systolic function.  3. No valvular vegetations visualized. QUANTITATIVE DATA SUMMARY:  2D MEASUREMENTS:           Normal Ranges: IVSd:            0.80 cm   (0.6-1.1cm) LVPWd:           1.29 cm   (0.6-1.1cm) LVIDd:           5.00 cm   (3.9-5.9cm) LVIDs:           3.39 cm LV Mass Index:   99.7 g/m2 LV % FS          32.2 %  LA VOLUME:                    Normal Ranges: LA Vol A4C:        96.5 ml    (22+/-6mL/m2) LA Vol A2C:        96.6 ml LA Vol BP:         100.8 ml LA Vol Index A4C:  49.9ml/m2 LA Vol Index A2C:  50.0 ml/m2 LA Vol Index BP:   52.1 ml/m2 LA Area A4C:       26.1 cm2 LA Area A2C:       25.0 cm2 LA Major Axis A4C: 6.0 cm LA Major Axis A2C: 5.5 cm LA Volume Index:   51.9 ml/m2 LA Vol A4C:        88.7 ml LA Vol A2C:        93.9 ml LA Vol Index BSA:  47.2 ml/m2  RA VOLUME BY A/L METHOD:            Normal Ranges: RA Vol A4C:              33.9 ml    (8.3-19.5ml) RA Vol Index A4C:        17.5 ml/m2 RA Area A4C:             15.2 cm2 RA Major Axis A4C:       5.8 cm  M-MODE MEASUREMENTS:         Normal Ranges: LAs:                  5.25 cm (2.7-4.0cm)  LV SYSTOLIC FUNCTION BY 2D PLANIMETRY (MOD):                      Normal Ranges: EF-A4C View:    58 % (>=55%) EF-A2C View:    63 % EF-Biplane:     60 % EF-Visual:      63 % LV EF Reported: 63 %  LV DIASTOLIC FUNCTION:          Normal Ranges: MV Peak E:             0.84 m/s (0.7-1.2 m/s) MV Peak A:             1.14 m/s (0.42-0.7 m/s) E/A Ratio:             0.74     (1.0-2.2)  MITRAL VALVE:          Normal Ranges: MV DT:        173 msec (150-240msec)  AORTIC VALVE:            Normal Ranges: AoV Vmax:      1.93 m/s  (<=1.7m/s) AoV Peak P.9 mmHg (<20mmHg) LVOT Max Joss:  1.24 m/s  (<=1.1m/s) LVOT VTI:      33.46 cm LVOT Diameter: 1.95 cm   (1.8-2.4cm) AoV Area,Vmax: 1.91 cm2  (2.5-4.5cm2)  RIGHT VENTRICLE: RV Basal 2.70 cm RV Mid   2.40 cm RV Major 6.3 cm  TRICUSPID VALVE/RVSP:          Normal Ranges: Peak TR Velocity:     2.83 m/s RV Syst Pressure:     35 mmHg  (< 30mmHg)  PULMONIC VALVE:           Normal Ranges: PV Accel Time:  86 msec   (>120ms) PV Max Joss:     1.6 m/s   (0.6-0.9m/s) PV Max PG:      10.3 mmHg  [id number] [facility] Electronically signed on 2024 at 10:02:08 AM  ** Final **     XR chest 1 view    Result Date: 2024  STUDY: Chest Radiograph;  [2024; 11:41 am] INDICATION: Altered. Cough. COMPARISON: XR chest 2024 ACCESSION NUMBER(S): [id number] ORDERING CLINICIAN: FELIPE SINGH TECHNIQUE:  Frontal chest was obtained at 11:41 hours. FINDINGS: CARDIOMEDIASTINAL SILHOUETTE: The cardiomediastinal silhouette is enlarged but stable compared to previous imaging..  LUNGS: The lungs demonstrate mild pulmonary vascular prominence which may represent mild vascular congestion.  There is blunting of the right costophrenic angle which could represent a possible small effusion..  ABDOMEN: No remarkable upper abdominal findings.  BONES: No acute osseous changes.  Multiple vascular stents are noted within the left upper extremity.    1.  Pulmonary vascular prominence  which may represent mild pulmonary vascular congestion. 2.  Blunting of the right costophrenic angle laterally which could represent a small pleural effusion.. Signed by Tk Geller MD    CT head wo IV contrast    Result Date: 12/21/2024  Interpreted By:  Jonas Rinaldi, STUDY: CT HEAD WO IV CONTRAST;  12/21/2024 11:45 am   INDICATION: Signs/Symptoms:altered.     COMPARISON: 11/16/2024   ACCESSION NUMBER(S): [id number]   ORDERING CLINICIAN: FELIPE SINGH   TECHNIQUE: Unenhanced images were obtained through the brain.   FINDINGS: There is atrophy resulting in prominence of the ventricles and sulci. There are areas of decreased attenuation within the white matter which are nonspecific but are commonly associated with small vessel ischemic disease. There is no mass effect or midline shift. No acute intracranial hemorrhage is identified. No extra-axial fluid collections are seen. No intraparenchymal mass lesions are identified.  Bone windows demonstrate no evidence of an acute calvarial fracture. There is mucosal in the paranasal sinuses, most conspicuous involving the left maxillary sinus.       No evidence of an acute intracranial process.   MACRO: None.   Signed by: Jonas Rinaldi 12/21/2024 12:21 PM Dictation workstation:   [id number]     Assessment/Plan   Encephalopathy, septic, resolved  MSSA bacteremia, source unclear  Abnormal chest x-ray-congestion, rule out infection-elevated procalcitonin     IV vancomycin-coverage for MSSA bacteremia  IV ceftriaxone-treatment for possible pneumonia  Discontinue azithromycin  Follow-up repeat blood cultures-12/22/2024  Transthoracic echocardiogram  Monitor temperature and WBC  Further recommendations based on culture results  Evaluate for de-escalation in 1 to 2 days-long-term plan is cefazolin with dialysis to complete 14 days of therapy    Dave Pathak MD

## 2024-12-24 NOTE — PROGRESS NOTES
12/24/24 0834   Discharge Planning   Expected Discharge Disposition Home     Patient to return home with no skilled services, patient is active at the [facility] and is not homebound for [facility] at this time     **Patient has a  safe discharge plan**

## 2024-12-25 ENCOUNTER — APPOINTMENT (OUTPATIENT)
Dept: RADIOLOGY | Facility: HOSPITAL | Age: 74
DRG: 871 | End: 2024-12-25
Payer: MEDICARE

## 2024-12-25 LAB
GLUCOSE BLD MANUAL STRIP-MCNC: 121 MG/DL (ref 74–99)
GLUCOSE BLD MANUAL STRIP-MCNC: 121 MG/DL (ref 74–99)
GLUCOSE BLD MANUAL STRIP-MCNC: 125 MG/DL (ref 74–99)
GLUCOSE BLD MANUAL STRIP-MCNC: 131 MG/DL (ref 74–99)
GLUCOSE BLD MANUAL STRIP-MCNC: 157 MG/DL (ref 74–99)
GLUCOSE BLD MANUAL STRIP-MCNC: 233 MG/DL (ref 74–99)

## 2024-12-25 PROCEDURE — 71250 CT THORAX DX C-: CPT

## 2024-12-25 PROCEDURE — 2500000002 HC RX 250 W HCPCS SELF ADMINISTERED DRUGS (ALT 637 FOR MEDICARE OP, ALT 636 FOR OP/ED): Performed by: NURSE PRACTITIONER

## 2024-12-25 PROCEDURE — 2500000001 HC RX 250 WO HCPCS SELF ADMINISTERED DRUGS (ALT 637 FOR MEDICARE OP): Performed by: NURSE PRACTITIONER

## 2024-12-25 PROCEDURE — 6350000001 HC RX 635 EPOETIN >10,000 UNITS: Mod: JZ | Performed by: INTERNAL MEDICINE

## 2024-12-25 PROCEDURE — 1200000002 HC GENERAL ROOM WITH TELEMETRY DAILY

## 2024-12-25 PROCEDURE — 71250 CT THORAX DX C-: CPT | Performed by: RADIOLOGY

## 2024-12-25 PROCEDURE — 82947 ASSAY GLUCOSE BLOOD QUANT: CPT

## 2024-12-25 PROCEDURE — 87040 BLOOD CULTURE FOR BACTERIA: CPT | Mod: WESLAB | Performed by: INTERNAL MEDICINE

## 2024-12-25 PROCEDURE — 36415 COLL VENOUS BLD VENIPUNCTURE: CPT | Performed by: INTERNAL MEDICINE

## 2024-12-25 PROCEDURE — 99221 1ST HOSP IP/OBS SF/LOW 40: CPT | Performed by: STUDENT IN AN ORGANIZED HEALTH CARE EDUCATION/TRAINING PROGRAM

## 2024-12-25 PROCEDURE — 2500000004 HC RX 250 GENERAL PHARMACY W/ HCPCS (ALT 636 FOR OP/ED): Performed by: NURSE PRACTITIONER

## 2024-12-25 PROCEDURE — 2500000004 HC RX 250 GENERAL PHARMACY W/ HCPCS (ALT 636 FOR OP/ED): Mod: JZ | Performed by: INTERNAL MEDICINE

## 2024-12-25 RX ORDER — CEFAZOLIN SODIUM 2 G/100ML
2 INJECTION, SOLUTION INTRAVENOUS
Status: DISCONTINUED | OUTPATIENT
Start: 2024-12-25 | End: 2024-12-31 | Stop reason: HOSPADM

## 2024-12-25 RX ADMIN — AMLODIPINE BESYLATE 5 MG: 5 TABLET ORAL at 09:50

## 2024-12-25 RX ADMIN — ASPIRIN 81 MG: 81 TABLET, COATED ORAL at 09:50

## 2024-12-25 RX ADMIN — SEVELAMER CARBONATE 1600 MG: 800 TABLET, FILM COATED ORAL at 09:50

## 2024-12-25 RX ADMIN — SITAGLIPTIN 25 MG: 50 TABLET, FILM COATED ORAL at 09:50

## 2024-12-25 RX ADMIN — EPOETIN ALFA-EPBX 10000 UNITS: 10000 INJECTION, SOLUTION INTRAVENOUS; SUBCUTANEOUS at 09:51

## 2024-12-25 RX ADMIN — CEFAZOLIN SODIUM 2 G: 2 INJECTION, SOLUTION INTRAVENOUS at 15:14

## 2024-12-25 RX ADMIN — CARVEDILOL 6.25 MG: 6.25 TABLET, FILM COATED ORAL at 18:28

## 2024-12-25 RX ADMIN — SEVELAMER CARBONATE 1600 MG: 800 TABLET, FILM COATED ORAL at 13:31

## 2024-12-25 RX ADMIN — HEPARIN SODIUM 5000 UNITS: 5000 INJECTION, SOLUTION INTRAVENOUS; SUBCUTANEOUS at 06:27

## 2024-12-25 RX ADMIN — ISOSORBIDE MONONITRATE 60 MG: 60 TABLET, EXTENDED RELEASE ORAL at 09:50

## 2024-12-25 RX ADMIN — ALLOPURINOL 100 MG: 100 TABLET ORAL at 09:50

## 2024-12-25 RX ADMIN — CARVEDILOL 6.25 MG: 6.25 TABLET, FILM COATED ORAL at 09:50

## 2024-12-25 RX ADMIN — HEPARIN SODIUM 5000 UNITS: 5000 INJECTION, SOLUTION INTRAVENOUS; SUBCUTANEOUS at 18:50

## 2024-12-25 RX ADMIN — PANTOPRAZOLE SODIUM 40 MG: 40 TABLET, DELAYED RELEASE ORAL at 09:51

## 2024-12-25 RX ADMIN — EZETIMIBE 10 MG: 10 TABLET ORAL at 09:50

## 2024-12-25 RX ADMIN — SEVELAMER CARBONATE 1600 MG: 800 TABLET, FILM COATED ORAL at 18:28

## 2024-12-25 RX ADMIN — ATORVASTATIN CALCIUM 80 MG: 80 TABLET, FILM COATED ORAL at 20:56

## 2024-12-25 ASSESSMENT — COGNITIVE AND FUNCTIONAL STATUS - GENERAL
DAILY ACTIVITIY SCORE: 24
CLIMB 3 TO 5 STEPS WITH RAILING: A LITTLE
MOBILITY SCORE: 23
DAILY ACTIVITIY SCORE: 24
CLIMB 3 TO 5 STEPS WITH RAILING: A LITTLE
MOBILITY SCORE: 23

## 2024-12-25 ASSESSMENT — PAIN - FUNCTIONAL ASSESSMENT
PAIN_FUNCTIONAL_ASSESSMENT: 0-10
PAIN_FUNCTIONAL_ASSESSMENT: 0-10

## 2024-12-25 ASSESSMENT — PAIN SCALES - GENERAL
PAINLEVEL_OUTOF10: 0 - NO PAIN

## 2024-12-25 NOTE — PROGRESS NOTES
Vancomycin Dosing by Pharmacy- Cessation of Therapy    Consult to pharmacy for vancomycin dosing has been discontinued by the prescriber, pharmacy will sign off at this time.    Please call pharmacy if there are further questions or re-enter a consult if vancomycin is resumed.     Gina Mayorga, [facility]

## 2024-12-25 NOTE — PROGRESS NOTES
"William A Franke is a 74 y.o. male on day 4 of admission presenting with Acute encephalopathy.    Subjective   Doing well. No changes. Afebrile. WBC continues to be normal.        Objective     Physical Exam  Constitutional:       Appearance: Normal appearance.   HENT:      Head: Normocephalic.      Mouth/Throat:      Mouth: Mucous membranes are moist.   Eyes:      Extraocular Movements: Extraocular movements intact.   Cardiovascular:      Rate and Rhythm: Normal rate.   Pulmonary:      Effort: Pulmonary effort is normal.   Abdominal:      General: Abdomen is flat.      Palpations: Abdomen is soft.   Musculoskeletal:         General: Normal range of motion.      Cervical back: Normal range of motion.      Comments: LUE with dialysis graft in place. Strong palpable thrill overlying the graft without any overlying erythema, induration, fluctuance, or TTP.    Skin:     General: Skin is warm.   Neurological:      General: No focal deficit present.      Mental Status: He is alert and oriented to person, place, and time.   Psychiatric:         Mood and Affect: Mood normal.         Behavior: Behavior normal.         Thought Content: Thought content normal.         Judgment: Judgment normal.         Last Recorded Vitals  Blood pressure 172/55, pulse 60, temperature 36.5 °C (97.7 °F), temperature source Oral, resp. rate 18, height 1.702 m (5' 7\"), weight 81.6 kg (180 lb), SpO2 97%.  Intake/Output last 3 Shifts:  No intake/output data recorded.    Relevant Results                              Assessment/Plan   Assessment & Plan  Acute encephalopathy  - MSSA bacteremia    - No evidence of obvious graft infection on exam. Patient afebrile over the last 24 hrs. WBC tagged scan pending. Any further surgical plans pending the scan. No surgical intervention needed at this time.     Jefe Bates MD      "

## 2024-12-25 NOTE — CONSULTS
Pulmonary Consultation Note   Subjective    William A Franke is a 74 y.o. year old male patient known with ESRD through left arm AV fistula, HTN, HFpEF admitted on 12/21/2024 with following encephalopathy and sepsis. Found to have MSSA bacteremia. Pulmonary are consulted for possible pneumonia as etiology of MSSA bacteremia     History of Present Illness:  Patient and wife present. Both mention cough with phlegm the night before admission. Deny fever, chills, sore throat, runny nose. Denies hx of pneumonia before. Denies any recent wounds or skin problems except dry skin. NO recent dental work. Denies leg swelling, denies swallowing issues, remains to have some urine output. Wakes up at night to use restroom    Denies asthma, COPD, or smoking hx or inhaler use     Past Medical History  He has a past medical history of Chronic kidney disease, Diabetes mellitus (Multi), ESRD (end stage renal disease) on dialysis (Multi), Heart murmur, HLD (hyperlipidemia), Hypertension, Personal history of other endocrine, nutritional and metabolic disease, and Personal history of other specified conditions (08/28/2020).    Surgical History  He has a past surgical history that includes Shoulder surgery (04/15/2013); Knee arthroscopy w/ debridement (04/15/2013); and AV fistula placement (Left, 07/31/2024).     Social History  He reports that he has never smoked. He has never used smokeless tobacco. He reports that he does not currently use alcohol. He reports that he does not use drugs.  Cigarette smoking history:   Marijuana use:  Vaping:  Pets:   Asbestos:  Other specific exposure:      Family History  Family History   Problem Relation Name Age of Onset   • Alzheimer's disease Mother     • Heart attack Mother     • Diabetes Father     • Heart attack Father       Asthma in son      Allergies  Patient has no known allergies.    Review of Systems   As Above      Meds    Scheduled medications  allopurinol, 100 mg, oral,  "Daily  amLODIPine, 5 mg, oral, Daily  aspirin, 81 mg, oral, Daily  atorvastatin, 80 mg, oral, Nightly  carvedilol, 6.25 mg, oral, BID  cefTRIAXone, 1 g, intravenous, q24h  epoetin jeison or biosimilar, 10,000 Units, subcutaneous, Once per day on Monday Wednesday Friday  ezetimibe, 10 mg, oral, Daily  heparin (porcine), 5,000 Units, subcutaneous, q12h  [Held by provider] hydrALAZINE, 50 mg, oral, TID  insulin lispro, 0-10 Units, subcutaneous, TID AC  isosorbide mononitrate ER, 60 mg, oral, Daily  pantoprazole, 40 mg, oral, Daily  sevelamer carbonate, 1,600 mg, oral, TID  SITagliptin phosphate, 25 mg, oral, Daily  [START ON 12/26/2024] vancomycin, 750 mg, intravenous, Once per day on Tuesday Thursday Saturday      Continuous medications  oxygen,       PRN medications  PRN medications: acetaminophen **OR** acetaminophen **OR** acetaminophen, acetaminophen **OR** acetaminophen **OR** acetaminophen, dextrose, dextrose, glucagon, glucagon, guaiFENesin, nitroglycerin, polyethylene glycol, vancomycin     Objective      Last Recorded Vitals  BP (!) 185/51 (BP Location: Right arm, Patient Position: Sitting)   Pulse 62   Temp 36.5 °C (97.7 °F) (Oral)   Resp 18   Wt 81.6 kg (180 lb)   SpO2 97%     Blood pressure (!) 185/51, pulse 62, temperature 36.5 °C (97.7 °F), temperature source Oral, resp. rate 18, height 1.702 m (5' 7\"), weight 81.6 kg (180 lb), SpO2 97%.   Physical Exam   GENERAL: No respiratory distress  HEAD/SINUSES: RA  OROPHARYNX: some dental discoloration of front upper teeth  LUNGS: appears clear without wheezing  CARDIAC: Regular rate and rhythm with murmur present   EXTREMITIES: No edema,  NEURO: grossly normal mental status  SKIN: Skin turgor normal.   PSYCH: Normal affect  No intake or output data in the 24 hours ending 12/25/24 1058  Labs:   Results from last 72 hours   Lab Units 12/24/24  0521 12/23/24  0529   SODIUM mmol/L 137 136   POTASSIUM mmol/L 3.7 3.7   CHLORIDE mmol/L 100 97*   CO2 mmol/L 28 28 "   BUN mg/dL 28* 45*   CREATININE mg/dL 6.01* 8.71*   GLUCOSE mg/dL 106* 92   CALCIUM mg/dL 8.8 8.7   ANION GAP mmol/L 13 15   EGFR mL/min/1.73m*2 9* 6*      Results from last 72 hours   Lab Units 12/24/24  0520 12/23/24  0529   WBC AUTO x10*3/uL 7.9 8.3   HEMOGLOBIN g/dL 9.7* 9.2*   HEMATOCRIT % 29.0* 26.3*   PLATELETS AUTO x10*3/uL 155 161               Micro/ID:   Lab Results   Component Value Date    BLOODCULT No growth at 2 days 12/22/2024     Summary of key imaging results from the last 24 hours  CXR IMPRESSION:  Improving small right pleural effusion.    CXR before with prominence of  interstitium and small right pleural effusion      CONCLUSIONS:   1. The left ventricular systolic function is normal, with a visually estimated ejection fraction of 55-60%.   2. Spectral Doppler shows a Grade II (pseudonormal pattern) of left ventricular diastolic filling with an elevated left atrial pressure.   3. There is normal right ventricular global systolic function.   4. Mild mitral valve regurgitation.   5. There is moderately increased posterior left ventricular wall thickness.  Impression   William A Franke is a 74 y.o. year old male patient known with ESRD through left arm AV fistula, HTN, HFpEF admitted on 12/21/2024 with following encephalopathy and sepsis. Found to have MSSA bacteremia. Pulmonary are consulted for possible pneumonia as etiology of MSSA bacteremia   Acute hypoxic respiratory failure resolved  MSSA bacteremia unsure of the source has fistula, possible lung source with cough prior to admission and xray with possible haziness on right side, vs dental cause vs endocarditis (has murmur but has known MR)  HFpEF  Suspected pneumonia, cough+, Sepsis, MSSA +, abnormal CXR. Procal not very reliable in patients with ESRD   Pleural effusion most probably due to  fluid overload   On ceftriaxone  Finished 3 days of zithromax     Recommendations   As follows:  Antibiotic choice and duration according to  Infectious disease  With cough prior to admission which could be due to fluid overload vs infection, could consider CT chest without contrast to evaluate lung parenchyma and evaluate for parenchymal changes suggestive of resolving pneumonia   Depending on CT result patient might need FU with pulmonary   Incentive spirometry   Sputum culture  Once ready for discharge, recommend checking oxygen on ambulation.     Alice Salgado MD   12/25/24 at 10:58 AM     -Only the Medical problems listed under impression were addressed today.   -Please contact primary team for all other concerns and medical problem  -Thank you for your consult     Disclaimer: Documentation completed with the information available at the time of input. Parts of this note may have been scribed or generated using voice dictation software, Dragon.  Homophonic errors may exist.  Please contact me directly if clarification is needed. The times in the chart may not be reflective of actual patient care times, interventions, or procedures. Documentation occurs after the physical care of the patient.

## 2024-12-25 NOTE — PROGRESS NOTES
Patient seen and examined  William A Franke is a 74 y.o. male on day 4 of admission presenting with Acute encephalopathy.    Subjective   Interval History:   Wife present  Afebrile, no chills  Interval positive blood culture on 1222 2024-1/2  No cough, chest pain or shortness of breath  No nausea vomiting or diarrhea        Review of Systems   All other systems reviewed and are negative.      Objective   Range of Vitals (last 24 hours)  Heart Rate:  [59-68]   Temp:  [36.4 °C (97.5 °F)-36.5 °C (97.7 °F)]   Resp:  [18-20]   BP: (141-189)/(42-51)   SpO2:  [97 %-98 %]   Daily Weight  12/21/24 : 81.6 kg (180 lb)    Body mass index is 28.19 kg/m².    Physical Exam  Constitutional:       Appearance: Normal appearance.   HENT:      Head: Normocephalic and atraumatic.      Nose: Nose normal.   Eyes:      General: No scleral icterus.     Extraocular Movements: Extraocular movements intact.      Conjunctiva/sclera: Conjunctivae normal.   Cardiovascular:      Rate and Rhythm: Normal rate and regular rhythm.      Heart sounds: Normal heart sounds.   Pulmonary:      Effort: Pulmonary effort is normal.      Breath sounds: Normal breath sounds.   Abdominal:      General: Bowel sounds are normal.      Palpations: Abdomen is soft.   Musculoskeletal:      Cervical back: Normal range of motion and neck supple.      Right lower leg: No edema.      Left lower leg: No edema.   Skin:     General: Skin is warm and dry.   Neurological:      Mental Status: He is alert.   Psychiatric:         Behavior: Behavior is cooperative.     Antibiotics  cefTRIAXone - 1 gram/50 mL  vancomycin - 750 mg/150 mL    Relevant Results  Labs  Results from last 72 hours   Lab Units 12/24/24  0520 12/23/24  0529   WBC AUTO x10*3/uL 7.9 8.3   HEMOGLOBIN g/dL 9.7* 9.2*   HEMATOCRIT % 29.0* 26.3*   PLATELETS AUTO x10*3/uL 155 161     Results from last 72 hours   Lab Units 12/24/24  0521 12/23/24  0529   SODIUM mmol/L 137 136   POTASSIUM mmol/L 3.7 3.7   CHLORIDE  "mmol/L 100 97*   CO2 mmol/L 28 28   BUN mg/dL 28* 45*   CREATININE mg/dL 6.01* 8.71*   GLUCOSE mg/dL 106* 92   CALCIUM mg/dL 8.8 8.7   ANION GAP mmol/L 13 15   EGFR mL/min/1.73m*2 9* 6*         Estimated Creatinine Clearance: 10.7 mL/min (A) (by C-G formula based on SCr of 6.01 mg/dL (H)).  No results found for: \"CRP\"  Microbiology  Susceptibility data from last 14 days.  Collected Specimen Info Organism Clindamycin Erythromycin Oxacillin Tetracycline Trimethoprim/Sulfamethoxazole Vancomycin   12/21/24 Blood culture from Peripheral Venipuncture Methicillin Susceptible Staphylococcus aureus (MSSA)  S  S  S  S  S  S   12/21/24 Blood culture from Peripheral Venipuncture Staphylococcus aureus           Imaging  Electrocardiogram, 12-lead ACS symptoms    Result Date: 12/24/2024  Normal sinus rhythm Left ventricular hypertrophy with repolarization abnormality ( R in aVL , Sokolow-Barriga , [place] product , Romhilt-Thornton ) Abnormal ECG No previous ECGs available Confirmed by Jonathan Masters ([id number]) on 12/24/2024 2:27:57 PM    XR chest 1 view    Result Date: 12/24/2024  Interpreted By:  Frank Dang, STUDY: XR CHEST 1 VIEW; 12/24/2024 9:51 am   INDICATION: Signs/Symptoms:Pneumonia   COMPARISON: 12 21 2024.   ACCESSION NUMBER(S): [id number]   ORDERING CLINICIAN: BAHMAN BLANK   FINDINGS: The study is limited due to  rotation/lordotic projection. The cardiomediastinal silhouette is within normal limits for the technique. There is no pneumothorax or confluence infiltrates. There is blunting of the right costophrenic angle, due to improving small right pleural effusion. The osseous structures are unremarkable.       Improving small right pleural effusion.   Signed by: Frank Dang 12/24/2024 12:53 PM Dictation workstation:   [id number]    Transthoracic Echo (TTE) Limited    Result Date: 12/24/2024           [place] [address]            Phone [phone] TRANSTHORACIC " ECHOCARDIOGRAM REPORT Patient Name:       SERJIO SOLOMON FRANKE Reading Physician:    [id number] Jared Rooney DO Study Date:         12/24/2024          Ordering Provider:    [id number] BAHMANCHRISTEN BLANK MRN/PID:            [id number]            Fellow: Accession#:         [id number]        Nurse: Date of Birth/Age:  1950 / 74     Sonographer:          Nakita PUENTES Gender Assigned at  M                   Additional Staff: Birth: Height:             170.20 cm           Admit Date:           12/22/2024 Weight:             81.65 kg            Admission Status:     Inpatient -                                                               Routine BSA / BMI:          1.93 m2 / 28.19     Department Location:                     kg/m2 Blood Pressure: 169 /55 mmHg Study Type:    TRANSTHORACIC ECHO (TTE) LIMITED Diagnosis/ICD: Bacteremia-R78.81 Indication:    Endocarditis CPT Codes:     Echo Limited-88412 Patient History: Pertinent History: CDK stageIV Stable Angina HTN CP HLD CAD AS CHF Chronic Liver                    Disease NSTEMI Atrteriovenous Fistula Stenosis. Study Detail: The following Echo studies were performed: 2D, M-Mode, Doppler and               color flow. Unable to obtain suprasternal notch and subcostal               view.  PHYSICIAN INTERPRETATION: Left Ventricle: The left ventricular systolic function is normal, with a visually estimated ejection fraction of 60-65%. There are no regional wall motion abnormalities. The left ventricular cavity size is normal. There is normal septal and mildly increased posterior left ventricular wall thickness. There is left ventricular concentric remodeling. Left ventricular diastolic filling was not assessed. Left Atrium: The left atrium is upper limits of normal in size. Right Ventricle:  The right ventricle is normal in size. There is normal right ventricular global systolic function. Right Atrium: The right atrium is normal in size. Aortic Valve: The aortic valve is trileaflet. There is no evidence of aortic valve regurgitation. The peak instantaneous gradient of the aortic valve is 15 mmHg. Mitral Valve: The mitral valve is normal in structure. There is trace to mild mitral valve regurgitation. Tricuspid Valve: The tricuspid valve is structurally normal. No evidence of tricuspid regurgitation. Pulmonic Valve: The pulmonic valve is structurally normal. There is trace pulmonic valve regurgitation. Pericardium: No pericardial effusion noted. Aorta: The aortic root is normal.  CONCLUSIONS:  1. The left ventricular systolic function is normal, with a visually estimated ejection fraction of 60-65%.  2. There is normal right ventricular global systolic function.  3. No valvular vegetations visualized. QUANTITATIVE DATA SUMMARY:  2D MEASUREMENTS:           Normal Ranges: IVSd:            0.80 cm   (0.6-1.1cm) LVPWd:           1.29 cm   (0.6-1.1cm) LVIDd:           5.00 cm   (3.9-5.9cm) LVIDs:           3.39 cm LV Mass Index:   99.7 g/m2 LV % FS          32.2 %  LA VOLUME:                    Normal Ranges: LA Vol A4C:        96.5 ml    (22+/-6mL/m2) LA Vol A2C:        96.6 ml LA Vol BP:         100.8 ml LA Vol Index A4C:  49.9ml/m2 LA Vol Index A2C:  50.0 ml/m2 LA Vol Index BP:   52.1 ml/m2 LA Area A4C:       26.1 cm2 LA Area A2C:       25.0 cm2 LA Major Axis A4C: 6.0 cm LA Major Axis A2C: 5.5 cm LA Volume Index:   51.9 ml/m2 LA Vol A4C:        88.7 ml LA Vol A2C:        93.9 ml LA Vol Index BSA:  47.2 ml/m2  RA VOLUME BY A/L METHOD:            Normal Ranges: RA Vol A4C:              33.9 ml    (8.3-19.5ml) RA Vol Index A4C:        17.5 ml/m2 RA Area A4C:             15.2 cm2 RA Major Axis A4C:       5.8 cm  M-MODE MEASUREMENTS:         Normal Ranges: LAs:                 5.25 cm (2.7-4.0cm)  LV SYSTOLIC  FUNCTION BY 2D PLANIMETRY (MOD):                      Normal Ranges: EF-A4C View:    58 % (>=55%) EF-A2C View:    63 % EF-Biplane:     60 % EF-Visual:      63 % LV EF Reported: 63 %  LV DIASTOLIC FUNCTION:          Normal Ranges: MV Peak E:             0.84 m/s (0.7-1.2 m/s) MV Peak A:             1.14 m/s (0.42-0.7 m/s) E/A Ratio:             0.74     (1.0-2.2)  MITRAL VALVE:          Normal Ranges: MV DT:        173 msec (150-240msec)  AORTIC VALVE:            Normal Ranges: AoV Vmax:      1.93 m/s  (<=1.7m/s) AoV Peak P.9 mmHg (<20mmHg) LVOT Max Joss:  1.24 m/s  (<=1.1m/s) LVOT VTI:      33.46 cm LVOT Diameter: 1.95 cm   (1.8-2.4cm) AoV Area,Vmax: 1.91 cm2  (2.5-4.5cm2)  RIGHT VENTRICLE: RV Basal 2.70 cm RV Mid   2.40 cm RV Major 6.3 cm  TRICUSPID VALVE/RVSP:          Normal Ranges: Peak TR Velocity:     2.83 m/s RV Syst Pressure:     35 mmHg  (< 30mmHg)  PULMONIC VALVE:           Normal Ranges: PV Accel Time:  86 msec   (>120ms) PV Max Joss:     1.6 m/s   (0.6-0.9m/s) PV Max PG:      10.3 mmHg  [id number] [facility] Electronically signed on 2024 at 10:02:08 AM  ** Final **     XR chest 1 view    Result Date: 2024  STUDY: Chest Radiograph;  [2024; 11:41 am] INDICATION: Altered. Cough. COMPARISON: XR chest 2024 ACCESSION NUMBER(S): [id number] ORDERING CLINICIAN: FELIPE SINGH TECHNIQUE:  Frontal chest was obtained at 11:41 hours. FINDINGS: CARDIOMEDIASTINAL SILHOUETTE: The cardiomediastinal silhouette is enlarged but stable compared to previous imaging..  LUNGS: The lungs demonstrate mild pulmonary vascular prominence which may represent mild vascular congestion.  There is blunting of the right costophrenic angle which could represent a possible small effusion..  ABDOMEN: No remarkable upper abdominal findings.  BONES: No acute osseous changes.  Multiple vascular stents are noted within the left upper extremity.    1.  Pulmonary vascular prominence which may represent mild pulmonary  vascular congestion. 2.  Blunting of the right costophrenic angle laterally which could represent a small pleural effusion.. Signed by Tk Geller MD    CT head wo IV contrast    Result Date: 12/21/2024  Interpreted By:  Jonas Rinaldi, STUDY: CT HEAD WO IV CONTRAST;  12/21/2024 11:45 am   INDICATION: Signs/Symptoms:altered.     COMPARISON: 11/16/2024   ACCESSION NUMBER(S): [id number]   ORDERING CLINICIAN: FELIPE SINGH   TECHNIQUE: Unenhanced images were obtained through the brain.   FINDINGS: There is atrophy resulting in prominence of the ventricles and sulci. There are areas of decreased attenuation within the white matter which are nonspecific but are commonly associated with small vessel ischemic disease. There is no mass effect or midline shift. No acute intracranial hemorrhage is identified. No extra-axial fluid collections are seen. No intraparenchymal mass lesions are identified.  Bone windows demonstrate no evidence of an acute calvarial fracture. There is mucosal in the paranasal sinuses, most conspicuous involving the left maxillary sinus.       No evidence of an acute intracranial process.   MACRO: None.   Signed by: Jonas Rinaldi 12/21/2024 12:21 PM Dictation workstation:   [id number]     Assessment/Plan   Encephalopathy, septic, resolved  MSSA bacteremia, source unclear-negative TTE  Abnormal chest x-ray-congestion, rule out infection-elevated procalcitonin     Discontinue vancomycin  Discontinue Rocephin  IV cefazolin  Repeat blood cultures-12/25/2024  Follow-up repeat blood cultures-12/22/2024  Monitor temperature and WBC  Further recommendations based on culture results      Dave Pathak MD

## 2024-12-25 NOTE — CARE PLAN
The patient's goals for the shift include rest    The clinical goals for the shift include maintain safety    Over the shift, the patient did not make progress toward the following goals. Barriers to progression include no noted barriers. Recommendations to address these barriers include continue current treatment plan.

## 2024-12-25 NOTE — PROGRESS NOTES
"William A Franke is a 74 y.o. male on day 4 of admission presenting with Acute encephalopathy.    Subjective   Patient seen for end-stage kidney disease on dialysis therapy admitted with altered mental status and hypotension he does have MSSA bacteremia source is not clear she had echocardiogram which was negative for vegetations    Objective     Physical Exam  Neck:      Vascular: No carotid bruit.   Cardiovascular:      Rate and Rhythm: Normal rate and regular rhythm.      Heart sounds: No murmur heard.     No friction rub. No gallop.   Pulmonary:      Breath sounds: No wheezing, rhonchi or rales.   Chest:      Chest wall: No tenderness.   Abdominal:      General: There is no distension.      Tenderness: There is no abdominal tenderness. There is no guarding or rebound.   Musculoskeletal:         General: No swelling or tenderness.      Cervical back: Neck supple.      Right lower leg: No edema.      Left lower leg: No edema.      Comments: Very minimal redness over his AV fistula without any drainage or tenderness   Lymphadenopathy:      Cervical: No cervical adenopathy.         Last Recorded Vitals  Blood pressure (!) 185/51, pulse 62, temperature 36.5 °C (97.7 °F), temperature source Oral, resp. rate 18, height 1.702 m (5' 7\"), weight 81.6 kg (180 lb), SpO2 97%.    Intake/Output last 3 Shifts:  No intake/output data recorded.    Current Facility-Administered Medications:     acetaminophen (Tylenol) tablet 650 mg, 650 mg, oral, q4h PRN, 650 mg at 12/22/24 0758 **OR** acetaminophen (Tylenol) oral liquid 650 mg, 650 mg, nasogastric tube, q4h PRN **OR** acetaminophen (Tylenol) suppository 650 mg, 650 mg, rectal, q4h PRN, Aliya Patel APRN-CNP    acetaminophen (Tylenol) tablet 650 mg, 650 mg, oral, q4h PRN **OR** acetaminophen (Tylenol) oral liquid 650 mg, 650 mg, oral, q4h PRN **OR** acetaminophen (Tylenol) suppository 650 mg, 650 mg, rectal, q4h PRN, Aliya Patel APRN-CNP    allopurinol (Zyloprim) " tablet 100 mg, 100 mg, oral, Daily, Aliya Patel APRN-CNP, 100 mg at 12/25/24 0950    amLODIPine (Norvasc) tablet 5 mg, 5 mg, oral, Daily, VERÓNICA Russ-CNP, 5 mg at 12/25/24 0950    aspirin EC tablet 81 mg, 81 mg, oral, Daily, Aliya Patel, APRN-CNP, 81 mg at 12/25/24 0950    atorvastatin (Lipitor) tablet 80 mg, 80 mg, oral, Nightly, Aliya Patel APRN-CNP, 80 mg at 12/24/24 2044    carvedilol (Coreg) tablet 6.25 mg, 6.25 mg, oral, BID, Aliya Patel APRN-CNP, 6.25 mg at 12/25/24 0950    cefTRIAXone (Rocephin) 1 g in dextrose (iso) IV 50 mL, 1 g, intravenous, q24h, VERÓNICA Russ-CNP, Stopped at 12/24/24 1351    dextrose 50 % injection 12.5 g, 12.5 g, intravenous, q15 min PRN, Aliya Patel APRN-CNP    dextrose 50 % injection 25 g, 25 g, intravenous, q15 min PRN, Aliya Patel APRN-CNP    epoetin jeison-epbx (Retacrit) injection 10,000 Units, 10,000 Units, subcutaneous, Once per day on Monday Wednesday Friday, Elier Woodson MD, 10,000 Units at 12/25/24 0951    ezetimibe (Zetia) tablet 10 mg, 10 mg, oral, Daily, Aliya Patel APRN-CNP, 10 mg at 12/25/24 0950    glucagon (Glucagen) injection 1 mg, 1 mg, intramuscular, q15 min PRN, Aliya Patel APRN-CNP    glucagon (Glucagen) injection 1 mg, 1 mg, intramuscular, q15 min PRN, VERÓNICA Russ-CNP    guaiFENesin (Mucinex) 12 hr tablet 600 mg, 600 mg, oral, BID PRN, Aliya Patel APRN-CNP, 600 mg at 12/21/24 1849    heparin (porcine) injection 5,000 Units, 5,000 Units, subcutaneous, q12h, VERÓNICA Russ-CNP, 5,000 Units at 12/25/24 0627    [Held by provider] hydrALAZINE (Apresoline) tablet 50 mg, 50 mg, oral, TID, Desirae Alicea MD    insulin lispro injection 0-10 Units, 0-10 Units, subcutaneous, TID AC, VERÓNICA Russ-CNP, 2 Units at 12/24/24 1619    isosorbide mononitrate ER (Imdur) 24 hr tablet 60 mg, 60 mg, oral, Daily, VERÓNICA Russ-CNP, 60 mg at 12/25/24 0919     nitroglycerin (Nitrostat) SL tablet 0.4 mg, 0.4 mg, sublingual, q5 min PRN, ERIKA Russ    oxygen (O2) therapy, , inhalation, Continuous, ERIKA Russ, 21 percent at 12/21/24 1508    pantoprazole (ProtoNix) EC tablet 40 mg, 40 mg, oral, Daily, ERIKA Russ, 40 mg at 12/25/24 0951    polyethylene glycol (Glycolax, Miralax) packet 17 g, 17 g, oral, Daily PRN, ERIKA Russ    sevelamer carbonate (Renvela) tablet 1,600 mg, 1,600 mg, oral, TID, ERIKA Russ, 1,600 mg at 12/25/24 0950    SITagliptin phosphate (Januvia) tablet 25 mg, 25 mg, oral, Daily, ERIKA Russ, 25 mg at 12/25/24 0950    vancomycin (Vancocin) pharmacy to dose - pharmacy monitoring, , miscellaneous, Daily PRN, Desirae Alicea MD    [START ON 12/26/2024] vancomycin (Xellia) 750 mg in diluent combination  mL, 750 mg, intravenous, Once per day on Tuesday Thursday Saturday, Yasmin Son   Relevant Results    Results for orders placed or performed during the hospital encounter of 12/21/24 (from the past 96 hours)   CBC and Auto Differential   Result Value Ref Range    WBC 9.5 4.4 - 11.3 x10*3/uL    nRBC 0.0 0.0 - 0.0 /100 WBCs    RBC 3.51 (L) 4.50 - 5.90 x10*6/uL    Hemoglobin 11.1 (L) 13.5 - 17.5 g/dL    Hematocrit 33.8 (L) 41.0 - 52.0 %    MCV 96 80 - 100 fL    MCH 31.6 26.0 - 34.0 pg    MCHC 32.8 32.0 - 36.0 g/dL    RDW 18.1 (H) 11.5 - 14.5 %    Platelets 157 150 - 450 x10*3/uL    Neutrophils % 87.9 40.0 - 80.0 %    Immature Granulocytes %, Automated 0.5 0.0 - 0.9 %    Lymphocytes % 4.0 13.0 - 44.0 %    Monocytes % 6.1 2.0 - 10.0 %    Eosinophils % 1.0 0.0 - 6.0 %    Basophils % 0.5 0.0 - 2.0 %    Neutrophils Absolute 8.30 (H) 1.60 - 5.50 x10*3/uL    Immature Granulocytes Absolute, Automated 0.05 0.00 - 0.50 x10*3/uL    Lymphocytes Absolute 0.38 (L) 0.80 - 3.00 x10*3/uL    Monocytes Absolute 0.58 0.05 - 0.80 x10*3/uL    Eosinophils Absolute 0.09 0.00 -  0.40 x10*3/uL    Basophils Absolute 0.05 0.00 - 0.10 x10*3/uL   Comprehensive Metabolic Panel   Result Value Ref Range    Glucose 163 (H) 74 - 99 mg/dL    Sodium 136 136 - 145 mmol/L    Potassium 3.7 3.5 - 5.3 mmol/L    Chloride 95 (L) 98 - 107 mmol/L    Bicarbonate 31 21 - 32 mmol/L    Anion Gap 14 10 - 20 mmol/L    Urea Nitrogen 12 6 - 23 mg/dL    Creatinine 4.15 (H) 0.50 - 1.30 mg/dL    eGFR 14 (L) >60 mL/min/1.73m*2    Calcium 9.1 8.6 - 10.3 mg/dL    Albumin 4.4 3.4 - 5.0 g/dL    Alkaline Phosphatase 64 33 - 136 U/L    Total Protein 6.8 6.4 - 8.2 g/dL    AST 21 9 - 39 U/L    Bilirubin, Total 1.3 (H) 0.0 - 1.2 mg/dL    ALT 12 10 - 52 U/L   Lactate   Result Value Ref Range    Lactate 1.3 0.4 - 2.0 mmol/L   Blood Culture    Specimen: Peripheral Venipuncture; Blood culture   Result Value Ref Range    Blood Culture (A)      Methicillin Susceptible Staphylococcus aureus (MSSA)    BLOOD CULTURE BOTTLE  Positive Aerobic and Anaerobic Bottle     Gram Stain Gram positive cocci, clusters (AA)     Gram Stain Gram positive cocci, clusters (AA)        Susceptibility    Methicillin Susceptible Staphylococcus aureus (MSSA) - MICROSCAN     Oxacillin  Susceptible ug/ml     Trimethoprim/Sulfamethoxazole  Susceptible ug/ml     Tetracycline  Susceptible ug/ml     Clindamycin  Susceptible ug/ml     Erythromycin  Susceptible ug/ml     Vancomycin  Susceptible ug/ml   Blood Culture    Specimen: Peripheral Venipuncture; Blood culture   Result Value Ref Range    Blood Culture Staphylococcus aureus (A)     BLOOD CULTURE BOTTLE  Positive Aerobic and Anaerobic Bottle     Gram Stain Gram positive cocci, clusters (AA)     Gram Stain Gram positive cocci, clusters (AA)    Sars-CoV-2 and Influenza A/B PCR   Result Value Ref Range    Flu A Result Not Detected Not Detected    Flu B Result Not Detected Not Detected    Coronavirus 2019, PCR Not Detected Not Detected   RSV PCR   Result Value Ref Range    RSV PCR Not Detected Not Detected    Electrocardiogram, 12-lead ACS symptoms   Result Value Ref Range    Ventricular Rate 75 BPM    Atrial Rate 75 BPM    MT Interval 168 ms    QRS Duration 92 ms    QT Interval 388 ms    QTC Calculation(Bazett) 433 ms    P Axis 42 degrees    R Axis -7 degrees    T Axis 81 degrees    QRS Count 12 beats    Q Onset 219 ms    P Onset 135 ms    P Offset 183 ms    T Offset 413 ms    QTC Fredericia 417 ms   POCT GLUCOSE   Result Value Ref Range    POCT Glucose 142 (H) 74 - 99 mg/dL   POCT GLUCOSE   Result Value Ref Range    POCT Glucose 115 (H) 74 - 99 mg/dL   Urinalysis with Reflex Culture and Microscopic   Result Value Ref Range    Color, Urine Yellow Light-Yellow, Yellow, Dark-Yellow    Appearance, Urine Clear Clear    Specific Gravity, Urine 1.013 1.005 - 1.035    pH, Urine 8.0 5.0, 5.5, 6.0, 6.5, 7.0, 7.5, 8.0    Protein, Urine 300 (3+) (A) NEGATIVE, 10 (TRACE), 20 (TRACE) mg/dL    Glucose, Urine 50 (TRACE) (A) Normal mg/dL    Blood, Urine 0.06 (1+) (A) NEGATIVE    Ketones, Urine NEGATIVE NEGATIVE mg/dL    Bilirubin, Urine NEGATIVE NEGATIVE    Urobilinogen, Urine Normal Normal mg/dL    Nitrite, Urine NEGATIVE NEGATIVE    Leukocyte Esterase, Urine NEGATIVE NEGATIVE   Extra Urine Gray Tube   Result Value Ref Range    Extra Tube Hold for add-ons.    Urinalysis Microscopic   Result Value Ref Range    WBC, Urine NONE 1-5, NONE /HPF    RBC, Urine 3-5 NONE, 1-2, 3-5 /HPF    Squamous Epithelial Cells, Urine 1-9 (SPARSE) Reference range not established. /HPF   CBC   Result Value Ref Range    WBC 9.1 4.4 - 11.3 x10*3/uL    nRBC 0.0 0.0 - 0.0 /100 WBCs    RBC 3.32 (L) 4.50 - 5.90 x10*6/uL    Hemoglobin 10.3 (L) 13.5 - 17.5 g/dL    Hematocrit 30.9 (L) 41.0 - 52.0 %    MCV 93 80 - 100 fL    MCH 31.0 26.0 - 34.0 pg    MCHC 33.3 32.0 - 36.0 g/dL    RDW 17.7 (H) 11.5 - 14.5 %    Platelets 139 (L) 150 - 450 x10*3/uL   Basic metabolic panel   Result Value Ref Range    Glucose 103 (H) 74 - 99 mg/dL    Sodium 135 (L) 136 - 145 mmol/L     Potassium 4.0 3.5 - 5.3 mmol/L    Chloride 96 (L) 98 - 107 mmol/L    Bicarbonate 28 21 - 32 mmol/L    Anion Gap 15 10 - 20 mmol/L    Urea Nitrogen 25 (H) 6 - 23 mg/dL    Creatinine 6.23 (H) 0.50 - 1.30 mg/dL    eGFR 9 (L) >60 mL/min/1.73m*2    Calcium 8.7 8.6 - 10.3 mg/dL   POCT GLUCOSE   Result Value Ref Range    POCT Glucose 99 74 - 99 mg/dL   POCT GLUCOSE   Result Value Ref Range    POCT Glucose 149 (H) 74 - 99 mg/dL   POCT GLUCOSE   Result Value Ref Range    POCT Glucose 168 (H) 74 - 99 mg/dL   Blood Culture    Specimen: Peripheral Venipuncture; Blood culture   Result Value Ref Range    Blood Culture       Identification and susceptibility testing to follow    Gram Stain Gram positive cocci, clusters (AA)    Blood Culture    Specimen: Peripheral Venipuncture; Blood culture   Result Value Ref Range    Blood Culture No growth at 2 days    Procalcitonin   Result Value Ref Range    Procalcitonin 5.19 (H) <=0.07 ng/mL   POCT GLUCOSE   Result Value Ref Range    POCT Glucose 147 (H) 74 - 99 mg/dL   CBC   Result Value Ref Range    WBC 8.3 4.4 - 11.3 x10*3/uL    nRBC 0.0 0.0 - 0.0 /100 WBCs    RBC 2.96 (L) 4.50 - 5.90 x10*6/uL    Hemoglobin 9.2 (L) 13.5 - 17.5 g/dL    Hematocrit 26.3 (L) 41.0 - 52.0 %    MCV 89 80 - 100 fL    MCH 31.1 26.0 - 34.0 pg    MCHC 35.0 32.0 - 36.0 g/dL    RDW 17.1 (H) 11.5 - 14.5 %    Platelets 161 150 - 450 x10*3/uL   Basic metabolic panel   Result Value Ref Range    Glucose 92 74 - 99 mg/dL    Sodium 136 136 - 145 mmol/L    Potassium 3.7 3.5 - 5.3 mmol/L    Chloride 97 (L) 98 - 107 mmol/L    Bicarbonate 28 21 - 32 mmol/L    Anion Gap 15 10 - 20 mmol/L    Urea Nitrogen 45 (H) 6 - 23 mg/dL    Creatinine 8.71 (H) 0.50 - 1.30 mg/dL    eGFR 6 (L) >60 mL/min/1.73m*2    Calcium 8.7 8.6 - 10.3 mg/dL   POCT GLUCOSE   Result Value Ref Range    POCT Glucose 88 74 - 99 mg/dL   POCT GLUCOSE   Result Value Ref Range    POCT Glucose 197 (H) 74 - 99 mg/dL   POCT GLUCOSE   Result Value Ref Range    POCT  Glucose 250 (H) 74 - 99 mg/dL   POCT GLUCOSE   Result Value Ref Range    POCT Glucose 137 (H) 74 - 99 mg/dL   CBC   Result Value Ref Range    WBC 7.9 4.4 - 11.3 x10*3/uL    nRBC 0.0 0.0 - 0.0 /100 WBCs    RBC 3.12 (L) 4.50 - 5.90 x10*6/uL    Hemoglobin 9.7 (L) 13.5 - 17.5 g/dL    Hematocrit 29.0 (L) 41.0 - 52.0 %    MCV 93 80 - 100 fL    MCH 31.1 26.0 - 34.0 pg    MCHC 33.4 32.0 - 36.0 g/dL    RDW 17.0 (H) 11.5 - 14.5 %    Platelets 155 150 - 450 x10*3/uL   Basic metabolic panel   Result Value Ref Range    Glucose 106 (H) 74 - 99 mg/dL    Sodium 137 136 - 145 mmol/L    Potassium 3.7 3.5 - 5.3 mmol/L    Chloride 100 98 - 107 mmol/L    Bicarbonate 28 21 - 32 mmol/L    Anion Gap 13 10 - 20 mmol/L    Urea Nitrogen 28 (H) 6 - 23 mg/dL    Creatinine 6.01 (H) 0.50 - 1.30 mg/dL    eGFR 9 (L) >60 mL/min/1.73m*2    Calcium 8.8 8.6 - 10.3 mg/dL   POCT GLUCOSE   Result Value Ref Range    POCT Glucose 95 74 - 99 mg/dL   Transthoracic Echo (TTE) Limited   Result Value Ref Range    AV pk mana 1.93 m/s    LVOT diam 1.95 cm    MV E/A ratio 0.74     LV Biplane EF 60 %    LA vol index A/L 52.1 ml/m2    LV EF 63 %    RVSP 35.1 mmHg    LVIDd 5.00 cm    AV pk grad 15 mmHg    Aortic Valve Area by Continuity of Peak Velocity 1.91 cm2    LV A4C EF 57.9    POCT GLUCOSE   Result Value Ref Range    POCT Glucose 180 (H) 74 - 99 mg/dL   MRSA Surveillance for Vancomycin De-escalation, PCR    Specimen: Anterior Nares; Swab   Result Value Ref Range    MRSA PCR Not Detected Not Detected   POCT GLUCOSE   Result Value Ref Range    POCT Glucose 254 (H) 74 - 99 mg/dL   POCT GLUCOSE   Result Value Ref Range    POCT Glucose 131 (H) 74 - 99 mg/dL   POCT GLUCOSE   Result Value Ref Range    POCT Glucose 125 (H) 74 - 99 mg/dL     *Note: Due to a large number of results and/or encounters for the requested time period, some results have not been displayed. A complete set of results can be found in Results Review.       Assessment/Plan   End-stage kidney disease  plan to schedule dialysis for tomorrow morning  Staph aureus bacteremia source is not clear lung versus AV fistula plan to continue antibiotic therapy per infectious disease awaiting vascular input on the AV fistula evaluation for possible infection  Anemia chronic kidney disease continue with erythropoietin therapy  Hypertension           Elier Woodson MD

## 2024-12-25 NOTE — NURSING NOTE
Patient pleasant and compliant with care, no noted confusion this shift, no s/s of hyper or hypoglycemia noted, nsr on tele, no report of pain or discomfort at this time, call light within reach safety measures remain in place.

## 2024-12-26 ENCOUNTER — APPOINTMENT (OUTPATIENT)
Dept: DIALYSIS | Facility: HOSPITAL | Age: 74
End: 2024-12-26
Payer: MEDICARE

## 2024-12-26 ENCOUNTER — APPOINTMENT (OUTPATIENT)
Dept: RADIOLOGY | Facility: HOSPITAL | Age: 74
DRG: 871 | End: 2024-12-26
Payer: MEDICARE

## 2024-12-26 ENCOUNTER — TELEPHONE (OUTPATIENT)
Dept: RADIATION ONCOLOGY | Facility: HOSPITAL | Age: 74
End: 2024-12-26

## 2024-12-26 LAB
GLUCOSE BLD MANUAL STRIP-MCNC: 119 MG/DL (ref 74–99)
GLUCOSE BLD MANUAL STRIP-MCNC: 152 MG/DL (ref 74–99)
GLUCOSE BLD MANUAL STRIP-MCNC: 275 MG/DL (ref 74–99)
GLUCOSE BLD MANUAL STRIP-MCNC: 98 MG/DL (ref 74–99)

## 2024-12-26 PROCEDURE — 2500000002 HC RX 250 W HCPCS SELF ADMINISTERED DRUGS (ALT 637 FOR MEDICARE OP, ALT 636 FOR OP/ED): Performed by: NURSE PRACTITIONER

## 2024-12-26 PROCEDURE — 3430000001 HC RX 343 DIAGNOSTIC RADIOPHARMACEUTICALS: Performed by: NURSE PRACTITIONER

## 2024-12-26 PROCEDURE — 2500000001 HC RX 250 WO HCPCS SELF ADMINISTERED DRUGS (ALT 637 FOR MEDICARE OP): Performed by: INTERNAL MEDICINE

## 2024-12-26 PROCEDURE — 8010000001 HC DIALYSIS - HEMODIALYSIS PER DAY

## 2024-12-26 PROCEDURE — 82947 ASSAY GLUCOSE BLOOD QUANT: CPT

## 2024-12-26 PROCEDURE — 1200000002 HC GENERAL ROOM WITH TELEMETRY DAILY

## 2024-12-26 PROCEDURE — 78804 RP LOCLZJ TUM WHBDY 2+D IMG: CPT

## 2024-12-26 PROCEDURE — 2500000001 HC RX 250 WO HCPCS SELF ADMINISTERED DRUGS (ALT 637 FOR MEDICARE OP): Performed by: NURSE PRACTITIONER

## 2024-12-26 PROCEDURE — 99232 SBSQ HOSP IP/OBS MODERATE 35: CPT

## 2024-12-26 PROCEDURE — A9547 IN111 OXYQUINOLINE: HCPCS | Performed by: NURSE PRACTITIONER

## 2024-12-26 PROCEDURE — 2500000004 HC RX 250 GENERAL PHARMACY W/ HCPCS (ALT 636 FOR OP/ED): Performed by: NURSE PRACTITIONER

## 2024-12-26 PROCEDURE — 2500000004 HC RX 250 GENERAL PHARMACY W/ HCPCS (ALT 636 FOR OP/ED): Mod: JZ | Performed by: INTERNAL MEDICINE

## 2024-12-26 RX ORDER — INDIUM IN-111 OXYQUINOLINE 1 UG/ML
502 SOLUTION INTRAVENOUS
Status: COMPLETED | OUTPATIENT
Start: 2024-12-26 | End: 2024-12-26

## 2024-12-26 RX ORDER — CLONIDINE HYDROCHLORIDE 0.1 MG/1
0.1 TABLET ORAL EVERY 6 HOURS PRN
Status: DISCONTINUED | OUTPATIENT
Start: 2024-12-26 | End: 2024-12-31 | Stop reason: HOSPADM

## 2024-12-26 RX ADMIN — INDIUM IN-111 OXYQUINOLINE 0.5 MILLICURIE: 1 SOLUTION INTRAVENOUS at 13:38

## 2024-12-26 RX ADMIN — SEVELAMER CARBONATE 1600 MG: 800 TABLET, FILM COATED ORAL at 14:33

## 2024-12-26 RX ADMIN — CARVEDILOL 6.25 MG: 6.25 TABLET, FILM COATED ORAL at 17:22

## 2024-12-26 RX ADMIN — CARVEDILOL 6.25 MG: 6.25 TABLET, FILM COATED ORAL at 08:01

## 2024-12-26 RX ADMIN — CEFAZOLIN SODIUM 2 G: 2 INJECTION, SOLUTION INTRAVENOUS at 14:33

## 2024-12-26 RX ADMIN — EZETIMIBE 10 MG: 10 TABLET ORAL at 14:29

## 2024-12-26 RX ADMIN — CLONIDINE HYDROCHLORIDE 0.1 MG: 0.1 TABLET ORAL at 17:22

## 2024-12-26 RX ADMIN — SEVELAMER CARBONATE 1600 MG: 800 TABLET, FILM COATED ORAL at 08:01

## 2024-12-26 RX ADMIN — ALLOPURINOL 100 MG: 100 TABLET ORAL at 14:33

## 2024-12-26 RX ADMIN — CLONIDINE HYDROCHLORIDE 0.1 MG: 0.1 TABLET ORAL at 05:00

## 2024-12-26 RX ADMIN — PANTOPRAZOLE SODIUM 40 MG: 40 TABLET, DELAYED RELEASE ORAL at 14:33

## 2024-12-26 RX ADMIN — ATORVASTATIN CALCIUM 80 MG: 80 TABLET, FILM COATED ORAL at 20:57

## 2024-12-26 RX ADMIN — INSULIN LISPRO 6 UNITS: 100 INJECTION, SOLUTION INTRAVENOUS; SUBCUTANEOUS at 17:22

## 2024-12-26 RX ADMIN — SITAGLIPTIN 25 MG: 50 TABLET, FILM COATED ORAL at 14:29

## 2024-12-26 RX ADMIN — ISOSORBIDE MONONITRATE 60 MG: 60 TABLET, EXTENDED RELEASE ORAL at 14:33

## 2024-12-26 RX ADMIN — HEPARIN SODIUM 5000 UNITS: 5000 INJECTION, SOLUTION INTRAVENOUS; SUBCUTANEOUS at 05:00

## 2024-12-26 RX ADMIN — HEPARIN SODIUM 5000 UNITS: 5000 INJECTION, SOLUTION INTRAVENOUS; SUBCUTANEOUS at 17:22

## 2024-12-26 RX ADMIN — ASPIRIN 81 MG: 81 TABLET, COATED ORAL at 14:33

## 2024-12-26 RX ADMIN — AMLODIPINE BESYLATE 5 MG: 5 TABLET ORAL at 14:33

## 2024-12-26 RX ADMIN — SEVELAMER CARBONATE 1600 MG: 800 TABLET, FILM COATED ORAL at 17:22

## 2024-12-26 ASSESSMENT — COGNITIVE AND FUNCTIONAL STATUS - GENERAL
DAILY ACTIVITIY SCORE: 24
MOBILITY SCORE: 23
CLIMB 3 TO 5 STEPS WITH RAILING: A LITTLE
MOBILITY SCORE: 23
CLIMB 3 TO 5 STEPS WITH RAILING: A LITTLE

## 2024-12-26 ASSESSMENT — ENCOUNTER SYMPTOMS
ALLERGIC/IMMUNOLOGIC NEGATIVE: 1
BLOOD IN STOOL: 0
PSYCHIATRIC NEGATIVE: 1
DIARRHEA: 0
ARTHRALGIAS: 0
BACK PAIN: 0
ANAL BLEEDING: 0
CONSTIPATION: 0
FEVER: 0
UNEXPECTED WEIGHT CHANGE: 0
DYSURIA: 0
DIZZINESS: 0
PALPITATIONS: 0
FREQUENCY: 0
CHEST TIGHTNESS: 0
SHORTNESS OF BREATH: 0
RECTAL PAIN: 0
FATIGUE: 0
ABDOMINAL PAIN: 0
DIFFICULTY URINATING: 0
COUGH: 0
HEMATURIA: 0
JOINT SWELLING: 0
WEAKNESS: 0

## 2024-12-26 ASSESSMENT — PAIN - FUNCTIONAL ASSESSMENT: PAIN_FUNCTIONAL_ASSESSMENT: 0-10

## 2024-12-26 ASSESSMENT — PAIN SCALES - GENERAL
PAINLEVEL_OUTOF10: 0 - NO PAIN
PAINLEVEL_OUTOF10: 0 - NO PAIN

## 2024-12-26 NOTE — PRE-PROCEDURE NOTE
Report from Sending RN:    Report From: INES Paula  Recent Surgery of Procedure: Yes, has indium scan today, nuclear medicine requesting dialysis prior to scan  Baseline Level of Consciousness (LOC): A&Ox4  Oxygen Use: No  Type: n/a  Diabetic: Yes  Last BP Med Given Day of Dialysis: see EMAR  Last Pain Med Given: see EMAR  Lab Tests to be Obtained with Dialysis: No  Blood Transfusion to be Given During Dialysis: No  Available IV Access: Yes  Medications to be Administered During Dialysis: No  Continuous IV Infusion Running: No  Restraints on Currently or in the Last 24 Hours: No  Hand-Off Communication: stable and ready for dialysis in HD room  Dialysis Catheter Dressing: n/a AVF  Last Dressing Change: n/a AVF

## 2024-12-26 NOTE — PROGRESS NOTES
Physical Therapy                 Therapy Communication Note    Patient Name: William A Franke  MRN: [id number]  Department: [facility]  [facility]  Today's Date: 12/26/2024     Discipline: Physical Therapy    PT Missed Visit: Yes     Missed Visit Reason: Missed Visit Reason: Other (Comment) (Pt off the floor at dialysis.)

## 2024-12-26 NOTE — POST-PROCEDURE NOTE
Report to Receiving RN:    Report To: Idalia  Time Report Called: [id number]  Hand-Off Communication: Pt stable post tx denies complaints. 1.9L of fluid removed. Last bp 161-81(57).  Pt returning to room, [facility] will see him after.   Complications During Treatment: No  Ultrafiltration Treatment: Yes  Medications Administered During Dialysis: No  Blood Products Administered During Dialysis: No  Labs Sent During Dialysis: No  Heparin Drip Rate Changes: N/A  Dialysis Catheter Dressing: n/a pt left avf covered, dressing, clean dry and intact.   Last Dressing Change: n/a    Electronic Signatures:     Authored:     Last Updated: 1:42 PM by WARD MUNOZ

## 2024-12-26 NOTE — CARE PLAN
The patient's goals for the shift include      The clinical goals for the shift include monitor labs    Problem: Diabetes  Goal: Decrease in ketones present in urine by end of shift  Outcome: Progressing  Goal: Maintain electrolyte levels within acceptable range throughout shift  Outcome: Progressing  Goal: No changes in neurological exam by end of shift  Outcome: Progressing  Goal: Learn about and adhere to nutrition recommendations by end of shift  Outcome: Progressing  Goal: Receive DSME education by end of shift  Outcome: Progressing     Problem: Pain - Adult  Goal: Verbalizes/displays adequate comfort level or baseline comfort level  Outcome: Progressing     Problem: Safety - Adult  Goal: Free from fall injury  Outcome: Progressing     Problem: Discharge Planning  Goal: Discharge to home or other facility with appropriate resources  Outcome: Progressing     Problem: Chronic Conditions and Co-morbidities  Goal: Patient's chronic conditions and co-morbidity symptoms are monitored and maintained or improved  Outcome: Progressing

## 2024-12-26 NOTE — PROGRESS NOTES
12/26/24 1408   Discharge Planning   Expected Discharge Disposition Home     No skilled needs    **Patient has a  safe discharge plan**

## 2024-12-26 NOTE — PROGRESS NOTES
William A Franke is a 74 y.o. male on day 5 of admission presenting with Acute encephalopathy.    Subjective   Interval History:   Patient seen and examined  Undergoing dialysis  No new complaints        Review of Systems   All other systems reviewed and are negative.      Objective   Range of Vitals (last 24 hours)  Heart Rate:  [53-68]   Temp:  [36.4 °C (97.5 °F)-36.7 °C (98.1 °F)]   Resp:  [17-18]   BP: (172-197)/(43-71)   SpO2:  [96 %-98 %]   Daily Weight  12/21/24 : 81.6 kg (180 lb)    Body mass index is 28.19 kg/m².    Physical Exam  Constitutional:       Appearance: Normal appearance.   HENT:      Head: Normocephalic and atraumatic.      Nose: Nose normal.   Eyes:      General: No scleral icterus.     Extraocular Movements: Extraocular movements intact.      Conjunctiva/sclera: Conjunctivae normal.   Cardiovascular:      Rate and Rhythm: Normal rate and regular rhythm.      Heart sounds: Normal heart sounds.   Pulmonary:      Effort: Pulmonary effort is normal.      Breath sounds: Normal breath sounds.   Abdominal:      General: Bowel sounds are normal.      Palpations: Abdomen is soft.   Musculoskeletal:      Cervical back: Normal range of motion and neck supple.      Right lower leg: No edema.      Left lower leg: No edema.   Skin:     General: Skin is warm and dry.   Neurological:      Mental Status: He is alert.   Psychiatric:         Behavior: Behavior is cooperative.     Antibiotics  ceFAZolin - 2 gram/100 mL    Relevant Results  Labs  Results from last 72 hours   Lab Units 12/24/24  0520   WBC AUTO x10*3/uL 7.9   HEMOGLOBIN g/dL 9.7*   HEMATOCRIT % 29.0*   PLATELETS AUTO x10*3/uL 155     Results from last 72 hours   Lab Units 12/24/24  0521   SODIUM mmol/L 137   POTASSIUM mmol/L 3.7   CHLORIDE mmol/L 100   CO2 mmol/L 28   BUN mg/dL 28*   CREATININE mg/dL 6.01*   GLUCOSE mg/dL 106*   CALCIUM mg/dL 8.8   ANION GAP mmol/L 13   EGFR mL/min/1.73m*2 9*         Estimated Creatinine Clearance: 10.7 mL/min (A)  "(by C-G formula based on SCr of 6.01 mg/dL (H)).  No results found for: \"CRP\"  Microbiology  Susceptibility data from last 14 days.  Collected Specimen Info Organism Clindamycin Erythromycin Oxacillin Tetracycline Trimethoprim/Sulfamethoxazole Vancomycin   12/22/24 Blood culture from Peripheral Venipuncture Staphylococcus aureus         12/21/24 Blood culture from Peripheral Venipuncture Methicillin Susceptible Staphylococcus aureus (MSSA)  S  S  S  S  S  S   12/21/24 Blood culture from Peripheral Venipuncture Staphylococcus aureus             Imaging  CT chest wo IV contrast    Result Date: 12/26/2024  Interpreted By:  Frank Dang, STUDY: CT CHEST WO IV CONTRAST; 12/25/2024 11:55 am   INDICATION: Signs/Symptoms:evaluate for pneumonia   COMPARISON: Plain radiographs from the previous day and CT scan from September 2021.   ACCESSION NUMBER(S): [id number]   ORDERING CLINICIAN: HUNTER CHÁVEZ   TECHNIQUE: Spiral axial unenhanced images were obtained through the chest. Post processing sagittal and coronal reconstruction images were also performed.   All CT examinations are performed with one or more of the following dose reduction techniques: Automated Exposure Control, adjustment of mA and/or kV according to patient size, or use of iterative reconstruction techniques.   PATIENT RADIATION EXPOSURE DATA: CTDI (mGy): DLP (mGy/cm):   FINDINGS Extensive atherosclerotic calcifications involve coronary arteries and to a lesser degree the aorta. There is no aortic aneurysm.   There is no mediastinal, hilar or axillary lymphadenopathy. There is moderate right pleural effusion, with atelectatic changes/infiltrates in the right lung. An 8 mm nodule is now seen in the as ago esophageal recess of the right lobe (axial image number 164). Ill-defined nodular consolidation measuring 16.5 mm is noted in the left upper lobe (axial image number 68). There is irregular nodular density measuring 8 mm in the left upper lobe (axial " image number 70). Ill-defined 7.4 mm nodular density seen in the left upper lobe more inferiorly (axial image number 109). Several smaller fibro nodular densities are seen in the superior segment of the left lower lobe. Subpleural vague nodular density measuring up to 1.5 cm is seen in the left upper lobe posteriorly/laterally (axial image number 154).   Images from the upper abdomen demonstrate calcifications in the visualized portion of the right kidney, most likely nonobstructing stones. There is also stable 1.3 cm hypodensity in the left lobe of the liver, most likely a cyst       1. Moderate right pleural effusion is of indeterminate significance, possibly related to pneumonia, however malignant effusion can not be excluded. Follow-up as needed. If indicated further evaluate with thoracentesis. 2. Several bilateral ill-defined nodular densities as above, not present on the CT scan from 2021, possibly infectious/inflammatory versus neoplastic. Short-term follow-up recommended.   MACRO: Critical Finding:  See findings. Notification was initiated on 12/26/2024 at 5:16 pm by  Frank Dang.  (**-YCF-**) Instructions:   Signed by: Frank Dang 12/26/2024 5:16 PM Dictation workstation:   [id number]    Electrocardiogram, 12-lead ACS symptoms    Result Date: 12/24/2024  Normal sinus rhythm Left ventricular hypertrophy with repolarization abnormality ( R in aVL , Sokolow-Barriga , David product , Romhilt-Thornton ) Abnormal ECG No previous ECGs available Confirmed by Jonathan Masters ([id number]) on 12/24/2024 2:27:57 PM    XR chest 1 view    Result Date: 12/24/2024  Interpreted By:  Frank Dang, STUDY: XR CHEST 1 VIEW; 12/24/2024 9:51 am   INDICATION: Signs/Symptoms:Pneumonia   COMPARISON: 12 21 2024.   ACCESSION NUMBER(S): [id number]   ORDERING CLINICIAN: BAHMAN BLANK   FINDINGS: The study is limited due to  rotation/lordotic projection. The cardiomediastinal silhouette is within normal limits for the technique.  There is no pneumothorax or confluence infiltrates. There is blunting of the right costophrenic angle, due to improving small right pleural effusion. The osseous structures are unremarkable.       Improving small right pleural effusion.   Signed by: Frank Dang 12/24/2024 12:53 PM Dictation workstation:   [id number]    Transthoracic Echo (TTE) Limited    Result Date: 12/24/2024           [place] [id number]            Phone [phone] TRANSTHORACIC ECHOCARDIOGRAM REPORT Patient Name:       WILLIAM A FRANKE Reading Physician:    [id number] [address] [facility]                                                                Study Date:         12/24/2024          Ordering Provider:    [id number] BAHMAN BLANK MRN/PID:            [id number]            Fellow: Accession#:         [id number]        Nurse: Date of Birth/Age:  1950 / 74     Sonographer:          Nakita PUENTES Gender Assigned at  M                   Additional Staff: Birth: Height:             170.20 cm           Admit Date:           12/22/2024 Weight:             81.65 kg            Admission Status:     Inpatient -                                                               Routine BSA / BMI:          1.93 m2 / 28.19     Department Location:                     kg/m2 Blood Pressure: 169 /55 mmHg Study Type:    TRANSTHORACIC ECHO (TTE) LIMITED Diagnosis/ICD: Bacteremia-R78.81 Indication:    Endocarditis CPT Codes:     Echo Limited-24706 Patient History: Pertinent History: CDK stageIV Stable Angina HTN CP HLD CAD AS CHF Chronic Liver                    Disease NSTEMI Atrteriovenous Fistula Stenosis. Study Detail: The following Echo studies were performed: 2D, M-Mode, Doppler and               color flow. Unable to obtain suprasternal notch and subcostal               view.   PHYSICIAN INTERPRETATION: Left Ventricle: The left ventricular systolic function is normal, with a visually estimated ejection fraction of 60-65%. There are no regional wall motion abnormalities. The left ventricular cavity size is normal. There is normal septal and mildly increased posterior left ventricular wall thickness. There is left ventricular concentric remodeling. Left ventricular diastolic filling was not assessed. Left Atrium: The left atrium is upper limits of normal in size. Right Ventricle: The right ventricle is normal in size. There is normal right ventricular global systolic function. Right Atrium: The right atrium is normal in size. Aortic Valve: The aortic valve is trileaflet. There is no evidence of aortic valve regurgitation. The peak instantaneous gradient of the aortic valve is 15 mmHg. Mitral Valve: The mitral valve is normal in structure. There is trace to mild mitral valve regurgitation. Tricuspid Valve: The tricuspid valve is structurally normal. No evidence of tricuspid regurgitation. Pulmonic Valve: The pulmonic valve is structurally normal. There is trace pulmonic valve regurgitation. Pericardium: No pericardial effusion noted. Aorta: The aortic root is normal.  CONCLUSIONS:  1. The left ventricular systolic function is normal, with a visually estimated ejection fraction of 60-65%.  2. There is normal right ventricular global systolic function.  3. No valvular vegetations visualized. QUANTITATIVE DATA SUMMARY:  2D MEASUREMENTS:           Normal Ranges: IVSd:            0.80 cm   (0.6-1.1cm) LVPWd:           1.29 cm   (0.6-1.1cm) LVIDd:           5.00 cm   (3.9-5.9cm) LVIDs:           3.39 cm LV Mass Index:   99.7 g/m2 LV % FS          32.2 %  LA VOLUME:                    Normal Ranges: LA Vol A4C:        96.5 ml    (22+/-6mL/m2) LA Vol A2C:        96.6 ml LA Vol BP:         100.8 ml LA Vol Index A4C:  49.9ml/m2 LA Vol Index A2C:  50.0 ml/m2 LA Vol Index BP:   52.1 ml/m2 LA Area A4C:        26.1 cm2 LA Area A2C:       25.0 cm2 LA Major Axis A4C: 6.0 cm LA Major Axis A2C: 5.5 cm LA Volume Index:   51.9 ml/m2 LA Vol A4C:        88.7 ml LA Vol A2C:        93.9 ml LA Vol Index BSA:  47.2 ml/m2  RA VOLUME BY A/L METHOD:            Normal Ranges: RA Vol A4C:              33.9 ml    (8.3-19.5ml) RA Vol Index A4C:        17.5 ml/m2 RA Area A4C:             15.2 cm2 RA Major Axis A4C:       5.8 cm  M-MODE MEASUREMENTS:         Normal Ranges: LAs:                 5.25 cm (2.7-4.0cm)  LV SYSTOLIC FUNCTION BY 2D PLANIMETRY (MOD):                      Normal Ranges: EF-A4C View:    58 % (>=55%) EF-A2C View:    63 % EF-Biplane:     60 % EF-Visual:      63 % LV EF Reported: 63 %  LV DIASTOLIC FUNCTION:          Normal Ranges: MV Peak E:             0.84 m/s (0.7-1.2 m/s) MV Peak A:             1.14 m/s (0.42-0.7 m/s) E/A Ratio:             0.74     (1.0-2.2)  MITRAL VALVE:          Normal Ranges: MV DT:        173 msec (150-240msec)  AORTIC VALVE:            Normal Ranges: AoV Vmax:      1.93 m/s  (<=1.7m/s) AoV Peak P.9 mmHg (<20mmHg) LVOT Max Joss:  1.24 m/s  (<=1.1m/s) LVOT VTI:      33.46 cm LVOT Diameter: 1.95 cm   (1.8-2.4cm) AoV Area,Vmax: 1.91 cm2  (2.5-4.5cm2)  RIGHT VENTRICLE: RV Basal 2.70 cm RV Mid   2.40 cm RV Major 6.3 cm  TRICUSPID VALVE/RVSP:          Normal Ranges: Peak TR Velocity:     2.83 m/s RV Syst Pressure:     35 mmHg  (< 30mmHg)  PULMONIC VALVE:           Normal Ranges: PV Accel Time:  86 msec   (>120ms) PV Max Joss:     1.6 m/s   (0.6-0.9m/s) PV Max PG:      10.3 mmHg  [id number] Jared Rooney DO Electronically signed on 2024 at 10:02:08 AM  ** Final **     XR chest 1 view    Result Date: 2024  STUDY: Chest Radiograph;  [2024; 11:41 am] INDICATION: Altered. Cough. COMPARISON: XR chest 2024 ACCESSION NUMBER(S): [id number] ORDERING CLINICIAN: FELIPE SINGH TECHNIQUE:  Frontal chest was obtained at 11:41 hours. FINDINGS: CARDIOMEDIASTINAL SILHOUETTE: The  cardiomediastinal silhouette is enlarged but stable compared to previous imaging..  LUNGS: The lungs demonstrate mild pulmonary vascular prominence which may represent mild vascular congestion.  There is blunting of the right costophrenic angle which could represent a possible small effusion..  ABDOMEN: No remarkable upper abdominal findings.  BONES: No acute osseous changes.  Multiple vascular stents are noted within the left upper extremity.    1.  Pulmonary vascular prominence which may represent mild pulmonary vascular congestion. 2.  Blunting of the right costophrenic angle laterally which could represent a small pleural effusion.. Signed by Tk Geller MD    CT head wo IV contrast    Result Date: 12/21/2024  Interpreted By:  Jonas Rinaldi, STUDY: CT HEAD WO IV CONTRAST;  12/21/2024 11:45 am   INDICATION: Signs/Symptoms:altered.     COMPARISON: 11/16/2024   ACCESSION NUMBER(S): [id number]   ORDERING CLINICIAN: FELIPE SINGH   TECHNIQUE: Unenhanced images were obtained through the brain.   FINDINGS: There is atrophy resulting in prominence of the ventricles and sulci. There are areas of decreased attenuation within the white matter which are nonspecific but are commonly associated with small vessel ischemic disease. There is no mass effect or midline shift. No acute intracranial hemorrhage is identified. No extra-axial fluid collections are seen. No intraparenchymal mass lesions are identified.  Bone windows demonstrate no evidence of an acute calvarial fracture. There is mucosal in the paranasal sinuses, most conspicuous involving the left maxillary sinus.       No evidence of an acute intracranial process.   MACRO: None.   Signed by: Jonas Rinaldi 12/21/2024 12:21 PM Dictation workstation:   [id number]     Assessment/Plan   Encephalopathy, septic, resolved  MSSA bacteremia, source unclear-negative TTE  Bilateral pleural effusion-thoracentesis planned  Abnormal chest x-ray-congestion, rule out infection-elevated  procalcitonin       IV cefazolin  Follow-up repeat blood cultures-12/25/2024  Follow-up repeat blood cultures-12/22/2024  Monitor temperature and WBC  Further recommendations based on culture results      Dave Pathak MD

## 2024-12-26 NOTE — CARE PLAN
The patient's goals for the shift include patient sleeps at least 6 hours throughout the shift     The clinical goals for the shift include no fall or injury by the end of shift    Problem: Diabetes  Goal: Decrease in ketones present in urine by end of shift  Outcome: Progressing     Problem: Diabetes  Goal: Maintain electrolyte levels within acceptable range throughout shift  Outcome: Progressing     Problem: Safety - Adult  Goal: Free from fall injury  Outcome: Progressing

## 2024-12-26 NOTE — PROGRESS NOTES
"Pulmonary Daily Progress Note   Subjective    William A Franke is a 74 y.o. year old male patient known with ESRD through left arm AV fistula, HTN, HFpEF admitted on 12/21/2024 with following encephalopathy and sepsis. Found to have MSSA bacteremia. Pulmonary are consulted for possible pneumonia as etiology of MSSA bacteremia     Interval History:  Patient just arrived back from dialysis. No c/o cough, SOB, chest pain fever. Patient laying flat with no discomfort. On room air    Meds    Scheduled medications  allopurinol, 100 mg, oral, Daily  amLODIPine, 5 mg, oral, Daily  aspirin, 81 mg, oral, Daily  atorvastatin, 80 mg, oral, Nightly  carvedilol, 6.25 mg, oral, BID  ceFAZolin, 2 g, intravenous, q24h LUKE  epoetin jeison or biosimilar, 10,000 Units, subcutaneous, Once per day on Monday Wednesday Friday  ezetimibe, 10 mg, oral, Daily  heparin (porcine), 5,000 Units, subcutaneous, q12h  [Held by provider] hydrALAZINE, 50 mg, oral, TID  insulin lispro, 0-10 Units, subcutaneous, TID AC  isosorbide mononitrate ER, 60 mg, oral, Daily  pantoprazole, 40 mg, oral, Daily  sevelamer carbonate, 1,600 mg, oral, TID  SITagliptin phosphate, 25 mg, oral, Daily      Continuous medications  oxygen,       PRN medications  PRN medications: acetaminophen **OR** acetaminophen **OR** acetaminophen, acetaminophen **OR** acetaminophen **OR** acetaminophen, cloNIDine, dextrose, dextrose, glucagon, glucagon, guaiFENesin, nitroglycerin, polyethylene glycol     Objective    Blood pressure (!) 187/43, pulse 53, temperature 36.5 °C (97.7 °F), temperature source Tympanic, resp. rate 17, height 1.702 m (5' 7\"), weight 81.6 kg (180 lb), SpO2 97%.   Physical Exam   GENERAL: No respiratory distress  HEAD/SINUSES: RA  OROPHARYNX: some dental discoloration of front upper teeth  LUNGS: clear bilaterally  CARDIAC: Regular rate and rhythm with murmur present   EXTREMITIES: No edema,  NEURO: grossly normal mental status  SKIN: Skin turgor normal.   PSYCH: " Normal affect      Labs:   Results from last 72 hours   Lab Units 12/24/24  0521   SODIUM mmol/L 137   POTASSIUM mmol/L 3.7   CHLORIDE mmol/L 100   CO2 mmol/L 28   BUN mg/dL 28*   CREATININE mg/dL 6.01*   GLUCOSE mg/dL 106*   CALCIUM mg/dL 8.8   ANION GAP mmol/L 13   EGFR mL/min/1.73m*2 9*      Results from last 72 hours   Lab Units 12/24/24  0520   WBC AUTO x10*3/uL 7.9   HEMOGLOBIN g/dL 9.7*   HEMATOCRIT % 29.0*   PLATELETS AUTO x10*3/uL 155               Micro/ID:   Lab Results   Component Value Date    BLOODCULT Loaded on Instrument - Culture in progress 12/25/2024     Summary of key imaging results from the last 24 hours  CXR 12/24/24 IMPRESSION: Improving small right pleural effusion.  CT chest WO 12/25/24 - no official read yet -showing multifocal nodular changes and bilateral pleural effusion greater on the right,  TTE:   1. The left ventricular systolic function is normal, with a visually estimated ejection fraction of 55-60%.   2. Spectral Doppler shows a Grade II (pseudonormal pattern) of left ventricular diastolic filling with an elevated left atrial pressure.   3. There is normal right ventricular global systolic function.   4. Mild mitral valve regurgitation.   5. There is moderately increased posterior left ventricular wall thickness.  Impression   William A Franke is a 74 y.o. year old male patient known with ESRD through left arm AV fistula, HTN, HFpEF admitted on 12/21/2024 with following encephalopathy and sepsis. Found to have MSSA bacteremia. Pulmonary are consulted for possible pneumonia as etiology of MSSA bacteremia   Acute hypoxic respiratory failure resolved  MSSA bacteremia unsure of the source has fistula, possible lung source with cough prior to admission and xray with possible haziness on right side CT chest without contrast - showing multifocal nodular changes and bilateral pleural effusion greater on the right,  HFpEF  Suspected pneumonia, cough+, Sepsis, MSSA +, abnormal CXR. Procal  (5.19) not very reliable in patients with ESRD   Pleural effusion - CT with moderate pleural effusion, multifocal nodular changes      Recommendations   As follows:  Antibiotic choice and duration according to Infectious disease - cefazolin  CT chest without contrast - showing multifocal nodular changes and bilateral pleural effusion greater on the right (no official read yet)  IR consulted for diagnostic thoracentesis for moderate pleural effusion potential source of MSSA, patient is agreeable for procedure but is awaiting a NM scan tomorrow (12/27) and wants to make sure this is done first. Thorough discussion with the patient about the risks and benefits of thoracentesis, patient understands and acknowledges potential complications such as pneumothorax, bleeding, and infection. Patients questions have been addressed and agreeable to proceeding with the procedure as long as it does not interfere with NM body scan.   Incentive spirometry   Sputum culture  Repeat blood cultures pending  Pulmonary will continue to follow  Reviewed case with Dr. Haywood who is in full agreement with above note   Claudia Hernández, VERÓNICA-CNP   12/26/24 at 1:04 PM     -Only the Medical problems listed under impression were addressed today.   -Please contact primary team for all other concerns and medical problem  -Thank you for your consult       Disclaimer: Documentation completed with the information available at the time of input. Parts of this note may have been scribed or generated using voice dictation software, Dragon.  Homophonic errors may exist.  Please contact me directly if clarification is needed. The times in the chart may not be reflective of actual patient care times, interventions, or procedures. Documentation occurs after the physical care of the patient.

## 2024-12-26 NOTE — PROGRESS NOTES
William A Franke is a 74 y.o. male on day 5 of admission presenting with Acute encephalopathy.      Subjective   For wbc scan today       Objective     Last Recorded Vitals  BP (!) 187/43 (BP Location: Right arm, Patient Position: Lying)   Pulse 53   Temp 36.5 °C (97.7 °F) (Tympanic)   Resp 17   Wt 81.6 kg (180 lb)   SpO2 97%   Intake/Output last 3 Shifts:    Intake/Output Summary (Last 24 hours) at 12/26/2024 1117  Last data filed at 12/26/2024 1100  Gross per 24 hour   Intake 500 ml   Output --   Net 500 ml       Admission Weight  Weight: 81.6 kg (180 lb) (12/21/24 1049)    Daily Weight  12/21/24 : 81.6 kg (180 lb)    Image Results  Electrocardiogram, 12-lead ACS symptoms  Normal sinus rhythm  Left ventricular hypertrophy with repolarization abnormality ( R in aVL , Sokolow-Barriga , Union product , Romhilt-Thornton )  Abnormal ECG  No previous ECGs available  Confirmed by Jonathan Masters ([id number]) on 12/24/2024 2:27:57 PM  XR chest 1 view  Narrative: Interpreted By:  Frank Dang,   STUDY:  XR CHEST 1 VIEW; 12/24/2024 9:51 am      INDICATION:  Signs/Symptoms:Pneumonia      COMPARISON:  12 21 2024.      ACCESSION NUMBER(S):  [id number]      ORDERING CLINICIAN:  BAHMAN BLANK      FINDINGS:  The study is limited due to  rotation/lordotic projection.  The cardiomediastinal silhouette is within normal limits for the  technique. There is no pneumothorax or confluence infiltrates.  There is blunting of the right costophrenic angle, due to improving  small right pleural effusion. The osseous structures are unremarkable.      Impression: Improving small right pleural effusion.      Signed by: Frank Dang 12/24/2024 12:53 PM  Dictation workstation:   [id number]  Transthoracic Echo (TTE) [place] [id number]             Phone [phone]    TRANSTHORACIC ECHOCARDIOGRAM REPORT    Patient Name:       WILLIAM A FRANKE    Reading Physician:    [id number]  Jared Rooney DO  Study Date:         12/24/2024          Ordering Provider:    [id number] BAHMAN SOLOMON                                                                ROSAMARIA  MRN/PID:            [id number]            Fellow:  Accession#:         [id number]        Nurse:  Date of Birth/Age:  1950 / 74     Sonographer:          Nakita PUENTES  Gender Assigned at  M                   Additional Staff:  Birth:  Height:             170.20 cm           Admit Date:           12/22/2024  Weight:             81.65 kg            Admission Status:     Inpatient -                                                                Routine  BSA / BMI:          1.93 m2 / 28.19     Department Location:                      kg/m2  Blood Pressure: 169 /55 mmHg    Study Type:    TRANSTHORACIC ECHO (TTE) LIMITED  Diagnosis/ICD: Bacteremia-R78.81  Indication:    Endocarditis  CPT Codes:     Echo Limited-42799    Patient History:  Pertinent History: CDK stageIV Stable Angina HTN CP HLD CAD AS CHF Chronic Liver                     Disease NSTEMI Atrteriovenous Fistula Stenosis.    Study Detail: The following Echo studies were performed: 2D, M-Mode, Doppler and                color flow. Unable to obtain suprasternal notch and subcostal                view.       PHYSICIAN INTERPRETATION:  Left Ventricle: The left ventricular systolic function is normal, with a visually estimated ejection fraction of 60-65%. There are no regional wall motion abnormalities. The left ventricular cavity size is normal. There is normal septal and mildly increased posterior left ventricular wall thickness. There is left ventricular concentric remodeling. Left ventricular diastolic filling was not assessed.  Left Atrium: The left atrium is upper limits of normal in size.  Right Ventricle: The right ventricle is normal in size. There is  normal right ventricular global systolic function.  Right Atrium: The right atrium is normal in size.  Aortic Valve: The aortic valve is trileaflet. There is no evidence of aortic valve regurgitation. The peak instantaneous gradient of the aortic valve is 15 mmHg.  Mitral Valve: The mitral valve is normal in structure. There is trace to mild mitral valve regurgitation.  Tricuspid Valve: The tricuspid valve is structurally normal. No evidence of tricuspid regurgitation.  Pulmonic Valve: The pulmonic valve is structurally normal. There is trace pulmonic valve regurgitation.  Pericardium: No pericardial effusion noted.  Aorta: The aortic root is normal.       CONCLUSIONS:   1. The left ventricular systolic function is normal, with a visually estimated ejection fraction of 60-65%.   2. There is normal right ventricular global systolic function.   3. No valvular vegetations visualized.    QUANTITATIVE DATA SUMMARY:     2D MEASUREMENTS:           Normal Ranges:  IVSd:            0.80 cm   (0.6-1.1cm)  LVPWd:           1.29 cm   (0.6-1.1cm)  LVIDd:           5.00 cm   (3.9-5.9cm)  LVIDs:           3.39 cm  LV Mass Index:   99.7 g/m2  LV % FS          32.2 %       LA VOLUME:                    Normal Ranges:  LA Vol A4C:        96.5 ml    (22+/-6mL/m2)  LA Vol A2C:        96.6 ml  LA Vol BP:         100.8 ml  LA Vol Index A4C:  49.9ml/m2  LA Vol Index A2C:  50.0 ml/m2  LA Vol Index BP:   52.1 ml/m2  LA Area A4C:       26.1 cm2  LA Area A2C:       25.0 cm2  LA Major Axis A4C: 6.0 cm  LA Major Axis A2C: 5.5 cm  LA Volume Index:   51.9 ml/m2  LA Vol A4C:        88.7 ml  LA Vol A2C:        93.9 ml  LA Vol Index BSA:  47.2 ml/m2       RA VOLUME BY A/L METHOD:            Normal Ranges:  RA Vol A4C:              33.9 ml    (8.3-19.5ml)  RA Vol Index A4C:        17.5 ml/m2  RA Area A4C:             15.2 cm2  RA Major Axis A4C:       5.8 cm       M-MODE MEASUREMENTS:         Normal Ranges:  LAs:                 5.25 cm  (2.7-4.0cm)       LV SYSTOLIC FUNCTION BY 2D PLANIMETRY (MOD):                       Normal Ranges:  EF-A4C View:    58 % (>=55%)  EF-A2C View:    63 %  EF-Biplane:     60 %  EF-Visual:      63 %  LV EF Reported: 63 %       LV DIASTOLIC FUNCTION:          Normal Ranges:  MV Peak E:             0.84 m/s (0.7-1.2 m/s)  MV Peak A:             1.14 m/s (0.42-0.7 m/s)  E/A Ratio:             0.74     (1.0-2.2)       MITRAL VALVE:          Normal Ranges:  MV DT:        173 msec (150-240msec)       AORTIC VALVE:            Normal Ranges:  AoV Vmax:      1.93 m/s  (<=1.7m/s)  AoV Peak P.9 mmHg (<20mmHg)  LVOT Max Joss:  1.24 m/s  (<=1.1m/s)  LVOT VTI:      33.46 cm  LVOT Diameter: 1.95 cm   (1.8-2.4cm)  AoV Area,Vmax: 1.91 cm2  (2.5-4.5cm2)       RIGHT VENTRICLE:  RV Basal 2.70 cm  RV Mid   2.40 cm  RV Major 6.3 cm       TRICUSPID VALVE/RVSP:          Normal Ranges:  Peak TR Velocity:     2.83 m/s  RV Syst Pressure:     35 mmHg  (< 30mmHg)       PULMONIC VALVE:           Normal Ranges:  PV Accel Time:  86 msec   (>120ms)  PV Max Joss:     1.6 m/s   (0.6-0.9m/s)  PV Max PG:      10.3 mmHg       [id number] [facility]  Electronically signed on 2024 at 10:02:08 AM       ** Final **      Physical Exam    Relevant Results               Assessment/Plan                  Assessment & Plan  Acute encephalopathy    Possible dc in am              Desirae Alicea MD

## 2024-12-26 NOTE — PROGRESS NOTES
Cancer synopsis:  Rad/onc: Dr. Gordon/ Weisent CNP    74 -year-old male returns to the radiation oncology clinic for ongoing consultation regarding his unfavorable intermediate risk prostate cancer.  MRI of  the pelvis was completed on 1/4/2020 which demonstrated a 5.3 x 3.8 x 3.7 cm prostate.  There was a PIRADS 4 lesion in the right peripheral zone extending from the mid gland to the apex.  There was no evidence of extra prosthetic extension.  There was  no involvement of the seminal vesicles.  There was no evidence of an enlarged pelvic lymph nodes.  As stated at the prior visit, the patient denies any prior history of cancer, chemotherapy, radiotherapy, autoimmune or connective tissue disorder, or cardiac  device.  He completed a screening colonoscopy in November 2018.     05/05/2022: Prostate and SV VMAT    PMH:  DM2, constipation, CKD III (dialysis), glaucoma, HLD, BPH, pulmonary HTN, LIAM    Recent imaging:  none    History of presenting illness:    Patient ID: [id number]     William A Franke is a 74 y.o. male who presents for his UIR prostate cancer GS 4+3=7, IPSA <10 now s/p RT    RT Site: Prostate and SV  RT Date: 05/05/2022  Hormone therapy: No  Hot Flushes: Denies  Fatigue: Denies  Bone pain: Denies  SAMARA: n/a   ED: Denies changes in erectile function since last visit.  ED medications: No  IPSS: n/a virtual  Urinary symptoms: Denies dysuria, hematuria, frequency, urgency or urine leakage. Recently had dialysis port placed and currently is receiving dialysis, does still produce urine per patient.  Urinary Medications: No  Rectal bleeding: Denies  Colonoscopy: 04/2021: bleeding internal hemmoirds, 1 polyp removed next in 5yrs  Other systems: Denies SOB, CP or fever. Currently admitted for ongoing encephalopthy and sepsis. Currently has MSSA bacteremia.    Review of systems:  Review of Systems   Constitutional:  Negative for fatigue, fever and unexpected weight change.   Respiratory:  Negative for cough, chest  tightness and shortness of breath.    Cardiovascular:  Negative for chest pain, palpitations and leg swelling.   Gastrointestinal:  Negative for abdominal pain, anal bleeding, blood in stool, constipation, diarrhea and rectal pain.   Endocrine: Negative for cold intolerance, heat intolerance and polyuria.   Genitourinary:  Negative for decreased urine volume, difficulty urinating, dysuria, frequency, hematuria and urgency.   Musculoskeletal:  Negative for arthralgias, back pain, gait problem and joint swelling.   Skin: Negative.    Allergic/Immunologic: Negative.    Neurological:  Negative for dizziness, syncope and weakness.   Psychiatric/Behavioral: Negative.         Past Medical history  Past Medical History:   Diagnosis Date    Chronic kidney disease     Diabetes mellitus (Multi)     ESRD (end stage renal disease) on dialysis (Multi)     Heart murmur     HLD (hyperlipidemia)     Hypertension     Personal history of other endocrine, nutritional and metabolic disease     History of hypercholesterolemia    Personal history of other specified conditions 08/28/2020    History of chest pain        Surgical/family history  Family History   Problem Relation Name Age of Onset    Alzheimer's disease Mother      Heart attack Mother      Diabetes Father      Heart attack Father        Past Surgical History:   Procedure Laterality Date    AV FISTULA PLACEMENT Left 07/31/2024    left brachial artery to axillary vein AV graft on 7/31/2024    KNEE ARTHROSCOPY W/ DEBRIDEMENT  04/15/2013    Arthroscopy Knee    SHOULDER SURGERY  04/15/2013    Shoulder Surgery        Social History  Tobacco Use: Low Risk  (12/10/2024)    Patient History     Smoking Tobacco Use: Never     Smokeless Tobacco Use: Never     Passive Exposure: Not on file         Current med list:  Current Outpatient Medications   Medication Instructions    allopurinol (ZYLOPRIM) 100 mg, oral, Daily    amLODIPine (NORVASC) 5 mg, oral, Daily    aspirin 81 mg, Daily     "atorvastatin (LIPITOR) 80 mg, oral, Nightly    B complex-vitamin C-folic acid (Nephro-Shea) 0.8 mg tablet 0.8 mg, Daily    BD Ultra-Fine Mini Pen Needle 31 gauge x 3/16\" needle USE AS DIRECTED 4 times a day    carvedilol (COREG) 6.25 mg, oral, 2 times daily (morning and late afternoon)    ergocalciferol (VITAMIN D-2) 50,000 Units, Every 14 days    escitalopram (LEXAPRO) 5 mg, oral, Nightly    ezetimibe (ZETIA) 10 mg, oral, Daily    gabapentin (NEURONTIN) 300 mg, oral, Nightly    hydrALAZINE (APRESOLINE) 50 mg, oral, 3 times daily    insulin lispro (HUMALOG) 10 Units, 3 times daily (morning, midday, late afternoon)    isosorbide mononitrate ER (IMDUR) 60 mg, oral, Daily, Do not crush or chew.    Januvia 25 mg, oral, Daily    nitroglycerin (NITROSTAT) 0.4 mg, Every 5 min PRN    oxygen (O2) gas therapy 1 each, inhalation, Continuous    pantoprazole (PROTONIX) 40 mg, Daily    sevelamer carbonate (Renvela) 800 mg tablet Take 2 tablets (1,600 mg) by mouth 3 times daily (morning, midday, late afternoon). Three times daily with meals        Last recorded vital:  N/a virtual    Physical exam  N/a virtual    Pertinent labs:  CBC Differential Path Review   Date/Time Value Ref Range Status   10/20/2022 05:45 AM GINA  Final     Comment:      By her/his signature above, the Pathologist   listed as making the final interpretation   certifies that she/he has personally reviewed    this case.  ----------------------------------------------   MICROCYTIC ANEMIA WITH ANISOPOIKILOCYTOSIS AND POLYCHROMASIA.  IRON, B12 AND FOLATE STUDIES MAYBE CONSIDERED IF CLINICALLY INDICATED.       Prostate Specific AG   Date/Time Value Ref Range Status   12/10/2024 04:13 PM 0.55 <=4.00 ng/mL Final     Dx:  Problem List Items Addressed This Visit    None  PSA of 0.55 was reviewed and stable. Will continue q6m PSA Review of latent SE including rectal bleeding, hematuria, urinary strictures, ED where reviewed as well as how to contact office if s/s " present. Denies latent SE. NCCN guidelines where reviewed and routine FUV of every 3m for first year and every 6m for four years for a total of five years was discussed. Patient verbalized understanding.      PLAN:  FUV 6m  Labs PSA in 6m  Imaging none  FUV other providers: PCP for routine evals    Please contact office with any concerns:  Minh Armando CNP  [phone]    I performed this visit using realtime telehealth tools, including an audio/video OR telephone connection between patient’s name and location Franke, William and Minh Samson CNP.    2. POS 10: Telehealth provided in patient's home.  o Patient is located in their home (which is a location other than a hospital or other facility where the patient receives care in a private residence) when receiving health services or health related services through telecommunication technology.

## 2024-12-26 NOTE — PROGRESS NOTES
"William A Franke is a 74 y.o. male on day 5 of admission presenting with Acute encephalopathy.    Subjective   Patient seen for end-stage kidney disease on dialysis therapy admitted with altered mental status and hypotension he does have MSSA bacteremia source is not clear patient scheduled for indium scan today he is seen and examined on dialysis therapy starting procedure well no new complaints    Objective     Physical Exam  Neck:      Vascular: No carotid bruit.   Cardiovascular:      Rate and Rhythm: Normal rate and regular rhythm.      Heart sounds: No murmur heard.     No friction rub. No gallop.   Pulmonary:      Breath sounds: No wheezing, rhonchi or rales.   Chest:      Chest wall: No tenderness.   Abdominal:      General: There is no distension.      Tenderness: There is no abdominal tenderness. There is no guarding or rebound.   Musculoskeletal:         General: No swelling or tenderness.      Cervical back: Neck supple.      Right lower leg: No edema.      Left lower leg: No edema.      Comments: Very minimal redness over his AV fistula without any drainage or tenderness   Lymphadenopathy:      Cervical: No cervical adenopathy.         Last Recorded Vitals  Blood pressure (!) 187/43, pulse 53, temperature 36.5 °C (97.7 °F), temperature source Tympanic, resp. rate 17, height 1.702 m (5' 7\"), weight 81.6 kg (180 lb), SpO2 97%.    Intake/Output last 3 Shifts:  I/O last 3 completed shifts:  In: 100 (1.2 mL/kg) [IV Piggyback:100]  Out: - (0 mL/kg)   Weight: 81.6 kg     Current Facility-Administered Medications:     acetaminophen (Tylenol) tablet 650 mg, 650 mg, oral, q4h PRN, 650 mg at 12/22/24 0758 **OR** acetaminophen (Tylenol) oral liquid 650 mg, 650 mg, nasogastric tube, q4h PRN **OR** acetaminophen (Tylenol) suppository 650 mg, 650 mg, rectal, q4h PRN, ERIKA Russ    acetaminophen (Tylenol) tablet 650 mg, 650 mg, oral, q4h PRN **OR** acetaminophen (Tylenol) oral liquid 650 mg, 650 mg, " oral, q4h PRN **OR** acetaminophen (Tylenol) suppository 650 mg, 650 mg, rectal, q4h PRN, Aliya Patel, APRN-CNP    allopurinol (Zyloprim) tablet 100 mg, 100 mg, oral, Daily, Aliya Supriyainger, APRN-CNP, 100 mg at 12/25/24 0950    amLODIPine (Norvasc) tablet 5 mg, 5 mg, oral, Daily, Aliya Patel, APRN-CNP, 5 mg at 12/25/24 0950    aspirin EC tablet 81 mg, 81 mg, oral, Daily, Aliya Eppsinger, APRN-CNP, 81 mg at 12/25/24 0950    atorvastatin (Lipitor) tablet 80 mg, 80 mg, oral, Nightly, Aliya Eppsinger, APRN-CNP, 80 mg at 12/25/24 2056    carvedilol (Coreg) tablet 6.25 mg, 6.25 mg, oral, BID, Aliya Patel, APRN-CNP, 6.25 mg at 12/26/24 0801    ceFAZolin (Ancef) 2 g in dextrose (iso)  mL, 2 g, intravenous, q24h LUEK, Dave Pathak MD, Stopped at 12/25/24 1615    cloNIDine (Catapres) tablet 0.1 mg, 0.1 mg, oral, q6h PRN, Desirae Alicea MD, 0.1 mg at 12/26/24 0500    dextrose 50 % injection 12.5 g, 12.5 g, intravenous, q15 min PRN, Aliya Patel APRN-CNP    dextrose 50 % injection 25 g, 25 g, intravenous, q15 min PRN, Aliya Patel, APRN-CNP    epoetin jeison-epbx (Retacrit) injection 10,000 Units, 10,000 Units, subcutaneous, Once per day on Monday Wednesday Friday, Elier Woodson MD, 10,000 Units at 12/25/24 0951    ezetimibe (Zetia) tablet 10 mg, 10 mg, oral, Daily, Aliya Patel, APRN-CNP, 10 mg at 12/25/24 0950    glucagon (Glucagen) injection 1 mg, 1 mg, intramuscular, q15 min PRN, Aliya Patel APRN-CNP    glucagon (Glucagen) injection 1 mg, 1 mg, intramuscular, q15 min PRAliya SHARMA APRN-CNP    guaiFENesin (Mucinex) 12 hr tablet 600 mg, 600 mg, oral, BID PRN, ERIKA Russ, 600 mg at 12/21/24 1849    heparin (porcine) injection 5,000 Units, 5,000 Units, subcutaneous, q12h, ERIKA Russ, 5,000 Units at 12/26/24 0500    [Held by provider] hydrALAZINE (Apresoline) tablet 50 mg, 50 mg, oral, TID, Desirae Alicea MD    insulin  lispro injection 0-10 Units, 0-10 Units, subcutaneous, TID AC, ERIKA Russ, 2 Units at 12/24/24 1619    isosorbide mononitrate ER (Imdur) 24 hr tablet 60 mg, 60 mg, oral, Daily, VERÓNICA Russ-CNP, 60 mg at 12/25/24 0950    nitroglycerin (Nitrostat) SL tablet 0.4 mg, 0.4 mg, sublingual, q5 min PRN, ERIKA Russ    oxygen (O2) therapy, , inhalation, Continuous, ERIKA Russ, 21 percent at 12/21/24 1508    pantoprazole (ProtoNix) EC tablet 40 mg, 40 mg, oral, Daily, ERIKA Russ, 40 mg at 12/25/24 0951    polyethylene glycol (Glycolax, Miralax) packet 17 g, 17 g, oral, Daily PRN, ERIKA Russ    sevelamer carbonate (Renvela) tablet 1,600 mg, 1,600 mg, oral, TID, ERIKA Russ, 1,600 mg at 12/26/24 0801    SITagliptin phosphate (Januvia) tablet 25 mg, 25 mg, oral, Daily, ERIKA Russ, 25 mg at 12/25/24 0950   Relevant Results    Results for orders placed or performed during the hospital encounter of 12/21/24 (from the past 96 hours)   POCT GLUCOSE   Result Value Ref Range    POCT Glucose 149 (H) 74 - 99 mg/dL   POCT GLUCOSE   Result Value Ref Range    POCT Glucose 168 (H) 74 - 99 mg/dL   Blood Culture    Specimen: Peripheral Venipuncture; Blood culture   Result Value Ref Range    Blood Culture Staphylococcus aureus (A)     BLOOD CULTURE BOTTLE  Positive Aerobic Bottle     Gram Stain Gram positive cocci, clusters (AA)    Blood Culture    Specimen: Peripheral Venipuncture; Blood culture   Result Value Ref Range    Blood Culture No growth at 3 days    Procalcitonin   Result Value Ref Range    Procalcitonin 5.19 (H) <=0.07 ng/mL   POCT GLUCOSE   Result Value Ref Range    POCT Glucose 147 (H) 74 - 99 mg/dL   CBC   Result Value Ref Range    WBC 8.3 4.4 - 11.3 x10*3/uL    nRBC 0.0 0.0 - 0.0 /100 WBCs    RBC 2.96 (L) 4.50 - 5.90 x10*6/uL    Hemoglobin 9.2 (L) 13.5 - 17.5 g/dL    Hematocrit 26.3 (L) 41.0 - 52.0 %     MCV 89 80 - 100 fL    MCH 31.1 26.0 - 34.0 pg    MCHC 35.0 32.0 - 36.0 g/dL    RDW 17.1 (H) 11.5 - 14.5 %    Platelets 161 150 - 450 x10*3/uL   Basic metabolic panel   Result Value Ref Range    Glucose 92 74 - 99 mg/dL    Sodium 136 136 - 145 mmol/L    Potassium 3.7 3.5 - 5.3 mmol/L    Chloride 97 (L) 98 - 107 mmol/L    Bicarbonate 28 21 - 32 mmol/L    Anion Gap 15 10 - 20 mmol/L    Urea Nitrogen 45 (H) 6 - 23 mg/dL    Creatinine 8.71 (H) 0.50 - 1.30 mg/dL    eGFR 6 (L) >60 mL/min/1.73m*2    Calcium 8.7 8.6 - 10.3 mg/dL   POCT GLUCOSE   Result Value Ref Range    POCT Glucose 88 74 - 99 mg/dL   POCT GLUCOSE   Result Value Ref Range    POCT Glucose 197 (H) 74 - 99 mg/dL   POCT GLUCOSE   Result Value Ref Range    POCT Glucose 250 (H) 74 - 99 mg/dL   POCT GLUCOSE   Result Value Ref Range    POCT Glucose 137 (H) 74 - 99 mg/dL   CBC   Result Value Ref Range    WBC 7.9 4.4 - 11.3 x10*3/uL    nRBC 0.0 0.0 - 0.0 /100 WBCs    RBC 3.12 (L) 4.50 - 5.90 x10*6/uL    Hemoglobin 9.7 (L) 13.5 - 17.5 g/dL    Hematocrit 29.0 (L) 41.0 - 52.0 %    MCV 93 80 - 100 fL    MCH 31.1 26.0 - 34.0 pg    MCHC 33.4 32.0 - 36.0 g/dL    RDW 17.0 (H) 11.5 - 14.5 %    Platelets 155 150 - 450 x10*3/uL   Basic metabolic panel   Result Value Ref Range    Glucose 106 (H) 74 - 99 mg/dL    Sodium 137 136 - 145 mmol/L    Potassium 3.7 3.5 - 5.3 mmol/L    Chloride 100 98 - 107 mmol/L    Bicarbonate 28 21 - 32 mmol/L    Anion Gap 13 10 - 20 mmol/L    Urea Nitrogen 28 (H) 6 - 23 mg/dL    Creatinine 6.01 (H) 0.50 - 1.30 mg/dL    eGFR 9 (L) >60 mL/min/1.73m*2    Calcium 8.8 8.6 - 10.3 mg/dL   POCT GLUCOSE   Result Value Ref Range    POCT Glucose 95 74 - 99 mg/dL   Transthoracic Echo (TTE) Limited   Result Value Ref Range    AV pk mana 1.93 m/s    LVOT diam 1.95 cm    MV E/A ratio 0.74     LV Biplane EF 60 %    LA vol index A/L 52.1 ml/m2    LV EF 63 %    RVSP 35.1 mmHg    LVIDd 5.00 cm    AV pk grad 15 mmHg    Aortic Valve Area by Continuity of Peak Velocity 1.91  cm2    LV A4C EF 57.9    POCT GLUCOSE   Result Value Ref Range    POCT Glucose 180 (H) 74 - 99 mg/dL   MRSA Surveillance for Vancomycin De-escalation, PCR    Specimen: Anterior Nares; Swab   Result Value Ref Range    MRSA PCR Not Detected Not Detected   POCT GLUCOSE   Result Value Ref Range    POCT Glucose 254 (H) 74 - 99 mg/dL   POCT GLUCOSE   Result Value Ref Range    POCT Glucose 131 (H) 74 - 99 mg/dL   POCT GLUCOSE   Result Value Ref Range    POCT Glucose 125 (H) 74 - 99 mg/dL   POCT GLUCOSE   Result Value Ref Range    POCT Glucose 157 (H) 74 - 99 mg/dL   Blood Culture    Specimen: Peripheral Venipuncture; Blood culture   Result Value Ref Range    Blood Culture Loaded on Instrument - Culture in progress    Blood Culture    Specimen: Peripheral Venipuncture; Blood culture   Result Value Ref Range    Blood Culture Loaded on Instrument - Culture in progress    POCT GLUCOSE   Result Value Ref Range    POCT Glucose 121 (H) 74 - 99 mg/dL   POCT GLUCOSE   Result Value Ref Range    POCT Glucose 121 (H) 74 - 99 mg/dL   POCT GLUCOSE   Result Value Ref Range    POCT Glucose 233 (H) 74 - 99 mg/dL   POCT GLUCOSE   Result Value Ref Range    POCT Glucose 119 (H) 74 - 99 mg/dL     *Note: Due to a large number of results and/or encounters for the requested time period, some results have not been displayed. A complete set of results can be found in Results Review.       Assessment/Plan   End-stage kidney disease continue dialysis Tuesday Thursday Saturday  Staph aureus bacteremia source is not clear lung versus AV fistula plan to continue antibiotic therapy per infectious disease patient is scheduled for indium scan today   Anemia chronic kidney disease continue with erythropoietin therapy  Hypertension           Elier Woodson MD

## 2024-12-26 NOTE — PROGRESS NOTES
William A Franke is a 74 y.o. male on day 4 of admission presenting with Acute encephalopathy.      Subjective   better       Objective     Last Recorded Vitals  /55 (BP Location: Right arm, Patient Position: Lying)   Pulse 60   Temp 36.5 °C (97.7 °F) (Oral)   Resp 18   Wt 81.6 kg (180 lb)   SpO2 97%   Intake/Output last 3 Shifts:  No intake or output data in the 24 hours ending 12/25/24 2001    Admission Weight  Weight: 81.6 kg (180 lb) (12/21/24 1049)    Daily Weight  12/21/24 : 81.6 kg (180 lb)    Image Results  Electrocardiogram, 12-lead ACS symptoms  Normal sinus rhythm  Left ventricular hypertrophy with repolarization abnormality ( R in aVL , Sokolow-Barriga , David product , Romhilt-Thornton )  Abnormal ECG  No previous ECGs available  Confirmed by Jonathan Masters ([id number]) on 12/24/2024 2:27:57 PM  XR chest 1 view  Narrative: Interpreted By:  Frank Dang,   STUDY:  XR CHEST 1 VIEW; 12/24/2024 9:51 am      INDICATION:  Signs/Symptoms:Pneumonia      COMPARISON:  12 21 2024.      ACCESSION NUMBER(S):  [id number]      ORDERING CLINICIAN:  BAHMAN BLANK      FINDINGS:  The study is limited due to  rotation/lordotic projection.  The cardiomediastinal silhouette is within normal limits for the  technique. There is no pneumothorax or confluence infiltrates.  There is blunting of the right costophrenic angle, due to improving  small right pleural effusion. The osseous structures are unremarkable.      Impression: Improving small right pleural effusion.      Signed by: Frank Dang 12/24/2024 12:53 PM  Dictation workstation:   [id number]  Transthoracic Echo (TTE) [facility]             [place] [address]             Phone [phone]    TRANSTHORACIC ECHOCARDIOGRAM REPORT    Patient Name:       WILLIAM A FRANKE    Reading Physician:    [id number] Jared Rooney DO  Study Date:         12/24/2024           Ordering Provider:    [id number] BAHMAN BLANK  MRN/PID:            [id number]            Fellow:  Accession#:         [id number]        Nurse:  Date of Birth/Age:  1950 / 74     Sonographer:          Nakita PUENTES  Gender Assigned at  M                   Additional Staff:  Birth:  Height:             170.20 cm           Admit Date:           12/22/2024  Weight:             81.65 kg            Admission Status:     Inpatient -                                                                Routine  BSA / BMI:          1.93 m2 / 28.19     Department Location:                      kg/m2  Blood Pressure: 169 /55 mmHg    Study Type:    TRANSTHORACIC ECHO (TTE) LIMITED  Diagnosis/ICD: Bacteremia-R78.81  Indication:    Endocarditis  CPT Codes:     Echo Limited-72670    Patient History:  Pertinent History: CDK stageIV Stable Angina HTN CP HLD CAD AS CHF Chronic Liver                     Disease NSTEMI Atrteriovenous Fistula Stenosis.    Study Detail: The following Echo studies were performed: 2D, M-Mode, Doppler and                color flow. Unable to obtain suprasternal notch and subcostal                view.       PHYSICIAN INTERPRETATION:  Left Ventricle: The left ventricular systolic function is normal, with a visually estimated ejection fraction of 60-65%. There are no regional wall motion abnormalities. The left ventricular cavity size is normal. There is normal septal and mildly increased posterior left ventricular wall thickness. There is left ventricular concentric remodeling. Left ventricular diastolic filling was not assessed.  Left Atrium: The left atrium is upper limits of normal in size.  Right Ventricle: The right ventricle is normal in size. There is normal right ventricular global systolic function.  Right Atrium: The right atrium is normal in size.  Aortic Valve:  The aortic valve is trileaflet. There is no evidence of aortic valve regurgitation. The peak instantaneous gradient of the aortic valve is 15 mmHg.  Mitral Valve: The mitral valve is normal in structure. There is trace to mild mitral valve regurgitation.  Tricuspid Valve: The tricuspid valve is structurally normal. No evidence of tricuspid regurgitation.  Pulmonic Valve: The pulmonic valve is structurally normal. There is trace pulmonic valve regurgitation.  Pericardium: No pericardial effusion noted.  Aorta: The aortic root is normal.       CONCLUSIONS:   1. The left ventricular systolic function is normal, with a visually estimated ejection fraction of 60-65%.   2. There is normal right ventricular global systolic function.   3. No valvular vegetations visualized.    QUANTITATIVE DATA SUMMARY:     2D MEASUREMENTS:           Normal Ranges:  IVSd:            0.80 cm   (0.6-1.1cm)  LVPWd:           1.29 cm   (0.6-1.1cm)  LVIDd:           5.00 cm   (3.9-5.9cm)  LVIDs:           3.39 cm  LV Mass Index:   99.7 g/m2  LV % FS          32.2 %       LA VOLUME:                    Normal Ranges:  LA Vol A4C:        96.5 ml    (22+/-6mL/m2)  LA Vol A2C:        96.6 ml  LA Vol BP:         100.8 ml  LA Vol Index A4C:  49.9ml/m2  LA Vol Index A2C:  50.0 ml/m2  LA Vol Index BP:   52.1 ml/m2  LA Area A4C:       26.1 cm2  LA Area A2C:       25.0 cm2  LA Major Axis A4C: 6.0 cm  LA Major Axis A2C: 5.5 cm  LA Volume Index:   51.9 ml/m2  LA Vol A4C:        88.7 ml  LA Vol A2C:        93.9 ml  LA Vol Index BSA:  47.2 ml/m2       RA VOLUME BY A/L METHOD:            Normal Ranges:  RA Vol A4C:              33.9 ml    (8.3-19.5ml)  RA Vol Index A4C:        17.5 ml/m2  RA Area A4C:             15.2 cm2  RA Major Axis A4C:       5.8 cm       M-MODE MEASUREMENTS:         Normal Ranges:  LAs:                 5.25 cm (2.7-4.0cm)       LV SYSTOLIC FUNCTION BY 2D PLANIMETRY (MOD):                       Normal Ranges:  EF-A4C View:    58 %  (>=55%)  EF-A2C View:    63 %  EF-Biplane:     60 %  EF-Visual:      63 %  LV EF Reported: 63 %       LV DIASTOLIC FUNCTION:          Normal Ranges:  MV Peak E:             0.84 m/s (0.7-1.2 m/s)  MV Peak A:             1.14 m/s (0.42-0.7 m/s)  E/A Ratio:             0.74     (1.0-2.2)       MITRAL VALVE:          Normal Ranges:  MV DT:        173 msec (150-240msec)       AORTIC VALVE:            Normal Ranges:  AoV Vmax:      1.93 m/s  (<=1.7m/s)  AoV Peak P.9 mmHg (<20mmHg)  LVOT Max Joss:  1.24 m/s  (<=1.1m/s)  LVOT VTI:      33.46 cm  LVOT Diameter: 1.95 cm   (1.8-2.4cm)  AoV Area,Vmax: 1.91 cm2  (2.5-4.5cm2)       RIGHT VENTRICLE:  RV Basal 2.70 cm  RV Mid   2.40 cm  RV Major 6.3 cm       TRICUSPID VALVE/RVSP:          Normal Ranges:  Peak TR Velocity:     2.83 m/s  RV Syst Pressure:     35 mmHg  (< 30mmHg)       PULMONIC VALVE:           Normal Ranges:  PV Accel Time:  86 msec   (>120ms)  PV Max Joss:     1.6 m/s   (0.6-0.9m/s)  PV Max PG:      10.3 mmHg       [id number] [facility]  Electronically signed on 2024 at 10:02:08 AM       ** Final **      Physical Exam    Relevant Results               Assessment/Plan                  Assessment & Plan  Acute encephalopathy    Wbc tagged scan pending              Desirae Alicea MD

## 2024-12-26 NOTE — PROGRESS NOTES
Occupational Therapy                 Therapy Communication Note    Patient Name: William A Franke  MRN: [id number]  Department: [facility]  Room: 432/432B  Today's Date: 12/26/2024     Discipline: Occupational Therapy    OT Missed Visit: Yes     Missed Visit Reason: Missed Visit Reason: Cancel (Therapist approached NSG at 1201 Pt tohave additional proceedures will cancel this date)    Missed Time: Cancel

## 2024-12-26 NOTE — NURSING NOTE
Assumed care of patient. Patient is alert with no signs of distress. Nuclear med bedside. Call light within reach.

## 2024-12-27 ENCOUNTER — APPOINTMENT (OUTPATIENT)
Dept: RADIOLOGY | Facility: HOSPITAL | Age: 74
DRG: 871 | End: 2024-12-27
Payer: MEDICARE

## 2024-12-27 ENCOUNTER — APPOINTMENT (OUTPATIENT)
Dept: RADIATION ONCOLOGY | Facility: HOSPITAL | Age: 74
End: 2024-12-27
Payer: MEDICARE

## 2024-12-27 LAB
ANION GAP SERPL CALCULATED.3IONS-SCNC: 14 MMOL/L (ref 10–20)
BACTERIA BLD AEROBE CULT: ABNORMAL
BACTERIA BLD CULT: ABNORMAL
BACTERIA BLD CULT: NORMAL
BASOPHILS NFR FLD MANUAL: 1 %
BLASTS NFR FLD MANUAL: 0 %
BUN SERPL-MCNC: 27 MG/DL (ref 6–23)
CALCIUM SERPL-MCNC: 8.6 MG/DL (ref 8.6–10.3)
CHLORIDE SERPL-SCNC: 100 MMOL/L (ref 98–107)
CLARITY FLD: CLEAR
CO2 SERPL-SCNC: 28 MMOL/L (ref 21–32)
COLOR FLD: YELLOW
CREAT SERPL-MCNC: 7.19 MG/DL (ref 0.5–1.3)
EGFRCR SERPLBLD CKD-EPI 2021: 7 ML/MIN/1.73M*2
EOSINOPHIL NFR FLD MANUAL: 16 %
ERYTHROCYTE [DISTWIDTH] IN BLOOD BY AUTOMATED COUNT: 16.7 % (ref 11.5–14.5)
GLUCOSE BLD MANUAL STRIP-MCNC: 112 MG/DL (ref 74–99)
GLUCOSE BLD MANUAL STRIP-MCNC: 138 MG/DL (ref 74–99)
GLUCOSE BLD MANUAL STRIP-MCNC: 144 MG/DL (ref 74–99)
GLUCOSE BLD MANUAL STRIP-MCNC: 149 MG/DL (ref 74–99)
GLUCOSE FLD-MCNC: 124 MG/DL
GLUCOSE SERPL-MCNC: 111 MG/DL (ref 74–99)
GRAM STN SPEC: ABNORMAL
HCT VFR BLD AUTO: 28.6 % (ref 41–52)
HGB BLD-MCNC: 9.8 G/DL (ref 13.5–17.5)
HOLD SPECIMEN: NORMAL
IMMATURE GRANULOCYTES IN FLUID: 0 %
LDH FLD L TO P-CCNC: 150 U/L
LDH SERPL L TO P-CCNC: 143 U/L (ref 84–246)
LYMPHOCYTES NFR FLD MANUAL: 11 %
MCH RBC QN AUTO: 30.6 PG (ref 26–34)
MCHC RBC AUTO-ENTMCNC: 34.3 G/DL (ref 32–36)
MCV RBC AUTO: 89 FL (ref 80–100)
MONOS+MACROS NFR FLD MANUAL: 58 %
NEUTROPHILS NFR FLD MANUAL: 14 %
NRBC BLD-RTO: 0.6 /100 WBCS (ref 0–0)
OTHER CELLS NFR FLD MANUAL: 0 %
PH FLD: 7.6 [PH]
PLASMA CELLS NFR FLD MANUAL: 0 %
PLATELET # BLD AUTO: 161 X10*3/UL (ref 150–450)
POTASSIUM SERPL-SCNC: 3.8 MMOL/L (ref 3.5–5.3)
PROT FLD-MCNC: 3.5 G/DL
PROT SERPL-MCNC: 5.9 G/DL (ref 6.4–8.2)
RBC # BLD AUTO: 3.2 X10*6/UL (ref 4.5–5.9)
RBC # FLD AUTO: 109 /UL
SODIUM SERPL-SCNC: 138 MMOL/L (ref 136–145)
TOTAL CELLS COUNTED FLD: 100
WBC # BLD AUTO: 6.6 X10*3/UL (ref 4.4–11.3)
WBC # FLD AUTO: 513 /UL

## 2024-12-27 PROCEDURE — 87206 SMEAR FLUORESCENT/ACID STAI: CPT | Mod: WESLAB

## 2024-12-27 PROCEDURE — 88112 CYTOPATH CELL ENHANCE TECH: CPT | Performed by: PATHOLOGY

## 2024-12-27 PROCEDURE — 82947 ASSAY GLUCOSE BLOOD QUANT: CPT

## 2024-12-27 PROCEDURE — 88305 TISSUE EXAM BY PATHOLOGIST: CPT | Performed by: PATHOLOGY

## 2024-12-27 PROCEDURE — 84155 ASSAY OF PROTEIN SERUM: CPT

## 2024-12-27 PROCEDURE — 83986 ASSAY PH BODY FLUID NOS: CPT

## 2024-12-27 PROCEDURE — 2500000001 HC RX 250 WO HCPCS SELF ADMINISTERED DRUGS (ALT 637 FOR MEDICARE OP): Performed by: NURSE PRACTITIONER

## 2024-12-27 PROCEDURE — 1200000002 HC GENERAL ROOM WITH TELEMETRY DAILY

## 2024-12-27 PROCEDURE — 84100 ASSAY OF PHOSPHORUS: CPT | Performed by: INTERNAL MEDICINE

## 2024-12-27 PROCEDURE — 80048 BASIC METABOLIC PNL TOTAL CA: CPT | Performed by: INTERNAL MEDICINE

## 2024-12-27 PROCEDURE — 2500000001 HC RX 250 WO HCPCS SELF ADMINISTERED DRUGS (ALT 637 FOR MEDICARE OP): Performed by: INTERNAL MEDICINE

## 2024-12-27 PROCEDURE — 2500000004 HC RX 250 GENERAL PHARMACY W/ HCPCS (ALT 636 FOR OP/ED): Mod: JZ | Performed by: INTERNAL MEDICINE

## 2024-12-27 PROCEDURE — 87116 MYCOBACTERIA CULTURE: CPT | Mod: WESLAB

## 2024-12-27 PROCEDURE — 83615 LACTATE (LD) (LDH) ENZYME: CPT

## 2024-12-27 PROCEDURE — 2500000004 HC RX 250 GENERAL PHARMACY W/ HCPCS (ALT 636 FOR OP/ED): Performed by: RADIOLOGY

## 2024-12-27 PROCEDURE — 82945 GLUCOSE OTHER FLUID: CPT | Mod: WESLAB

## 2024-12-27 PROCEDURE — 99232 SBSQ HOSP IP/OBS MODERATE 35: CPT | Performed by: NURSE PRACTITIONER

## 2024-12-27 PROCEDURE — 89051 BODY FLUID CELL COUNT: CPT

## 2024-12-27 PROCEDURE — 83615 LACTATE (LD) (LDH) ENZYME: CPT | Mod: WESLAB

## 2024-12-27 PROCEDURE — 87070 CULTURE OTHR SPECIMN AEROBIC: CPT | Mod: WESLAB

## 2024-12-27 PROCEDURE — 36415 COLL VENOUS BLD VENIPUNCTURE: CPT | Performed by: INTERNAL MEDICINE

## 2024-12-27 PROCEDURE — 2500000004 HC RX 250 GENERAL PHARMACY W/ HCPCS (ALT 636 FOR OP/ED): Performed by: NURSE PRACTITIONER

## 2024-12-27 PROCEDURE — 88112 CYTOPATH CELL ENHANCE TECH: CPT | Mod: TC

## 2024-12-27 PROCEDURE — 87116 MYCOBACTERIA CULTURE: CPT | Performed by: INTERNAL MEDICINE

## 2024-12-27 PROCEDURE — 97116 GAIT TRAINING THERAPY: CPT | Mod: GP,CQ

## 2024-12-27 PROCEDURE — 2500000002 HC RX 250 W HCPCS SELF ADMINISTERED DRUGS (ALT 637 FOR MEDICARE OP, ALT 636 FOR OP/ED): Performed by: NURSE PRACTITIONER

## 2024-12-27 PROCEDURE — 0W993ZZ DRAINAGE OF RIGHT PLEURAL CAVITY, PERCUTANEOUS APPROACH: ICD-10-PCS | Performed by: RADIOLOGY

## 2024-12-27 PROCEDURE — 32555 ASPIRATE PLEURA W/ IMAGING: CPT

## 2024-12-27 PROCEDURE — 2720000007 HC OR 272 NO HCPCS

## 2024-12-27 PROCEDURE — 32555 ASPIRATE PLEURA W/ IMAGING: CPT | Performed by: RADIOLOGY

## 2024-12-27 PROCEDURE — 6350000001 HC RX 635 EPOETIN >10,000 UNITS: Mod: JZ | Performed by: INTERNAL MEDICINE

## 2024-12-27 PROCEDURE — 84157 ASSAY OF PROTEIN OTHER: CPT | Mod: WESLAB

## 2024-12-27 PROCEDURE — C1729 CATH, DRAINAGE: HCPCS

## 2024-12-27 PROCEDURE — 99232 SBSQ HOSP IP/OBS MODERATE 35: CPT | Performed by: INTERNAL MEDICINE

## 2024-12-27 PROCEDURE — 85027 COMPLETE CBC AUTOMATED: CPT | Performed by: INTERNAL MEDICINE

## 2024-12-27 RX ORDER — LIDOCAINE HYDROCHLORIDE 10 MG/ML
INJECTION, SOLUTION EPIDURAL; INFILTRATION; INTRACAUDAL; PERINEURAL
Status: COMPLETED | OUTPATIENT
Start: 2024-12-27 | End: 2024-12-27

## 2024-12-27 RX ADMIN — CARVEDILOL 6.25 MG: 6.25 TABLET, FILM COATED ORAL at 10:28

## 2024-12-27 RX ADMIN — ISOSORBIDE MONONITRATE 60 MG: 60 TABLET, EXTENDED RELEASE ORAL at 10:28

## 2024-12-27 RX ADMIN — AMLODIPINE BESYLATE 5 MG: 5 TABLET ORAL at 10:28

## 2024-12-27 RX ADMIN — SEVELAMER CARBONATE 1600 MG: 800 TABLET, FILM COATED ORAL at 10:28

## 2024-12-27 RX ADMIN — CLONIDINE HYDROCHLORIDE 0.1 MG: 0.1 TABLET ORAL at 23:28

## 2024-12-27 RX ADMIN — ASPIRIN 81 MG: 81 TABLET, COATED ORAL at 10:28

## 2024-12-27 RX ADMIN — PANTOPRAZOLE SODIUM 40 MG: 40 TABLET, DELAYED RELEASE ORAL at 10:28

## 2024-12-27 RX ADMIN — ATORVASTATIN CALCIUM 80 MG: 80 TABLET, FILM COATED ORAL at 20:24

## 2024-12-27 RX ADMIN — HEPARIN SODIUM 5000 UNITS: 5000 INJECTION, SOLUTION INTRAVENOUS; SUBCUTANEOUS at 05:48

## 2024-12-27 RX ADMIN — SEVELAMER CARBONATE 1600 MG: 800 TABLET, FILM COATED ORAL at 17:00

## 2024-12-27 RX ADMIN — LIDOCAINE HYDROCHLORIDE 5 ML: 10 INJECTION, SOLUTION EPIDURAL; INFILTRATION; INTRACAUDAL; PERINEURAL at 10:04

## 2024-12-27 RX ADMIN — ALLOPURINOL 100 MG: 100 TABLET ORAL at 10:28

## 2024-12-27 RX ADMIN — CEFAZOLIN SODIUM 2 G: 2 INJECTION, SOLUTION INTRAVENOUS at 15:45

## 2024-12-27 RX ADMIN — CLONIDINE HYDROCHLORIDE 0.1 MG: 0.1 TABLET ORAL at 10:28

## 2024-12-27 RX ADMIN — HEPARIN SODIUM 5000 UNITS: 5000 INJECTION, SOLUTION INTRAVENOUS; SUBCUTANEOUS at 17:00

## 2024-12-27 RX ADMIN — SITAGLIPTIN 25 MG: 50 TABLET, FILM COATED ORAL at 10:28

## 2024-12-27 RX ADMIN — EPOETIN ALFA-EPBX 10000 UNITS: 10000 INJECTION, SOLUTION INTRAVENOUS; SUBCUTANEOUS at 10:28

## 2024-12-27 RX ADMIN — EZETIMIBE 10 MG: 10 TABLET ORAL at 10:28

## 2024-12-27 RX ADMIN — CARVEDILOL 6.25 MG: 6.25 TABLET, FILM COATED ORAL at 17:00

## 2024-12-27 SDOH — ECONOMIC STABILITY: FOOD INSECURITY: WITHIN THE PAST 12 MONTHS, YOU WORRIED THAT YOUR FOOD WOULD RUN OUT BEFORE YOU GOT MONEY TO BUY MORE.: NEVER TRUE

## 2024-12-27 SDOH — ECONOMIC STABILITY: HOUSING INSECURITY

## 2024-12-27 SDOH — ECONOMIC STABILITY: HOUSING INSECURITY: IN THE LAST 12 MONTHS, WAS THERE A TIME WHEN YOU WERE NOT ABLE TO PAY THE MORTGAGE OR RENT ON TIME?: YES

## 2024-12-27 SDOH — ECONOMIC STABILITY: FOOD INSECURITY: WITHIN THE PAST 12 MONTHS, THE FOOD YOU BOUGHT JUST DIDN'T LAST AND YOU DIDN'T HAVE MONEY TO GET MORE.: NEVER TRUE

## 2024-12-27 SDOH — ECONOMIC STABILITY: INCOME INSECURITY: IN THE LAST 12 MONTHS, WAS THERE A TIME WHEN YOU WERE NOT ABLE TO PAY THE MORTGAGE OR RENT ON TIME?: YES

## 2024-12-27 SDOH — ECONOMIC STABILITY: TRANSPORTATION INSECURITY

## 2024-12-27 SDOH — ECONOMIC STABILITY: FOOD INSECURITY: WITHIN THE PAST 12 MONTHS, YOU WORRIED THAT YOUR FOOD WOULD RUN OUT BEFORE YOU GOT THE MONEY TO BUY MORE.: NEVER TRUE

## 2024-12-27 SDOH — ECONOMIC STABILITY: FOOD INSECURITY

## 2024-12-27 SDOH — ECONOMIC STABILITY: GENERAL

## 2024-12-27 ASSESSMENT — COGNITIVE AND FUNCTIONAL STATUS - GENERAL
WALKING IN HOSPITAL ROOM: A LITTLE
CLIMB 3 TO 5 STEPS WITH RAILING: A LITTLE
MOBILITY SCORE: 23
CLIMB 3 TO 5 STEPS WITH RAILING: A LITTLE
DAILY ACTIVITIY SCORE: 24
STANDING UP FROM CHAIR USING ARMS: A LITTLE
TURNING FROM BACK TO SIDE WHILE IN FLAT BAD: A LITTLE
DAILY ACTIVITIY SCORE: 24
CLIMB 3 TO 5 STEPS WITH RAILING: A LITTLE
MOBILITY SCORE: 23
MOVING TO AND FROM BED TO CHAIR: A LITTLE
MOBILITY SCORE: 19

## 2024-12-27 ASSESSMENT — PAIN SCALES - GENERAL
PAINLEVEL_OUTOF10: 0 - NO PAIN

## 2024-12-27 ASSESSMENT — PAIN - FUNCTIONAL ASSESSMENT: PAIN_FUNCTIONAL_ASSESSMENT: 0-10

## 2024-12-27 NOTE — PROGRESS NOTES
William A Franke is a 74 y.o. male on day 6 of admission presenting with Acute encephalopathy.      Subjective   Fluid drained, awaitting culture and clearence by consultants       Objective     Last Recorded Vitals  BP (!) 145/45 (BP Location: Right arm, Patient Position: Sitting) Comment: RN notified  Pulse 56   Temp 36.7 °C (98.1 °F) (Oral)   Resp 18   Wt 81.6 kg (180 lb)   SpO2 99%   Intake/Output last 3 Shifts:    Intake/Output Summary (Last 24 hours) at 12/27/2024 1347  Last data filed at 12/27/2024 1300  Gross per 24 hour   Intake 200 ml   Output --   Net 200 ml       Admission Weight  Weight: 81.6 kg (180 lb) (12/21/24 1049)    Daily Weight  12/21/24 : 81.6 kg (180 lb)    Image Results  US thoracentesis  Narrative: Interpreted By:  Bassam Brown,   STUDY:  US THORACENTESIS; 12/27/2024 10:13 am      INDICATION:  Right pleural effusion. Referred for diagnostic thoracentesis      COMPARISON:  None      ACCESSION NUMBER(S):  [id number]      ORDERING CLINICIAN:  JEISON OCHOA      TECHNIQUE:  Informed consent obtained. Patient positionedsitting upright. Skin  prepped, draped and anesthetized. Under ultrasound guidance, a  centesis catheter/needle was advanced into rightpleural cavity.      FINDINGS:  A total of 700 mL of clear yellow fluid was aspirated. A sample was  sent for analysis. The patient tolerated the procedure well.      Impression: Ultrasound-guided right thoracentesis.      Signed by: Bassam Brown 12/27/2024 12:16 PM  Dictation workstation:   [id number]  NM INFLAMMATION whole body w indium 111  Narrative: Interpreted By:  Rafiq Hamm,   STUDY:  NM INFLAMMATION WHOLE BODY WITH INDIUM 111;  12/27/2024 9:37 am      INDICATION:  Signs/Symptoms:Assess for left brachial axillary graft infection.      COMPARISON:  CT of chest on 12/25/2024      ACCESSION NUMBER(S):  [id number]      ORDERING CLINICIAN:  WALLY LEAVITT      TECHNIQUE:  DIVISION OF NUCLEAR MEDICINE  RADIOLABELED In-111-WBC  SCAN      The patient received an intravenous dose of 0.502 mCi of In-111  radiolabeled autologous leukocytes.  Anterior and posterior images  were then acquired to include the whole body approximately 24 hours  later.  SPECT CT of the chest was obtained as well.      FINDINGS:  Normal activity is noted in the liver, spleen, and bone marrow  spaces.  No abnormal  In-111 labeled WBC accumulation in the left  bronchial axillary graft to suggest an active infection.      Low-dose CT demonstrates multiple pulmonary nodules in both lungs,  left greater than right as well  as moderate-sized right pleural  effusion.      Impression: 1. Negative study, no abnormal In-111 labeled WBC accumulation in the  left bronchial axillary graft to suggest an active infection.  2. Low-dose CT demonstrates multiple pulmonary nodules in both lungs,  left greater than right as well as moderate-sized right pleural  effusion, please correlate with CT chest on 12/25/2024      MACRO:  None      Signed by: Rafiq Hamm 12/27/2024 10:08 AM  Dictation workstation:   [id number]      Physical Exam    Relevant Results               Assessment/Plan                  Assessment & Plan  Acute encephalopathy    S/p thoracentesis 700 ml, wbc neg              Desirae Alicea MD

## 2024-12-27 NOTE — PROGRESS NOTES
Occupational Therapy                 Therapy Communication Note    Patient Name: William A Franke  MRN: [id number]  Department: [facility]  Room: 432432-B  Today's Date: 12/27/2024     Discipline: Occupational Therapy    OT Missed Visit: Yes     Missed Visit Reason: Missed Visit Reason: Other (Comment) (pt off floor @ nuclear medicine)    Missed Time: Attempt    Comment:

## 2024-12-27 NOTE — PROGRESS NOTES
"William A Franke is a 74 y.o. male on day 6 of admission presenting with Acute encephalopathy.    Subjective   Patient seen for end-stage kidney disease on dialysis therapy admitted with altered mental status and hypotension he does have MSSA bacteremia and underwent thoracentesis today also he still on antibiotic therapy per infectious disease CT of the chest showed a new nodules in the lungs with possible infiltrate nares following    Objective     Physical Exam  Neck:      Vascular: No carotid bruit.   Cardiovascular:      Rate and Rhythm: Normal rate and regular rhythm.      Heart sounds: No murmur heard.     No friction rub. No gallop.   Pulmonary:      Breath sounds: No wheezing, rhonchi or rales.   Chest:      Chest wall: No tenderness.   Abdominal:      General: There is no distension.      Tenderness: There is no abdominal tenderness. There is no guarding or rebound.   Musculoskeletal:         General: No swelling or tenderness.      Cervical back: Neck supple.      Right lower leg: No edema.      Left lower leg: No edema.      Comments: Very minimal redness over his AV fistula without any drainage or tenderness   Lymphadenopathy:      Cervical: No cervical adenopathy.         Last Recorded Vitals  Blood pressure (!) 145/45, pulse 56, temperature 36.7 °C (98.1 °F), temperature source Oral, resp. rate 18, height 1.702 m (5' 7\"), weight 81.6 kg (180 lb), SpO2 99%.    Intake/Output last 3 Shifts:  I/O last 3 completed shifts:  In: 1200 (14.7 mL/kg) [I.V.:800 (9.8 mL/kg); Other:400]  Out: 1900 (23.3 mL/kg) [Other:1900]  Weight: 81.6 kg     Current Facility-Administered Medications:     acetaminophen (Tylenol) tablet 650 mg, 650 mg, oral, q4h PRN, 650 mg at 12/22/24 0758 **OR** acetaminophen (Tylenol) oral liquid 650 mg, 650 mg, nasogastric tube, q4h PRN **OR** acetaminophen (Tylenol) suppository 650 mg, 650 mg, rectal, q4h PRN, Aliya Patel, VERÓNICA-CNP    acetaminophen (Tylenol) tablet 650 mg, 650 mg, oral, " q4h PRN **OR** acetaminophen (Tylenol) oral liquid 650 mg, 650 mg, oral, q4h PRN **OR** acetaminophen (Tylenol) suppository 650 mg, 650 mg, rectal, q4h PRN, VERÓNICA Russ-CNP    allopurinol (Zyloprim) tablet 100 mg, 100 mg, oral, Daily, VERÓNICA Russ-CNP, 100 mg at 12/27/24 1028    amLODIPine (Norvasc) tablet 5 mg, 5 mg, oral, Daily, Aliya Patel APRN-CNP, 5 mg at 12/27/24 1028    aspirin EC tablet 81 mg, 81 mg, oral, Daily, VERÓNICA Russ-CNP, 81 mg at 12/27/24 1028    atorvastatin (Lipitor) tablet 80 mg, 80 mg, oral, Nightly, Aliay Patel APRN-CNP, 80 mg at 12/26/24 2057    carvedilol (Coreg) tablet 6.25 mg, 6.25 mg, oral, BID, Aliya Patel APRN-CNP, 6.25 mg at 12/27/24 1028    ceFAZolin (Ancef) 2 g in dextrose (iso)  mL, 2 g, intravenous, q24h LUKE, Dave Pathak MD, Stopped at 12/26/24 1525    cloNIDine (Catapres) tablet 0.1 mg, 0.1 mg, oral, q6h PRN, Desirae Alicea MD, 0.1 mg at 12/27/24 1028    dextrose 50 % injection 12.5 g, 12.5 g, intravenous, q15 min PRN, Aliya Patel APRN-CNP    dextrose 50 % injection 25 g, 25 g, intravenous, q15 min PRN, VERÓNICA Russ-CNP    ezetimibe (Zetia) tablet 10 mg, 10 mg, oral, Daily, VERÓNICA Russ-CNP, 10 mg at 12/27/24 1028    glucagon (Glucagen) injection 1 mg, 1 mg, intramuscular, q15 min PRN, Aliya Patel APRN-CNP    glucagon (Glucagen) injection 1 mg, 1 mg, intramuscular, q15 min PRN, Aliya Pachinger, APRN-CNP    guaiFENesin (Mucinex) 12 hr tablet 600 mg, 600 mg, oral, BID PRN, VERÓNICA Russ-CNP, 600 mg at 12/21/24 1849    heparin (porcine) injection 5,000 Units, 5,000 Units, subcutaneous, q12h, VERÓNICA Russ-CNP, 5,000 Units at 12/27/24 0548    [Held by provider] hydrALAZINE (Apresoline) tablet 50 mg, 50 mg, oral, TID, Desirae Alicea MD    insulin lispro injection 0-10 Units, 0-10 Units, subcutaneous, TID AC, VERÓNICA Russ-CNP, 6 Units at 12/26/24  1722    isosorbide mononitrate ER (Imdur) 24 hr tablet 60 mg, 60 mg, oral, Daily, ERIKA Russ, 60 mg at 12/27/24 1028    nitroglycerin (Nitrostat) SL tablet 0.4 mg, 0.4 mg, sublingual, q5 min PRN, ERIKA Russ    oxygen (O2) therapy, , inhalation, Continuous, ERIKA Russ, 21 percent at 12/21/24 1508    pantoprazole (ProtoNix) EC tablet 40 mg, 40 mg, oral, Daily, ERIKA Russ, 40 mg at 12/27/24 1028    polyethylene glycol (Glycolax, Miralax) packet 17 g, 17 g, oral, Daily PRN, ERIKA Russ    sevelamer carbonate (Renvela) tablet 1,600 mg, 1,600 mg, oral, TID, ERIKA Russ, 1,600 mg at 12/27/24 1028    SITagliptin phosphate (Januvia) tablet 25 mg, 25 mg, oral, Daily, ERIKA Russ, 25 mg at 12/27/24 1028   Relevant Results    Results for orders placed or performed during the hospital encounter of 12/21/24 (from the past 96 hours)   POCT GLUCOSE   Result Value Ref Range    POCT Glucose 197 (H) 74 - 99 mg/dL   POCT GLUCOSE   Result Value Ref Range    POCT Glucose 250 (H) 74 - 99 mg/dL   POCT GLUCOSE   Result Value Ref Range    POCT Glucose 137 (H) 74 - 99 mg/dL   CBC   Result Value Ref Range    WBC 7.9 4.4 - 11.3 x10*3/uL    nRBC 0.0 0.0 - 0.0 /100 WBCs    RBC 3.12 (L) 4.50 - 5.90 x10*6/uL    Hemoglobin 9.7 (L) 13.5 - 17.5 g/dL    Hematocrit 29.0 (L) 41.0 - 52.0 %    MCV 93 80 - 100 fL    MCH 31.1 26.0 - 34.0 pg    MCHC 33.4 32.0 - 36.0 g/dL    RDW 17.0 (H) 11.5 - 14.5 %    Platelets 155 150 - 450 x10*3/uL   Basic metabolic panel   Result Value Ref Range    Glucose 106 (H) 74 - 99 mg/dL    Sodium 137 136 - 145 mmol/L    Potassium 3.7 3.5 - 5.3 mmol/L    Chloride 100 98 - 107 mmol/L    Bicarbonate 28 21 - 32 mmol/L    Anion Gap 13 10 - 20 mmol/L    Urea Nitrogen 28 (H) 6 - 23 mg/dL    Creatinine 6.01 (H) 0.50 - 1.30 mg/dL    eGFR 9 (L) >60 mL/min/1.73m*2    Calcium 8.8 8.6 - 10.3 mg/dL   POCT GLUCOSE   Result Value  Ref Range    POCT Glucose 95 74 - 99 mg/dL   Transthoracic Echo (TTE) Limited   Result Value Ref Range    AV pk mana 1.93 m/s    LVOT diam 1.95 cm    MV E/A ratio 0.74     LV Biplane EF 60 %    LA vol index A/L 52.1 ml/m2    LV EF 63 %    RVSP 35.1 mmHg    LVIDd 5.00 cm    AV pk grad 15 mmHg    Aortic Valve Area by Continuity of Peak Velocity 1.91 cm2    LV A4C EF 57.9    POCT GLUCOSE   Result Value Ref Range    POCT Glucose 180 (H) 74 - 99 mg/dL   MRSA Surveillance for Vancomycin De-escalation, PCR    Specimen: Anterior Nares; Swab   Result Value Ref Range    MRSA PCR Not Detected Not Detected   POCT GLUCOSE   Result Value Ref Range    POCT Glucose 254 (H) 74 - 99 mg/dL   POCT GLUCOSE   Result Value Ref Range    POCT Glucose 131 (H) 74 - 99 mg/dL   POCT GLUCOSE   Result Value Ref Range    POCT Glucose 125 (H) 74 - 99 mg/dL   POCT GLUCOSE   Result Value Ref Range    POCT Glucose 157 (H) 74 - 99 mg/dL   Blood Culture    Specimen: Peripheral Venipuncture; Blood culture   Result Value Ref Range    Blood Culture No growth at 1 day    Blood Culture    Specimen: Peripheral Venipuncture; Blood culture   Result Value Ref Range    Blood Culture No growth at 1 day    POCT GLUCOSE   Result Value Ref Range    POCT Glucose 121 (H) 74 - 99 mg/dL   POCT GLUCOSE   Result Value Ref Range    POCT Glucose 121 (H) 74 - 99 mg/dL   POCT GLUCOSE   Result Value Ref Range    POCT Glucose 233 (H) 74 - 99 mg/dL   POCT GLUCOSE   Result Value Ref Range    POCT Glucose 119 (H) 74 - 99 mg/dL   POCT GLUCOSE   Result Value Ref Range    POCT Glucose 98 74 - 99 mg/dL   POCT GLUCOSE   Result Value Ref Range    POCT Glucose 275 (H) 74 - 99 mg/dL   POCT GLUCOSE   Result Value Ref Range    POCT Glucose 152 (H) 74 - 99 mg/dL   Protein, Total   Result Value Ref Range    Total Protein 5.9 (L) 6.4 - 8.2 g/dL   Lactate Dehydrogenase   Result Value Ref Range     84 - 246 U/L   CBC   Result Value Ref Range    WBC 6.6 4.4 - 11.3 x10*3/uL    nRBC 0.6 (H)  0.0 - 0.0 /100 WBCs    RBC 3.20 (L) 4.50 - 5.90 x10*6/uL    Hemoglobin 9.8 (L) 13.5 - 17.5 g/dL    Hematocrit 28.6 (L) 41.0 - 52.0 %    MCV 89 80 - 100 fL    MCH 30.6 26.0 - 34.0 pg    MCHC 34.3 32.0 - 36.0 g/dL    RDW 16.7 (H) 11.5 - 14.5 %    Platelets 161 150 - 450 x10*3/uL   Basic Metabolic Panel   Result Value Ref Range    Glucose 111 (H) 74 - 99 mg/dL    Sodium 138 136 - 145 mmol/L    Potassium 3.8 3.5 - 5.3 mmol/L    Chloride 100 98 - 107 mmol/L    Bicarbonate 28 21 - 32 mmol/L    Anion Gap 14 10 - 20 mmol/L    Urea Nitrogen 27 (H) 6 - 23 mg/dL    Creatinine 7.19 (H) 0.50 - 1.30 mg/dL    eGFR 7 (L) >60 mL/min/1.73m*2    Calcium 8.6 8.6 - 10.3 mg/dL   POCT GLUCOSE   Result Value Ref Range    POCT Glucose 112 (H) 74 - 99 mg/dL     *Note: Due to a large number of results and/or encounters for the requested time period, some results have not been displayed. A complete set of results can be found in Results Review.       Assessment/Plan   End-stage kidney disease schedule for dialysis tomorrow morning discussed with the family  Staph aureus bacteremia source is not clear lung versus AV fistula continue antibiotics per infectious disease awaiting indium scan results  Anemia chronic kidney disease continue with erythropoietin therapy  Hypertension           Elier Woodson MD

## 2024-12-27 NOTE — PROGRESS NOTES
Physical Therapy    Physical Therapy Treatment    Patient Name: William A Franke  MRN: [id number]  Department: [address]  Room: [address] [place] [address]  Today's Date: 12/27/2024  Time Calculation  Start Time: 1110  Stop Time: 1123  Time Calculation (min): 13 min         Assessment/Plan   PT Assessment  Barriers to Discharge Home: Physical needs  Physical Needs: Stair navigation into home limited by function/safety  End of Session Communication: Bedside nurse  Assessment Comment: Pt presents with mild endurance and balance deficits.  End of Session Patient Position: Up in chair, Alarm off, not on at start of session  PT Plan  Inpatient/Swing Bed or Outpatient: Inpatient  PT Plan  Treatment/Interventions: Bed mobility, Transfer training, Gait training, Stair training, Balance training, Strengthening, Endurance training, Therapeutic exercise, Therapeutic activity, Home exercise program  PT Plan: Ongoing PT  PT Frequency: 3 times per week  PT Discharge Recommendations: Low intensity level of continued care  PT Recommended Transfer Status: Assist x1  PT - OK to Discharge: Yes (PT POC initiated)      General Visit Information:   PT  Visit  PT Received On: 12/27/24  General  Family/Caregiver Present: Yes  Caregiver Feedback: Spouse and brother present  Prior to Session Communication: Bedside nurse  Patient Position Received: Up in chair, Alarm off, not on at start of session  General Comment: Cleared by nursing to be seen for therapy, pt agreeable with tx, seated in chair upon arrival.    Subjective   Precautions:  Precautions  Medical Precautions: Fall precautions    Objective   Pain:  Pain Assessment  Pain Assessment: 0-10  0-10 (Numeric) Pain Score: 0 - No pain    Cognition:  Cognition  Overall Cognitive Status: Within Functional Limits  Orientation Level: Oriented X4     Postural Control:  Static Sitting Balance  Static Sitting-Balance Support: Feet supported  Static Sitting-Level of Assistance: Independent  Static  Sitting-Comment/Number of Minutes: Good seated static balance.  Static Standing Balance  Static Standing-Balance Support: No upper extremity supported  Static Standing-Level of Assistance: Close supervision  Static Standing-Comment/Number of Minutes: Good- static standing balance without UE support.    Treatments:  Ambulation/Gait Training  Ambulation/Gait Training Performed: Yes  Ambulation/Gait Training 1  Surface 1: Level tile  Device 1: No device  Assistance 1: Close supervision  Quality of Gait 1: Diminished heel strike  Comments/Distance (ft) 1: 120' without AD, occasional staggering gait requiring supervision for balance.    Transfers  Transfer: Yes  Transfer 1  Transfer From 1: Sit to  Transfer to 1: Stand  Technique 1: Sit to stand, Stand to sit  Transfer Level of Assistance 1: Distant supervision    Outcome Measures:  [facility] Basic Mobility  Turning from your back to your side while in a flat bed without using bedrails: None  Moving from lying on your back to sitting on the side of a flat bed without using bedrails: A little  Moving to and from bed to chair (including a wheelchair): A little  Standing up from a chair using your arms (e.g. wheelchair or bedside chair): A little  To walk in hospital room: A little  Climbing 3-5 steps with railing: A little  Basic Mobility - Total Score: 19    Education Documentation  Body Mechanics, taught by Freda Becerra PTA at 12/27/2024 11:44 AM.  Learner: Patient  Readiness: Acceptance  Method: Explanation  Response: Verbalizes Understanding  Comment: Educated on safety and use of call light for assistance.    Mobility Training, taught by Freda Becerra PTA at 12/27/2024 11:44 AM.  Learner: Patient  Readiness: Acceptance  Method: Explanation  Response: Verbalizes Understanding  Comment: Educated on safety and use of call light for assistance.         Encounter Problems       Encounter Problems (Active)       Mobility       STG - Patient will ambulate 300' without  assistive device, + turns, without LOB, provided close S  (Progressing)       Start:  12/22/24    Expected End:  01/05/25            STG - Patient will ascend and descend 2 steps without handrails, provided close S for safety  (Not Progressing)       Start:  12/22/24    Expected End:  01/05/25               PT Transfers       STG - Patient will transfer sit <> stand at an independent level  (Progressing)       Start:  12/22/24    Expected End:  01/05/25               Pain - Adult

## 2024-12-27 NOTE — PROGRESS NOTES
12/27/24 0823   Discharge Planning   Expected Discharge Disposition Home     No skilled needs    **Patient has a  safe discharge plan**

## 2024-12-27 NOTE — CARE PLAN
The patient's goals for the shift include      The clinical goals for the shift include monitor labs    Over the shift, the patient did not make progress toward the following goals. Barriers to progression include . Recommendations to address these barriers include .

## 2024-12-27 NOTE — PROGRESS NOTES
William A Franke is a 74 y.o. male on day 6 of admission presenting with Acute encephalopathy.    Subjective   Interval History:   Patient sitting up in chair  Wife and brother at the bedside  Denies shortness of breath or chest pain  Reports an occasional cough  Denies nausea, vomiting, diarrhea  Denies any pain  No rashes or wounds        Review of Systems   All other systems reviewed and are negative.      Objective   Range of Vitals (last 24 hours)  Heart Rate:  [54-61]   Temp:  [36.3 °C (97.3 °F)-36.8 °C (98.2 °F)]   Resp:  [17-19]   BP: (142-213)/(45-61)   SpO2:  [97 %-99 %]   Daily Weight  12/21/24 : 81.6 kg (180 lb)    Body mass index is 28.19 kg/m².    Physical Exam  Constitutional:       Appearance: Normal appearance.   HENT:      Head: Normocephalic and atraumatic.      Nose: Nose normal.   Eyes:      General: No scleral icterus.     Extraocular Movements: Extraocular movements intact.      Conjunctiva/sclera: Conjunctivae normal.   Cardiovascular:      Rate and Rhythm: Normal rate and regular rhythm.      Heart sounds: Normal heart sounds.   Pulmonary:      Effort: Pulmonary effort is normal.      Breath sounds: Normal breath sounds.   Abdominal:      General: Bowel sounds are normal.      Palpations: Abdomen is soft.   Musculoskeletal:      Cervical back: Normal range of motion and neck supple.      Right lower leg: No edema.      Left lower leg: No edema.   Skin:     General: Skin is warm and dry.   Neurological:      Mental Status: He is alert.   Psychiatric:         Behavior: Behavior is cooperative.     Antibiotics  ceFAZolin - 2 gram/100 mL    Relevant Results  Labs  Results from last 72 hours   Lab Units 12/27/24  0507   WBC AUTO x10*3/uL 6.6   HEMOGLOBIN g/dL 9.8*   HEMATOCRIT % 28.6*   PLATELETS AUTO x10*3/uL 161     Results from last 72 hours   Lab Units 12/27/24  0507   SODIUM mmol/L 138   POTASSIUM mmol/L 3.8   CHLORIDE mmol/L 100   CO2 mmol/L 28   BUN mg/dL 27*   CREATININE mg/dL 7.19*  "  GLUCOSE mg/dL 111*   CALCIUM mg/dL 8.6   ANION GAP mmol/L 14   EGFR mL/min/1.73m*2 7*     Results from last 72 hours   Lab Units 12/27/24  0507   PROTEIN TOTAL g/dL 5.9*     Estimated Creatinine Clearance: 8.9 mL/min (A) (by C-G formula based on SCr of 7.19 mg/dL (H)).  No results found for: \"CRP\"  Microbiology  Susceptibility data from last 14 days.  Collected Specimen Info Organism Clindamycin Erythromycin Oxacillin Tetracycline Trimethoprim/Sulfamethoxazole Vancomycin   12/22/24 Blood culture from Peripheral Venipuncture Staphylococcus aureus         12/21/24 Blood culture from Peripheral Venipuncture Methicillin Susceptible Staphylococcus aureus (MSSA)  S  S  S  S  S  S   12/21/24 Blood culture from Peripheral Venipuncture Staphylococcus aureus             Imaging  NM INFLAMMATION whole body w indium 111    Result Date: 12/27/2024  Interpreted By:  Rafiq Hamm, STUDY: NM INFLAMMATION WHOLE BODY WITH INDIUM 111;  12/27/2024 9:37 am   INDICATION: Signs/Symptoms:Assess for left brachial axillary graft infection.   COMPARISON: CT of chest on 12/25/2024   ACCESSION NUMBER(S): [id number]   ORDERING CLINICIAN: WALLY LEAVITT   TECHNIQUE: DIVISION OF NUCLEAR MEDICINE RADIOLABELED In-111-WBC SCAN   The patient received an intravenous dose of 0.502 mCi of In-111 radiolabeled autologous leukocytes.  Anterior and posterior images were then acquired to include the whole body approximately 24 hours later.  SPECT CT of the chest was obtained as well.   FINDINGS: Normal activity is noted in the liver, spleen, and bone marrow spaces.  No abnormal  In-111 labeled WBC accumulation in the left bronchial axillary graft to suggest an active infection.   Low-dose CT demonstrates multiple pulmonary nodules in both lungs, left greater than right as well  as moderate-sized right pleural effusion.       1. Negative study, no abnormal In-111 labeled WBC accumulation in the left bronchial axillary graft to suggest an active infection. 2. " Low-dose CT demonstrates multiple pulmonary nodules in both lungs, left greater than right as well as moderate-sized right pleural effusion, please correlate with CT chest on 12/25/2024   MACRO: None   Signed by: Rafiq Hamm 12/27/2024 10:08 AM Dictation workstation:   [id number]    CT chest wo IV contrast    Result Date: 12/26/2024  Interpreted By:  Frank Dang, STUDY: CT CHEST WO IV CONTRAST; 12/25/2024 11:55 am   INDICATION: Signs/Symptoms:evaluate for pneumonia   COMPARISON: Plain radiographs from the previous day and CT scan from September 2021.   ACCESSION NUMBER(S): [id number]   ORDERING CLINICIAN: HUNTER CHÁVEZ   TECHNIQUE: Spiral axial unenhanced images were obtained through the chest. Post processing sagittal and coronal reconstruction images were also performed.   All CT examinations are performed with one or more of the following dose reduction techniques: Automated Exposure Control, adjustment of mA and/or kV according to patient size, or use of iterative reconstruction techniques.   PATIENT RADIATION EXPOSURE DATA: CTDI (mGy): DLP (mGy/cm):   FINDINGS Extensive atherosclerotic calcifications involve coronary arteries and to a lesser degree the aorta. There is no aortic aneurysm.   There is no mediastinal, hilar or axillary lymphadenopathy. There is moderate right pleural effusion, with atelectatic changes/infiltrates in the right lung. An 8 mm nodule is now seen in the as ago esophageal recess of the right lobe (axial image number 164). Ill-defined nodular consolidation measuring 16.5 mm is noted in the left upper lobe (axial image number 68). There is irregular nodular density measuring 8 mm in the left upper lobe (axial image number 70). Ill-defined 7.4 mm nodular density seen in the left upper lobe more inferiorly (axial image number 109). Several smaller fibro nodular densities are seen in the superior segment of the left lower lobe. Subpleural vague nodular density measuring up to 1.5 cm is  seen in the left upper lobe posteriorly/laterally (axial image number 154).   Images from the upper abdomen demonstrate calcifications in the visualized portion of the right kidney, most likely nonobstructing stones. There is also stable 1.3 cm hypodensity in the left lobe of the liver, most likely a cyst       1. Moderate right pleural effusion is of indeterminate significance, possibly related to pneumonia, however malignant effusion can not be excluded. Follow-up as needed. If indicated further evaluate with thoracentesis. 2. Several bilateral ill-defined nodular densities as above, not present on the CT scan from 2021, possibly infectious/inflammatory versus neoplastic. Short-term follow-up recommended.   MACRO: Critical Finding:  See findings. Notification was initiated on 12/26/2024 at 5:16 pm by  Frank Dang.  (**-YCF-**) Instructions:   Signed by: Frank Dang 12/26/2024 5:16 PM Dictation workstation:   [id number]    Electrocardiogram, 12-lead ACS symptoms    Result Date: 12/24/2024  Normal sinus rhythm Left ventricular hypertrophy with repolarization abnormality ( R in aVL , Sokolow-Barriga , David product , Romhilt-Thornton ) Abnormal ECG No previous ECGs available Confirmed by Jonathan Masters ([id number]) on 12/24/2024 2:27:57 PM    XR chest 1 view    Result Date: 12/24/2024  Interpreted By:  Frank Dang, STUDY: XR CHEST 1 VIEW; 12/24/2024 9:51 am   INDICATION: Signs/Symptoms:Pneumonia   COMPARISON: 12 21 2024.   ACCESSION NUMBER(S): [id number]   ORDERING CLINICIAN: BAHMAN BLANK   FINDINGS: The study is limited due to  rotation/lordotic projection. The cardiomediastinal silhouette is within normal limits for the technique. There is no pneumothorax or confluence infiltrates. There is blunting of the right costophrenic angle, due to improving small right pleural effusion. The osseous structures are unremarkable.       Improving small right pleural effusion.   Signed by: Frank Dang 12/24/2024  12:53 PM Dictation workstation:   [id number]    Transthoracic Echo (TTE) Limited    Result Date: 12/24/2024           [place] [address]            Phone [phone] TRANSTHORACIC ECHOCARDIOGRAM REPORT Patient Name:       WILLIAM A FRANKE    Ian Physician:    [id number] Jared Rooney DO Study Date:         12/24/2024          Ordering Provider:    [id number] BAHMAN BLANK MRN/PID:            [id number]            Fellow: Accession#:         [id number]        Nurse: Date of Birth/Age:  1950 / 74     Sonographer:          Nakita PUENTES Gender Assigned at  M                   Additional Staff: Birth: Height:             170.20 cm           Admit Date:           12/22/2024 Weight:             81.65 kg            Admission Status:     Inpatient -                                                               Routine BSA / BMI:          1.93 m2 / 28.19     Department Location:                     kg/m2 Blood Pressure: 169 /55 mmHg Study Type:    TRANSTHORACIC ECHO (TTE) LIMITED Diagnosis/ICD: Bacteremia-R78.81 Indication:    Endocarditis CPT Codes:     Echo Limited-63912 Patient History: Pertinent History: CDK stageIV Stable Angina HTN CP HLD CAD AS CHF Chronic Liver                    Disease NSTEMI Atrteriovenous Fistula Stenosis. Study Detail: The following Echo studies were performed: 2D, M-Mode, Doppler and               color flow. Unable to obtain suprasternal notch and subcostal               view.  PHYSICIAN INTERPRETATION: Left Ventricle: The left ventricular systolic function is normal, with a visually estimated ejection fraction of 60-65%. There are no regional wall motion abnormalities. The left ventricular cavity size is normal. There is normal septal and mildly  increased posterior left ventricular wall thickness. There is left ventricular concentric remodeling. Left ventricular diastolic filling was not assessed. Left Atrium: The left atrium is upper limits of normal in size. Right Ventricle: The right ventricle is normal in size. There is normal right ventricular global systolic function. Right Atrium: The right atrium is normal in size. Aortic Valve: The aortic valve is trileaflet. There is no evidence of aortic valve regurgitation. The peak instantaneous gradient of the aortic valve is 15 mmHg. Mitral Valve: The mitral valve is normal in structure. There is trace to mild mitral valve regurgitation. Tricuspid Valve: The tricuspid valve is structurally normal. No evidence of tricuspid regurgitation. Pulmonic Valve: The pulmonic valve is structurally normal. There is trace pulmonic valve regurgitation. Pericardium: No pericardial effusion noted. Aorta: The aortic root is normal.  CONCLUSIONS:  1. The left ventricular systolic function is normal, with a visually estimated ejection fraction of 60-65%.  2. There is normal right ventricular global systolic function.  3. No valvular vegetations visualized. QUANTITATIVE DATA SUMMARY:  2D MEASUREMENTS:           Normal Ranges: IVSd:            0.80 cm   (0.6-1.1cm) LVPWd:           1.29 cm   (0.6-1.1cm) LVIDd:           5.00 cm   (3.9-5.9cm) LVIDs:           3.39 cm LV Mass Index:   99.7 g/m2 LV % FS          32.2 %  LA VOLUME:                    Normal Ranges: LA Vol A4C:        96.5 ml    (22+/-6mL/m2) LA Vol A2C:        96.6 ml LA Vol BP:         100.8 ml LA Vol Index A4C:  49.9ml/m2 LA Vol Index A2C:  50.0 ml/m2 LA Vol Index BP:   52.1 ml/m2 LA Area A4C:       26.1 cm2 LA Area A2C:       25.0 cm2 LA Major Axis A4C: 6.0 cm LA Major Axis A2C: 5.5 cm LA Volume Index:   51.9 ml/m2 LA Vol A4C:        88.7 ml LA Vol A2C:        93.9 ml LA Vol Index BSA:  47.2 ml/m2  RA VOLUME BY A/L METHOD:            Normal Ranges: RA Vol A4C:               33.9 ml    (8.3-19.5ml) RA Vol Index A4C:        17.5 ml/m2 RA Area A4C:             15.2 cm2 RA Major Axis A4C:       5.8 cm  M-MODE MEASUREMENTS:         Normal Ranges: LAs:                 5.25 cm (2.7-4.0cm)  LV SYSTOLIC FUNCTION BY 2D PLANIMETRY (MOD):                      Normal Ranges: EF-A4C View:    58 % (>=55%) EF-A2C View:    63 % EF-Biplane:     60 % EF-Visual:      63 % LV EF Reported: 63 %  LV DIASTOLIC FUNCTION:          Normal Ranges: MV Peak E:             0.84 m/s (0.7-1.2 m/s) MV Peak A:             1.14 m/s (0.42-0.7 m/s) E/A Ratio:             0.74     (1.0-2.2)  MITRAL VALVE:          Normal Ranges: MV DT:        173 msec (150-240msec)  AORTIC VALVE:            Normal Ranges: AoV Vmax:      1.93 m/s  (<=1.7m/s) AoV Peak P.9 mmHg (<20mmHg) LVOT Max Joss:  1.24 m/s  (<=1.1m/s) LVOT VTI:      33.46 cm LVOT Diameter: 1.95 cm   (1.8-2.4cm) AoV Area,Vmax: 1.91 cm2  (2.5-4.5cm2)  RIGHT VENTRICLE: RV Basal 2.70 cm RV Mid   2.40 cm RV Major 6.3 cm  TRICUSPID VALVE/RVSP:          Normal Ranges: Peak TR Velocity:     2.83 m/s RV Syst Pressure:     35 mmHg  (< 30mmHg)  PULMONIC VALVE:           Normal Ranges: PV Accel Time:  86 msec   (>120ms) PV Max Joss:     1.6 m/s   (0.6-0.9m/s) PV Max PG:      10.3 mmHg  [id number] Jared Nevin  Electronically signed on 2024 at 10:02:08 AM  ** Final **     XR chest 1 view    Result Date: 2024  STUDY: Chest Radiograph;  [2024; 11:41 am] INDICATION: Altered. Cough. COMPARISON: XR chest 2024 ACCESSION NUMBER(S): [id number] ORDERING CLINICIAN: FELIPE SINGH TECHNIQUE:  Frontal chest was obtained at 11:41 hours. FINDINGS: CARDIOMEDIASTINAL SILHOUETTE: The cardiomediastinal silhouette is enlarged but stable compared to previous imaging..  LUNGS: The lungs demonstrate mild pulmonary vascular prominence which may represent mild vascular congestion.  There is blunting of the right costophrenic angle which could represent a possible small  effusion..  ABDOMEN: No remarkable upper abdominal findings.  BONES: No acute osseous changes.  Multiple vascular stents are noted within the left upper extremity.    1.  Pulmonary vascular prominence which may represent mild pulmonary vascular congestion. 2.  Blunting of the right costophrenic angle laterally which could represent a small pleural effusion.. Signed by Tk Geller MD    CT head wo IV contrast    Result Date: 12/21/2024  Interpreted By:  Jonas Rinaldi, STUDY: CT HEAD WO IV CONTRAST;  12/21/2024 11:45 am   INDICATION: Signs/Symptoms:altered.     COMPARISON: 11/16/2024   ACCESSION NUMBER(S): [id number]   ORDERING CLINICIAN: FELIPE SINGH   TECHNIQUE: Unenhanced images were obtained through the brain.   FINDINGS: There is atrophy resulting in prominence of the ventricles and sulci. There are areas of decreased attenuation within the white matter which are nonspecific but are commonly associated with small vessel ischemic disease. There is no mass effect or midline shift. No acute intracranial hemorrhage is identified. No extra-axial fluid collections are seen. No intraparenchymal mass lesions are identified.  Bone windows demonstrate no evidence of an acute calvarial fracture. There is mucosal in the paranasal sinuses, most conspicuous involving the left maxillary sinus.       No evidence of an acute intracranial process.   MACRO: None.   Signed by: Jonas Rinaldi 12/21/2024 12:21 PM Dictation workstation:   [id number]     Assessment/Plan   Encephalopathy, septic, resolved  MSSA bacteremia, source unclear-negative TTE, negative indium scan  Bilateral pleural effusion-s/p thoracentesis 12/27/24  Abnormal chest x-ray-congestion, rule out infection-elevated procalcitonin  ESRD on dialysis       Continue IV cefazolin  Follow-up repeat blood cultures-12/25/2024  Monitor temperature and WBC  Tentative plan will be IV cefazolin 2/2/3gm with dialysis at discharge pending repeat blood cultures    Discussed with   Lawson    Total time spent caring for the patient today was 20 minutes.  This includes time spent before the visit reviewing the chart, time spent during the visit, and time spent after the visit on documentation.      Leelee Krishna, APRN-CNP

## 2024-12-27 NOTE — CARE PLAN
The patient's goals for the shift include  GO HOME    The clinical goals for the shift include NO ADVERSE EVENTS    Over the shift, the patient did make progress toward the PLAN OF CARE goals.

## 2024-12-27 NOTE — PROGRESS NOTES
William A Franke is a 74 y.o. male on day 6 of admission presenting with Acute encephalopathy.      Subjective  Patient sitting up in chair in no distress  Discussed negative indium scan of the left arm  Denies left arm pain  Status post thoracentesis bilaterally on 12/27    Objective        Last Recorded Vitals      12/26/2024    11:00 PM 12/27/2024     7:00 AM 12/27/2024     9:51 AM 12/27/2024    10:04 AM 12/27/2024    10:07 AM 12/27/2024    10:11 AM 12/27/2024    11:00 AM   Vitals   Systolic 142 176 203 190 200 213 145   Diastolic 45 46 51 48 49 61 45   BP Location Right arm Right arm     Right arm   Heart Rate 57 55 54 57 54 55 56   Temp 36.3 °C (97.3 °F) 36.7 °C (98.1 °F)     36.7 °C (98.1 °F)   Resp 19 17 18 18 18 18 18       Imaging  NM INFLAMMATION whole body w indium 111    Result Date: 12/27/2024  Interpreted By:  Rafiq Hamm, STUDY: NM INFLAMMATION WHOLE BODY WITH INDIUM 111;  12/27/2024 9:37 am   INDICATION: Signs/Symptoms:Assess for left brachial axillary graft infection.   COMPARISON: CT of chest on 12/25/2024   ACCESSION NUMBER(S): [id number]   ORDERING CLINICIAN: WALLY LEAVITT   TECHNIQUE: DIVISION OF NUCLEAR MEDICINE RADIOLABELED In-111-WBC SCAN   The patient received an intravenous dose of 0.502 mCi of In-111 radiolabeled autologous leukocytes.  Anterior and posterior images were then acquired to include the whole body approximately 24 hours later.  SPECT CT of the chest was obtained as well.   FINDINGS: Normal activity is noted in the liver, spleen, and bone marrow spaces.  No abnormal  In-111 labeled WBC accumulation in the left bronchial axillary graft to suggest an active infection.   Low-dose CT demonstrates multiple pulmonary nodules in both lungs, left greater than right as well  as moderate-sized right pleural effusion.       1. Negative study, no abnormal In-111 labeled WBC accumulation in the left bronchial axillary graft to suggest an active infection. 2. Low-dose CT demonstrates  multiple pulmonary nodules in both lungs, left greater than right as well as moderate-sized right pleural effusion, please correlate with CT chest on 12/25/2024   MACRO: None   Signed by: Rafiq Hamm 12/27/2024 10:08 AM Dictation workstation:   [id number]       Relevant Results  Results for orders placed or performed during the hospital encounter of 12/21/24 (from the past 24 hours)   POCT GLUCOSE   Result Value Ref Range    POCT Glucose 98 74 - 99 mg/dL   POCT GLUCOSE   Result Value Ref Range    POCT Glucose 275 (H) 74 - 99 mg/dL   POCT GLUCOSE   Result Value Ref Range    POCT Glucose 152 (H) 74 - 99 mg/dL   Protein, Total   Result Value Ref Range    Total Protein 5.9 (L) 6.4 - 8.2 g/dL   Lactate Dehydrogenase   Result Value Ref Range     84 - 246 U/L   CBC   Result Value Ref Range    WBC 6.6 4.4 - 11.3 x10*3/uL    nRBC 0.6 (H) 0.0 - 0.0 /100 WBCs    RBC 3.20 (L) 4.50 - 5.90 x10*6/uL    Hemoglobin 9.8 (L) 13.5 - 17.5 g/dL    Hematocrit 28.6 (L) 41.0 - 52.0 %    MCV 89 80 - 100 fL    MCH 30.6 26.0 - 34.0 pg    MCHC 34.3 32.0 - 36.0 g/dL    RDW 16.7 (H) 11.5 - 14.5 %    Platelets 161 150 - 450 x10*3/uL   Basic Metabolic Panel   Result Value Ref Range    Glucose 111 (H) 74 - 99 mg/dL    Sodium 138 136 - 145 mmol/L    Potassium 3.8 3.5 - 5.3 mmol/L    Chloride 100 98 - 107 mmol/L    Bicarbonate 28 21 - 32 mmol/L    Anion Gap 14 10 - 20 mmol/L    Urea Nitrogen 27 (H) 6 - 23 mg/dL    Creatinine 7.19 (H) 0.50 - 1.30 mg/dL    eGFR 7 (L) >60 mL/min/1.73m*2    Calcium 8.6 8.6 - 10.3 mg/dL   POCT GLUCOSE   Result Value Ref Range    POCT Glucose 112 (H) 74 - 99 mg/dL   Sterile Cup   Result Value Ref Range    Extra Tube Hold for add-ons.    pH, Body Fluid   Result Value Ref Range    pH, Fluid 7.60 See Below   Body Fluid Cell Count   Result Value Ref Range    Color, Fluid Yellow Colorless, Straw, Yellow    Clarity, Fluid Clear Clear    WBC, Fluid 513 See Comment /uL    RBC, Fluid 109 0  /uL /uL   Body Fluid Differential  "  Result Value Ref Range    Neutrophils %, Manual, Fluid 14 <25 % %    Lymphocytes %, Manual, Fluid 11 <75 % %    Mono/Macrophages %, Manual, Fluid 58 <70 % %    Eosinophils %, Manual, Fluid 16 0 % %    Basophils %, Manual, Fluid 1 0 % %    Immature Granulocytes %, Manual, Fluid 0 0 % %    Blasts %, Manual, Fluid 0 0 % %    Unclassified Cells %, Manual, Fluid 0 0 % %    Plasma Cells %, Manual, Fluid 0 0 % %    Total Cells Counted, Fluid 100    POCT GLUCOSE   Result Value Ref Range    POCT Glucose 149 (H) 74 - 99 mg/dL     *Note: Due to a large number of results and/or encounters for the requested time period, some results have not been displayed. A complete set of results can be found in Results Review.     Physical Exam  General: Pt is alert and oriented x 3.   HEENT: Head is atraumatic, normocephalic. PERRL. No cervical bruits  Cardiac:Regular rate and rhythm.    Respiratory: Lungs clear to auscultation.  .  Abdomen: Soft, nondistended, nontender.  Bowel sounds x4 quadrants.  Pulse exam: Palpable brachial and radial pulses bilaterall. Bilateral upper and lower extremities are warm and well-perfused.  Extremities: Left upper extremity AV graft has a positive bruit.  There is no erythema or induration.  There is no drainage from my examination.  Although the patient had just had dialysis so I did not fully remove all the dressings due to concern for bleeding.  Neuro: Moves all extremities spontaneously.  No focal deficits.  Psych: Appropriate affect.  Answers questions appropriately.      Assessment/Plan  Bacteremia  Status post left brachial axillary graft placed July 2024     Patient admitted with a \"drop in vital signs\" with associated confusion and mental status changes.  This has since resolved.  On admission, lactate was 1.3, white blood cell count 9.5 ,and 1 episode of fever during hospitalization on 12/22/2024.     From a clinical standpoint, the patient has no evidence of AV graft infection.  There is no " tenderness, induration, drainage, erythema.  Given unclear etiology of the MSSA bacteremia, recommend tagged white blood cell scan for further evaluation.  Indium scan negative, no abnormal WBC accumulation in the left axillary graft to suggest active infection.  Left arm remains nontender without erythema.  Our service will sign off.  Please call with further questions.

## 2024-12-27 NOTE — PROGRESS NOTES
"Pulmonary Daily Progress Note   Subjective    William A Franke is a 74 y.o. year old male patient known with ESRD through left arm AV fistula, HTN, HFpEF admitted on 12/21/2024 with following encephalopathy and sepsis. Found to have MSSA bacteremia. Pulmonary are consulted for possible pneumonia as etiology of MSSA bacteremia     Interval History:  Patient resting in bed with eyes closed, but alert and arousable when entering the room, patient on Room air    Meds    Scheduled medications  allopurinol, 100 mg, oral, Daily  amLODIPine, 5 mg, oral, Daily  aspirin, 81 mg, oral, Daily  atorvastatin, 80 mg, oral, Nightly  carvedilol, 6.25 mg, oral, BID  ceFAZolin, 2 g, intravenous, q24h LUKE  ezetimibe, 10 mg, oral, Daily  heparin (porcine), 5,000 Units, subcutaneous, q12h  [Held by provider] hydrALAZINE, 50 mg, oral, TID  insulin lispro, 0-10 Units, subcutaneous, TID AC  isosorbide mononitrate ER, 60 mg, oral, Daily  pantoprazole, 40 mg, oral, Daily  sevelamer carbonate, 1,600 mg, oral, TID  SITagliptin phosphate, 25 mg, oral, Daily    Continuous medications  oxygen,     PRN medications  PRN medications: acetaminophen **OR** acetaminophen **OR** acetaminophen, acetaminophen **OR** acetaminophen **OR** acetaminophen, cloNIDine, dextrose, dextrose, glucagon, glucagon, guaiFENesin, nitroglycerin, polyethylene glycol     Objective    Blood pressure (!) 145/45, pulse 56, temperature 36.7 °C (98.1 °F), temperature source Oral, resp. rate 18, height 1.702 m (5' 7\"), weight 81.6 kg (180 lb), SpO2 99%.   Physical Exam   GENERAL: No respiratory distress  HEAD/SINUSES: RA  OROPHARYNX: some dental discoloration of front upper teeth  LUNGS: clear bilaterally  CARDIAC: Regular rate and rhythm with murmur present   EXTREMITIES: No edema,  NEURO: grossly normal mental status  SKIN: Skin turgor normal.   PSYCH: Normal affect    Intake/Output Summary (Last 24 hours) at 12/27/2024 1206  Last data filed at 12/26/2024 1316  Gross per 24 hour "   Intake 800 ml   Output 1900 ml   Net -1100 ml     Labs:   Results from last 72 hours   Lab Units 12/27/24  0507   SODIUM mmol/L 138   POTASSIUM mmol/L 3.8   CHLORIDE mmol/L 100   CO2 mmol/L 28   BUN mg/dL 27*   CREATININE mg/dL 7.19*   GLUCOSE mg/dL 111*   CALCIUM mg/dL 8.6   ANION GAP mmol/L 14   EGFR mL/min/1.73m*2 7*      Results from last 72 hours   Lab Units 12/27/24  0507   WBC AUTO x10*3/uL 6.6   HEMOGLOBIN g/dL 9.8*   HEMATOCRIT % 28.6*   PLATELETS AUTO x10*3/uL 161      Micro/ID:   Lab Results   Component Value Date    BLOODCULT No growth at 1 day 12/25/2024     Summary of key imaging results from the last 24 hours  CT chest WO 12/25/24 - 1. Moderate right pleural effusion is of indeterminate significance, possibly related to pneumonia, however malignant effusion can not be excluded. 2. Several bilateral ill-defined nodular densities as above, not present on the CT scan from 2021, possibly infectious/inflammatory versus neoplastic. .    Impression   William A Franke is a 74 y.o. year old male patient known with ESRD through left arm AV fistula, HTN, HFpEF admitted on 12/21/2024 with following encephalopathy and sepsis. Found to have MSSA bacteremia. Pulmonary are consulted for possible pneumonia as etiology of MSSA bacteremia   Acute hypoxic respiratory failure resolved  MSSA bacteremia indium scan negative - possible lung source CT chest showing multifocal nodular changes and bilateral pleural effusion greater on the right  HFpEF  Suspected pneumonia, cough+, Sepsis, MSSA +, abnormal CXR. Procal (5.19) not very reliable in patients with ESRD   Lung nodules - ill defined boarders small few have groundglass and the halo sign suggestive of an infectious process.  They are mainly in the left upper lobe.  This may represent septic embolization or multifocal area of pneumonia that is currently being treated.  Observation is extremely important  Pleural effusion - CT with moderate right pleural effusion,  multifocal nodular changes - thoracentesis done on 12/27/24 700mL clear yellow fluid, exudate by Light's criteria. Cultures and cytology pending   ESRD - on dialysis tues thurs sat    Recommendations   As follows:  Antibiotic therapy according to infectious disease - cefazolin - when discharged cefazolin with dialysis  CXR in the AM to monitor pleural effusions post thoracentesis  Incentive spirometry out of bed to chair  Patient remains on room air  Sputum culture if obtainable  Repeat blood cultures pending (no growth for one day)  Pulmonary will continue to follow  Reviewed case with Dr. Haywood who is in full agreement with above note     Claudia Hernández, VERÓNICA-CNP   12/27/24 at 12:06 PM     -Only the Medical problems listed under impression were addressed today.   -Please contact primary team for all other concerns and medical problem  -Thank you for your consult     Disclaimer: Documentation completed with the information available at the time of input. Parts of this note may have been scribed or generated using voice dictation software, Dragon.  Homophonic errors may exist.  Please contact me directly if clarification is needed. The times in the chart may not be reflective of actual patient care times, interventions, or procedures. Documentation occurs after the physical care of the patient.

## 2024-12-28 ENCOUNTER — APPOINTMENT (OUTPATIENT)
Dept: DIALYSIS | Facility: HOSPITAL | Age: 74
End: 2024-12-28
Payer: MEDICARE

## 2024-12-28 ENCOUNTER — APPOINTMENT (OUTPATIENT)
Dept: RADIOLOGY | Facility: HOSPITAL | Age: 74
DRG: 871 | End: 2024-12-28
Payer: MEDICARE

## 2024-12-28 LAB
FUNGUS SPEC FUNGUS STN: NORMAL
GLUCOSE BLD MANUAL STRIP-MCNC: 121 MG/DL (ref 74–99)
GLUCOSE BLD MANUAL STRIP-MCNC: 123 MG/DL (ref 74–99)
GLUCOSE BLD MANUAL STRIP-MCNC: 165 MG/DL (ref 74–99)
GLUCOSE BLD MANUAL STRIP-MCNC: 98 MG/DL (ref 74–99)
PHOSPHATE SERPL-MCNC: 3.1 MG/DL (ref 2.5–4.9)

## 2024-12-28 PROCEDURE — 8010000001 HC DIALYSIS - HEMODIALYSIS PER DAY

## 2024-12-28 PROCEDURE — 1200000002 HC GENERAL ROOM WITH TELEMETRY DAILY

## 2024-12-28 PROCEDURE — 2500000002 HC RX 250 W HCPCS SELF ADMINISTERED DRUGS (ALT 637 FOR MEDICARE OP, ALT 636 FOR OP/ED): Performed by: NURSE PRACTITIONER

## 2024-12-28 PROCEDURE — 71045 X-RAY EXAM CHEST 1 VIEW: CPT

## 2024-12-28 PROCEDURE — 71045 X-RAY EXAM CHEST 1 VIEW: CPT | Performed by: RADIOLOGY

## 2024-12-28 PROCEDURE — 99232 SBSQ HOSP IP/OBS MODERATE 35: CPT | Performed by: INTERNAL MEDICINE

## 2024-12-28 PROCEDURE — 2500000001 HC RX 250 WO HCPCS SELF ADMINISTERED DRUGS (ALT 637 FOR MEDICARE OP): Performed by: INTERNAL MEDICINE

## 2024-12-28 PROCEDURE — 2500000004 HC RX 250 GENERAL PHARMACY W/ HCPCS (ALT 636 FOR OP/ED): Mod: JZ | Performed by: INTERNAL MEDICINE

## 2024-12-28 PROCEDURE — 82947 ASSAY GLUCOSE BLOOD QUANT: CPT

## 2024-12-28 PROCEDURE — 2500000001 HC RX 250 WO HCPCS SELF ADMINISTERED DRUGS (ALT 637 FOR MEDICARE OP): Performed by: NURSE PRACTITIONER

## 2024-12-28 PROCEDURE — 2500000004 HC RX 250 GENERAL PHARMACY W/ HCPCS (ALT 636 FOR OP/ED): Performed by: NURSE PRACTITIONER

## 2024-12-28 RX ORDER — AMLODIPINE BESYLATE 10 MG/1
10 TABLET ORAL DAILY
Qty: 90 TABLET | Refills: 3 | Status: SHIPPED | OUTPATIENT
Start: 2024-12-28 | End: 2025-12-28

## 2024-12-28 RX ORDER — ISOSORBIDE MONONITRATE 60 MG/1
60 TABLET, EXTENDED RELEASE ORAL DAILY
Qty: 90 TABLET | Refills: 3 | Status: SHIPPED | OUTPATIENT
Start: 2024-12-28 | End: 2025-12-28

## 2024-12-28 RX ORDER — HYDRALAZINE HYDROCHLORIDE 50 MG/1
50 TABLET, FILM COATED ORAL 3 TIMES DAILY
Qty: 270 TABLET | Refills: 3 | Status: SHIPPED | OUTPATIENT
Start: 2024-12-28 | End: 2025-12-28

## 2024-12-28 RX ADMIN — PANTOPRAZOLE SODIUM 40 MG: 40 TABLET, DELAYED RELEASE ORAL at 08:31

## 2024-12-28 RX ADMIN — HYDRALAZINE HYDROCHLORIDE 50 MG: 50 TABLET ORAL at 20:49

## 2024-12-28 RX ADMIN — SEVELAMER CARBONATE 1600 MG: 800 TABLET, FILM COATED ORAL at 17:25

## 2024-12-28 RX ADMIN — HEPARIN SODIUM 5000 UNITS: 5000 INJECTION, SOLUTION INTRAVENOUS; SUBCUTANEOUS at 17:25

## 2024-12-28 RX ADMIN — ASPIRIN 81 MG: 81 TABLET, COATED ORAL at 08:31

## 2024-12-28 RX ADMIN — ALLOPURINOL 100 MG: 100 TABLET ORAL at 08:31

## 2024-12-28 RX ADMIN — ISOSORBIDE MONONITRATE 60 MG: 60 TABLET, EXTENDED RELEASE ORAL at 08:31

## 2024-12-28 RX ADMIN — SEVELAMER CARBONATE 1600 MG: 800 TABLET, FILM COATED ORAL at 14:04

## 2024-12-28 RX ADMIN — CEFAZOLIN SODIUM 2 G: 2 INJECTION, SOLUTION INTRAVENOUS at 14:04

## 2024-12-28 RX ADMIN — CARVEDILOL 6.25 MG: 6.25 TABLET, FILM COATED ORAL at 17:25

## 2024-12-28 RX ADMIN — CARVEDILOL 6.25 MG: 6.25 TABLET, FILM COATED ORAL at 08:31

## 2024-12-28 RX ADMIN — SITAGLIPTIN 25 MG: 50 TABLET, FILM COATED ORAL at 08:31

## 2024-12-28 RX ADMIN — HYDRALAZINE HYDROCHLORIDE 50 MG: 50 TABLET ORAL at 14:04

## 2024-12-28 RX ADMIN — ATORVASTATIN CALCIUM 80 MG: 80 TABLET, FILM COATED ORAL at 20:49

## 2024-12-28 RX ADMIN — EZETIMIBE 10 MG: 10 TABLET ORAL at 08:31

## 2024-12-28 RX ADMIN — INSULIN LISPRO 2 UNITS: 100 INJECTION, SOLUTION INTRAVENOUS; SUBCUTANEOUS at 17:25

## 2024-12-28 RX ADMIN — AMLODIPINE BESYLATE 5 MG: 5 TABLET ORAL at 08:31

## 2024-12-28 RX ADMIN — SEVELAMER CARBONATE 1600 MG: 800 TABLET, FILM COATED ORAL at 08:31

## 2024-12-28 ASSESSMENT — PAIN SCALES - WONG BAKER: WONGBAKER_NUMERICALRESPONSE: NO HURT

## 2024-12-28 ASSESSMENT — COGNITIVE AND FUNCTIONAL STATUS - GENERAL
DAILY ACTIVITIY SCORE: 24
MOBILITY SCORE: 24
MOBILITY SCORE: 24

## 2024-12-28 ASSESSMENT — PAIN SCALES - GENERAL
PAINLEVEL_OUTOF10: 0 - NO PAIN
PAINLEVEL_OUTOF10: 0 - NO PAIN

## 2024-12-28 NOTE — NURSING NOTE
Assumed care of patient. Patient is alert laying in the bed with no needs. Heart rate on telemetry in mid 40's notified attending.

## 2024-12-28 NOTE — CARE PLAN
The patient's goals for the shift include  rest    The clinical goals for the shift include hds    Over the shift, the patient did not make progress toward the following goals. Barriers to progression include . Recommendations to address these barriers include.

## 2024-12-28 NOTE — PROGRESS NOTES
"William A Franke is a 74 y.o. male on day 7 of admission presenting with Acute encephalopathy.      Subjective   Patient seen and examined today for end stage renal disease on dialysis therapy, waiting to be dialyzed today via left arm graft. Denies nausea, vomiting, diarrhea, changes in appetite, dizziness or sob. In no acute distress,       Objective      Physical Exam  Neck:      Vascular: No carotid bruit.   Cardiovascular:      Rate and Rhythm: Normal rate and regular rhythm.      Heart sounds: No murmur heard.     No friction rub. No gallop.   Pulmonary:      Breath sounds: No wheezing, rhonchi or rales.   Chest:      Chest wall: No tenderness.   Abdominal:      General: There is no distension.      Tenderness: There is no abdominal tenderness. There is no guarding or rebound.   Musculoskeletal:         General: No swelling or tenderness.      Cervical back: Neck supple.      Right lower leg: No edema.      Left lower leg: No edema.      Comments: Very minimal redness over his AV graft without any drainage or tenderness   Lymphadenopathy:      Cervical: No cervical adenopathy.      Visit Vitals  BP (!) 178/40 (BP Location: Right arm, Patient Position: Lying) Comment: [facility] notified.   Pulse 51   Temp 36.6 °C (97.9 °F) (Oral)   Resp 17   Ht 1.702 m (5' 7\")   Wt 81.6 kg (180 lb)   SpO2 97%   BMI 28.19 kg/m²   Smoking Status Never   BSA 1.96 m²        Intake/Output last 3 Shifts:    Intake/Output Summary (Last 24 hours) at 12/28/2024 0951  Last data filed at 12/27/2024 1300  Gross per 24 hour   Intake 200 ml   Output --   Net 200 ml       Current Facility-Administered Medications:     acetaminophen (Tylenol) tablet 650 mg, 650 mg, oral, q4h PRN, 650 mg at 12/22/24 0758 **OR** acetaminophen (Tylenol) oral liquid 650 mg, 650 mg, nasogastric tube, q4h PRN **OR** acetaminophen (Tylenol) suppository 650 mg, 650 mg, rectal, q4h PRN, Aliya Patel, VERÓNICA-CNP    acetaminophen (Tylenol) tablet 650 mg, 650 mg, oral, " q4h PRN **OR** acetaminophen (Tylenol) oral liquid 650 mg, 650 mg, oral, q4h PRN **OR** acetaminophen (Tylenol) suppository 650 mg, 650 mg, rectal, q4h PRN, VERÓNICA Russ-CNP    allopurinol (Zyloprim) tablet 100 mg, 100 mg, oral, Daily, VERÓNICA Russ-CNP, 100 mg at 12/28/24 0831    amLODIPine (Norvasc) tablet 5 mg, 5 mg, oral, Daily, Aliya Patel APRN-CNP, 5 mg at 12/28/24 0831    aspirin EC tablet 81 mg, 81 mg, oral, Daily, VERÓNIAC Russ-CNP, 81 mg at 12/28/24 0831    atorvastatin (Lipitor) tablet 80 mg, 80 mg, oral, Nightly, VERÓNICA Russ-CNP, 80 mg at 12/27/24 2024    carvedilol (Coreg) tablet 6.25 mg, 6.25 mg, oral, BID, VERÓNICA Russ-CNP, 6.25 mg at 12/28/24 0831    ceFAZolin (Ancef) 2 g in dextrose (iso)  mL, 2 g, intravenous, q24h LUKE, Dave Pathak MD, Stopped at 12/27/24 1628    cloNIDine (Catapres) tablet 0.1 mg, 0.1 mg, oral, q6h PRN, Desirae Alicea MD, 0.1 mg at 12/27/24 2328    dextrose 50 % injection 12.5 g, 12.5 g, intravenous, q15 min PRN, Aliya Patel APRN-CNP    dextrose 50 % injection 25 g, 25 g, intravenous, q15 min PRN, Aliya Patel APRN-CNP    ezetimibe (Zetia) tablet 10 mg, 10 mg, oral, Daily, VERÓNICA Russ-CNP, 10 mg at 12/28/24 0831    glucagon (Glucagen) injection 1 mg, 1 mg, intramuscular, q15 min PRN, Aliya Patel APRN-CNP    glucagon (Glucagen) injection 1 mg, 1 mg, intramuscular, q15 min PRN, Aliya Pachinger, APRN-CNP    guaiFENesin (Mucinex) 12 hr tablet 600 mg, 600 mg, oral, BID PRN, VERÓNICA Russ-CNP, 600 mg at 12/21/24 1849    heparin (porcine) injection 5,000 Units, 5,000 Units, subcutaneous, q12h, VERÓNICA Russ-CNP, 5,000 Units at 12/27/24 1700    [Held by provider] hydrALAZINE (Apresoline) tablet 50 mg, 50 mg, oral, TID, Desirae Alicea MD    insulin lispro injection 0-10 Units, 0-10 Units, subcutaneous, TID AC, VERÓNICA Russ-CNP, 6 Units at 12/26/24  1722    isosorbide mononitrate ER (Imdur) 24 hr tablet 60 mg, 60 mg, oral, Daily, ERIKA Russ, 60 mg at 12/28/24 0831    nitroglycerin (Nitrostat) SL tablet 0.4 mg, 0.4 mg, sublingual, q5 min PRN, ERIKA Russ    oxygen (O2) therapy, , inhalation, Continuous, ERIKA Russ, 21 percent at 12/21/24 1508    pantoprazole (ProtoNix) EC tablet 40 mg, 40 mg, oral, Daily, VERÓNICA Russ-CNP, 40 mg at 12/28/24 0831    polyethylene glycol (Glycolax, Miralax) packet 17 g, 17 g, oral, Daily PRN, ERIKA Russ    sevelamer carbonate (Renvela) tablet 1,600 mg, 1,600 mg, oral, TID, ERIKA Russ, 1,600 mg at 12/28/24 0831    SITagliptin phosphate (Januvia) tablet 25 mg, 25 mg, oral, Daily, ERIKA Russ, 25 mg at 12/28/24 0831     Results for orders placed or performed during the hospital encounter of 12/21/24 (from the past 24 hours)   Sterile Cup   Result Value Ref Range    Extra Tube Hold for add-ons.    Sterile Fluid Culture/Smear    Specimen: Pleural; Fluid   Result Value Ref Range    Gram Stain (4+) Abundant Polymorphonuclear leukocytes     Gram Stain No organisms seen    AFB Processed   Result Value Ref Range    Extra Tube Hold for add-ons.    pH, Body Fluid   Result Value Ref Range    pH, Fluid 7.60 See Below   Lactate Dehydrogenase, Fluid   Result Value Ref Range    LD, Fluid 150 Not established. U/L   Glucose, Fluid   Result Value Ref Range    Glucose, Fluid 124 Not established mg/dL   Protein, Total Fluid   Result Value Ref Range    Protein, Total Fluid 3.5 Not established g/dL   Body Fluid Cell Count   Result Value Ref Range    Color, Fluid Yellow Colorless, Straw, Yellow    Clarity, Fluid Clear Clear    WBC, Fluid 513 See Comment /uL    RBC, Fluid 109 0  /uL /uL   Body Fluid Differential   Result Value Ref Range    Neutrophils %, Manual, Fluid 14 <25 % %    Lymphocytes %, Manual, Fluid 11 <75 % %    Mono/Macrophages %,  Manual, Fluid 58 <70 % %    Eosinophils %, Manual, Fluid 16 0 % %    Basophils %, Manual, Fluid 1 0 % %    Immature Granulocytes %, Manual, Fluid 0 0 % %    Blasts %, Manual, Fluid 0 0 % %    Unclassified Cells %, Manual, Fluid 0 0 % %    Plasma Cells %, Manual, Fluid 0 0 % %    Total Cells Counted, Fluid 100    Lavender Top   Result Value Ref Range    Extra Tube Hold for add-ons.    POCT GLUCOSE   Result Value Ref Range    POCT Glucose 149 (H) 74 - 99 mg/dL   POCT GLUCOSE   Result Value Ref Range    POCT Glucose 144 (H) 74 - 99 mg/dL   POCT GLUCOSE   Result Value Ref Range    POCT Glucose 138 (H) 74 - 99 mg/dL   POCT GLUCOSE   Result Value Ref Range    POCT Glucose 121 (H) 74 - 99 mg/dL     *Note: Due to a large number of results and/or encounters for the requested time period, some results have not been displayed. A complete set of results can be found in Results Review.      Assessment/Plan      End-stage kidney disease schedule plans for dialysis today, patient is aware  Staph aureus bacteremia source is not clear lung versus AV fistula continue antibiotics per infectious disease negative indium scan  Anemia chronic kidney disease continue with erythropoietin therapy  Lung nodules-pulmonology on board  Pleural effusion-s/p thoracentesis on 12/27/2024   Hypertension- ultrafiltration, resume hydralizine today        VERÓNICA Bucio-CNP

## 2024-12-28 NOTE — PROGRESS NOTES
"Pulmonary Daily Progress Note   Subjective    William A Franke is a 74 y.o. year old male patient known with ESRD admitted on 12/21/2024 with Staph aureus (MSSA) bacteremia. Found to have R pleural effusion. Thus the pulmonary consult    Interval History:  He is feeling well.  He denies any shortness of breath, cough, sputum production or chest pain.  He does not remember being exposed to anybody with respiratory illness    Meds    Scheduled medications  allopurinol, 100 mg, oral, Daily  amLODIPine, 5 mg, oral, Daily  aspirin, 81 mg, oral, Daily  atorvastatin, 80 mg, oral, Nightly  carvedilol, 6.25 mg, oral, BID  ceFAZolin, 2 g, intravenous, q24h LUKE  ezetimibe, 10 mg, oral, Daily  heparin (porcine), 5,000 Units, subcutaneous, q12h  [Held by provider] hydrALAZINE, 50 mg, oral, TID  insulin lispro, 0-10 Units, subcutaneous, TID AC  isosorbide mononitrate ER, 60 mg, oral, Daily  pantoprazole, 40 mg, oral, Daily  sevelamer carbonate, 1,600 mg, oral, TID  SITagliptin phosphate, 25 mg, oral, Daily    Continuous medications  oxygen,     PRN medications  PRN medications: acetaminophen **OR** acetaminophen **OR** acetaminophen, acetaminophen **OR** acetaminophen **OR** acetaminophen, cloNIDine, dextrose, dextrose, glucagon, glucagon, guaiFENesin, nitroglycerin, polyethylene glycol     Objective    Blood pressure (!) 178/40, pulse 62, temperature 36.2 °C (97.2 °F), temperature source Temporal, resp. rate 17, height 1.702 m (5' 7\"), weight 81.6 kg (180 lb), SpO2 97%.   Physical Exam   GENERAL: normal appearance. well nourished. No respiratory distress  NECK: no JVD, midline trachea without stridor.   LUNGS: clear with no wheezing. no crackles or rhonchi  CARDIAC: normal S1 and S2; no gallops, rubs or murmurs. Regular rate and rhythm  EXTREMITIES: No edema,    NEURO: grossly normal mental status, CN reflexes and motor strength.   SKIN: Skin turgor normal. No rashes or lesions.   PSYCH: Normal affect    Intake/Output Summary " (Last 24 hours) at 12/28/2024 1049  Last data filed at 12/28/2024 1000  Gross per 24 hour   Intake 600 ml   Output --   Net 600 ml     Labs:   Results from last 72 hours   Lab Units 12/27/24  0507   SODIUM mmol/L 138   POTASSIUM mmol/L 3.8   CHLORIDE mmol/L 100   CO2 mmol/L 28   BUN mg/dL 27*   CREATININE mg/dL 7.19*   GLUCOSE mg/dL 111*   CALCIUM mg/dL 8.6   ANION GAP mmol/L 14   EGFR mL/min/1.73m*2 7*      Results from last 72 hours   Lab Units 12/27/24  0507   WBC AUTO x10*3/uL 6.6   HEMOGLOBIN g/dL 9.8*   HEMATOCRIT % 28.6*   PLATELETS AUTO x10*3/uL 161     Micro/ID:   Lab Results   Component Value Date    BLOODCULT No growth at 2 days 12/25/2024     Summary of key imaging    Right-sided pleural effusion without clear evidence of consolidation.  Left lung, particularly left upper lobe has few ill-defined nodular infiltrates with groundglass changes    Impression   Jose A Franke is a 74 y.o. year old male patient is being seen by the pulmonary service for   MSSA bacteremia with abnormal chest CT is concerning for possible septic embolization  Right pleural effusion is exudative by lights criteria.  Cultures pending.  Cytology pending  The etiology of this effusion is not yet identified.  Clinically he does not appear to have had a pneumonia prior to this presentation to think of it as a parapneumonic effusion.  Other possibilities include volume overload in the setting of renal failure as well as a malignant process -it is crucial to follow cytology as well as repeat scanning in the next 6 to 8 weeks to assure complete resolution  End-stage renal disease on dialysis    Recommendations   As follows:  Follow-up cytology  If cytology is negative, need to repeat the chest CT in 6 to 8 weeks to assure complete resolution otherwise repeat And/or VATS will be indicated  Favor proceeding with NIDA to exclude endocarditis given the pathogen identified  Need to arrange for outpatient follow-up with pulmonary  medicine    Darryn Haywood MD   12/28/24 at 10:49 AM     Disclaimer: Documentation completed with the information available at the time of input. Parts of this note may have been scribed or generated using voice dictation software, Dragon.  Homophonic errors may exist.  Please contact me directly if clarification is needed. The times in the chart may not be reflective of actual patient care times, interventions, or procedures. Documentation occurs after the physical care of the patient.

## 2024-12-28 NOTE — PROGRESS NOTES
Occupational Therapy                 Therapy Communication Note    Patient Name: William A Franke  MRN: [id number]  Department: [facility] DIALYSIS  Room: 432/432-B  Today's Date: 12/28/2024     Discipline: Occupational Therapy    OT Missed Visit: Yes     Missed Visit Reason: Missed Visit Reason: Other (Comment) (Pt currently off floor for HD, no OT tx completed this date.)    Missed Time: Attempt at 1023    Comment:

## 2024-12-28 NOTE — PRE-PROCEDURE NOTE
"Report from Sending RN:    Report From: Idalia Paula RN  Recent Surgery of Procedure: No  Baseline Level of Consciousness (LOC): AOx3  Oxygen Use: No  Type: N/A  Diabetic: Yes, 121  Last BP Med Given Day of Dialysis: see EMR  Last Pain Med Given: see EMR  Lab Tests to be Obtained with Dialysis: No  Blood Transfusion to be Given During Dialysis: No  Available IV Access: Yes  Medications to be Administered During Dialysis: No  Continuous IV Infusion Running: No  Restraints on Currently or in the Last 24 Hours: No  Hand-Off Communication: Per the RN \"pt is up to date on meds. blood sugar 121 no insulin last set of vitals 170/40's \"  Dialysis Catheter Dressing: N/A  Last Dressing Change: N/A   "

## 2024-12-28 NOTE — CARE PLAN
The patient's goals for the shift include      The clinical goals for the shift include hds    Problem: Diabetes  Goal: Maintain electrolyte levels within acceptable range throughout shift  Outcome: Progressing  Goal: No changes in neurological exam by end of shift  Outcome: Progressing  Goal: Learn about and adhere to nutrition recommendations by end of shift  Outcome: Progressing     Problem: Pain - Adult  Goal: Verbalizes/displays adequate comfort level or baseline comfort level  Outcome: Progressing     Problem: Safety - Adult  Goal: Free from fall injury  Outcome: Progressing     Problem: Discharge Planning  Goal: Discharge to home or other facility with appropriate resources  Outcome: Progressing     Problem: Chronic Conditions and Co-morbidities  Goal: Patient's chronic conditions and co-morbidity symptoms are monitored and maintained or improved  Outcome: Progressing     Problem: Ineffective Tissue Perfusion  Goal: Patient will be free from complications of poor renal perfusion causing other organ dysfunction, such as congestive heart failure or encephalopathy. Patient will adhere to dialysis treatments.  Outcome: Progressing     Problem: Impaired Gas Exchange  Goal: Patient will be able to maintain optimal gas exchange as evidenced by unlabored breathing and respiratory rate within normal limits.  Outcome: Progressing     Problem: Skin  Goal: Participates in plan/prevention/treatment measures  Outcome: Progressing  Flowsheets (Taken 12/28/2024 1022)  Participates in plan/prevention/treatment measures: Discuss with provider PT/OT consult  Goal: Prevent/manage excess moisture  Outcome: Progressing  Flowsheets (Taken 12/28/2024 1022)  Prevent/manage excess moisture:   Monitor for/manage infection if present   Moisturize dry skin  Goal: Prevent/minimize sheer/friction injuries  Outcome: Progressing  Flowsheets (Taken 12/28/2024 1022)  Prevent/minimize sheer/friction injuries: HOB 30 degrees or less  Goal:  Promote/optimize nutrition  Outcome: Progressing  Flowsheets (Taken 12/28/2024 1022)  Promote/optimize nutrition:   Consume > 50% meals/supplements   Monitor/record intake including meals  Goal: Promote skin healing  Outcome: Progressing  Flowsheets (Taken 12/28/2024 1022)  Promote skin healing: Protective dressings over bony prominences

## 2024-12-28 NOTE — POST-PROCEDURE NOTE
Report to Receiving RN:    Report To: Idalia Paula RN  Time Report Called: [id number]  Hand-Off Communication: Maksim Tx is complete. He removed 2L w/o issue or complaint. Post Tx BP is 164/66 54. RN asked to take a peak at his access arm just to confirm no bleeding occurred during transport   Complications During Treatment: No  Ultrafiltration Treatment: Yes, 2L removed  Medications Administered During Dialysis: No  Blood Products Administered During Dialysis: No  Labs Sent During Dialysis: No  Heparin Drip Rate Changes: No  Dialysis Catheter Dressing: Clean gauze and tape applied once hemostasis achieved  Last Dressing Change: N/A    Electronic Signatures:  Parvez Lora OCDT    Last Updated: 1:52 PM by PARVEZ LORA S

## 2024-12-29 VITALS
WEIGHT: 180 LBS | SYSTOLIC BLOOD PRESSURE: 160 MMHG | HEART RATE: 60 BPM | OXYGEN SATURATION: 97 % | TEMPERATURE: 97.5 F | BODY MASS INDEX: 28.25 KG/M2 | RESPIRATION RATE: 17 BRPM | HEIGHT: 67 IN | DIASTOLIC BLOOD PRESSURE: 52 MMHG

## 2024-12-29 LAB
BACTERIA BLD CULT: NORMAL
BACTERIA BLD CULT: NORMAL
BACTERIA FLD CULT: NORMAL
GLUCOSE BLD MANUAL STRIP-MCNC: 159 MG/DL (ref 74–99)
GLUCOSE BLD MANUAL STRIP-MCNC: 167 MG/DL (ref 74–99)
GLUCOSE BLD MANUAL STRIP-MCNC: 176 MG/DL (ref 74–99)
GRAM STN SPEC: NORMAL
GRAM STN SPEC: NORMAL

## 2024-12-29 PROCEDURE — 2500000004 HC RX 250 GENERAL PHARMACY W/ HCPCS (ALT 636 FOR OP/ED): Performed by: NURSE PRACTITIONER

## 2024-12-29 PROCEDURE — 1200000002 HC GENERAL ROOM WITH TELEMETRY DAILY

## 2024-12-29 PROCEDURE — 2500000001 HC RX 250 WO HCPCS SELF ADMINISTERED DRUGS (ALT 637 FOR MEDICARE OP): Performed by: NURSE PRACTITIONER

## 2024-12-29 PROCEDURE — 82947 ASSAY GLUCOSE BLOOD QUANT: CPT

## 2024-12-29 PROCEDURE — 2500000001 HC RX 250 WO HCPCS SELF ADMINISTERED DRUGS (ALT 637 FOR MEDICARE OP): Performed by: INTERNAL MEDICINE

## 2024-12-29 PROCEDURE — 2500000004 HC RX 250 GENERAL PHARMACY W/ HCPCS (ALT 636 FOR OP/ED): Performed by: INTERNAL MEDICINE

## 2024-12-29 PROCEDURE — 99223 1ST HOSP IP/OBS HIGH 75: CPT | Performed by: NURSE PRACTITIONER

## 2024-12-29 RX ORDER — ONDANSETRON HYDROCHLORIDE 2 MG/ML
4 INJECTION, SOLUTION INTRAVENOUS EVERY 4 HOURS PRN
Status: DISCONTINUED | OUTPATIENT
Start: 2024-12-29 | End: 2024-12-31 | Stop reason: HOSPADM

## 2024-12-29 RX ADMIN — HYDRALAZINE HYDROCHLORIDE 50 MG: 50 TABLET ORAL at 21:03

## 2024-12-29 RX ADMIN — ONDANSETRON 4 MG: 2 INJECTION INTRAMUSCULAR; INTRAVENOUS at 05:55

## 2024-12-29 RX ADMIN — HEPARIN SODIUM 5000 UNITS: 5000 INJECTION, SOLUTION INTRAVENOUS; SUBCUTANEOUS at 06:01

## 2024-12-29 RX ADMIN — ATORVASTATIN CALCIUM 80 MG: 80 TABLET, FILM COATED ORAL at 21:03

## 2024-12-29 RX ADMIN — ONDANSETRON 4 MG: 2 INJECTION INTRAMUSCULAR; INTRAVENOUS at 09:59

## 2024-12-29 ASSESSMENT — PAIN SCALES - GENERAL
PAINLEVEL_OUTOF10: 0 - NO PAIN
PAINLEVEL_OUTOF10: 0 - NO PAIN

## 2024-12-29 ASSESSMENT — COGNITIVE AND FUNCTIONAL STATUS - GENERAL
MOBILITY SCORE: 24
DAILY ACTIVITIY SCORE: 24
MOBILITY SCORE: 24
DAILY ACTIVITIY SCORE: 24

## 2024-12-29 ASSESSMENT — PAIN - FUNCTIONAL ASSESSMENT: PAIN_FUNCTIONAL_ASSESSMENT: 0-10

## 2024-12-29 NOTE — PROGRESS NOTES
William A Franke is a 74 y.o. male on day 8 of admission presenting with Acute encephalopathy.      Subjective   Wife at bedside       Objective     Last Recorded Vitals  /60 (BP Location: Right arm, Patient Position: Lying)   Pulse 69   Temp 36.6 °C (97.9 °F) (Oral)   Resp 17   Wt 81.6 kg (180 lb)   SpO2 97%   Intake/Output last 3 Shifts:    Intake/Output Summary (Last 24 hours) at 12/29/2024 0832  Last data filed at 12/28/2024 1300  Gross per 24 hour   Intake 1000 ml   Output 2502 ml   Net -1502 ml       Admission Weight  Weight: 81.6 kg (180 lb) (12/21/24 1049)    Daily Weight  12/21/24 : 81.6 kg (180 lb)    Image Results  XR chest 1 view  Narrative: Interpreted By:  Stone Negrete,   STUDY:  XR CHEST 1 VIEW;  12/28/2024 8:24 am      INDICATION:  Signs/Symptoms:Pleural effusions.          COMPARISON:  CT chest without contrast 25 December 2024      ACCESSION NUMBER(S):  [id number]      ORDERING CLINICIAN:  JEISON OCHOA      TECHNIQUE:  Single frontal view of the chest; Portable technique      FINDINGS:  Enlarged cardiac silhouette is unchanged      No demonstrable pleural effusion or pneumothorax      No airspace infiltrate or edema      Impression: NO ACUTE DISEASE IN THE CHEST EVIDENT ON PORTABLE FRONTAL CHEST  RADIOGRAPH      MACRO:  None      Signed by: Stone Negrete 12/28/2024 9:34 AM  Dictation workstation:   [id number]      Physical Exam    Relevant Results               Assessment/Plan                  Assessment & Plan  Acute encephalopathy    Diarrhea and nausea over night, r/o c diff              Desirae Alicea MD

## 2024-12-29 NOTE — NURSING NOTE
Assumed care of patient. Patient is alert laying in the bed with complaints of nausea. Family at bedside, and call light within reach.

## 2024-12-29 NOTE — CARE PLAN
The patient's goals for the shift include      The clinical goals for the shift include maintain safety    Problem: Diabetes  Goal: Maintain electrolyte levels within acceptable range throughout shift  Outcome: Progressing  Goal: No changes in neurological exam by end of shift  Outcome: Progressing  Goal: Learn about and adhere to nutrition recommendations by end of shift  Outcome: Progressing     Problem: Pain - Adult  Goal: Verbalizes/displays adequate comfort level or baseline comfort level  Outcome: Progressing     Problem: Safety - Adult  Goal: Free from fall injury  Outcome: Progressing     Problem: Discharge Planning  Goal: Discharge to home or other facility with appropriate resources  Outcome: Progressing     Problem: Chronic Conditions and Co-morbidities  Goal: Patient's chronic conditions and co-morbidity symptoms are monitored and maintained or improved  Outcome: Progressing     Problem: Ineffective Tissue Perfusion  Goal: Patient will be free from complications of poor renal perfusion causing other organ dysfunction, such as congestive heart failure or encephalopathy. Patient will adhere to dialysis treatments.  Outcome: Progressing     Problem: Impaired Gas Exchange  Goal: Patient will be able to maintain optimal gas exchange as evidenced by unlabored breathing and respiratory rate within normal limits.  Outcome: Progressing     Problem: Skin  Goal: Participates in plan/prevention/treatment measures  Outcome: Progressing  Flowsheets (Taken 12/29/2024 1129)  Participates in plan/prevention/treatment measures: Discuss with provider PT/OT consult  Goal: Prevent/manage excess moisture  Outcome: Progressing  Flowsheets (Taken 12/29/2024 1129)  Prevent/manage excess moisture:   Moisturize dry skin   Monitor for/manage infection if present  Goal: Prevent/minimize sheer/friction injuries  Outcome: Progressing  Flowsheets (Taken 12/29/2024 1129)  Prevent/minimize sheer/friction injuries:   HOB 30 degrees or  less   Increase activity/out of bed for meals  Goal: Promote/optimize nutrition  Outcome: Progressing  Flowsheets (Taken 12/29/2024 1129)  Promote/optimize nutrition:   Consume > 50% meals/supplements   Monitor/record intake including meals  Goal: Promote skin healing  Outcome: Progressing  Flowsheets (Taken 12/29/2024 1129)  Promote skin healing: Protective dressings over bony prominences

## 2024-12-29 NOTE — NURSING NOTE
Medicated patient for nausea. Attempted to administer morning meds and patient requesting to hold off on morning meds.

## 2024-12-29 NOTE — PROGRESS NOTES
"William A Franke is a 74 y.o. male on day 8 of admission presenting with Acute encephalopathy.    Subjective   Patient seen for end-stage kidney disease on dialysis on antibiotics for MSSA source is not very clear at this time patient had indium scan which was negative for any localization of infection and his fistula patient is not feeling well he is complaining of diarrhea at this time which is liquidy and he is on a rule out C. difficile protocol no abdominal pain not eating well and he is nauseous    Objective     Physical Exam  Neck:      Vascular: No carotid bruit.   Cardiovascular:      Rate and Rhythm: Normal rate and regular rhythm.      Heart sounds: No murmur heard.     No friction rub. No gallop.   Pulmonary:      Breath sounds: No wheezing, rhonchi or rales.   Chest:      Chest wall: No tenderness.   Abdominal:      General: There is no distension.      Tenderness: There is no abdominal tenderness. There is no guarding or rebound.   Musculoskeletal:         General: No swelling or tenderness.      Cervical back: Neck supple.      Right lower leg: No edema.      Left lower leg: No edema.      Comments: Very minimal redness over his AV fistula without any drainage or tenderness   Lymphadenopathy:      Cervical: No cervical adenopathy.         Last Recorded Vitals  Blood pressure 173/60, pulse 69, temperature 36.6 °C (97.9 °F), temperature source Oral, resp. rate 17, height 1.702 m (5' 7\"), weight 81.6 kg (180 lb), SpO2 97%.    Intake/Output last 3 Shifts:  I/O last 3 completed shifts:  In: 1000 (12.2 mL/kg) [I.V.:600 (7.3 mL/kg); Other:400]  Out: 2502 (30.6 mL/kg) [Other:2502]  Weight: 81.6 kg     Current Facility-Administered Medications:     acetaminophen (Tylenol) tablet 650 mg, 650 mg, oral, q4h PRN, 650 mg at 12/22/24 0758 **OR** acetaminophen (Tylenol) oral liquid 650 mg, 650 mg, nasogastric tube, q4h PRN **OR** acetaminophen (Tylenol) suppository 650 mg, 650 mg, rectal, q4h PRN, Aliya Patel, " APRN-CNP    acetaminophen (Tylenol) tablet 650 mg, 650 mg, oral, q4h PRN **OR** acetaminophen (Tylenol) oral liquid 650 mg, 650 mg, oral, q4h PRN **OR** acetaminophen (Tylenol) suppository 650 mg, 650 mg, rectal, q4h PRN, Aliya Patle APRN-CNP    allopurinol (Zyloprim) tablet 100 mg, 100 mg, oral, Daily, Aliya Patel, APRN-CNP, 100 mg at 12/28/24 0831    amLODIPine (Norvasc) tablet 5 mg, 5 mg, oral, Daily, Aliya Patel APRN-CNP, 5 mg at 12/28/24 0831    aspirin EC tablet 81 mg, 81 mg, oral, Daily, Aliya Patel, APRN-CNP, 81 mg at 12/28/24 0831    atorvastatin (Lipitor) tablet 80 mg, 80 mg, oral, Nightly, Aliya Patel APRN-CNP, 80 mg at 12/28/24 2049    carvedilol (Coreg) tablet 6.25 mg, 6.25 mg, oral, BID, Darrell Blackwell, APRN-CNP, 6.25 mg at 12/28/24 1725    ceFAZolin (Ancef) 2 g in dextrose (iso)  mL, 2 g, intravenous, q24h LUKE, Dave Pathak MD, Stopped at 12/28/24 1434    cloNIDine (Catapres) tablet 0.1 mg, 0.1 mg, oral, q6h PRN, Desirae Alicea MD, 0.1 mg at 12/27/24 2328    dextrose 50 % injection 12.5 g, 12.5 g, intravenous, q15 min PRN, Aliya Patel APRN-CNP    dextrose 50 % injection 25 g, 25 g, intravenous, q15 min PRN, VERÓNICA Russ-CNP    ezetimibe (Zetia) tablet 10 mg, 10 mg, oral, Daily, Aliya Patel APRN-CNP, 10 mg at 12/28/24 0831    glucagon (Glucagen) injection 1 mg, 1 mg, intramuscular, q15 min PRN, Aliya Patel APRN-CNP    glucagon (Glucagen) injection 1 mg, 1 mg, intramuscular, q15 min PRN, VERÓNICA Russ-CNP    guaiFENesin (Mucinex) 12 hr tablet 600 mg, 600 mg, oral, BID PRN, VERÓNICA Russ-CNP, 600 mg at 12/21/24 1849    heparin (porcine) injection 5,000 Units, 5,000 Units, subcutaneous, q12h, VERÓNICA Russ-CNP, 5,000 Units at 12/29/24 0601    hydrALAZINE (Apresoline) tablet 50 mg, 50 mg, oral, TID, Marco Woodson MD, 50 mg at 12/28/24 2049    insulin lispro injection 0-10 Units, 0-10 Units,  subcutaneous, TID AC, ERIKA Russ, 2 Units at 12/28/24 1725    isosorbide mononitrate ER (Imdur) 24 hr tablet 60 mg, 60 mg, oral, Daily, ERIKA Russ, 60 mg at 12/28/24 0831    nitroglycerin (Nitrostat) SL tablet 0.4 mg, 0.4 mg, sublingual, q5 min PRN, ERIKA Russ    ondansetron (Zofran) injection 4 mg, 4 mg, intravenous, q4h PRN, Desirae Alicea MD, 4 mg at 12/29/24 0555    oxygen (O2) therapy, , inhalation, Continuous, ERIKA Russ, 21 percent at 12/21/24 1508    pantoprazole (ProtoNix) EC tablet 40 mg, 40 mg, oral, Daily, ERIKA Russ, 40 mg at 12/28/24 0831    polyethylene glycol (Glycolax, Miralax) packet 17 g, 17 g, oral, Daily PRN, ERIKA Russ    sevelamer carbonate (Renvela) tablet 1,600 mg, 1,600 mg, oral, TID, ERIKA Russ, 1,600 mg at 12/28/24 1725    SITagliptin phosphate (Januvia) tablet 25 mg, 25 mg, oral, Daily, ERIKA Russ, 25 mg at 12/28/24 0831   Relevant Results    Results for orders placed or performed during the hospital encounter of 12/21/24 (from the past 96 hours)   POCT GLUCOSE   Result Value Ref Range    POCT Glucose 157 (H) 74 - 99 mg/dL   Blood Culture    Specimen: Peripheral Venipuncture; Blood culture   Result Value Ref Range    Blood Culture No growth at 3 days    Blood Culture    Specimen: Peripheral Venipuncture; Blood culture   Result Value Ref Range    Blood Culture No growth at 3 days    POCT GLUCOSE   Result Value Ref Range    POCT Glucose 121 (H) 74 - 99 mg/dL   POCT GLUCOSE   Result Value Ref Range    POCT Glucose 121 (H) 74 - 99 mg/dL   POCT GLUCOSE   Result Value Ref Range    POCT Glucose 233 (H) 74 - 99 mg/dL   POCT GLUCOSE   Result Value Ref Range    POCT Glucose 119 (H) 74 - 99 mg/dL   POCT GLUCOSE   Result Value Ref Range    POCT Glucose 98 74 - 99 mg/dL   POCT GLUCOSE   Result Value Ref Range    POCT Glucose 275 (H) 74 - 99 mg/dL   POCT GLUCOSE    Result Value Ref Range    POCT Glucose 152 (H) 74 - 99 mg/dL   Protein, Total   Result Value Ref Range    Total Protein 5.9 (L) 6.4 - 8.2 g/dL   Lactate Dehydrogenase   Result Value Ref Range     84 - 246 U/L   CBC   Result Value Ref Range    WBC 6.6 4.4 - 11.3 x10*3/uL    nRBC 0.6 (H) 0.0 - 0.0 /100 WBCs    RBC 3.20 (L) 4.50 - 5.90 x10*6/uL    Hemoglobin 9.8 (L) 13.5 - 17.5 g/dL    Hematocrit 28.6 (L) 41.0 - 52.0 %    MCV 89 80 - 100 fL    MCH 30.6 26.0 - 34.0 pg    MCHC 34.3 32.0 - 36.0 g/dL    RDW 16.7 (H) 11.5 - 14.5 %    Platelets 161 150 - 450 x10*3/uL   Basic Metabolic Panel   Result Value Ref Range    Glucose 111 (H) 74 - 99 mg/dL    Sodium 138 136 - 145 mmol/L    Potassium 3.8 3.5 - 5.3 mmol/L    Chloride 100 98 - 107 mmol/L    Bicarbonate 28 21 - 32 mmol/L    Anion Gap 14 10 - 20 mmol/L    Urea Nitrogen 27 (H) 6 - 23 mg/dL    Creatinine 7.19 (H) 0.50 - 1.30 mg/dL    eGFR 7 (L) >60 mL/min/1.73m*2    Calcium 8.6 8.6 - 10.3 mg/dL   Phosphorus   Result Value Ref Range    Phosphorus 3.1 2.5 - 4.9 mg/dL   POCT GLUCOSE   Result Value Ref Range    POCT Glucose 112 (H) 74 - 99 mg/dL   Sterile Cup   Result Value Ref Range    Extra Tube Hold for add-ons.    Sterile Fluid Culture/Smear    Specimen: Pleural; Fluid   Result Value Ref Range    Sterile Fluid Culture/Smear No growth to date     Gram Stain (4+) Abundant Polymorphonuclear leukocytes     Gram Stain No organisms seen    Fungal Culture/Smear    Specimen: Pleural; Fluid   Result Value Ref Range    Fungal Smear No fungal elements seen    AFB Processed   Result Value Ref Range    Extra Tube Hold for add-ons.    pH, Body Fluid   Result Value Ref Range    pH, Fluid 7.60 See Below   Lactate Dehydrogenase, Fluid   Result Value Ref Range    LD, Fluid 150 Not established. U/L   Glucose, Fluid   Result Value Ref Range    Glucose, Fluid 124 Not established mg/dL   Protein, Total Fluid   Result Value Ref Range    Protein, Total Fluid 3.5 Not established g/dL    Body Fluid Cell Count   Result Value Ref Range    Color, Fluid Yellow Colorless, Straw, Yellow    Clarity, Fluid Clear Clear    WBC, Fluid 513 See Comment /uL    RBC, Fluid 109 0  /uL /uL   Body Fluid Differential   Result Value Ref Range    Neutrophils %, Manual, Fluid 14 <25 % %    Lymphocytes %, Manual, Fluid 11 <75 % %    Mono/Macrophages %, Manual, Fluid 58 <70 % %    Eosinophils %, Manual, Fluid 16 0 % %    Basophils %, Manual, Fluid 1 0 % %    Immature Granulocytes %, Manual, Fluid 0 0 % %    Blasts %, Manual, Fluid 0 0 % %    Unclassified Cells %, Manual, Fluid 0 0 % %    Plasma Cells %, Manual, Fluid 0 0 % %    Total Cells Counted, Fluid 100    Lavender Top   Result Value Ref Range    Extra Tube Hold for add-ons.    POCT GLUCOSE   Result Value Ref Range    POCT Glucose 149 (H) 74 - 99 mg/dL   POCT GLUCOSE   Result Value Ref Range    POCT Glucose 144 (H) 74 - 99 mg/dL   POCT GLUCOSE   Result Value Ref Range    POCT Glucose 138 (H) 74 - 99 mg/dL   POCT GLUCOSE   Result Value Ref Range    POCT Glucose 121 (H) 74 - 99 mg/dL   POCT GLUCOSE   Result Value Ref Range    POCT Glucose 98 74 - 99 mg/dL   POCT GLUCOSE   Result Value Ref Range    POCT Glucose 165 (H) 74 - 99 mg/dL   POCT GLUCOSE   Result Value Ref Range    POCT Glucose 123 (H) 74 - 99 mg/dL   POCT GLUCOSE   Result Value Ref Range    POCT Glucose 176 (H) 74 - 99 mg/dL     *Note: Due to a large number of results and/or encounters for the requested time period, some results have not been displayed. A complete set of results can be found in Results Review.       Assessment/Plan   End-stage kidney disease plan to dialyze him tomorrow morning  Staph aureus bacteremia source is not clear lung versus AV fistula continue antibiotics per infectious disease patient to have NIDA tomorrow  Anemia chronic kidney disease continue with erythropoietin therapy  Hypertension     Discussed with  the family      Elier Woodson MD

## 2024-12-29 NOTE — CONSULTS
Inpatient consult to Cardiology  Consult performed by: Darrell Blackwell, VERÓNICA-CNP  Consult ordered by: Dave Pathak MD  Reason for consult: Bradycardia, NIDA requested        History Of Present Illness:    William A Franke is a 74 y.o. male presenting with hypotension and unstable vital signs during dialysis.  Current with Dr. Ardon.complex medical history.  Past medical history of end-stage renal disease with hemodialysis, resistant hypertension, anemia of chronic disease, hyperlipidemia, diabetes.  Patient was recently hospitalized approxi a month ago for syncope.  At that point was thought to be hypotension related.  Medications were adjusted and the patient was informed not to take his hydralazine prior to dialysis.  He has had no further episodes of syncope per his wife.  His medications have been adjusted multiple times in the outpatient setting.  His isosorbide and his hydralazine have been stopped by nephrology, he followed up with Dr. Ardon a week later who restarted these medications relative to significant hypertensive disorder.  This hospitalization the patient presented to dialysis, during dialysis had significantly unstable vital signs with hypotension and was sent to the hospital.  At that point had fevers and nausea as well as diarrhea.  He is being tested for C. difficile.  No reports of chest pain or syncope.  He was found to have a negative indium scan but a positive CT suggestive of septic emboli.  Pulmonology has been following and performed a thoracentesis removing 700 mL of clear fluid on 12/28.  We have been consulted for bradycardia on the telemetry monitor as well as for NIDA.  Most recent EKG a normal sinus rhythm with LVH.  Creatinine 7.19 with a potassium of 3.8 with a hemoglobin of 9.8.  Recent blood pressure 114/60 with a pulse ox on room air of 96% and a current pulse of 64.     Last Recorded Vitals:  Vitals:    12/28/24 1644 12/28/24 1900 12/28/24 2300 12/29/24 0545    BP: 156/54 (!) 162/48 (!) 126/45 114/60   BP Location: Right arm Right arm Right arm    Patient Position: Lying Lying Sitting    Pulse: 57 57 59 61   Resp: 18 18 18    Temp: 36.6 °C (97.9 °F) 36.9 °C (98.4 °F) 36.7 °C (98.1 °F) 36.4 °C (97.5 °F)   TempSrc: Oral Oral Oral    SpO2: 98% 96% 97% 96%   Weight:       Height:           Last Labs:  CBC - 12/27/2024:  5:07 AM  6.6 9.8 161    28.6      CMP - 12/27/2024:  5:07 AM  8.6 5.9 21 --- 1.3   3.1 4.4 12 64      PTT - No results in last year.  _   _ _     Troponin I, High Sensitivity   Date/Time Value Ref Range Status   11/13/2024 04:03 PM 54 (HH) 0 - 20 ng/L Final     Comment:     Previous result verified on 11/13/2024 1543 on specimen/case [id number] called with component [facility] for procedure Troponin I, High Sensitivity, Initial with value 54 ng/L.   11/13/2024 02:56 PM 54 (HH) 0 - 20 ng/L Final     BNP   Date/Time Value Ref Range Status   11/13/2024 02:56  (H) 0 - 99 pg/mL Final     Hemoglobin A1C   Date/Time Value Ref Range Status   11/08/2024 07:11 AM 6.3 (H) See comment % Final   08/09/2024 09:54 AM 6.5 (H) see below % Final     LDL Calculated   Date/Time Value Ref Range Status   11/08/2024 07:11 AM 52 <=99 mg/dL Final     Comment:                                 Near   Borderline      AGE      Desirable  Optimal    High     High     Very High     0-19 Y     0 - 109     ---    110-129   >/= 130     ----    20-24 Y     0 - 119     ---    120-159   >/= 160     ----      >24 Y     0 -  99   100-129  130-159   160-189     >/=190     08/09/2024 09:54 AM 33 <=99 mg/dL Final     Comment:                                 Near   Borderline      AGE      Desirable  Optimal    High     High     Very High     0-19 Y     0 - 109     ---    110-129   >/= 130     ----    20-24 Y     0 - 119     ---    120-159   >/= 160     ----      >24 Y     0 -  99   100-129  130-159   160-189     >/=190     05/29/2024 06:55 AM 37 <=99 mg/dL Final     Comment:                                  Near   Borderline      AGE      Desirable  Optimal    High     High     Very High     0-19 Y     0 - 109     ---    110-129   >/= 130     ----    20-24 Y     0 - 119     ---    120-159   >/= 160     ----      >24 Y     0 -  99   100-129  130-159   160-189     >/=190       VLDL   Date/Time Value Ref Range Status   11/08/2024 07:11 AM 34 0 - 40 mg/dL Final   08/09/2024 09:54 AM 39 0 - 40 mg/dL Final   05/29/2024 06:55 AM 53 (H) 0 - 40 mg/dL Final      Results for orders placed or performed during the hospital encounter of 12/21/24 (from the past 24 hours)   POCT GLUCOSE   Result Value Ref Range    POCT Glucose 121 (H) 74 - 99 mg/dL   POCT GLUCOSE   Result Value Ref Range    POCT Glucose 98 74 - 99 mg/dL   POCT GLUCOSE   Result Value Ref Range    POCT Glucose 165 (H) 74 - 99 mg/dL   POCT GLUCOSE   Result Value Ref Range    POCT Glucose 123 (H) 74 - 99 mg/dL     *Note: Due to a large number of results and/or encounters for the requested time period, some results have not been displayed. A complete set of results can be found in Results Review.       Last I/O:  I/O last 3 completed shifts:  In: 1000 (12.2 mL/kg) [I.V.:600 (7.3 mL/kg); Other:400]  Out: 2502 (30.6 mL/kg) [Other:2502]  Weight: 81.6 kg     Past Cardiology Tests (Last 3 Years):  EKG: Normal sinus rhythm with LVH    Echo:  Transthoracic Echo (TTE) Limited 12/24/2024  Transthoracic Echo (TTE) Limited    Result Date: 12/24/2024           [place] [address]            Phone [phone] TRANSTHORACIC ECHOCARDIOGRAM REPORT Patient Name:       WILLIAM A FRANKE Reading Physician:    [id number] Jared Rooney DO Study Date:         12/24/2024          Ordering Provider:    [id number] BAHMAN BLANK MRN/PID:            [id number]            Fellow: Accession#:         [id number]        Nurse:  Date of Birth/Age:  1950 / 74     Sonographer:          Nakita PUENTES Gender Assigned at  M                   Additional Staff: Birth: Height:             170.20 cm           Admit Date:           12/22/2024 Weight:             81.65 kg            Admission Status:     Inpatient -                                                               Routine BSA / BMI:          1.93 m2 / 28.19     Department Location:                     kg/m2 Blood Pressure: 169 /55 mmHg Study Type:    TRANSTHORACIC ECHO (TTE) LIMITED Diagnosis/ICD: Bacteremia-R78.81 Indication:    Endocarditis CPT Codes:     Echo Limited-04176 Patient History: Pertinent History: CDK stageIV Stable Angina HTN CP HLD CAD AS CHF Chronic Liver                    Disease NSTEMI Atrteriovenous Fistula Stenosis. Study Detail: The following Echo studies were performed: 2D, M-Mode, Doppler and               color flow. Unable to obtain suprasternal notch and subcostal               view.  PHYSICIAN INTERPRETATION: Left Ventricle: The left ventricular systolic function is normal, with a visually estimated ejection fraction of 60-65%. There are no regional wall motion abnormalities. The left ventricular cavity size is normal. There is normal septal and mildly increased posterior left ventricular wall thickness. There is left ventricular concentric remodeling. Left ventricular diastolic filling was not assessed. Left Atrium: The left atrium is upper limits of normal in size. Right Ventricle: The right ventricle is normal in size. There is normal right ventricular global systolic function. Right Atrium: The right atrium is normal in size. Aortic Valve: The aortic valve is trileaflet. There is no evidence of aortic valve regurgitation. The peak instantaneous gradient of the aortic valve is 15 mmHg. Mitral Valve: The mitral valve is normal in structure. There is trace to mild mitral valve  regurgitation. Tricuspid Valve: The tricuspid valve is structurally normal. No evidence of tricuspid regurgitation. Pulmonic Valve: The pulmonic valve is structurally normal. There is trace pulmonic valve regurgitation. Pericardium: No pericardial effusion noted. Aorta: The aortic root is normal.  CONCLUSIONS:  1. The left ventricular systolic function is normal, with a visually estimated ejection fraction of 60-65%.  2. There is normal right ventricular global systolic function.  3. No valvular vegetations visualized. QUANTITATIVE DATA SUMMARY:  2D MEASUREMENTS:           Normal Ranges: IVSd:            0.80 cm   (0.6-1.1cm) LVPWd:           1.29 cm   (0.6-1.1cm) LVIDd:           5.00 cm   (3.9-5.9cm) LVIDs:           3.39 cm LV Mass Index:   99.7 g/m2 LV % FS          32.2 %  LA VOLUME:                    Normal Ranges: LA Vol A4C:        96.5 ml    (22+/-6mL/m2) LA Vol A2C:        96.6 ml LA Vol BP:         100.8 ml LA Vol Index A4C:  49.9ml/m2 LA Vol Index A2C:  50.0 ml/m2 LA Vol Index BP:   52.1 ml/m2 LA Area A4C:       26.1 cm2 LA Area A2C:       25.0 cm2 LA Major Axis A4C: 6.0 cm LA Major Axis A2C: 5.5 cm LA Volume Index:   51.9 ml/m2 LA Vol A4C:        88.7 ml LA Vol A2C:        93.9 ml LA Vol Index BSA:  47.2 ml/m2  RA VOLUME BY A/L METHOD:            Normal Ranges: RA Vol A4C:              33.9 ml    (8.3-19.5ml) RA Vol Index A4C:        17.5 ml/m2 RA Area A4C:             15.2 cm2 RA Major Axis A4C:       5.8 cm  M-MODE MEASUREMENTS:         Normal Ranges: LAs:                 5.25 cm (2.7-4.0cm)  LV SYSTOLIC FUNCTION BY 2D PLANIMETRY (MOD):                      Normal Ranges: EF-A4C View:    58 % (>=55%) EF-A2C View:    63 % EF-Biplane:     60 % EF-Visual:      63 % LV EF Reported: 63 %  LV DIASTOLIC FUNCTION:          Normal Ranges: MV Peak E:             0.84 m/s (0.7-1.2 m/s) MV Peak A:             1.14 m/s (0.42-0.7 m/s) E/A Ratio:             0.74     (1.0-2.2)  MITRAL VALVE:          Normal Ranges:  MV DT:        173 msec (150-240msec)  AORTIC VALVE:            Normal Ranges: AoV Vmax:      1.93 m/s  (<=1.7m/s) AoV Peak P.9 mmHg (<20mmHg) LVOT Max Joss:  1.24 m/s  (<=1.1m/s) LVOT VTI:      33.46 cm LVOT Diameter: 1.95 cm   (1.8-2.4cm) AoV Area,Vmax: 1.91 cm2  (2.5-4.5cm2)  RIGHT VENTRICLE: RV Basal 2.70 cm RV Mid   2.40 cm RV Major 6.3 cm  TRICUSPID VALVE/RVSP:          Normal Ranges: Peak TR Velocity:     2.83 m/s RV Syst Pressure:     35 mmHg  (< 30mmHg)  PULMONIC VALVE:           Normal Ranges: PV Accel Time:  86 msec   (>120ms) PV Max Joss:     1.6 m/s   (0.6-0.9m/s) PV Max PG:      10.3 mmHg  [id number] Jared Rooney DO Electronically signed on 2024 at 10:02:08 AM  ** Final **     Transthoracic Echo (TTE) Complete    Result Date: 2024           [place] [id number]            Phone [phone] TRANSTHORACIC ECHOCARDIOGRAM REPORT Patient Name:       WILLIAM A FRANKE Reading Physician:    [id number] Jared Rooney DO Study Date:         2024           Ordering Provider:    [id number] LESLIE DENTON MRN/PID:            [id number]             Fellow: Accession#:         [id number]         Nurse: Date of Birth/Age:  1950      Sonographer:          Rafael rg RDCS Gender Assigned at  M                    Additional Staff: Birth: Height:             170.18 cm            Admit Date: Weight:             77.11 kg             Admission Status:     Inpatient -                                                                Routine BSA / BMI:          1.89 m2 / 26.63      Department Location:  [facility]                     kg/m2 Blood Pressure: 215 /52 mmHg Study Type:    TRANSTHORACIC ECHO (TTE) COMPLETE Diagnosis/ICD: Other chest pain-R07.89 Indication:     Chest Pain CPT Codes:     Echo Complete w Full Doppler-23941 Patient History: Diabetes:          Yes Pertinent History: HTN, Hyperlipidemia, CAD, Chest Pain, CHF, LE Edema, Dyspnea,                    Syncope and Murmur. ESRD, PCI 2022,HFrEF 45-50%, GERD. Study Detail: The following Echo studies were performed: 2D, M-Mode, Doppler and               color flow. Technically challenging study due to poor acoustic               windows.  PHYSICIAN INTERPRETATION: Left Ventricle: The left ventricular systolic function is normal, with a visually estimated ejection fraction of 55-60%. There is moderate concentric left ventricular hypertrophy. There are no regional wall motion abnormalities. The left ventricular cavity size is normal. There is mild increased septal and moderately increased posterior left ventricular wall thickness. Spectral Doppler shows a Grade II (pseudonormal pattern) of left ventricular diastolic filling with an elevated left atrial pressure. Left Atrium: The left atrium is normal in size. Right Ventricle: The right ventricle is normal in size. There is normal right ventricular global systolic function. Right Atrium: The right atrium is normal in size. Aortic Valve: The aortic valve is trileaflet. There is mild aortic valve cusp calcification. The aortic valve dimensionless index is 0.67. There is no evidence of aortic valve regurgitation. The peak instantaneous gradient of the aortic valve is 14 mmHg. The mean gradient of the aortic valve is 7 mmHg. Mitral Valve: The mitral valve is normal in structure. There is mild mitral valve regurgitation. Tricuspid Valve: The tricuspid valve is structurally normal. There is mild tricuspid regurgitation. Pulmonic Valve: The pulmonic valve is structurally normal. There is trace pulmonic valve regurgitation. Pericardium: No pericardial effusion noted. Aorta: The aortic root is normal.  CONCLUSIONS:  1. The left ventricular systolic function is normal, with a  visually estimated ejection fraction of 55-60%.  2. Spectral Doppler shows a Grade II (pseudonormal pattern) of left ventricular diastolic filling with an elevated left atrial pressure.  3. There is normal right ventricular global systolic function.  4. Mild mitral valve regurgitation.  5. There is moderately increased posterior left ventricular wall thickness. QUANTITATIVE DATA SUMMARY:  2D MEASUREMENTS:            Normal Ranges: Ao Root s:       2.70 cm IVSd:            1.16 cm    (0.6-1.1cm) LVPWd:           1.33 cm    (0.6-1.1cm) LVIDd:           5.04 cm    (3.9-5.9cm) LVIDs:           3.62 cm LV Mass Index:   138.7 g/m2 LV % FS          28.2 %  LA VOLUME:                   Normal Ranges: LA Vol A4C:       71.5 ml LA Vol A2C:       68.7 ml LA Vol Index BSA: 37.2 ml/m2  RA VOLUME BY A/L METHOD:          Normal Ranges: RA Area A4C:             11.3 cm2  LV SYSTOLIC FUNCTION BY 2D PLANIMETRY (MOD):                      Normal Ranges: EF-A4C View:    52 % (>=55%) EF-A2C View:    61 % EF-Biplane:     58 % EF-Visual:      58 % EF-3DQ:         51 % LV EF Reported: 58 %  LV DIASTOLIC FUNCTION:           Normal Ranges: MV Peak E:             0.86 m/s  (0.7-1.2 m/s) MV Peak A:             0.83 m/s  (0.42-0.7 m/s) E/A Ratio:             1.03      (1.0-2.2) MV e'                  0.063 m/s (>8.0) MV lateral e'          0.07 m/s MV medial e'           0.05 m/s E/e' Ratio:            13.66     (<8.0)  MITRAL VALVE:          Normal Ranges: MV DT:        291 msec (150-240msec)  AORTIC VALVE:                      Normal Ranges: AoV Vmax:                1.87 m/s  (<=1.7m/s) AoV Peak P.0 mmHg (<20mmHg) AoV Mean P.3 mmHg  (1.7-11.5mmHg) LVOT Max Joss:            1.20 m/s  (<=1.1m/s) AoV VTI:                 40.32 cm  (18-25cm) LVOT VTI:                27.10 cm LVOT Diameter:           1.96 cm   (1.8-2.4cm) AoV Area, VTI:           2.03 cm2  (2.5-5.5cm2) AoV Area,Vmax:           1.94 cm2  (2.5-4.5cm2)  AoV Dimensionless Index: 0.67  RIGHT VENTRICLE: RV Basal 3.10 cm RV Mid   2.30 cm RV Major 5.6 cm  TRICUSPID VALVE/RVSP:         Normal Ranges: IVC Diam:             1.39 cm  PULMONIC VALVE:          Normal Ranges: PV Max Joss:     1.6 m/s  (0.6-0.9m/s) PV Max P.7 mmHg  [id number] Jared Rooney DO Electronically signed on 2024 at 3:29:19 PM  ** Final **        Ejection Fractions:  EF   Date/Time Value Ref Range Status   2024 08:46 AM 63 %    2024 02:41 PM 58 %      Past Medical History:  He has a past medical history of Chronic kidney disease, Diabetes mellitus (Multi), ESRD (end stage renal disease) on dialysis (Multi), Heart murmur, HLD (hyperlipidemia), Hypertension, Personal history of other endocrine, nutritional and metabolic disease, and Personal history of other specified conditions (2020).    Past Surgical History:  He has a past surgical history that includes Shoulder surgery (04/15/2013); Knee arthroscopy w/ debridement (04/15/2013); and AV fistula placement (Left, 2024).      Social History:  He reports that he has never smoked. He has never used smokeless tobacco. He reports that he does not currently use alcohol. He reports that he does not use drugs.    Family History:  Family History   Problem Relation Name Age of Onset    Alzheimer's disease Mother      Heart attack Mother      Diabetes Father      Heart attack Father          Allergies:  Patient has no known allergies.    Inpatient Medications:  Scheduled medications   Medication Dose Route Frequency    allopurinol  100 mg oral Daily    amLODIPine  5 mg oral Daily    aspirin  81 mg oral Daily    atorvastatin  80 mg oral Nightly    carvedilol  6.25 mg oral BID    ceFAZolin  2 g intravenous q24h LUKE    ezetimibe  10 mg oral Daily    heparin (porcine)  5,000 Units subcutaneous q12h    hydrALAZINE  50 mg oral TID    insulin lispro  0-10 Units subcutaneous TID AC    isosorbide mononitrate ER  60 mg oral Daily     "pantoprazole  40 mg oral Daily    sevelamer carbonate  1,600 mg oral TID    SITagliptin phosphate  25 mg oral Daily     PRN medications   Medication    acetaminophen    Or    acetaminophen    Or    acetaminophen    acetaminophen    Or    acetaminophen    Or    acetaminophen    cloNIDine    dextrose    dextrose    glucagon    glucagon    guaiFENesin    nitroglycerin    ondansetron    polyethylene glycol     Continuous Medications   Medication Dose Last Rate    oxygen         Outpatient Medications:  Current Outpatient Medications   Medication Instructions    allopurinol (ZYLOPRIM) 100 mg, oral, Daily    amLODIPine (NORVASC) 10 mg, oral, Daily    aspirin 81 mg, Daily    atorvastatin (LIPITOR) 80 mg, oral, Nightly    B complex-vitamin C-folic acid (Nephro-Shea) 0.8 mg tablet 0.8 mg, Daily    BD Ultra-Fine Mini Pen Needle 31 gauge x 3/16\" needle USE AS DIRECTED 4 times a day    carvedilol (COREG) 6.25 mg, oral, 2 times daily (morning and late afternoon)    ergocalciferol (VITAMIN D-2) 50,000 Units, Every 14 days    escitalopram (LEXAPRO) 5 mg, oral, Nightly    ezetimibe (ZETIA) 10 mg, oral, Daily    gabapentin (NEURONTIN) 300 mg, oral, Nightly    hydrALAZINE (APRESOLINE) 50 mg, oral, 3 times daily    hydrALAZINE (APRESOLINE) 50 mg, oral, 3 times daily    insulin lispro (HUMALOG) 10 Units, 3 times daily (morning, midday, late afternoon)    isosorbide mononitrate ER (IMDUR) 60 mg, oral, Daily, Do not crush or chew.    isosorbide mononitrate ER (IMDUR) 60 mg, oral, Daily, Do not crush or chew.    Januvia 25 mg, oral, Daily    nitroglycerin (NITROSTAT) 0.4 mg, Every 5 min PRN    oxygen (O2) gas therapy 1 each, inhalation, Continuous    pantoprazole (PROTONIX) 40 mg, Daily    sevelamer carbonate (Renvela) 800 mg tablet Take 2 tablets (1,600 mg) by mouth 3 times daily (morning, midday, late afternoon). Three times daily with meals       Physical Exam:  General: Lethargic, oriented x 2-3 with confusion.  Wife at " bedside  HEENT: normal cephalic, atraumatic, no scleral icterus  Neck: No JVD, bruit or thrill, masses or tenderness   Heart: S1/S2, Rate 64, Rhythm regular, no s3 or s4, no murmur, thrill, or heaves at PMI.   Lungs: Clear, equal expansion and excursion, no wheezes, crackles, rhales or rhonci room air.  No conversational dyspnea appreciated.  No tachypnea.  No pain with deep aspiration  Abdomen: bowel sounds x 4, soft, non-tender to light and deep palpation, No masses, guarding, or CVA tenderness   Genitourinary: deferred   Extremities: No significant upper or lower extremity [place] appreciated.         Assessment/Plan     Acute encephalopathy  Resistant hypertension  End-stage renal disease on hemodialysis  Acute respiratory failure  Pneumonia  Pleural effusion  Confusion  rule out endocarditis  Diabetes mellitus type 2  Hyperlipidemia    Overall impression:    12/29: As above.  Patient known to me from recent admission.  Difficulty with hypertension and hypotension  recently within the context of dialysis.  Patient has been doing reasonably well in the outpatient setting for the past month.  Presented to dialysis recently and was noted to have unstable vital signs transferred to the hospital.  Found to have a fever and sepsis presentation.  Treated initially with for this over the past 8 days.  Underwent thoracentesis yielding 700 mL yesterday.  Overnight was noted to have some bradycardias with heart rate in the mid 40s.  Additionally infectious disease has requested a NIDA by our service.  The wife is at bedside who provided much of the details of the patient's recent illness.  He remains confused.  As noted on my previous note on last hospitalization I suspect a component of underlying dementia.  At this point his bradycardia has resolved.  His heart rates have been in the mid 60s this morning.  He does take  carvedilol in the outpatient setting which was previously at 12.5 reduced down to 6.25 on last  hospitalization.  He continued to receive this recently.  I have placed parameters on this medication to hold for a heart rate less than 60 or systolic blood pressure less than 110.  This is the only rate limiting agents the patient is on.  He was recently taking off of hydralazine and isosorbide by nephrology in the outpatient setting.  This was reinitiated by Dr. Ardon approximately 2 weeks ago.  The patient remains very complex.  He continues to receive dialysis.  To this point his blood cultures have been negative for 3 days.  Will discuss with Dr. Masters the timing or necessity of NIDA.  Will follow with you.      Code Status:  Full Code    I spent 60 minutes in the professional and overall care of this patient.        Darrell Blackwell, APRN-CNP

## 2024-12-29 NOTE — PROGRESS NOTES
William A Franke is a 74 y.o. male on day 7 of admission presenting with Acute encephalopathy.      Subjective   Wife at bed side,for NIDA       Objective     Last Recorded Vitals  BP (!) 162/48 (BP Location: Right arm, Patient Position: Lying)   Pulse 57   Temp 36.9 °C (98.4 °F) (Oral)   Resp 18   Wt 81.6 kg (180 lb)   SpO2 96%   Intake/Output last 3 Shifts:    Intake/Output Summary (Last 24 hours) at 12/28/2024 2040  Last data filed at 12/28/2024 1300  Gross per 24 hour   Intake 1000 ml   Output 2502 ml   Net -1502 ml       Admission Weight  Weight: 81.6 kg (180 lb) (12/21/24 1049)    Daily Weight  12/21/24 : 81.6 kg (180 lb)    Image Results  XR chest 1 view  Narrative: Interpreted By:  Stone Negrete,   STUDY:  XR CHEST 1 VIEW;  12/28/2024 8:24 am      INDICATION:  Signs/Symptoms:Pleural effusions.          COMPARISON:  CT chest without contrast 25 December 2024      ACCESSION NUMBER(S):  [id number]      ORDERING CLINICIAN:  JEISON OCHOA      TECHNIQUE:  Single frontal view of the chest; Portable technique      FINDINGS:  Enlarged cardiac silhouette is unchanged      No demonstrable pleural effusion or pneumothorax      No airspace infiltrate or edema      Impression: NO ACUTE DISEASE IN THE CHEST EVIDENT ON PORTABLE FRONTAL CHEST  RADIOGRAPH      MACRO:  None      Signed by: Stone Negrete 12/28/2024 9:34 AM  Dictation workstation:   [id number]      Physical Exam    Relevant Results               Assessment/Plan                  Assessment & Plan  Acute encephalopathy    Abn indium scan possible septic emboli              Desirae Alicea MD

## 2024-12-30 ENCOUNTER — APPOINTMENT (OUTPATIENT)
Dept: DIALYSIS | Facility: HOSPITAL | Age: 74
End: 2024-12-30
Payer: MEDICARE

## 2024-12-30 ENCOUNTER — APPOINTMENT (OUTPATIENT)
Dept: CARDIOLOGY | Facility: HOSPITAL | Age: 74
DRG: 871 | End: 2024-12-30
Payer: MEDICARE

## 2024-12-30 LAB
ANION GAP SERPL CALCULATED.3IONS-SCNC: 19 MMOL/L (ref 10–20)
BACTERIA BLD CULT: NORMAL
BACTERIA FLD CULT: NORMAL
BODY SURFACE AREA: 1.96 M2
BUN SERPL-MCNC: 45 MG/DL (ref 6–23)
CALCIUM SERPL-MCNC: 9.5 MG/DL (ref 8.6–10.3)
CHLORIDE SERPL-SCNC: 97 MMOL/L (ref 98–107)
CO2 SERPL-SCNC: 26 MMOL/L (ref 21–32)
CREAT SERPL-MCNC: 9.94 MG/DL (ref 0.5–1.3)
EGFRCR SERPLBLD CKD-EPI 2021: 5 ML/MIN/1.73M*2
ERYTHROCYTE [DISTWIDTH] IN BLOOD BY AUTOMATED COUNT: 17.9 % (ref 11.5–14.5)
GLUCOSE BLD MANUAL STRIP-MCNC: 113 MG/DL (ref 74–99)
GLUCOSE BLD MANUAL STRIP-MCNC: 125 MG/DL (ref 74–99)
GLUCOSE BLD MANUAL STRIP-MCNC: 168 MG/DL (ref 74–99)
GLUCOSE SERPL-MCNC: 113 MG/DL (ref 74–99)
GRAM STN SPEC: NORMAL
GRAM STN SPEC: NORMAL
HCT VFR BLD AUTO: 33.6 % (ref 41–52)
HGB BLD-MCNC: 11.5 G/DL (ref 13.5–17.5)
MCH RBC QN AUTO: 31.3 PG (ref 26–34)
MCHC RBC AUTO-ENTMCNC: 34.2 G/DL (ref 32–36)
MCV RBC AUTO: 91 FL (ref 80–100)
NRBC BLD-RTO: 0 /100 WBCS (ref 0–0)
PLATELET # BLD AUTO: 201 X10*3/UL (ref 150–450)
POTASSIUM SERPL-SCNC: 4.3 MMOL/L (ref 3.5–5.3)
RBC # BLD AUTO: 3.68 X10*6/UL (ref 4.5–5.9)
SODIUM SERPL-SCNC: 138 MMOL/L (ref 136–145)
WBC # BLD AUTO: 6.7 X10*3/UL (ref 4.4–11.3)

## 2024-12-30 PROCEDURE — 99232 SBSQ HOSP IP/OBS MODERATE 35: CPT | Performed by: NURSE PRACTITIONER

## 2024-12-30 PROCEDURE — 2500000005 HC RX 250 GENERAL PHARMACY W/O HCPCS: Performed by: INTERNAL MEDICINE

## 2024-12-30 PROCEDURE — 36415 COLL VENOUS BLD VENIPUNCTURE: CPT | Performed by: INTERNAL MEDICINE

## 2024-12-30 PROCEDURE — 8010000001 HC DIALYSIS - HEMODIALYSIS PER DAY

## 2024-12-30 PROCEDURE — 80048 BASIC METABOLIC PNL TOTAL CA: CPT | Performed by: INTERNAL MEDICINE

## 2024-12-30 PROCEDURE — 1200000002 HC GENERAL ROOM WITH TELEMETRY DAILY

## 2024-12-30 PROCEDURE — 93318 ECHO TRANSESOPHAGEAL INTRAOP: CPT | Performed by: INTERNAL MEDICINE

## 2024-12-30 PROCEDURE — 99152 MOD SED SAME PHYS/QHP 5/>YRS: CPT

## 2024-12-30 PROCEDURE — 93318 ECHO TRANSESOPHAGEAL INTRAOP: CPT

## 2024-12-30 PROCEDURE — 87493 C DIFF AMPLIFIED PROBE: CPT | Performed by: INTERNAL MEDICINE

## 2024-12-30 PROCEDURE — 2500000004 HC RX 250 GENERAL PHARMACY W/ HCPCS (ALT 636 FOR OP/ED): Performed by: INTERNAL MEDICINE

## 2024-12-30 PROCEDURE — 2500000001 HC RX 250 WO HCPCS SELF ADMINISTERED DRUGS (ALT 637 FOR MEDICARE OP): Performed by: NURSE PRACTITIONER

## 2024-12-30 PROCEDURE — 99153 MOD SED SAME PHYS/QHP EA: CPT

## 2024-12-30 PROCEDURE — 82947 ASSAY GLUCOSE BLOOD QUANT: CPT

## 2024-12-30 PROCEDURE — 85027 COMPLETE CBC AUTOMATED: CPT | Performed by: INTERNAL MEDICINE

## 2024-12-30 PROCEDURE — 2500000004 HC RX 250 GENERAL PHARMACY W/ HCPCS (ALT 636 FOR OP/ED): Performed by: NURSE PRACTITIONER

## 2024-12-30 PROCEDURE — 2500000004 HC RX 250 GENERAL PHARMACY W/ HCPCS (ALT 636 FOR OP/ED): Mod: JZ | Performed by: INTERNAL MEDICINE

## 2024-12-30 PROCEDURE — 2500000001 HC RX 250 WO HCPCS SELF ADMINISTERED DRUGS (ALT 637 FOR MEDICARE OP): Performed by: INTERNAL MEDICINE

## 2024-12-30 PROCEDURE — 99232 SBSQ HOSP IP/OBS MODERATE 35: CPT | Performed by: INTERNAL MEDICINE

## 2024-12-30 RX ORDER — MIDAZOLAM HYDROCHLORIDE 1 MG/ML
INJECTION, SOLUTION INTRAMUSCULAR; INTRAVENOUS AS NEEDED
Status: DISCONTINUED | OUTPATIENT
Start: 2024-12-30 | End: 2024-12-30 | Stop reason: HOSPADM

## 2024-12-30 RX ORDER — FENTANYL CITRATE 50 UG/ML
INJECTION, SOLUTION INTRAMUSCULAR; INTRAVENOUS AS NEEDED
Status: DISCONTINUED | OUTPATIENT
Start: 2024-12-30 | End: 2024-12-30 | Stop reason: HOSPADM

## 2024-12-30 RX ORDER — CEFAZOLIN SODIUM 2 G/50ML
2 SOLUTION INTRAVENOUS
Qty: 1500 ML | Refills: 1 | Status: SHIPPED | OUTPATIENT
Start: 2024-12-30 | End: 2025-02-01

## 2024-12-30 RX ORDER — LIDOCAINE HYDROCHLORIDE 20 MG/ML
SOLUTION OROPHARYNGEAL AS NEEDED
Status: DISCONTINUED | OUTPATIENT
Start: 2024-12-30 | End: 2024-12-30 | Stop reason: HOSPADM

## 2024-12-30 RX ADMIN — ATORVASTATIN CALCIUM 80 MG: 80 TABLET, FILM COATED ORAL at 20:28

## 2024-12-30 RX ADMIN — FENTANYL CITRATE 25 MCG: 0.05 INJECTION, SOLUTION INTRAMUSCULAR; INTRAVENOUS at 13:41

## 2024-12-30 RX ADMIN — HEPARIN SODIUM 5000 UNITS: 5000 INJECTION, SOLUTION INTRAVENOUS; SUBCUTANEOUS at 16:49

## 2024-12-30 RX ADMIN — BENZOCAINE 1 SPRAY: 200 SPRAY DENTAL; ORAL; PERIODONTAL at 13:41

## 2024-12-30 RX ADMIN — FENTANYL CITRATE 25 MCG: 0.05 INJECTION, SOLUTION INTRAMUSCULAR; INTRAVENOUS at 13:45

## 2024-12-30 RX ADMIN — HYDRALAZINE HYDROCHLORIDE 50 MG: 50 TABLET ORAL at 20:28

## 2024-12-30 RX ADMIN — CEFAZOLIN SODIUM 2 G: 2 INJECTION, SOLUTION INTRAVENOUS at 07:54

## 2024-12-30 RX ADMIN — BENZOCAINE 1 SPRAY: 200 SPRAY DENTAL; ORAL; PERIODONTAL at 13:42

## 2024-12-30 RX ADMIN — LIDOCAINE HYDROCHLORIDE 15 ML: 20 SOLUTION ORAL; TOPICAL at 13:43

## 2024-12-30 RX ADMIN — HEPARIN SODIUM 5000 UNITS: 5000 INJECTION, SOLUTION INTRAVENOUS; SUBCUTANEOUS at 06:47

## 2024-12-30 RX ADMIN — MIDAZOLAM HYDROCHLORIDE 1 MG: 1 INJECTION, SOLUTION INTRAMUSCULAR; INTRAVENOUS at 13:40

## 2024-12-30 RX ADMIN — MIDAZOLAM HYDROCHLORIDE 1 MG: 1 INJECTION, SOLUTION INTRAMUSCULAR; INTRAVENOUS at 13:51

## 2024-12-30 ASSESSMENT — COGNITIVE AND FUNCTIONAL STATUS - GENERAL
DAILY ACTIVITIY SCORE: 24
DAILY ACTIVITIY SCORE: 24
MOBILITY SCORE: 24
MOBILITY SCORE: 24

## 2024-12-30 ASSESSMENT — COLUMBIA-SUICIDE SEVERITY RATING SCALE - C-SSRS
1. IN THE PAST MONTH, HAVE YOU WISHED YOU WERE DEAD OR WISHED YOU COULD GO TO SLEEP AND NOT WAKE UP?: NO
6. HAVE YOU EVER DONE ANYTHING, STARTED TO DO ANYTHING, OR PREPARED TO DO ANYTHING TO END YOUR LIFE?: NO
2. HAVE YOU ACTUALLY HAD ANY THOUGHTS OF KILLING YOURSELF?: NO

## 2024-12-30 ASSESSMENT — PAIN SCALES - GENERAL
PAINLEVEL_OUTOF10: 0 - NO PAIN

## 2024-12-30 ASSESSMENT — PAIN - FUNCTIONAL ASSESSMENT: PAIN_FUNCTIONAL_ASSESSMENT: 0-10

## 2024-12-30 NOTE — PROGRESS NOTES
"   12/30/24 1121   Discharge Planning   Expected Discharge Disposition Home     No skilled needs  \"Tentative plan will be IV cefazolin 2/2/3gm with dialysis at discharge-total of 6 weeks \"     **Patient has a  safe discharge plan**     "

## 2024-12-30 NOTE — POST-PROCEDURE NOTE
Report to Receiving RN:    Report To: INES Chan  Time Report Called: 1300  Hand-Off Communication: 3.5hr HD tolerated without difficulty. 1L removed. Last /56 HR 73. FAHAD AVG decannulated, hemostasis achieved, bandaged securely with gauze and tape. Patient picked up by transport for NIDA.  Complications During Treatment: No  Ultrafiltration Treatment: No  Medications Administered During Dialysis: No  Blood Products Administered During Dialysis: No  Labs Sent During Dialysis: No  Heparin Drip Rate Changes: N/A  Dialysis Catheter Dressing: n/a AVG  Last Dressing Change: n/a    Electronic Signatures:  Pauline BERRYT

## 2024-12-30 NOTE — PROGRESS NOTES
Department of Medicine  Division of Pulmonary, Critical Care, and Sleep Medicine  Progress Note    Subjective     William A Franke is a 74 y.o. male on day 9 of admission presenting with Acute encephalopathy.    Pt was seen today resting comfortably in bed currently on room air.  No new complaints.  Denies shortness of breath.    Objective     Vitals:      12/29/2024     5:45 AM 12/29/2024     7:41 AM 12/29/2024    12:13 PM 12/29/2024     3:53 PM 12/29/2024     7:00 PM 12/29/2024     9:03 PM 12/30/2024     1:19 AM   Vitals   Systolic 114 173 135 142 141 160 144   Diastolic 60 60 53 56 51 52 49   BP Location  Right arm Right arm Right arm Right arm  Right arm   Heart Rate 61 69 76 69 62 60 68   Temp 36.4 °C (97.5 °F) 36.6 °C (97.9 °F) 36.8 °C (98.2 °F) 36.7 °C (98.1 °F) 36.4 °C (97.5 °F)  36.9 °C (98.4 °F)   Resp  17 18 17 17  18        Oxygen Therapy:  SpO2: 94 %  Medical Gas Therapy: None (Room air)  Medical Gas Delivery Method: Nasal cannula (2 liters)       Intake/Output::  No intake/output data recorded.    Physical Exam:  Physical Exam  Constitutional:       General: He is not in acute distress.     Appearance: He is not toxic-appearing.   HENT:      Head: Normocephalic and atraumatic.      Nose: Nose normal.      Mouth/Throat:      Pharynx: Oropharynx is clear.   Eyes:      Extraocular Movements: Extraocular movements intact.   Neck:      Comments: No visualized masses  Cardiovascular:      Rate and Rhythm: Normal rate and regular rhythm.      Heart sounds: No murmur heard.     No friction rub. No gallop.   Pulmonary:      Effort: No respiratory distress.      Breath sounds: Normal breath sounds. No wheezing, rhonchi or rales.   Abdominal:      General: There is no distension.      Palpations: Abdomen is soft.      Tenderness: There is no abdominal tenderness. There is no guarding.   Musculoskeletal:         General: No tenderness.      Right lower leg: No edema.      Left lower leg: No edema.   Skin:      General: Skin is warm and dry.   Neurological:      General: No focal deficit present.      Mental Status: He is alert and oriented to person, place, and time.   Psychiatric:         Mood and Affect: Mood normal.         Allergies:  No Known Allergies    Inpatient Medications:  allopurinol, 100 mg, oral, Daily  amLODIPine, 5 mg, oral, Daily  aspirin, 81 mg, oral, Daily  atorvastatin, 80 mg, oral, Nightly  carvedilol, 6.25 mg, oral, BID  ceFAZolin, 2 g, intravenous, q24h LUKE  ezetimibe, 10 mg, oral, Daily  heparin (porcine), 5,000 Units, subcutaneous, q12h  hydrALAZINE, 50 mg, oral, TID  insulin lispro, 0-10 Units, subcutaneous, TID AC  isosorbide mononitrate ER, 60 mg, oral, Daily  pantoprazole, 40 mg, oral, Daily  sevelamer carbonate, 1,600 mg, oral, TID  SITagliptin phosphate, 25 mg, oral, Daily      oxygen,       PRN medications: acetaminophen **OR** acetaminophen **OR** acetaminophen, acetaminophen **OR** acetaminophen **OR** acetaminophen, cloNIDine, dextrose, dextrose, glucagon, glucagon, guaiFENesin, nitroglycerin, ondansetron, polyethylene glycol    Lab/Radiology/Diagnostic Review:    Labs:  Results for orders placed or performed during the hospital encounter of 12/21/24 (from the past 24 hours)   POCT GLUCOSE   Result Value Ref Range    POCT Glucose 176 (H) 74 - 99 mg/dL   POCT GLUCOSE   Result Value Ref Range    POCT Glucose 167 (H) 74 - 99 mg/dL   POCT GLUCOSE   Result Value Ref Range    POCT Glucose 159 (H) 74 - 99 mg/dL     *Note: Due to a large number of results and/or encounters for the requested time period, some results have not been displayed. A complete set of results can be found in Results Review.      CT chest wo IV contrast 12/25/2024    Narrative  Interpreted By:  Frank Dang,  STUDY:  CT CHEST WO IV CONTRAST; 12/25/2024 11:55 am    INDICATION:  Signs/Symptoms:evaluate for pneumonia    COMPARISON:  Plain radiographs from the previous day and CT scan from September 2021.    ACCESSION  NUMBER(S):  [id number]    ORDERING CLINICIAN:  HUNTER CHÁVEZ    TECHNIQUE:  Spiral axial unenhanced images were obtained through the chest. Post  processing sagittal and coronal reconstruction images were also  performed.    All CT examinations are performed with one or more of the following  dose reduction techniques: Automated Exposure Control, adjustment of  mA and/or kV according to patient size, or use of iterative  reconstruction techniques.    PATIENT RADIATION EXPOSURE DATA:  CTDI (mGy):  DLP (mGy/cm):    FINDINGS  Extensive atherosclerotic calcifications involve coronary arteries  and to a lesser degree the aorta. There is no aortic aneurysm.    There is no mediastinal, hilar or axillary lymphadenopathy.  There is moderate right pleural effusion, with atelectatic  changes/infiltrates in the right lung. An 8 mm nodule is now seen in  the as ago esophageal recess of the right lobe (axial image number  164). Ill-defined nodular consolidation measuring 16.5 mm is noted in  the left upper lobe (axial image number 68). There is irregular  nodular density measuring 8 mm in the left upper lobe (axial image  number 70). Ill-defined 7.4 mm nodular density seen in the left upper  lobe more inferiorly (axial image number 109). Several smaller fibro  nodular densities are seen in the superior segment of the left lower  lobe. Subpleural vague nodular density measuring up to 1.5 cm is seen  in the left upper lobe posteriorly/laterally (axial image number 154).    Images from the upper abdomen demonstrate calcifications in the  visualized portion of the right kidney, most likely nonobstructing  stones. There is also stable 1.3 cm hypodensity in the left lobe of  the liver, most likely a cyst    Impression  1. Moderate right pleural effusion is of indeterminate significance,  possibly related to pneumonia, however malignant effusion can not be  excluded. Follow-up as needed. If indicated further evaluate  with  thoracentesis.  2. Several bilateral ill-defined nodular densities as above, not  present on the CT scan from 2021, possibly infectious/inflammatory  versus neoplastic. Short-term follow-up recommended.    MACRO:  Critical Finding:  See findings. Notification was initiated on  12/26/2024 at 5:16 pm by  Frank Dang.  (**-YCF-**) Instructions:    Signed by: Frank Dang 12/26/2024 5:16 PM  Dictation workstation:   [id number]     XR chest 1 view 12/28/2024    Narrative  Interpreted By:  Stone Negrete,  STUDY:  XR CHEST 1 VIEW;  12/28/2024 8:24 am    INDICATION:  Signs/Symptoms:Pleural effusions.      COMPARISON:  CT chest without contrast 25 December 2024    ACCESSION NUMBER(S):  [id number]    ORDERING CLINICIAN:  JEISON OCHOA    TECHNIQUE:  Single frontal view of the chest; Portable technique    FINDINGS:  Enlarged cardiac silhouette is unchanged    No demonstrable pleural effusion or pneumothorax    No airspace infiltrate or edema    Impression  NO ACUTE DISEASE IN THE CHEST EVIDENT ON PORTABLE FRONTAL CHEST  RADIOGRAPH    MACRO:  None    Signed by: Stone Negrete 12/28/2024 9:34 AM  Dictation workstation:   [id number]      XR chest 1 view 12/24/2024    Narrative  Interpreted By:  Frank Dang,  STUDY:  XR CHEST 1 VIEW; 12/24/2024 9:51 am    INDICATION:  Signs/Symptoms:Pneumonia    COMPARISON:  12 21 2024.    ACCESSION NUMBER(S):  [id number]    ORDERING CLINICIAN:  BAHMAN BLANK    FINDINGS:  The study is limited due to  rotation/lordotic projection.  The cardiomediastinal silhouette is within normal limits for the  technique. There is no pneumothorax or confluence infiltrates.  There is blunting of the right costophrenic angle, due to improving  small right pleural effusion. The osseous structures are unremarkable.    Impression  Improving small right pleural effusion.    Signed by: Frank Dang 12/24/2024 12:53 PM  Dictation workstation:   [id number]      XR chest 1 view  12/21/2024    Narrative  STUDY:  Chest Radiograph;  [12-; 11:41 am]  INDICATION:  Altered. Cough.  COMPARISON:  XR chest 11-  ACCESSION NUMBER(S):  [id number]  ORDERING CLINICIAN:  FELIPE SINGH  TECHNIQUE:  Frontal chest was obtained at 11:41 hours.  FINDINGS:  CARDIOMEDIASTINAL SILHOUETTE:  The cardiomediastinal silhouette is enlarged but stable compared to  previous imaging..    LUNGS:  The lungs demonstrate mild pulmonary vascular prominence which may  represent mild vascular congestion.  There is blunting of the right  costophrenic angle which could represent a possible small effusion..    ABDOMEN:  No remarkable upper abdominal findings.    BONES:  No acute osseous changes.  Multiple vascular stents are noted within  the left upper extremity.    Impression  1.  Pulmonary vascular prominence which may represent mild pulmonary  vascular congestion.  2.  Blunting of the right costophrenic angle laterally which could  represent a small pleural effusion..  Signed by Tk Geller MD       All labs and Imaging have been personally reviewed.     Assessment/Plan       William A Franke is a 74 y.o. male with a past medical history of ESRD, diabetes, hypertension, hyperlipidemia, CAD who presented with altered mental status.  Pulmonary was consulted for pleural effusion    Patient was seen and assessed by myself and his chart was reviewed for previous pulmonary visits, and Pulmonary related labs and imaging.  Patient was found to have Staph aureus (MSSA) bacteremia.  Patient had a follow-up CT scan which was concerning for septic emboli.  Right pleural effusion was exudative by lights criteria.  Clinically, patient did not appear to have had a pneumonia prior to presentation which makes this effusion less likely parapneumonic in nature.  Likely etiologies could be volume overload in the setting of renal failure or a possible malignant process.  Cytology remains in process.  Sterile fluid culture showed no  growth.  Fungal cultures also show no growth.  Blood cultures have cleared.    #MSSA bacteremia  #ESRD on HD  #Right-sided pleural effusion; exudative by lights criteria    Recommendations are as follows  -Follow-up cytology  -Agree with NIDA to rule out endocarditis  -Antibiotics per primary team  -Patient will need outpatient follow-up with pulmonary medicine with further imaging.  Recommend CT in 6 to 8 weeks.      I spent 37 minutes in the professional and overall care of this patient.    Pulmonary team will sign off from here.    Brandyn Lazo MD, MPH  Pulmonary and Critical Care Medicine     Please excuse any typographical or unwanted errors as voice recognition software was used to complete this note.

## 2024-12-30 NOTE — POST-PROCEDURE NOTE
Physician Transition of Care Summary  Invasive Cardiovascular Lab    Procedure Date: 12/30/2024  Attending: * Surgery not found *  Resident/Fellow/Other Assistant: * Surgery not found *    Indications:   * No surgery found *    Post-procedure diagnosis:   * No surgery found *    Procedure(s):   * Cannot find OR case *  * No surgery found *    Procedure Findings:   Procedure canceled    Description of the Procedure:   Patient was brought to the catheterization suite after informed consent for NIDA was obtained.  Continuous telemetry and pulse oximetry and intermittent blood pressure monitoring were performed per protocol.  The patient received topical Cetacaine spray and lidocaine gel for local anesthesia and Versed 2 mg and fentanyl 50 mcg was used for sedation.  Despite multiple attempts probe insertion was unsuccessful due to very prominent gag reflex in the procedure was terminated.  The patient will tentatively be scheduled for NIDA tomorrow 12/31/2024 with anesthesia department for sedation.    Complications:   None    Stents/Implants:   * No surgery found *    Anticoagulation/Antiplatelet Plan:   None    Estimated Blood Loss:   * No surgery found *    Anesthesia: * No surgery found * Anesthesia Staff: Anesthesia staff cannot be found from this context.    Any Specimen(s) Removed:   * Cannot find OR log *    Disposition:   Return to the telemetry floor in stable condition.      Electronically signed by: Aleksandr Barraza DO, 12/30/2024 2:39 PM

## 2024-12-30 NOTE — PROGRESS NOTES
"William A Franke is a 74 y.o. male on day 9 of admission presenting with Acute encephalopathy.    Subjective   Patient seen for end-stage kidney disease on dialysis on antibiotics for MSSA source is not very clear at this time patient had indium scan which was negative for any localization of infection and his fistula the patient was seen and examined on dialysis therapy tolerating procedure well he looks better today no further diarrhea    Objective     Physical Exam  Neck:      Vascular: No carotid bruit.   Cardiovascular:      Rate and Rhythm: Normal rate and regular rhythm.      Heart sounds: No murmur heard.     No friction rub. No gallop.   Pulmonary:      Breath sounds: No wheezing, rhonchi or rales.   Chest:      Chest wall: No tenderness.   Abdominal:      General: There is no distension.      Tenderness: There is no abdominal tenderness. There is no guarding or rebound.   Musculoskeletal:         General: No swelling or tenderness.      Cervical back: Neck supple.      Right lower leg: No edema.      Left lower leg: No edema.      Comments: Very minimal redness over his AV fistula without any drainage or tenderness   Lymphadenopathy:      Cervical: No cervical adenopathy.         Last Recorded Vitals  Blood pressure 169/55, pulse 54, temperature 36.2 °C (97.2 °F), resp. rate 17, height 1.702 m (5' 7\"), weight 81.6 kg (180 lb), SpO2 98%.    Intake/Output last 3 Shifts:  No intake/output data recorded.    Current Facility-Administered Medications:     acetaminophen (Tylenol) tablet 650 mg, 650 mg, oral, q4h PRN, 650 mg at 12/22/24 0758 **OR** acetaminophen (Tylenol) oral liquid 650 mg, 650 mg, nasogastric tube, q4h PRN **OR** acetaminophen (Tylenol) suppository 650 mg, 650 mg, rectal, q4h PRN, Aliya Patel, APRN-CNP    acetaminophen (Tylenol) tablet 650 mg, 650 mg, oral, q4h PRN **OR** acetaminophen (Tylenol) oral liquid 650 mg, 650 mg, oral, q4h PRN **OR** acetaminophen (Tylenol) suppository 650 mg, " 650 mg, rectal, q4h PRN, Aliya Patel, APRN-CNP    allopurinol (Zyloprim) tablet 100 mg, 100 mg, oral, Daily, Aliya Patel, APRN-CNP, 100 mg at 12/28/24 0831    amLODIPine (Norvasc) tablet 5 mg, 5 mg, oral, Daily, Aliya Patel, APRN-CNP, 5 mg at 12/28/24 0831    aspirin EC tablet 81 mg, 81 mg, oral, Daily, Aliya Eppsinger, APRN-CNP, 81 mg at 12/28/24 0831    atorvastatin (Lipitor) tablet 80 mg, 80 mg, oral, Nightly, Aliya Eppsinger, APRN-CNP, 80 mg at 12/29/24 2103    carvedilol (Coreg) tablet 6.25 mg, 6.25 mg, oral, BID, Darrell Blackwell, APRN-CNP, 6.25 mg at 12/28/24 1725    ceFAZolin (Ancef) 2 g in dextrose (iso)  mL, 2 g, intravenous, q24h LUKE, Dave Pathak MD, Stopped at 12/30/24 0835    cloNIDine (Catapres) tablet 0.1 mg, 0.1 mg, oral, q6h PRN, Desirae Alicea MD, 0.1 mg at 12/27/24 2328    dextrose 50 % injection 12.5 g, 12.5 g, intravenous, q15 min PRN, Aliya Patel, APRN-CNP    dextrose 50 % injection 25 g, 25 g, intravenous, q15 min PRN, Aliya Patel, APRN-CNP    ezetimibe (Zetia) tablet 10 mg, 10 mg, oral, Daily, Aliya Patel, APRN-CNP, 10 mg at 12/28/24 0831    glucagon (Glucagen) injection 1 mg, 1 mg, intramuscular, q15 min PRN, Aliya Patel APRN-CNP    glucagon (Glucagen) injection 1 mg, 1 mg, intramuscular, q15 min PRN, Aliya Patel, APRN-CNP    guaiFENesin (Mucinex) 12 hr tablet 600 mg, 600 mg, oral, BID PRN, Aliya Patel, APRN-CNP, 600 mg at 12/21/24 1849    heparin (porcine) injection 5,000 Units, 5,000 Units, subcutaneous, q12h, ERIKA Russ, 5,000 Units at 12/30/24 0647    hydrALAZINE (Apresoline) tablet 50 mg, 50 mg, oral, TID, Marco Woodson MD, 50 mg at 12/29/24 2103    insulin lispro injection 0-10 Units, 0-10 Units, subcutaneous, TID AC, ERIKA Russ, 2 Units at 12/28/24 1725    isosorbide mononitrate ER (Imdur) 24 hr tablet 60 mg, 60 mg, oral, Daily, ERIKA Russ, 60 mg at  12/28/24 0831    nitroglycerin (Nitrostat) SL tablet 0.4 mg, 0.4 mg, sublingual, q5 min PRN, ERIKA Russ    ondansetron (Zofran) injection 4 mg, 4 mg, intravenous, q4h PRN, Desirae Alicea MD, 4 mg at 12/29/24 0959    oxygen (O2) therapy, , inhalation, Continuous, ERIKA Russ, 21 percent at 12/21/24 1508    pantoprazole (ProtoNix) EC tablet 40 mg, 40 mg, oral, Daily, ERIKA Russ, 40 mg at 12/28/24 0831    polyethylene glycol (Glycolax, Miralax) packet 17 g, 17 g, oral, Daily PRN, ERIKA Russ    sevelamer carbonate (Renvela) tablet 1,600 mg, 1,600 mg, oral, TID, ERIKA Russ, 1,600 mg at 12/28/24 1725    SITagliptin phosphate (Januvia) tablet 25 mg, 25 mg, oral, Daily, ERIKA Russ, 25 mg at 12/28/24 0831   Relevant Results    Results for orders placed or performed during the hospital encounter of 12/21/24 (from the past 96 hours)   POCT GLUCOSE   Result Value Ref Range    POCT Glucose 98 74 - 99 mg/dL   POCT GLUCOSE   Result Value Ref Range    POCT Glucose 275 (H) 74 - 99 mg/dL   POCT GLUCOSE   Result Value Ref Range    POCT Glucose 152 (H) 74 - 99 mg/dL   Protein, Total   Result Value Ref Range    Total Protein 5.9 (L) 6.4 - 8.2 g/dL   Lactate Dehydrogenase   Result Value Ref Range     84 - 246 U/L   CBC   Result Value Ref Range    WBC 6.6 4.4 - 11.3 x10*3/uL    nRBC 0.6 (H) 0.0 - 0.0 /100 WBCs    RBC 3.20 (L) 4.50 - 5.90 x10*6/uL    Hemoglobin 9.8 (L) 13.5 - 17.5 g/dL    Hematocrit 28.6 (L) 41.0 - 52.0 %    MCV 89 80 - 100 fL    MCH 30.6 26.0 - 34.0 pg    MCHC 34.3 32.0 - 36.0 g/dL    RDW 16.7 (H) 11.5 - 14.5 %    Platelets 161 150 - 450 x10*3/uL   Basic Metabolic Panel   Result Value Ref Range    Glucose 111 (H) 74 - 99 mg/dL    Sodium 138 136 - 145 mmol/L    Potassium 3.8 3.5 - 5.3 mmol/L    Chloride 100 98 - 107 mmol/L    Bicarbonate 28 21 - 32 mmol/L    Anion Gap 14 10 - 20 mmol/L    Urea Nitrogen 27 (H) 6 -  23 mg/dL    Creatinine 7.19 (H) 0.50 - 1.30 mg/dL    eGFR 7 (L) >60 mL/min/1.73m*2    Calcium 8.6 8.6 - 10.3 mg/dL   Phosphorus   Result Value Ref Range    Phosphorus 3.1 2.5 - 4.9 mg/dL   POCT GLUCOSE   Result Value Ref Range    POCT Glucose 112 (H) 74 - 99 mg/dL   Sterile Cup   Result Value Ref Range    Extra Tube Hold for add-ons.    Sterile Fluid Culture/Smear    Specimen: Pleural; Fluid   Result Value Ref Range    Sterile Fluid Culture/Smear No growth to date     Gram Stain (4+) Abundant Polymorphonuclear leukocytes     Gram Stain No organisms seen    Fungal Culture/Smear    Specimen: Pleural; Fluid   Result Value Ref Range    Fungal Culture/Smear       Culture in progress, a report will be issued when positive or after 2 weeks of incubation.    Fungal Smear No fungal elements seen    AFB Processed   Result Value Ref Range    Extra Tube Hold for add-ons.    pH, Body Fluid   Result Value Ref Range    pH, Fluid 7.60 See Below   Lactate Dehydrogenase, Fluid   Result Value Ref Range    LD, Fluid 150 Not established. U/L   Glucose, Fluid   Result Value Ref Range    Glucose, Fluid 124 Not established mg/dL   Protein, Total Fluid   Result Value Ref Range    Protein, Total Fluid 3.5 Not established g/dL   Body Fluid Cell Count   Result Value Ref Range    Color, Fluid Yellow Colorless, Straw, Yellow    Clarity, Fluid Clear Clear    WBC, Fluid 513 See Comment /uL    RBC, Fluid 109 0  /uL /uL   Body Fluid Differential   Result Value Ref Range    Neutrophils %, Manual, Fluid 14 <25 % %    Lymphocytes %, Manual, Fluid 11 <75 % %    Mono/Macrophages %, Manual, Fluid 58 <70 % %    Eosinophils %, Manual, Fluid 16 0 % %    Basophils %, Manual, Fluid 1 0 % %    Immature Granulocytes %, Manual, Fluid 0 0 % %    Blasts %, Manual, Fluid 0 0 % %    Unclassified Cells %, Manual, Fluid 0 0 % %    Plasma Cells %, Manual, Fluid 0 0 % %    Total Cells Counted, Fluid 100    Lavender Top   Result Value Ref Range    Extra Tube Hold for  add-ons.    POCT GLUCOSE   Result Value Ref Range    POCT Glucose 149 (H) 74 - 99 mg/dL   POCT GLUCOSE   Result Value Ref Range    POCT Glucose 144 (H) 74 - 99 mg/dL   POCT GLUCOSE   Result Value Ref Range    POCT Glucose 138 (H) 74 - 99 mg/dL   POCT GLUCOSE   Result Value Ref Range    POCT Glucose 121 (H) 74 - 99 mg/dL   POCT GLUCOSE   Result Value Ref Range    POCT Glucose 98 74 - 99 mg/dL   POCT GLUCOSE   Result Value Ref Range    POCT Glucose 165 (H) 74 - 99 mg/dL   POCT GLUCOSE   Result Value Ref Range    POCT Glucose 123 (H) 74 - 99 mg/dL   POCT GLUCOSE   Result Value Ref Range    POCT Glucose 176 (H) 74 - 99 mg/dL   POCT GLUCOSE   Result Value Ref Range    POCT Glucose 167 (H) 74 - 99 mg/dL   POCT GLUCOSE   Result Value Ref Range    POCT Glucose 159 (H) 74 - 99 mg/dL   CBC   Result Value Ref Range    WBC 6.7 4.4 - 11.3 x10*3/uL    nRBC 0.0 0.0 - 0.0 /100 WBCs    RBC 3.68 (L) 4.50 - 5.90 x10*6/uL    Hemoglobin 11.5 (L) 13.5 - 17.5 g/dL    Hematocrit 33.6 (L) 41.0 - 52.0 %    MCV 91 80 - 100 fL    MCH 31.3 26.0 - 34.0 pg    MCHC 34.2 32.0 - 36.0 g/dL    RDW 17.9 (H) 11.5 - 14.5 %    Platelets 201 150 - 450 x10*3/uL   Basic Metabolic Panel   Result Value Ref Range    Glucose 113 (H) 74 - 99 mg/dL    Sodium 138 136 - 145 mmol/L    Potassium 4.3 3.5 - 5.3 mmol/L    Chloride 97 (L) 98 - 107 mmol/L    Bicarbonate 26 21 - 32 mmol/L    Anion Gap 19 10 - 20 mmol/L    Urea Nitrogen 45 (H) 6 - 23 mg/dL    Creatinine 9.94 (H) 0.50 - 1.30 mg/dL    eGFR 5 (L) >60 mL/min/1.73m*2    Calcium 9.5 8.6 - 10.3 mg/dL   POCT GLUCOSE   Result Value Ref Range    POCT Glucose 125 (H) 74 - 99 mg/dL     *Note: Due to a large number of results and/or encounters for the requested time period, some results have not been displayed. A complete set of results can be found in Results Review.       Assessment/Plan   End-stage kidney disease dialysis would be Wednesday  Staph aureus bacteremia source is not clear lung versus AV fistula continue  antibiotics per infectious disease patient to have NIDA today  Anemia chronic kidney disease continue with erythropoietin therapy  Hypertension     Discussed with  the family      Elier Woodson MD

## 2024-12-30 NOTE — PRE-PROCEDURE NOTE
..Report from Sending RN:    Report From: Sayda Chan RN  Recent Surgery of Procedure: No  Baseline Level of Consciousness (LOC): A&Ox4  Oxygen Use: No  Type: room air  Diabetic: Yes  Last BP Med Given Day of Dialysis: see EMAR  Last Pain Med Given: see EMAR  Lab Tests to be Obtained with Dialysis: No  Blood Transfusion to be Given During Dialysis: No  Available IV Access: Yes  Medications to be Administered During Dialysis: No  Continuous IV Infusion Running: No  Restraints on Currently or in the Last 24 Hours: No  Hand-Off Communication: pt is stable to come to HD room for dialysis.  Dialysis Catheter Dressing: N/A pt has fistula  Last Dressing Change: N/A

## 2024-12-30 NOTE — PROGRESS NOTES
Physical Therapy                 Therapy Communication Note    Patient Name: William A Franke  MRN: [id number]  Department: [facility]  Room: [address] [place], [place] [address]  Today's Date: 12/30/2024     Discipline: Physical Therapy    PT Missed Visit: Yes     Missed Visit Reason: Missed Visit Reason: Other (Comment) (pt off floor for dialysis, then ECHO.)    Missed Time: Attempt    Comment:

## 2024-12-30 NOTE — CARE PLAN
The patient's goals for the shift include      The clinical goals for the shift include maintain safety    Problem: Diabetes  Goal: Maintain electrolyte levels within acceptable range throughout shift  Outcome: Progressing  Goal: No changes in neurological exam by end of shift  Outcome: Progressing  Goal: Learn about and adhere to nutrition recommendations by end of shift  Outcome: Progressing     Problem: Pain - Adult  Goal: Verbalizes/displays adequate comfort level or baseline comfort level  Outcome: Progressing     Problem: Safety - Adult  Goal: Free from fall injury  Outcome: Progressing     Problem: Discharge Planning  Goal: Discharge to home or other facility with appropriate resources  Outcome: Progressing     Problem: Chronic Conditions and Co-morbidities  Goal: Patient's chronic conditions and co-morbidity symptoms are monitored and maintained or improved  Outcome: Progressing     Problem: Ineffective Tissue Perfusion  Goal: Patient will be free from complications of poor renal perfusion causing other organ dysfunction, such as congestive heart failure or encephalopathy. Patient will adhere to dialysis treatments.  Outcome: Progressing     Problem: Impaired Gas Exchange  Goal: Patient will be able to maintain optimal gas exchange as evidenced by unlabored breathing and respiratory rate within normal limits.  Outcome: Progressing     Problem: Skin  Goal: Participates in plan/prevention/treatment measures  Outcome: Progressing  Goal: Prevent/manage excess moisture  Outcome: Progressing  Goal: Prevent/minimize sheer/friction injuries  Outcome: Progressing  Goal: Promote/optimize nutrition  Outcome: Progressing  Goal: Promote skin healing  Outcome: Progressing

## 2024-12-30 NOTE — PROGRESS NOTES
William A Franke is a 74 y.o. male on day 9 of admission presenting with Acute encephalopathy.    Subjective   Interval History:   Patient seen and examined  Undergoing dialysis when seen  No cough, chest pain or shortness of breath  No nausea vomiting or diarrhea        Review of Systems   All other systems reviewed and are negative.      Objective   Range of Vitals (last 24 hours)  Heart Rate:  [54-98]   Temp:  [36.2 °C (97.2 °F)-36.9 °C (98.4 °F)]   Resp:  [17-18]   BP: (141-169)/(49-56)   SpO2:  [94 %-98 %]   Daily Weight  12/21/24 : 81.6 kg (180 lb)    Body mass index is 28.19 kg/m².    Physical Exam  Constitutional:       Appearance: Normal appearance.   HENT:      Head: Normocephalic and atraumatic.      Nose: Nose normal.   Eyes:      General: No scleral icterus.     Extraocular Movements: Extraocular movements intact.      Conjunctiva/sclera: Conjunctivae normal.   Cardiovascular:      Rate and Rhythm: Normal rate and regular rhythm.      Heart sounds: Normal heart sounds.   Pulmonary:      Effort: Pulmonary effort is normal.      Breath sounds: Normal breath sounds.   Abdominal:      General: Bowel sounds are normal.      Palpations: Abdomen is soft.   Musculoskeletal:      Cervical back: Normal range of motion and neck supple.      Right lower leg: No edema.      Left lower leg: No edema.   Skin:     General: Skin is warm and dry.   Neurological:      Mental Status: He is alert.   Psychiatric:         Behavior: Behavior is cooperative.     Antibiotics  ceFAZolin - 2 gram/100 mL    Relevant Results  Labs  Results from last 72 hours   Lab Units 12/30/24  0733   WBC AUTO x10*3/uL 6.7   HEMOGLOBIN g/dL 11.5*   HEMATOCRIT % 33.6*   PLATELETS AUTO x10*3/uL 201     Results from last 72 hours   Lab Units 12/30/24  0733   SODIUM mmol/L 138   POTASSIUM mmol/L 4.3   CHLORIDE mmol/L 97*   CO2 mmol/L 26   BUN mg/dL 45*   CREATININE mg/dL 9.94*   GLUCOSE mg/dL 113*   CALCIUM mg/dL 9.5   ANION GAP mmol/L 19   EGFR  "mL/min/1.73m*2 5*         Estimated Creatinine Clearance: 6.5 mL/min (A) (by C-G formula based on SCr of 9.94 mg/dL (H)).  No results found for: \"CRP\"  Microbiology  Susceptibility data from last 14 days.  Collected Specimen Info Organism Clindamycin Erythromycin Oxacillin Tetracycline Trimethoprim/Sulfamethoxazole Vancomycin   12/22/24 Blood culture from Peripheral Venipuncture Staphylococcus aureus         12/21/24 Blood culture from Peripheral Venipuncture Methicillin Susceptible Staphylococcus aureus (MSSA)  S  S  S  S  S  S   12/21/24 Blood culture from Peripheral Venipuncture Staphylococcus aureus             Imaging  XR chest 1 view    Result Date: 12/28/2024  Interpreted By:  Stone Negrete, STUDY: XR CHEST 1 VIEW;  12/28/2024 8:24 am   INDICATION: Signs/Symptoms:Pleural effusions.     COMPARISON: CT chest without contrast 25 December 2024   ACCESSION NUMBER(S): [id number]   ORDERING CLINICIAN: JEISON OCHOA   TECHNIQUE: Single frontal view of the chest; Portable technique   FINDINGS: Enlarged cardiac silhouette is unchanged   No demonstrable pleural effusion or pneumothorax   No airspace infiltrate or edema       NO ACUTE DISEASE IN THE CHEST EVIDENT ON PORTABLE FRONTAL CHEST RADIOGRAPH   MACRO: None   Signed by: Stone Negrete 12/28/2024 9:34 AM Dictation workstation:   [id number]    US thoracentesis    Result Date: 12/27/2024  Interpreted By:  Bassam Brown, STUDY: US THORACENTESIS; 12/27/2024 10:13 am   INDICATION: Right pleural effusion. Referred for diagnostic thoracentesis   COMPARISON: None   ACCESSION NUMBER(S): [id number]   ORDERING CLINICIAN: JEISON OCHOA   TECHNIQUE: Informed consent obtained. Patient positionedsitting upright. Skin prepped, draped and anesthetized. Under ultrasound guidance, a centesis catheter/needle was advanced into rightpleural cavity.   FINDINGS: A total of 700 mL of clear yellow fluid was aspirated. A sample was sent for analysis. The patient tolerated the " procedure well.       Ultrasound-guided right thoracentesis.   Signed by: Bassam Brown 12/27/2024 12:16 PM Dictation workstation:   [id number]    NM INFLAMMATION whole body w indium 111    Result Date: 12/27/2024  Interpreted By:  Rafiq Hamm, STUDY: NM INFLAMMATION WHOLE BODY WITH INDIUM 111;  12/27/2024 9:37 am   INDICATION: Signs/Symptoms:Assess for left brachial axillary graft infection.   COMPARISON: CT of chest on 12/25/2024   ACCESSION NUMBER(S): [id number]   ORDERING CLINICIAN: WALLY LEAVITT   TECHNIQUE: DIVISION OF NUCLEAR MEDICINE RADIOLABELED In-111-WBC SCAN   The patient received an intravenous dose of 0.502 mCi of In-111 radiolabeled autologous leukocytes.  Anterior and posterior images were then acquired to include the whole body approximately 24 hours later.  SPECT CT of the chest was obtained as well.   FINDINGS: Normal activity is noted in the liver, spleen, and bone marrow spaces.  No abnormal  In-111 labeled WBC accumulation in the left bronchial axillary graft to suggest an active infection.   Low-dose CT demonstrates multiple pulmonary nodules in both lungs, left greater than right as well  as moderate-sized right pleural effusion.       1. Negative study, no abnormal In-111 labeled WBC accumulation in the left bronchial axillary graft to suggest an active infection. 2. Low-dose CT demonstrates multiple pulmonary nodules in both lungs, left greater than right as well as moderate-sized right pleural effusion, please correlate with CT chest on 12/25/2024   MACRO: None   Signed by: Rafiq Hamm 12/27/2024 10:08 AM Dictation workstation:   [id number]    CT chest wo IV contrast    Result Date: 12/26/2024  Interpreted By:  Frank Dang, STUDY: CT CHEST WO IV CONTRAST; 12/25/2024 11:55 am   INDICATION: Signs/Symptoms:evaluate for pneumonia   COMPARISON: Plain radiographs from the previous day and CT scan from September 2021.   ACCESSION NUMBER(S): [id number]   ORDERING CLINICIAN: HUNTER GEIGER  BIZRI   TECHNIQUE: Spiral axial unenhanced images were obtained through the chest. Post processing sagittal and coronal reconstruction images were also performed.   All CT examinations are performed with one or more of the following dose reduction techniques: Automated Exposure Control, adjustment of mA and/or kV according to patient size, or use of iterative reconstruction techniques.   PATIENT RADIATION EXPOSURE DATA: CTDI (mGy): DLP (mGy/cm):   FINDINGS Extensive atherosclerotic calcifications involve coronary arteries and to a lesser degree the aorta. There is no aortic aneurysm.   There is no mediastinal, hilar or axillary lymphadenopathy. There is moderate right pleural effusion, with atelectatic changes/infiltrates in the right lung. An 8 mm nodule is now seen in the as ago esophageal recess of the right lobe (axial image number 164). Ill-defined nodular consolidation measuring 16.5 mm is noted in the left upper lobe (axial image number 68). There is irregular nodular density measuring 8 mm in the left upper lobe (axial image number 70). Ill-defined 7.4 mm nodular density seen in the left upper lobe more inferiorly (axial image number 109). Several smaller fibro nodular densities are seen in the superior segment of the left lower lobe. Subpleural vague nodular density measuring up to 1.5 cm is seen in the left upper lobe posteriorly/laterally (axial image number 154).   Images from the upper abdomen demonstrate calcifications in the visualized portion of the right kidney, most likely nonobstructing stones. There is also stable 1.3 cm hypodensity in the left lobe of the liver, most likely a cyst       1. Moderate right pleural effusion is of indeterminate significance, possibly related to pneumonia, however malignant effusion can not be excluded. Follow-up as needed. If indicated further evaluate with thoracentesis. 2. Several bilateral ill-defined nodular densities as above, not present on the CT scan from  2021, possibly infectious/inflammatory versus neoplastic. Short-term follow-up recommended.   MACRO: Critical Finding:  See findings. Notification was initiated on 12/26/2024 at 5:16 pm by  Frank Dang.  (**-YCF-**) Instructions:   Signed by: Frank Dang 12/26/2024 5:16 PM Dictation workstation:   [id number]    Electrocardiogram, 12-lead ACS symptoms    Result Date: 12/24/2024  Normal sinus rhythm Left ventricular hypertrophy with repolarization abnormality ( R in aVL , Sokolow-Barriga , [place]wood product , Romhilt-Thornton ) Abnormal ECG No previous ECGs available Confirmed by Jonathan Masters ([id number]) on 12/24/2024 2:27:57 PM    XR chest 1 view    Result Date: 12/24/2024  Interpreted By:  Frank Dang, STUDY: XR CHEST 1 VIEW; 12/24/2024 9:51 am   INDICATION: Signs/Symptoms:Pneumonia   COMPARISON: 12 21 2024.   ACCESSION NUMBER(S): [id number]   ORDERING CLINICIAN: BAHMAN BLANK   FINDINGS: The study is limited due to  rotation/lordotic projection. The cardiomediastinal silhouette is within normal limits for the technique. There is no pneumothorax or confluence infiltrates. There is blunting of the right costophrenic angle, due to improving small right pleural effusion. The osseous structures are unremarkable.       Improving small right pleural effusion.   Signed by: Frank Dang 12/24/2024 12:53 PM Dictation workstation:   [id number]    Transthoracic Echo (TTE) Limited    Result Date: 12/24/2024           [place] [address]            Phone [phone] TRANSTHORACIC ECHOCARDIOGRAM REPORT Patient Name:       WILLIAM A FRANKE Reading Physician:    [id number] [facility]                                                                Study Date:         12/24/2024          Ordering Provider:    [id number] BAHMAN BLANK MRN/PID:            [id number]            Fellow: Accession#:         [id number]         Nurse: Date of Birth/Age:  1950 / 74     Sonographer:          Nakita Skiplaila PUENTES Gender Assigned at  M                   Additional Staff: Birth: Height:             170.20 cm           Admit Date:           12/22/2024 Weight:             81.65 kg            Admission Status:     Inpatient -                                                               Routine BSA / BMI:          1.93 m2 / 28.19     Department Location:                     kg/m2 Blood Pressure: 169 /55 mmHg Study Type:    TRANSTHORACIC ECHO (TTE) LIMITED Diagnosis/ICD: Bacteremia-R78.81 Indication:    Endocarditis CPT Codes:     Echo Limited-71454 Patient History: Pertinent History: CDK stageIV Stable Angina HTN CP HLD CAD AS CHF Chronic Liver                    Disease NSTEMI Atrteriovenous Fistula Stenosis. Study Detail: The following Echo studies were performed: 2D, M-Mode, Doppler and               color flow. Unable to obtain suprasternal notch and subcostal               view.  PHYSICIAN INTERPRETATION: Left Ventricle: The left ventricular systolic function is normal, with a visually estimated ejection fraction of 60-65%. There are no regional wall motion abnormalities. The left ventricular cavity size is normal. There is normal septal and mildly increased posterior left ventricular wall thickness. There is left ventricular concentric remodeling. Left ventricular diastolic filling was not assessed. Left Atrium: The left atrium is upper limits of normal in size. Right Ventricle: The right ventricle is normal in size. There is normal right ventricular global systolic function. Right Atrium: The right atrium is normal in size. Aortic Valve: The aortic valve is trileaflet. There is no evidence of aortic valve regurgitation. The peak instantaneous gradient of the aortic valve is 15 mmHg. Mitral Valve: The mitral valve is normal in structure. There is trace to mild mitral  valve regurgitation. Tricuspid Valve: The tricuspid valve is structurally normal. No evidence of tricuspid regurgitation. Pulmonic Valve: The pulmonic valve is structurally normal. There is trace pulmonic valve regurgitation. Pericardium: No pericardial effusion noted. Aorta: The aortic root is normal.  CONCLUSIONS:  1. The left ventricular systolic function is normal, with a visually estimated ejection fraction of 60-65%.  2. There is normal right ventricular global systolic function.  3. No valvular vegetations visualized. QUANTITATIVE DATA SUMMARY:  2D MEASUREMENTS:           Normal Ranges: IVSd:            0.80 cm   (0.6-1.1cm) LVPWd:           1.29 cm   (0.6-1.1cm) LVIDd:           5.00 cm   (3.9-5.9cm) LVIDs:           3.39 cm LV Mass Index:   99.7 g/m2 LV % FS          32.2 %  LA VOLUME:                    Normal Ranges: LA Vol A4C:        96.5 ml    (22+/-6mL/m2) LA Vol A2C:        96.6 ml LA Vol BP:         100.8 ml LA Vol Index A4C:  49.9ml/m2 LA Vol Index A2C:  50.0 ml/m2 LA Vol Index BP:   52.1 ml/m2 LA Area A4C:       26.1 cm2 LA Area A2C:       25.0 cm2 LA Major Axis A4C: 6.0 cm LA Major Axis A2C: 5.5 cm LA Volume Index:   51.9 ml/m2 LA Vol A4C:        88.7 ml LA Vol A2C:        93.9 ml LA Vol Index BSA:  47.2 ml/m2  RA VOLUME BY A/L METHOD:            Normal Ranges: RA Vol A4C:              33.9 ml    (8.3-19.5ml) RA Vol Index A4C:        17.5 ml/m2 RA Area A4C:             15.2 cm2 RA Major Axis A4C:       5.8 cm  M-MODE MEASUREMENTS:         Normal Ranges: LAs:                 5.25 cm (2.7-4.0cm)  LV SYSTOLIC FUNCTION BY 2D PLANIMETRY (MOD):                      Normal Ranges: EF-A4C View:    58 % (>=55%) EF-A2C View:    63 % EF-Biplane:     60 % EF-Visual:      63 % LV EF Reported: 63 %  LV DIASTOLIC FUNCTION:          Normal Ranges: MV Peak E:             0.84 m/s (0.7-1.2 m/s) MV Peak A:             1.14 m/s (0.42-0.7 m/s) E/A Ratio:             0.74     (1.0-2.2)  MITRAL VALVE:          Normal  Ranges: MV DT:        173 msec (150-240msec)  AORTIC VALVE:            Normal Ranges: AoV Vmax:      1.93 m/s  (<=1.7m/s) AoV Peak P.9 mmHg (<20mmHg) LVOT Max Joss:  1.24 m/s  (<=1.1m/s) LVOT VTI:      33.46 cm LVOT Diameter: 1.95 cm   (1.8-2.4cm) AoV Area,Vmax: 1.91 cm2  (2.5-4.5cm2)  RIGHT VENTRICLE: RV Basal 2.70 cm RV Mid   2.40 cm RV Major 6.3 cm  TRICUSPID VALVE/RVSP:          Normal Ranges: Peak TR Velocity:     2.83 m/s RV Syst Pressure:     35 mmHg  (< 30mmHg)  PULMONIC VALVE:           Normal Ranges: PV Accel Time:  86 msec   (>120ms) PV Max Joss:     1.6 m/s   (0.6-0.9m/s) PV Max PG:      10.3 mmHg  [id number] [facility] ANAYA Electronically signed on 2024 at 10:02:08 AM  ** Final **     XR chest 1 view    Result Date: 2024  STUDY: Chest Radiograph;  [2024; 11:41 am] INDICATION: Altered. Cough. COMPARISON: XR chest 2024 ACCESSION NUMBER(S): [id number] ORDERING CLINICIAN: FELIPE SINGH TECHNIQUE:  Frontal chest was obtained at 11:41 hours. FINDINGS: CARDIOMEDIASTINAL SILHOUETTE: The cardiomediastinal silhouette is enlarged but stable compared to previous imaging..  LUNGS: The lungs demonstrate mild pulmonary vascular prominence which may represent mild vascular congestion.  There is blunting of the right costophrenic angle which could represent a possible small effusion..  ABDOMEN: No remarkable upper abdominal findings.  BONES: No acute osseous changes.  Multiple vascular stents are noted within the left upper extremity.    1.  Pulmonary vascular prominence which may represent mild pulmonary vascular congestion. 2.  Blunting of the right costophrenic angle laterally which could represent a small pleural effusion.. Signed by Tk Geller MD    CT head wo IV contrast    Result Date: 2024  Interpreted By:  Jonas Rinaldi, STUDY: CT HEAD WO IV CONTRAST;  2024 11:45 am   INDICATION: Signs/Symptoms:altered.     COMPARISON: 2024   ACCESSION NUMBER(S): [id number]   ORDERING  CLINICIAN: FELIPE SINGH   TECHNIQUE: Unenhanced images were obtained through the brain.   FINDINGS: There is atrophy resulting in prominence of the ventricles and sulci. There are areas of decreased attenuation within the white matter which are nonspecific but are commonly associated with small vessel ischemic disease. There is no mass effect or midline shift. No acute intracranial hemorrhage is identified. No extra-axial fluid collections are seen. No intraparenchymal mass lesions are identified.  Bone windows demonstrate no evidence of an acute calvarial fracture. There is mucosal in the paranasal sinuses, most conspicuous involving the left maxillary sinus.       No evidence of an acute intracranial process.   MACRO: None.   Signed by: Jonas Rinaldi 12/21/2024 12:21 PM Dictation workstation:   [id number]     Assessment/Plan   Encephalopathy, septic, resolved  MSSA bacteremia, source unclear-negative TTE, negative indium scan  Bilateral pleural effusion-s/p thoracentesis 12/27/24-transudative  Abnormal indium scan-suspect septic emboli  ESRD on dialysis        Continue IV cefazolin  Follow-up repeat blood cultures-12/25/2024  Monitor temperature and WBC  Transesophageal echocardiogram-under anesthesia  Follow-up pleural fluid workup  Tentative plan will be IV cefazolin 2/2/3gm with dialysis at discharge-total of 6 weeks-2/1/2025    Dave Pathak MD

## 2024-12-30 NOTE — PROGRESS NOTES
"William A Franke is a 74 y.o. male on day 9 of admission presenting with Acute encephalopathy.    Subjective   Alert, oriented x 2-3 with some confusion.  No obvious distress.  Currently receiving dialysis.  Plan for NIDA today.       Objective     Physical Exam  Vitals and nursing note reviewed.   Constitutional:       General: He is not in acute distress.     Appearance: He is not ill-appearing or toxic-appearing.   HENT:      Head: Normocephalic and atraumatic.      Nose: Nose normal.      Mouth/Throat:      Mouth: Mucous membranes are moist.      Pharynx: Oropharynx is clear.   Cardiovascular:      Rate and Rhythm: Normal rate and regular rhythm.      Pulses: Normal pulses.      Heart sounds: Normal heart sounds. No murmur heard.     No friction rub. No gallop.   Pulmonary:      Effort: Pulmonary effort is normal.      Breath sounds: Normal breath sounds.   Abdominal:      General: Bowel sounds are normal.   Musculoskeletal:         General: Normal range of motion.      Cervical back: Normal range of motion.      Right lower leg: No edema.      Left lower leg: No edema.   Skin:     Capillary Refill: Capillary refill takes less than 2 seconds.   Neurological:      Mental Status: He is alert. Mental status is at baseline.         Last Recorded Vitals  Blood pressure 169/55, pulse 54, temperature 36.2 °C (97.2 °F), resp. rate 17, height 1.702 m (5' 7\"), weight 81.6 kg (180 lb), SpO2 98%.  Intake/Output last 3 Shifts:  No intake/output data recorded.    Relevant Results  Results for orders placed or performed during the hospital encounter of 12/21/24 (from the past 24 hours)   POCT GLUCOSE   Result Value Ref Range    POCT Glucose 167 (H) 74 - 99 mg/dL   POCT GLUCOSE   Result Value Ref Range    POCT Glucose 159 (H) 74 - 99 mg/dL   CBC   Result Value Ref Range    WBC 6.7 4.4 - 11.3 x10*3/uL    nRBC 0.0 0.0 - 0.0 /100 WBCs    RBC 3.68 (L) 4.50 - 5.90 x10*6/uL    Hemoglobin 11.5 (L) 13.5 - 17.5 g/dL    Hematocrit 33.6 " (L) 41.0 - 52.0 %    MCV 91 80 - 100 fL    MCH 31.3 26.0 - 34.0 pg    MCHC 34.2 32.0 - 36.0 g/dL    RDW 17.9 (H) 11.5 - 14.5 %    Platelets 201 150 - 450 x10*3/uL   Basic Metabolic Panel   Result Value Ref Range    Glucose 113 (H) 74 - 99 mg/dL    Sodium 138 136 - 145 mmol/L    Potassium 4.3 3.5 - 5.3 mmol/L    Chloride 97 (L) 98 - 107 mmol/L    Bicarbonate 26 21 - 32 mmol/L    Anion Gap 19 10 - 20 mmol/L    Urea Nitrogen 45 (H) 6 - 23 mg/dL    Creatinine 9.94 (H) 0.50 - 1.30 mg/dL    eGFR 5 (L) >60 mL/min/1.73m*2    Calcium 9.5 8.6 - 10.3 mg/dL   POCT GLUCOSE   Result Value Ref Range    POCT Glucose 125 (H) 74 - 99 mg/dL     *Note: Due to a large number of results and/or encounters for the requested time period, some results have not been displayed. A complete set of results can be found in Results Review.     Transthoracic Echo (TTE) Limited    Result Date: 12/24/2024           [place] [address]            Phone [phone] TRANSTHORACIC ECHOCARDIOGRAM REPORT Patient Name:       WILLIAM A FRANKE Reading Physician:    [id number] [facility] Study Date:         12/24/2024          Ordering Provider:    [id number] BAHMAN BLANK MRN/PID:            [id number]            Fellow: Accession#:         [id number]        Nurse: Date of Birth/Age:  1950 / 74     Sonographer:          Nakita PUENTES Gender Assigned at  M                   Additional Staff: Birth: Height:             170.20 cm           Admit Date:           12/22/2024 Weight:             81.65 kg            Admission Status:     Inpatient -                                                               Routine BSA / BMI:          1.93 m2 / 28.19     Department Location:                     kg/m2 Blood Pressure: 169  /55 mmHg Study Type:    TRANSTHORACIC ECHO (TTE) LIMITED Diagnosis/ICD: Bacteremia-R78.81 Indication:    Endocarditis CPT Codes:     Echo Limited-19701 Patient History: Pertinent History: CDK stageIV Stable Angina HTN CP HLD CAD AS CHF Chronic Liver                    Disease NSTEMI Atrteriovenous Fistula Stenosis. Study Detail: The following Echo studies were performed: 2D, M-Mode, Doppler and               color flow. Unable to obtain suprasternal notch and subcostal               view.  PHYSICIAN INTERPRETATION: Left Ventricle: The left ventricular systolic function is normal, with a visually estimated ejection fraction of 60-65%. There are no regional wall motion abnormalities. The left ventricular cavity size is normal. There is normal septal and mildly increased posterior left ventricular wall thickness. There is left ventricular concentric remodeling. Left ventricular diastolic filling was not assessed. Left Atrium: The left atrium is upper limits of normal in size. Right Ventricle: The right ventricle is normal in size. There is normal right ventricular global systolic function. Right Atrium: The right atrium is normal in size. Aortic Valve: The aortic valve is trileaflet. There is no evidence of aortic valve regurgitation. The peak instantaneous gradient of the aortic valve is 15 mmHg. Mitral Valve: The mitral valve is normal in structure. There is trace to mild mitral valve regurgitation. Tricuspid Valve: The tricuspid valve is structurally normal. No evidence of tricuspid regurgitation. Pulmonic Valve: The pulmonic valve is structurally normal. There is trace pulmonic valve regurgitation. Pericardium: No pericardial effusion noted. Aorta: The aortic root is normal.  CONCLUSIONS:  1. The left ventricular systolic function is normal, with a visually estimated ejection fraction of 60-65%.  2. There is normal right ventricular global systolic function.  3. No valvular vegetations visualized. QUANTITATIVE DATA  SUMMARY:  2D MEASUREMENTS:           Normal Ranges: IVSd:            0.80 cm   (0.6-1.1cm) LVPWd:           1.29 cm   (0.6-1.1cm) LVIDd:           5.00 cm   (3.9-5.9cm) LVIDs:           3.39 cm LV Mass Index:   99.7 g/m2 LV % FS          32.2 %  LA VOLUME:                    Normal Ranges: LA Vol A4C:        96.5 ml    (22+/-6mL/m2) LA Vol A2C:        96.6 ml LA Vol BP:         100.8 ml LA Vol Index A4C:  49.9ml/m2 LA Vol Index A2C:  50.0 ml/m2 LA Vol Index BP:   52.1 ml/m2 LA Area A4C:       26.1 cm2 LA Area A2C:       25.0 cm2 LA Major Axis A4C: 6.0 cm LA Major Axis A2C: 5.5 cm LA Volume Index:   51.9 ml/m2 LA Vol A4C:        88.7 ml LA Vol A2C:        93.9 ml LA Vol Index BSA:  47.2 ml/m2  RA VOLUME BY A/L METHOD:            Normal Ranges: RA Vol A4C:              33.9 ml    (8.3-19.5ml) RA Vol Index A4C:        17.5 ml/m2 RA Area A4C:             15.2 cm2 RA Major Axis A4C:       5.8 cm  M-MODE MEASUREMENTS:         Normal Ranges: LAs:                 5.25 cm (2.7-4.0cm)  LV SYSTOLIC FUNCTION BY 2D PLANIMETRY (MOD):                      Normal Ranges: EF-A4C View:    58 % (>=55%) EF-A2C View:    63 % EF-Biplane:     60 % EF-Visual:      63 % LV EF Reported: 63 %  LV DIASTOLIC FUNCTION:          Normal Ranges: MV Peak E:             0.84 m/s (0.7-1.2 m/s) MV Peak A:             1.14 m/s (0.42-0.7 m/s) E/A Ratio:             0.74     (1.0-2.2)  MITRAL VALVE:          Normal Ranges: MV DT:        173 msec (150-240msec)  AORTIC VALVE:            Normal Ranges: AoV Vmax:      1.93 m/s  (<=1.7m/s) AoV Peak P.9 mmHg (<20mmHg) LVOT Max Joss:  1.24 m/s  (<=1.1m/s) LVOT VTI:      33.46 cm LVOT Diameter: 1.95 cm   (1.8-2.4cm) AoV Area,Vmax: 1.91 cm2  (2.5-4.5cm2)  RIGHT VENTRICLE: RV Basal 2.70 cm RV Mid   2.40 cm RV Major 6.3 cm  TRICUSPID VALVE/RVSP:          Normal Ranges: Peak TR Velocity:     2.83 m/s RV Syst Pressure:     35 mmHg  (< 30mmHg)  PULMONIC VALVE:           Normal Ranges: PV Accel Time:  86 msec    (>120ms) PV Max Joss:     1.6 m/s   (0.6-0.9m/s) PV Max PG:      10.3 mmHg  [id number] Jared Rooney DO Electronically signed on 12/24/2024 at 10:02:08 AM  ** Final **     Transthoracic Echo (TTE) Complete    Result Date: 11/14/2024           [place] [id number]            Phone [phone] TRANSTHORACIC ECHOCARDIOGRAM REPORT Patient Name:       WILLIAM A FRANKE Reading Physician:    [id number] Jared Rooney                                                                 Study Date:         11/14/2024           Ordering Provider:    [id number] LESLIE DENTON MRN/PID:            [id number]             Fellow: Accession#:         [id number]         Nurse: Date of Birth/Age:  1950 / 73      Sonographer:          Rafael rg RDCS Gender Assigned at  M                    Additional Staff: Birth: Height:             170.18 cm            Admit Date: Weight:             77.11 kg             Admission Status:     Inpatient -                                                                Routine BSA / BMI:          1.89 m2 / 26.63      Department Location:  [facility]                     kg/m2 Blood Pressure: 215 /52 mmHg Study Type:    TRANSTHORACIC ECHO (TTE) COMPLETE Diagnosis/ICD: Other chest pain-R07.89 Indication:    Chest Pain CPT Codes:     Echo Complete w Full Doppler-[id number] Patient History: Diabetes:          Yes Pertinent History: HTN, Hyperlipidemia, CAD, Chest Pain, CHF, LE Edema, Dyspnea,                    Syncope and Murmur. ESRD, PCI 2022,HFrEF 45-50%, GERD. Study Detail: The following Echo studies were performed: 2D, M-Mode, Doppler and               color flow. Technically challenging study due to poor acoustic               windows.  PHYSICIAN INTERPRETATION: Left Ventricle: The left ventricular systolic function is normal, with a  visually estimated ejection fraction of 55-60%. There is moderate concentric left ventricular hypertrophy. There are no regional wall motion abnormalities. The left ventricular cavity size is normal. There is mild increased septal and moderately increased posterior left ventricular wall thickness. Spectral Doppler shows a Grade II (pseudonormal pattern) of left ventricular diastolic filling with an elevated left atrial pressure. Left Atrium: The left atrium is normal in size. Right Ventricle: The right ventricle is normal in size. There is normal right ventricular global systolic function. Right Atrium: The right atrium is normal in size. Aortic Valve: The aortic valve is trileaflet. There is mild aortic valve cusp calcification. The aortic valve dimensionless index is 0.67. There is no evidence of aortic valve regurgitation. The peak instantaneous gradient of the aortic valve is 14 mmHg. The mean gradient of the aortic valve is 7 mmHg. Mitral Valve: The mitral valve is normal in structure. There is mild mitral valve regurgitation. Tricuspid Valve: The tricuspid valve is structurally normal. There is mild tricuspid regurgitation. Pulmonic Valve: The pulmonic valve is structurally normal. There is trace pulmonic valve regurgitation. Pericardium: No pericardial effusion noted. Aorta: The aortic root is normal.  CONCLUSIONS:  1. The left ventricular systolic function is normal, with a visually estimated ejection fraction of 55-60%.  2. Spectral Doppler shows a Grade II (pseudonormal pattern) of left ventricular diastolic filling with an elevated left atrial pressure.  3. There is normal right ventricular global systolic function.  4. Mild mitral valve regurgitation.  5. There is moderately increased posterior left ventricular wall thickness. QUANTITATIVE DATA SUMMARY:  2D MEASUREMENTS:            Normal Ranges: Ao Root s:       2.70 cm IVSd:            1.16 cm    (0.6-1.1cm) LVPWd:           1.33 cm    (0.6-1.1cm)  LVIDd:           5.04 cm    (3.9-5.9cm) LVIDs:           3.62 cm LV Mass Index:   138.7 g/m2 LV % FS          28.2 %  LA VOLUME:                   Normal Ranges: LA Vol A4C:       71.5 ml LA Vol A2C:       68.7 ml LA Vol Index BSA: 37.2 ml/m2  RA VOLUME BY A/L METHOD:          Normal Ranges: RA Area A4C:             11.3 cm2  LV SYSTOLIC FUNCTION BY 2D PLANIMETRY (MOD):                      Normal Ranges: EF-A4C View:    52 % (>=55%) EF-A2C View:    61 % EF-Biplane:     58 % EF-Visual:      58 % EF-3DQ:         51 % LV EF Reported: 58 %  LV DIASTOLIC FUNCTION:           Normal Ranges: MV Peak E:             0.86 m/s  (0.7-1.2 m/s) MV Peak A:             0.83 m/s  (0.42-0.7 m/s) E/A Ratio:             1.03      (1.0-2.2) MV e'                  0.063 m/s (>8.0) MV lateral e'          0.07 m/s MV medial e'           0.05 m/s E/e' Ratio:            13.66     (<8.0)  MITRAL VALVE:          Normal Ranges: MV DT:        291 msec (150-240msec)  AORTIC VALVE:                      Normal Ranges: AoV Vmax:                1.87 m/s  (<=1.7m/s) AoV Peak P.0 mmHg (<20mmHg) AoV Mean P.3 mmHg  (1.7-11.5mmHg) LVOT Max Joss:            1.20 m/s  (<=1.1m/s) AoV VTI:                 40.32 cm  (18-25cm) LVOT VTI:                27.10 cm LVOT Diameter:           1.96 cm   (1.8-2.4cm) AoV Area, VTI:           2.03 cm2  (2.5-5.5cm2) AoV Area,Vmax:           1.94 cm2  (2.5-4.5cm2) AoV Dimensionless Index: 0.67  RIGHT VENTRICLE: RV Basal 3.10 cm RV Mid   2.30 cm RV Major 5.6 cm  TRICUSPID VALVE/RVSP:         Normal Ranges: IVC Diam:             1.39 cm  PULMONIC VALVE:          Normal Ranges: PV Max Joss:     1.6 m/s  (0.6-0.9m/s) PV Max P.7 mmHg  [id number] Jared Rooney DO Electronically signed on 2024 at 3:29:19 PM  ** Final **          Assessment/Plan   Assessment & Plan  Acute encephalopathy      Acute encephalopathy  Resistant hypertension  End-stage renal disease on hemodialysis  Acute respiratory  failure  Pneumonia  Pleural effusion  Confusion  rule out endocarditis  Diabetes mellitus type 2  Hyperlipidemia     Overall impression:     12/29: As above.  Patient known to me from recent admission.  Difficulty with hypertension and hypotension  recently within the context of dialysis.  Patient has been doing reasonably well in the outpatient setting for the past month.  Presented to dialysis recently and was noted to have unstable vital signs transferred to the hospital.  Found to have a fever and sepsis presentation.  Treated initially with for this over the past 8 days.  Underwent thoracentesis yielding 700 mL yesterday.  Overnight was noted to have some bradycardias with heart rate in the mid 40s.  Additionally infectious disease has requested a NIDA by our service.  The wife is at bedside who provided much of the details of the patient's recent illness.  He remains confused.  As noted on my previous note on last hospitalization I suspect a component of underlying dementia.  At this point his bradycardia has resolved.  His heart rates have been in the mid 60s this morning.  He does take  carvedilol in the outpatient setting which was previously at 12.5 reduced down to 6.25 on last hospitalization.  He continued to receive this recently.  I have placed parameters on this medication to hold for a heart rate less than 60 or systolic blood pressure less than 110.  This is the only rate limiting agents the patient is on.  He was recently taking off of hydralazine and isosorbide by nephrology in the outpatient setting.  This was reinitiated by Dr. Ardon approximately 2 weeks ago.  The patient remains very complex.  He continues to receive dialysis.  To this point his blood cultures have been negative for 3 days.  Will discuss with Dr. Masters the timing or necessity of NIDA.  Will follow with you.    12/30: Stable overnight.  Chest pain-free.  Breathing comfortably on room air.  Euvolemic on exam.  The patient is  currently receiving dialysis.  His blood pressure has been hypertensive leading into dialysis with most recent 169/55.  Current pulse ox 98%.  His creatinine prior to dialysis today was 9.94 with a hemoglobin 11.5.  Infectious disease has recommended a NIDA to rule out endocarditis.  The patient is n.p.o. with plans for NIDA today.  Will discuss with Dr. Barraza.  On the telemetry monitor he remained in a sinus rhythm with occasional sinus arrhythmia and PVCs.  Appears generally stable from the cardiac perspective at this time.  Will follow with you.      I spent 35 minutes in the professional and overall care of this patient.      Darrell Blackwell, APRN-CNP

## 2024-12-30 NOTE — CARE PLAN
The patient's goals for the shift include      The clinical goals for the shift include safety    Problem: Diabetes  Goal: Maintain electrolyte levels within acceptable range throughout shift  Outcome: Progressing  Goal: No changes in neurological exam by end of shift  Outcome: Progressing  Goal: Learn about and adhere to nutrition recommendations by end of shift  Outcome: Progressing     Problem: Pain - Adult  Goal: Verbalizes/displays adequate comfort level or baseline comfort level  Outcome: Progressing     Problem: Safety - Adult  Goal: Free from fall injury  Outcome: Progressing     Problem: Discharge Planning  Goal: Discharge to home or other facility with appropriate resources  Outcome: Progressing     Problem: Chronic Conditions and Co-morbidities  Goal: Patient's chronic conditions and co-morbidity symptoms are monitored and maintained or improved  Outcome: Progressing     Problem: Ineffective Tissue Perfusion  Goal: Patient will be free from complications of poor renal perfusion causing other organ dysfunction, such as congestive heart failure or encephalopathy. Patient will adhere to dialysis treatments.  Outcome: Progressing     Problem: Impaired Gas Exchange  Goal: Patient will be able to maintain optimal gas exchange as evidenced by unlabored breathing and respiratory rate within normal limits.  Outcome: Progressing     Problem: Skin  Goal: Participates in plan/prevention/treatment measures  Outcome: Progressing  Goal: Prevent/manage excess moisture  Outcome: Progressing  Goal: Prevent/minimize sheer/friction injuries  Outcome: Progressing  Goal: Promote/optimize nutrition  Outcome: Progressing  Goal: Promote skin healing  Outcome: Progressing

## 2024-12-31 ENCOUNTER — ANESTHESIA (OUTPATIENT)
Dept: CARDIOLOGY | Facility: HOSPITAL | Age: 74
End: 2024-12-31
Payer: MEDICARE

## 2024-12-31 ENCOUNTER — ANESTHESIA EVENT (OUTPATIENT)
Dept: CARDIOLOGY | Facility: HOSPITAL | Age: 74
End: 2024-12-31
Payer: MEDICARE

## 2024-12-31 ENCOUNTER — APPOINTMENT (OUTPATIENT)
Dept: CARDIOLOGY | Facility: HOSPITAL | Age: 74
DRG: 871 | End: 2024-12-31
Payer: MEDICARE

## 2024-12-31 VITALS
WEIGHT: 180 LBS | DIASTOLIC BLOOD PRESSURE: 60 MMHG | SYSTOLIC BLOOD PRESSURE: 110 MMHG | HEIGHT: 67 IN | HEART RATE: 78 BPM | BODY MASS INDEX: 28.25 KG/M2 | TEMPERATURE: 98.2 F | OXYGEN SATURATION: 96 % | RESPIRATION RATE: 18 BRPM

## 2024-12-31 PROBLEM — G93.40 ACUTE ENCEPHALOPATHY: Status: RESOLVED | Noted: 2024-12-21 | Resolved: 2024-12-31

## 2024-12-31 LAB
C DIF TOX TCDA+TCDB STL QL NAA+PROBE: NOT DETECTED
EJECTION FRACTION: 58 %
GLUCOSE BLD MANUAL STRIP-MCNC: 118 MG/DL (ref 74–99)
GLUCOSE BLD MANUAL STRIP-MCNC: 121 MG/DL (ref 74–99)
LABORATORY COMMENT REPORT: NORMAL
LABORATORY COMMENT REPORT: NORMAL
PATH REPORT.FINAL DX SPEC: NORMAL
PATH REPORT.GROSS SPEC: NORMAL
PATH REPORT.RELEVANT HX SPEC: NORMAL
PATH REPORT.TOTAL CANCER: NORMAL

## 2024-12-31 PROCEDURE — 2500000001 HC RX 250 WO HCPCS SELF ADMINISTERED DRUGS (ALT 637 FOR MEDICARE OP): Performed by: NURSE PRACTITIONER

## 2024-12-31 PROCEDURE — 7100000001 HC RECOVERY ROOM TIME - INITIAL BASE CHARGE

## 2024-12-31 PROCEDURE — 2500000004 HC RX 250 GENERAL PHARMACY W/ HCPCS (ALT 636 FOR OP/ED): Mod: JZ | Performed by: INTERNAL MEDICINE

## 2024-12-31 PROCEDURE — 93320 DOPPLER ECHO COMPLETE: CPT

## 2024-12-31 PROCEDURE — 2500000004 HC RX 250 GENERAL PHARMACY W/ HCPCS (ALT 636 FOR OP/ED): Performed by: NURSE PRACTITIONER

## 2024-12-31 PROCEDURE — B246ZZ4 ULTRASONOGRAPHY OF RIGHT AND LEFT HEART, TRANSESOPHAGEAL: ICD-10-PCS | Performed by: INTERNAL MEDICINE

## 2024-12-31 PROCEDURE — 2500000002 HC RX 250 W HCPCS SELF ADMINISTERED DRUGS (ALT 637 FOR MEDICARE OP, ALT 636 FOR OP/ED): Performed by: NURSE PRACTITIONER

## 2024-12-31 PROCEDURE — 82947 ASSAY GLUCOSE BLOOD QUANT: CPT

## 2024-12-31 PROCEDURE — 3700000002 HC GENERAL ANESTHESIA TIME - EACH INCREMENTAL 1 MINUTE

## 2024-12-31 PROCEDURE — 99232 SBSQ HOSP IP/OBS MODERATE 35: CPT | Performed by: NURSE PRACTITIONER

## 2024-12-31 PROCEDURE — 2500000004 HC RX 250 GENERAL PHARMACY W/ HCPCS (ALT 636 FOR OP/ED): Performed by: INTERNAL MEDICINE

## 2024-12-31 PROCEDURE — 3700000001 HC GENERAL ANESTHESIA TIME - INITIAL BASE CHARGE

## 2024-12-31 PROCEDURE — 2500000004 HC RX 250 GENERAL PHARMACY W/ HCPCS (ALT 636 FOR OP/ED): Performed by: ANESTHESIOLOGY

## 2024-12-31 PROCEDURE — 2500000005 HC RX 250 GENERAL PHARMACY W/O HCPCS: Performed by: INTERNAL MEDICINE

## 2024-12-31 PROCEDURE — 93312 ECHO TRANSESOPHAGEAL: CPT | Performed by: INTERNAL MEDICINE

## 2024-12-31 PROCEDURE — 93318 ECHO TRANSESOPHAGEAL INTRAOP: CPT

## 2024-12-31 PROCEDURE — 7100000002 HC RECOVERY ROOM TIME - EACH INCREMENTAL 1 MINUTE

## 2024-12-31 RX ORDER — HYDRALAZINE HYDROCHLORIDE 20 MG/ML
5 INJECTION INTRAMUSCULAR; INTRAVENOUS EVERY 30 MIN PRN
Status: DISCONTINUED | OUTPATIENT
Start: 2024-12-31 | End: 2024-12-31 | Stop reason: HOSPADM

## 2024-12-31 RX ORDER — AMLODIPINE BESYLATE 5 MG/1
5 TABLET ORAL DAILY
Start: 2024-12-31

## 2024-12-31 RX ORDER — FENTANYL CITRATE 50 UG/ML
INJECTION, SOLUTION INTRAMUSCULAR; INTRAVENOUS AS NEEDED
Status: DISCONTINUED | OUTPATIENT
Start: 2024-12-31 | End: 2024-12-31

## 2024-12-31 RX ORDER — MIDAZOLAM HYDROCHLORIDE 1 MG/ML
INJECTION, SOLUTION INTRAMUSCULAR; INTRAVENOUS AS NEEDED
Status: DISCONTINUED | OUTPATIENT
Start: 2024-12-31 | End: 2024-12-31

## 2024-12-31 RX ORDER — LABETALOL HYDROCHLORIDE 5 MG/ML
5 INJECTION, SOLUTION INTRAVENOUS ONCE AS NEEDED
Status: DISCONTINUED | OUTPATIENT
Start: 2024-12-31 | End: 2024-12-31 | Stop reason: HOSPADM

## 2024-12-31 RX ORDER — PHENYLEPHRINE HYDROCHLORIDE 10 MG/ML
INJECTION INTRAVENOUS AS NEEDED
Status: DISCONTINUED | OUTPATIENT
Start: 2024-12-31 | End: 2024-12-31

## 2024-12-31 RX ORDER — PROPOFOL 10 MG/ML
INJECTION, EMULSION INTRAVENOUS AS NEEDED
Status: DISCONTINUED | OUTPATIENT
Start: 2024-12-31 | End: 2024-12-31

## 2024-12-31 RX ORDER — LIDOCAINE HYDROCHLORIDE 20 MG/ML
JELLY TOPICAL AS NEEDED
Status: DISCONTINUED | OUTPATIENT
Start: 2024-12-31 | End: 2024-12-31 | Stop reason: HOSPADM

## 2024-12-31 RX ORDER — MEPERIDINE HYDROCHLORIDE 25 MG/ML
12.5 INJECTION INTRAMUSCULAR; INTRAVENOUS; SUBCUTANEOUS EVERY 10 MIN PRN
Status: DISCONTINUED | OUTPATIENT
Start: 2024-12-31 | End: 2024-12-31 | Stop reason: HOSPADM

## 2024-12-31 RX ORDER — ONDANSETRON HYDROCHLORIDE 2 MG/ML
4 INJECTION, SOLUTION INTRAVENOUS ONCE AS NEEDED
Status: DISCONTINUED | OUTPATIENT
Start: 2024-12-31 | End: 2024-12-31 | Stop reason: HOSPADM

## 2024-12-31 RX ADMIN — BENZOCAINE 2 SPRAY: 200 SPRAY DENTAL; ORAL; PERIODONTAL at 09:41

## 2024-12-31 RX ADMIN — PANTOPRAZOLE SODIUM 40 MG: 40 TABLET, DELAYED RELEASE ORAL at 12:07

## 2024-12-31 RX ADMIN — CEFAZOLIN SODIUM 2 G: 2 INJECTION, SOLUTION INTRAVENOUS at 11:11

## 2024-12-31 RX ADMIN — ASPIRIN 81 MG: 81 TABLET, COATED ORAL at 12:07

## 2024-12-31 RX ADMIN — LIDOCAINE HYDROCHLORIDE 10 ML: 20 JELLY TOPICAL at 09:41

## 2024-12-31 RX ADMIN — PHENYLEPHRINE HYDROCHLORIDE 100 MCG: 10 INJECTION INTRAVENOUS at 09:45

## 2024-12-31 RX ADMIN — CARVEDILOL 6.25 MG: 6.25 TABLET, FILM COATED ORAL at 12:07

## 2024-12-31 RX ADMIN — SITAGLIPTIN 25 MG: 50 TABLET, FILM COATED ORAL at 12:07

## 2024-12-31 RX ADMIN — EZETIMIBE 10 MG: 10 TABLET ORAL at 12:07

## 2024-12-31 RX ADMIN — ONDANSETRON 4 MG: 2 INJECTION INTRAMUSCULAR; INTRAVENOUS at 03:53

## 2024-12-31 RX ADMIN — MIDAZOLAM 2 MG: 1 INJECTION INTRAMUSCULAR; INTRAVENOUS at 09:43

## 2024-12-31 RX ADMIN — HEPARIN SODIUM 5000 UNITS: 5000 INJECTION, SOLUTION INTRAVENOUS; SUBCUTANEOUS at 03:53

## 2024-12-31 RX ADMIN — ISOSORBIDE MONONITRATE 60 MG: 60 TABLET, EXTENDED RELEASE ORAL at 12:07

## 2024-12-31 RX ADMIN — FENTANYL CITRATE 25 MCG: 50 INJECTION, SOLUTION INTRAMUSCULAR; INTRAVENOUS at 09:43

## 2024-12-31 RX ADMIN — ALLOPURINOL 100 MG: 100 TABLET ORAL at 12:06

## 2024-12-31 RX ADMIN — SEVELAMER CARBONATE 1600 MG: 800 TABLET, FILM COATED ORAL at 12:07

## 2024-12-31 RX ADMIN — PROPOFOL 50 MG: 10 INJECTION, EMULSION INTRAVENOUS at 09:45

## 2024-12-31 RX ADMIN — AMLODIPINE BESYLATE 5 MG: 5 TABLET ORAL at 12:07

## 2024-12-31 SDOH — HEALTH STABILITY: MENTAL HEALTH: CURRENT SMOKER: 0

## 2024-12-31 ASSESSMENT — PAIN SCALES - GENERAL
PAINLEVEL_OUTOF10: 0 - NO PAIN
PAINLEVEL_OUTOF10: 0 - NO PAIN
PAIN_LEVEL: 0
PAINLEVEL_OUTOF10: 0 - NO PAIN

## 2024-12-31 ASSESSMENT — COGNITIVE AND FUNCTIONAL STATUS - GENERAL
MOBILITY SCORE: 24
DAILY ACTIVITIY SCORE: 24

## 2024-12-31 ASSESSMENT — PAIN - FUNCTIONAL ASSESSMENT
PAIN_FUNCTIONAL_ASSESSMENT: 0-10
PAIN_FUNCTIONAL_ASSESSMENT: FLACC (FACE, LEGS, ACTIVITY, CRY, CONSOLABILITY)
PAIN_FUNCTIONAL_ASSESSMENT: 0-10
PAIN_FUNCTIONAL_ASSESSMENT: FLACC (FACE, LEGS, ACTIVITY, CRY, CONSOLABILITY)
PAIN_FUNCTIONAL_ASSESSMENT: 0-10
PAIN_FUNCTIONAL_ASSESSMENT: FLACC (FACE, LEGS, ACTIVITY, CRY, CONSOLABILITY)

## 2024-12-31 NOTE — PROGRESS NOTES
William A Franke is a 74 y.o. male on day 9 of admission presenting with Acute encephalopathy.      Subjective   Patient is alert, oriented x 3, no major complaints       Objective     Last Recorded Vitals  /58 (BP Location: Right arm, Patient Position: Lying)   Pulse 86   Temp 36.7 °C (98.1 °F) (Oral)   Resp 18   Wt 81.6 kg (180 lb)   SpO2 95%   Intake/Output last 3 Shifts:    Intake/Output Summary (Last 24 hours) at 12/30/2024 2256  Last data filed at 12/30/2024 1313  Gross per 24 hour   Intake 1200 ml   Output 2400 ml   Net -1200 ml       Admission Weight  Weight: 81.6 kg (180 lb) (12/21/24 1049)    Daily Weight  12/21/24 : 81.6 kg (180 lb)    Image Results  XR chest 1 view  Narrative: Interpreted By:  Stone Negrete,   STUDY:  XR CHEST 1 VIEW;  12/28/2024 8:24 am      INDICATION:  Signs/Symptoms:Pleural effusions.          COMPARISON:  CT chest without contrast 25 December 2024      ACCESSION NUMBER(S):  [id number]      ORDERING CLINICIAN:  JEISON OCHOA      TECHNIQUE:  Single frontal view of the chest; Portable technique      FINDINGS:  Enlarged cardiac silhouette is unchanged      No demonstrable pleural effusion or pneumothorax      No airspace infiltrate or edema      Impression: NO ACUTE DISEASE IN THE CHEST EVIDENT ON PORTABLE FRONTAL CHEST  RADIOGRAPH      MACRO:  None      Signed by: Stone Negrete 12/28/2024 9:34 AM  Dictation workstation:   [id number]      Physical Exam  General-awake, alert, oriented x3  Lung-clear to auscultation bilaterally  Heart-regular sinus rhythm and rhythm, S1 and S2 audible,no murmurs  Abdomen- soft, nontender, nondistended, good bowel sounds, no organomegaly, no mass, bowel sounds are present  Extremities-no clubbing, no cyanosis, no edema.  Left arm fistula  Wound-none applicable  Relevant Results               Assessment/Plan    1.  Encephalopathy-metabolic versus toxic.  NIDA tomorrow  .  Continue cefazolin 2 g IV every 24 hours empirically    2. ESRD- HD  arrangement per nephrology.    3. DM 5-[id number]-[id number] ACHS with sliding scale Humalog coverage      Last hemoglobin A1c 6.3(11/8/2024)    4. HTN-continue hydralazine and amlodipine          Assessment & Plan  Acute encephalopathy                  Herb Velasquez MD

## 2024-12-31 NOTE — PROGRESS NOTES
"William A Franke is a 74 y.o. male on day 10 of admission presenting with Acute encephalopathy.    Subjective   Alert and oriented x 2-3.  Status post NIDA.  Negative for acute findings.        Objective     Physical Exam  Vitals and nursing note reviewed.   Constitutional:       General: He is not in acute distress.     Appearance: He is not ill-appearing or toxic-appearing.   HENT:      Head: Normocephalic and atraumatic.      Nose: Nose normal.      Mouth/Throat:      Mouth: Mucous membranes are moist.      Pharynx: Oropharynx is clear.   Cardiovascular:      Rate and Rhythm: Normal rate and regular rhythm.      Pulses: Normal pulses.      Heart sounds: Normal heart sounds. No murmur heard.     No friction rub. No gallop.   Pulmonary:      Effort: Pulmonary effort is normal.      Breath sounds: Normal breath sounds.   Abdominal:      General: Bowel sounds are normal.      Palpations: Abdomen is soft.   Musculoskeletal:         General: Normal range of motion.      Cervical back: Normal range of motion.      Right lower leg: No edema.      Left lower leg: No edema.   Skin:     General: Skin is warm and dry.      Capillary Refill: Capillary refill takes less than 2 seconds.   Neurological:      Mental Status: He is alert. Mental status is at baseline.         Last Recorded Vitals  Blood pressure 117/55, pulse 67, temperature 36.3 °C (97.3 °F), temperature source Temporal, resp. rate 18, height 1.702 m (5' 7\"), weight 81.6 kg (180 lb), SpO2 98%.  Intake/Output last 3 Shifts:  I/O last 3 completed shifts:  In: 1200 (14.7 mL/kg) [I.V.:800 (9.8 mL/kg); Other:400]  Out: 2400 (29.4 mL/kg) [Other:2400]  Weight: 81.6 kg     Relevant Results  Results for orders placed or performed during the hospital encounter of 12/21/24 (from the past 24 hours)   Transesophageal Echo (NIDA)   Result Value Ref Range    BSA 1.96 m2   POCT GLUCOSE   Result Value Ref Range    POCT Glucose 113 (H) 74 - 99 mg/dL   POCT GLUCOSE   Result Value Ref " Range    POCT Glucose 168 (H) 74 - 99 mg/dL   C. difficile, PCR    Specimen: Stool   Result Value Ref Range    C. difficile, PCR Not Detected Not Detected   POCT GLUCOSE   Result Value Ref Range    POCT Glucose 118 (H) 74 - 99 mg/dL     *Note: Due to a large number of results and/or encounters for the requested time period, some results have not been displayed. A complete set of results can be found in Results Review.           Assessment/Plan   Assessment & Plan  Acute encephalopathy      Acute encephalopathy  Resistant hypertension  End-stage renal disease on hemodialysis  Acute respiratory failure  Pneumonia  Pleural effusion  Confusion  rule out endocarditis  Diabetes mellitus type 2  Hyperlipidemia     Overall impression:     12/29: As above.  Patient known to me from recent admission.  Difficulty with hypertension and hypotension  recently within the context of dialysis.  Patient has been doing reasonably well in the outpatient setting for the past month.  Presented to dialysis recently and was noted to have unstable vital signs transferred to the hospital.  Found to have a fever and sepsis presentation.  Treated initially with for this over the past 8 days.  Underwent thoracentesis yielding 700 mL yesterday.  Overnight was noted to have some bradycardias with heart rate in the mid 40s.  Additionally infectious disease has requested a NIDA by our service.  The wife is at bedside who provided much of the details of the patient's recent illness.  He remains confused.  As noted on my previous note on last hospitalization I suspect a component of underlying dementia.  At this point his bradycardia has resolved.  His heart rates have been in the mid 60s this morning.  He does take  carvedilol in the outpatient setting which was previously at 12.5 reduced down to 6.25 on last hospitalization.  He continued to receive this recently.  I have placed parameters on this medication to hold for a heart rate less than 60 or  systolic blood pressure less than 110.  This is the only rate limiting agents the patient is on.  He was recently taking off of hydralazine and isosorbide by nephrology in the outpatient setting.  This was reinitiated by Dr. Ardon approximately 2 weeks ago.  The patient remains very complex.  He continues to receive dialysis.  To this point his blood cultures have been negative for 3 days.  Will discuss with Dr. Masters the timing or necessity of NIDA.  Will follow with you.     12/30: Stable overnight.  Chest pain-free.  Breathing comfortably on room air.  Euvolemic on exam.  The patient is currently receiving dialysis.  His blood pressure has been hypertensive leading into dialysis with most recent 169/55.  Current pulse ox 98%.  His creatinine prior to dialysis today was 9.94 with a hemoglobin 11.5.  Infectious disease has recommended a NIDA to rule out endocarditis.  The patient is n.p.o. with plans for NIDA today.  Will discuss with Dr. Barraza.  On the telemetry monitor he remained in a sinus rhythm with occasional sinus arrhythmia and PVCs.  Appears generally stable from the cardiac perspective at this time.  Will follow with you.    12/31: Stable overnight.  Chest pain-free  Euvolemic on exam.  Blood pressure stable with most recent 117/55.  Breathing comfortably on room air.  No conversational dyspnea appreciated.  No laboratory diagnostics drawn today.  Underwent NIDA this morning revealing no findings suggestive of endocarditis or vegetation.  There are no significant events on the telemetry monitor.  There has been no significant bradycardia.  His blood pressures remained stable.  We are following the patient for NIDA.  No further recommendations at this point.  Will sign off.  Patient to follow-up with Dr. Ardon in the outpatient setting within the next 2 to 3 weeks for reassessment.    I spent 35 minutes in the professional and overall care of this patient.      Darrell Blackwell, APRN-CNP

## 2024-12-31 NOTE — CARE PLAN
Problem: Diabetes  Goal: Maintain electrolyte levels within acceptable range throughout shift  Outcome: Progressing     Problem: Pain - Adult  Goal: Verbalizes/displays adequate comfort level or baseline comfort level  Outcome: Progressing     Problem: Safety - Adult  Goal: Free from fall injury  Outcome: Progressing     Problem: Discharge Planning  Goal: Discharge to home or other facility with appropriate resources  Outcome: Progressing

## 2024-12-31 NOTE — PERIOPERATIVE NURSING NOTE
Transport at bedside. Patient transferred to Division Status Stable.   [Fever] : no fever [Chills] : no chills [Vision Problems] : no vision problems [Chest Pain] : no chest pain [Palpitations] : no palpitations [Orthopnea] : no orthopnea [Shortness Of Breath] : no shortness of breath [Cough] : no cough [Dyspnea on Exertion] : no dyspnea on exertion [Muscle Pain] : no muscle pain [Skin Rash] : no skin rash [Headache] : no headache [Dizziness] : no dizziness [Easy Bleeding] : no easy bleeding [Easy Bruising] : no easy bruising

## 2024-12-31 NOTE — CARE PLAN
Problem: Diabetes  Goal: Maintain electrolyte levels within acceptable range throughout shift  Outcome: Progressing  Goal: No changes in neurological exam by end of shift  Outcome: Progressing  Goal: Learn about and adhere to nutrition recommendations by end of shift  Outcome: Progressing     Problem: Pain - Adult  Goal: Verbalizes/displays adequate comfort level or baseline comfort level  Outcome: Progressing     Problem: Safety - Adult  Goal: Free from fall injury  Outcome: Progressing     Problem: Discharge Planning  Goal: Discharge to home or other facility with appropriate resources  Outcome: Progressing     Problem: Chronic Conditions and Co-morbidities  Goal: Patient's chronic conditions and co-morbidity symptoms are monitored and maintained or improved  Outcome: Progressing     Problem: Ineffective Tissue Perfusion  Goal: Patient will be free from complications of poor renal perfusion causing other organ dysfunction, such as congestive heart failure or encephalopathy. Patient will adhere to dialysis treatments.  Outcome: Progressing     Problem: Impaired Gas Exchange  Goal: Patient will be able to maintain optimal gas exchange as evidenced by unlabored breathing and respiratory rate within normal limits.  Outcome: Progressing     Problem: Skin  Goal: Participates in plan/prevention/treatment measures  Outcome: Progressing  Goal: Prevent/manage excess moisture  Outcome: Progressing  Goal: Prevent/minimize sheer/friction injuries  Outcome: Progressing  Goal: Promote/optimize nutrition  Outcome: Progressing  Goal: Promote skin healing  Outcome: Progressing   The patient's goals for the shift include      The clinical goals for the shift include safety

## 2024-12-31 NOTE — PERIOPERATIVE NURSING NOTE
Patient received to [facility] # 6. Anesthesia at bedside. Report received. Initial assessment complete. VSS on Cardiac Monitor. Patient appears resting comfortably at present. No Signs or Symptoms of Pain or Resp Distress noted.

## 2024-12-31 NOTE — PROGRESS NOTES
12/31/24 0846   Discharge Planning   Expected Discharge Disposition Home     No skilled needs  IV ATB scripts sent to [facility] mentor     **Patient has a  safe discharge plan**

## 2024-12-31 NOTE — ANESTHESIA PREPROCEDURE EVALUATION
Patient: William A Franke    Procedure Information       Date/Time: 12/31/24 0972    Scheduled providers: Aleksandr Barraza DO; Tk Sue MD    Procedure: TRANSESOPHAGEAL ECHO (NIDA)    [facility]            Relevant Problems   Cardiac   (+) Acute non-ST segment elevation myocardial infarction (Multi)   (+) Aortic stenosis   (+) Benign essential hypertension   (+) CAD (coronary artery disease)   (+) Chest pain   (+) Chest pain, unspecified type   (+) Congestive heart failure   (+) Coronary artery disease involving native heart without angina pectoris   (+) Hyperlipidemia   (+) Hypertension   (+) Hypertensive urgency   (+) Murmur, cardiac   (+) Stable angina pectoris due to arteriosclerosis of coronary artery (CMS-HCC)   (+) Stented coronary artery      Pulmonary   (+) LIAM (obstructive sleep apnea)   (+) Pulmonary hypertension (Multi)      Neuro   (+) Anxiety   (+) Peripheral neuropathy      GI   (+) Esophageal reflux   (+) Gastroesophageal reflux disease   (+) Rectal hemorrhage      /Renal   (+) BPH with obstruction/lower urinary tract symptoms   (+) ESRD on dialysis (Multi)   (+) ESRD on hemodialysis (Multi)   (+) Kidney stones   (+) Prostate cancer (Multi)      Hematology   (+) Anemia   (+) Anemia of chronic disease      Musculoskeletal   (+) Osteoarthritis of hands, bilateral      ID   (+) Coronavirus infection     Past Medical History:   Diagnosis Date    Chronic kidney disease     Diabetes mellitus (Multi)     ESRD (end stage renal disease) on dialysis (Multi)     Heart murmur     HLD (hyperlipidemia)     Hypertension     Personal history of other endocrine, nutritional and metabolic disease     History of hypercholesterolemia    Personal history of other specified conditions 08/28/2020    History of chest pain     No Known Allergies    Clinical information reviewed:      NPO Detail:  NPO/Void Status  Date of Last Liquid: 12/31/24  Time of Last  Liquid: 0835  Date of Last Solid: 12/30/24  Time of Last Solid: 0000         Physical Exam    Airway  Mallampati: III  TM distance: >3 FB  Neck ROM: full     Cardiovascular   Comments: deferred   Dental    Pulmonary   Comments: deferred   Abdominal     Comments: deferred           Anesthesia Plan    History of general anesthesia?: yes  History of complications of general anesthesia?: no    ASA 3     MAC     The patient is not a current smoker.    intravenous induction   Postoperative administration of opioids is intended.  Anesthetic plan and risks discussed with patient.

## 2024-12-31 NOTE — DISCHARGE SUMMARY
"Discharge Diagnosis  Acute encephalopathy.  Bacteremia of unknown source.  ESRD.  Diabetes mellitus type 2.  Hypertension.  Dyslipidemia.  Coronary artery disease    Issues Requiring Follow-Up  Follow-up with primary care physician in 2 weeks    Discharge Meds     Medication List      START taking these medications     ceFAZolin 2 gram/50 mL IV; Commonly known as: Ancef; Infuse 50 mL (2 g)   at 100 mL/hr over 30 minutes into a venous catheter once daily on dialysis   days.     CONTINUE taking these medications     allopurinol 100 mg tablet; Commonly known as: Zyloprim; Take 1 tablet   (100 mg) by mouth once daily.   amLODIPine 5 mg tablet; Commonly known as: Norvasc; Take 1 tablet (5 mg)   by mouth once daily.   aspirin 81 mg EC tablet   atorvastatin 80 mg tablet; Commonly known as: Lipitor; Take 1 tablet (80   mg) by mouth once daily at bedtime.   B complex-vitamin C-folic acid 0.8 mg tablet; Commonly known as:   Nephro-Shea   BD Ultra-Fine Mini Pen Needle 31 gauge x 3/16\" needle; Generic drug: pen   needle, diabetic; USE AS DIRECTED 4 times a day   carvedilol 6.25 mg tablet; Commonly known as: Coreg; Take 1 tablet (6.25   mg) by mouth 2 times daily (morning and late afternoon).   ergocalciferol 1.25 MG (49752 UT) capsule; Commonly known as: Vitamin   D-2   ezetimibe 10 mg tablet; Commonly known as: Zetia; Take 1 tablet (10 mg)   by mouth once daily.   gabapentin 300 mg capsule; Commonly known as: Neurontin   hydrALAZINE 50 mg tablet; Commonly known as: Apresoline; Take 1 tablet   (50 mg) by mouth 3 times a day.   insulin lispro 100 unit/mL injection; Commonly known as: HumaLOG   isosorbide mononitrate ER 60 mg 24 hr tablet; Commonly known as: Imdur;   Take 1 tablet (60 mg) by mouth once daily. Do not crush or chew.   Januvia 25 mg tablet; Generic drug: SITagliptin phosphate; Take 1 tablet   (25 mg) by mouth once daily.   nitroglycerin 0.4 mg SL tablet; Commonly known as: Nitrostat   pantoprazole 40 mg EC tablet; " "Commonly known as: ProtoNix   sevelamer carbonate 800 mg tablet; Commonly known as: Renvela     STOP taking these medications     escitalopram 5 mg tablet; Commonly known as: Lexapro   oxygen gas therapy; Commonly known as: O2       Test Results Pending At Discharge  Pending Labs       Order Current Status    AFB CULTURE AND SMEAR; [facility]; [id number] - Miscellaneous Test In process    Cytology (Non-Gynecologic) In process    Fungal Culture/Smear Preliminary result            Hospital Course  William A Franke is a 74 y.o. male with a past medical history of diabetes, hypertension, hyperlipidmeia, CAD, ESKD on HD. Patient presents to the ED today via EMS from dialysis for evaluation and treatment for alerted mental status. Patients wife and brother were at bedside who assisted in HPI. Per wife, states that she received a call from the dialysis center that patient had a \"drop in vitals\" with confusion. Wife states that the patient is alert and orientated at baseline, drove himself to out patient scheduled dialysis, and completed the full course of dialysis. However, about an hour prior to completing dialysis he was noted to have altered mental status by staff. Noted to be weak with needing assistance getting out of dialysis chair, tremors, and lethargic. Patients wife states that the patient was coughing some last night but denies any other concerning symptoms. Upon examination the patient is drowsy and but easily arousable. He is orientated to person, place, and time. He is able to identify the people in the room with him. He does not remember any events or what happened at dialysis that brought him to the ED. Patient denies any shortness of breath, trouble breathing, chest pains, fever, chills, nausea, or vomiting. No dysuria, no abdominal pain. Denies diarrhea or constipation. No blurred vision, light headed, or dizziness. At baseline he is independent, lives with his wife at home, and is fully functional.  Admitted to " telemetry for further management.  Panculture was obtained.  ID consult was requested.  He was started on Zosyn and vancomycin and then switched to cefazolin when blood culture grew MRSA.  He was underwent TTE and then NIDA to rule out bacterial endocarditis which was without.  Second set of blood culture from 12/23 is negative.  ID and cardiology consults were obtained.  Hemodialysis has been arranged by Dr. Perez.  Overall his condition  has gradually improved.  He is no longer febrile and mentally bright.  He is going to get IV cefazolin 2 g with every dialysis for another 4 weeks per ID recommendation.  Cleared for discharge in satisfactory condition    Pertinent Physical Exam At Time of Discharge  Physical Exam  General-awake, alert, oriented x3  Lung-clear to auscultation bilaterally  Heart-regular sinus rhythm and rhythm, S1 and S2 audible,no murmurs  Abdomen- soft, nontender, nondistended, good bowel sounds, no organomegaly, no mass, bowel sounds are present  Extremities-no clubbing, no cyanosis, no edema.  Left arm AV fistula without swelling, without erythema, nontender  Wound-none applicable  Outpatient Follow-Up  Future Appointments   Date Time Provider Department Center   1/10/2025  9:40 AM VERÓNICA Barnes-CNP [id number] [facility]   2/10/2025 10:15 AM Marianna Galloway MD [id number] [facility]   2/21/2025  9:30 AM VERÓNICA Austin-CNP [id number] [facility]   2/24/2025  9:00 AM Abad Briceno MD [id number] [facility]         Herb Velasquez MD

## 2024-12-31 NOTE — ANESTHESIA POSTPROCEDURE EVALUATION
Patient: William A Franke    Procedure Summary       Date: 12/31/24 Room / [facility]    Anesthesia Start: 0934 Anesthesia Stop: 1006    Procedure: TRANSESOPHAGEAL ECHO (NIDA) Diagnosis:       Endocarditis due to methicillin susceptible Staphylococcus aureus (MSSA) ([facility])      (sepsis)    Scheduled Providers: Aleksandr Barraza DO; Tk Sue MD Responsible Provider: Tk Sue MD    Anesthesia Type: MAC ASA Status: 3            Anesthesia Type: MAC    Vitals Value Taken Time   BP 95/46 12/31/24 1006   Temp 36.3 12/31/24 1006   Pulse 59 12/31/24 1006   Resp 10 12/31/24 1006   SpO2 100 12/31/24 1006       Anesthesia Post Evaluation    Patient location during evaluation: PACU  Patient participation: waiting for patient participation  Level of consciousness: awake and alert  Pain score: 0  Pain management: adequate  Multimodal analgesia pain management approach  Airway patency: patent  Two or more strategies used to mitigate risk of obstructive sleep apnea  Cardiovascular status: acceptable and blood pressure returned to baseline  Respiratory status: acceptable  Hydration status: acceptable  Postoperative Nausea and Vomiting: none        There were no known notable events for this encounter.

## 2024-12-31 NOTE — CONSULTS
"Nutrition Assessement Note    Nutrition Assessment    Reason for Assessment: Length of stay    Reason for Hospital Admission:  William A Franke is a 74 y.o. male who is admitted for acute encephalopathy. Pt off floor for NIDA at time of visit. Pt was recently admitted in November for being unresponsive during dialysis session and this time for experiencing AMS during dialysis. Will continue to monitor PO intakes.     Malnutrition Screening Tool (MST)  Have you recently lost weight without trying?: No  Weight Loss Score: 0  Have you been eating poorly because of a decreased appetite?: No  Malnutrition Score: 0  Nutrition Screen  Stage 3 or 4 Pressure Injury or Multiple Non-Healing Wounds: No  Home Tube Feeding or Total Parenteral Nutrition (TPN): No  Dietitian Consult Needed: No    Past Medical History:   Diagnosis Date    Chronic kidney disease     Diabetes mellitus (Multi)     ESRD (end stage renal disease) on dialysis (Multi)     Heart murmur     HLD (hyperlipidemia)     Hypertension     Personal history of other endocrine, nutritional and metabolic disease     History of hypercholesterolemia    Personal history of other specified conditions 08/28/2020    History of chest pain      Past Surgical History:   Procedure Laterality Date    AV FISTULA PLACEMENT Left 07/31/2024    left brachial artery to axillary vein AV graft on 7/31/2024    KNEE ARTHROSCOPY W/ DEBRIDEMENT  04/15/2013    Arthroscopy Knee    SHOULDER SURGERY  04/15/2013    Shoulder Surgery       Nutrition History:     Energy Intake: Good > 75 %     Food Allergies/Intolerances:  None  Anthropometrics:  Ht: 170.2 cm (5' 7\"), Wt: 81.6 kg (180 lb), BMI: 28.19  IBW/kg (Dietitian Calculated): 67.27 kg  Percent of IBW: 121.62 %       Weight Change:  Daily Weight  12/21/24 : 81.6 kg (180 lb)  12/10/24 : 81.6 kg (180 lb)  11/16/24 : 74.4 kg (164 lb)  11/15/24 : 74.7 kg (164 lb 10.9 oz)  11/13/24 : 81.6 kg (180 lb)  11/12/24 : 82.9 kg (182 lb 12.8 oz)  11/11/24 : " 83 kg (183 lb)  09/13/24 : 82.6 kg (182 lb)  08/23/24 : 81.2 kg (179 lb)  08/19/24 : 82.1 kg (181 lb)     Weight History / % Weight Change: records show weight maintenance between 179-181# over 4 months             Nutrition Focused Physical Exam Findings:                       Nutrition Significant Labs:  Lab Results   Component Value Date    WBC 6.7 12/30/2024    HGB 11.5 (L) 12/30/2024    HCT 33.6 (L) 12/30/2024     12/30/2024    CHOL 123 11/08/2024    TRIG 170 (H) 11/08/2024    HDL 36.7 11/08/2024    ALT 12 12/21/2024    AST 21 12/21/2024     12/30/2024    K 4.3 12/30/2024    CL 97 (L) 12/30/2024    CREATININE 9.94 (H) 12/30/2024    BUN 45 (H) 12/30/2024    CO2 26 12/30/2024    TSH 1.09 08/09/2024    PSA 0.55 12/10/2024    INR 1.2 (H) 04/13/2023    HGBA1C 6.3 (H) 11/08/2024     Nutrition Specific Medications:  allopurinol, 100 mg, oral, Daily  amLODIPine, 5 mg, oral, Daily  aspirin, 81 mg, oral, Daily  atorvastatin, 80 mg, oral, Nightly  carvedilol, 6.25 mg, oral, BID  ceFAZolin, 2 g, intravenous, q24h LUKE  ezetimibe, 10 mg, oral, Daily  heparin (porcine), 5,000 Units, subcutaneous, q12h  hydrALAZINE, 50 mg, oral, TID  insulin lispro, 0-10 Units, subcutaneous, TID AC  isosorbide mononitrate ER, 60 mg, oral, Daily  pantoprazole, 40 mg, oral, Daily  sevelamer carbonate, 1,600 mg, oral, TID  SITagliptin phosphate, 25 mg, oral, Daily      oxygen,         Dietary Orders (From admission, onward)       Start     Ordered    12/31/24 1309  Adult diet Renal; Potassium Restricted 2 gm (50mEq); 2 - 3 grams Sodium  Diet effective now        Question Answer Comment   Diet type Renal    Potassium restriction: Potassium Restricted 2 gm (50mEq)    Sodium restriction: 2 - 3 grams Sodium        12/31/24 1308    12/21/24 1638  May Participate in Room Service  ( ROOM SERVICE MAY PARTICIPATE)  Once        Question:  .  Answer:  Yes    12/21/24 1637                  Estimated Needs:   Estimated Energy Needs  Total  Energy Estimated Needs (kCal): 2018 kCal  Total Estimated Energy Need per Day (kCal/kg): 30 kCal/kg  Method for Estimating Needs: IBW    Estimated Protein Needs  Total Protein Estimated Needs (g): 81 g  Total Protein Estimated Needs (g/kg): 1.2 g/kg  Method for Estimating Needs: IBW    Estimated Fluid Needs  Method for Estimating Needs: urine output + 500-1000 mL/d        Nutrition Diagnosis   Nutrition Diagnosis:       Nutrition Diagnosis  Patient has Nutrition Diagnosis: Yes  Diagnosis Status (1): New  Nutrition Diagnosis 1: Increased nutrient needs  Related to (1): increased demand for nutrient  As Evidenced by (1): hemodialysis       Nutrition Interventions/Recommendations   Nutrition Interventions and Recommendations:    Nutrition Prescription:  Individualized Nutrition Prescription Provided for : 2018 kcals, 81 g protein via diet    Nutrition Interventions:   Food and/or Nutrient Delivery Interventions  Interventions: Meals and snacks  Meals and Snacks: Mineral-modified diet  Goal: provide diet as ordered    Education Documentation  No documentation found.           Nutrition Monitoring and Evaluation   Monitoring/Evaluation:   Food/Nutrient Related History Monitoring  Monitoring and Evaluation Plan: Energy intake  Energy Intake: Estimated energy intake  Criteria: pt to consume >/= 75% estimated needs                      __       Time Spent/Follow-up:   Follow Up  Time Spent (min): 30 minutes  Last Date of Nutrition Visit: 12/31/24  Nutrition Follow-Up Needed?: 5-7 days  Follow up Comment: 1/6/25

## 2024-12-31 NOTE — POST-PROCEDURE NOTE
Physician Transition of Care Summary  Invasive Cardiovascular Lab    Procedure Date: 12/31/2024  Attending: * No surgeons found in log *  Resident/Fellow/Other Assistant: * No surgeons found in log *    Indications:   * No Diagnosis Codes entered *    Post-procedure diagnosis:   * No Diagnosis Codes entered *    Procedure(s):   * No procedures documented on diagnosis form *      Procedure Findings:   See dictated procedure    Description of the Procedure:   Patient was brought to the cardiac catheterization suite after informed consent for transesophageal echocardiography was obtained.  Continuous telemetry and pulse oximetry and intermittent blood pressure monitoring were performed per protocol.  The department of anesthesiology assisted with sedation with the fentanyl and intravenous propofol.  Topical Cetacaine spray and viscous lidocaine were used for numbing of the oropharynx.  At the end of the procedure the probe was removed patient was returned to the postoperative holding area in stable condition.    NIDA probe inserted in the esophagus without difficulty.  Normal left and right heart function without segmental wall motion abnormality, with left ventricular ejection fraction estimated at 55 to 60%.  No vegetations were seen on the mitral, aortic, pulmonic, tricuspid valves.  No other stigmata of endocarditis noted.    Complications:   none    Stents/Implants:   Implants       No implant documentation for this case.            Anticoagulation/Antiplatelet Plan:   Resume previous anticoagulation.    Estimated Blood Loss:   0 mL    Anesthesia: * No anesthesia type entered * Anesthesia Staff: Anesthesiologist (Solo in Case): Tk Sue MD    Any Specimen(s) Removed:   No specimens collected during this procedure.    Disposition:   Transfer back to telemetry bed when awake per protocol      Electronically signed by: Aleksandr Barraza DO, 12/31/2024 10:15 AM

## 2025-01-02 LAB — SCAN RESULT: NORMAL

## 2025-01-03 LAB
FUNGUS SPEC CULT: NORMAL
FUNGUS SPEC FUNGUS STN: NORMAL

## 2025-01-06 DIAGNOSIS — K21.9 GASTROESOPHAGEAL REFLUX DISEASE WITHOUT ESOPHAGITIS: ICD-10-CM

## 2025-01-06 RX ORDER — PANTOPRAZOLE SODIUM 40 MG/1
40 TABLET, DELAYED RELEASE ORAL DAILY
Qty: 90 TABLET | Refills: 0 | Status: SHIPPED | OUTPATIENT
Start: 2025-01-06

## 2025-01-08 ENCOUNTER — TELEPHONE (OUTPATIENT)
Dept: RADIATION ONCOLOGY | Facility: HOSPITAL | Age: 75
End: 2025-01-08
Payer: MEDICARE

## 2025-01-08 ASSESSMENT — ENCOUNTER SYMPTOMS
DIARRHEA: 0
ARTHRALGIAS: 0
COUGH: 0
ANAL BLEEDING: 0
FEVER: 0
DIZZINESS: 0
HEMATURIA: 0
ABDOMINAL PAIN: 0
WEAKNESS: 0
CONSTIPATION: 0
UNEXPECTED WEIGHT CHANGE: 0
SHORTNESS OF BREATH: 0
PSYCHIATRIC NEGATIVE: 1
BACK PAIN: 0
PALPITATIONS: 0
BLOOD IN STOOL: 0
FREQUENCY: 0
FATIGUE: 0
DYSURIA: 0
JOINT SWELLING: 0
ALLERGIC/IMMUNOLOGIC NEGATIVE: 1
RECTAL PAIN: 0
CHEST TIGHTNESS: 0
DIFFICULTY URINATING: 0

## 2025-01-08 NOTE — PROGRESS NOTES
Cancer synopsis:  Rad/onc: Dr. Gordon/ [place] [facility]    74 -year-old male returns to the radiation oncology clinic for ongoing consultation regarding his unfavorable intermediate risk prostate cancer.  MRI of  the pelvis was completed on 1/4/2020 which demonstrated a 5.3 x 3.8 x 3.7 cm prostate.  There was a PIRADS 4 lesion in the right peripheral zone extending from the mid gland to the apex.  There was no evidence of extra prosthetic extension.  There was  no involvement of the seminal vesicles.  There was no evidence of an enlarged pelvic lymph nodes.  As stated at the prior visit, the patient denies any prior history of cancer, chemotherapy, radiotherapy, autoimmune or connective tissue disorder, or cardiac  device.  He completed a screening colonoscopy in November 2018.     05/05/2022: Prostate and SV VMAT    PMH:  DM2, constipation, CKD III (dialysis), glaucoma, HLD, BPH, pulmonary HTN, LIAM    Recent imaging:  none    History of presenting illness:    Patient ID: [id number]     William A Franke is a 74 y.o. male who presents for his UIR prostate cancer GS 4+3=7, IPSA <10 now s/p RT    RT Site: Prostate and SV  RT Date: 05/05/2022  Hormone therapy: No  Hot Flushes: Denies  Fatigue: Denies  Bone pain: Denies  SAMARA: n/a   ED: Denies changes in erectile function since last visit.  ED medications: No  IPSS: n/a virtual  Urinary symptoms: Denies dysuria, hematuria, frequency, urgency or urine leakage. Recently had dialysis port placed and currently is receiving dialysis, does still produce urine per patient.  Urinary Medications: No  Rectal bleeding: Denies  Colonoscopy: 04/2021: bleeding internal hemmoirds, 1 polyp removed next in 5yrs  Other systems: Denies SOB, CP or fever. Currently admitted for ongoing encephalopthy and sepsis. Currently has MSSA bacteremia and was discharged from hospital admission w/ abx.     Review of systems:  Review of Systems   Constitutional:  Negative for fatigue, fever and unexpected  weight change.   Respiratory:  Negative for cough, chest tightness and shortness of breath.    Cardiovascular:  Negative for chest pain, palpitations and leg swelling.   Gastrointestinal:  Negative for abdominal pain, anal bleeding, blood in stool, constipation, diarrhea and rectal pain.   Endocrine: Negative for cold intolerance, heat intolerance and polyuria.   Genitourinary:  Negative for decreased urine volume, difficulty urinating, dysuria, frequency, hematuria and urgency.   Musculoskeletal:  Negative for arthralgias, back pain, gait problem and joint swelling.   Skin: Negative.    Allergic/Immunologic: Negative.    Neurological:  Negative for dizziness, syncope and weakness.   Psychiatric/Behavioral: Negative.       Past Medical history  Past Medical History:   Diagnosis Date    Chronic kidney disease     Diabetes mellitus (Multi)     ESRD (end stage renal disease) on dialysis (Multi)     Heart murmur     HLD (hyperlipidemia)     Hypertension     Personal history of other endocrine, nutritional and metabolic disease     History of hypercholesterolemia    Personal history of other specified conditions 08/28/2020    History of chest pain      Surgical/family history  Family History   Problem Relation Name Age of Onset    Alzheimer's disease Mother      Heart attack Mother      Diabetes Father      Heart attack Father        Past Surgical History:   Procedure Laterality Date    AV FISTULA PLACEMENT Left 07/31/2024    left brachial artery to axillary vein AV graft on 7/31/2024    KNEE ARTHROSCOPY W/ DEBRIDEMENT  04/15/2013    Arthroscopy Knee    SHOULDER SURGERY  04/15/2013    Shoulder Surgery      Social History  Tobacco Use: Low Risk  (12/10/2024)    Patient History     Smoking Tobacco Use: Never     Smokeless Tobacco Use: Never     Passive Exposure: Not on file       Current med list:  Current Outpatient Medications   Medication Instructions    allopurinol (ZYLOPRIM) 100 mg, oral, Daily    amLODIPine (NORVASC)  "5 mg, oral, Daily    aspirin 81 mg, Daily    atorvastatin (LIPITOR) 80 mg, oral, Nightly    B complex-vitamin C-folic acid (Nephro-Shea) 0.8 mg tablet 0.8 mg, Daily    BD Ultra-Fine Mini Pen Needle 31 gauge x 3/16\" needle USE AS DIRECTED 4 times a day    carvedilol (COREG) 6.25 mg, oral, 2 times daily (morning and late afternoon)    ceFAZolin (Ancef) 2 gram/50 mL IV 2 g, intravenous, After Dialysis    ergocalciferol (VITAMIN D-2) 50,000 Units, Every 14 days    ezetimibe (ZETIA) 10 mg, oral, Daily    gabapentin (NEURONTIN) 300 mg, oral, Nightly    hydrALAZINE (APRESOLINE) 50 mg, oral, 3 times daily    insulin lispro (HUMALOG) 10 Units, 3 times daily (morning, midday, late afternoon)    isosorbide mononitrate ER (IMDUR) 60 mg, oral, Daily, Do not crush or chew.    Januvia 25 mg, oral, Daily    nitroglycerin (NITROSTAT) 0.4 mg, Every 5 min PRN    pantoprazole (PROTONIX) 40 mg, oral, Daily    sevelamer carbonate (Renvela) 800 mg tablet Take 2 tablets (1,600 mg) by mouth 3 times daily (morning, midday, late afternoon). Three times daily with meals      Last recorded vital:  N/a virtual    Physical exam  N/a virtual    Pertinent labs:  CBC Differential Path Review   Date/Time Value Ref Range Status   10/20/2022 05:45 AM GINA  Final     Comment:      By her/his signature above, the Pathologist   listed as making the final interpretation   certifies that she/he has personally reviewed    this case.  ----------------------------------------------   MICROCYTIC ANEMIA WITH ANISOPOIKILOCYTOSIS AND POLYCHROMASIA.  IRON, B12 AND FOLATE STUDIES MAYBE CONSIDERED IF CLINICALLY INDICATED.       Prostate Specific AG   Date/Time Value Ref Range Status   12/10/2024 04:13 PM 0.55 <=4.00 ng/mL Final     Dx:  Problem List Items Addressed This Visit    None  Visit Diagnoses       Malignant neoplasm of prostate (Multi)    -  Primary    Relevant Orders    Prostate Specific Antigen    Clinic Appointment Request Follow Up; RUPINDER ROBINS " (virtual); [facility] [address] (virtual)    Clinic Appointment Request Follow Up; MINH ROBINS (virtual); [facility] [address] (virtual) (Completed)        PSA of 0.55 was reviewed and stable. Will continue q6m PSA Review of latent SE including rectal bleeding, hematuria, urinary strictures, ED where reviewed as well as how to contact office if s/s present. Denies latent SE. NCCN guidelines where reviewed and routine FUV of every 3m for first year and every 6m for four years for a total of five years was discussed. Patient verbalized understanding.      PLAN:  FUV 6m  Labs PSA in 6m  Imaging none  FUV other providers: PCP for routine evals    Please contact office with any concerns:  Minh Armando CNP  [phone]    I performed this visit using realtime telehealth tools, including an audio/video OR telephone connection between patient’s name and location Franke, William and Minh Robins CNP.    2. POS 10: Telehealth provided in patient's home.  o Patient is located in their home (which is a location other than a hospital or other facility where the patient receives care in a private residence) when receiving health services or health related services through telecommunication technology.

## 2025-01-10 ENCOUNTER — HOSPITAL ENCOUNTER (OUTPATIENT)
Dept: RADIATION ONCOLOGY | Facility: HOSPITAL | Age: 75
Setting detail: RADIATION/ONCOLOGY SERIES
Discharge: HOME | End: 2025-01-10
Payer: MEDICARE

## 2025-01-10 DIAGNOSIS — C61 MALIGNANT NEOPLASM OF PROSTATE (MULTI): Primary | ICD-10-CM

## 2025-01-10 PROCEDURE — 99213 OFFICE O/P EST LOW 20 MIN: CPT

## 2025-01-10 PROCEDURE — 99213 OFFICE O/P EST LOW 20 MIN: CPT | Mod: 95

## 2025-01-10 ASSESSMENT — ENCOUNTER SYMPTOMS
CONSTIPATION: 0
DIFFICULTY URINATING: 0
ABDOMINAL PAIN: 0
SHORTNESS OF BREATH: 0
DIZZINESS: 0
FREQUENCY: 0
WEAKNESS: 0
FATIGUE: 0
COUGH: 0
DYSURIA: 0
CHEST TIGHTNESS: 0
UNEXPECTED WEIGHT CHANGE: 0
PSYCHIATRIC NEGATIVE: 1
BACK PAIN: 0
PALPITATIONS: 0
RECTAL PAIN: 0
BLOOD IN STOOL: 0
JOINT SWELLING: 0
ANAL BLEEDING: 0
DIARRHEA: 0
ARTHRALGIAS: 0
FEVER: 0
ALLERGIC/IMMUNOLOGIC NEGATIVE: 1
HEMATURIA: 0

## 2025-01-10 NOTE — PROGRESS NOTES
Cancer synopsis:  Rad/onc: Dr. Gordon/ [place] [facility]    74 -year-old male returns to the radiation oncology clinic for ongoing consultation regarding his unfavorable intermediate risk prostate cancer.  MRI of  the pelvis was completed on 1/4/2020 which demonstrated a 5.3 x 3.8 x 3.7 cm prostate.  There was a PIRADS 4 lesion in the right peripheral zone extending from the mid gland to the apex.  There was no evidence of extra prosthetic extension.  There was  no involvement of the seminal vesicles.  There was no evidence of an enlarged pelvic lymph nodes.  As stated at the prior visit, the patient denies any prior history of cancer, chemotherapy, radiotherapy, autoimmune or connective tissue disorder, or cardiac  device.  He completed a screening colonoscopy in November 2018.     05/05/2022: Prostate and SV VMAT    PMH:  DM2, constipation, CKD III (dialysis), glaucoma, HLD, BPH, pulmonary HTN, LIAM    Recent imaging:  none    History of presenting illness:    Patient ID: [id number]     William A Franke is a 74 y.o. male who presents for his UIR prostate cancer GS 4+3=7, IPSA <10 now s/p RT    RT Site: Prostate and SV  RT Date: 05/05/2022  Hormone therapy: No  Hot Flushes: Denies  Fatigue: Denies  Bone pain: Denies  SAMARA: n/a   ED: Denies changes in erectile function since last visit.  ED medications: No  IPSS: n/a virtual  Urinary symptoms: Denies dysuria, hematuria, frequency, urgency or urine leakage. Recently had dialysis port placed and currently is receiving dialysis, does still produce urine per patient.  Urinary Medications: No  Rectal bleeding: Denies  Colonoscopy: 04/2021: bleeding internal hemmoirds, 1 polyp removed next in 5yrs  Other systems: Denies SOB, CP or fever. Currently admitted for ongoing encephalopthy and sepsis. Currently has MSSA bacteremia. Denies any further signs of infection including SOB, CP, fever of further confusion. Is working out at gym again to help with regaining strength.    Review  of systems:  Review of Systems   Constitutional:  Negative for fatigue, fever and unexpected weight change.   Respiratory:  Negative for cough, chest tightness and shortness of breath.    Cardiovascular:  Negative for chest pain, palpitations and leg swelling.   Gastrointestinal:  Negative for abdominal pain, anal bleeding, blood in stool, constipation, diarrhea and rectal pain.   Endocrine: Negative for cold intolerance, heat intolerance and polyuria.   Genitourinary:  Negative for decreased urine volume, difficulty urinating, dysuria, frequency, hematuria and urgency.   Musculoskeletal:  Negative for arthralgias, back pain, gait problem and joint swelling.   Skin: Negative.    Allergic/Immunologic: Negative.    Neurological:  Negative for dizziness, syncope and weakness.   Psychiatric/Behavioral: Negative.       Past Medical history  Past Medical History:   Diagnosis Date    Chronic kidney disease     Diabetes mellitus (Multi)     ESRD (end stage renal disease) on dialysis (Multi)     Heart murmur     HLD (hyperlipidemia)     Hypertension     Personal history of other endocrine, nutritional and metabolic disease     History of hypercholesterolemia    Personal history of other specified conditions 08/28/2020    History of chest pain      Surgical/family history  Family History   Problem Relation Name Age of Onset    Alzheimer's disease Mother      Heart attack Mother      Diabetes Father      Heart attack Father        Past Surgical History:   Procedure Laterality Date    AV FISTULA PLACEMENT Left 07/31/2024    left brachial artery to axillary vein AV graft on 7/31/2024    KNEE ARTHROSCOPY W/ DEBRIDEMENT  04/15/2013    Arthroscopy Knee    SHOULDER SURGERY  04/15/2013    Shoulder Surgery        Social History  Tobacco Use: Low Risk  (12/10/2024)    Patient History     Smoking Tobacco Use: Never     Smokeless Tobacco Use: Never     Passive Exposure: Not on file         Current med list:  Current Outpatient  "Medications   Medication Instructions    allopurinol (ZYLOPRIM) 100 mg, oral, Daily    amLODIPine (NORVASC) 5 mg, oral, Daily    aspirin 81 mg, Daily    atorvastatin (LIPITOR) 80 mg, oral, Nightly    B complex-vitamin C-folic acid (Nephro-Shea) 0.8 mg tablet 0.8 mg, Daily    BD Ultra-Fine Mini Pen Needle 31 gauge x 3/16\" needle USE AS DIRECTED 4 times a day    carvedilol (COREG) 6.25 mg, oral, 2 times daily (morning and late afternoon)    ceFAZolin (Ancef) 2 gram/50 mL IV 2 g, intravenous, After Dialysis    ergocalciferol (VITAMIN D-2) 50,000 Units, Every 14 days    ezetimibe (ZETIA) 10 mg, oral, Daily    gabapentin (NEURONTIN) 300 mg, oral, Nightly    hydrALAZINE (APRESOLINE) 50 mg, oral, 3 times daily    insulin lispro (HUMALOG) 10 Units, 3 times daily (morning, midday, late afternoon)    isosorbide mononitrate ER (IMDUR) 60 mg, oral, Daily, Do not crush or chew.    Januvia 25 mg, oral, Daily    nitroglycerin (NITROSTAT) 0.4 mg, Every 5 min PRN    pantoprazole (PROTONIX) 40 mg, oral, Daily    sevelamer carbonate (Renvela) 800 mg tablet Take 2 tablets (1,600 mg) by mouth 3 times daily (morning, midday, late afternoon). Three times daily with meals      Last recorded vital:  N/a virtual    Physical exam  N/a virtual    Pertinent labs:  CBC Differential Path Review   Date/Time Value Ref Range Status   10/20/2022 05:45 AM GINA  Final     Comment:      By her/his signature above, the Pathologist   listed as making the final interpretation   certifies that she/he has personally reviewed    this case.  ----------------------------------------------   MICROCYTIC ANEMIA WITH ANISOPOIKILOCYTOSIS AND POLYCHROMASIA.  IRON, B12 AND FOLATE STUDIES MAYBE CONSIDERED IF CLINICALLY INDICATED.       Prostate Specific AG   Date/Time Value Ref Range Status   12/10/2024 04:13 PM 0.55 <=4.00 ng/mL Final     Dx:  Problem List Items Addressed This Visit    None  Visit Diagnoses       Malignant neoplasm of prostate (Multi)    -  Primary "    Relevant Orders    Prostate Specific Antigen    Clinic Appointment Request Follow Up; MINH ROBINS (virtual); [facility] [address] (virtual)    Clinic Appointment Request Follow Up; MINH ROBINS (virtual); [facility] [address] (virtual) (Completed)        PSA of 0.55 was reviewed and stable. Will continue q6m PSA Review of latent SE including rectal bleeding, hematuria, urinary strictures, ED where reviewed as well as how to contact office if s/s present. Denies latent SE. NCCN guidelines where reviewed and routine FUV of every 3m for first year and every 6m for four years for a total of five years was discussed. Patient verbalized understanding.      PLAN:  FUV 6m  Labs PSA in 6m  Imaging none  FUV other providers: PCP for routine evals    Please contact office with any concerns:  Minh Armando CNP  [phone]    I performed this visit using realtime telehealth tools, including an audio/video OR telephone connection between patient’s name and location Franke, William and Minh Robins CNP.    2. POS 10: Telehealth provided in patient's home.  o Patient is located in their home (which is a location other than a hospital or other facility where the patient receives care in a private residence) when receiving health services or health related services through telecommunication technology.

## 2025-01-10 NOTE — PROGRESS NOTES
Cancer synopsis:  Rad/onc: Dr. Gordon/ [place] [facility]    74 -year-old male returns to the radiation oncology clinic for ongoing consultation regarding his unfavorable intermediate risk prostate cancer.  MRI of  the pelvis was completed on 1/4/2020 which demonstrated a 5.3 x 3.8 x 3.7 cm prostate.  There was a PIRADS 4 lesion in the right peripheral zone extending from the mid gland to the apex.  There was no evidence of extra prosthetic extension.  There was  no involvement of the seminal vesicles.  There was no evidence of an enlarged pelvic lymph nodes.  As stated at the prior visit, the patient denies any prior history of cancer, chemotherapy, radiotherapy, autoimmune or connective tissue disorder, or cardiac  device.  He completed a screening colonoscopy in November 2018.     05/05/2022: Prostate and SV VMAT    PMH:  DM2, constipation, CKD III (dialysis), glaucoma, HLD, BPH, pulmonary HTN, LIAM    Recent imaging:  none    History of presenting illness:    Patient ID: [id number]     William A Franke is a 74 y.o. male who presents for his UIR prostate cancer GS 4+3=7, IPSA <10 now s/p RT    RT Site: Prostate and SV  RT Date: 05/05/2022  Hormone therapy: No  Hot Flushes: Denies  Fatigue: Denies  Bone pain: Denies  SAMARA: n/a   ED: Denies changes in erectile function since last visit.  ED medications: No  IPSS: n/a virtual  Urinary symptoms: Denies dysuria, hematuria, frequency, urgency or urine leakage. Recently had dialysis port placed and currently is receiving dialysis, does still produce urine per patient.  Urinary Medications: No  Rectal bleeding: Denies  Colonoscopy: 04/2021: bleeding internal hemmoirds, 1 polyp removed next in 5yrs  Other systems: Denies SOB, CP or fever. Currently admitted for ongoing encephalopthy and sepsis. Currently has MSSA bacteremia.    Review of systems:  Review of Systems   Constitutional:  Negative for fatigue, fever and unexpected weight change.   Respiratory:  Negative for cough,  chest tightness and shortness of breath.    Cardiovascular:  Negative for chest pain, palpitations and leg swelling.   Gastrointestinal:  Negative for abdominal pain, anal bleeding, blood in stool, constipation, diarrhea and rectal pain.   Endocrine: Negative for cold intolerance, heat intolerance and polyuria.   Genitourinary:  Negative for decreased urine volume, difficulty urinating, dysuria, frequency, hematuria and urgency.   Musculoskeletal:  Negative for arthralgias, back pain, gait problem and joint swelling.   Skin: Negative.    Allergic/Immunologic: Negative.    Neurological:  Negative for dizziness, syncope and weakness.   Psychiatric/Behavioral: Negative.         Past Medical history  Past Medical History:   Diagnosis Date    Chronic kidney disease     Diabetes mellitus (Multi)     ESRD (end stage renal disease) on dialysis (Multi)     Heart murmur     HLD (hyperlipidemia)     Hypertension     Personal history of other endocrine, nutritional and metabolic disease     History of hypercholesterolemia    Personal history of other specified conditions 08/28/2020    History of chest pain        Surgical/family history  Family History   Problem Relation Name Age of Onset    Alzheimer's disease Mother      Heart attack Mother      Diabetes Father      Heart attack Father        Past Surgical History:   Procedure Laterality Date    AV FISTULA PLACEMENT Left 07/31/2024    left brachial artery to axillary vein AV graft on 7/31/2024    KNEE ARTHROSCOPY W/ DEBRIDEMENT  04/15/2013    Arthroscopy Knee    SHOULDER SURGERY  04/15/2013    Shoulder Surgery        Social History  Tobacco Use: Low Risk  (12/10/2024)    Patient History     Smoking Tobacco Use: Never     Smokeless Tobacco Use: Never     Passive Exposure: Not on file         Current med list:  Current Outpatient Medications   Medication Instructions    allopurinol (ZYLOPRIM) 100 mg, oral, Daily    amLODIPine (NORVASC) 5 mg, oral, Daily    aspirin 81 mg, Daily  "   atorvastatin (LIPITOR) 80 mg, oral, Nightly    B complex-vitamin C-folic acid (Nephro-Shea) 0.8 mg tablet 0.8 mg, Daily    BD Ultra-Fine Mini Pen Needle 31 gauge x 3/16\" needle USE AS DIRECTED 4 times a day    carvedilol (COREG) 6.25 mg, oral, 2 times daily (morning and late afternoon)    ceFAZolin (Ancef) 2 gram/50 mL IV 2 g, intravenous, After Dialysis    ergocalciferol (VITAMIN D-2) 50,000 Units, Every 14 days    ezetimibe (ZETIA) 10 mg, oral, Daily    gabapentin (NEURONTIN) 300 mg, oral, Nightly    hydrALAZINE (APRESOLINE) 50 mg, oral, 3 times daily    insulin lispro (HUMALOG) 10 Units, 3 times daily (morning, midday, late afternoon)    isosorbide mononitrate ER (IMDUR) 60 mg, oral, Daily, Do not crush or chew.    Januvia 25 mg, oral, Daily    nitroglycerin (NITROSTAT) 0.4 mg, Every 5 min PRN    pantoprazole (PROTONIX) 40 mg, oral, Daily    sevelamer carbonate (Renvela) 800 mg tablet Take 2 tablets (1,600 mg) by mouth 3 times daily (morning, midday, late afternoon). Three times daily with meals        Last recorded vital:  N/a virtual    Physical exam  N/a virtual    Pertinent labs:  CBC Differential Path Review   Date/Time Value Ref Range Status   10/20/2022 05:45 AM GINA  Final     Comment:      By her/his signature above, the Pathologist   listed as making the final interpretation   certifies that she/he has personally reviewed    this case.  ----------------------------------------------   MICROCYTIC ANEMIA WITH ANISOPOIKILOCYTOSIS AND POLYCHROMASIA.  IRON, B12 AND FOLATE STUDIES MAYBE CONSIDERED IF CLINICALLY INDICATED.       Prostate Specific AG   Date/Time Value Ref Range Status   12/10/2024 04:13 PM 0.55 <=4.00 ng/mL Final     Dx:  Problem List Items Addressed This Visit    None  Visit Diagnoses       Malignant neoplasm of prostate (Multi)    -  Primary    Relevant Orders    Prostate Specific Antigen    Clinic Appointment Request Follow Up; RUPINDER ROBINS (virtual); [facility] [address] [facility] (virtual) "    Clinic Appointment Request Follow Up; MINH ROBINS (virtual); [facility] [address] [facility] (virtual) (Completed)        PSA of 0.55 was reviewed and stable. Will continue q6m PSA Review of latent SE including rectal bleeding, hematuria, urinary strictures, ED where reviewed as well as how to contact office if s/s present. Denies latent SE. NCCN guidelines where reviewed and routine FUV of every 3m for first year and every 6m for four years for a total of five years was discussed. Patient verbalized understanding.      PLAN:  FUV 6m  Labs PSA in 6m  Imaging none  FUV other providers: PCP for routine evals    Please contact office with any concerns:  Minh Armando CNP  [phone]    I performed this visit using realtime telehealth tools, including an audio/video OR telephone connection between patient’s name and location Franke, William and Minh Robins CNP.    2. POS 10: Telehealth provided in patient's home.  o Patient is located in their home (which is a location other than a hospital or other facility where the patient receives care in a private residence) when receiving health services or health related services through telecommunication technology.

## 2025-01-11 LAB
FUNGUS SPEC CULT: NORMAL
FUNGUS SPEC FUNGUS STN: NORMAL

## 2025-01-17 ENCOUNTER — APPOINTMENT (OUTPATIENT)
Dept: CARDIOLOGY | Facility: CLINIC | Age: 75
End: 2025-01-17
Payer: MEDICARE

## 2025-01-30 ENCOUNTER — HOSPITAL ENCOUNTER (OUTPATIENT)
Dept: RADIOLOGY | Facility: HOSPITAL | Age: 75
Discharge: HOME | End: 2025-01-30
Payer: MEDICARE

## 2025-01-30 ENCOUNTER — OFFICE VISIT (OUTPATIENT)
Dept: ORTHOPEDIC SURGERY | Facility: HOSPITAL | Age: 75
End: 2025-01-30
Payer: MEDICARE

## 2025-01-30 VITALS — HEIGHT: 68 IN | WEIGHT: 176 LBS | BODY MASS INDEX: 26.67 KG/M2

## 2025-01-30 DIAGNOSIS — M17.11 PRIMARY OSTEOARTHRITIS OF RIGHT KNEE: Primary | ICD-10-CM

## 2025-01-30 DIAGNOSIS — M25.561 RIGHT KNEE PAIN, UNSPECIFIED CHRONICITY: ICD-10-CM

## 2025-01-30 PROCEDURE — 1159F MED LIST DOCD IN RCRD: CPT | Performed by: ORTHOPAEDIC SURGERY

## 2025-01-30 PROCEDURE — 1111F DSCHRG MED/CURRENT MED MERGE: CPT | Performed by: ORTHOPAEDIC SURGERY

## 2025-01-30 PROCEDURE — 99204 OFFICE O/P NEW MOD 45 MIN: CPT | Performed by: ORTHOPAEDIC SURGERY

## 2025-01-30 PROCEDURE — 99214 OFFICE O/P EST MOD 30 MIN: CPT | Performed by: ORTHOPAEDIC SURGERY

## 2025-01-30 PROCEDURE — 1123F ACP DISCUSS/DSCN MKR DOCD: CPT | Performed by: ORTHOPAEDIC SURGERY

## 2025-01-30 PROCEDURE — 73564 X-RAY EXAM KNEE 4 OR MORE: CPT | Mod: RT

## 2025-01-30 PROCEDURE — 1036F TOBACCO NON-USER: CPT | Performed by: ORTHOPAEDIC SURGERY

## 2025-01-30 ASSESSMENT — PAIN - FUNCTIONAL ASSESSMENT: PAIN_FUNCTIONAL_ASSESSMENT: NO/DENIES PAIN

## 2025-01-30 NOTE — LETTER
January 30, 2025     Marianna Galloway MD  [address]   [place] [facility], [address]  [place] [address]    Patient: William A Franke   YOB: 1950   Date of Visit: 1/30/2025       Dear Dr. Marianna Galloway MD:    Thank you for referring William Franke to me for evaluation. Below are my notes for this consultation.  If you have questions, please do not hesitate to call me. I look forward to following your patient along with you.       Sincerely,     Sherman Wang, DO      CC: No Recipients  ______________________________________________________________________________________    PRIMARY CARE PHYSICIAN: Marianna Galloway MD  REFERRING PROVIDER: No referring provider defined for this encounter.     CONSULT ORTHOPAEDIC: Knee Evaluation        ASSESSMENT & PLAN    IMPRESSION:   1.  Primary arthritis, right knee    PLAN:   Discussed the patient's findings above.  Reviewed his current x-rays with him.  His severe arthritis of right knee.  Reviewed his medical history and discussed that ultimately in the event that he would need a knee replacement would recommend he get an evaluation to see if he be candidate for kidney transplant prior to surgery as this is hard stop in our surgical process at the moment.  Currently he is responded well to Tylenol and overall feels well and wants to proceed with continued conservative care and declined corticosteroid junction today.  May proceed with cortisone injection in the future if necessary.  Otherwise we will follow-up as needed.      SUBJECTIVE  CHIEF COMPLAINT:   Chief Complaint   Patient presents with   • Right Knee - Pain        HPI: William A Franke is a 74 y.o. patient. William A Franke has had progressive problems with their right knee over the past 5 weeks. They do not report any trauma. He states his pain started when his knee joint shifted when he was walking. They do not report any constant or progressive numbness or tingling in their legs. Their symptoms  are interfering with activities which include right knee anterior sided pain which has subsided 1 week ago. He was in he ED for a staph infection on 12/21/2024 and is currently on dialysis. He has ESRD secondary to his T2DM. He has multiple stents but is not on thinners due to a history of bleeding from his fistula. He has never had an injection in his knees. Currently his pain has completely resolved spontaneously.    FUNCTIONAL STATUS: not limited.  AMBULATORY STATUS:  independent  PREVIOUS TREATMENTS: Surgery R knee scope 20-30 years ago with improvement until 5 weeks ago  Tylenol, completed, with mild improvement   HISTORY OF SURGERY ON AFFECTED KNEE(S): Yes, R knee scope 20-30 years ago       REVIEW OF SYSTEMS  Constitutional: See HPI for pain assessment, No significant weight loss, recent trauma  Cardiovascular: No chest pain, shortness of breath  Respiratory: No difficulty breathing, cough  Gastrointestinal: No nausea, vomiting, diarrhea, constipation  Musculoskeletal: Noted in HPI, positive for pain, restricted motion, stiffness  Integumentary: No rashes, easy bruising, redness   Neurological: no numbness or tingling in extremities, no gait disturbances   Psychiatric: No mood changes, memory changes, social issues  Heme/Lymph: no excessive swelling, easy bruising, excessive bleeding  ENT: No hearing changes  Eyes: No vision changes    Past Medical History:   Diagnosis Date   • Chronic kidney disease    • Diabetes mellitus (Multi)    • ESRD (end stage renal disease) on dialysis (Multi)    • Heart murmur    • HLD (hyperlipidemia)    • Hypertension    • Personal history of other endocrine, nutritional and metabolic disease     History of hypercholesterolemia   • Personal history of other specified conditions 08/28/2020    History of chest pain        No Known Allergies     Past Surgical History:   Procedure Laterality Date   • AV FISTULA PLACEMENT Left 07/31/2024    left brachial artery to axillary vein AV  graft on 7/31/2024   • KNEE ARTHROSCOPY W/ DEBRIDEMENT  04/15/2013    Arthroscopy Knee   • SHOULDER SURGERY  04/15/2013    Shoulder Surgery        Family History   Problem Relation Name Age of Onset   • Alzheimer's disease Mother     • Heart attack Mother     • Diabetes Father     • Heart attack Father          Social History     Socioeconomic History   • Marital status:      Spouse name: Not on file   • Number of children: Not on file   • Years of education: Not on file   • Highest education level: Not on file   Occupational History   • Not on file   Tobacco Use   • Smoking status: Never   • Smokeless tobacco: Never   Substance and Sexual Activity   • Alcohol use: Not Currently     Comment: rarely   • Drug use: Never   • Sexual activity: Not on file   Other Topics Concern   • Not on file   Social History Narrative   • Not on file     Social Drivers of Health     Financial Resource Strain: Low Risk  (12/22/2024)    Overall Financial Resource Strain (CARDIA)    • Difficulty of Paying Living Expenses: Not hard at all   Food Insecurity: No Food Insecurity (12/22/2024)    Hunger Vital Sign    • Worried About Running Out of Food in the Last Year: Never true    • Ran Out of Food in the Last Year: Never true   Transportation Needs: No Transportation Needs (12/22/2024)    PRAPARE - Transportation    • Lack of Transportation (Medical): No    • Lack of Transportation (Non-Medical): No   Physical Activity: Sufficiently Active (12/22/2024)    Exercise Vital Sign    • Days of Exercise per Week: 6 days    • Minutes of Exercise per Session: 60 min   Stress: No Stress Concern Present (12/22/2024)    [employer] [place] [employer] - Occupational Stress Questionnaire    • Feeling of Stress : Not at all   Social Connections: Moderately Integrated (12/22/2024)    Social Connection and Isolation Panel [NHANES]    • Frequency of Communication with Friends and Family: More than three times a week    • Frequency of Social  "Gatherings with Friends and Family: More than three times a week    • Attends Mu-ism Services: More than 4 times per year    • Active Member of Clubs or Organizations: No    • Attends Club or Organization Meetings: Never    • Marital Status:    Intimate Partner Violence: Not At Risk (12/22/2024)    Humiliation, Afraid, Rape, and Kick questionnaire    • Fear of Current or Ex-Partner: No    • Emotionally Abused: No    • Physically Abused: No    • Sexually Abused: No   Housing Stability: High Risk (12/27/2024)    Housing Stability Vital Sign    • Unable to Pay for Housing in the Last Year: Yes    • Number of Times Moved in the Last Year: 0    • Homeless in the Last Year: No        CURRENT MEDICATIONS:   Current Outpatient Medications   Medication Sig Dispense Refill   • allopurinol (Zyloprim) 100 mg tablet Take 1 tablet (100 mg) by mouth once daily. 90 tablet 0   • amLODIPine (Norvasc) 5 mg tablet Take 1 tablet (5 mg) by mouth once daily.     • aspirin 81 mg EC tablet Take 1 tablet (81 mg) by mouth once daily.     • atorvastatin (Lipitor) 80 mg tablet Take 1 tablet (80 mg) by mouth once daily at bedtime. 90 tablet 0   • B complex-vitamin C-folic acid (Nephro-Shea) 0.8 mg tablet Take 1 tablet by mouth once daily.     • BD Ultra-Fine Mini Pen Needle 31 gauge x 3/16\" needle USE AS DIRECTED 4 times a day 400 each 2   • carvedilol (Coreg) 6.25 mg tablet Take 1 tablet (6.25 mg) by mouth 2 times daily (morning and late afternoon). 180 tablet 3   • ceFAZolin (Ancef) 2 gram/50 mL IV Infuse 50 mL (2 g) at 100 mL/hr over 30 minutes into a venous catheter once daily on dialysis days. 1500 mL 1   • ergocalciferol (Vitamin D-2) 1.25 MG (92108 UT) capsule Take 1 capsule (50,000 Units) by mouth every 14 (fourteen) days. Takes every other Sunday     • ezetimibe (Zetia) 10 mg tablet Take 1 tablet (10 mg) by mouth once daily. 90 tablet 0   • gabapentin (Neurontin) 300 mg capsule Take 1 capsule (300 mg) by mouth once daily at " bedtime.     • hydrALAZINE (Apresoline) 50 mg tablet Take 1 tablet (50 mg) by mouth 3 times a day. 270 tablet 3   • insulin lispro (HumaLOG) 100 unit/mL injection Inject 10 Units under the skin 3 times daily (morning, midday, late afternoon).     • isosorbide mononitrate ER (Imdur) 60 mg 24 hr tablet Take 1 tablet (60 mg) by mouth once daily. Do not crush or chew. 90 tablet 3   • Januvia 25 mg tablet Take 1 tablet (25 mg) by mouth once daily. 90 tablet 3   • nitroglycerin (Nitrostat) 0.4 mg SL tablet Place 1 tablet (0.4 mg) under the tongue every 5 minutes if needed for chest pain.     • pantoprazole (ProtoNix) 40 mg EC tablet Take 1 tablet (40 mg) by mouth once daily. 90 tablet 0   • sevelamer carbonate (Renvela) 800 mg tablet Take 2 tablets (1,600 mg) by mouth 3 times daily (morning, midday, late afternoon). Three times daily with meals       No current facility-administered medications for this visit.        OBJECTIVE    PHYSICAL EXAM      12/31/2024    10:15 AM 12/31/2024    10:20 AM 12/31/2024    10:25 AM 12/31/2024    10:30 AM 12/31/2024    10:35 AM 12/31/2024    10:40 AM 12/31/2024    10:59 AM   Vitals   Systolic 108 102 117 106 119 119 110   Diastolic 52 44 55 52 54 54 60   BP Location       Right arm   Heart Rate 62 58 67 80 58 79 78   Temp     36.3 °C (97.3 °F)  36.8 °C (98.2 °F)   Resp 21 16 18 12 16 23 18      There is no height or weight on file to calculate BMI.    GENERAL: A/Ox3, NAD. Appears healthy, well nourished  PSYCHIATRIC: Mood stable, appropriate memory recall  EYES: EOM intact, no scleral icterus  CARDIAC: regular rate  LUNGS: Breathing non-labored  SKIN: no erythema, rashes, or ecchymoses     MUSCULOSKELETAL:  Laterality: right Knee Exam  - Alignment: partial correctible valgus deformity  - ROM: 5-115 degrees  - Effusion: none  - Strength: knee extension and flexion 5/5, EHL/PF/DF motor intact  - Palpation: TTP along  medial and lateral  joint line  - Stability: Anterior/Posterior stable,  varus/valgus stable  - Gait: normal  - Hip Exam: flexion to 100+ degrees, full extension, internal/external rotation adequate, and no pain with log roll  - Special Tests: none performed      NEUROVASCULAR:  - Neurovascular Status: sensation intact to light touch distally  - Capillary refill brisk at extremities, Bilateral dorsalis pedis pulse 2+     IMAGING:  Multiple views of the affected right knee(s) demonstrate: Severe tricompartment osteoarthritis most notably lateral compartment complete joint space narrowing, subchondral sclerosis, marginal osteophytic change and valgus deformity.   X-rays were personally reviewed and interpreted by me.  Radiology reports were reviewed by me as well, if readily available at the time.        Sherman Wang DO  Attending Surgeon  Joint Replacement and Adult Reconstructive Surgery  [place], [place]

## 2025-01-30 NOTE — PROGRESS NOTES
PRIMARY CARE PHYSICIAN: Marianna Galloway MD  REFERRING PROVIDER: No referring provider defined for this encounter.     CONSULT ORTHOPAEDIC: Knee Evaluation        ASSESSMENT & PLAN    IMPRESSION:   1.  Primary arthritis, right knee    PLAN:   Discussed the patient's findings above.  Reviewed his current x-rays with him.  His severe arthritis of right knee.  Reviewed his medical history and discussed that ultimately in the event that he would need a knee replacement would recommend he get an evaluation to see if he be candidate for kidney transplant prior to surgery as this is hard stop in our surgical process at the moment.  Currently he is responded well to Tylenol and overall feels well and wants to proceed with continued conservative care and declined corticosteroid junction today.  May proceed with cortisone injection in the future if necessary.  Otherwise we will follow-up as needed.      SUBJECTIVE  CHIEF COMPLAINT:   Chief Complaint   Patient presents with    Right Knee - Pain        HPI: William A Franke is a 74 y.o. patient. William A Franke has had progressive problems with their right knee over the past 5 weeks. They do not report any trauma. He states his pain started when his knee joint shifted when he was walking. They do not report any constant or progressive numbness or tingling in their legs. Their symptoms are interfering with activities which include right knee anterior sided pain which has subsided 1 week ago. He was in he ED for a staph infection on 12/21/2024 and is currently on dialysis. He has ESRD secondary to his T2DM. He has multiple stents but is not on thinners due to a history of bleeding from his fistula. He has never had an injection in his knees. Currently his pain has completely resolved spontaneously.    FUNCTIONAL STATUS: not limited.  AMBULATORY STATUS:  independent  PREVIOUS TREATMENTS: Surgery R knee scope 20-30 years ago with improvement until 5 weeks ago  Tylenol, completed, with  mild improvement   HISTORY OF SURGERY ON AFFECTED KNEE(S): Yes, R knee scope 20-30 years ago       REVIEW OF SYSTEMS  Constitutional: See HPI for pain assessment, No significant weight loss, recent trauma  Cardiovascular: No chest pain, shortness of breath  Respiratory: No difficulty breathing, cough  Gastrointestinal: No nausea, vomiting, diarrhea, constipation  Musculoskeletal: Noted in HPI, positive for pain, restricted motion, stiffness  Integumentary: No rashes, easy bruising, redness   Neurological: no numbness or tingling in extremities, no gait disturbances   Psychiatric: No mood changes, memory changes, social issues  Heme/Lymph: no excessive swelling, easy bruising, excessive bleeding  ENT: No hearing changes  Eyes: No vision changes    Past Medical History:   Diagnosis Date    Chronic kidney disease     Diabetes mellitus (Multi)     ESRD (end stage renal disease) on dialysis (Multi)     Heart murmur     HLD (hyperlipidemia)     Hypertension     Personal history of other endocrine, nutritional and metabolic disease     History of hypercholesterolemia    Personal history of other specified conditions 08/28/2020    History of chest pain        No Known Allergies     Past Surgical History:   Procedure Laterality Date    AV FISTULA PLACEMENT Left 07/31/2024    left brachial artery to axillary vein AV graft on 7/31/2024    KNEE ARTHROSCOPY W/ DEBRIDEMENT  04/15/2013    Arthroscopy Knee    SHOULDER SURGERY  04/15/2013    Shoulder Surgery        Family History   Problem Relation Name Age of Onset    Alzheimer's disease Mother      Heart attack Mother      Diabetes Father      Heart attack Father          Social History     Socioeconomic History    Marital status:      Spouse name: Not on file    Number of children: Not on file    Years of education: Not on file    Highest education level: Not on file   Occupational History    Not on file   Tobacco Use    Smoking status: Never    Smokeless tobacco: Never    Substance and Sexual Activity    Alcohol use: Not Currently     Comment: rarely    Drug use: Never    Sexual activity: Not on file   Other Topics Concern    Not on file   Social History Narrative    Not on file     Social Drivers of Health     Financial Resource Strain: Low Risk  (12/22/2024)    Overall Financial Resource Strain (CARDIA)     Difficulty of Paying Living Expenses: Not hard at all   Food Insecurity: No Food Insecurity (12/22/2024)    Hunger Vital Sign     Worried About Running Out of Food in the Last Year: Never true     Ran Out of Food in the Last Year: Never true   Transportation Needs: No Transportation Needs (12/22/2024)    PRAPARE - Transportation     Lack of Transportation (Medical): No     Lack of Transportation (Non-Medical): No   Physical Activity: Sufficiently Active (12/22/2024)    Exercise Vital Sign     Days of Exercise per Week: 6 days     Minutes of Exercise per Session: 60 min   Stress: No Stress Concern Present (12/22/2024)    [employer] [place] [employer] - Occupational Stress Questionnaire     Feeling of Stress : Not at all   Social Connections: Moderately Integrated (12/22/2024)    Social Connection and Isolation Panel [NHANES]     Frequency of Communication with Friends and Family: More than three times a week     Frequency of Social Gatherings with Friends and Family: More than three times a week     Attends Caodaism Services: More than 4 times per year     Active Member of Clubs or Organizations: No     Attends Club or Organization Meetings: Never     Marital Status:    Intimate Partner Violence: Not At Risk (12/22/2024)    Humiliation, Afraid, Rape, and Kick questionnaire     Fear of Current or Ex-Partner: No     Emotionally Abused: No     Physically Abused: No     Sexually Abused: No   Housing Stability: High Risk (12/27/2024)    Housing Stability Vital Sign     Unable to Pay for Housing in the Last Year: Yes     Number of Times Moved in the Last Year: 0     " Homeless in the Last Year: No        CURRENT MEDICATIONS:   Current Outpatient Medications   Medication Sig Dispense Refill    allopurinol (Zyloprim) 100 mg tablet Take 1 tablet (100 mg) by mouth once daily. 90 tablet 0    amLODIPine (Norvasc) 5 mg tablet Take 1 tablet (5 mg) by mouth once daily.      aspirin 81 mg EC tablet Take 1 tablet (81 mg) by mouth once daily.      atorvastatin (Lipitor) 80 mg tablet Take 1 tablet (80 mg) by mouth once daily at bedtime. 90 tablet 0    B complex-vitamin C-folic acid (Nephro-Shea) 0.8 mg tablet Take 1 tablet by mouth once daily.      BD Ultra-Fine Mini Pen Needle 31 gauge x 3/16\" needle USE AS DIRECTED 4 times a day 400 each 2    carvedilol (Coreg) 6.25 mg tablet Take 1 tablet (6.25 mg) by mouth 2 times daily (morning and late afternoon). 180 tablet 3    ceFAZolin (Ancef) 2 gram/50 mL IV Infuse 50 mL (2 g) at 100 mL/hr over 30 minutes into a venous catheter once daily on dialysis days. 1500 mL 1    ergocalciferol (Vitamin D-2) 1.25 MG (36697 UT) capsule Take 1 capsule (50,000 Units) by mouth every 14 (fourteen) days. Takes every other Sunday      ezetimibe (Zetia) 10 mg tablet Take 1 tablet (10 mg) by mouth once daily. 90 tablet 0    gabapentin (Neurontin) 300 mg capsule Take 1 capsule (300 mg) by mouth once daily at bedtime.      hydrALAZINE (Apresoline) 50 mg tablet Take 1 tablet (50 mg) by mouth 3 times a day. 270 tablet 3    insulin lispro (HumaLOG) 100 unit/mL injection Inject 10 Units under the skin 3 times daily (morning, midday, late afternoon).      isosorbide mononitrate ER (Imdur) 60 mg 24 hr tablet Take 1 tablet (60 mg) by mouth once daily. Do not crush or chew. 90 tablet 3    Januvia 25 mg tablet Take 1 tablet (25 mg) by mouth once daily. 90 tablet 3    nitroglycerin (Nitrostat) 0.4 mg SL tablet Place 1 tablet (0.4 mg) under the tongue every 5 minutes if needed for chest pain.      pantoprazole (ProtoNix) 40 mg EC tablet Take 1 tablet (40 mg) by mouth once daily. " 90 tablet 0    sevelamer carbonate (Renvela) 800 mg tablet Take 2 tablets (1,600 mg) by mouth 3 times daily (morning, midday, late afternoon). Three times daily with meals       No current facility-administered medications for this visit.        OBJECTIVE    PHYSICAL EXAM      12/31/2024    10:15 AM 12/31/2024    10:20 AM 12/31/2024    10:25 AM 12/31/2024    10:30 AM 12/31/2024    10:35 AM 12/31/2024    10:40 AM 12/31/2024    10:59 AM   Vitals   Systolic 108 102 117 106 119 119 110   Diastolic 52 44 55 52 54 54 60   BP Location       Right arm   Heart Rate 62 58 67 80 58 79 78   Temp     36.3 °C (97.3 °F)  36.8 °C (98.2 °F)   Resp 21 16 18 12 16 23 18      There is no height or weight on file to calculate BMI.    GENERAL: A/Ox3, NAD. Appears healthy, well nourished  PSYCHIATRIC: Mood stable, appropriate memory recall  EYES: EOM intact, no scleral icterus  CARDIAC: regular rate  LUNGS: Breathing non-labored  SKIN: no erythema, rashes, or ecchymoses     MUSCULOSKELETAL:  Laterality: right Knee Exam  - Alignment: partial correctible valgus deformity  - ROM: 5-115 degrees  - Effusion: none  - Strength: knee extension and flexion 5/5, EHL/PF/DF motor intact  - Palpation: TTP along  medial and lateral  joint line  - Stability: Anterior/Posterior stable, varus/valgus stable  - Gait: normal  - Hip Exam: flexion to 100+ degrees, full extension, internal/external rotation adequate, and no pain with log roll  - Special Tests: none performed      NEUROVASCULAR:  - Neurovascular Status: sensation intact to light touch distally  - Capillary refill brisk at extremities, Bilateral dorsalis pedis pulse 2+     IMAGING:  Multiple views of the affected right knee(s) demonstrate: Severe tricompartment osteoarthritis most notably lateral compartment complete joint space narrowing, subchondral sclerosis, marginal osteophytic change and valgus deformity.   X-rays were personally reviewed and interpreted by me.  Radiology reports were  reviewed by me as well, if readily available at the time.        Sherman Wang,   Attending Surgeon  Joint Replacement and Adult Reconstructive Surgery  [place], [place]

## 2025-01-31 ENCOUNTER — TELEMEDICINE (OUTPATIENT)
Dept: PRIMARY CARE | Facility: CLINIC | Age: 75
End: 2025-01-31
Payer: MEDICARE

## 2025-01-31 DIAGNOSIS — L30.9 DERMATITIS: ICD-10-CM

## 2025-01-31 PROCEDURE — 99213 OFFICE O/P EST LOW 20 MIN: CPT | Performed by: INTERNAL MEDICINE

## 2025-01-31 PROCEDURE — 1123F ACP DISCUSS/DSCN MKR DOCD: CPT | Performed by: INTERNAL MEDICINE

## 2025-01-31 RX ORDER — TRIAMCINOLONE ACETONIDE 5 MG/G
CREAM TOPICAL 3 TIMES DAILY
Qty: 30 G | Refills: 0 | Status: SHIPPED | OUTPATIENT
Start: 2025-01-31

## 2025-02-05 DIAGNOSIS — I26.90 ACUTE SEPTIC PULMONARY EMBOLISM WITHOUT ACUTE COR PULMONALE (MULTI): Primary | ICD-10-CM

## 2025-02-06 LAB
ALBUMIN SERPL-MCNC: 4.2 G/DL (ref 3.6–5.1)
ALP SERPL-CCNC: 112 U/L (ref 35–144)
ALT SERPL-CCNC: <3 U/L (ref 9–46)
ANION GAP SERPL CALCULATED.4IONS-SCNC: 16 MMOL/L (CALC) (ref 7–17)
AST SERPL-CCNC: 16 U/L (ref 10–35)
BASOPHILS # BLD AUTO: 89 CELLS/UL (ref 0–200)
BASOPHILS NFR BLD AUTO: 1.2 %
BILIRUB SERPL-MCNC: 0.6 MG/DL (ref 0.2–1.2)
BUN SERPL-MCNC: 33 MG/DL (ref 7–25)
CALCIUM SERPL-MCNC: 9.5 MG/DL (ref 8.6–10.3)
CHLORIDE SERPL-SCNC: 96 MMOL/L (ref 98–110)
CO2 SERPL-SCNC: 27 MMOL/L (ref 20–32)
CREAT SERPL-MCNC: 8.98 MG/DL (ref 0.7–1.28)
EGFRCR SERPLBLD CKD-EPI 2021: 6 ML/MIN/1.73M2
EOSINOPHIL # BLD AUTO: 400 CELLS/UL (ref 15–500)
EOSINOPHIL NFR BLD AUTO: 5.4 %
ERYTHROCYTE [DISTWIDTH] IN BLOOD BY AUTOMATED COUNT: 15.5 % (ref 11–15)
GLUCOSE SERPL-MCNC: 116 MG/DL (ref 65–99)
HCT VFR BLD AUTO: 36.7 % (ref 38.5–50)
HGB BLD-MCNC: 12 G/DL (ref 13.2–17.1)
LYMPHOCYTES # BLD AUTO: 1103 CELLS/UL (ref 850–3900)
LYMPHOCYTES NFR BLD AUTO: 14.9 %
MCH RBC QN AUTO: 31.6 PG (ref 27–33)
MCHC RBC AUTO-ENTMCNC: 32.7 G/DL (ref 32–36)
MCV RBC AUTO: 96.6 FL (ref 80–100)
MONOCYTES # BLD AUTO: 784 CELLS/UL (ref 200–950)
MONOCYTES NFR BLD AUTO: 10.6 %
NEUTROPHILS # BLD AUTO: 5025 CELLS/UL (ref 1500–7800)
NEUTROPHILS NFR BLD AUTO: 67.9 %
PLATELET # BLD AUTO: 218 THOUSAND/UL (ref 140–400)
PMV BLD REES-ECKER: 9.8 FL (ref 7.5–12.5)
POTASSIUM SERPL-SCNC: 4.5 MMOL/L (ref 3.5–5.3)
PROT SERPL-MCNC: 6.5 G/DL (ref 6.1–8.1)
RBC # BLD AUTO: 3.8 MILLION/UL (ref 4.2–5.8)
SODIUM SERPL-SCNC: 139 MMOL/L (ref 135–146)
WBC # BLD AUTO: 7.4 THOUSAND/UL (ref 3.8–10.8)

## 2025-02-07 LAB — BODY SURFACE AREA: 1.96 M2

## 2025-02-10 ENCOUNTER — APPOINTMENT (OUTPATIENT)
Dept: PRIMARY CARE | Facility: CLINIC | Age: 75
End: 2025-02-10
Payer: MEDICARE

## 2025-02-10 VITALS
BODY MASS INDEX: 25.61 KG/M2 | DIASTOLIC BLOOD PRESSURE: 56 MMHG | HEIGHT: 68 IN | SYSTOLIC BLOOD PRESSURE: 112 MMHG | WEIGHT: 169 LBS

## 2025-02-10 DIAGNOSIS — E78.2 MIXED HYPERLIPIDEMIA: ICD-10-CM

## 2025-02-10 DIAGNOSIS — D63.8 ANEMIA OF CHRONIC DISEASE: ICD-10-CM

## 2025-02-10 DIAGNOSIS — I10 HYPERTENSION, UNSPECIFIED TYPE: ICD-10-CM

## 2025-02-10 DIAGNOSIS — E11.9 TYPE 2 DIABETES MELLITUS WITHOUT COMPLICATION, WITHOUT LONG-TERM CURRENT USE OF INSULIN (MULTI): ICD-10-CM

## 2025-02-10 DIAGNOSIS — Z00.00 MEDICARE ANNUAL WELLNESS VISIT, SUBSEQUENT: Primary | ICD-10-CM

## 2025-02-10 ASSESSMENT — PATIENT HEALTH QUESTIONNAIRE - PHQ9
SUM OF ALL RESPONSES TO PHQ9 QUESTIONS 1 AND 2: 0
2. FEELING DOWN, DEPRESSED OR HOPELESS: NOT AT ALL
1. LITTLE INTEREST OR PLEASURE IN DOING THINGS: NOT AT ALL

## 2025-02-10 ASSESSMENT — ACTIVITIES OF DAILY LIVING (ADL)
TAKING_MEDICATION: INDEPENDENT
BATHING: INDEPENDENT
MANAGING_FINANCES: INDEPENDENT
GROCERY_SHOPPING: INDEPENDENT
DRESSING: INDEPENDENT
DOING_HOUSEWORK: INDEPENDENT

## 2025-02-10 ASSESSMENT — ENCOUNTER SYMPTOMS
DEPRESSION: 0
OCCASIONAL FEELINGS OF UNSTEADINESS: 0
LOSS OF SENSATION IN FEET: 0

## 2025-02-10 NOTE — PROGRESS NOTES
Subjective   Patient ID: Bill A Franke is a 74 y.o. male who presents for Medicare Annual Wellness Visit Subsequent and Med Refill.    Med Refill    Patient is here for Medicare wellness exam  Patient is here for follow-up on diabetes hypertension high cholesterol end-stage renal disease on hemodialysis  Overall patient is doing well  He needs refills on fenofibric gabapentin anxiety  His neuropathy is stable  Staying active and going to gym.    Past recap  Patient is here for follow-up on diabetes hypertension high cholesterol  He is not on any aspirin or any blood thinner because he bleeds during dialysis  He is worried about his stents closing up  Overall he is doing well  He manages his diabetes by taking extra insulin whenever he eats at home.  He does not watch his diet  He needs diabetic shoes because he has neuropathy and having some calluses buildup    Past recap   patient is here for follow-up  Follow-up on diabetes hypertension high cholesterol  Doing hemodialysis for end-stage renal disease     patient is here for follow-up  His fistula still has bleeding off-and-on but not as much  Follow-up on diabetes hypertension high cholesterol  He is getting dialysis 3 times a week  Overall doing fine      patient is here for hospital follow-up  He was admitted for bleeding AV fistula.  Required blood transfusion.  Needs refill on gabapentin and Zetia  Complains of having stiffness in the hands  Doing hemodialysis 3 times a week    patient was hospitalized and had another heart attack at [facility]  He is still complaining of having a lot of shortness of breath and cough  His kidney functions did deteriorate but because he is making urine did not get started on the dialysis yet  Is doing blood work today to reevaluate his kidney function  But his main concern is his cough     Patient went to the hospital and had MI  He was catheterized again and had stent placed  Since he came home he is having very poor  "appetite not eating drinking not feeling good and blood pressure is running high in 180s and 200s  He had stent placed 3 weeks ago     Patient here for follow-up on diabetes hypertension high cholesterol chronic kidney disease and anemia  Follow-up on blood work  Patient was hospitalized for another non-ST elevation MI  Since discharge his chest pain is doing better but he still getting off-and-on chest pain  Feels very anxious  Feels very limited in his activity        Patient is here with complaints of rectal bleed this morning he started bleeding did not realize his bleeding until it messed up his underwear and pants. He had similar episode 5 days ago  He is having some discomfort in the lower abdomen denies any fever or chills  He had problems with hemorrhoids many years ago. But he has no pain and denies constipation     Patient is here for hospital follow-up  He presented with chest pain and acute MI. He had to have cardiac cath done with close monitoring of kidney function  Cardiac stable  Patient had stent placed and did not require dialysis  He is here for follow-up on blood work for kidney  His blood sugars were doing better in the hospital. Now they are running high again  He is getting Procrit every 2 weeks            Virtual visit  Patient here for follow-up on diabetes   Patient is eating little better but not able to exercise because  Blood sugars are running high  The pressure is running little high but she is coming to see Dr. Ardon on Thursday  He is managing with his insulin blood sugar little better  Finished radiation treatment for prostate cancer  Works outdoors  Did blood work needs medications refilled   refuses to see the dietitian  Does not take Humalog insulin because of fear has appointment with the urologist        Review of Systems    Objective   /56   Ht 1.727 m (5' 8\")   Wt 76.7 kg (169 lb)   BMI 25.70 kg/m²     Physical Exam  Vitals reviewed.   Constitutional:       " Appearance: Normal appearance.   HENT:      Head: Normocephalic and atraumatic.      Right Ear: Tympanic membrane, ear canal and external ear normal.      Left Ear: Tympanic membrane, ear canal and external ear normal.      Nose: Nose normal.      Mouth/Throat:      Pharynx: Oropharynx is clear.   Eyes:      Extraocular Movements: Extraocular movements intact.      Conjunctiva/sclera: Conjunctivae normal.      Pupils: Pupils are equal, round, and reactive to light.   Cardiovascular:      Rate and Rhythm: Normal rate and regular rhythm.      Pulses: Normal pulses.      Heart sounds: Normal heart sounds.   Pulmonary:      Effort: Pulmonary effort is normal.      Breath sounds: Normal breath sounds.   Abdominal:      General: Abdomen is flat. Bowel sounds are normal.      Palpations: Abdomen is soft.   Musculoskeletal:      Cervical back: Normal range of motion and neck supple.   Skin:     General: Skin is warm and dry.      Comments: Callus on the bottom of the feet   Neurological:      General: No focal deficit present.      Mental Status: He is alert and oriented to person, place, and time.   Psychiatric:         Mood and Affect: Mood normal.         Assessment/Plan   Problem List Items Addressed This Visit          Cardiac and Vasculature    Hypertension    Relevant Orders    CBC    Comprehensive Metabolic Panel    Hemoglobin A1C    Lipid Panel    Hyperlipidemia    Relevant Orders    CBC    Comprehensive Metabolic Panel    Hemoglobin A1C    Lipid Panel       Endocrine/Metabolic    Diabetes mellitus (Multi)    Relevant Orders    CBC    Comprehensive Metabolic Panel    Hemoglobin A1C    Lipid Panel       Hematology and Neoplasia    Anemia of chronic disease     Other Visit Diagnoses       Medicare annual wellness visit, subsequent    -  Primary          10/14  Call Dr. Palmer  He is trying to arrange outpatient follow-up for cardiac catheterization and carotid stent which she needs  Because of his anemia and  severe kidney disease he is planning in stages  He asked me to order CBC BMP and he will call tomorrow to schedule for Next week discussed with the patient and the wife agreed with the plan  Advised him to go to the emergency room if symptoms persist  Continue diet exercise follow-up in 3 months     11/2  Concerned that patient might be getting anemic or his kidney function would be getting worse  Stat CBC BMP ordered  Increase Coreg to 2 tablets twice a day  Blood work results reviewed  Kidney functions are in fact better anemia stable  Patient could be not feeling good because of elevated blood pressure  Recheck in 2 weeks     9/9  Will get x-ray chest  Tessalon cough drops albuterol inhaler  Cholesterol medication refill  Blood pressure is stable  Will see if kidney functions looks okay to hold off dialysis  Patient wife is planning to be able to go to [place] before start of dialysis     5/1/2023  Will order blood work to make sure anemia is stable  Patient is under care of endocrinologist for diabetes  He is under care of nephrologist for kidneys  He follows up with cardiology regularly and stable  Refills given on iron and gabapentin and Zetia     5/19/2023  Clinically patient is stable blood pressure is stable  Routine blood work ordered for 3 months  His diabetes is doing better  Medications refilled    8/18/2023  Blood work reviewed  A1c still high  Blood pressure stable  Cholesterol okay  Discussed different food options  Patient eats a lot of processed food and to eat out a lot  Discussed better options  Continue current medications  Follow-up blood work in 3 months    11/10/2023  Blood work reviewed  Triglycerides have gone up to 388  A1c still 7.7 patient is under care of endocrinologist  Start fenofibrate  Discussed high triglycerides this will put him at risk for blockage in coronary arteries at this time  Again discussed diet and exercise  Follow-up blood work in 3 months    2/12/2024  Blood work  reviewed  A1c down to 6.9 anemia stable  Cholesterol good but triglycerides always higher  Follow-up blood work in 3 months continue current medications  Patient is doing dialysis    5/31/2024  Blood pressure stable  Cardiac status is stable.  Patient denies any angina  A1c 7.1 well-controlled  Hemoglobin 9.1 stable gets epo shots  Triglycerides elevated  Discussed diet and exercise  Stable on current medications  Diabetic shoes ordered for diabetic neuropathy and callus issues  Follow-up blood work in 3 months    8/19/2024  Patient's blood work reviewed  A1c 6.5 well-controlled  Cholesterol well-controlled triglycerides slightly elevated  Chronic anemia stable  End-stage renal disease on hemodialysis  Medications refilled  Neuropathy stable with gabapentin  Follow-up blood work in 3 months    11/11/2024  Blood work reviewed  A1c down to 6.3  Triglycerides 172 cholesterol well-controlled  CAD stable  Blood pressure repeat reading stable  Dialysis scheduled for tomorrow which helps with his blood pressure  Medications refilled  Follow-up in 3 months    2/10/2025  Medicare annual wellness exam  Mini-Mental status perfect  Blood pressure stable  Cardiac status stable  Anemia doing better  Continue with the hemodialysis  Follow-up blood work in 3 months

## 2025-02-14 ENCOUNTER — HOSPITAL ENCOUNTER (OUTPATIENT)
Dept: RADIOLOGY | Facility: HOSPITAL | Age: 75
Discharge: HOME | End: 2025-02-14
Payer: MEDICARE

## 2025-02-14 DIAGNOSIS — I26.90 ACUTE SEPTIC PULMONARY EMBOLISM WITHOUT ACUTE COR PULMONALE (MULTI): ICD-10-CM

## 2025-02-14 PROCEDURE — 71250 CT THORAX DX C-: CPT

## 2025-02-16 NOTE — PROGRESS NOTES
Subjective   Patient ID: Bill A Franke is a 74 y.o. male who presents for Virtual Visit.    Patient is here with complaints of having skin issues.  Having red spots on the arms for past 7 days  No itching no pain no change in any products     Past recap  Patient is here for follow-up on diabetes hypertension high cholesterol end-stage renal disease on hemodialysis  Overall patient is doing well  He needs refills on fenofibric gabapentin anxiety  His neuropathy is stable  Staying active and going to gym.    Past recap  Patient is here for follow-up on diabetes hypertension high cholesterol  He is not on any aspirin or any blood thinner because he bleeds during dialysis  He is worried about his stents closing up  Overall he is doing well  He manages his diabetes by taking extra insulin whenever he eats at home.  He does not watch his diet  He needs diabetic shoes because he has neuropathy and having some calluses buildup    Past recap   patient is here for follow-up  Follow-up on diabetes hypertension high cholesterol  Doing hemodialysis for end-stage renal disease     patient is here for follow-up  His fistula still has bleeding off-and-on but not as much  Follow-up on diabetes hypertension high cholesterol  He is getting dialysis 3 times a week  Overall doing fine      patient is here for hospital follow-up  He was admitted for bleeding AV fistula.  Required blood transfusion.  Needs refill on gabapentin and Zetia  Complains of having stiffness in the hands  Doing hemodialysis 3 times a week    patient was hospitalized and had another heart attack at [facility]  He is still complaining of having a lot of shortness of breath and cough  His kidney functions did deteriorate but because he is making urine did not get started on the dialysis yet  Is doing blood work today to reevaluate his kidney function  But his main concern is his cough     Patient went to the hospital and had MI  He was catheterized again and  had stent placed  Since he came home he is having very poor appetite not eating drinking not feeling good and blood pressure is running high in 180s and 200s  He had stent placed 3 weeks ago     Patient here for follow-up on diabetes hypertension high cholesterol chronic kidney disease and anemia  Follow-up on blood work  Patient was hospitalized for another non-ST elevation MI  Since discharge his chest pain is doing better but he still getting off-and-on chest pain  Feels very anxious  Feels very limited in his activity        Patient is here with complaints of rectal bleed this morning he started bleeding did not realize his bleeding until it messed up his underwear and pants. He had similar episode 5 days ago  He is having some discomfort in the lower abdomen denies any fever or chills  He had problems with hemorrhoids many years ago. But he has no pain and denies constipation     Patient is here for hospital follow-up  He presented with chest pain and acute MI. He had to have cardiac cath done with close monitoring of kidney function  Cardiac stable  Patient had stent placed and did not require dialysis  He is here for follow-up on blood work for kidney  His blood sugars were doing better in the hospital. Now they are running high again  He is getting Procrit every 2 weeks            Virtual visit  Patient here for follow-up on diabetes   Patient is eating little better but not able to exercise because  Blood sugars are running high  The pressure is running little high but she is coming to see Dr. Ardon on Thursday  He is managing with his insulin blood sugar little better  Finished radiation treatment for prostate cancer  Works outdoors  Did blood work needs medications refilled   refuses to see the dietitian  Does not take Humalog insulin because of fear has appointment with the urologist        Review of Systems    Objective   There were no vitals taken for this visit.    On virtual visit today appears  discrete erythematous patches no cellulitis  Assessment/Plan   Problem List Items Addressed This Visit    None  Visit Diagnoses       Dermatitis        Relevant Medications    triamcinolone (Kenalog) 0.5 % cream        10/14  Call Dr. Palmer  He is trying to arrange outpatient follow-up for cardiac catheterization and carotid stent which she needs  Because of his anemia and severe kidney disease he is planning in stages  He asked me to order CBC BMP and he will call tomorrow to schedule for Next week discussed with the patient and the wife agreed with the plan  Advised him to go to the emergency room if symptoms persist  Continue diet exercise follow-up in 3 months     11/2  Concerned that patient might be getting anemic or his kidney function would be getting worse  Stat CBC BMP ordered  Increase Coreg to 2 tablets twice a day  Blood work results reviewed  Kidney functions are in fact better anemia stable  Patient could be not feeling good because of elevated blood pressure  Recheck in 2 weeks     9/9  Will get x-ray chest  Tessalon cough drops albuterol inhaler  Cholesterol medication refill  Blood pressure is stable  Will see if kidney functions looks okay to hold off dialysis  Patient wife is planning to be able to go to [place] before start of dialysis     5/1/2023  Will order blood work to make sure anemia is stable  Patient is under care of endocrinologist for diabetes  He is under care of nephrologist for kidneys  He follows up with cardiology regularly and stable  Refills given on iron and gabapentin and Zetia     5/19/2023  Clinically patient is stable blood pressure is stable  Routine blood work ordered for 3 months  His diabetes is doing better  Medications refilled    8/18/2023  Blood work reviewed  A1c still high  Blood pressure stable  Cholesterol okay  Discussed different food options  Patient eats a lot of processed food and to eat out a lot  Discussed better options  Continue current  medications  Follow-up blood work in 3 months    11/10/2023  Blood work reviewed  Triglycerides have gone up to 388  A1c still 7.7 patient is under care of endocrinologist  Start fenofibrate  Discussed high triglycerides this will put him at risk for blockage in coronary arteries at this time  Again discussed diet and exercise  Follow-up blood work in 3 months    2/12/2024  Blood work reviewed  A1c down to 6.9 anemia stable  Cholesterol good but triglycerides always higher  Follow-up blood work in 3 months continue current medications  Patient is doing dialysis    5/31/2024  Blood pressure stable  Cardiac status is stable.  Patient denies any angina  A1c 7.1 well-controlled  Hemoglobin 9.1 stable gets epo shots  Triglycerides elevated  Discussed diet and exercise  Stable on current medications  Diabetic shoes ordered for diabetic neuropathy and callus issues  Follow-up blood work in 3 months    8/19/2024  Patient's blood work reviewed  A1c 6.5 well-controlled  Cholesterol well-controlled triglycerides slightly elevated  Chronic anemia stable  End-stage renal disease on hemodialysis  Medications refilled  Neuropathy stable with gabapentin  Follow-up blood work in 3 months    11/11/2024  Blood work reviewed  A1c down to 6.3  Triglycerides 172 cholesterol well-controlled  CAD stable  Blood pressure repeat reading stable  Dialysis scheduled for tomorrow which helps with his blood pressure  Medications refilled  Follow-up in 3 months    1/31/2025  Patient complaining dermatitis from dry weather  Does not look like drug reaction  Does not look like cellulitis  Treat with topical steroid cream  Follow-up if not better

## 2025-02-19 PROBLEM — H01.9 DERMATITIS OF EYELID OF LEFT EYE: Status: ACTIVE | Noted: 2024-12-04

## 2025-02-21 ENCOUNTER — OFFICE VISIT (OUTPATIENT)
Dept: CARDIOLOGY | Facility: CLINIC | Age: 75
End: 2025-02-21
Payer: MEDICARE

## 2025-02-21 VITALS
DIASTOLIC BLOOD PRESSURE: 67 MMHG | WEIGHT: 164.1 LBS | OXYGEN SATURATION: 97 % | BODY MASS INDEX: 24.87 KG/M2 | HEIGHT: 68 IN | HEART RATE: 59 BPM | SYSTOLIC BLOOD PRESSURE: 114 MMHG

## 2025-02-21 DIAGNOSIS — I10 PRIMARY HYPERTENSION: Primary | ICD-10-CM

## 2025-02-21 DIAGNOSIS — E78.5 HYPERLIPIDEMIA, UNSPECIFIED HYPERLIPIDEMIA TYPE: ICD-10-CM

## 2025-02-21 PROCEDURE — 1036F TOBACCO NON-USER: CPT | Performed by: NURSE PRACTITIONER

## 2025-02-21 PROCEDURE — 1123F ACP DISCUSS/DSCN MKR DOCD: CPT | Performed by: NURSE PRACTITIONER

## 2025-02-21 PROCEDURE — 1159F MED LIST DOCD IN RCRD: CPT | Performed by: NURSE PRACTITIONER

## 2025-02-21 PROCEDURE — 99215 OFFICE O/P EST HI 40 MIN: CPT | Performed by: NURSE PRACTITIONER

## 2025-02-21 PROCEDURE — G2211 COMPLEX E/M VISIT ADD ON: HCPCS | Performed by: NURSE PRACTITIONER

## 2025-02-21 PROCEDURE — 1126F AMNT PAIN NOTED NONE PRSNT: CPT | Performed by: NURSE PRACTITIONER

## 2025-02-21 PROCEDURE — 1160F RVW MEDS BY RX/DR IN RCRD: CPT | Performed by: NURSE PRACTITIONER

## 2025-02-21 PROCEDURE — 3074F SYST BP LT 130 MM HG: CPT | Performed by: NURSE PRACTITIONER

## 2025-02-21 PROCEDURE — 3078F DIAST BP <80 MM HG: CPT | Performed by: NURSE PRACTITIONER

## 2025-02-21 ASSESSMENT — ENCOUNTER SYMPTOMS
DEPRESSION: 0
CONSTITUTIONAL NEGATIVE: 1
CARDIOVASCULAR NEGATIVE: 1
GASTROINTESTINAL NEGATIVE: 1
MUSCULOSKELETAL NEGATIVE: 1
RESPIRATORY NEGATIVE: 1
OCCASIONAL FEELINGS OF UNSTEADINESS: 0
LOSS OF SENSATION IN FEET: 0
NEUROLOGICAL NEGATIVE: 1

## 2025-02-21 ASSESSMENT — PATIENT HEALTH QUESTIONNAIRE - PHQ9
1. LITTLE INTEREST OR PLEASURE IN DOING THINGS: NOT AT ALL
SUM OF ALL RESPONSES TO PHQ9 QUESTIONS 1 AND 2: 0
2. FEELING DOWN, DEPRESSED OR HOPELESS: NOT AT ALL

## 2025-02-21 ASSESSMENT — COLUMBIA-SUICIDE SEVERITY RATING SCALE - C-SSRS
2. HAVE YOU ACTUALLY HAD ANY THOUGHTS OF KILLING YOURSELF?: NO
1. IN THE PAST MONTH, HAVE YOU WISHED YOU WERE DEAD OR WISHED YOU COULD GO TO SLEEP AND NOT WAKE UP?: NO
6. HAVE YOU EVER DONE ANYTHING, STARTED TO DO ANYTHING, OR PREPARED TO DO ANYTHING TO END YOUR LIFE?: NO

## 2025-02-21 ASSESSMENT — PAIN SCALES - GENERAL: PAINLEVEL_OUTOF10: 0-NO PAIN

## 2025-02-21 NOTE — PROGRESS NOTES
"Chief Complaint:   Follow-up    History Of Present Illness:    .Mr Franke returns in follow up.  Denies chest pain, sob, palpitations or pedal edema. Had some hospital stays.  Per Dr Ardon will hold hydralazine.  Had a recent CT.  Follows with ID.         Last Recorded Vitals:  Blood pressure 114/67, pulse 59, height 1.727 m (5' 8\"), weight 74.4 kg (164 lb 1.6 oz), SpO2 97%.     Past Medical History:  Past Medical History:   Diagnosis Date    Chronic kidney disease     Diabetes mellitus (Multi)     ESRD (end stage renal disease) on dialysis (Multi)     Heart murmur     HLD (hyperlipidemia)     Hypertension     Personal history of other endocrine, nutritional and metabolic disease     History of hypercholesterolemia    Personal history of other specified conditions 08/28/2020    History of chest pain        Past Surgical History:  Past Surgical History:   Procedure Laterality Date    AV FISTULA PLACEMENT Left 07/31/2024    left brachial artery to axillary vein AV graft on 7/31/2024    KNEE ARTHROSCOPY W/ DEBRIDEMENT  04/15/2013    Arthroscopy Knee    SHOULDER SURGERY  04/15/2013    Shoulder Surgery       Social History:  Social History     Socioeconomic History    Marital status:    Tobacco Use    Smoking status: Never    Smokeless tobacco: Never   Substance and Sexual Activity    Alcohol use: Not Currently     Comment: rarely    Drug use: Never     Social Drivers of Health     Financial Resource Strain: Low Risk  (12/22/2024)    Overall Financial Resource Strain (CARDIA)     Difficulty of Paying Living Expenses: Not hard at all   Food Insecurity: No Food Insecurity (12/22/2024)    Hunger Vital Sign     Worried About Running Out of Food in the Last Year: Never true     Ran Out of Food in the Last Year: Never true   Transportation Needs: No Transportation Needs (12/22/2024)    PRAPARE - Transportation     Lack of Transportation (Medical): No     Lack of Transportation (Non-Medical): No   Physical Activity: " Sufficiently Active (12/22/2024)    Exercise Vital Sign     Days of Exercise per Week: 6 days     Minutes of Exercise per Session: 60 min   Stress: No Stress Concern Present (12/22/2024)    [place] [employer] - Occupational Stress Questionnaire     Feeling of Stress : Not at all   Social Connections: Moderately Integrated (12/22/2024)    Social Connection and Isolation Panel [NHANES]     Frequency of Communication with Friends and Family: More than three times a week     Frequency of Social Gatherings with Friends and Family: More than three times a week     Attends Restorationist Services: More than 4 times per year     Active Member of Clubs or Organizations: No     Attends Club or Organization Meetings: Never     Marital Status:    Intimate Partner Violence: Not At Risk (12/22/2024)    Humiliation, Afraid, Rape, and Kick questionnaire     Fear of Current or Ex-Partner: No     Emotionally Abused: No     Physically Abused: No     Sexually Abused: No   Housing Stability: High Risk (12/27/2024)    Housing Stability Vital Sign     Unable to Pay for Housing in the Last Year: Yes     Number of Times Moved in the Last Year: 0     Homeless in the Last Year: No       Family History:  Family History   Problem Relation Name Age of Onset    Alzheimer's disease Mother      Heart attack Mother      Diabetes Father      Heart attack Father           Allergies:  Patient has no known allergies.    Outpatient Medications:  Current Outpatient Medications   Medication Sig Dispense Refill    allopurinol (Zyloprim) 100 mg tablet Take 1 tablet (100 mg) by mouth once daily. 90 tablet 0    amLODIPine (Norvasc) 5 mg tablet Take 1 tablet (5 mg) by mouth once daily.      aspirin 81 mg EC tablet Take 1 tablet (81 mg) by mouth once daily.      atorvastatin (Lipitor) 80 mg tablet Take 1 tablet (80 mg) by mouth once daily at bedtime. 90 tablet 0    B complex-vitamin C-folic acid (Nephro-Shea) 0.8 mg tablet Take 1 tablet  "by mouth once daily.      BD Ultra-Fine Mini Pen Needle 31 gauge x 3/16\" needle USE AS DIRECTED 4 times a day 400 each 2    carvedilol (Coreg) 6.25 mg tablet Take 1 tablet (6.25 mg) by mouth 2 times daily (morning and late afternoon). 180 tablet 3    ergocalciferol (Vitamin D-2) 1.25 MG (72368 UT) capsule Take 1 capsule (50,000 Units) by mouth every 14 (fourteen) days. Takes every other Sunday      ezetimibe (Zetia) 10 mg tablet Take 1 tablet (10 mg) by mouth once daily. 90 tablet 0    gabapentin (Neurontin) 300 mg capsule Take 1 capsule (300 mg) by mouth once daily at bedtime.      hydrALAZINE (Apresoline) 50 mg tablet Take 1 tablet (50 mg) by mouth 3 times a day. 270 tablet 3    insulin lispro (HumaLOG) 100 unit/mL injection Inject 10 Units under the skin 3 times daily (morning, midday, late afternoon).      isosorbide mononitrate ER (Imdur) 60 mg 24 hr tablet Take 1 tablet (60 mg) by mouth once daily. Do not crush or chew. 90 tablet 3    Januvia 25 mg tablet Take 1 tablet (25 mg) by mouth once daily. 90 tablet 3    nitroglycerin (Nitrostat) 0.4 mg SL tablet Place 1 tablet (0.4 mg) under the tongue every 5 minutes if needed for chest pain.      pantoprazole (ProtoNix) 40 mg EC tablet Take 1 tablet (40 mg) by mouth once daily. 90 tablet 0    sevelamer carbonate (Renvela) 800 mg tablet Take 2 tablets (1,600 mg) by mouth 3 times daily (morning, midday, late afternoon). Three times daily with meals      triamcinolone (Kenalog) 0.5 % cream Apply topically 3 times a day. 30 g 0     No current facility-administered medications for this visit.        Physical Exam:  Cardiovascular:      PMI at left midclavicular line. Normal rate. Regular rhythm. Normal S1. Normal S2.       Murmurs: There is a grade 2/6 systolic murmur.      No gallop.  No click. No rub.   Pulses:     Intact distal pulses.   Edema:     Peripheral edema absent.         ROS:  Review of Systems   Constitutional: Negative.   Cardiovascular: Negative.  "   Respiratory: Negative.     Skin: Negative.    Musculoskeletal: Negative.    Gastrointestinal: Negative.    Genitourinary: Negative.    Neurological: Negative.           Last Labs:  CBC -  Lab Results   Component Value Date    WBC 7.4 02/05/2025    HGB 12.0 (L) 02/05/2025    HCT 36.7 (L) 02/05/2025    MCV 96.6 02/05/2025     02/05/2025       CMP -  Lab Results   Component Value Date    CALCIUM 9.5 02/05/2025    PHOS 3.1 12/27/2024    PROT 6.5 02/05/2025    ALBUMIN 4.2 02/05/2025    AST 16 02/05/2025    ALT <3 (L) 02/05/2025    ALKPHOS 112 02/05/2025    BILITOT 0.6 02/05/2025       LIPID PANEL -   Lab Results   Component Value Date    CHOL 123 11/08/2024    TRIG 170 (H) 11/08/2024    HDL 36.7 11/08/2024    CHHDL 3.4 11/08/2024    LDLF 13 08/14/2023    VLDL 34 11/08/2024    NHDL 86 11/08/2024       RENAL FUNCTION PANEL -   Lab Results   Component Value Date    GLUCOSE 116 (H) 02/05/2025     02/05/2025    K 4.5 02/05/2025    CL 96 (L) 02/05/2025    CO2 27 02/05/2025    ANIONGAP 16 02/05/2025    BUN 33 (H) 02/05/2025    CREATININE 8.98 (H) 02/05/2025    GFRMALE 5 (A) 08/14/2023    CALCIUM 9.5 02/05/2025    PHOS 3.1 12/27/2024    ALBUMIN 4.2 02/05/2025        Lab Results   Component Value Date     (H) 11/13/2024    HGBA1C 6.3 (H) 11/08/2024         Assessment/Plan   Problem List Items Addressed This Visit    None    1. Hypertension. The patient's blood pressure is elevated on atenolol 25 mg daily, amlodipine 10 mg daily, and Benicar HCT 20/12.5 mg daily. The patient's recent lab work from 04/17/2020 shows some ongoing progression of chronic kidney disease and his creatinine is now 3.34 The patient completed a course of treatment for prostate carcinoma. He has noted some increased lower extremity edema. Given his progressing chronic kidney disease the benicar and furosemide were stopped. He wias started on hydralazine 50 mg twice a day and continue amlodipine 10 mg daily. He had an echocardiogram done  9/2/2020 which showed an EF of 65%, left atrium mild to moderately dilated with a PASP of 36 mmHg. The patient's blood pressure presently is well within the range of normal. He presently is on high-dose hydralazine 100 mg 3 times daily isosorbide mononitrate 120 mg daily amlodipine 5 mg daily carvedilol 12.5 mg twice daily. If blood pressure readings remained in current range may be able to reduce the dose of hydralazine and possibly the isosorbide as well. Hold hydralazine.  NIDA 12/2024  CONCLUSIONS:   1. Left ventricular ejection fraction is normal, by visual estimate at 55-60%.   2. There is normal right ventricular global systolic function.   3. No mitral valve vegetation visualized.   4. No tricuspid valve vegetation.   5. No aortic valve vegetation visualized.   6. No pulmonic valve vegetation visualized.     Lexiscan   IMPRESSION:  1. Findings suggestive of mild ischemia involving the cardiac apex.  2. Normal-sized left ventricle, with mildly depressed systolic left  ventricular function and a calculated ejection fraction of 46 %.   ECG portion showed LVH.   2. Hyperlipidemia Recent lipid panel from 11/2020 includes cholesterol 192 LDL 96 HDL 37 triglyceride 289. These results are somewhat worse than previous lipid panels and may be related to the fact that the patient for uncertain reason is taking atorvastatin 10 mg daily rather than the intended 40 mg daily. The dose will be increased to 80 mg daily. Will add Zetia. Recent FLP was optimized.  Recent lipid panel from 11/8/2024 satisfactory cholesterol 123 LDL 52 HDL 36 triglyceride 170.     3. Coronary artery disease status post multiple PCI procedures. Patient had a stress test in 2008 that was negative for ischemia. The patient had a CT score of 447.8. The patient did have a subsequent nuclear exercise treadmill stress test on 02/18/2019 which demonstrated normal myocardial perfusion. Cardiac cath done 09/22/2020 for angina showed triple-vessel CAD with  proximal left anterior descending involvement, 90% ostial stenosis of first diagonal branch, middle third of the proximal circumflex coronary artery showed 10 to 30% stenosis, middle third of the mid right coronary artery showed 60% stenosis. Patient was seen by Dr. Montez Palmer to consider possible PCI. Medical management was initially recommended but then the patient was admitted to [facility] on 2/28/2021 with a non-STEMI. He was transferred to [facility] and on 2/24/2021 underwent PCI and stent deployment to the proximal and mid LAD and a [place] angioplasty of a ostial first diagonal branch. An echocardiogram performed at that time demonstrated a preserved LV ejection fraction at 60-65% with moderate left ventricular hypertrophy and severe left atrial enlargement with mild aortic valve stenosis and mild elevation of the PA systolic pressure. The patient presented back to [facility] emergency room on 9/12/2021 with intermittent chest discomfort. Of 2 weeks. The high-sensitivity troponins elevated and fashion consistent with a non-STEMI. EKG showed left ventricular hypertrophy with anterolateral downsloping ST abnormality consistent with either left ventricular repolarization abnormality versus ischemia. The patient underwent cardiac catheterization and this identified a probable non-STEMI related to a culprit high-grade proximal LCx stenosis that had significantly progressed since the previous study. The patient was transferred to [facility] and on 9/17/2021 underwent a successful OCT guided PCI of a culprit LCx stenosis with deployment of a drug-eluting stent. A staged PCI to the RCA was recommended on an outpatient basis due to the patient's advanced chronic kidney disease. The patient had an echocardiogram performed 9/18/2021 which again confirmed preserved LV ejection fraction 60% with moderate concentric LV hypertrophy mild aortic valve stenosis. The  patient was readmitted to [facility] on 10/6/2021 with recurrent anginal type chest discomfort marginal troponin elevation. His creatinine was stable at 3.9 and he had a chronic anemia with hematocrit of 25.4. He was continued on dual antiplatelet therapy given intravenous iron infusion. He subsequently was transferred to [facility] and on 10/20/2021 underwent successful OCT guided PCI with rotational atherectomy of a severely calcified 90% mid to distal RCA stenosis and deployment of a 3.0 x 38 mm Synergy drug-eluting stent. The patient is actually doing clinically quite well and is without anginal symptoms. He feels improved and has not required the use of nitroglycerin. He is going to the [facility] routinely in the mornings and riding a bike without any symptoms.  The patient did have a pharmacological nuclear stress test on 8/30/2022 which showed mild ischemia involving the cardiac apex normal LV chamber size and her LV ejection fraction of 46%.  Patient currently experiencing some very minor chest discomfort.  The patient had been raking leaves several days ago currently has some chest congestion and cough over the past 24 hours along with some minor diarrhea.  The chest pain is actually been present for approximately 1 month but only happens 1-2 times every few weeks and comes and goes.  He is not certain as to whether or not it resembles his original anginal syndrome.  Will defer repeat stress testing or cardiac catheterization pending further additional follow-up.  He will return in 2 months for to reassess his symptoms.  Will check chest x-ray today to assess findings of decreased breath sounds right base.     4. Type two diabetes He will continue to follow up with his primary care doctor.  Lab work 11/8/2024 includes acceptable glycohemoglobin of 6.3%.     5. GERD      6. History of gout the patient is currently on Allopurinol 300 mg daily but the dose will be reduced to 100 mg daily because  of his progressive kidney disease.     7. History of shoulder surgery      8. Chronic kidney disease. The patient's most recent creatinine was 5.64 when checked recently. He also has chronic anemia most likely due to his chronic kidney disease. Has appointment with Dr Gomez. Is on procrit and iron tablets. H&H is improving. Most recent lab work from 1/14/2022 includes creatinine 4.80 hemoglobin 10.4. He is having his hemoglobin checked every other week and will receive a Procrit injection for hemoglobins of less than 10. Has fistula to left arm.  Patient had dialysis port removed in early 11/2024 and has a fully functional AV graft in the left upper extremity.  Lab work 11/8/2024 includes an SMA panel creatinine is 6.72.     9. Arthroscopic left knee surgery with Dr. Menard on June 21, 2016 at [facility].     10. Prostate carcinoma. The patient had a prostate biopsy in 11/25/2019 which was positive for the presence of carcinoma. MRI of the prostate in 01/04/2020 which evidently was negative for any evidence of metastatic disease. He subsequently underwent a 5 week course of external radiation therapy which was completed in mid 4/2020. His PSA is declining from a maximum value of 8.0 now at 3.66 and presumably declining.     11. Carotid US done 09/2020 showed < 50% stenosis.      12. Hx of covid-19 vaccine #1 and #2.      13. History of rectal bleeding. Patient was at [facility] April 2021 to receive 1 unit packed red blood cells. He complains of rectal bleeding x2 episodes and underwent a colonoscopy and found to have internal hemorrhoids and 2 polyps. He had a polypectomy done and required 2 clips.     14. Chronic anemia.          15.  Hyperkalemia.     Hospital course 11/2024  Hypertensive emergency  Hypertensive chest pain  Acute on chronic systolic heart failure  End-stage renal disease with hemodialysis  Altered mentation  Hypertensive disorder  Ischemic cardiomyopathy  Anemia of chronic  disease  Medical noncompliance     Overall impression:     11/14: Consulted for combination of exacerbated CHF and chest discomfort.  Patient is a very poor historian and I certainly suspect underlying dementia.  He missed his most recent dialysis session and appears to have not been taking his medications at home.  He did undergo dialysis yesterday and received 1 dose of IV Lasix.  He is breathing comfortably nasal cannula oxygen with no significant JVD or peripheral edema.  His systolic blood pressure is significantly elevated with an average over 200.  His home medication list did not include his home hydralazine at 100 mg 3 times daily as well as isosorbide 120 g daily.  I have reinitiated these.  I believe his chest discomfort is certainly relative to a hypertensive emergency.  As the patient appears to be breathing comfortably nasal cannula oxygen at this time principal means of fluid management will likely be via dialysis.  Will focus on this.  Resume his home medications otherwise including amlodipine, aspirin, atorvastatin, carvedilol unable to increase carvedilol given a low resting heart rate.  Otherwise the patient does not appear to be under any distress at this time will follow with you.       Hospital Course   Patient on hemodialysis for end-stage renal disease presented with syncopal, low blood pressure on dialysis after being discharged home stable for.  Cardiology reduced dose for Norvasc.  Parameters placed to not give hydralazine before dialysis if blood pressure is low.  Patient discharged in stable condition     Hospital Course 12/2024  William A Franke is a 74 y.o. male with a past medical history of diabetes, hypertension, hyperlipidmeia, CAD, ESKD on HD. Patient presents to the ED today via EMS from dialysis for evaluation and treatment for alerted mental status. Patients wife and brother were at bedside who assisted in HPI. Per wife, states that she received a call from the dialysis center  "that patient had a \"drop in vitals\" with confusion. Wife states that the patient is alert and orientated at baseline, drove himself to out patient scheduled dialysis, and completed the full course of dialysis. However, about an hour prior to completing dialysis he was noted to have altered mental status by staff. Noted to be weak with needing assistance getting out of dialysis chair, tremors, and lethargic. Patients wife states that the patient was coughing some last night but denies any other concerning symptoms. Upon examination the patient is drowsy and but easily arousable. He is orientated to person, place, and time. He is able to identify the people in the room with him. He does not remember any events or what happened at dialysis that brought him to the ED. Patient denies any shortness of breath, trouble breathing, chest pains, fever, chills, nausea, or vomiting. No dysuria, no abdominal pain. Denies diarrhea or constipation. No blurred vision, light headed, or dizziness. At baseline he is independent, lives with his wife at home, and is fully functional.  Admitted to telemetry for further management.  Panculture was obtained.  ID consult was requested.  He was started on Zosyn and vancomycin and then switched to cefazolin when blood culture grew MRSA.  He was underwent TTE and then NIDA to rule out bacterial endocarditis which was without.  Second set of blood culture from 12/23 is negative.  ID and cardiology consults were obtained.  Hemodialysis has been arranged by Dr. Perez.  Overall his condition  has gradually improved.  He is no longer febrile and mentally bright.  He is going to get IV cefazolin 2 g with every dialysis for another 4 weeks per ID recommendation.  Cleared for discharge in satisfactory condition     Elvia Brunner, APRN-CNP  "

## 2025-02-24 ENCOUNTER — APPOINTMENT (OUTPATIENT)
Dept: ENDOCRINOLOGY | Facility: CLINIC | Age: 75
End: 2025-02-24
Payer: MEDICARE

## 2025-02-24 VITALS — DIASTOLIC BLOOD PRESSURE: 72 MMHG | WEIGHT: 165 LBS | BODY MASS INDEX: 25.09 KG/M2 | SYSTOLIC BLOOD PRESSURE: 118 MMHG

## 2025-02-24 DIAGNOSIS — Z79.4 TYPE 2 DIABETES MELLITUS WITH CHRONIC KIDNEY DISEASE ON CHRONIC DIALYSIS, WITH LONG-TERM CURRENT USE OF INSULIN (MULTI): Primary | ICD-10-CM

## 2025-02-24 DIAGNOSIS — Z99.2 TYPE 2 DIABETES MELLITUS WITH CHRONIC KIDNEY DISEASE ON CHRONIC DIALYSIS, WITH LONG-TERM CURRENT USE OF INSULIN (MULTI): Primary | ICD-10-CM

## 2025-02-24 DIAGNOSIS — I10 PRIMARY HYPERTENSION: ICD-10-CM

## 2025-02-24 DIAGNOSIS — N18.6 TYPE 2 DIABETES MELLITUS WITH CHRONIC KIDNEY DISEASE ON CHRONIC DIALYSIS, WITH LONG-TERM CURRENT USE OF INSULIN (MULTI): Primary | ICD-10-CM

## 2025-02-24 DIAGNOSIS — E78.5 HYPERLIPIDEMIA, UNSPECIFIED HYPERLIPIDEMIA TYPE: ICD-10-CM

## 2025-02-24 DIAGNOSIS — E11.22 TYPE 2 DIABETES MELLITUS WITH CHRONIC KIDNEY DISEASE ON CHRONIC DIALYSIS, WITH LONG-TERM CURRENT USE OF INSULIN (MULTI): Primary | ICD-10-CM

## 2025-02-24 PROCEDURE — 1123F ACP DISCUSS/DSCN MKR DOCD: CPT | Performed by: INTERNAL MEDICINE

## 2025-02-24 PROCEDURE — 99214 OFFICE O/P EST MOD 30 MIN: CPT | Performed by: INTERNAL MEDICINE

## 2025-02-24 PROCEDURE — 3078F DIAST BP <80 MM HG: CPT | Performed by: INTERNAL MEDICINE

## 2025-02-24 PROCEDURE — 3074F SYST BP LT 130 MM HG: CPT | Performed by: INTERNAL MEDICINE

## 2025-02-24 NOTE — PROGRESS NOTES
"Patient ID: William A Franke \"Narciso\" is a 74 y.o. male who presents for Follow-up.  HPI  The patient comes in for follow up.    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    He continues on Lantus 0 to 30 units daily Humalog 4 to 5 units and Januvia.    He continues taking the mealtime insulin postmeal.    He has very rare low blood sugars.    He continues using the freestyle shahbaz but again did not bring the reader.    He notes that if he is higher at bedtime he actually takes less Lantus otherwise he will get low.    He was hospitalized in December with encephalopathy.    Physically he has no complaints.    ROS  Comprehensive review of systems is negative.    Objective   Physical Exam  Visit Vitals  /72      Vitals:    02/24/25 0848   Weight: 74.8 kg (165 lb)      Body mass index is 25.09 kg/m².      Weight 165 down 14 pounds    ENT normal. No adenopathy  Fundi normal  Thyroid palpable and normal. No nodules  Chest clear to auscultation  Heart sounds are normal  Abdomen nontender. Bowel sounds normal. No organomegaly  Feet are okay  Normal vibration and monofilament sensation normal pulses, no lesions    Current Outpatient Medications   Medication Sig Dispense Refill    allopurinol (Zyloprim) 100 mg tablet Take 1 tablet (100 mg) by mouth once daily. 90 tablet 0    amLODIPine (Norvasc) 5 mg tablet Take 1 tablet (5 mg) by mouth once daily.      aspirin 81 mg EC tablet Take 1 tablet (81 mg) by mouth once daily.      atorvastatin (Lipitor) 80 mg tablet Take 1 tablet (80 mg) by mouth once daily at bedtime. 90 tablet 0    B complex-vitamin C-folic acid (Nephro-Shea) 0.8 mg tablet Take 1 tablet by mouth once daily.      BD Ultra-Fine Mini Pen Needle 31 gauge x 3/16\" needle USE AS DIRECTED 4 times a day 400 each 2    carvedilol (Coreg) 6.25 mg tablet Take 1 tablet (6.25 mg) by mouth 2 times daily (morning and late afternoon). 180 tablet 3    ergocalciferol (Vitamin D-2) 1.25 MG " (88611 UT) capsule Take 1 capsule (50,000 Units) by mouth every 14 (fourteen) days. Takes every other Sunday      ezetimibe (Zetia) 10 mg tablet Take 1 tablet (10 mg) by mouth once daily. 90 tablet 0    gabapentin (Neurontin) 300 mg capsule Take 1 capsule (300 mg) by mouth once daily at bedtime.      insulin lispro (HumaLOG) 100 unit/mL injection Inject 10 Units under the skin 3 times daily (morning, midday, late afternoon).      isosorbide mononitrate ER (Imdur) 60 mg 24 hr tablet Take 1 tablet (60 mg) by mouth once daily. Do not crush or chew. 90 tablet 3    Januvia 25 mg tablet Take 1 tablet (25 mg) by mouth once daily. 90 tablet 3    nitroglycerin (Nitrostat) 0.4 mg SL tablet Place 1 tablet (0.4 mg) under the tongue every 5 minutes if needed for chest pain.      pantoprazole (ProtoNix) 40 mg EC tablet Take 1 tablet (40 mg) by mouth once daily. 90 tablet 0    sevelamer carbonate (Renvela) 800 mg tablet Take 2 tablets (1,600 mg) by mouth 3 times daily (morning, midday, late afternoon). Three times daily with meals      triamcinolone (Kenalog) 0.5 % cream Apply topically 3 times a day. 30 g 0     No current facility-administered medications for this visit.       Assessment/Plan     1.  Type 2 diabetes on insulin  2.  Hypertension  3.  Hyperlipidemia  4.  End-stage renal disease    He will continue his current regimen.    We discussed updating to the freestyle shahbaz 3+.    We discussed hypoglycemia.    He will follow-up with me in 6 months sooner as needed.

## 2025-02-25 LAB — SCAN RESULT: NORMAL

## 2025-03-05 DIAGNOSIS — E78.49 OTHER HYPERLIPIDEMIA: ICD-10-CM

## 2025-03-05 RX ORDER — EZETIMIBE 10 MG/1
10 TABLET ORAL DAILY
Qty: 90 TABLET | Refills: 0 | Status: SHIPPED | OUTPATIENT
Start: 2025-03-05 | End: 2025-09-01

## 2025-03-17 ENCOUNTER — HOSPITAL ENCOUNTER (EMERGENCY)
Facility: HOSPITAL | Age: 75
Discharge: STILL ADMITTED OR ADMINISTRATIVE | DRG: 186 | End: 2025-03-17
Payer: MEDICARE

## 2025-03-17 ENCOUNTER — HOSPITAL ENCOUNTER (INPATIENT)
Facility: HOSPITAL | Age: 75
LOS: 5 days | Discharge: ANOTHER HOSPITAL | DRG: 186 | End: 2025-03-22
Attending: INTERNAL MEDICINE | Admitting: INTERNAL MEDICINE
Payer: MEDICARE

## 2025-03-17 ENCOUNTER — OFFICE VISIT (OUTPATIENT)
Dept: PRIMARY CARE | Facility: CLINIC | Age: 75
End: 2025-03-17
Payer: MEDICARE

## 2025-03-17 VITALS
BODY MASS INDEX: 25.01 KG/M2 | WEIGHT: 165 LBS | SYSTOLIC BLOOD PRESSURE: 122 MMHG | DIASTOLIC BLOOD PRESSURE: 70 MMHG | HEIGHT: 68 IN

## 2025-03-17 DIAGNOSIS — J90 PLEURAL EFFUSION: ICD-10-CM

## 2025-03-17 DIAGNOSIS — J90 PLEURAL EFFUSION: Primary | ICD-10-CM

## 2025-03-17 DIAGNOSIS — R13.10 DYSPHAGIA, UNSPECIFIED TYPE: ICD-10-CM

## 2025-03-17 PROCEDURE — 99214 OFFICE O/P EST MOD 30 MIN: CPT | Performed by: INTERNAL MEDICINE

## 2025-03-17 PROCEDURE — 5A1D70Z PERFORMANCE OF URINARY FILTRATION, INTERMITTENT, LESS THAN 6 HOURS PER DAY: ICD-10-PCS | Performed by: INTERNAL MEDICINE

## 2025-03-17 PROCEDURE — 2060000001 HC INTERMEDIATE ICU ROOM DAILY

## 2025-03-17 PROCEDURE — 3074F SYST BP LT 130 MM HG: CPT | Performed by: INTERNAL MEDICINE

## 2025-03-17 PROCEDURE — 3078F DIAST BP <80 MM HG: CPT | Performed by: INTERNAL MEDICINE

## 2025-03-17 PROCEDURE — 1159F MED LIST DOCD IN RCRD: CPT | Performed by: INTERNAL MEDICINE

## 2025-03-17 PROCEDURE — 4500999001 HC ED NO CHARGE

## 2025-03-17 PROCEDURE — 1123F ACP DISCUSS/DSCN MKR DOCD: CPT | Performed by: INTERNAL MEDICINE

## 2025-03-17 PROCEDURE — 0W993ZZ DRAINAGE OF RIGHT PLEURAL CAVITY, PERCUTANEOUS APPROACH: ICD-10-PCS | Performed by: INTERNAL MEDICINE

## 2025-03-17 RX ORDER — DOCUSATE SODIUM 100 MG/1
100 CAPSULE, LIQUID FILLED ORAL 2 TIMES DAILY
Status: DISCONTINUED | OUTPATIENT
Start: 2025-03-17 | End: 2025-03-22 | Stop reason: HOSPADM

## 2025-03-17 RX ORDER — GUAIFENESIN/DEXTROMETHORPHAN 100-10MG/5
5 SYRUP ORAL EVERY 4 HOURS PRN
Status: DISCONTINUED | OUTPATIENT
Start: 2025-03-17 | End: 2025-03-22 | Stop reason: HOSPADM

## 2025-03-17 RX ORDER — ACETAMINOPHEN 160 MG/5ML
650 SOLUTION ORAL EVERY 4 HOURS PRN
Status: DISCONTINUED | OUTPATIENT
Start: 2025-03-17 | End: 2025-03-22 | Stop reason: HOSPADM

## 2025-03-17 RX ORDER — TALC
3 POWDER (GRAM) TOPICAL NIGHTLY PRN
Status: DISCONTINUED | OUTPATIENT
Start: 2025-03-17 | End: 2025-03-22 | Stop reason: HOSPADM

## 2025-03-17 RX ORDER — POLYETHYLENE GLYCOL 3350 17 G/17G
17 POWDER, FOR SOLUTION ORAL DAILY
Status: DISCONTINUED | OUTPATIENT
Start: 2025-03-18 | End: 2025-03-22 | Stop reason: HOSPADM

## 2025-03-17 RX ORDER — INSULIN LISPRO 100 [IU]/ML
0-5 INJECTION, SOLUTION INTRAVENOUS; SUBCUTANEOUS
Status: DISCONTINUED | OUTPATIENT
Start: 2025-03-18 | End: 2025-03-22 | Stop reason: HOSPADM

## 2025-03-17 RX ORDER — PANTOPRAZOLE SODIUM 40 MG/1
40 TABLET, DELAYED RELEASE ORAL DAILY
Status: DISCONTINUED | OUTPATIENT
Start: 2025-03-18 | End: 2025-03-22 | Stop reason: HOSPADM

## 2025-03-17 RX ORDER — SEVELAMER CARBONATE 800 MG/1
1600 TABLET, FILM COATED ORAL
Status: DISCONTINUED | OUTPATIENT
Start: 2025-03-18 | End: 2025-03-22 | Stop reason: HOSPADM

## 2025-03-17 RX ORDER — ERGOCALCIFEROL 1.25 MG/1
50000 CAPSULE ORAL WEEKLY
Status: DISCONTINUED | OUTPATIENT
Start: 2025-03-18 | End: 2025-03-17

## 2025-03-17 RX ORDER — ATORVASTATIN CALCIUM 80 MG/1
80 TABLET, FILM COATED ORAL NIGHTLY
Status: DISCONTINUED | OUTPATIENT
Start: 2025-03-17 | End: 2025-03-22 | Stop reason: HOSPADM

## 2025-03-17 RX ORDER — ACETAMINOPHEN 650 MG/1
650 SUPPOSITORY RECTAL EVERY 4 HOURS PRN
Status: DISCONTINUED | OUTPATIENT
Start: 2025-03-17 | End: 2025-03-22 | Stop reason: HOSPADM

## 2025-03-17 RX ORDER — ERGOCALCIFEROL 1.25 MG/1
50000 CAPSULE ORAL WEEKLY
Status: DISCONTINUED | OUTPATIENT
Start: 2025-03-24 | End: 2025-03-22 | Stop reason: HOSPADM

## 2025-03-17 RX ORDER — FLUOCINONIDE 0.5 MG/G
CREAM TOPICAL 2 TIMES DAILY
Status: DISCONTINUED | OUTPATIENT
Start: 2025-03-18 | End: 2025-03-18

## 2025-03-17 RX ORDER — EZETIMIBE 10 MG/1
10 TABLET ORAL DAILY
Status: DISCONTINUED | OUTPATIENT
Start: 2025-03-18 | End: 2025-03-22 | Stop reason: HOSPADM

## 2025-03-17 RX ORDER — GUAIFENESIN 600 MG/1
600 TABLET, EXTENDED RELEASE ORAL EVERY 12 HOURS PRN
Status: DISCONTINUED | OUTPATIENT
Start: 2025-03-17 | End: 2025-03-22 | Stop reason: HOSPADM

## 2025-03-17 RX ORDER — CARVEDILOL 6.25 MG/1
6.25 TABLET ORAL
Status: DISCONTINUED | OUTPATIENT
Start: 2025-03-18 | End: 2025-03-22 | Stop reason: HOSPADM

## 2025-03-17 RX ORDER — AMLODIPINE BESYLATE 5 MG/1
5 TABLET ORAL DAILY
Status: DISCONTINUED | OUTPATIENT
Start: 2025-03-18 | End: 2025-03-19

## 2025-03-17 RX ORDER — ACETAMINOPHEN 325 MG/1
650 TABLET ORAL EVERY 4 HOURS PRN
Status: DISCONTINUED | OUTPATIENT
Start: 2025-03-17 | End: 2025-03-22 | Stop reason: HOSPADM

## 2025-03-17 RX ORDER — NITROGLYCERIN 0.4 MG/1
0.4 TABLET SUBLINGUAL EVERY 5 MIN PRN
Status: DISCONTINUED | OUTPATIENT
Start: 2025-03-17 | End: 2025-03-22 | Stop reason: HOSPADM

## 2025-03-17 RX ORDER — GABAPENTIN 300 MG/1
300 CAPSULE ORAL NIGHTLY
Status: DISCONTINUED | OUTPATIENT
Start: 2025-03-17 | End: 2025-03-22 | Stop reason: HOSPADM

## 2025-03-17 RX ORDER — HEPARIN SODIUM 5000 [USP'U]/ML
5000 INJECTION, SOLUTION INTRAVENOUS; SUBCUTANEOUS EVERY 8 HOURS
Status: DISCONTINUED | OUTPATIENT
Start: 2025-03-17 | End: 2025-03-22 | Stop reason: HOSPADM

## 2025-03-17 RX ORDER — ALLOPURINOL 100 MG/1
100 TABLET ORAL DAILY
Status: DISCONTINUED | OUTPATIENT
Start: 2025-03-18 | End: 2025-03-22 | Stop reason: HOSPADM

## 2025-03-17 RX ORDER — ISOSORBIDE MONONITRATE 60 MG/1
60 TABLET, EXTENDED RELEASE ORAL DAILY
Status: DISCONTINUED | OUTPATIENT
Start: 2025-03-18 | End: 2025-03-22 | Stop reason: HOSPADM

## 2025-03-17 RX ORDER — ASPIRIN 81 MG/1
81 TABLET ORAL DAILY
Status: DISCONTINUED | OUTPATIENT
Start: 2025-03-18 | End: 2025-03-22 | Stop reason: HOSPADM

## 2025-03-17 SDOH — SOCIAL STABILITY: SOCIAL NETWORK: HOW OFTEN DO YOU ATTEND CHURCH OR RELIGIOUS SERVICES?: MORE THAN 4 TIMES PER YEAR

## 2025-03-17 SDOH — SOCIAL STABILITY: SOCIAL INSECURITY: WERE YOU ABLE TO COMPLETE ALL THE BEHAVIORAL HEALTH SCREENINGS?: YES

## 2025-03-17 SDOH — ECONOMIC STABILITY: FOOD INSECURITY: WITHIN THE PAST 12 MONTHS, THE FOOD YOU BOUGHT JUST DIDN'T LAST AND YOU DIDN'T HAVE MONEY TO GET MORE.: NEVER TRUE

## 2025-03-17 SDOH — SOCIAL STABILITY: SOCIAL INSECURITY: ABUSE: ADULT

## 2025-03-17 SDOH — SOCIAL STABILITY: SOCIAL INSECURITY: DO YOU FEEL ANYONE HAS EXPLOITED OR TAKEN ADVANTAGE OF YOU FINANCIALLY OR OF YOUR PERSONAL PROPERTY?: NO

## 2025-03-17 SDOH — ECONOMIC STABILITY: FOOD INSECURITY: HOW HARD IS IT FOR YOU TO PAY FOR THE VERY BASICS LIKE FOOD, HOUSING, MEDICAL CARE, AND HEATING?: NOT HARD AT ALL

## 2025-03-17 SDOH — SOCIAL STABILITY: SOCIAL INSECURITY: HAVE YOU HAD ANY THOUGHTS OF HARMING ANYONE ELSE?: NO

## 2025-03-17 SDOH — ECONOMIC STABILITY: HOUSING INSECURITY: IN THE LAST 12 MONTHS, WAS THERE A TIME WHEN YOU WERE NOT ABLE TO PAY THE MORTGAGE OR RENT ON TIME?: NO

## 2025-03-17 SDOH — ECONOMIC STABILITY: FOOD INSECURITY: WITHIN THE PAST 12 MONTHS, YOU WORRIED THAT YOUR FOOD WOULD RUN OUT BEFORE YOU GOT THE MONEY TO BUY MORE.: NEVER TRUE

## 2025-03-17 SDOH — HEALTH STABILITY: PHYSICAL HEALTH: ON AVERAGE, HOW MANY DAYS PER WEEK DO YOU ENGAGE IN MODERATE TO STRENUOUS EXERCISE (LIKE A BRISK WALK)?: 6 DAYS

## 2025-03-17 SDOH — SOCIAL STABILITY: SOCIAL INSECURITY: ARE THERE ANY APPARENT SIGNS OF INJURIES/BEHAVIORS THAT COULD BE RELATED TO ABUSE/NEGLECT?: NO

## 2025-03-17 SDOH — SOCIAL STABILITY: SOCIAL INSECURITY: WITHIN THE LAST YEAR, HAVE YOU BEEN HUMILIATED OR EMOTIONALLY ABUSED IN OTHER WAYS BY YOUR PARTNER OR EX-PARTNER?: NO

## 2025-03-17 SDOH — SOCIAL STABILITY: SOCIAL INSECURITY: DO YOU FEEL UNSAFE GOING BACK TO THE PLACE WHERE YOU ARE LIVING?: NO

## 2025-03-17 SDOH — SOCIAL STABILITY: SOCIAL INSECURITY: HAS ANYONE EVER THREATENED TO HURT YOUR FAMILY OR YOUR PETS?: NO

## 2025-03-17 SDOH — SOCIAL STABILITY: SOCIAL INSECURITY: WITHIN THE LAST YEAR, HAVE YOU BEEN AFRAID OF YOUR PARTNER OR EX-PARTNER?: NO

## 2025-03-17 SDOH — SOCIAL STABILITY: SOCIAL INSECURITY: ARE YOU MARRIED, WIDOWED, DIVORCED, SEPARATED, NEVER MARRIED, OR LIVING WITH A PARTNER?: MARRIED

## 2025-03-17 SDOH — ECONOMIC STABILITY: HOUSING INSECURITY: AT ANY TIME IN THE PAST 12 MONTHS, WERE YOU HOMELESS OR LIVING IN A SHELTER (INCLUDING NOW)?: NO

## 2025-03-17 SDOH — ECONOMIC STABILITY: TRANSPORTATION INSECURITY: IN THE PAST 12 MONTHS, HAS LACK OF TRANSPORTATION KEPT YOU FROM MEDICAL APPOINTMENTS OR FROM GETTING MEDICATIONS?: NO

## 2025-03-17 SDOH — ECONOMIC STABILITY: INCOME INSECURITY: IN THE PAST 12 MONTHS HAS THE ELECTRIC, GAS, OIL, OR WATER COMPANY THREATENED TO SHUT OFF SERVICES IN YOUR HOME?: NO

## 2025-03-17 SDOH — SOCIAL STABILITY: SOCIAL NETWORK: HOW OFTEN DO YOU GET TOGETHER WITH FRIENDS OR RELATIVES?: MORE THAN THREE TIMES A WEEK

## 2025-03-17 SDOH — HEALTH STABILITY: PHYSICAL HEALTH: ON AVERAGE, HOW MANY MINUTES DO YOU ENGAGE IN EXERCISE AT THIS LEVEL?: 60 MIN

## 2025-03-17 SDOH — SOCIAL STABILITY: SOCIAL INSECURITY: ARE YOU OR HAVE YOU BEEN THREATENED OR ABUSED PHYSICALLY, EMOTIONALLY, OR SEXUALLY BY ANYONE?: NO

## 2025-03-17 SDOH — SOCIAL STABILITY: SOCIAL NETWORK: HOW OFTEN DO YOU ATTEND MEETINGS OF THE CLUBS OR ORGANIZATIONS YOU BELONG TO?: NEVER

## 2025-03-17 SDOH — ECONOMIC STABILITY: HOUSING INSECURITY: IN THE PAST 12 MONTHS, HOW MANY TIMES HAVE YOU MOVED WHERE YOU WERE LIVING?: 0

## 2025-03-17 SDOH — SOCIAL STABILITY: SOCIAL INSECURITY: DOES ANYONE TRY TO KEEP YOU FROM HAVING/CONTACTING OTHER FRIENDS OR DOING THINGS OUTSIDE YOUR HOME?: NO

## 2025-03-17 SDOH — SOCIAL STABILITY: SOCIAL INSECURITY: HAVE YOU HAD THOUGHTS OF HARMING ANYONE ELSE?: NO

## 2025-03-17 ASSESSMENT — COGNITIVE AND FUNCTIONAL STATUS - GENERAL
DAILY ACTIVITIY SCORE: 24
PATIENT BASELINE BEDBOUND: NO
MOBILITY SCORE: 24

## 2025-03-17 ASSESSMENT — ENCOUNTER SYMPTOMS
OCCASIONAL FEELINGS OF UNSTEADINESS: 0
LOSS OF SENSATION IN FEET: 0
DEPRESSION: 0

## 2025-03-17 ASSESSMENT — ACTIVITIES OF DAILY LIVING (ADL)
FEEDING YOURSELF: INDEPENDENT
GROOMING: INDEPENDENT
WALKS IN HOME: INDEPENDENT
HEARING - LEFT EAR: FUNCTIONAL
DRESSING YOURSELF: INDEPENDENT
ADEQUATE_TO_COMPLETE_ADL: YES
LACK_OF_TRANSPORTATION: NO
TOILETING: INDEPENDENT
PATIENT'S MEMORY ADEQUATE TO SAFELY COMPLETE DAILY ACTIVITIES?: YES
BATHING: INDEPENDENT
JUDGMENT_ADEQUATE_SAFELY_COMPLETE_DAILY_ACTIVITIES: YES
HEARING - RIGHT EAR: FUNCTIONAL

## 2025-03-17 ASSESSMENT — LIFESTYLE VARIABLES
HOW OFTEN DO YOU HAVE A DRINK CONTAINING ALCOHOL: NEVER
HOW OFTEN DO YOU HAVE 6 OR MORE DRINKS ON ONE OCCASION: NEVER
HOW MANY STANDARD DRINKS CONTAINING ALCOHOL DO YOU HAVE ON A TYPICAL DAY: PATIENT DOES NOT DRINK
SKIP TO QUESTIONS 9-10: 1
AUDIT-C TOTAL SCORE: 0
PRESCIPTION_ABUSE_PAST_12_MONTHS: NO
AUDIT-C TOTAL SCORE: 0
SUBSTANCE_ABUSE_PAST_12_MONTHS: NO

## 2025-03-17 ASSESSMENT — PAIN SCALES - GENERAL: PAINLEVEL_OUTOF10: 0 - NO PAIN

## 2025-03-17 ASSESSMENT — PATIENT HEALTH QUESTIONNAIRE - PHQ9
1. LITTLE INTEREST OR PLEASURE IN DOING THINGS: NOT AT ALL
2. FEELING DOWN, DEPRESSED OR HOPELESS: NOT AT ALL
SUM OF ALL RESPONSES TO PHQ9 QUESTIONS 1 & 2: 0

## 2025-03-17 ASSESSMENT — PAIN - FUNCTIONAL ASSESSMENT: PAIN_FUNCTIONAL_ASSESSMENT: 0-10

## 2025-03-17 NOTE — Clinical Note
Drained 950ml of bloody fluid.  Pt tolerated well.  Denies any discomfort and no shortness of breath.  Bandaid to right back chest.

## 2025-03-18 ENCOUNTER — APPOINTMENT (OUTPATIENT)
Dept: RADIOLOGY | Facility: HOSPITAL | Age: 75
DRG: 186 | End: 2025-03-18
Payer: MEDICARE

## 2025-03-18 ENCOUNTER — APPOINTMENT (OUTPATIENT)
Dept: DIALYSIS | Facility: HOSPITAL | Age: 75
End: 2025-03-18
Payer: MEDICARE

## 2025-03-18 LAB
ALBUMIN SERPL BCP-MCNC: 3.3 G/DL (ref 3.4–5)
ALP SERPL-CCNC: 84 U/L (ref 33–136)
ALT SERPL W P-5'-P-CCNC: 8 U/L (ref 10–52)
ANION GAP SERPL CALCULATED.3IONS-SCNC: 15 MMOL/L (ref 10–20)
AST SERPL W P-5'-P-CCNC: 12 U/L (ref 9–39)
BILIRUB SERPL-MCNC: 0.4 MG/DL (ref 0–1.2)
BUN SERPL-MCNC: 58 MG/DL (ref 6–23)
CALCIUM SERPL-MCNC: 9.3 MG/DL (ref 8.6–10.3)
CHLORIDE SERPL-SCNC: 103 MMOL/L (ref 98–107)
CO2 SERPL-SCNC: 28 MMOL/L (ref 21–32)
CREAT SERPL-MCNC: 9.54 MG/DL (ref 0.5–1.3)
EGFRCR SERPLBLD CKD-EPI 2021: 5 ML/MIN/1.73M*2
ERYTHROCYTE [DISTWIDTH] IN BLOOD BY AUTOMATED COUNT: 16.2 % (ref 11.5–14.5)
FERRITIN SERPL-MCNC: 1219 NG/ML (ref 20–300)
GLUCOSE BLD MANUAL STRIP-MCNC: 132 MG/DL (ref 74–99)
GLUCOSE BLD MANUAL STRIP-MCNC: 92 MG/DL (ref 74–99)
GLUCOSE SERPL-MCNC: 101 MG/DL (ref 74–99)
HCT VFR BLD AUTO: 29.1 % (ref 41–52)
HGB BLD-MCNC: 9.1 G/DL (ref 13.5–17.5)
IRON SATN MFR SERPL: 15 % (ref 25–45)
IRON SERPL-MCNC: 27 UG/DL (ref 35–150)
LDH SERPL L TO P-CCNC: 135 U/L (ref 84–246)
MCH RBC QN AUTO: 28.3 PG (ref 26–34)
MCHC RBC AUTO-ENTMCNC: 31.3 G/DL (ref 32–36)
MCV RBC AUTO: 91 FL (ref 80–100)
NRBC BLD-RTO: 0 /100 WBCS (ref 0–0)
PLATELET # BLD AUTO: 262 X10*3/UL (ref 150–450)
POTASSIUM SERPL-SCNC: 4.9 MMOL/L (ref 3.5–5.3)
PROT SERPL-MCNC: 5.8 G/DL (ref 6.4–8.2)
PROT SERPL-MCNC: 5.8 G/DL (ref 6.4–8.2)
RBC # BLD AUTO: 3.21 X10*6/UL (ref 4.5–5.9)
SODIUM SERPL-SCNC: 141 MMOL/L (ref 136–145)
TIBC SERPL-MCNC: 181 UG/DL (ref 240–445)
UIBC SERPL-MCNC: 154 UG/DL (ref 110–370)
WBC # BLD AUTO: 7.3 X10*3/UL (ref 4.4–11.3)

## 2025-03-18 PROCEDURE — 83540 ASSAY OF IRON: CPT | Performed by: INTERNAL MEDICINE

## 2025-03-18 PROCEDURE — 71046 X-RAY EXAM CHEST 2 VIEWS: CPT

## 2025-03-18 PROCEDURE — 8010000001 HC DIALYSIS - HEMODIALYSIS PER DAY

## 2025-03-18 PROCEDURE — 99221 1ST HOSP IP/OBS SF/LOW 40: CPT | Performed by: STUDENT IN AN ORGANIZED HEALTH CARE EDUCATION/TRAINING PROGRAM

## 2025-03-18 PROCEDURE — 2500000001 HC RX 250 WO HCPCS SELF ADMINISTERED DRUGS (ALT 637 FOR MEDICARE OP): Performed by: STUDENT IN AN ORGANIZED HEALTH CARE EDUCATION/TRAINING PROGRAM

## 2025-03-18 PROCEDURE — 71046 X-RAY EXAM CHEST 2 VIEWS: CPT | Performed by: RADIOLOGY

## 2025-03-18 PROCEDURE — 2500000002 HC RX 250 W HCPCS SELF ADMINISTERED DRUGS (ALT 637 FOR MEDICARE OP, ALT 636 FOR OP/ED): Performed by: INTERNAL MEDICINE

## 2025-03-18 PROCEDURE — 36415 COLL VENOUS BLD VENIPUNCTURE: CPT | Performed by: INTERNAL MEDICINE

## 2025-03-18 PROCEDURE — 85027 COMPLETE CBC AUTOMATED: CPT | Performed by: INTERNAL MEDICINE

## 2025-03-18 PROCEDURE — 97165 OT EVAL LOW COMPLEX 30 MIN: CPT | Mod: GO

## 2025-03-18 PROCEDURE — 82728 ASSAY OF FERRITIN: CPT | Performed by: INTERNAL MEDICINE

## 2025-03-18 PROCEDURE — 99222 1ST HOSP IP/OBS MODERATE 55: CPT | Performed by: INTERNAL MEDICINE

## 2025-03-18 PROCEDURE — 2500000001 HC RX 250 WO HCPCS SELF ADMINISTERED DRUGS (ALT 637 FOR MEDICARE OP)

## 2025-03-18 PROCEDURE — 36415 COLL VENOUS BLD VENIPUNCTURE: CPT | Performed by: STUDENT IN AN ORGANIZED HEALTH CARE EDUCATION/TRAINING PROGRAM

## 2025-03-18 PROCEDURE — 83615 LACTATE (LD) (LDH) ENZYME: CPT | Performed by: STUDENT IN AN ORGANIZED HEALTH CARE EDUCATION/TRAINING PROGRAM

## 2025-03-18 PROCEDURE — 2500000004 HC RX 250 GENERAL PHARMACY W/ HCPCS (ALT 636 FOR OP/ED): Performed by: STUDENT IN AN ORGANIZED HEALTH CARE EDUCATION/TRAINING PROGRAM

## 2025-03-18 PROCEDURE — 2060000001 HC INTERMEDIATE ICU ROOM DAILY

## 2025-03-18 PROCEDURE — 80053 COMPREHEN METABOLIC PANEL: CPT | Performed by: INTERNAL MEDICINE

## 2025-03-18 PROCEDURE — 97161 PT EVAL LOW COMPLEX 20 MIN: CPT | Mod: GP

## 2025-03-18 PROCEDURE — 84155 ASSAY OF PROTEIN SERUM: CPT | Performed by: STUDENT IN AN ORGANIZED HEALTH CARE EDUCATION/TRAINING PROGRAM

## 2025-03-18 PROCEDURE — 87449 NOS EACH ORGANISM AG IA: CPT | Mod: WESLAB | Performed by: STUDENT IN AN ORGANIZED HEALTH CARE EDUCATION/TRAINING PROGRAM

## 2025-03-18 PROCEDURE — 2500000004 HC RX 250 GENERAL PHARMACY W/ HCPCS (ALT 636 FOR OP/ED): Performed by: INTERNAL MEDICINE

## 2025-03-18 PROCEDURE — 87075 CULTR BACTERIA EXCEPT BLOOD: CPT | Mod: WESLAB | Performed by: STUDENT IN AN ORGANIZED HEALTH CARE EDUCATION/TRAINING PROGRAM

## 2025-03-18 PROCEDURE — 2500000001 HC RX 250 WO HCPCS SELF ADMINISTERED DRUGS (ALT 637 FOR MEDICARE OP): Performed by: INTERNAL MEDICINE

## 2025-03-18 PROCEDURE — 82947 ASSAY GLUCOSE BLOOD QUANT: CPT

## 2025-03-18 PROCEDURE — 87081 CULTURE SCREEN ONLY: CPT | Mod: WESLAB | Performed by: STUDENT IN AN ORGANIZED HEALTH CARE EDUCATION/TRAINING PROGRAM

## 2025-03-18 PROCEDURE — 87899 AGENT NOS ASSAY W/OPTIC: CPT | Mod: WESLAB | Performed by: STUDENT IN AN ORGANIZED HEALTH CARE EDUCATION/TRAINING PROGRAM

## 2025-03-18 RX ORDER — FLUOCINONIDE 0.5 MG/G
OINTMENT TOPICAL 2 TIMES DAILY
Status: DISCONTINUED | OUTPATIENT
Start: 2025-03-18 | End: 2025-03-22 | Stop reason: HOSPADM

## 2025-03-18 RX ORDER — HYDRALAZINE HYDROCHLORIDE 20 MG/ML
5 INJECTION INTRAMUSCULAR; INTRAVENOUS ONCE
Status: COMPLETED | OUTPATIENT
Start: 2025-03-18 | End: 2025-03-18

## 2025-03-18 RX ORDER — CEFTRIAXONE 2 G/50ML
2 INJECTION, SOLUTION INTRAVENOUS EVERY 24 HOURS
Status: DISCONTINUED | OUTPATIENT
Start: 2025-03-18 | End: 2025-03-22 | Stop reason: HOSPADM

## 2025-03-18 RX ORDER — DOXYCYCLINE 100 MG/1
100 CAPSULE ORAL EVERY 12 HOURS SCHEDULED
Status: DISCONTINUED | OUTPATIENT
Start: 2025-03-18 | End: 2025-03-22 | Stop reason: HOSPADM

## 2025-03-18 RX ADMIN — ISOSORBIDE MONONITRATE 60 MG: 60 TABLET, EXTENDED RELEASE ORAL at 10:51

## 2025-03-18 RX ADMIN — PANTOPRAZOLE SODIUM 40 MG: 40 TABLET, DELAYED RELEASE ORAL at 10:40

## 2025-03-18 RX ADMIN — SEVELAMER CARBONATE 1600 MG: 800 TABLET, FILM COATED ORAL at 17:37

## 2025-03-18 RX ADMIN — AMLODIPINE BESYLATE 5 MG: 5 TABLET ORAL at 10:51

## 2025-03-18 RX ADMIN — FLUOCINONIDE: 0.5 OINTMENT TOPICAL at 20:36

## 2025-03-18 RX ADMIN — HYDRALAZINE HYDROCHLORIDE 5 MG: 20 INJECTION INTRAMUSCULAR; INTRAVENOUS at 10:34

## 2025-03-18 RX ADMIN — ASPIRIN 81 MG: 81 TABLET, COATED ORAL at 10:39

## 2025-03-18 RX ADMIN — DOXYCYCLINE HYCLATE 100 MG: 100 CAPSULE ORAL at 17:37

## 2025-03-18 RX ADMIN — EZETIMIBE 10 MG: 10 TABLET ORAL at 10:39

## 2025-03-18 RX ADMIN — GABAPENTIN 300 MG: 300 CAPSULE ORAL at 20:37

## 2025-03-18 RX ADMIN — ATORVASTATIN CALCIUM 80 MG: 80 TABLET, FILM COATED ORAL at 20:36

## 2025-03-18 RX ADMIN — CEFTRIAXONE SODIUM 2 G: 2 INJECTION, SOLUTION INTRAVENOUS at 19:43

## 2025-03-18 RX ADMIN — SEVELAMER CARBONATE 1600 MG: 800 TABLET, FILM COATED ORAL at 10:38

## 2025-03-18 RX ADMIN — SITAGLIPTIN 25 MG: 50 TABLET, FILM COATED ORAL at 10:45

## 2025-03-18 RX ADMIN — ALLOPURINOL 100 MG: 100 TABLET ORAL at 10:40

## 2025-03-18 RX ADMIN — CARVEDILOL 6.25 MG: 6.25 TABLET, FILM COATED ORAL at 10:39

## 2025-03-18 RX ADMIN — CARVEDILOL 6.25 MG: 6.25 TABLET, FILM COATED ORAL at 17:37

## 2025-03-18 ASSESSMENT — ENCOUNTER SYMPTOMS
SORE THROAT: 0
EYE PAIN: 0
HEADACHES: 0
BLOOD IN STOOL: 0
DIFFICULTY URINATING: 0
POLYDIPSIA: 0
VOICE CHANGE: 0
EYE DISCHARGE: 0
CHILLS: 0
ANAL BLEEDING: 0
DYSURIA: 0
COUGH: 1
SEIZURES: 0
FACIAL SWELLING: 0
JOINT SWELLING: 0
EYE REDNESS: 0
ACTIVITY CHANGE: 0
SLEEP DISTURBANCE: 0
HALLUCINATIONS: 0
STRIDOR: 0
LIGHT-HEADEDNESS: 0
FATIGUE: 0
DECREASED CONCENTRATION: 0
SINUS PRESSURE: 0
VOMITING: 0
POLYPHAGIA: 0
COLOR CHANGE: 0
TROUBLE SWALLOWING: 0
SPEECH DIFFICULTY: 0
CONFUSION: 0
CHEST TIGHTNESS: 0
FACIAL ASYMMETRY: 0
SINUS PAIN: 0
APNEA: 0
CHOKING: 0
FREQUENCY: 0
MYALGIAS: 0
WEAKNESS: 0
HEMATURIA: 0
NECK STIFFNESS: 0
ABDOMINAL DISTENTION: 0
UNEXPECTED WEIGHT CHANGE: 0
NERVOUS/ANXIOUS: 0
DYSPHORIC MOOD: 0
NECK PAIN: 0
HYPERACTIVE: 0
PHOTOPHOBIA: 0
BACK PAIN: 0
ABDOMINAL PAIN: 0
SHORTNESS OF BREATH: 1
DIAPHORESIS: 0
RECTAL PAIN: 0
WHEEZING: 0
NAUSEA: 0
WOUND: 0
DIZZINESS: 0
DIARRHEA: 0
FLANK PAIN: 0
NUMBNESS: 0
BRUISES/BLEEDS EASILY: 0
ARTHRALGIAS: 0
ADENOPATHY: 0
FEVER: 0
EYE ITCHING: 0
PALPITATIONS: 0
APPETITE CHANGE: 0
TREMORS: 0
AGITATION: 0
RHINORRHEA: 0
CONSTIPATION: 0

## 2025-03-18 ASSESSMENT — COGNITIVE AND FUNCTIONAL STATUS - GENERAL
DAILY ACTIVITIY SCORE: 24
DAILY ACTIVITIY SCORE: 24
CLIMB 3 TO 5 STEPS WITH RAILING: A LITTLE
DAILY ACTIVITIY SCORE: 24
MOBILITY SCORE: 24
MOBILITY SCORE: 23
MOBILITY SCORE: 24

## 2025-03-18 ASSESSMENT — PAIN SCALES - GENERAL
PAINLEVEL_OUTOF10: 0 - NO PAIN
PAINLEVEL_OUTOF10: 0 - NO PAIN

## 2025-03-18 ASSESSMENT — PAIN - FUNCTIONAL ASSESSMENT
PAIN_FUNCTIONAL_ASSESSMENT: 0-10
PAIN_FUNCTIONAL_ASSESSMENT: 0-10

## 2025-03-18 ASSESSMENT — ACTIVITIES OF DAILY LIVING (ADL)
ADL_ASSISTANCE: INDEPENDENT
BATHING_ASSISTANCE: INDEPENDENT
ADL_ASSISTANCE: INDEPENDENT

## 2025-03-18 NOTE — CONSULTS
"Reason For Consult  End-stage renal disease on hemodialysis    History Of Present Illness  William A Franke \"Bill\" is a 74 y.o. male with a past medical history of end-stage renal disease on hemodialysis Tuesday Thursday Saturday, hypertension presented to the hospital with a cough, found to have a large right-sided pleural effusion.  Patient says he received dialysis on Saturday.  He denies any other symptoms.  We are consulted for management of end-stage renal disease on hemodialysis.     Past Medical History  He has a past medical history of Chronic kidney disease, Diabetes mellitus (Multi), ESRD (end stage renal disease) on dialysis (Multi), Heart murmur, HLD (hyperlipidemia), Hypertension, Personal history of other endocrine, nutritional and metabolic disease, and Personal history of other specified conditions (08/28/2020).    Surgical History  He has a past surgical history that includes Shoulder surgery (04/15/2013); Knee arthroscopy w/ debridement (04/15/2013); and AV fistula placement (Left, 07/31/2024).     Social History  He reports that he has never smoked. He has never used smokeless tobacco. He reports that he does not currently use alcohol. He reports that he does not use drugs.    Family History  Family History   Problem Relation Name Age of Onset    Alzheimer's disease Mother      Heart attack Mother      Diabetes Father      Heart attack Father          Allergies  Patient has no known allergies.    Review of Systems  10 point review of systems was obtained and is negative other than what is indicated above in the HPI     Physical Exam  General: Awake, alert, no acute distress  Head/ears/nose/throat:  Normocephalic, atraumatic  Neck:  No jugular venous distention, neck supple  Respiratory:  Clear to auscultation bilaterally, normal respiratory effort  Cardiovascular:  S1 and S2, no rubs  Gastrointestinal:  Soft, positive bowel sounds, no rebound or guarding  Extremities:  No edema, cyanosis.  Left " "upper extremity AV fistula with positive bruit  Musculoskeletal:  No injury or deformity noted  Neurologic:  Alert, responsive, cooperative with exam  Skin:  No ulcers noted, dry          I&O 24HR  No intake or output data in the 24 hours ending 03/18/25 1135    Vitals 24HR  Heart Rate:  [64]   Temp:  [36.7 °C (98.1 °F)-38 °C (100.4 °F)]   Resp:  [16-18]   BP: (122-191)/(45-70)   Height:  [172.7 cm (5' 8\")]   Weight:  [74 kg (163 lb 2.3 oz)-74.8 kg (165 lb)]   SpO2:  [93 %-96 %]       Relevant Results  Results for orders placed or performed during the hospital encounter of 03/17/25 (from the past 24 hours)   CBC   Result Value Ref Range    WBC 7.3 4.4 - 11.3 x10*3/uL    nRBC 0.0 0.0 - 0.0 /100 WBCs    RBC 3.21 (L) 4.50 - 5.90 x10*6/uL    Hemoglobin 9.1 (L) 13.5 - 17.5 g/dL    Hematocrit 29.1 (L) 41.0 - 52.0 %    MCV 91 80 - 100 fL    MCH 28.3 26.0 - 34.0 pg    MCHC 31.3 (L) 32.0 - 36.0 g/dL    RDW 16.2 (H) 11.5 - 14.5 %    Platelets 262 150 - 450 x10*3/uL   Comprehensive metabolic panel   Result Value Ref Range    Glucose 101 (H) 74 - 99 mg/dL    Sodium 141 136 - 145 mmol/L    Potassium 4.9 3.5 - 5.3 mmol/L    Chloride 103 98 - 107 mmol/L    Bicarbonate 28 21 - 32 mmol/L    Anion Gap 15 10 - 20 mmol/L    Urea Nitrogen 58 (H) 6 - 23 mg/dL    Creatinine 9.54 (H) 0.50 - 1.30 mg/dL    eGFR 5 (L) >60 mL/min/1.73m*2    Calcium 9.3 8.6 - 10.3 mg/dL    Albumin 3.3 (L) 3.4 - 5.0 g/dL    Alkaline Phosphatase 84 33 - 136 U/L    Total Protein 5.8 (L) 6.4 - 8.2 g/dL    AST 12 9 - 39 U/L    Bilirubin, Total 0.4 0.0 - 1.2 mg/dL    ALT 8 (L) 10 - 52 U/L   POCT GLUCOSE   Result Value Ref Range    POCT Glucose 132 (H) 74 - 99 mg/dL     *Note: Due to a large number of results and/or encounters for the requested time period, some results have not been displayed. A complete set of results can be found in Results Review.          Assessment/Plan   End-stage renal disease on hemodialysis Tuesday Thursday Saturday  Right-sided large " pleural effusion  Hypertension  Anemia    Plan: Dialysis today and continue his regular Tuesday Thursday Saturday schedule.  Resume his oral antihypertensive medications.  Given 5 mg of IV hydralazine for systolic blood pressure above 180.  Pulmonology on consult for pleural effusion.  Renal diet.  Check updated iron studies.  Thank you for your consultation.    Marco Woodson MD

## 2025-03-18 NOTE — PROGRESS NOTES
Spiritual Care Visit  Spiritual Care Request    Reason for Visit:  Routine Visit: Introduction     Request Received From:       Focus of Care:  Visited With: Patient not available         Refer to :          Spiritual Care Assessment    Spiritual Assessment:                      Care Provided:       Sense of Community and or Alevism Affiliation:  Caodaism   Values/Beliefs  Spiritual Requests During Hospitalization: Narciso was not in his room when I tried to see him today.     Addressed Needs/Concerns and/or Miguel A Through:          Outcome:        Advance Directives:         Spiritual Care Annotation    Annotation:  Narciso was not in is room when I tried to see him today.  Marcos Day

## 2025-03-18 NOTE — PROGRESS NOTES
"Occupational Therapy    Evaluation    Patient Name: William A Franke \"Narciso\"  MRN: [id number]  Department: [facility] 3 E  Room: 13/13-B  Today's Date: 3/18/2025  Time Calculation  Start Time: 1007  Stop Time: 1012  Time Calculation (min): 5 min    Assessment  IP OT Assessment  OT Assessment: OT orders received, chart reviewed, OT evaluation completed. Patient completing ADLs, transfers and functional mobility at IND level. No skilled OT needs indicated at this time.  Prognosis: Excellent  Barriers to Discharge Home: No anticipated barriers  Evaluation/Treatment Tolerance: Patient tolerated treatment well  Medical Staff Made Aware: Yes  End of Session Communication: Bedside nurse  End of Session Patient Position: Bed, 2 rail up, Alarm off, not on at start of session (patient Ind with ambulation and toileting.)  Plan:  No Skilled OT: At baseline function  OT Frequency: OT eval only  OT Discharge Recommendations: No further acute OT  Equipment Recommended upon Discharge:  (none)  OT Recommended Transfer Status: Independent  OT - OK to Discharge: Yes    Subjective   Current Problem:  1. Pleural effusion          General:  General  Reason for Referral: impaired actvities of daily living and pleural effusion  Referred By: Marianna Galloway MD  Family/Caregiver Present: No  Co-Treatment: PT  Co-Treatment Reason: to maximize patient outcomes and safety  Prior to Session Communication: Bedside nurse  Patient Position Received:  (standing in hallway with PT)  Preferred Learning Style: verbal, visual  General Comment: pt is a 74 year-old M admitted from home with a pleural effusion.  Precautions:  Hearing/Visual Limitations: glassess, hearing WFL  Medical Precautions: No known precautions/limitation     Date/Time Vitals Session Patient Position Pulse Resp SpO2 BP MAP (mmHg)    03/18/25 1031 --  --  --  --  --  191/55  94                Pain:  Pain Assessment  Pain Assessment: 0-10  0-10 (Numeric) Pain Score: 0 - No pain    Objective "   Cognition:  Overall Cognitive Status: Within Functional Limits  Orientation Level: Oriented X4           Home Living:  Type of Home: House  Lives With: Spouse  Home Adaptive Equipment: None  Home Layout: One level, Laundry in basement (doesnt utilize basement)  Home Access: Stairs to enter without rails  Entrance Stairs-Rails: None  Entrance Stairs-Number of Steps: 1  Bathroom Shower/Tub: Tub/shower unit  Bathroom Toilet: Standard  Bathroom Equipment: None   Prior Function:  Level of [place]: Independent with ADLs and functional transfers, Independent with homemaking with ambulation  Receives Help From: Family  ADL Assistance: Independent  Homemaking Assistance: Independent  Ambulatory Assistance: Independent  Vocational: Retired  Prior Function Comments: denies h/o falls  IADL History:     ADL:  Eating Assistance: Independent  Eating Deficit: None  Grooming Assistance: Independent  Grooming Deficit: None  Bathing Assistance: Independent  Bathing Deficit:  (anticipated)  UE Dressing Assistance: Independent  UE Dressing Deficit:  (nones)  LE Dressing Assistance: Independent  LE Dressing Deficit: Other (Comment) (none)  Toileting Assistance with Device: Independent  Toileting Deficit:  (none)  Activity Tolerance:  Endurance: Endurance does not limit participation in activity  Activity Tolerance Comments: 97% O2 saturation throughout ambulation  Bed Mobility/Transfers: Bed Mobility  Bed Mobility: Yes  Bed Mobility 1  Bed Mobility 1: Sitting to supine  Level of Assistance 1: Independent    Transfers  Transfer: Yes  Transfer 1  Transfer From 1: Stand to, Toilet to  Transfer to 1: Toilet, Stand  Technique 1: Stand pivot  Transfer Level of Assistance 1: Independent  Transfers 2  Transfer From 2: Stand to  Transfer to 2: Bed  Technique 2: Stand to sit  Transfer Level of Assistance 2: Independent    Sensation:  Light Touch: No apparent deficits  Sensation Comment: denies parasthesia BUE/BLEs  Strength:  Strength  Comments: BUE strength 4/5 grossly throughout  Perception:  Inattention/Neglect: Appears intact  Coordination:  Movements are Fluid and Coordinated: Yes   Hand Function:  Hand Function  Gross Grasp: Functional  Coordination: Functional  Extremities: RUE   RUE : Within Functional Limits and LUE   LUE: Within Functional Limits    Outcome Measures: [facility] Daily Activity  Putting on and taking off regular lower body clothing: None  Bathing (including washing, rinsing, drying): None  Putting on and taking off regular upper body clothing: None  Toileting, which includes using toilet, bedpan or urinal: None  Taking care of personal grooming such as brushing teeth: None  Eating Meals: None  Daily Activity - Total Score: 24      Education Documentation  Precautions, taught by Aury López OT at 3/18/2025 11:35 AM.  Learner: Patient  Readiness: Eager  Method: Explanation  Response: Verbalizes Understanding  Comment: Patient educated on call light use    Education Comments  Patient educated on call light use, fall precautions.      Goals:   N/A OT EVAL ONLY

## 2025-03-18 NOTE — CONSULTS
Pulmonary Consultation Note   Subjective    William A Franke is a 74 y.o. year old male patient known with ESRD, through left arm AV fistula, HTN, HFpEF hypertension, history of MSSA bacteremia in December 2024, NIDA negative for endocarditis in December 2024, exudative pleural effusion status post thoracentesis on 12- admitted on 3/17/2025 with following cough. Pulmonary are consulted for pleural effusion.       History of Present Illness:  He mentions that few weeks ago he started to have a cough with some clear phlegm.  He denies hemoptysis, fevers or chills.  He denies shortness of breath or chest pain or pleuritic pain.  His dry weight is 165 pounds.  He has a personal history of prostate cancer and underwent radiation 2 years ago.    Fever on 3-17 at 11 PM of only 38 Celsius      Denies asthma, COPD, or smoking hx or inhaler use, or oxygen use  Patient is only on aspirin at home, denies any other blood thinners    Past Medical History  He has a past medical history of Chronic kidney disease, Diabetes mellitus (Multi), ESRD (end stage renal disease) on dialysis (Multi), Heart murmur, HLD (hyperlipidemia), Hypertension, Personal history of other endocrine, nutritional and metabolic disease, and Personal history of other specified conditions (08/28/2020).    Surgical History  He has a past surgical history that includes Shoulder surgery (04/15/2013); Knee arthroscopy w/ debridement (04/15/2013); and AV fistula placement (Left, 07/31/2024).     Social History  He reports that he has never smoked. He has never used smokeless tobacco. He reports that he does not currently use alcohol. He reports that he does not use drugs.  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    Review of Systems     As above    Meds    Scheduled medications  allopurinol, 100 mg, oral, Daily  amLODIPine, 5 mg, oral, Daily  aspirin, 81 mg, oral, Daily  atorvastatin, 80 mg, oral, Nightly  carvedilol, 6.25 mg, oral, BID  docusate sodium, 100 mg, oral, BID  [START ON 3/24/2025] ergocalciferol, 50,000 Units, oral, Weekly  ezetimibe, 10 mg, oral, Daily  fluocinonide, , Topical, BID  gabapentin, 300 mg, oral, Nightly  heparin (porcine), 5,000 Units, subcutaneous, q8h  insulin lispro, 0-5 Units, subcutaneous, TID AC  isosorbide mononitrate ER, 60 mg, oral, Daily  pantoprazole, 40 mg, oral, Daily  polyethylene glycol, 17 g, oral, Daily  sevelamer carbonate, 1,600 mg, oral, TID  SITagliptin phosphate, 25 mg, oral, Daily      Continuous medications     PRN medications  PRN medications: acetaminophen **OR** acetaminophen **OR** acetaminophen, benzocaine-menthol, dextromethorphan-guaifenesin, guaiFENesin, melatonin, nitroglycerin     Objective      Last Recorded Vitals  BP (!) 186/53 (BP Location: Right arm, Patient Position: Lying)   Pulse 64   Temp 37 °C (98.6 °F) (Temporal)   Resp 18   Wt 74 kg (163 lb 2.3 oz)   SpO2 94%     Blood pressure (!) 186/53, pulse 64, temperature 37 °C (98.6 °F), temperature source Temporal, resp. rate 18, height 1.727 m (5' 8\"), weight 74 kg (163 lb 2.3 oz), SpO2 94%.   Physical Exam   GENERAL:No respiratory distress  HEAD/SINUSES: On room air  LUNGS: Decreased air entry at the right base, otherwise unremarkable  CARDIAC: Regular rate and rhythm, systolic murmur  EXTREMITIES: No edema  NEURO: grossly normal mental status  SKIN: Skin turgor normal.  PSYCH: Normal affect  No intake or output data in the 24 hours ending 03/18/25 0939  Labs:   Results from last 72 hours   Lab Units 03/18/25  0523   SODIUM mmol/L 141   POTASSIUM mmol/L 4.9 "   CHLORIDE mmol/L 103   CO2 mmol/L 28   BUN mg/dL 58*   CREATININE mg/dL 9.54*   GLUCOSE mg/dL 101*   CALCIUM mg/dL 9.3   ANION GAP mmol/L 15   EGFR mL/min/1.73m*2 5*      Results from last 72 hours   Lab Units 03/18/25  0523   WBC AUTO x10*3/uL 7.3   HEMOGLOBIN g/dL 9.1*   HEMATOCRIT % 29.1*   PLATELETS AUTO x10*3/uL 262               Micro/ID:   Lab Results   Component Value Date    BLOODCULT No growth at 4 days -  FINAL REPORT 12/25/2024     Summary of key imaging results from the last 24 hours  CXR 3/18  IMPRESSION:  Moderately large right pleural effusion which has developed since the  prior study. There is probable atelectasis and perhaps some  infiltrate within the right lower lobe as well.    CT chest in February 2025 MPRESSION:  1. Persistent moderate to large right-sided pleural effusion with  surrounding atelectasis. Resolved trace left-sided pleural effusion.  2. Resolved previously seen scattered bilateral nodular  infiltrates/airspace opacities. No new suspicious pulmonary nodules  or consolidations.  3. Cardiomegaly. Severe coronary calcifications  4. Few hepatic lesions largest in the dome measuring 1.8 cm,  unchanged since 2021 and likely benign.    TTE 12/2024  CONCLUSIONS:   1. The left ventricular systolic function is normal, with a visually estimated ejection fraction of 60-65%.   2. There is normal right ventricular global systolic function.   3. No valvular vegetations visualized.     Impression   William A Franke is a 74 y.o. year old male patient known with ESRD, through left arm AV fistula, HTN, HFpEF hypertension, history of MSSA bacteremia in December 2024, NIDA negative for endocarditis in December 2024, exudative pleural effusion status post thoracentesis on 12- admitted on 3/17/2025 with following cough. Pulmonary are consulted for pleural effusion.   Recurrent  right pleural effusion, previous tap in December 2024 showed exudate, negative cultures, negative cytology then.   Admission was for bacteremia event.  Patient has had effusions on previous x-rays however now there is a more predominant effusion on the right seems to be a little bit localized  Cough of subacute process without shortness of breath, denies fevers at home however had a low-grade at 4 AM, however clinically seems stable and well  ESRD on dialysis  History of septic emboli in the lung these seem to have resolved  Low-grade fever yesterday    Recommendations   As follows:  Will recommend blood cultures, and empiric start of antibiotics to cover for pneumonia ceftriaxone and doxycycline  MRSA, sputum culture, procalcitonin, urine strep and legionella Ags  Patient seems stable and he prefers a thoracentesis instead of chest tube, however understands that if the fluid is infected then we will have to do a chest tube, will elect thoracentesis with diagnostic and therapeutic purpose  Would recommend imaging post thoracentesis with CT chest without contrast to evaluate lung parenchyma better after drainage  Pleural fluid to be sent for cultures, cell count, chemistries, cytology    Alice Salgado MD   03/18/25 at 9:39 AM     -Only the Medical problems listed under impression were addressed today.   -Please contact primary team for all other concerns and medical problem  -Thank you for your consult     Disclaimer: Documentation completed with the information available at the time of input. Parts of this note may have been scribed or generated using voice dictation software, Dragon.  Homophonic errors may exist.  Please contact me directly if clarification is needed. The times in the chart may not be reflective of actual patient care times, interventions, or procedures. Documentation occurs after the physical care of the patient.

## 2025-03-18 NOTE — CARE PLAN
The patient's goals for the shift include get some rest     The clinical goals for the shift include get some rest and remain free from falls

## 2025-03-18 NOTE — PRE-PROCEDURE NOTE
Report from Sending RN:    Report From: INES Leavitt  Recent Surgery of Procedure: No  Baseline Level of Consciousness (LOC): A&Ox4  Oxygen Use: No  Type: n/a  Diabetic: Yes  Last BP Med Given Day of Dialysis: see EMAR  Last Pain Med Given: see EMAR  Lab Tests to be Obtained with Dialysis: No  Blood Transfusion to be Given During Dialysis: No  Available IV Access: Yes  Medications to be Administered During Dialysis: No  Continuous IV Infusion Running: No  Restraints on Currently or in the Last 24 Hours: No  Hand-Off Communication: BP has been running high, patient given multiple antihypertensives this morning, patient otherwise stable and ready for dialysis in HD room  Dialysis Catheter Dressing: n/a AVG  Last Dressing Change: n/a

## 2025-03-18 NOTE — PROGRESS NOTES
"Physical Therapy    Physical Therapy Evaluation    Patient Name: William A Franke \"Narciso\"  MRN: [id number]  Department: [facility] E  Room: 13/13-B  Today's Date: 3/18/2025   Time Calculation  Start Time: 1011  Stop Time: 1026  Time Calculation (min): 15 min    Assessment/Plan   PT Assessment  Barriers to Discharge Home: No anticipated barriers  Evaluation/Treatment Tolerance: Patient tolerated treatment well  Medical Staff Made Aware: Yes  Strengths: Ability to acquire knowledge  Barriers to Participation: Comorbidities  End of Session Communication: Bedside nurse  Assessment Comment: Pt demonstrates functional mobility at baseline level, no acute skilled PT needs indicated at this time; will d/c from PT.  End of Session Patient Position: Bed, 2 rail up, Alarm off, not on at start of session  IP OR SWING BED PT PLAN  Inpatient or Swing Bed: Inpatient  PT Plan  PT Plan: PT Eval only  PT Eval Only Reason: At baseline function  PT Frequency: PT eval only  PT Discharge Recommendations: No further acute PT  PT Recommended Transfer Status: Independent  PT - OK to Discharge: Yes    Subjective   General Visit Information:  General  Reason for Referral: impaired functional mobility d/t pleural effusion  Referred By: Marianna Galloway MD  Past Medical History Relevant to Rehab: anemia, HTN, CKD, CAD, DM, HLD, prostate CA, ESRD on dialysis, OA, anemia, CHF.  Family/Caregiver Present: No  Co-Treatment: OT  Co-Treatment Reason: partial co-eval to maximize pt outcomes/safety  Patient Position Received: Bed, 2 rail up, Alarm off, not on at start of session  Preferred Learning Style: verbal  General Comment: pt is a 74 year-old M admitted from home with a pleural effusion.  Home Living:  Home Living  Type of Home: House  Lives With: Spouse  Home Adaptive Equipment: None  Home Layout: One level, Laundry in basement  Home Access: Stairs to enter without rails  Entrance Stairs-Rails: None  Entrance Stairs-Number of Steps: 1  Bathroom Shower/Tub: " Tub/shower unit  Bathroom Toilet: Standard  Bathroom Equipment: None  Prior Level of Function:  Prior Function Per Pt/Caregiver Report  Level of [place]: Independent with ADLs and functional transfers, Independent with homemaking with ambulation  Receives Help From: Family  ADL Assistance: Independent  Homemaking Assistance: Independent  Ambulatory Assistance: Independent  Vocational: Retired  Prior Function Comments: denies h/o falls  Precautions:  Precautions  Hearing/Visual Limitations: glasses; hearing WFL  Medical Precautions: No known precautions/limitation      Date/Time Vitals Session Patient Position Pulse Resp SpO2 BP MAP (mmHg)    03/18/25 1031 --  --  --  --  --  191/55  94           Objective   Pain:  Pain Assessment  Pain Assessment: 0-10  0-10 (Numeric) Pain Score: 0 - No pain  Cognition:  Cognition  Overall Cognitive Status: Within Functional Limits  Orientation Level: Oriented X4    General Assessments:  General Observation  General Observation: pleasant/cooperative; visible skin intact; (+) telemetry     Activity Tolerance  Endurance: Endurance does not limit participation in activity  Activity Tolerance Comments: SpO2 97% throughout session    Sensation  Light Touch: No apparent deficits  Sensation Comment: pt denies paresthesias    Strength  Strength Comments: BLE > 4/5  Strength  Strength Comments: BLE > 4/5    Coordination  Movements are Fluid and Coordinated: Yes    Postural Control  Postural Control: Within Functional Limits    Static Sitting Balance  Static Sitting-Balance Support: Feet supported  Static Sitting-Level of Assistance: Independent    Static Standing Balance  Static Standing-Balance Support: No upper extremity supported  Static Standing-Level of Assistance: Independent  Functional Assessments:     Bed Mobility  Bed Mobility: Yes  Bed Mobility 1  Bed Mobility 1: Supine to sitting, Sitting to supine  Level of Assistance 1: Independent    Transfers  Transfer: Yes  Transfer  1  Transfer From 1: Sit to, Stand to  Transfer to 1: Sit, Stand  Technique 1: Sit to stand, Stand to sit  Transfer Level of Assistance 1: Independent    Ambulation/Gait Training  Ambulation/Gait Training Performed: Yes  Ambulation/Gait Training 1  Surface 1: Level tile  Device 1: No device  Assistance 1: Independent  Quality of Gait 1:  (decreased carmina, reciprocating pattern, steady gait)  Comments/Distance (ft) 1: 300'    Stairs  Stairs: No    Extremity/Trunk Assessments:  RLE   RLE : Within Functional Limits  LLE   LLE : Within Functional Limits  Outcome Measures:  [facility] Basic Mobility  Turning from your back to your side while in a flat bed without using bedrails: None  Moving from lying on your back to sitting on the side of a flat bed without using bedrails: None  Moving to and from bed to chair (including a wheelchair): None  Standing up from a chair using your arms (e.g. wheelchair or bedside chair): None  To walk in hospital room: None  Climbing 3-5 steps with railing: A little  Basic Mobility - Total Score: 23    Education Documentation  Mobility Training, taught by Lucy Lucia PT at 3/18/2025 11:10 AM.  Learner: Patient  Readiness: Acceptance  Method: Explanation, Demonstration  Response: Demonstrated Understanding  Comment: educated on PT POC and safety/sequencing during functional mobility training    Education Comments  No comments found.

## 2025-03-19 ENCOUNTER — APPOINTMENT (OUTPATIENT)
Dept: RADIOLOGY | Facility: HOSPITAL | Age: 75
DRG: 186 | End: 2025-03-19
Payer: MEDICARE

## 2025-03-19 LAB
AMYLASE FLD-CCNC: 29 U/L
BASOPHILS NFR FLD MANUAL: 1 %
BLASTS NFR FLD MANUAL: 0 %
CLARITY FLD: ABNORMAL
COLOR FLD: ABNORMAL
EOSINOPHIL NFR FLD MANUAL: 6 %
ERYTHROCYTE [DISTWIDTH] IN BLOOD BY AUTOMATED COUNT: 16.3 % (ref 11.5–14.5)
GLUCOSE FLD-MCNC: 35 MG/DL
HCT VFR BLD AUTO: 32.7 % (ref 41–52)
HGB BLD-MCNC: 10.4 G/DL (ref 13.5–17.5)
HOLD SPECIMEN: NORMAL
HOLD SPECIMEN: NORMAL
IMMATURE GRANULOCYTES IN FLUID: 0 %
LDH FLD L TO P-CCNC: 281 U/L
LEGIONELLA AG UR QL: NEGATIVE
LYMPHOCYTES NFR FLD MANUAL: 68 %
MCH RBC QN AUTO: 28.7 PG (ref 26–34)
MCHC RBC AUTO-ENTMCNC: 31.8 G/DL (ref 32–36)
MCV RBC AUTO: 90 FL (ref 80–100)
MONOS+MACROS NFR FLD MANUAL: 2 %
NEUTROPHILS NFR FLD MANUAL: 23 %
NRBC BLD-RTO: 0 /100 WBCS (ref 0–0)
OTHER CELLS NFR FLD MANUAL: 0 %
PH FLD: 8 [PH]
PLASMA CELLS NFR FLD MANUAL: 0 %
PLATELET # BLD AUTO: 244 X10*3/UL (ref 150–450)
PROT FLD-MCNC: 3.8 G/DL
RBC # BLD AUTO: 3.63 X10*6/UL (ref 4.5–5.9)
RBC # FLD AUTO: ABNORMAL /UL
S PNEUM AG UR QL: NEGATIVE
TOTAL CELLS COUNTED FLD: 100
TRIGL FLD-MCNC: 24 MG/DL
WBC # BLD AUTO: 7 X10*3/UL (ref 4.4–11.3)
WBC # FLD AUTO: 1292 /UL

## 2025-03-19 PROCEDURE — 87015 SPECIMEN INFECT AGNT CONCNTJ: CPT | Mod: WESLAB | Performed by: STUDENT IN AN ORGANIZED HEALTH CARE EDUCATION/TRAINING PROGRAM

## 2025-03-19 PROCEDURE — 2500000002 HC RX 250 W HCPCS SELF ADMINISTERED DRUGS (ALT 637 FOR MEDICARE OP, ALT 636 FOR OP/ED): Performed by: INTERNAL MEDICINE

## 2025-03-19 PROCEDURE — 84157 ASSAY OF PROTEIN OTHER: CPT | Mod: WESLAB | Performed by: STUDENT IN AN ORGANIZED HEALTH CARE EDUCATION/TRAINING PROGRAM

## 2025-03-19 PROCEDURE — 2720000007 HC OR 272 NO HCPCS

## 2025-03-19 PROCEDURE — 83615 LACTATE (LD) (LDH) ENZYME: CPT | Mod: WESLAB | Performed by: STUDENT IN AN ORGANIZED HEALTH CARE EDUCATION/TRAINING PROGRAM

## 2025-03-19 PROCEDURE — 2500000004 HC RX 250 GENERAL PHARMACY W/ HCPCS (ALT 636 FOR OP/ED): Performed by: STUDENT IN AN ORGANIZED HEALTH CARE EDUCATION/TRAINING PROGRAM

## 2025-03-19 PROCEDURE — 32555 ASPIRATE PLEURA W/ IMAGING: CPT

## 2025-03-19 PROCEDURE — 71045 X-RAY EXAM CHEST 1 VIEW: CPT

## 2025-03-19 PROCEDURE — 88305 TISSUE EXAM BY PATHOLOGIST: CPT | Performed by: PATHOLOGY

## 2025-03-19 PROCEDURE — 85027 COMPLETE CBC AUTOMATED: CPT | Performed by: INTERNAL MEDICINE

## 2025-03-19 PROCEDURE — 84478 ASSAY OF TRIGLYCERIDES: CPT | Mod: WESLAB | Performed by: STUDENT IN AN ORGANIZED HEALTH CARE EDUCATION/TRAINING PROGRAM

## 2025-03-19 PROCEDURE — 83986 ASSAY PH BODY FLUID NOS: CPT | Performed by: STUDENT IN AN ORGANIZED HEALTH CARE EDUCATION/TRAINING PROGRAM

## 2025-03-19 PROCEDURE — 2500000005 HC RX 250 GENERAL PHARMACY W/O HCPCS: Performed by: INTERNAL MEDICINE

## 2025-03-19 PROCEDURE — 88112 CYTOPATH CELL ENHANCE TECH: CPT | Performed by: PATHOLOGY

## 2025-03-19 PROCEDURE — 89051 BODY FLUID CELL COUNT: CPT | Performed by: STUDENT IN AN ORGANIZED HEALTH CARE EDUCATION/TRAINING PROGRAM

## 2025-03-19 PROCEDURE — 99233 SBSQ HOSP IP/OBS HIGH 50: CPT | Performed by: INTERNAL MEDICINE

## 2025-03-19 PROCEDURE — 2500000004 HC RX 250 GENERAL PHARMACY W/ HCPCS (ALT 636 FOR OP/ED): Performed by: INTERNAL MEDICINE

## 2025-03-19 PROCEDURE — 87102 FUNGUS ISOLATION CULTURE: CPT | Mod: WESLAB | Performed by: STUDENT IN AN ORGANIZED HEALTH CARE EDUCATION/TRAINING PROGRAM

## 2025-03-19 PROCEDURE — 2500000004 HC RX 250 GENERAL PHARMACY W/ HCPCS (ALT 636 FOR OP/ED): Performed by: RADIOLOGY

## 2025-03-19 PROCEDURE — 82945 GLUCOSE OTHER FLUID: CPT | Mod: WESLAB | Performed by: STUDENT IN AN ORGANIZED HEALTH CARE EDUCATION/TRAINING PROGRAM

## 2025-03-19 PROCEDURE — C1729 CATH, DRAINAGE: HCPCS

## 2025-03-19 PROCEDURE — 36415 COLL VENOUS BLD VENIPUNCTURE: CPT | Performed by: INTERNAL MEDICINE

## 2025-03-19 PROCEDURE — 32555 ASPIRATE PLEURA W/ IMAGING: CPT | Performed by: RADIOLOGY

## 2025-03-19 PROCEDURE — 88305 TISSUE EXAM BY PATHOLOGIST: CPT | Mod: TC | Performed by: STUDENT IN AN ORGANIZED HEALTH CARE EDUCATION/TRAINING PROGRAM

## 2025-03-19 PROCEDURE — 2500000001 HC RX 250 WO HCPCS SELF ADMINISTERED DRUGS (ALT 637 FOR MEDICARE OP): Performed by: INTERNAL MEDICINE

## 2025-03-19 PROCEDURE — 82150 ASSAY OF AMYLASE: CPT | Mod: WESLAB | Performed by: STUDENT IN AN ORGANIZED HEALTH CARE EDUCATION/TRAINING PROGRAM

## 2025-03-19 PROCEDURE — 87205 SMEAR GRAM STAIN: CPT | Mod: WESLAB | Performed by: STUDENT IN AN ORGANIZED HEALTH CARE EDUCATION/TRAINING PROGRAM

## 2025-03-19 PROCEDURE — 71046 X-RAY EXAM CHEST 2 VIEWS: CPT

## 2025-03-19 PROCEDURE — 71045 X-RAY EXAM CHEST 1 VIEW: CPT | Performed by: RADIOLOGY

## 2025-03-19 PROCEDURE — 2500000001 HC RX 250 WO HCPCS SELF ADMINISTERED DRUGS (ALT 637 FOR MEDICARE OP): Performed by: STUDENT IN AN ORGANIZED HEALTH CARE EDUCATION/TRAINING PROGRAM

## 2025-03-19 PROCEDURE — 2060000001 HC INTERMEDIATE ICU ROOM DAILY

## 2025-03-19 RX ORDER — LIDOCAINE HYDROCHLORIDE 10 MG/ML
INJECTION, SOLUTION EPIDURAL; INFILTRATION; INTRACAUDAL; PERINEURAL
Status: COMPLETED | OUTPATIENT
Start: 2025-03-19 | End: 2025-03-19

## 2025-03-19 RX ORDER — AMLODIPINE BESYLATE 5 MG/1
5 TABLET ORAL ONCE
Status: COMPLETED | OUTPATIENT
Start: 2025-03-19 | End: 2025-03-19

## 2025-03-19 RX ORDER — AMLODIPINE BESYLATE 10 MG/1
10 TABLET ORAL DAILY
Status: DISCONTINUED | OUTPATIENT
Start: 2025-03-20 | End: 2025-03-22 | Stop reason: HOSPADM

## 2025-03-19 RX ADMIN — CEFTRIAXONE SODIUM 2 G: 2 INJECTION, SOLUTION INTRAVENOUS at 13:10

## 2025-03-19 RX ADMIN — DOXYCYCLINE HYCLATE 100 MG: 100 CAPSULE ORAL at 05:37

## 2025-03-19 RX ADMIN — FLUOCINONIDE: 0.5 OINTMENT TOPICAL at 09:41

## 2025-03-19 RX ADMIN — PANTOPRAZOLE SODIUM 40 MG: 40 TABLET, DELAYED RELEASE ORAL at 09:42

## 2025-03-19 RX ADMIN — ASPIRIN 81 MG: 81 TABLET, COATED ORAL at 09:42

## 2025-03-19 RX ADMIN — GABAPENTIN 300 MG: 300 CAPSULE ORAL at 21:03

## 2025-03-19 RX ADMIN — DOXYCYCLINE HYCLATE 100 MG: 100 CAPSULE ORAL at 21:03

## 2025-03-19 RX ADMIN — EZETIMIBE 10 MG: 10 TABLET ORAL at 09:42

## 2025-03-19 RX ADMIN — SEVELAMER CARBONATE 1600 MG: 800 TABLET, FILM COATED ORAL at 18:54

## 2025-03-19 RX ADMIN — Medication 2 L/MIN: at 21:36

## 2025-03-19 RX ADMIN — SEVELAMER CARBONATE 1600 MG: 800 TABLET, FILM COATED ORAL at 09:42

## 2025-03-19 RX ADMIN — AMLODIPINE BESYLATE 5 MG: 5 TABLET ORAL at 12:51

## 2025-03-19 RX ADMIN — ISOSORBIDE MONONITRATE 60 MG: 60 TABLET, EXTENDED RELEASE ORAL at 09:43

## 2025-03-19 RX ADMIN — ATORVASTATIN CALCIUM 80 MG: 80 TABLET, FILM COATED ORAL at 21:03

## 2025-03-19 RX ADMIN — CARVEDILOL 6.25 MG: 6.25 TABLET, FILM COATED ORAL at 18:53

## 2025-03-19 RX ADMIN — ALLOPURINOL 100 MG: 100 TABLET ORAL at 09:43

## 2025-03-19 RX ADMIN — LIDOCAINE HYDROCHLORIDE 7 ML: 10 INJECTION, SOLUTION EPIDURAL; INFILTRATION; INTRACAUDAL; PERINEURAL at 14:31

## 2025-03-19 RX ADMIN — SITAGLIPTIN 25 MG: 50 TABLET, FILM COATED ORAL at 09:43

## 2025-03-19 RX ADMIN — DOCUSATE SODIUM 100 MG: 100 CAPSULE, LIQUID FILLED ORAL at 09:42

## 2025-03-19 RX ADMIN — CARVEDILOL 6.25 MG: 6.25 TABLET, FILM COATED ORAL at 09:43

## 2025-03-19 RX ADMIN — AMLODIPINE BESYLATE 5 MG: 5 TABLET ORAL at 09:43

## 2025-03-19 ASSESSMENT — COGNITIVE AND FUNCTIONAL STATUS - GENERAL
DRESSING REGULAR LOWER BODY CLOTHING: A LITTLE
EATING MEALS: A LITTLE
TOILETING: A LITTLE
PERSONAL GROOMING: A LITTLE
TURNING FROM BACK TO SIDE WHILE IN FLAT BAD: A LITTLE
MOBILITY SCORE: 18
STANDING UP FROM CHAIR USING ARMS: A LITTLE
DAILY ACTIVITIY SCORE: 18
CLIMB 3 TO 5 STEPS WITH RAILING: A LITTLE
WALKING IN HOSPITAL ROOM: A LITTLE
MOVING FROM LYING ON BACK TO SITTING ON SIDE OF FLAT BED WITH BEDRAILS: A LITTLE
MOVING TO AND FROM BED TO CHAIR: A LITTLE
DRESSING REGULAR UPPER BODY CLOTHING: A LITTLE
HELP NEEDED FOR BATHING: A LITTLE

## 2025-03-19 ASSESSMENT — PAIN SCALES - GENERAL
PAINLEVEL_OUTOF10: 0 - NO PAIN

## 2025-03-19 ASSESSMENT — PAIN - FUNCTIONAL ASSESSMENT: PAIN_FUNCTIONAL_ASSESSMENT: 0-10

## 2025-03-19 ASSESSMENT — ACTIVITIES OF DAILY LIVING (ADL): LACK_OF_TRANSPORTATION: NO

## 2025-03-19 NOTE — CARE PLAN
Problem: Diabetes  Goal: Achieve decreasing blood glucose levels by end of shift  Outcome: Progressing  Goal: Increase stability of blood glucose readings by end of shift  Outcome: Progressing  Goal: Decrease in ketones present in urine by end of shift  Outcome: Progressing  Goal: Maintain electrolyte levels within acceptable range throughout shift  Outcome: Progressing  Goal: Maintain glucose levels >70mg/dl to <250mg/dl throughout shift  Outcome: Progressing  Goal: No changes in neurological exam by end of shift  Outcome: Progressing  Goal: Learn about and adhere to nutrition recommendations by end of shift  Outcome: Progressing  Goal: Vital signs within normal range for age by end of shift  Outcome: Progressing  Goal: Increase self care and/or family involovement by end of shift  Outcome: Progressing  Goal: Receive DSME education by end of shift  Outcome: Progressing     Problem: Pain - Adult  Goal: Verbalizes/displays adequate comfort level or baseline comfort level  Outcome: Progressing     Problem: Safety - Adult  Goal: Free from fall injury  Outcome: Progressing     Problem: Discharge Planning  Goal: Discharge to home or other facility with appropriate resources  Outcome: Progressing     Problem: Chronic Conditions and Co-morbidities  Goal: Patient's chronic conditions and co-morbidity symptoms are monitored and maintained or improved  Outcome: Progressing     Problem: Nutrition  Goal: Nutrient intake appropriate for maintaining nutritional needs  Outcome: Progressing   The patient's goals for the shift include remain free from falls and get some rest    The clinical goals for the shift include pt safety    Over the shift, the patient did not make progress toward the following goals. Barriers to progression include pt is at risk for falls . Recommendations to address these barriers include bed alarm on, bed locked and in lowest position, call light in reach.

## 2025-03-19 NOTE — PROGRESS NOTES
Spiritual Care Visit  Spiritual Care Request    Reason for Visit:  Routine Visit: Introduction     Request Received From:       Focus of Care:  Visited With: Patient and family together         Refer to :          Spiritual Care Assessment    Spiritual Assessment:                      Care Provided:  Intended Effects: Build relationship of care and support, Demonstrate caring and concern    Sense of Community and or Nondenominational Affiliation:  [employer]   Values/Beliefs  Spiritual Requests During Hospitalization: Narciso asked to be anointed today.     Addressed Needs/Concerns and/or Miguel A Through:     Sacramental Encounters  Sacrament of Sick-Anointing: Anointed    Outcome:        Advance Directives:         Spiritual Care Annotation    Annotation:  Narciso asked to be anointed today  Wife=Lucie Day

## 2025-03-19 NOTE — POST-PROCEDURE NOTE
Report to Receiving RN:    Report To: INES Leavitt  Time Report Called: [id number]  Hand-Off Communication: 3.5hr dialysis complete. 2.5L removed. Last /60 HR 56. FAHAD AVG decannulated, hemostasis achieved, bandaged securely with gauze and tape.   Complications During Treatment: No  Ultrafiltration Treatment: Yes, 2.5L  Medications Administered During Dialysis: No  Blood Products Administered During Dialysis: No  Labs Sent During Dialysis: No  Heparin Drip Rate Changes: No  Dialysis Catheter Dressing: n/a AVG  Last Dressing Change: n/a    Electronic Signatures:  Pauline BERRYT

## 2025-03-19 NOTE — H&P
"History Of Present Illness  William A Franke \"Narciso\" is a 74 y.o. male presenting with increasing shortness of breath and cough.  Patient has history of end-stage renal disease on hemodialysis Tuesday Thursday Saturday, hypertension, diabetes, coronary artery disease, CHF, left arm AV fistula, anemia,  .  In December 2024 patient was admitted for transudative pleural effusion s/p thoracocentesis on 12/27/2024.  NIDA negative for endocarditis.  MSSA bacteremia source unclear negative indium scan.  Treated with IV cefazolin for 6 weeks.  He had a CAT scan done on 2/14/2025 by ID which showed reaccumulation of right pleural effusion.  Patient followed up with me yesterday in the office complaining of increasing cough and increasing shortness of breath.  Patient was directly admitted for his symptoms and to repeat thoracocentesis and decide if patient will need Pleurx catheter or pleurodesis.  Patient remains afebrile.  He is on room air     Past Medical History  Past Medical History:   Diagnosis Date    Chronic kidney disease     Diabetes mellitus (Multi)     ESRD (end stage renal disease) on dialysis (Multi)     Heart murmur     HLD (hyperlipidemia)     Hypertension     Personal history of other endocrine, nutritional and metabolic disease     History of hypercholesterolemia    Personal history of other specified conditions 08/28/2020    History of chest pain       Surgical History  Past Surgical History:   Procedure Laterality Date    AV FISTULA PLACEMENT Left 07/31/2024    left brachial artery to axillary vein AV graft on 7/31/2024    KNEE ARTHROSCOPY W/ DEBRIDEMENT  04/15/2013    Arthroscopy Knee    SHOULDER SURGERY  04/15/2013    Shoulder Surgery        Social History  He reports that he has never smoked. He has never used smokeless tobacco. He reports that he does not currently use alcohol. He reports that he does not use drugs.    Family History  Family History   Problem Relation Name Age of Onset    Alzheimer's " disease Mother      Heart attack Mother      Diabetes Father      Heart attack Father          Allergies  Patient has no known allergies.    Review of Systems   Constitutional:  Negative for activity change, appetite change, chills, diaphoresis, fatigue, fever and unexpected weight change.   HENT:  Negative for congestion, dental problem, drooling, ear discharge, ear pain, facial swelling, hearing loss, mouth sores, nosebleeds, postnasal drip, rhinorrhea, sinus pressure, sinus pain, sneezing, sore throat, tinnitus, trouble swallowing and voice change.    Eyes:  Negative for photophobia, pain, discharge, redness, itching and visual disturbance.   Respiratory:  Positive for cough and shortness of breath. Negative for apnea, choking, chest tightness, wheezing and stridor.    Cardiovascular:  Negative for chest pain, palpitations and leg swelling.   Gastrointestinal:  Negative for abdominal distention, abdominal pain, anal bleeding, blood in stool, constipation, diarrhea, nausea, rectal pain and vomiting.   Endocrine: Negative for cold intolerance, heat intolerance, polydipsia, polyphagia and polyuria.   Genitourinary:  Negative for decreased urine volume, difficulty urinating, dysuria, enuresis, flank pain, frequency, genital sores, hematuria and urgency.   Musculoskeletal:  Negative for arthralgias, back pain, gait problem, joint swelling, myalgias, neck pain and neck stiffness.   Skin:  Negative for color change, pallor, rash and wound.   Allergic/Immunologic: Negative for environmental allergies, food allergies and immunocompromised state.   Neurological:  Negative for dizziness, tremors, seizures, syncope, facial asymmetry, speech difficulty, weakness, light-headedness, numbness and headaches.   Hematological:  Negative for adenopathy. Does not bruise/bleed easily.   Psychiatric/Behavioral:  Negative for agitation, behavioral problems, confusion, decreased concentration, dysphoric mood, hallucinations, self-injury,  "sleep disturbance and suicidal ideas. The patient is not nervous/anxious and is not hyperactive.         Physical Exam  Vitals reviewed.   Constitutional:       Appearance: Normal appearance.   HENT:      Head: Normocephalic and atraumatic.      Right Ear: Tympanic membrane, ear canal and external ear normal.      Left Ear: Tympanic membrane, ear canal and external ear normal.      Nose: Nose normal.      Mouth/Throat:      Pharynx: Oropharynx is clear.   Eyes:      Extraocular Movements: Extraocular movements intact.      Conjunctiva/sclera: Conjunctivae normal.      Pupils: Pupils are equal, round, and reactive to light.   Cardiovascular:      Rate and Rhythm: Normal rate and regular rhythm.      Pulses: Normal pulses.      Heart sounds: Normal heart sounds.   Pulmonary:      Effort: Pulmonary effort is normal.      Breath sounds: Rales present.   Abdominal:      General: Abdomen is flat. Bowel sounds are normal.      Palpations: Abdomen is soft.   Musculoskeletal:      Cervical back: Normal range of motion and neck supple.   Skin:     General: Skin is warm and dry.   Neurological:      General: No focal deficit present.      Mental Status: He is alert and oriented to person, place, and time.   Psychiatric:         Mood and Affect: Mood normal.        Last Recorded Vitals  Blood pressure 150/55, pulse 61, temperature 36.8 °C (98.2 °F), temperature source Temporal, resp. rate 18, height 1.727 m (5' 8\"), weight 74 kg (163 lb 2.3 oz), SpO2 94%.    Relevant Results        Scheduled medications  allopurinol, 100 mg, oral, Daily  amLODIPine, 5 mg, oral, Daily  aspirin, 81 mg, oral, Daily  atorvastatin, 80 mg, oral, Nightly  carvedilol, 6.25 mg, oral, BID  cefTRIAXone, 2 g, intravenous, q24h  docusate sodium, 100 mg, oral, BID  doxycylcine, 100 mg, oral, q12h [facility]  [START ON 3/24/2025] ergocalciferol, 50,000 Units, oral, Weekly  ezetimibe, 10 mg, oral, Daily  fluocinonide, , Topical, BID  gabapentin, 300 mg, oral, " Nightly  heparin (porcine), 5,000 Units, subcutaneous, q8h  insulin lispro, 0-5 Units, subcutaneous, TID AC  isosorbide mononitrate ER, 60 mg, oral, Daily  pantoprazole, 40 mg, oral, Daily  polyethylene glycol, 17 g, oral, Daily  sevelamer carbonate, 1,600 mg, oral, TID  SITagliptin phosphate, 25 mg, oral, Daily      Continuous medications     PRN medications  PRN medications: acetaminophen **OR** acetaminophen **OR** acetaminophen, benzocaine-menthol, dextromethorphan-guaifenesin, guaiFENesin, melatonin, nitroglycerin  Results for orders placed or performed during the hospital encounter of 03/17/25 (from the past 24 hours)   CBC   Result Value Ref Range    WBC 7.3 4.4 - 11.3 x10*3/uL    nRBC 0.0 0.0 - 0.0 /100 WBCs    RBC 3.21 (L) 4.50 - 5.90 x10*6/uL    Hemoglobin 9.1 (L) 13.5 - 17.5 g/dL    Hematocrit 29.1 (L) 41.0 - 52.0 %    MCV 91 80 - 100 fL    MCH 28.3 26.0 - 34.0 pg    MCHC 31.3 (L) 32.0 - 36.0 g/dL    RDW 16.2 (H) 11.5 - 14.5 %    Platelets 262 150 - 450 x10*3/uL   Comprehensive metabolic panel   Result Value Ref Range    Glucose 101 (H) 74 - 99 mg/dL    Sodium 141 136 - 145 mmol/L    Potassium 4.9 3.5 - 5.3 mmol/L    Chloride 103 98 - 107 mmol/L    Bicarbonate 28 21 - 32 mmol/L    Anion Gap 15 10 - 20 mmol/L    Urea Nitrogen 58 (H) 6 - 23 mg/dL    Creatinine 9.54 (H) 0.50 - 1.30 mg/dL    eGFR 5 (L) >60 mL/min/1.73m*2    Calcium 9.3 8.6 - 10.3 mg/dL    Albumin 3.3 (L) 3.4 - 5.0 g/dL    Alkaline Phosphatase 84 33 - 136 U/L    Total Protein 5.8 (L) 6.4 - 8.2 g/dL    AST 12 9 - 39 U/L    Bilirubin, Total 0.4 0.0 - 1.2 mg/dL    ALT 8 (L) 10 - 52 U/L   Ferritin   Result Value Ref Range    Ferritin 1,219 (H) 20 - 300 ng/mL   Iron and TIBC   Result Value Ref Range    Iron 27 (L) 35 - 150 ug/dL    UIBC 154 110 - 370 ug/dL    TIBC 181 (L) 240 - 445 ug/dL    % Saturation 15 (L) 25 - 45 %   Lactate Dehydrogenase   Result Value Ref Range     84 - 246 U/L   Protein, Total   Result Value Ref Range    Total  Protein 5.8 (L) 6.4 - 8.2 g/dL   POCT GLUCOSE   Result Value Ref Range    POCT Glucose 132 (H) 74 - 99 mg/dL   POCT GLUCOSE   Result Value Ref Range    POCT Glucose 92 74 - 99 mg/dL     *Note: Due to a large number of results and/or encounters for the requested time period, some results have not been displayed. A complete set of results can be found in Results Review.     XR chest 2 views    Result Date: 3/18/2025  Interpreted By:  Angy Henry, STUDY: XR CHEST 2 VIEWS 3/18/2025 8:59 am   INDICATION: Signs/Symptoms:Shortness of breath   COMPARISON: 12/28/2024   ACCESSION NUMBER(S): [id number]   ORDERING CLINICIAN: DIANNE LAWRENCE   TECHNIQUE: PA and lateral views of the chest were acquired.   FINDINGS: There is a moderately large right pleural effusion now seen with no left-sided pleural abnormality observed.   The cardiac size is within normal limits with calcified plaque in the aortic knob and with coronary artery stent graft identified.   The left lung is clear but there is compressive atelectasis and infiltrate in the right lower lobe.   A stent graft within the left axillary region is seen.       Moderately large right pleural effusion which has developed since the prior study. There is probable atelectasis and perhaps some infiltrate within the right lower lobe as well.   Signed by: Angy Henry 3/18/2025 9:07 AM Dictation workstation:   [id number]        Assessment/Plan   Assessment & Plan  Pleural effusion    Benign essential hypertension    BPH with obstruction/lower urinary tract symptoms    CAD (coronary artery disease)    ESRD on hemodialysis (Multi)    Hypertension    Hyperlipidemia    Prostate cancer (Multi)      Will consult pulmonary  Patient probably will need repeat thoracocentesis  Will let pulmonary decide if patient will benefit by thoracic surgery consult  Continue home medications  Consult nephrology for hemodialysis  Will see orders for details     I spent  minutes in the professional and overall  care of this patient.      Marianna Galloway MD

## 2025-03-19 NOTE — PROGRESS NOTES
Patient underwent dialysis yesterday, he is going for thoracentesis today.  His blood pressure is above goal therefore increase amlodipine to 10 mg daily.  His iron stores are sufficient.  Next dialysis will be tomorrow, following along.    Marco Woodson MD

## 2025-03-19 NOTE — CARE PLAN
The patient's goals for the shift include remain free from falls and get some rest    The clinical goals for the shift include no falls this shift    Over the shift, the patient did not make progress toward the following goals. Barriers to progression include   Problem: Skin  Goal: Decreased wound size/increased tissue granulation at next dressing change  Outcome: Progressing  Flowsheets (Taken 3/19/2025 1153)  Decreased wound size/increased tissue granulation at next dressing change: Protective dressings over bony prominences  Goal: Prevent/manage excess moisture  Flowsheets (Taken 3/19/2025 1153)  Prevent/manage excess moisture: Moisturize dry skin  Goal: Prevent/minimize sheer/friction injuries  Flowsheets (Taken 3/19/2025 1153)  Prevent/minimize sheer/friction injuries: Use pull sheet  Goal: Promote skin healing  Outcome: Progressing  Flowsheets (Taken 3/19/2025 1153)  Promote skin healing:   Turn/reposition every 2 hours/use positioning/transfer devices   Protective dressings over bony prominences   Recommendations to address these barriers include

## 2025-03-19 NOTE — PROGRESS NOTES
03/19/25 1710   Discharge Planning   Living Arrangements Spouse/significant other   Support Systems Spouse/significant other   Assistance Needed none   Type of Residence Private residence   Number of Stairs to Enter Residence 2   Number of Stairs Within Residence 1   Home or Post Acute Services None   Expected Discharge Disposition Home   Does the patient need discharge transport arranged? No   Financial Resource Strain   How hard is it for you to pay for the very basics like food, housing, medical care, and heating? Not hard   Housing Stability   In the last 12 months, was there a time when you were not able to pay the mortgage or rent on time? N   In the past 12 months, how many times have you moved where you were living? 0   At any time in the past 12 months, were you homeless or living in a shelter (including now)? N   Transportation Needs   In the past 12 months, has lack of transportation kept you from medical appointments or from getting medications? no   In the past 12 months, has lack of transportation kept you from meetings, work, or from getting things needed for daily living? No     Met with patient at bedside.  An explanation of discharge planning was provided.  Patient resides with his spouse in a ranch style home.  Patient is independent with all ADL's.  Patient is able to cook, clean , shop and drive. Patient does not require oxygen or cpap in the home.  Patient denies smoking and alcohol use. POA is spouse.  A copy of the paperwork was requested for hospital records.     Patient will dischagre home with spouse when medically ready.  No skilled needs identified. PT signed off.

## 2025-03-19 NOTE — TREATMENT PLAN
Patient is on RA  Pending thoracentesis today with IR  CXR placed for post IR procedure  Please call Pulmonary with any questions  Will see patient tomorrow

## 2025-03-19 NOTE — CARE PLAN
The patient's goals for the shift include remain free from falls and get some rest    The clinical goals for the shift include no falls this shift    Over the shift, the patient did not make progress toward the following goals. Barriers to progression include  Recommendations to address these barriers include   Problem: Diabetes  Goal: Achieve decreasing blood glucose levels by end of shift  Outcome: Progressing  Goal: Increase stability of blood glucose readings by end of shift  Outcome: Progressing  Goal: Decrease in ketones present in urine by end of shift  Outcome: Progressing  Goal: Maintain electrolyte levels within acceptable range throughout shift  Outcome: Progressing  Goal: Maintain glucose levels >70mg/dl to <250mg/dl throughout shift  Outcome: Progressing  Goal: No changes in neurological exam by end of shift  Outcome: Progressing  Goal: Learn about and adhere to nutrition recommendations by end of shift  Outcome: Progressing  Goal: Vital signs within normal range for age by end of shift  Outcome: Progressing  Goal: Increase self care and/or family involovement by end of shift  Outcome: Progressing  Goal: Receive DSME education by end of shift  Outcome: Progressing

## 2025-03-20 ENCOUNTER — APPOINTMENT (OUTPATIENT)
Dept: DIALYSIS | Facility: HOSPITAL | Age: 75
End: 2025-03-20
Payer: MEDICARE

## 2025-03-20 ENCOUNTER — APPOINTMENT (OUTPATIENT)
Dept: RADIOLOGY | Facility: HOSPITAL | Age: 75
DRG: 186 | End: 2025-03-20
Payer: MEDICARE

## 2025-03-20 PROBLEM — J94.8 HYDROPNEUMOTHORAX: Status: ACTIVE | Noted: 2025-03-20

## 2025-03-20 LAB
ALBUMIN SERPL BCP-MCNC: 3.6 G/DL (ref 3.4–5)
ALP SERPL-CCNC: 77 U/L (ref 33–136)
ALT SERPL W P-5'-P-CCNC: 9 U/L (ref 10–52)
ANION GAP SERPL CALCULATED.3IONS-SCNC: 18 MMOL/L (ref 10–20)
AST SERPL W P-5'-P-CCNC: 15 U/L (ref 9–39)
BASOPHILS # BLD AUTO: 0.07 X10*3/UL (ref 0–0.1)
BASOPHILS NFR BLD AUTO: 1 %
BILIRUB SERPL-MCNC: 0.5 MG/DL (ref 0–1.2)
BUN SERPL-MCNC: 52 MG/DL (ref 6–23)
CALCIUM SERPL-MCNC: 9.9 MG/DL (ref 8.6–10.3)
CHLORIDE SERPL-SCNC: 96 MMOL/L (ref 98–107)
CO2 SERPL-SCNC: 28 MMOL/L (ref 21–32)
CREAT SERPL-MCNC: 9.06 MG/DL (ref 0.5–1.3)
EGFRCR SERPLBLD CKD-EPI 2021: 6 ML/MIN/1.73M*2
EOSINOPHIL # BLD AUTO: 0.35 X10*3/UL (ref 0–0.4)
EOSINOPHIL NFR BLD AUTO: 4.8 %
ERYTHROCYTE [DISTWIDTH] IN BLOOD BY AUTOMATED COUNT: 16.4 % (ref 11.5–14.5)
GLUCOSE BLD MANUAL STRIP-MCNC: 145 MG/DL (ref 74–99)
GLUCOSE SERPL-MCNC: 88 MG/DL (ref 74–99)
HCT VFR BLD AUTO: 33.7 % (ref 41–52)
HGB BLD-MCNC: 10.7 G/DL (ref 13.5–17.5)
IMM GRANULOCYTES # BLD AUTO: 0.02 X10*3/UL (ref 0–0.5)
IMM GRANULOCYTES NFR BLD AUTO: 0.3 % (ref 0–0.9)
LYMPHOCYTES # BLD AUTO: 1.24 X10*3/UL (ref 0.8–3)
LYMPHOCYTES NFR BLD AUTO: 16.9 %
MCH RBC QN AUTO: 28.2 PG (ref 26–34)
MCHC RBC AUTO-ENTMCNC: 31.8 G/DL (ref 32–36)
MCV RBC AUTO: 89 FL (ref 80–100)
MONOCYTES # BLD AUTO: 0.72 X10*3/UL (ref 0.05–0.8)
MONOCYTES NFR BLD AUTO: 9.8 %
NEUTROPHILS # BLD AUTO: 4.92 X10*3/UL (ref 1.6–5.5)
NEUTROPHILS NFR BLD AUTO: 67.2 %
NRBC BLD-RTO: 0 /100 WBCS (ref 0–0)
PLATELET # BLD AUTO: 268 X10*3/UL (ref 150–450)
POTASSIUM SERPL-SCNC: 4.9 MMOL/L (ref 3.5–5.3)
PROT SERPL-MCNC: 6.6 G/DL (ref 6.4–8.2)
RBC # BLD AUTO: 3.79 X10*6/UL (ref 4.5–5.9)
SODIUM SERPL-SCNC: 137 MMOL/L (ref 136–145)
STAPHYLOCOCCUS SPEC CULT: NORMAL
WBC # BLD AUTO: 7.3 X10*3/UL (ref 4.4–11.3)

## 2025-03-20 PROCEDURE — 82947 ASSAY GLUCOSE BLOOD QUANT: CPT

## 2025-03-20 PROCEDURE — 2500000004 HC RX 250 GENERAL PHARMACY W/ HCPCS (ALT 636 FOR OP/ED): Performed by: STUDENT IN AN ORGANIZED HEALTH CARE EDUCATION/TRAINING PROGRAM

## 2025-03-20 PROCEDURE — 94668 MNPJ CHEST WALL SBSQ: CPT

## 2025-03-20 PROCEDURE — 85025 COMPLETE CBC W/AUTO DIFF WBC: CPT | Performed by: INTERNAL MEDICINE

## 2025-03-20 PROCEDURE — 2500000001 HC RX 250 WO HCPCS SELF ADMINISTERED DRUGS (ALT 637 FOR MEDICARE OP): Performed by: STUDENT IN AN ORGANIZED HEALTH CARE EDUCATION/TRAINING PROGRAM

## 2025-03-20 PROCEDURE — 2500000001 HC RX 250 WO HCPCS SELF ADMINISTERED DRUGS (ALT 637 FOR MEDICARE OP): Performed by: INTERNAL MEDICINE

## 2025-03-20 PROCEDURE — 71045 X-RAY EXAM CHEST 1 VIEW: CPT | Performed by: RADIOLOGY

## 2025-03-20 PROCEDURE — 71045 X-RAY EXAM CHEST 1 VIEW: CPT

## 2025-03-20 PROCEDURE — 2500000005 HC RX 250 GENERAL PHARMACY W/O HCPCS: Performed by: INTERNAL MEDICINE

## 2025-03-20 PROCEDURE — 2500000004 HC RX 250 GENERAL PHARMACY W/ HCPCS (ALT 636 FOR OP/ED): Performed by: INTERNAL MEDICINE

## 2025-03-20 PROCEDURE — 80053 COMPREHEN METABOLIC PANEL: CPT | Performed by: INTERNAL MEDICINE

## 2025-03-20 PROCEDURE — 2500000002 HC RX 250 W HCPCS SELF ADMINISTERED DRUGS (ALT 637 FOR MEDICARE OP, ALT 636 FOR OP/ED): Performed by: INTERNAL MEDICINE

## 2025-03-20 PROCEDURE — 36415 COLL VENOUS BLD VENIPUNCTURE: CPT | Performed by: INTERNAL MEDICINE

## 2025-03-20 PROCEDURE — 8010000001 HC DIALYSIS - HEMODIALYSIS PER DAY

## 2025-03-20 PROCEDURE — 99233 SBSQ HOSP IP/OBS HIGH 50: CPT | Performed by: INTERNAL MEDICINE

## 2025-03-20 PROCEDURE — 2060000001 HC INTERMEDIATE ICU ROOM DAILY

## 2025-03-20 PROCEDURE — 71250 CT THORAX DX C-: CPT

## 2025-03-20 PROCEDURE — 99233 SBSQ HOSP IP/OBS HIGH 50: CPT | Performed by: STUDENT IN AN ORGANIZED HEALTH CARE EDUCATION/TRAINING PROGRAM

## 2025-03-20 RX ADMIN — CEFTRIAXONE SODIUM 2 G: 2 INJECTION, SOLUTION INTRAVENOUS at 14:32

## 2025-03-20 RX ADMIN — DOXYCYCLINE HYCLATE 100 MG: 100 CAPSULE ORAL at 14:05

## 2025-03-20 RX ADMIN — ASPIRIN 81 MG: 81 TABLET, COATED ORAL at 14:04

## 2025-03-20 RX ADMIN — PANTOPRAZOLE SODIUM 40 MG: 40 TABLET, DELAYED RELEASE ORAL at 14:05

## 2025-03-20 RX ADMIN — Medication 21 PERCENT: at 20:16

## 2025-03-20 RX ADMIN — Medication 21 PERCENT: at 07:56

## 2025-03-20 RX ADMIN — SEVELAMER CARBONATE 1600 MG: 800 TABLET, FILM COATED ORAL at 14:05

## 2025-03-20 RX ADMIN — EZETIMIBE 10 MG: 10 TABLET ORAL at 14:04

## 2025-03-20 RX ADMIN — ATORVASTATIN CALCIUM 80 MG: 80 TABLET, FILM COATED ORAL at 21:08

## 2025-03-20 RX ADMIN — AMLODIPINE BESYLATE 10 MG: 10 TABLET ORAL at 14:05

## 2025-03-20 RX ADMIN — ALLOPURINOL 100 MG: 100 TABLET ORAL at 14:05

## 2025-03-20 RX ADMIN — GABAPENTIN 300 MG: 300 CAPSULE ORAL at 21:08

## 2025-03-20 RX ADMIN — DOXYCYCLINE HYCLATE 100 MG: 100 CAPSULE ORAL at 21:08

## 2025-03-20 RX ADMIN — SEVELAMER CARBONATE 1600 MG: 800 TABLET, FILM COATED ORAL at 17:59

## 2025-03-20 RX ADMIN — ISOSORBIDE MONONITRATE 60 MG: 60 TABLET, EXTENDED RELEASE ORAL at 14:19

## 2025-03-20 ASSESSMENT — COGNITIVE AND FUNCTIONAL STATUS - GENERAL
MOVING FROM LYING ON BACK TO SITTING ON SIDE OF FLAT BED WITH BEDRAILS: A LITTLE
PERSONAL GROOMING: A LITTLE
PERSONAL GROOMING: A LITTLE
TOILETING: A LITTLE
DRESSING REGULAR UPPER BODY CLOTHING: A LITTLE
MOVING TO AND FROM BED TO CHAIR: A LITTLE
EATING MEALS: A LITTLE
TURNING FROM BACK TO SIDE WHILE IN FLAT BAD: A LITTLE
DRESSING REGULAR LOWER BODY CLOTHING: A LITTLE
HELP NEEDED FOR BATHING: A LITTLE
DAILY ACTIVITIY SCORE: 18
MOVING TO AND FROM BED TO CHAIR: A LITTLE
DAILY ACTIVITIY SCORE: 18
DRESSING REGULAR UPPER BODY CLOTHING: A LITTLE
EATING MEALS: A LITTLE
PERSONAL GROOMING: A LITTLE
WALKING IN HOSPITAL ROOM: A LITTLE
EATING MEALS: A LITTLE
WALKING IN HOSPITAL ROOM: A LITTLE
MOBILITY SCORE: 19
TURNING FROM BACK TO SIDE WHILE IN FLAT BAD: A LITTLE
MOVING FROM LYING ON BACK TO SITTING ON SIDE OF FLAT BED WITH BEDRAILS: A LITTLE
STANDING UP FROM CHAIR USING ARMS: A LITTLE
STANDING UP FROM CHAIR USING ARMS: A LITTLE
DRESSING REGULAR UPPER BODY CLOTHING: A LITTLE
STANDING UP FROM CHAIR USING ARMS: A LITTLE
MOVING TO AND FROM BED TO CHAIR: A LITTLE
TOILETING: A LITTLE
TURNING FROM BACK TO SIDE WHILE IN FLAT BAD: A LITTLE
HELP NEEDED FOR BATHING: A LITTLE
DRESSING REGULAR LOWER BODY CLOTHING: A LITTLE
MOBILITY SCORE: 19
WALKING IN HOSPITAL ROOM: A LITTLE
MOVING FROM LYING ON BACK TO SITTING ON SIDE OF FLAT BED WITH BEDRAILS: A LITTLE
TOILETING: A LITTLE
DAILY ACTIVITIY SCORE: 18
MOBILITY SCORE: 19
DRESSING REGULAR LOWER BODY CLOTHING: A LITTLE
HELP NEEDED FOR BATHING: A LITTLE

## 2025-03-20 ASSESSMENT — ENCOUNTER SYMPTOMS
CHILLS: 0
FEVER: 0
SHORTNESS OF BREATH: 1
COUGH: 1

## 2025-03-20 ASSESSMENT — PAIN SCALES - GENERAL
PAINLEVEL_OUTOF10: 0 - NO PAIN
PAINLEVEL_OUTOF10: 0 - NO PAIN

## 2025-03-20 NOTE — PROGRESS NOTES
"William A Franke \"Narciso\" is a 74 y.o. male on day 3 of admission presenting with Pleural effusion.    Subjective   Patient undergoing dialysis    Objective     Physical Exam  Vitals reviewed.   Constitutional:       Appearance: Normal appearance.   HENT:      Head: Normocephalic and atraumatic.      Right Ear: Tympanic membrane, ear canal and external ear normal.      Left Ear: Tympanic membrane, ear canal and external ear normal.      Nose: Nose normal.      Mouth/Throat:      Pharynx: Oropharynx is clear.   Eyes:      Extraocular Movements: Extraocular movements intact.      Conjunctiva/sclera: Conjunctivae normal.      Pupils: Pupils are equal, round, and reactive to light.   Cardiovascular:      Rate and Rhythm: Normal rate and regular rhythm.      Pulses: Normal pulses.      Heart sounds: Normal heart sounds.   Pulmonary:      Effort: Pulmonary effort is normal.      Breath sounds: Rales present.   Abdominal:      General: Abdomen is flat. Bowel sounds are normal.      Palpations: Abdomen is soft.   Musculoskeletal:      Cervical back: Normal range of motion and neck supple.   Skin:     General: Skin is warm and dry.   Neurological:      General: No focal deficit present.      Mental Status: He is alert and oriented to person, place, and time.   Psychiatric:         Mood and Affect: Mood normal.         Last Recorded Vitals  Blood pressure 167/58, pulse 72, temperature 36.8 °C (98.2 °F), temperature source Temporal, resp. rate 20, height 1.727 m (5' 8\"), weight 68.6 kg (151 lb 3.8 oz), SpO2 97%.  Intake/Output last 3 Shifts:  I/O last 3 completed shifts:  In: 570 (8.3 mL/kg) [P.O.:520; IV Piggyback:50]  Out: - (0 mL/kg)   Weight: 68.6 kg     Relevant Results               Scheduled medications  allopurinol, 100 mg, oral, Daily  amLODIPine, 10 mg, oral, Daily  aspirin, 81 mg, oral, Daily  atorvastatin, 80 mg, oral, Nightly  carvedilol, 6.25 mg, oral, BID  cefTRIAXone, 2 g, intravenous, q24h  docusate sodium, 100 " mg, oral, BID  doxycylcine, 100 mg, oral, q12h [facility]  [START ON 3/24/2025] ergocalciferol, 50,000 Units, oral, Weekly  ezetimibe, 10 mg, oral, Daily  fluocinonide, , Topical, BID  gabapentin, 300 mg, oral, Nightly  heparin (porcine), 5,000 Units, subcutaneous, q8h  insulin lispro, 0-5 Units, subcutaneous, TID AC  isosorbide mononitrate ER, 60 mg, oral, Daily  oxygen, , inhalation, Continuous - Inhalation  pantoprazole, 40 mg, oral, Daily  polyethylene glycol, 17 g, oral, Daily  sevelamer carbonate, 1,600 mg, oral, TID  SITagliptin phosphate, 25 mg, oral, Daily      Continuous medications     PRN medications  PRN medications: acetaminophen **OR** acetaminophen **OR** acetaminophen, benzocaine-menthol, dextromethorphan-guaifenesin, guaiFENesin, melatonin, nitroglycerin  Results for orders placed or performed during the hospital encounter of 03/17/25 (from the past 24 hours)   Sterile Fluid Culture/Smear    Specimen: Pleural; Fluid   Result Value Ref Range    Sterile Fluid Culture/Smear No growth to date     Gram Stain (1+) Rare Polymorphonuclear leukocytes     Gram Stain No organisms seen    Fungal Culture/Smear    Specimen: Pleural; Fluid   Result Value Ref Range    Fungal Culture/Smear       Culture in progress, a report will be issued when positive or after 2 weeks of incubation.    Fungal Smear No fungal elements seen    Lactate Dehydrogenase, Body Fluid   Result Value Ref Range    LD, Fluid 281 Not established. U/L   Glucose, Body Fluid   Result Value Ref Range    Glucose, Fluid 35 Not established mg/dL   Protein, Total, Body Fluid   Result Value Ref Range    Protein, Total Fluid 3.8 Not established g/dL   Triglycerides, Body Fluid   Result Value Ref Range    Triglycerides, Fluid 24 No established mg/dL   Amylase, Body Fluid   Result Value Ref Range    Amylase, Fluid 29 Not established. U/L   AFB Processed   Result Value Ref Range    Extra Tube Hold for add-ons.    Body Fluid Cell Count   Result Value Ref Range     Color, Fluid Red (A) Colorless, Straw, Yellow    Clarity, Fluid Cloudy (A) Clear    WBC, Fluid 1,292 See Comment /uL    RBC, Fluid 255,000 see comment /uL   Body Fluid Differential   Result Value Ref Range    Neutrophils %, Manual, Fluid 23 see comment %    Lymphocytes %, Manual, Fluid 68 see comment %    Mono/Macrophages %, Manual, Fluid 2 see comment %    Eosinophils %, Manual, Fluid 6 see comment %    Basophils %, Manual, Fluid 1 not established %    Immature Granulocytes %, Manual, Fluid 0 not established %    Blasts %, Manual, Fluid 0 not established %    Unclassified Cells %, Manual, Fluid 0 not established %    Plasma Cells %, Manual, Fluid 0 not established %    Total Cells Counted, Fluid 100    pH, Body Fluid   Result Value Ref Range    pH, Fluid 8.00 See Below   Lavender Top   Result Value Ref Range    Extra Tube Hold for add-ons.    CBC   Result Value Ref Range    WBC 7.0 4.4 - 11.3 x10*3/uL    nRBC 0.0 0.0 - 0.0 /100 WBCs    RBC 3.63 (L) 4.50 - 5.90 x10*6/uL    Hemoglobin 10.4 (L) 13.5 - 17.5 g/dL    Hematocrit 32.7 (L) 41.0 - 52.0 %    MCV 90 80 - 100 fL    MCH 28.7 26.0 - 34.0 pg    MCHC 31.8 (L) 32.0 - 36.0 g/dL    RDW 16.3 (H) 11.5 - 14.5 %    Platelets 244 150 - 450 x10*3/uL   CBC and Auto Differential   Result Value Ref Range    WBC 7.3 4.4 - 11.3 x10*3/uL    nRBC 0.0 0.0 - 0.0 /100 WBCs    RBC 3.79 (L) 4.50 - 5.90 x10*6/uL    Hemoglobin 10.7 (L) 13.5 - 17.5 g/dL    Hematocrit 33.7 (L) 41.0 - 52.0 %    MCV 89 80 - 100 fL    MCH 28.2 26.0 - 34.0 pg    MCHC 31.8 (L) 32.0 - 36.0 g/dL    RDW 16.4 (H) 11.5 - 14.5 %    Platelets 268 150 - 450 x10*3/uL    Neutrophils % 67.2 40.0 - 80.0 %    Immature Granulocytes %, Automated 0.3 0.0 - 0.9 %    Lymphocytes % 16.9 13.0 - 44.0 %    Monocytes % 9.8 2.0 - 10.0 %    Eosinophils % 4.8 0.0 - 6.0 %    Basophils % 1.0 0.0 - 2.0 %    Neutrophils Absolute 4.92 1.60 - 5.50 x10*3/uL    Immature Granulocytes Absolute, Automated 0.02 0.00 - 0.50 x10*3/uL    Lymphocytes  Absolute 1.24 0.80 - 3.00 x10*3/uL    Monocytes Absolute 0.72 0.05 - 0.80 x10*3/uL    Eosinophils Absolute 0.35 0.00 - 0.40 x10*3/uL    Basophils Absolute 0.07 0.00 - 0.10 x10*3/uL   Comprehensive Metabolic Panel   Result Value Ref Range    Glucose 88 74 - 99 mg/dL    Sodium 137 136 - 145 mmol/L    Potassium 4.9 3.5 - 5.3 mmol/L    Chloride 96 (L) 98 - 107 mmol/L    Bicarbonate 28 21 - 32 mmol/L    Anion Gap 18 10 - 20 mmol/L    Urea Nitrogen 52 (H) 6 - 23 mg/dL    Creatinine 9.06 (H) 0.50 - 1.30 mg/dL    eGFR 6 (L) >60 mL/min/1.73m*2    Calcium 9.9 8.6 - 10.3 mg/dL    Albumin 3.6 3.4 - 5.0 g/dL    Alkaline Phosphatase 77 33 - 136 U/L    Total Protein 6.6 6.4 - 8.2 g/dL    AST 15 9 - 39 U/L    Bilirubin, Total 0.5 0.0 - 1.2 mg/dL    ALT 9 (L) 10 - 52 U/L     *Note: Due to a large number of results and/or encounters for the requested time period, some results have not been displayed. A complete set of results can be found in Results Review.   CT chest wo IV contrast    Result Date: 3/20/2025  Interpreted By:  Bobbi Stacy, STUDY: CT CHEST WO IV CONTRAST;  3/20/2025 5:07 am   INDICATION: Signs/Symptoms:exudative effusion, evaluate for fluid remaining, trapped lung physiology vs pneumothorax.     COMPARISON: CT chest 02/14/2025 CT abdomen pelvis 04/26/2021   ACCESSION NUMBER(S): [id number]   ORDERING CLINICIAN: HUNTER CHÁVEZ   TECHNIQUE: Helical data acquisition of the chest was obtained  without IV contrast material.  Images were reformatted in axial, coronal, and sagittal planes.   FINDINGS: LUNGS AND AIRWAYS: New moderate right hydropneumothorax is seen. Similar mild diffuse centrilobular micronodular ground-glass opacities, likely respiratory bronchiolitis. Trachea and right and left main bronchi are patent.   MEDIASTINUM AND YAAKOV, LOWER NECK AND AXILLA: The visualized thyroid gland is within normal limits.   Stable mildly prominent 14 x 14 mm right hilar lymph node on series 4, image 133. no new enlarged  thoracic lymph node.   Esophagus is within normal limits.   HEART AND VESSELS: The thoracic aorta is of normal course and caliber with mild-to-moderate vascular calcifications.   Main pulmonary artery and its branches are normal in caliber.   There are severe coronary artery calcifications versus stent. The study is not optimized for evaluation of coronary arteries.   The cardiac chambers are not enlarged.   No evidence of pericardial effusion.   UPPER ABDOMEN: 20 mm hypoattenuating lesion in the hepatic dome, stable dating back to April 2021.   CHEST WALL AND OSSEOUS STRUCTURES: No suspicious osseous lesions. Multilevel degenerative changes are present.       1. New moderate right hydropneumothorax. Given interval thoracentesis and exudative fluid, findings are suspicious for ex vacuo hydropneumothorax (trapped lung). However, correlation with fluid culture is recommended to exclude empyema, though felt to be less likely given the negative gram stain and normal WBC count. Bronchopleural fistula is felt to be unlikely. Underlying malignant effusion is possible, please correlate with cytology. Further evaluation with PET-CT could be considered if there is no known history of malignancy or if there is no known cause of patient's exudative fluid. The pleural fluid will likely not show FDG avidity even if malignant, however other sites of potential disease could be identified or excluded. 2. Stable mildly prominent right hilar lymph node. 3. Additional chronic and incidental findings as detailed above.   MACRO: Critical Finding:  See findings. Notification was initiated on 3/20/2025 at 9:35 am by  Bobbi Stacy.  (**-YCF-**) Instructions:   Signed by: Bobbi Stacy 3/20/2025 9:36 AM Dictation workstation:   [id number]    XR chest 2 views    Result Date: 3/20/2025  Interpreted By:  Tk Kyle, STUDY: XR CHEST 2 VIEWS; 3/19/2025 6:05 pm   INDICATION: Signs/Symptoms:Post thoracentesis.   COMPARISON: 03/18/2025    ACCESSION NUMBER(S): [id number]   ORDERING CLINICIAN: HUNTER CHÁVEZ   TECHNIQUE: AP and lateral views of the chest were obtained.   FINDINGS: The cardiac size is indeterminate due to the AP projection. Patient is status post right-sided thoracentesis with a loculated pneumothorax at the right lung base in association with right basilar partial collapse/atelectasis. The pneumothorax is likely from failure of re-expansion of the lung at the right base. Left hemithorax is clear.       Loculated right basilar pneumothorax likely from failure of re-expansion of the right lung. Compressive atelectasis of the right lung base is noted.   MACRO: none   Signed by: Tk Kyle 3/20/2025 7:43 AM Dictation workstation:   [id number]    XR chest 1 view    Result Date: 3/19/2025  Interpreted By:  Jose R Murray, STUDY: XR CHEST 1 VIEW;  3/19/2025 9:20 pm   INDICATION: Signs/Symptoms:evalaute for pneumothorax vs ex vacuo.     COMPARISON: Study performed earlier the same day   ACCESSION NUMBER(S): [id number]   ORDERING CLINICIAN: HUNTER CHÁVEZ   FINDINGS: The patient is status post right-sided thoracentesis. Lucency over the right lung base consistent with a basilar pneumothorax. Interval decrease in size of the effusion which is now small. Right basilar airspace disease present. The left lung is clear.       Moderate-sized right basilar pneumothorax. Interval decrease in right effusion after thoracentesis. Persistent small effusion and right basilar airspace disease present   MACRO: Jose R Murray discussed the significance and urgency of this critical finding by telephone with  the clinical team taking care of the patient on 3/19/2025 at 9:53 pm.  (**-RCF-**) Findings:  See findings.   Signed by: Jose R Murray 3/19/2025 9:53 PM Dictation workstation:   [id number]    US thoracentesis    Result Date: 3/19/2025  Interpreted By:  Bassam Brown, STUDY: US THORACENTESIS; 3/19/2025 2:45 pm   INDICATION:  Signs/Symptoms:Right thoracentesis diagnostic and therapeutic, hx of ESRD, recurrent effusion, concern for malignancy vs infection.   COMPARISON: None   ACCESSION NUMBER(S): [id number]   ORDERING CLINICIAN: HUNTER CHÁVEZ   TECHNIQUE: Informed consent obtained. Patient positionedsitting upright. Skin prepped, draped and anesthetized. Ultrasound right chest demonstrates moderate complex effusion. Under ultrasound guidance, a centesis catheter/needle was advanced into rightpleural cavity.   FINDINGS: A total of 950 cc of bloody fluid was aspirated. A sample was sent for analysis. The patient tolerated the procedure well.       Ultrasound-guided right thoracentesis.   Signed by: Bassam Brown 3/19/2025 3:18 PM Dictation workstation:   [id number]            Assessment/Plan   Assessment & Plan  Pleural effusion    Benign essential hypertension    BPH with obstruction/lower urinary tract symptoms    CAD (coronary artery disease)    ESRD on hemodialysis (Multi)    Hypertension    Hyperlipidemia    Prostate cancer (Multi)    Hydropneumothorax    CAT scan findings noted  Discussed with the pulmonologist  Consult thoracic surgeon   Continue antibiotic  Continue oxygen to keep sats above 100%  Dialysis per nephrology  Blood pressure stable  Blood sugar adrianna         I spent  minutes in the professional and overall care of this patient.      Marianna Galloway MD

## 2025-03-20 NOTE — POST-PROCEDURE NOTE
Report to Receiving RN:     Report To: INES Fay  Time Report Called: 1230  Hand-Off Communication: 3.5hr dialysis complete. 2L removed. Last /71 HR 72. FAHAD AVG decannulated, hemostasis achieved, bandaged securely with gauze and tape.   Complications During Treatment: No  Ultrafiltration Treatment: Yes, 2L  Medications Administered During Dialysis: No  Blood Products Administered During Dialysis: No  Labs Sent During Dialysis: No  Heparin Drip Rate Changes: No  Dialysis Catheter Dressing: n/a AVG  Last Dressing Change: n/a     Electronic Signatures:  Pauline HELLER

## 2025-03-20 NOTE — CONSULTS
"Inpatient consult to Infectious Diseases  Consult performed by: Dave Pathak MD  Consult ordered by: Marianna aGlloway MD            Primary MD: Marianna Galloway MD    Reason For Consult  Infected pleural fluid    History Of Present Illness  William A Franke \"Narciso\" is a 74 y.o. male presenting with shortness of breath.  He has history of HTN and is on hemodialysis.  He has a history of type 2 diabetes coronary disease, congestive heart failure.  He was recently admitted to the hospital for MSSA bacteremia with possible septic emboli.  He was treated with IV vancomycin for 6 weeks.  He had repeat CT that was remarkable for right pleural effusion, reaccumulation.  He had associated nonproductive cough.  He denies any chest pain.  He was admitted for further care.  He had thoracentesis, remarkable for total protein of 3.8, glucose of 35.  He was seen while undergoing dialysis.  He is on IV ceftriaxone  He had CT done, reviewed by me, remarkable for right-sided pleural effusion    Past Medical History  He has a past medical history of Chronic kidney disease, Diabetes mellitus (Multi), ESRD (end stage renal disease) on dialysis (Multi), Heart murmur, HLD (hyperlipidemia), Hypertension, Personal history of other endocrine, nutritional and metabolic disease, and Personal history of other specified conditions (08/28/2020).    Surgical History  He has a past surgical history that includes Shoulder surgery (04/15/2013); Knee arthroscopy w/ debridement (04/15/2013); and AV fistula placement (Left, 07/31/2024).     Social History     Occupational History    Not on file   Tobacco Use    Smoking status: Never    Smokeless tobacco: Never   Substance and Sexual Activity    Alcohol use: Not Currently     Comment: rarely    Drug use: Never    Sexual activity: Not on file     Travel History   Travel since 02/20/25    No documented travel since 02/20/25         Family History  Family History   Problem Relation Name Age of Onset    " "Alzheimer's disease Mother      Heart attack Mother      Diabetes Father      Heart attack Father       Allergies  Patient has no known allergies.     Immunization History   Administered Date(s) Administered    Flu vaccine (IIV4), preservative free *Check age/dose* 09/24/2022    Flu vaccine, quadrivalent, high-dose, preservative free, age 65y+ (FLUZONE) 10/14/2021    Flu vaccine, trivalent, preservative free, HIGH-DOSE, age 65y+ (Fluzone) 11/14/2019    Flu vaccine, trivalent, preservative free, age 6 months and greater (Fluarix/Fluzone/Flulaval) 10/27/2017    Influenza, Unspecified 10/20/2012, 12/01/2020    Influenza, seasonal, injectable 10/22/2011, 10/25/2016    Moderna SARS-CoV-2 Vaccination 03/12/2021, 04/09/2021, 11/29/2021, 04/08/2022    Pfizer COVID-19 vaccine, 12 years and older, (30mcg/0.3mL) (Comirnaty) 12/04/2023, 05/22/2024    Pneumococcal conjugate vaccine, 13-valent (PREVNAR 13) 04/04/2019    Tdap vaccine, age 7 year and older (BOOSTRIX, ADACEL) 10/07/2014     Medications  Home medications:  Medications Prior to Admission   Medication Sig Dispense Refill Last Dose/Taking    allopurinol (Zyloprim) 100 mg tablet Take 1 tablet (100 mg) by mouth once daily. 90 tablet 0 3/17/2025    amLODIPine (Norvasc) 5 mg tablet Take 1 tablet (5 mg) by mouth once daily.   3/17/2025    aspirin 81 mg EC tablet Take 1 tablet (81 mg) by mouth once daily.   3/17/2025    atorvastatin (Lipitor) 80 mg tablet Take 1 tablet (80 mg) by mouth once daily at bedtime. 90 tablet 0 3/17/2025    B complex-vitamin C-folic acid (Nephro-Shea) 0.8 mg tablet Take 1 tablet by mouth once daily.   3/17/2025    BD Ultra-Fine Mini Pen Needle 31 gauge x 3/16\" needle USE AS DIRECTED 4 times a day 400 each 2 3/17/2025    carvedilol (Coreg) 6.25 mg tablet Take 1 tablet (6.25 mg) by mouth 2 times daily (morning and late afternoon). 180 tablet 3 3/17/2025    ergocalciferol (Vitamin D-2) 1.25 MG (29966 UT) capsule Take 1 capsule (50,000 Units) by mouth " every 14 (fourteen) days. Takes every other Ross   3/17/2025    ezetimibe (Zetia) 10 mg tablet Take 1 tablet (10 mg) by mouth once daily. 90 tablet 0 3/17/2025    gabapentin (Neurontin) 300 mg capsule Take 1 capsule (300 mg) by mouth once daily at bedtime.   3/17/2025    insulin lispro (HumaLOG) 100 unit/mL injection Inject 10 Units under the skin 3 times daily (morning, midday, late afternoon).   3/17/2025    isosorbide mononitrate ER (Imdur) 60 mg 24 hr tablet Take 1 tablet (60 mg) by mouth once daily. Do not crush or chew. 90 tablet 3 3/17/2025    Januvia 25 mg tablet Take 1 tablet (25 mg) by mouth once daily. 90 tablet 3 3/17/2025    nitroglycerin (Nitrostat) 0.4 mg SL tablet Place 1 tablet (0.4 mg) under the tongue every 5 minutes if needed for chest pain.   3/17/2025    pantoprazole (ProtoNix) 40 mg EC tablet Take 1 tablet (40 mg) by mouth once daily. 90 tablet 0 3/17/2025    sevelamer carbonate (Renvela) 800 mg tablet Take 2 tablets (1,600 mg) by mouth 3 times daily (morning, midday, late afternoon). Three times daily with meals   3/17/2025    triamcinolone (Kenalog) 0.5 % cream Apply topically 3 times a day. 30 g 0 3/17/2025     Current medications:  Scheduled medications  allopurinol, 100 mg, oral, Daily  amLODIPine, 10 mg, oral, Daily  aspirin, 81 mg, oral, Daily  atorvastatin, 80 mg, oral, Nightly  carvedilol, 6.25 mg, oral, BID  cefTRIAXone, 2 g, intravenous, q24h  docusate sodium, 100 mg, oral, BID  doxycylcine, 100 mg, oral, q12h [facility]  [START ON 3/24/2025] ergocalciferol, 50,000 Units, oral, Weekly  ezetimibe, 10 mg, oral, Daily  fluocinonide, , Topical, BID  gabapentin, 300 mg, oral, Nightly  heparin (porcine), 5,000 Units, subcutaneous, q8h  insulin lispro, 0-5 Units, subcutaneous, TID AC  isosorbide mononitrate ER, 60 mg, oral, Daily  oxygen, , inhalation, Continuous - Inhalation  pantoprazole, 40 mg, oral, Daily  polyethylene glycol, 17 g, oral, Daily  sevelamer carbonate, 1,600 mg, oral,  TID  SITagliptin phosphate, 25 mg, oral, Daily      Continuous medications     PRN medications  PRN medications: acetaminophen **OR** acetaminophen **OR** acetaminophen, benzocaine-menthol, dextromethorphan-guaifenesin, guaiFENesin, melatonin, nitroglycerin    Review of Systems   Constitutional:  Negative for chills and fever.   Respiratory:  Positive for cough and shortness of breath.    Cardiovascular:  Negative for chest pain and leg swelling.   All other systems reviewed and are negative.       Objective  Range of Vitals (last 24 hours)  Heart Rate:  [52-63]   Temp:  [36.6 °C (97.9 °F)-37.9 °C (100.2 °F)]   Resp:  [17-20]   BP: (140-173)/(53-64)   Weight:  [68.2 kg (150 lb 5.7 oz)-68.6 kg (151 lb 3.8 oz)]   SpO2:  [94 %-100 %]   Daily Weight  03/20/25 : 68.6 kg (151 lb 3.8 oz)    Body mass index is 23 kg/m².     Physical Exam  Constitutional:       Appearance: Normal appearance.   HENT:      Head: Normocephalic and atraumatic.      Nose: Nose normal.   Eyes:      General: No scleral icterus.     Extraocular Movements: Extraocular movements intact.      Conjunctiva/sclera: Conjunctivae normal.   Cardiovascular:      Rate and Rhythm: Normal rate and regular rhythm.      Heart sounds: Normal heart sounds.   Pulmonary:      Breath sounds: Decreased breath sounds present.   Abdominal:      General: Bowel sounds are normal.      Palpations: Abdomen is soft.      Tenderness: There is no abdominal tenderness.   Musculoskeletal:      Cervical back: Normal range of motion and neck supple.      Right lower leg: No edema.      Left lower leg: No edema.   Skin:     General: Skin is warm and dry.   Neurological:      Mental Status: He is alert.   Psychiatric:         Behavior: Behavior normal. Behavior is cooperative.          Relevant Results  Outside Hospital Results    Labs  Results from last 72 hours   Lab Units 03/20/25  0540 03/19/25  2222 03/18/25  0523   WBC AUTO x10*3/uL 7.3 7.0 7.3   HEMOGLOBIN g/dL 10.7* 10.4* 9.1*  "  HEMATOCRIT % 33.7* 32.7* 29.1*   PLATELETS AUTO x10*3/uL 268 244 262   NEUTROS PCT AUTO % 67.2  --   --    LYMPHS PCT AUTO % 16.9  --   --    MONOS PCT AUTO % 9.8  --   --    EOS PCT AUTO % 4.8  --   --      Results from last 72 hours   Lab Units 03/20/25  0540 03/18/25  0523   SODIUM mmol/L 137 141   POTASSIUM mmol/L 4.9 4.9   CHLORIDE mmol/L 96* 103   CO2 mmol/L 28 28   BUN mg/dL 52* 58*   CREATININE mg/dL 9.06* 9.54*   GLUCOSE mg/dL 88 101*   CALCIUM mg/dL 9.9 9.3   ANION GAP mmol/L 18 15   EGFR mL/min/1.73m*2 6* 5*     Results from last 72 hours   Lab Units 03/20/25  0540 03/18/25  0523   ALK PHOS U/L 77 84   BILIRUBIN TOTAL mg/dL 0.5 0.4   PROTEIN TOTAL g/dL 6.6 5.8*  5.8*   ALT U/L 9* 8*   AST U/L 15 12   ALBUMIN g/dL 3.6 3.3*     Estimated Creatinine Clearance: 6.7 mL/min (A) (by C-G formula based on SCr of 9.06 mg/dL (H)).  No results found for: \"CRP\", \"SEDRATE\"  No results found for: \"HIV1X2\", \"HIVCONF\", \"TXSHZK1QS\"  No results found for: \"HEPCABINIT\", \"HEPCAB\", \"HCVPCRQUANT\"  Microbiology  Susceptibility data from last 90 days.  Collected Specimen Info Organism Clindamycin Erythromycin Oxacillin Tetracycline Trimethoprim/Sulfamethoxazole Vancomycin   12/22/24 Blood culture from Peripheral Venipuncture Staphylococcus aureus         12/21/24 Blood culture from Peripheral Venipuncture Methicillin Susceptible Staphylococcus aureus (MSSA)  S  S  S  S  S  S   12/21/24 Blood culture from Peripheral Venipuncture Staphylococcus aureus             Imaging  XR chest 1 view    Result Date: 3/20/2025  Interpreted By:  Angy Henry, STUDY: XR CHEST 1 VIEW 3/20/2025 3:30 pm   INDICATION: Evaluate for trapped lung, recurrence of effusion   COMPARISON: 03/19/2025   ACCESSION NUMBER(S): [id number]   ORDERING CLINICIAN: HUNTER CHÁVEZ   TECHNIQUE: AP erect view of the chest   FINDINGS: Persistent right-sided hydropneumothorax is seen with increase in the amount of pleural fluid on the right with multiple air-fluid levels " seen. The size of the right hydropneumothorax has not changed significantly. There is a noncompliant right lung identified.   The left lung is clear with no left-sided pleural abnormality. The cardiac size is within normal limits.       Although the right hydropneumothorax has not changed significantly in size, there is increased amount of pleural fluid within the right pleural space with a number of air-fluid levels. The right lung appears noncompliant.   Signed by: Angy Henry 3/20/2025 3:55 PM Dictation workstation:   [id number]    CT chest wo IV contrast    Result Date: 3/20/2025  Interpreted By:  Bobbi Stacy, STUDY: CT CHEST WO IV CONTRAST;  3/20/2025 5:07 am   INDICATION: Signs/Symptoms:exudative effusion, evaluate for fluid remaining, trapped lung physiology vs pneumothorax.     COMPARISON: CT chest 02/14/2025 CT abdomen pelvis 04/26/2021   ACCESSION NUMBER(S): [id number]   ORDERING CLINICIAN: HUNTER CHÁVEZ   TECHNIQUE: Helical data acquisition of the chest was obtained  without IV contrast material.  Images were reformatted in axial, coronal, and sagittal planes.   FINDINGS: LUNGS AND AIRWAYS: New moderate right hydropneumothorax is seen. Similar mild diffuse centrilobular micronodular ground-glass opacities, likely respiratory bronchiolitis. Trachea and right and left main bronchi are patent.   MEDIASTINUM AND YAAKOV, LOWER NECK AND AXILLA: The visualized thyroid gland is within normal limits.   Stable mildly prominent 14 x 14 mm right hilar lymph node on series 4, image 133. no new enlarged thoracic lymph node.   Esophagus is within normal limits.   HEART AND VESSELS: The thoracic aorta is of normal course and caliber with mild-to-moderate vascular calcifications.   Main pulmonary artery and its branches are normal in caliber.   There are severe coronary artery calcifications versus stent. The study is not optimized for evaluation of coronary arteries.   The cardiac chambers are not enlarged.   No  evidence of pericardial effusion.   UPPER ABDOMEN: 20 mm hypoattenuating lesion in the hepatic dome, stable dating back to April 2021.   CHEST WALL AND OSSEOUS STRUCTURES: No suspicious osseous lesions. Multilevel degenerative changes are present.       1. New moderate right hydropneumothorax. Given interval thoracentesis and exudative fluid, findings are suspicious for ex vacuo hydropneumothorax (trapped lung). However, correlation with fluid culture is recommended to exclude empyema, though felt to be less likely given the negative gram stain and normal WBC count. Bronchopleural fistula is felt to be unlikely. Underlying malignant effusion is possible, please correlate with cytology. Further evaluation with PET-CT could be considered if there is no known history of malignancy or if there is no known cause of patient's exudative fluid. The pleural fluid will likely not show FDG avidity even if malignant, however other sites of potential disease could be identified or excluded. 2. Stable mildly prominent right hilar lymph node. 3. Additional chronic and incidental findings as detailed above.   MACRO: Critical Finding:  See findings. Notification was initiated on 3/20/2025 at 9:35 am by  Bobbi Stacy.  (**-YCF-**) Instructions:   Signed by: Bobbi Stacy 3/20/2025 9:36 AM Dictation workstation:   [id number]    XR chest 2 views    Result Date: 3/20/2025  Interpreted By:  Tk Kyle, STUDY: XR CHEST 2 VIEWS; 3/19/2025 6:05 pm   INDICATION: Signs/Symptoms:Post thoracentesis.   COMPARISON: 03/18/2025   ACCESSION NUMBER(S): [id number]   ORDERING CLINICIAN: HUNTER CHÁVEZ   TECHNIQUE: AP and lateral views of the chest were obtained.   FINDINGS: The cardiac size is indeterminate due to the AP projection. Patient is status post right-sided thoracentesis with a loculated pneumothorax at the right lung base in association with right basilar partial collapse/atelectasis. The pneumothorax is likely from failure of  re-expansion of the lung at the right base. Left hemithorax is clear.       Loculated right basilar pneumothorax likely from failure of re-expansion of the right lung. Compressive atelectasis of the right lung base is noted.   MACRO: none   Signed by: Tk Kyle 3/20/2025 7:43 AM Dictation workstation:   [id number]    XR chest 1 view    Result Date: 3/19/2025  Interpreted By:  Jose R Murray, STUDY: XR CHEST 1 VIEW;  3/19/2025 9:20 pm   INDICATION: Signs/Symptoms:evalaute for pneumothorax vs ex vacuo.     COMPARISON: Study performed earlier the same day   ACCESSION NUMBER(S): [id number]   ORDERING CLINICIAN: HUNTER CHÁVEZ   FINDINGS: The patient is status post right-sided thoracentesis. Lucency over the right lung base consistent with a basilar pneumothorax. Interval decrease in size of the effusion which is now small. Right basilar airspace disease present. The left lung is clear.       Moderate-sized right basilar pneumothorax. Interval decrease in right effusion after thoracentesis. Persistent small effusion and right basilar airspace disease present   MACRO: Jose R Murray discussed the significance and urgency of this critical finding by telephone with  the clinical team taking care of the patient on 3/19/2025 at 9:53 pm.  (**-RCF-**) Findings:  See findings.   Signed by: Jose R Murray 3/19/2025 9:53 PM Dictation workstation:   [id number]    US thoracentesis    Result Date: 3/19/2025  Interpreted By:  Bassam Brown, STUDY: US THORACENTESIS; 3/19/2025 2:45 pm   INDICATION: Signs/Symptoms:Right thoracentesis diagnostic and therapeutic, hx of ESRD, recurrent effusion, concern for malignancy vs infection.   COMPARISON: None   ACCESSION NUMBER(S): [id number]   ORDERING CLINICIAN: HUNTER CHÁVEZ   TECHNIQUE: Informed consent obtained. Patient positionedsitting upright. Skin prepped, draped and anesthetized. Ultrasound right chest demonstrates moderate complex effusion. Under ultrasound guidance, a  centesis catheter/needle was advanced into rightpleural cavity.   FINDINGS: A total of 950 cc of bloody fluid was aspirated. A sample was sent for analysis. The patient tolerated the procedure well.       Ultrasound-guided right thoracentesis.   Signed by: Bassam Brown 3/19/2025 3:18 PM Dictation workstation:   [id number]    XR chest 2 views    Result Date: 3/18/2025  Interpreted By:  Angy Henry, STUDY: XR CHEST 2 VIEWS 3/18/2025 8:59 am   INDICATION: Signs/Symptoms:Shortness of breath   COMPARISON: 12/28/2024   ACCESSION NUMBER(S): [id number]   ORDERING CLINICIAN: DIANNE LAWRENCE   TECHNIQUE: PA and lateral views of the chest were acquired.   FINDINGS: There is a moderately large right pleural effusion now seen with no left-sided pleural abnormality observed.   The cardiac size is within normal limits with calcified plaque in the aortic knob and with coronary artery stent graft identified.   The left lung is clear but there is compressive atelectasis and infiltrate in the right lower lobe.   A stent graft within the left axillary region is seen.       Moderately large right pleural effusion which has developed since the prior study. There is probable atelectasis and perhaps some infiltrate within the right lower lobe as well.   Signed by: Angy Henry 3/18/2025 9:07 AM Dictation workstation:   [id number]    Assessment/Plan   Acute hypoxic respiratory failure  Exudative right-sided pleural effusion  Possible pneumonia    Continue ceftriaxone  Oxygen as needed  Thoracic surgery consult  Follow-up pleural fluid workup  Monitor temperature and WBC    Dave Pathak MD

## 2025-03-20 NOTE — PROGRESS NOTES
"William A Franke \"Narciso\" is a 74 y.o. male on day 2 of admission presenting with Pleural effusion.    Subjective   Seen this morning patient was waiting for going for thoracocentesis     Objective     Physical Exam  Vitals reviewed.   Constitutional:       Appearance: Normal appearance.   HENT:      Head: Normocephalic and atraumatic.      Right Ear: Tympanic membrane, ear canal and external ear normal.      Left Ear: Tympanic membrane, ear canal and external ear normal.      Nose: Nose normal.      Mouth/Throat:      Pharynx: Oropharynx is clear.   Eyes:      Extraocular Movements: Extraocular movements intact.      Conjunctiva/sclera: Conjunctivae normal.      Pupils: Pupils are equal, round, and reactive to light.   Cardiovascular:      Rate and Rhythm: Normal rate and regular rhythm.      Pulses: Normal pulses.      Heart sounds: Normal heart sounds.   Pulmonary:      Effort: Pulmonary effort is normal.      Breath sounds: Rales present.   Abdominal:      General: Abdomen is flat. Bowel sounds are normal.      Palpations: Abdomen is soft.   Musculoskeletal:      Cervical back: Normal range of motion and neck supple.   Skin:     General: Skin is warm and dry.   Neurological:      General: No focal deficit present.      Mental Status: He is alert and oriented to person, place, and time.   Psychiatric:         Mood and Affect: Mood normal.         Last Recorded Vitals  Blood pressure 140/62, pulse 58, temperature 37.5 °C (99.5 °F), temperature source Temporal, resp. rate 17, height 1.727 m (5' 8\"), weight 68.2 kg (150 lb 5.7 oz), SpO2 100%.  Intake/Output last 3 Shifts:  I/O last 3 completed shifts:  In: 1570 (23 mL/kg) [P.O.:320; I.V.:800 (11.7 mL/kg); Other:400; IV Piggyback:50]  Out: 5400 (79.2 mL/kg) [Other:5400]  Weight: 68.2 kg     Relevant Results               Scheduled medications  allopurinol, 100 mg, oral, Daily  [START ON 3/20/2025] amLODIPine, 10 mg, oral, Daily  aspirin, 81 mg, oral, " Daily  atorvastatin, 80 mg, oral, Nightly  carvedilol, 6.25 mg, oral, BID  cefTRIAXone, 2 g, intravenous, q24h  docusate sodium, 100 mg, oral, BID  doxycylcine, 100 mg, oral, q12h [facility]  [START ON 3/24/2025] ergocalciferol, 50,000 Units, oral, Weekly  ezetimibe, 10 mg, oral, Daily  fluocinonide, , Topical, BID  gabapentin, 300 mg, oral, Nightly  heparin (porcine), 5,000 Units, subcutaneous, q8h  insulin lispro, 0-5 Units, subcutaneous, TID AC  isosorbide mononitrate ER, 60 mg, oral, Daily  oxygen, , inhalation, Continuous - Inhalation  pantoprazole, 40 mg, oral, Daily  polyethylene glycol, 17 g, oral, Daily  sevelamer carbonate, 1,600 mg, oral, TID  SITagliptin phosphate, 25 mg, oral, Daily      Continuous medications     PRN medications  PRN medications: acetaminophen **OR** acetaminophen **OR** acetaminophen, benzocaine-menthol, dextromethorphan-guaifenesin, guaiFENesin, melatonin, nitroglycerin  Results for orders placed or performed during the hospital encounter of 03/17/25 (from the past 24 hours)   Lactate Dehydrogenase, Body Fluid   Result Value Ref Range    LD, Fluid 281 Not established. U/L   Glucose, Body Fluid   Result Value Ref Range    Glucose, Fluid 35 Not established mg/dL   Protein, Total, Body Fluid   Result Value Ref Range    Protein, Total Fluid 3.8 Not established g/dL   Triglycerides, Body Fluid   Result Value Ref Range    Triglycerides, Fluid 24 No established mg/dL   Amylase, Body Fluid   Result Value Ref Range    Amylase, Fluid 29 Not established. U/L   Body Fluid Cell Count   Result Value Ref Range    Color, Fluid Red (A) Colorless, Straw, Yellow    Clarity, Fluid Cloudy (A) Clear    WBC, Fluid 1,292 See Comment /uL    RBC, Fluid 255,000 see comment /uL   Body Fluid Differential   Result Value Ref Range    Neutrophils %, Manual, Fluid 23 see comment %    Lymphocytes %, Manual, Fluid 68 see comment %    Mono/Macrophages %, Manual, Fluid 2 see comment %    Eosinophils %, Manual, Fluid 6 see  comment %    Basophils %, Manual, Fluid 1 not established %    Immature Granulocytes %, Manual, Fluid 0 not established %    Blasts %, Manual, Fluid 0 not established %    Unclassified Cells %, Manual, Fluid 0 not established %    Plasma Cells %, Manual, Fluid 0 not established %    Total Cells Counted, Fluid 100    pH, Body Fluid   Result Value Ref Range    pH, Fluid 8.00 See Below   Lavender Top   Result Value Ref Range    Extra Tube Hold for add-ons.      *Note: Due to a large number of results and/or encounters for the requested time period, some results have not been displayed. A complete set of results can be found in Results Review.            Assessment/Plan   Assessment & Plan  Pleural effusion    Benign essential hypertension    BPH with obstruction/lower urinary tract symptoms    CAD (coronary artery disease)    ESRD on hemodialysis (Multi)    Hypertension    Hyperlipidemia    Prostate cancer (Multi)    CBC ordered as patient had bloody pleural Effusion removed  Discussed with pulmonary wants labs because of exudative pleural effusion  Consult ID  Continue antibiotic  Continue oxygen to keep sats above 100%  Dialysis per nephrology  Blood pressure stable  Blood sugar okay         I spent  minutes in the professional and overall care of this patient.      Marianna Galloway MD

## 2025-03-20 NOTE — PROGRESS NOTES
Spiritual Care Visit  Spiritual Care Request    Reason for Visit:  Routine Visit: Follow-up     Request Received From:       Focus of Care:  Visited With: Patient not available         Refer to :          Spiritual Care Assessment    Spiritual Assessment:                      Care Provided:       Sense of Community and or Moravian Affiliation:  Synagogue   Values/Beliefs  Spiritual Requests During Hospitalization: Narciso was having Patient Care when I tried to see him today.     Addressed Needs/Concerns and/or Miguel A Through:          Outcome:        Advance Directives:         Spiritual Care Annotation    Annotation:  Narciso was having Patient Care when I tried to see him today.  Marcos Day

## 2025-03-20 NOTE — PRE-PROCEDURE NOTE
Report from Sending RN:    Report From: INES Fay  Recent Surgery of Procedure: Yes, thoracentesis yesterday  Baseline Level of Consciousness (LOC): A&Ox4  Oxygen Use: No  Type: n/a  Diabetic: Yes  Last BP Med Given Day of Dialysis: see EMAR  Last Pain Med Given: see EMAR  Lab Tests to be Obtained with Dialysis: No  Blood Transfusion to be Given During Dialysis: No  Available IV Access: Yes  Medications to be Administered During Dialysis: No  Continuous IV Infusion Running: No  Restraints on Currently or in the Last 24 Hours: No  Hand-Off Communication: stable and ready for dialysis in HD room  Dialysis Catheter Dressing: n/a FAHAD FISHER  Last Dressing Change: n/a

## 2025-03-20 NOTE — PROGRESS NOTES
"William A Franke \"Narciso\" is a 74 y.o. male on day 3 of admission presenting with Pleural effusion.      Subjective   Patient was seen on dialysis and tolerating well with no complaints.       Objective          Vitals 24HR  Heart Rate:  [52-63]   Temp:  [36.7 °C (98.1 °F)-37.9 °C (100.2 °F)]   Resp:  [17-20]   BP: (140-173)/(53-64)   Weight:  [68.2 kg (150 lb 5.7 oz)-68.6 kg (151 lb 3.8 oz)]   SpO2:  [94 %-100 %]       Intake/Output last 3 Shifts:    Intake/Output Summary (Last 24 hours) at 3/20/2025 1109  Last data filed at 3/20/2025 1100  Gross per 24 hour   Intake 650 ml   Output --   Net 650 ml       Physical Exam  Constitutional:       General: He is awake. He is not in acute distress.  Cardiovascular:      Rate and Rhythm: Regular rhythm.      Heart sounds:      No friction rub.   Pulmonary:      Effort: Pulmonary effort is normal.      Comments: Diminished breath sounds in the right lung field anteriorly  Abdominal:      General: Bowel sounds are normal.      Palpations: Abdomen is soft.      Tenderness: There is no guarding or rebound.   Musculoskeletal:      Right lower leg: No edema.      Left lower leg: No edema.      Comments: L Upper extremity AV fistula   Neurological:      Mental Status: He is alert.         Relevant Results  Results for orders placed or performed during the hospital encounter of 03/17/25 (from the past 24 hours)   Sterile Fluid Culture/Smear    Specimen: Pleural; Fluid   Result Value Ref Range    Gram Stain (1+) Rare Polymorphonuclear leukocytes     Gram Stain No organisms seen    Fungal Culture/Smear    Specimen: Pleural; Fluid   Result Value Ref Range    Fungal Culture/Smear       Culture in progress, a report will be issued when positive or after 2 weeks of incubation.    Fungal Smear No fungal elements seen    Lactate Dehydrogenase, Body Fluid   Result Value Ref Range    LD, Fluid 281 Not established. U/L   Glucose, Body Fluid   Result Value Ref Range    Glucose, Fluid 35 Not " established mg/dL   Protein, Total, Body Fluid   Result Value Ref Range    Protein, Total Fluid 3.8 Not established g/dL   Triglycerides, Body Fluid   Result Value Ref Range    Triglycerides, Fluid 24 No established mg/dL   Amylase, Body Fluid   Result Value Ref Range    Amylase, Fluid 29 Not established. U/L   AFB Processed   Result Value Ref Range    Extra Tube Hold for add-ons.    Body Fluid Cell Count   Result Value Ref Range    Color, Fluid Red (A) Colorless, Straw, Yellow    Clarity, Fluid Cloudy (A) Clear    WBC, Fluid 1,292 See Comment /uL    RBC, Fluid 255,000 see comment /uL   Body Fluid Differential   Result Value Ref Range    Neutrophils %, Manual, Fluid 23 see comment %    Lymphocytes %, Manual, Fluid 68 see comment %    Mono/Macrophages %, Manual, Fluid 2 see comment %    Eosinophils %, Manual, Fluid 6 see comment %    Basophils %, Manual, Fluid 1 not established %    Immature Granulocytes %, Manual, Fluid 0 not established %    Blasts %, Manual, Fluid 0 not established %    Unclassified Cells %, Manual, Fluid 0 not established %    Plasma Cells %, Manual, Fluid 0 not established %    Total Cells Counted, Fluid 100    pH, Body Fluid   Result Value Ref Range    pH, Fluid 8.00 See Below   Lavender Top   Result Value Ref Range    Extra Tube Hold for add-ons.    CBC   Result Value Ref Range    WBC 7.0 4.4 - 11.3 x10*3/uL    nRBC 0.0 0.0 - 0.0 /100 WBCs    RBC 3.63 (L) 4.50 - 5.90 x10*6/uL    Hemoglobin 10.4 (L) 13.5 - 17.5 g/dL    Hematocrit 32.7 (L) 41.0 - 52.0 %    MCV 90 80 - 100 fL    MCH 28.7 26.0 - 34.0 pg    MCHC 31.8 (L) 32.0 - 36.0 g/dL    RDW 16.3 (H) 11.5 - 14.5 %    Platelets 244 150 - 450 x10*3/uL   CBC and Auto Differential   Result Value Ref Range    WBC 7.3 4.4 - 11.3 x10*3/uL    nRBC 0.0 0.0 - 0.0 /100 WBCs    RBC 3.79 (L) 4.50 - 5.90 x10*6/uL    Hemoglobin 10.7 (L) 13.5 - 17.5 g/dL    Hematocrit 33.7 (L) 41.0 - 52.0 %    MCV 89 80 - 100 fL    MCH 28.2 26.0 - 34.0 pg    MCHC 31.8 (L) 32.0  - 36.0 g/dL    RDW 16.4 (H) 11.5 - 14.5 %    Platelets 268 150 - 450 x10*3/uL    Neutrophils % 67.2 40.0 - 80.0 %    Immature Granulocytes %, Automated 0.3 0.0 - 0.9 %    Lymphocytes % 16.9 13.0 - 44.0 %    Monocytes % 9.8 2.0 - 10.0 %    Eosinophils % 4.8 0.0 - 6.0 %    Basophils % 1.0 0.0 - 2.0 %    Neutrophils Absolute 4.92 1.60 - 5.50 x10*3/uL    Immature Granulocytes Absolute, Automated 0.02 0.00 - 0.50 x10*3/uL    Lymphocytes Absolute 1.24 0.80 - 3.00 x10*3/uL    Monocytes Absolute 0.72 0.05 - 0.80 x10*3/uL    Eosinophils Absolute 0.35 0.00 - 0.40 x10*3/uL    Basophils Absolute 0.07 0.00 - 0.10 x10*3/uL   Comprehensive Metabolic Panel   Result Value Ref Range    Glucose 88 74 - 99 mg/dL    Sodium 137 136 - 145 mmol/L    Potassium 4.9 3.5 - 5.3 mmol/L    Chloride 96 (L) 98 - 107 mmol/L    Bicarbonate 28 21 - 32 mmol/L    Anion Gap 18 10 - 20 mmol/L    Urea Nitrogen 52 (H) 6 - 23 mg/dL    Creatinine 9.06 (H) 0.50 - 1.30 mg/dL    eGFR 6 (L) >60 mL/min/1.73m*2    Calcium 9.9 8.6 - 10.3 mg/dL    Albumin 3.6 3.4 - 5.0 g/dL    Alkaline Phosphatase 77 33 - 136 U/L    Total Protein 6.6 6.4 - 8.2 g/dL    AST 15 9 - 39 U/L    Bilirubin, Total 0.5 0.0 - 1.2 mg/dL    ALT 9 (L) 10 - 52 U/L     *Note: Due to a large number of results and/or encounters for the requested time period, some results have not been displayed. A complete set of results can be found in Results Review.            Assessment/Plan   End-stage renal disease on hemodialysis Tuesday Thursday Saturday  Right-sided large pleural effusion  Hypertension  Anemia     Plan: Seen undergoing dialysis today, continue his regular Tuesday Thursday Saturday schedule.  Status post thoracentesis.  Pulmonology on consult for pleural effusion.  Renal diet.    Marco Woodson MD

## 2025-03-20 NOTE — PROGRESS NOTES
"Pulmonary Progress Note   Subjective    William A Franke is a 74 y.o. year old male patient with ESRD on dialysis and recurrent pleural effusion admitted on 3/17/2025 with recurrent pleural effusion associated with cough and shortness of breath. Pulmonary service consulted for the evaluation and management of pleural effusion.  Interval History:  Underwent thoracentesis yesterday  Cough and shortness of breath have improved   Lung did not re-expand. Underwent repeat CT this morning.     Meds    Scheduled medications  allopurinol, 100 mg, oral, Daily  amLODIPine, 10 mg, oral, Daily  aspirin, 81 mg, oral, Daily  atorvastatin, 80 mg, oral, Nightly  carvedilol, 6.25 mg, oral, BID  cefTRIAXone, 2 g, intravenous, q24h  docusate sodium, 100 mg, oral, BID  doxycylcine, 100 mg, oral, q12h LUKE  [START ON 3/24/2025] ergocalciferol, 50,000 Units, oral, Weekly  ezetimibe, 10 mg, oral, Daily  fluocinonide, , Topical, BID  gabapentin, 300 mg, oral, Nightly  heparin (porcine), 5,000 Units, subcutaneous, q8h  insulin lispro, 0-5 Units, subcutaneous, TID AC  isosorbide mononitrate ER, 60 mg, oral, Daily  oxygen, , inhalation, Continuous - Inhalation  pantoprazole, 40 mg, oral, Daily  polyethylene glycol, 17 g, oral, Daily  sevelamer carbonate, 1,600 mg, oral, TID  SITagliptin phosphate, 25 mg, oral, Daily      Continuous medications     PRN medications  PRN medications: acetaminophen **OR** acetaminophen **OR** acetaminophen, benzocaine-menthol, dextromethorphan-guaifenesin, guaiFENesin, melatonin, nitroglycerin     Objective    Blood pressure 140/54, pulse 64, temperature 37.4 °C (99.3 °F), temperature source Temporal, resp. rate 20, height 1.727 m (5' 8\"), weight 68.6 kg (151 lb 3.8 oz), SpO2 99%.     Physical Exam   GENERAL: normal appearance. well nourished. No respiratory distress  HEAD/SINUSES: no sinus tenderness  OROPHARYNX: Moist mucosa, no thrush or lesions  NECK: no JVD, midline trachea without stridor. Thyroid not " "enlarged  LYMPH NODES : none felt in the cervical, submandibular or supraclavicular regions  LUNGS: Symmetric chest. Good excursion. Equal breath sounds. no wheezing. no crackles or rhonchi  CARDIAC: normal S1 and S2; no gallops, rubs or murmurs. Regular rate and rhythm  EXTREMITIES: No edema, no varicose veins  NEURO: grossly normal mental status, CN reflexes and motor strength.   SKIN: Skin turgor normal. No rashes or lesions.   PSYCH: Normal affect    Intake/Output Summary (Last 24 hours) at 3/20/2025 1722  Last data filed at 3/20/2025 1304  Gross per 24 hour   Intake 1400 ml   Output 5300 ml   Net -3900 ml     Labs:   Results from last 72 hours   Lab Units 03/20/25  0540 03/18/25  0523   SODIUM mmol/L 137 141   POTASSIUM mmol/L 4.9 4.9   CHLORIDE mmol/L 96* 103   CO2 mmol/L 28 28   BUN mg/dL 52* 58*   CREATININE mg/dL 9.06* 9.54*   GLUCOSE mg/dL 88 101*   CALCIUM mg/dL 9.9 9.3   ANION GAP mmol/L 18 15   EGFR mL/min/1.73m*2 6* 5*      Results from last 72 hours   Lab Units 03/20/25  0540 03/19/25  2222 03/18/25  0523   WBC AUTO x10*3/uL 7.3 7.0 7.3   HEMOGLOBIN g/dL 10.7* 10.4* 9.1*   HEMATOCRIT % 33.7* 32.7* 29.1*   PLATELETS AUTO x10*3/uL 268 244 262   NEUTROS PCT AUTO % 67.2  --   --    LYMPHS PCT AUTO % 16.9  --   --    MONOS PCT AUTO % 9.8  --   --    EOS PCT AUTO % 4.8  --   --             No lab exists for component: \"PCO2\", \"PO2\", \"SO2\"  Micro/ID:   Lab Results   Component Value Date    BLOODCULT No growth at 1 day 03/18/2025     Summary of key imaging results from the last 24 hours  Repeat CT after thoracentesis shows loculated effusion with pneumo ex vacuo with rind around the the lung    Impression   Recurrent pleural effusion in patient with ESRD and history of MSSA bacteremia treated with 6 weeks of antibiotics.   Exudative effusion with many red blood cells. Lymphocytic predominance. Gram stain negative.   Trapped lung  ? pneumonia    Recommendations   Currently being treated with ceftriaxone and " doxycycline  ID consulted, recommendations pending  Recommend thoracic surgery consult to evaluated need for VATS, with how many red blood cells there were in effusion I would be hesitant to try TPA and dornase through a chest tube without their involvement first.  Cytology and culture still pending from pleural fluid - with lymphocytic predominance lower suspicion for empyema    Shaneka Chawla MD PhD   03/20/25 at 5:22 PM     Disclaimer: Documentation completed with the information available at the time of input. Parts of this note may have been scribed or generated using voice dictation software, Dragon.  Homophonic errors may exist.  Please contact me directly if clarification is needed

## 2025-03-20 NOTE — PROGRESS NOTES
03/20/25 1722   Discharge Planning   Expected Discharge Disposition Home   Does the patient need discharge transport arranged? No     Met with patient and spouse at bedside.  Plan is still to discharge home with spouse.  Patient is established at [facility] [address]. No home going needs identified.

## 2025-03-20 NOTE — CARE PLAN
The patient's goals for the shift include remain free from falls and get some rest    The clinical goals for the shift include maintain safety    Over the shift, the patient did not make progress toward the following goals. Barriers to progression include weakness from lying in bed. Recommendations to address these barriers include assist with ambulation.

## 2025-03-20 NOTE — NURSING NOTE
Call received from Pulmonologist, patient has a pneumothorax s/p thoracentesis, recommended O2 via mask or NC to keep SpO2 at 100% for perfusion and a CBC due to blood in specimen.  Per Pulmonologist they have contacted Dr. Galloway with updates  ICU PA at the bedside to examine patient.   Patient denies SOB, SpO2 100% on RA, applied O2 at 2L to keep SpO2 at 100%.

## 2025-03-20 NOTE — TREATMENT PLAN
-post thoracentesis xray with evidence of ex vacuo and trapped lung.   -Fluid studies prelim show exudative fluid with low Glucose concerning for parapneumonic effusion vs empyema (which is less likely)  -Patient is clinically stable  -Recommend CT chest to evaluate for fluid recurrence or residual, as patient might need further drainage with chest pig tail if that's the case, otherwise no treatment needed for ex vacuo at this time.  -Recommend consulting thoracic surgery  -if patient becomes unstable or in respiratory distress, please inform pulmonary team and MICU team.     Case discussed with MICU and IP

## 2025-03-21 DIAGNOSIS — R41.0 DISORIENTATION: ICD-10-CM

## 2025-03-21 LAB
ACID FAST STN SPEC: NORMAL
ANION GAP SERPL CALCULATED.3IONS-SCNC: 17 MMOL/L (ref 10–20)
BUN SERPL-MCNC: 31 MG/DL (ref 6–23)
CALCIUM SERPL-MCNC: 9.7 MG/DL (ref 8.6–10.3)
CHLORIDE SERPL-SCNC: 97 MMOL/L (ref 98–107)
CO2 SERPL-SCNC: 28 MMOL/L (ref 21–32)
CREAT SERPL-MCNC: 7.05 MG/DL (ref 0.5–1.3)
EGFRCR SERPLBLD CKD-EPI 2021: 7 ML/MIN/1.73M*2
ERYTHROCYTE [DISTWIDTH] IN BLOOD BY AUTOMATED COUNT: 16.4 % (ref 11.5–14.5)
FUNGUS SPEC CULT: NORMAL
FUNGUS SPEC FUNGUS STN: NORMAL
GLUCOSE BLD MANUAL STRIP-MCNC: 120 MG/DL (ref 74–99)
GLUCOSE BLD MANUAL STRIP-MCNC: 139 MG/DL (ref 74–99)
GLUCOSE BLD MANUAL STRIP-MCNC: 182 MG/DL (ref 74–99)
GLUCOSE SERPL-MCNC: 98 MG/DL (ref 74–99)
HCT VFR BLD AUTO: 36.2 % (ref 41–52)
HGB BLD-MCNC: 11.6 G/DL (ref 13.5–17.5)
LABORATORY COMMENT REPORT: NORMAL
LABORATORY COMMENT REPORT: NORMAL
MCH RBC QN AUTO: 28.5 PG (ref 26–34)
MCHC RBC AUTO-ENTMCNC: 32 G/DL (ref 32–36)
MCV RBC AUTO: 89 FL (ref 80–100)
MYCOBACTERIUM SPEC CULT: NORMAL
NRBC BLD-RTO: 0 /100 WBCS (ref 0–0)
PATH REPORT.FINAL DX SPEC: NORMAL
PATH REPORT.GROSS SPEC: NORMAL
PATH REPORT.RELEVANT HX SPEC: NORMAL
PATH REPORT.TOTAL CANCER: NORMAL
PHOSPHATE SERPL-MCNC: 5.1 MG/DL (ref 2.5–4.9)
PLATELET # BLD AUTO: 264 X10*3/UL (ref 150–450)
POTASSIUM SERPL-SCNC: 4.2 MMOL/L (ref 3.5–5.3)
RBC # BLD AUTO: 4.07 X10*6/UL (ref 4.5–5.9)
SODIUM SERPL-SCNC: 138 MMOL/L (ref 136–145)
WBC # BLD AUTO: 7.9 X10*3/UL (ref 4.4–11.3)

## 2025-03-21 PROCEDURE — 36415 COLL VENOUS BLD VENIPUNCTURE: CPT | Performed by: INTERNAL MEDICINE

## 2025-03-21 PROCEDURE — 80048 BASIC METABOLIC PNL TOTAL CA: CPT | Performed by: INTERNAL MEDICINE

## 2025-03-21 PROCEDURE — 94668 MNPJ CHEST WALL SBSQ: CPT

## 2025-03-21 PROCEDURE — 99221 1ST HOSP IP/OBS SF/LOW 40: CPT | Performed by: PHYSICIAN ASSISTANT

## 2025-03-21 PROCEDURE — 82947 ASSAY GLUCOSE BLOOD QUANT: CPT

## 2025-03-21 PROCEDURE — 2500000001 HC RX 250 WO HCPCS SELF ADMINISTERED DRUGS (ALT 637 FOR MEDICARE OP): Performed by: INTERNAL MEDICINE

## 2025-03-21 PROCEDURE — 2500000004 HC RX 250 GENERAL PHARMACY W/ HCPCS (ALT 636 FOR OP/ED): Performed by: STUDENT IN AN ORGANIZED HEALTH CARE EDUCATION/TRAINING PROGRAM

## 2025-03-21 PROCEDURE — 2500000001 HC RX 250 WO HCPCS SELF ADMINISTERED DRUGS (ALT 637 FOR MEDICARE OP): Performed by: STUDENT IN AN ORGANIZED HEALTH CARE EDUCATION/TRAINING PROGRAM

## 2025-03-21 PROCEDURE — 84100 ASSAY OF PHOSPHORUS: CPT | Performed by: INTERNAL MEDICINE

## 2025-03-21 PROCEDURE — 99233 SBSQ HOSP IP/OBS HIGH 50: CPT | Performed by: INTERNAL MEDICINE

## 2025-03-21 PROCEDURE — 2500000002 HC RX 250 W HCPCS SELF ADMINISTERED DRUGS (ALT 637 FOR MEDICARE OP, ALT 636 FOR OP/ED): Performed by: INTERNAL MEDICINE

## 2025-03-21 PROCEDURE — 2060000001 HC INTERMEDIATE ICU ROOM DAILY

## 2025-03-21 PROCEDURE — 85027 COMPLETE CBC AUTOMATED: CPT | Performed by: INTERNAL MEDICINE

## 2025-03-21 PROCEDURE — 2500000004 HC RX 250 GENERAL PHARMACY W/ HCPCS (ALT 636 FOR OP/ED): Performed by: INTERNAL MEDICINE

## 2025-03-21 PROCEDURE — 2500000005 HC RX 250 GENERAL PHARMACY W/O HCPCS: Performed by: INTERNAL MEDICINE

## 2025-03-21 RX ADMIN — EZETIMIBE 10 MG: 10 TABLET ORAL at 08:19

## 2025-03-21 RX ADMIN — Medication 2 L/MIN: at 20:50

## 2025-03-21 RX ADMIN — CEFTRIAXONE SODIUM 2 G: 2 INJECTION, SOLUTION INTRAVENOUS at 17:42

## 2025-03-21 RX ADMIN — Medication 21 PERCENT: at 08:57

## 2025-03-21 RX ADMIN — PANTOPRAZOLE SODIUM 40 MG: 40 TABLET, DELAYED RELEASE ORAL at 08:18

## 2025-03-21 RX ADMIN — SEVELAMER CARBONATE 1600 MG: 800 TABLET, FILM COATED ORAL at 17:57

## 2025-03-21 RX ADMIN — SEVELAMER CARBONATE 1600 MG: 800 TABLET, FILM COATED ORAL at 08:18

## 2025-03-21 RX ADMIN — GABAPENTIN 300 MG: 300 CAPSULE ORAL at 21:16

## 2025-03-21 RX ADMIN — DOXYCYCLINE HYCLATE 100 MG: 100 CAPSULE ORAL at 21:16

## 2025-03-21 RX ADMIN — DOXYCYCLINE HYCLATE 100 MG: 100 CAPSULE ORAL at 08:18

## 2025-03-21 RX ADMIN — ASPIRIN 81 MG: 81 TABLET, COATED ORAL at 08:18

## 2025-03-21 RX ADMIN — SITAGLIPTIN 25 MG: 50 TABLET, FILM COATED ORAL at 08:19

## 2025-03-21 RX ADMIN — ISOSORBIDE MONONITRATE 60 MG: 60 TABLET, EXTENDED RELEASE ORAL at 08:19

## 2025-03-21 RX ADMIN — ATORVASTATIN CALCIUM 80 MG: 80 TABLET, FILM COATED ORAL at 21:16

## 2025-03-21 RX ADMIN — ALLOPURINOL 100 MG: 100 TABLET ORAL at 08:18

## 2025-03-21 RX ADMIN — AMLODIPINE BESYLATE 10 MG: 10 TABLET ORAL at 08:19

## 2025-03-21 RX ADMIN — Medication 3 MG: at 21:16

## 2025-03-21 ASSESSMENT — COGNITIVE AND FUNCTIONAL STATUS - GENERAL
DAILY ACTIVITIY SCORE: 24
MOBILITY SCORE: 24
DAILY ACTIVITIY SCORE: 24
MOBILITY SCORE: 24

## 2025-03-21 ASSESSMENT — PAIN SCALES - GENERAL
PAINLEVEL_OUTOF10: 0 - NO PAIN
PAINLEVEL_OUTOF10: 0 - NO PAIN

## 2025-03-21 ASSESSMENT — PAIN - FUNCTIONAL ASSESSMENT: PAIN_FUNCTIONAL_ASSESSMENT: 0-10

## 2025-03-21 NOTE — CARE PLAN
The patient's goals for the shift include remain free from falls and get some rest    The clinical goals for the shift include maintain oxygenation    Over the shift, the patient did not make progress toward the following goals. Barriers to progression include dx with a pneumothorax. Recommendations to address these barriers include apply O2 and monitor O2.

## 2025-03-21 NOTE — CONSULTS
Reason For Consult  Recurrent right pleural effusion/trapped lung    History Of Present Illness  Bill A Franke is a 74 y.o. male presenting with a few weeks history of cough.He denies fever or chills. Work up revealed a large right pleural effusion. Thoracentesis was done on 3/19/25, 950 ml bloody fluid.Of note this has been tapped previously in 12/24 which grew MSSA. We have been consulted for possible pluerx vs VATS decort.     Past Medical History  He has a past medical history of Chronic kidney disease, Diabetes mellitus (Multi), ESRD (end stage renal disease) on dialysis (Multi), Heart murmur, HLD (hyperlipidemia), Hypertension, Personal history of other endocrine, nutritional and metabolic disease, and Personal history of other specified conditions (08/28/2020).    Surgical History  He has a past surgical history that includes Shoulder surgery (04/15/2013); Knee arthroscopy w/ debridement (04/15/2013); and AV fistula placement (Left, 07/31/2024).     Social History  He reports that he has never smoked. He has never used smokeless tobacco. He reports that he does not currently use alcohol. He reports that he does not use drugs.    Family History  Family History   Problem Relation Name Age of Onset    Alzheimer's disease Mother      Heart attack Mother      Diabetes Father      Heart attack Father          Allergies  Patient has no known allergies.             Physical Exam  Constitutional:       Normal appearance, well-developed, well-nourished.   HENT:      Head: Normocephalic and atraumatic.   Cardiovascular:      Rate and Rhythm: RRR, no murmur, nl S1/S2.      Pulses: Normal, <3 sec cap refill.   Pulmonary:      Clear breath sounds left chest. Right base decreased BS  Abdominal:      Bowel sounds present, soft.   Musculoskeletal:         No gross abnormalities.  Skin:     Warm and dry, no rashes.   Neurological:      Alert, no focal deficits.            Last Recorded Vitals  Blood pressure 169/57, pulse 65,  "temperature 36 °C (96.8 °F), temperature source Temporal, resp. rate 18, height 1.727 m (5' 8\"), weight 62.6 kg (138 lb 0.1 oz), SpO2 95%.    Relevant Results  CT chest wo IV contrast 03/20/2025    Narrative  Interpreted By:  Bobbi Stacy,  STUDY:  CT CHEST WO IV CONTRAST;  3/20/2025 5:07 am    INDICATION:  Signs/Symptoms:exudative effusion, evaluate for fluid remaining,  trapped lung physiology vs pneumothorax.      COMPARISON:  CT chest 02/14/2025  CT abdomen pelvis 04/26/2021    ACCESSION NUMBER(S):  [id number]    ORDERING CLINICIAN:  HUNTER CHÁVEZ    TECHNIQUE:  Helical data acquisition of the chest was obtained  without IV  contrast material.  Images were reformatted in axial, coronal, and  sagittal planes.    FINDINGS:  LUNGS AND AIRWAYS:  New moderate right hydropneumothorax is seen.  Similar mild diffuse centrilobular micronodular ground-glass  opacities, likely respiratory bronchiolitis. Trachea and right and  left main bronchi are patent.    MEDIASTINUM AND YAAKOV, LOWER NECK AND AXILLA:  The visualized thyroid gland is within normal limits.    Stable mildly prominent 14 x 14 mm right hilar lymph node on series  4, image 133. no new enlarged thoracic lymph node.    Esophagus is within normal limits.    HEART AND VESSELS:  The thoracic aorta is of normal course and caliber with  mild-to-moderate vascular calcifications.    Main pulmonary artery and its branches are normal in caliber.    There are severe coronary artery calcifications versus stent. The  study is not optimized for evaluation of coronary arteries.    The cardiac chambers are not enlarged.    No evidence of pericardial effusion.    UPPER ABDOMEN:  20 mm hypoattenuating lesion in the hepatic dome, stable dating back  to April 2021.    CHEST WALL AND OSSEOUS STRUCTURES:  No suspicious osseous lesions. Multilevel degenerative changes are  present.    Impression  1. New moderate right hydropneumothorax. Given interval thoracentesis  and " exudative fluid, findings are suspicious for ex vacuo  hydropneumothorax (trapped lung). However, correlation with fluid  culture is recommended to exclude empyema, though felt to be less  likely given the negative gram stain and normal WBC count.  Bronchopleural fistula is felt to be unlikely. Underlying malignant  effusion is possible, please correlate with cytology. Further  evaluation with PET-CT could be considered if there is no known  history of malignancy or if there is no known cause of patient's  exudative fluid. The pleural fluid will likely not show FDG avidity  even if malignant, however other sites of potential disease could be  identified or excluded.  2. Stable mildly prominent right hilar lymph node.  3. Additional chronic and incidental findings as detailed above.    MACRO:  Critical Finding:  See findings. Notification was initiated on  3/20/2025 at 9:35 am by  Bobbi Stacy.  (**-YCF-**) Instructions:    Signed by: Bobbi Stacy 3/20/2025 9:36 AM  Dictation workstation:   [id number]       Results for orders placed or performed during the hospital encounter of 03/17/25 (from the past 24 hours)   POCT GLUCOSE   Result Value Ref Range    POCT Glucose 145 (H) 74 - 99 mg/dL   CBC   Result Value Ref Range    WBC 7.9 4.4 - 11.3 x10*3/uL    nRBC 0.0 0.0 - 0.0 /100 WBCs    RBC 4.07 (L) 4.50 - 5.90 x10*6/uL    Hemoglobin 11.6 (L) 13.5 - 17.5 g/dL    Hematocrit 36.2 (L) 41.0 - 52.0 %    MCV 89 80 - 100 fL    MCH 28.5 26.0 - 34.0 pg    MCHC 32.0 32.0 - 36.0 g/dL    RDW 16.4 (H) 11.5 - 14.5 %    Platelets 264 150 - 450 x10*3/uL   Basic Metabolic Panel   Result Value Ref Range    Glucose 98 74 - 99 mg/dL    Sodium 138 136 - 145 mmol/L    Potassium 4.2 3.5 - 5.3 mmol/L    Chloride 97 (L) 98 - 107 mmol/L    Bicarbonate 28 21 - 32 mmol/L    Anion Gap 17 10 - 20 mmol/L    Urea Nitrogen 31 (H) 6 - 23 mg/dL    Creatinine 7.05 (H) 0.50 - 1.30 mg/dL    eGFR 7 (L) >60 mL/min/1.73m*2    Calcium 9.7 8.6 - 10.3 mg/dL    Phosphorus   Result Value Ref Range    Phosphorus 5.1 (H) 2.5 - 4.9 mg/dL   POCT GLUCOSE   Result Value Ref Range    POCT Glucose 139 (H) 74 - 99 mg/dL   POCT GLUCOSE   Result Value Ref Range    POCT Glucose 120 (H) 74 - 99 mg/dL     *Note: Due to a large number of results and/or encounters for the requested time period, some results have not been displayed. A complete set of results can be found in Results Review.   ESRD  HTN  BPH  CAD  Hyperlipidemia  Hx of prostate cancer    Assessment/Plan     Patient with recurrent right pleural effusion. Thoracentesis done 3/19/25. Cytology pending. Reviewed case with . Recommend VATS decort.   Patient will need to be transferred for surgery. Patient requests transfer to [facility].   Request for transfer placed.     I spent 30 minutes in the professional and overall care of this patient.      Gina Mendieta PA-C

## 2025-03-21 NOTE — PROGRESS NOTES
03/21/25 1319   Discharge Planning   Expected Discharge Disposition [place]  ([facility]   Does the patient need discharge transport arranged? Yes   RoundTrip coordination needed? Yes     Patient awaiting transfer to [facility] for VATS.

## 2025-03-21 NOTE — PROGRESS NOTES
Underwent dialysis yesterday, next dialysis scheduled for tomorrow, following along.    Marco Woodson MD

## 2025-03-21 NOTE — CARE PLAN
The patient's goals for the shift include remain free from falls and get some rest    The clinical goals for the shift include maintain oxygenation    Over the shift, the patient did not make progress toward the following goals. Barriers to progression include   Problem: Safety - Adult  Goal: Free from fall injury  3/21/2025 0724 by Alejandra Fay RN  Outcome: Progressing  3/21/2025 0724 by Alejandra Fay RN  Outcome: Progressing    Recommendations to address these barriers include

## 2025-03-21 NOTE — SIGNIFICANT EVENT
Cystoscopy: What to Expect at [address] [facility]    A cystoscopy is a procedure that lets a doctor look inside of the bladder and the urethra. The urethra is the tube that carries urine from the bladder to outside the body. The doctor uses a thin, lighted tool called a cystoscope. Your bladder is filled with fluid. This stretches the bladder so that your doctor can look closely at the inside of your bladder. After the cystoscopy, your urethra may be sore at first, and it may burn when you urinate for the first few days after the procedure. You may feel the need to urinate more often, and your urine may be pink. These symptoms should get better in 1 or 2 days. You will probably be able to go back to most of your usual activities in 1 or 2 days. This care sheet gives you a general idea about how long it will take for you to recover. But each person recovers at a different pace. Follow the steps below to get better as quickly as possible. How can you care for yourself at home? Activity  · Rest when you feel tired. Getting enough sleep will help you recover. · Try to walk each day. Start by walking a little more than you did the day before. Bit by bit, increase the amount you walk. Walking boosts blood flow and helps prevent pneumonia and constipation. · Avoid strenuous activities, such as bicycle riding, jogging, weight lifting, or aerobic exercise, until your doctor says it is okay. · Ask your doctor when you can drive again. · Most people are able to return to work within 1 or 2 days after the procedure. · You may shower and take baths as usual.  · Ask your doctor when it is okay for you to have sex. Diet  · You can eat your normal diet. If your stomach is upset, try bland, low-fat foods like plain rice, broiled chicken, toast, and yogurt. · Drink plenty of fluids (unless your doctor tells you not to). Medicines  · Take pain medicines exactly as directed.   ¨ If the doctor gave you a prescription Seen by thoracic surgery. They are recommending VATS. Transfer to [facility] requested for procedure. Pleural effusion cultures negative to date. Cytology results still pending.    medicine for pain, take it as prescribed. ¨ If you are not taking a prescription pain medicine, ask your doctor if you can take an over-the-counter medicine. · If you think your pain medicine is making you sick to your stomach:  ¨ Take your medicine after meals (unless your doctor has told you not to). ¨ Ask your doctor for a different pain medicine. · If your doctor prescribed antibiotics, take them as directed. Do not stop taking them just because you feel better. You need to take the full course of antibiotics. Follow-up care is a key part of your treatment and safety. Be sure to make and go to all appointments, and call your doctor if you are having problems. It's also a good idea to know your test results and keep a list of the medicines you take. When should you call for help? Call 911 anytime you think you may need emergency care. For example, call if:  · You passed out (lost consciousness). · You have severe trouble breathing. · You have sudden chest pain and shortness of breath, or you cough up blood. · You have severe belly pain. Call your doctor now or seek immediate medical care if:  · You are sick to your stomach or cannot keep fluids down. · Your urine is still red or you see blood clots after you have urinated several times. · You have trouble passing urine or stool, especially if you have pain or swelling in your lower belly. · You have signs of a blood clot, such as:  ¨ Pain in your calf, back of the knee, thigh, or groin. ¨ Redness and swelling in your leg or groin. · You develop a fever or severe chills. · You have pain in your back just below your rib cage. This is called flank pain. Watch closely for changes in your health, and be sure to contact your doctor if:  · You have pain or burning when you urinate. A burning feeling is normal for a day or two after the test, but call if it does not get better.   · You have a frequent urge to urinate but can pass only small amounts of urine.  · Your urine is pink, red, or cloudy, or smells bad. It is normal for the urine to have a pinkish color for a few days after the test, but call if it does not get better. Where can you learn more? Go to http://all-christian.info/. Enter Y682 in the search box to learn more about \"Cystoscopy: What to Expect at Home. \"  Current as of: November 11, 2016  Content Version: 11.2  © 4311-2564 [employer]. Care instructions adapted under license by [employer] (which disclaims liability or warranty for this information). If you have questions about a medical condition or this instruction, always ask your healthcare professional. [address] any warranty or liability for your use of this information. After general anesthesia or intravenous sedation, for 24 hours or while taking prescription Narcotics:  · Limit your activities  · Do not drive and operate hazardous machinery  · Do not make important personal or business decisions  · Do  not drink alcoholic beverages  · If you have not urinated within 8 hours after discharge, please contact your surgeon on call. *  Please give a list of your current medications to your Primary Care Provider. *  Please update this list whenever your medications are discontinued, doses are      changed, or new medications (including over-the-counter products) are added. *  Please carry medication information at all times in case of emergency situations. These are general instructions for a healthy lifestyle:  No smoking/ No tobacco products/ Avoid exposure to second hand smoke  Surgeon General's Warning:  Quitting smoking now greatly reduces serious risk to your health.   Obesity, smoking, and sedentary lifestyle greatly increases your risk for illness  A healthy diet, regular physical exercise & weight monitoring are important for maintaining a healthy lifestyle    You may be retaining fluid if you have a history of heart failure or if you experience any of the following symptoms:  Weight gain of 3 pounds or more overnight or 5 pounds in a week, increased swelling in our hands or feet or shortness of breath while lying flat in bed. Please call your doctor as soon as you notice any of these symptoms; do not wait until your next office visit. Recognize signs and symptoms of STROKE:    F-face looks uneven    A-arms unable to move or move unevenly    S-speech slurred or non-existent    T-time-call 911 as soon as signs and symptoms begin-DO NOT go       Back to bed or wait to see if you get better-TIME IS BRAIN.

## 2025-03-21 NOTE — PROGRESS NOTES
Bill A Franke is a 74 y.o. male on day 4 of admission presenting with Pleural effusion.    Subjective   Afebrile,no chills  Denies shortness of breath or chest pain  Reports occasional cough  Denies nausea, vomiting, diarrhea, abdominal pain    Objective   Range of Vitals (last 24 hours)  Heart Rate:  [57-72]   Temp:  [36 °C (96.8 °F)-37.4 °C (99.3 °F)]   Resp:  [17-20]   BP: (140-169)/(53-90)   Weight:  [62.6 kg (138 lb 0.1 oz)]   SpO2:  [94 %-99 %]   Daily Weight  03/21/25 : 62.6 kg (138 lb 0.1 oz)    Body mass index is 20.98 kg/m².    Physical Exam  Constitutional:       Appearance: Normal appearance.   HENT:      Head: Normocephalic and atraumatic.   Eyes:      Extraocular Movements: Extraocular movements intact.      Conjunctiva/sclera: Conjunctivae normal.   Cardiovascular:      Rate and Rhythm: Normal rate and regular rhythm.   Pulmonary:      Effort: Pulmonary effort is normal.      Comments: Diminished bilaterally  Abdominal:      General: Bowel sounds are normal.      Palpations: Abdomen is soft.      Tenderness: There is no abdominal tenderness.   Musculoskeletal:         General: No swelling. Normal range of motion.      Cervical back: Normal range of motion.   Skin:     General: Skin is warm and dry.   Neurological:      General: No focal deficit present.      Mental Status: He is alert.   Psychiatric:         Mood and Affect: Mood normal.         Behavior: Behavior normal.           Antibiotics  cefTRIAXone - 2 gram/50 mL  doxycycline - 100 mg    Relevant Results  Labs  Results from last 72 hours   Lab Units 03/21/25  0547 03/20/25  0540 03/19/25  2222   WBC AUTO x10*3/uL 7.9 7.3 7.0   HEMOGLOBIN g/dL 11.6* 10.7* 10.4*   HEMATOCRIT % 36.2* 33.7* 32.7*   PLATELETS AUTO x10*3/uL 264 268 244   NEUTROS PCT AUTO %  --  67.2  --    LYMPHS PCT AUTO %  --  16.9  --    MONOS PCT AUTO %  --  9.8  --    EOS PCT AUTO %  --  4.8  --      Results from last 72 hours   Lab Units 03/21/25  0547 03/20/25  0540   SODIUM  "mmol/L 138 137   POTASSIUM mmol/L 4.2 4.9   CHLORIDE mmol/L 97* 96*   CO2 mmol/L 28 28   BUN mg/dL 31* 52*   CREATININE mg/dL 7.05* 9.06*   GLUCOSE mg/dL 98 88   CALCIUM mg/dL 9.7 9.9   ANION GAP mmol/L 17 18   EGFR mL/min/1.73m*2 7* 6*     Results from last 72 hours   Lab Units 03/20/25  0540   ALK PHOS U/L 77   BILIRUBIN TOTAL mg/dL 0.5   PROTEIN TOTAL g/dL 6.6   ALT U/L 9*   AST U/L 15   ALBUMIN g/dL 3.6     Estimated Creatinine Clearance: 7.9 mL/min (A) (by C-G formula based on SCr of 7.05 mg/dL (H)).  No results found for: \"CRP\"  Microbiology  Pleural fluid workup pending  Legionella antigen negative  Streptococcus pneumoniae antigen negative  MRSA colonization culture negative  Blood cultures pending with no growth  Imaging  XR chest 1 view    Result Date: 3/20/2025  Interpreted By:  Angy Henry, STUDY: XR CHEST 1 VIEW 3/20/2025 3:30 pm   INDICATION: Evaluate for trapped lung, recurrence of effusion   COMPARISON: 03/19/2025   ACCESSION NUMBER(S): [id number]   ORDERING CLINICIAN: HUNTER CHÁVEZ   TECHNIQUE: AP erect view of the chest   FINDINGS: Persistent right-sided hydropneumothorax is seen with increase in the amount of pleural fluid on the right with multiple air-fluid levels seen. The size of the right hydropneumothorax has not changed significantly. There is a noncompliant right lung identified.   The left lung is clear with no left-sided pleural abnormality. The cardiac size is within normal limits.       Although the right hydropneumothorax has not changed significantly in size, there is increased amount of pleural fluid within the right pleural space with a number of air-fluid levels. The right lung appears noncompliant.   Signed by: Angy Henry 3/20/2025 3:55 PM Dictation workstation:   [id number]    CT chest wo IV contrast    Result Date: 3/20/2025  Interpreted By:  Bobbi Stacy, STUDY: CT CHEST WO IV CONTRAST;  3/20/2025 5:07 am   INDICATION: Signs/Symptoms:exudative effusion, evaluate for " fluid remaining, trapped lung physiology vs pneumothorax.     COMPARISON: CT chest 02/14/2025 CT abdomen pelvis 04/26/2021   ACCESSION NUMBER(S): [id number]   ORDERING CLINICIAN: HUNTER CHÁVEZ   TECHNIQUE: Helical data acquisition of the chest was obtained  without IV contrast material.  Images were reformatted in axial, coronal, and sagittal planes.   FINDINGS: LUNGS AND AIRWAYS: New moderate right hydropneumothorax is seen. Similar mild diffuse centrilobular micronodular ground-glass opacities, likely respiratory bronchiolitis. Trachea and right and left main bronchi are patent.   MEDIASTINUM AND AYAKOV, LOWER NECK AND AXILLA: The visualized thyroid gland is within normal limits.   Stable mildly prominent 14 x 14 mm right hilar lymph node on series 4, image 133. no new enlarged thoracic lymph node.   Esophagus is within normal limits.   HEART AND VESSELS: The thoracic aorta is of normal course and caliber with mild-to-moderate vascular calcifications.   Main pulmonary artery and its branches are normal in caliber.   There are severe coronary artery calcifications versus stent. The study is not optimized for evaluation of coronary arteries.   The cardiac chambers are not enlarged.   No evidence of pericardial effusion.   UPPER ABDOMEN: 20 mm hypoattenuating lesion in the hepatic dome, stable dating back to April 2021.   CHEST WALL AND OSSEOUS STRUCTURES: No suspicious osseous lesions. Multilevel degenerative changes are present.       1. New moderate right hydropneumothorax. Given interval thoracentesis and exudative fluid, findings are suspicious for ex vacuo hydropneumothorax (trapped lung). However, correlation with fluid culture is recommended to exclude empyema, though felt to be less likely given the negative gram stain and normal WBC count. Bronchopleural fistula is felt to be unlikely. Underlying malignant effusion is possible, please correlate with cytology. Further evaluation with PET-CT could be  considered if there is no known history of malignancy or if there is no known cause of patient's exudative fluid. The pleural fluid will likely not show FDG avidity even if malignant, however other sites of potential disease could be identified or excluded. 2. Stable mildly prominent right hilar lymph node. 3. Additional chronic and incidental findings as detailed above.   MACRO: Critical Finding:  See findings. Notification was initiated on 3/20/2025 at 9:35 am by  Bobbi Stacy.  (**-YCF-**) Instructions:   Signed by: Bobbi Stacy 3/20/2025 9:36 AM Dictation workstation:   [id number]    XR chest 2 views    Result Date: 3/20/2025  Interpreted By:  Tk Kyle, STUDY: XR CHEST 2 VIEWS; 3/19/2025 6:05 pm   INDICATION: Signs/Symptoms:Post thoracentesis.   COMPARISON: 03/18/2025   ACCESSION NUMBER(S): [id number]   ORDERING CLINICIAN: HUNTER CHÁVEZ   TECHNIQUE: AP and lateral views of the chest were obtained.   FINDINGS: The cardiac size is indeterminate due to the AP projection. Patient is status post right-sided thoracentesis with a loculated pneumothorax at the right lung base in association with right basilar partial collapse/atelectasis. The pneumothorax is likely from failure of re-expansion of the lung at the right base. Left hemithorax is clear.       Loculated right basilar pneumothorax likely from failure of re-expansion of the right lung. Compressive atelectasis of the right lung base is noted.   MACRO: none   Signed by: Tk Kyle 3/20/2025 7:43 AM Dictation workstation:   [id number]    XR chest 1 view    Result Date: 3/19/2025  Interpreted By:  Jose R Murray, STUDY: XR CHEST 1 VIEW;  3/19/2025 9:20 pm   INDICATION: Signs/Symptoms:evalaute for pneumothorax vs ex vacuo.     COMPARISON: Study performed earlier the same day   ACCESSION NUMBER(S): [id number]   ORDERING CLINICIAN: HUNTER CHÁVEZ   FINDINGS: The patient is status post right-sided thoracentesis. Lucency over the right lung base  consistent with a basilar pneumothorax. Interval decrease in size of the effusion which is now small. Right basilar airspace disease present. The left lung is clear.       Moderate-sized right basilar pneumothorax. Interval decrease in right effusion after thoracentesis. Persistent small effusion and right basilar airspace disease present   MACRO: Jose R Murray discussed the significance and urgency of this critical finding by telephone with  the clinical team taking care of the patient on 3/19/2025 at 9:53 pm.  (**-RCF-**) Findings:  See findings.   Signed by: Jose R Murray 3/19/2025 9:53 PM Dictation workstation:   [id number]    US thoracentesis    Result Date: 3/19/2025  Interpreted By:  Bassam Brown, STUDY: US THORACENTESIS; 3/19/2025 2:45 pm   INDICATION: Signs/Symptoms:Right thoracentesis diagnostic and therapeutic, hx of ESRD, recurrent effusion, concern for malignancy vs infection.   COMPARISON: None   ACCESSION NUMBER(S): [id number]   ORDERING CLINICIAN: HUNTER CHÁVEZ   TECHNIQUE: Informed consent obtained. Patient positionedsitting upright. Skin prepped, draped and anesthetized. Ultrasound right chest demonstrates moderate complex effusion. Under ultrasound guidance, a centesis catheter/needle was advanced into rightpleural cavity.   FINDINGS: A total of 950 cc of bloody fluid was aspirated. A sample was sent for analysis. The patient tolerated the procedure well.       Ultrasound-guided right thoracentesis.   Signed by: Bassam Brown 3/19/2025 3:18 PM Dictation workstation:   [id number]    XR chest 2 views    Result Date: 3/18/2025  Interpreted By:  Angy Henry, STUDY: XR CHEST 2 VIEWS 3/18/2025 8:59 am   INDICATION: Signs/Symptoms:Shortness of breath   COMPARISON: 12/28/2024   ACCESSION NUMBER(S): [id number]   ORDERING CLINICIAN: DIANNE LAWRENCE   TECHNIQUE: PA and lateral views of the chest were acquired.   FINDINGS: There is a moderately large right pleural effusion now seen with no left-sided  pleural abnormality observed.   The cardiac size is within normal limits with calcified plaque in the aortic knob and with coronary artery stent graft identified.   The left lung is clear but there is compressive atelectasis and infiltrate in the right lower lobe.   A stent graft within the left axillary region is seen.       Moderately large right pleural effusion which has developed since the prior study. There is probable atelectasis and perhaps some infiltrate within the right lower lobe as well.   Signed by: Angy Henry 3/18/2025 9:07 AM Dictation workstation:   [id number]     Assessment/Plan   Acute hypoxic respiratory failure-on low-flow oxygen  Exudative right-sided pleural effusion  Possible pneumonia     Continue ceftriaxone  Oxygen as needed  Thoracic surgery consult  Follow-up pleural fluid workup  Monitor temperature and WBC  Further recommendations based on pending workup    Total time spent caring for the patient today was 20 minutes.  This includes time spent before the visit reviewing the chart, time spent during the visit, and time spent after the visit on documentation.        Leelee Krishna, APRN-CNP

## 2025-03-21 NOTE — PROGRESS NOTES
"William A Franke \"Narciso\" is a 74 y.o. male on day 4 of admission presenting with Pleural effusion.    Subjective   Patient family present in the room.  [facility]  Objective     Physical Exam  Vitals reviewed.   Constitutional:       Appearance: Normal appearance.   HENT:      Head: Normocephalic and atraumatic.      Right Ear: Tympanic membrane, ear canal and external ear normal.      Left Ear: Tympanic membrane, ear canal and external ear normal.      Nose: Nose normal.      Mouth/Throat:      Pharynx: Oropharynx is clear.   Eyes:      Extraocular Movements: Extraocular movements intact.      Conjunctiva/sclera: Conjunctivae normal.      Pupils: Pupils are equal, round, and reactive to light.   Cardiovascular:      Rate and Rhythm: Normal rate and regular rhythm.      Pulses: Normal pulses.      Heart sounds: Normal heart sounds.   Pulmonary:      Effort: Pulmonary effort is normal.      Breath sounds: Rales present.   Abdominal:      General: Abdomen is flat. Bowel sounds are normal.      Palpations: Abdomen is soft.   Musculoskeletal:      Cervical back: Normal range of motion and neck supple.   Skin:     General: Skin is warm and dry.   Neurological:      General: No focal deficit present.      Mental Status: He is alert and oriented to person, place, and time.   Psychiatric:         Mood and Affect: Mood normal.         Last Recorded Vitals  Blood pressure 142/52, pulse 65, temperature 36.3 °C (97.3 °F), temperature source Temporal, resp. rate 18, height 1.727 m (5' 8\"), weight 62.6 kg (138 lb 0.1 oz), SpO2 99%.  Intake/Output last 3 Shifts:  I/O last 3 completed shifts:  In: 1600 (25.6 mL/kg) [P.O.:400; I.V.:800 (12.8 mL/kg); Other:400]  Out: 5300 (84.7 mL/kg) [Other:5300]  Weight: 62.6 kg     Relevant Results               Scheduled medications  allopurinol, 100 mg, oral, Daily  amLODIPine, 10 mg, oral, Daily  aspirin, 81 mg, oral, Daily  atorvastatin, 80 mg, " oral, Nightly  carvedilol, 6.25 mg, oral, BID  cefTRIAXone, 2 g, intravenous, q24h  docusate sodium, 100 mg, oral, BID  doxycylcine, 100 mg, oral, q12h [facility]  [START ON 3/24/2025] ergocalciferol, 50,000 Units, oral, Weekly  ezetimibe, 10 mg, oral, Daily  fluocinonide, , Topical, BID  gabapentin, 300 mg, oral, Nightly  heparin (porcine), 5,000 Units, subcutaneous, q8h  insulin lispro, 0-5 Units, subcutaneous, TID AC  isosorbide mononitrate ER, 60 mg, oral, Daily  oxygen, , inhalation, Continuous - Inhalation  pantoprazole, 40 mg, oral, Daily  polyethylene glycol, 17 g, oral, Daily  sevelamer carbonate, 1,600 mg, oral, TID  SITagliptin phosphate, 25 mg, oral, Daily      Continuous medications     PRN medications  PRN medications: acetaminophen **OR** acetaminophen **OR** acetaminophen, benzocaine-menthol, dextromethorphan-guaifenesin, guaiFENesin, melatonin, nitroglycerin  Results for orders placed or performed during the hospital encounter of 03/17/25 (from the past 24 hours)   POCT GLUCOSE   Result Value Ref Range    POCT Glucose 145 (H) 74 - 99 mg/dL   CBC   Result Value Ref Range    WBC 7.9 4.4 - 11.3 x10*3/uL    nRBC 0.0 0.0 - 0.0 /100 WBCs    RBC 4.07 (L) 4.50 - 5.90 x10*6/uL    Hemoglobin 11.6 (L) 13.5 - 17.5 g/dL    Hematocrit 36.2 (L) 41.0 - 52.0 %    MCV 89 80 - 100 fL    MCH 28.5 26.0 - 34.0 pg    MCHC 32.0 32.0 - 36.0 g/dL    RDW 16.4 (H) 11.5 - 14.5 %    Platelets 264 150 - 450 x10*3/uL   Basic Metabolic Panel   Result Value Ref Range    Glucose 98 74 - 99 mg/dL    Sodium 138 136 - 145 mmol/L    Potassium 4.2 3.5 - 5.3 mmol/L    Chloride 97 (L) 98 - 107 mmol/L    Bicarbonate 28 21 - 32 mmol/L    Anion Gap 17 10 - 20 mmol/L    Urea Nitrogen 31 (H) 6 - 23 mg/dL    Creatinine 7.05 (H) 0.50 - 1.30 mg/dL    eGFR 7 (L) >60 mL/min/1.73m*2    Calcium 9.7 8.6 - 10.3 mg/dL   Phosphorus   Result Value Ref Range    Phosphorus 5.1 (H) 2.5 - 4.9 mg/dL   POCT GLUCOSE   Result Value Ref Range    POCT Glucose 139 (H) 74 -  99 mg/dL   POCT GLUCOSE   Result Value Ref Range    POCT Glucose 120 (H) 74 - 99 mg/dL     *Note: Due to a large number of results and/or encounters for the requested time period, some results have not been displayed. A complete set of results can be found in Results Review.   XR chest 1 view    Result Date: 3/20/2025  Interpreted By:  Angy Henry, STUDY: XR CHEST 1 VIEW 3/20/2025 3:30 pm   INDICATION: Evaluate for trapped lung, recurrence of effusion   COMPARISON: 03/19/2025   ACCESSION NUMBER(S): [id number]   ORDERING CLINICIAN: HUNTER CHÁVEZ   TECHNIQUE: AP erect view of the chest   FINDINGS: Persistent right-sided hydropneumothorax is seen with increase in the amount of pleural fluid on the right with multiple air-fluid levels seen. The size of the right hydropneumothorax has not changed significantly. There is a noncompliant right lung identified.   The left lung is clear with no left-sided pleural abnormality. The cardiac size is within normal limits.       Although the right hydropneumothorax has not changed significantly in size, there is increased amount of pleural fluid within the right pleural space with a number of air-fluid levels. The right lung appears noncompliant.   Signed by: Angy Henry 3/20/2025 3:55 PM Dictation workstation:   [id number]    CT chest wo IV contrast    Result Date: 3/20/2025  Interpreted By:  Bobbi Stacy, STUDY: CT CHEST WO IV CONTRAST;  3/20/2025 5:07 am   INDICATION: Signs/Symptoms:exudative effusion, evaluate for fluid remaining, trapped lung physiology vs pneumothorax.     COMPARISON: CT chest 02/14/2025 CT abdomen pelvis 04/26/2021   ACCESSION NUMBER(S): [id number]   ORDERING CLINICIAN: HUNTER CHÁVEZ   TECHNIQUE: Helical data acquisition of the chest was obtained  without IV contrast material.  Images were reformatted in axial, coronal, and sagittal planes.   FINDINGS: LUNGS AND AIRWAYS: New moderate right hydropneumothorax is seen. Similar mild diffuse  centrilobular micronodular ground-glass opacities, likely respiratory bronchiolitis. Trachea and right and left main bronchi are patent.   MEDIASTINUM AND YAAKOV, LOWER NECK AND AXILLA: The visualized thyroid gland is within normal limits.   Stable mildly prominent 14 x 14 mm right hilar lymph node on series 4, image 133. no new enlarged thoracic lymph node.   Esophagus is within normal limits.   HEART AND VESSELS: The thoracic aorta is of normal course and caliber with mild-to-moderate vascular calcifications.   Main pulmonary artery and its branches are normal in caliber.   There are severe coronary artery calcifications versus stent. The study is not optimized for evaluation of coronary arteries.   The cardiac chambers are not enlarged.   No evidence of pericardial effusion.   UPPER ABDOMEN: 20 mm hypoattenuating lesion in the hepatic dome, stable dating back to April 2021.   CHEST WALL AND OSSEOUS STRUCTURES: No suspicious osseous lesions. Multilevel degenerative changes are present.       1. New moderate right hydropneumothorax. Given interval thoracentesis and exudative fluid, findings are suspicious for ex vacuo hydropneumothorax (trapped lung). However, correlation with fluid culture is recommended to exclude empyema, though felt to be less likely given the negative gram stain and normal WBC count. Bronchopleural fistula is felt to be unlikely. Underlying malignant effusion is possible, please correlate with cytology. Further evaluation with PET-CT could be considered if there is no known history of malignancy or if there is no known cause of patient's exudative fluid. The pleural fluid will likely not show FDG avidity even if malignant, however other sites of potential disease could be identified or excluded. 2. Stable mildly prominent right hilar lymph node. 3. Additional chronic and incidental findings as detailed above.   MACRO: Critical Finding:  See findings. Notification was initiated on 3/20/2025 at  9:35 am by  Bobbi Stacy.  (**-YCF-**) Instructions:   Signed by: Bobbi Stacy 3/20/2025 9:36 AM Dictation workstation:   [id number]    XR chest 2 views    Result Date: 3/20/2025  Interpreted By:  Tk Kyle, STUDY: XR CHEST 2 VIEWS; 3/19/2025 6:05 pm   INDICATION: Signs/Symptoms:Post thoracentesis.   COMPARISON: 03/18/2025   ACCESSION NUMBER(S): [id number]   ORDERING CLINICIAN: HUNTER CHÁVEZ   TECHNIQUE: AP and lateral views of the chest were obtained.   FINDINGS: The cardiac size is indeterminate due to the AP projection. Patient is status post right-sided thoracentesis with a loculated pneumothorax at the right lung base in association with right basilar partial collapse/atelectasis. The pneumothorax is likely from failure of re-expansion of the lung at the right base. Left hemithorax is clear.       Loculated right basilar pneumothorax likely from failure of re-expansion of the right lung. Compressive atelectasis of the right lung base is noted.   MACRO: none   Signed by: Tk Kyle 3/20/2025 7:43 AM Dictation workstation:   [id number]    XR chest 1 view    Result Date: 3/19/2025  Interpreted By:  Jose R Murray, STUDY: XR CHEST 1 VIEW;  3/19/2025 9:20 pm   INDICATION: Signs/Symptoms:evalaute for pneumothorax vs ex vacuo.     COMPARISON: Study performed earlier the same day   ACCESSION NUMBER(S): [id number]   ORDERING CLINICIAN: HUNTER CHÁVEZ   FINDINGS: The patient is status post right-sided thoracentesis. Lucency over the right lung base consistent with a basilar pneumothorax. Interval decrease in size of the effusion which is now small. Right basilar airspace disease present. The left lung is clear.       Moderate-sized right basilar pneumothorax. Interval decrease in right effusion after thoracentesis. Persistent small effusion and right basilar airspace disease present   MACRO: Jose R Murray discussed the significance and urgency of this critical finding by telephone with  the  clinical team taking care of the patient on 3/19/2025 at 9:53 pm.  (**-RCF-**) Findings:  See findings.   Signed by: Jose R Murray 3/19/2025 9:53 PM Dictation workstation:   [id number]    US thoracentesis    Result Date: 3/19/2025  Interpreted By:  Bassam Brown, STUDY: US THORACENTESIS; 3/19/2025 2:45 pm   INDICATION: Signs/Symptoms:Right thoracentesis diagnostic and therapeutic, hx of ESRD, recurrent effusion, concern for malignancy vs infection.   COMPARISON: None   ACCESSION NUMBER(S): [id number]   ORDERING CLINICIAN: HUNTER CHÁVEZ   TECHNIQUE: Informed consent obtained. Patient positionedsitting upright. Skin prepped, draped and anesthetized. Ultrasound right chest demonstrates moderate complex effusion. Under ultrasound guidance, a centesis catheter/needle was advanced into rightpleural cavity.   FINDINGS: A total of 950 cc of bloody fluid was aspirated. A sample was sent for analysis. The patient tolerated the procedure well.       Ultrasound-guided right thoracentesis.   Signed by: Bassam Brown 3/19/2025 3:18 PM Dictation workstation:   [id number]            Assessment/Plan   Assessment & Plan  Pleural effusion    Benign essential hypertension    BPH with obstruction/lower urinary tract symptoms    CAD (coronary artery disease)    ESRD on hemodialysis (Multi)    Hypertension    Hyperlipidemia    Prostate cancer (Multi)    Hydropneumothorax    Spoke to thoracic physician assistant  Explained patient's patient is that he does not want to go to [facility]  He will prefer to go to  [facility]  He understands he might have to wait till Tuesday for the procedure  He needs  VATS  Continue antibiotic  Continue oxygen to keep sats above 100%  Dialysis per nephrology  Blood pressure stable  Blood sugar okay         I spent  minutes in the professional and overall care of this patient.      Marianna Galloway MD

## 2025-03-22 ENCOUNTER — APPOINTMENT (OUTPATIENT)
Dept: DIALYSIS | Facility: HOSPITAL | Age: 75
End: 2025-03-22
Payer: MEDICARE

## 2025-03-22 ENCOUNTER — HOSPITAL ENCOUNTER (INPATIENT)
Facility: HOSPITAL | Age: 75
DRG: 163 | End: 2025-03-22
Attending: INTERNAL MEDICINE | Admitting: INTERNAL MEDICINE
Payer: MEDICARE

## 2025-03-22 VITALS
RESPIRATION RATE: 16 BRPM | BODY MASS INDEX: 21.02 KG/M2 | SYSTOLIC BLOOD PRESSURE: 148 MMHG | TEMPERATURE: 98.3 F | HEART RATE: 70 BPM | HEIGHT: 68 IN | DIASTOLIC BLOOD PRESSURE: 57 MMHG | WEIGHT: 138.67 LBS | OXYGEN SATURATION: 96 %

## 2025-03-22 DIAGNOSIS — J94.8 HYDROPNEUMOTHORAX: Primary | ICD-10-CM

## 2025-03-22 DIAGNOSIS — J90 PLEURAL EFFUSION: ICD-10-CM

## 2025-03-22 DIAGNOSIS — N18.5 CKD (CHRONIC KIDNEY DISEASE), STAGE V (MULTI): ICD-10-CM

## 2025-03-22 LAB
ANION GAP SERPL CALCULATED.3IONS-SCNC: 20 MMOL/L (ref 10–20)
BACTERIA FLD CULT: NORMAL
BUN SERPL-MCNC: 47 MG/DL (ref 6–23)
CALCIUM SERPL-MCNC: 10.1 MG/DL (ref 8.6–10.3)
CHLORIDE SERPL-SCNC: 96 MMOL/L (ref 98–107)
CO2 SERPL-SCNC: 29 MMOL/L (ref 21–32)
CREAT SERPL-MCNC: 9.85 MG/DL (ref 0.5–1.3)
EGFRCR SERPLBLD CKD-EPI 2021: 5 ML/MIN/1.73M*2
ERYTHROCYTE [DISTWIDTH] IN BLOOD BY AUTOMATED COUNT: 16.4 % (ref 11.5–14.5)
GLUCOSE BLD MANUAL STRIP-MCNC: 97 MG/DL (ref 74–99)
GLUCOSE SERPL-MCNC: 98 MG/DL (ref 74–99)
GRAM STN SPEC: NORMAL
GRAM STN SPEC: NORMAL
HBV SURFACE AB SER-ACNC: <3.1 MIU/ML
HBV SURFACE AG SERPL QL IA: NONREACTIVE
HCT VFR BLD AUTO: 36.8 % (ref 41–52)
HGB BLD-MCNC: 11.5 G/DL (ref 13.5–17.5)
MCH RBC QN AUTO: 28 PG (ref 26–34)
MCHC RBC AUTO-ENTMCNC: 31.3 G/DL (ref 32–36)
MCV RBC AUTO: 90 FL (ref 80–100)
NRBC BLD-RTO: 0 /100 WBCS (ref 0–0)
PLATELET # BLD AUTO: 257 X10*3/UL (ref 150–450)
POTASSIUM SERPL-SCNC: 4.6 MMOL/L (ref 3.5–5.3)
RBC # BLD AUTO: 4.11 X10*6/UL (ref 4.5–5.9)
SODIUM SERPL-SCNC: 140 MMOL/L (ref 136–145)
WBC # BLD AUTO: 7.5 X10*3/UL (ref 4.4–11.3)

## 2025-03-22 PROCEDURE — 8010000001 HC DIALYSIS - HEMODIALYSIS PER DAY

## 2025-03-22 PROCEDURE — 85027 COMPLETE CBC AUTOMATED: CPT | Performed by: INTERNAL MEDICINE

## 2025-03-22 PROCEDURE — 82947 ASSAY GLUCOSE BLOOD QUANT: CPT

## 2025-03-22 PROCEDURE — 94640 AIRWAY INHALATION TREATMENT: CPT

## 2025-03-22 PROCEDURE — 2500000001 HC RX 250 WO HCPCS SELF ADMINISTERED DRUGS (ALT 637 FOR MEDICARE OP): Performed by: INTERNAL MEDICINE

## 2025-03-22 PROCEDURE — 2500000002 HC RX 250 W HCPCS SELF ADMINISTERED DRUGS (ALT 637 FOR MEDICARE OP, ALT 636 FOR OP/ED): Performed by: INTERNAL MEDICINE

## 2025-03-22 PROCEDURE — 86706 HEP B SURFACE ANTIBODY: CPT | Mod: WESLAB | Performed by: INTERNAL MEDICINE

## 2025-03-22 PROCEDURE — 2500000001 HC RX 250 WO HCPCS SELF ADMINISTERED DRUGS (ALT 637 FOR MEDICARE OP): Performed by: STUDENT IN AN ORGANIZED HEALTH CARE EDUCATION/TRAINING PROGRAM

## 2025-03-22 PROCEDURE — 80048 BASIC METABOLIC PNL TOTAL CA: CPT | Performed by: INTERNAL MEDICINE

## 2025-03-22 PROCEDURE — 94667 MNPJ CHEST WALL 1ST: CPT

## 2025-03-22 PROCEDURE — 36415 COLL VENOUS BLD VENIPUNCTURE: CPT | Performed by: INTERNAL MEDICINE

## 2025-03-22 PROCEDURE — 2500000004 HC RX 250 GENERAL PHARMACY W/ HCPCS (ALT 636 FOR OP/ED): Performed by: INTERNAL MEDICINE

## 2025-03-22 PROCEDURE — 99221 1ST HOSP IP/OBS SF/LOW 40: CPT | Performed by: NURSE PRACTITIONER

## 2025-03-22 PROCEDURE — 2060000001 HC INTERMEDIATE ICU ROOM DAILY

## 2025-03-22 PROCEDURE — 87340 HEPATITIS B SURFACE AG IA: CPT | Mod: WESLAB | Performed by: INTERNAL MEDICINE

## 2025-03-22 PROCEDURE — 9420000001 HC RT PATIENT EDUCATION 5 MIN

## 2025-03-22 PROCEDURE — 2500000004 HC RX 250 GENERAL PHARMACY W/ HCPCS (ALT 636 FOR OP/ED): Performed by: STUDENT IN AN ORGANIZED HEALTH CARE EDUCATION/TRAINING PROGRAM

## 2025-03-22 PROCEDURE — 99238 HOSP IP/OBS DSCHRG MGMT 30/<: CPT | Performed by: INTERNAL MEDICINE

## 2025-03-22 RX ORDER — ATORVASTATIN CALCIUM 80 MG/1
80 TABLET, FILM COATED ORAL NIGHTLY
Status: DISCONTINUED | OUTPATIENT
Start: 2025-03-22 | End: 2025-03-22

## 2025-03-22 RX ORDER — ISOSORBIDE MONONITRATE 30 MG/1
60 TABLET, EXTENDED RELEASE ORAL DAILY
Status: DISCONTINUED | OUTPATIENT
Start: 2025-03-23 | End: 2025-03-31 | Stop reason: HOSPADM

## 2025-03-22 RX ORDER — FLUOCINONIDE 0.5 MG/G
CREAM TOPICAL 2 TIMES DAILY
Status: DISCONTINUED | OUTPATIENT
Start: 2025-03-22 | End: 2025-03-31 | Stop reason: HOSPADM

## 2025-03-22 RX ORDER — EZETIMIBE 10 MG/1
10 TABLET ORAL DAILY
Status: DISCONTINUED | OUTPATIENT
Start: 2025-03-23 | End: 2025-03-31 | Stop reason: HOSPADM

## 2025-03-22 RX ORDER — EZETIMIBE 10 MG/1
10 TABLET ORAL DAILY
Status: DISCONTINUED | OUTPATIENT
Start: 2025-03-23 | End: 2025-03-22 | Stop reason: SDUPTHER

## 2025-03-22 RX ORDER — GABAPENTIN 300 MG/1
300 CAPSULE ORAL NIGHTLY
Status: DISCONTINUED | OUTPATIENT
Start: 2025-03-22 | End: 2025-03-31 | Stop reason: HOSPADM

## 2025-03-22 RX ORDER — ACETAMINOPHEN 650 MG/1
650 SUPPOSITORY RECTAL EVERY 4 HOURS PRN
Status: DISCONTINUED | OUTPATIENT
Start: 2025-03-22 | End: 2025-03-31 | Stop reason: HOSPADM

## 2025-03-22 RX ORDER — CARVEDILOL 6.25 MG/1
6.25 TABLET ORAL
Status: DISCONTINUED | OUTPATIENT
Start: 2025-03-23 | End: 2025-03-31 | Stop reason: HOSPADM

## 2025-03-22 RX ORDER — PANTOPRAZOLE SODIUM 40 MG/1
40 TABLET, DELAYED RELEASE ORAL DAILY
Status: DISCONTINUED | OUTPATIENT
Start: 2025-03-23 | End: 2025-03-31 | Stop reason: HOSPADM

## 2025-03-22 RX ORDER — PANTOPRAZOLE SODIUM 40 MG/1
40 TABLET, DELAYED RELEASE ORAL DAILY
Status: DISCONTINUED | OUTPATIENT
Start: 2025-03-23 | End: 2025-03-22 | Stop reason: SDUPTHER

## 2025-03-22 RX ORDER — ISOSORBIDE MONONITRATE 30 MG/1
60 TABLET, EXTENDED RELEASE ORAL DAILY
Status: DISCONTINUED | OUTPATIENT
Start: 2025-03-23 | End: 2025-03-22 | Stop reason: SDUPTHER

## 2025-03-22 RX ORDER — ERGOCALCIFEROL 1.25 MG/1
50000 CAPSULE ORAL
Status: DISCONTINUED | OUTPATIENT
Start: 2025-03-23 | End: 2025-03-31 | Stop reason: HOSPADM

## 2025-03-22 RX ORDER — AMLODIPINE BESYLATE 10 MG/1
10 TABLET ORAL DAILY
Status: DISCONTINUED | OUTPATIENT
Start: 2025-03-23 | End: 2025-03-31 | Stop reason: HOSPADM

## 2025-03-22 RX ORDER — ASPIRIN 81 MG/1
81 TABLET ORAL DAILY
Status: DISCONTINUED | OUTPATIENT
Start: 2025-03-23 | End: 2025-03-31 | Stop reason: HOSPADM

## 2025-03-22 RX ORDER — DOCUSATE SODIUM 100 MG/1
100 CAPSULE, LIQUID FILLED ORAL 2 TIMES DAILY
Status: DISCONTINUED | OUTPATIENT
Start: 2025-03-22 | End: 2025-03-31 | Stop reason: HOSPADM

## 2025-03-22 RX ORDER — ATORVASTATIN CALCIUM 80 MG/1
80 TABLET, FILM COATED ORAL NIGHTLY
Status: DISCONTINUED | OUTPATIENT
Start: 2025-03-22 | End: 2025-03-31 | Stop reason: HOSPADM

## 2025-03-22 RX ORDER — HEPARIN SODIUM 5000 [USP'U]/ML
5000 INJECTION, SOLUTION INTRAVENOUS; SUBCUTANEOUS EVERY 8 HOURS SCHEDULED
Status: DISCONTINUED | OUTPATIENT
Start: 2025-03-22 | End: 2025-03-31 | Stop reason: HOSPADM

## 2025-03-22 RX ORDER — SEVELAMER CARBONATE 800 MG/1
1600 TABLET, FILM COATED ORAL
Status: DISCONTINUED | OUTPATIENT
Start: 2025-03-23 | End: 2025-03-22 | Stop reason: SDUPTHER

## 2025-03-22 RX ORDER — GUAIFENESIN/DEXTROMETHORPHAN 100-10MG/5
5 SYRUP ORAL EVERY 4 HOURS PRN
Status: DISCONTINUED | OUTPATIENT
Start: 2025-03-22 | End: 2025-03-31 | Stop reason: HOSPADM

## 2025-03-22 RX ORDER — ALLOPURINOL 100 MG/1
100 TABLET ORAL DAILY
Status: DISCONTINUED | OUTPATIENT
Start: 2025-03-23 | End: 2025-03-22 | Stop reason: SDUPTHER

## 2025-03-22 RX ORDER — ACETAMINOPHEN 325 MG/1
650 TABLET ORAL EVERY 4 HOURS PRN
Status: DISCONTINUED | OUTPATIENT
Start: 2025-03-22 | End: 2025-03-31 | Stop reason: HOSPADM

## 2025-03-22 RX ORDER — DOXYCYCLINE 100 MG/1
100 CAPSULE ORAL EVERY 12 HOURS SCHEDULED
Qty: 3 CAPSULE | Refills: 0 | Status: ON HOLD | OUTPATIENT
Start: 2025-03-22 | End: 2025-03-31

## 2025-03-22 RX ORDER — ASPIRIN 81 MG/1
81 TABLET ORAL DAILY
Status: DISCONTINUED | OUTPATIENT
Start: 2025-03-23 | End: 2025-03-22

## 2025-03-22 RX ORDER — POLYETHYLENE GLYCOL 3350 17 G/17G
17 POWDER, FOR SOLUTION ORAL DAILY
Status: DISCONTINUED | OUTPATIENT
Start: 2025-03-23 | End: 2025-03-31 | Stop reason: HOSPADM

## 2025-03-22 RX ORDER — GABAPENTIN 300 MG/1
300 CAPSULE ORAL NIGHTLY
Status: DISCONTINUED | OUTPATIENT
Start: 2025-03-22 | End: 2025-03-22 | Stop reason: SDUPTHER

## 2025-03-22 RX ORDER — CARVEDILOL 6.25 MG/1
6.25 TABLET ORAL
Status: DISCONTINUED | OUTPATIENT
Start: 2025-03-23 | End: 2025-03-22 | Stop reason: SDUPTHER

## 2025-03-22 RX ORDER — DOXYCYCLINE HYCLATE 100 MG
100 TABLET ORAL EVERY 12 HOURS SCHEDULED
Status: DISCONTINUED | OUTPATIENT
Start: 2025-03-23 | End: 2025-03-31 | Stop reason: HOSPADM

## 2025-03-22 RX ORDER — NITROGLYCERIN 0.4 MG/1
0.4 TABLET SUBLINGUAL EVERY 5 MIN PRN
Status: DISCONTINUED | OUTPATIENT
Start: 2025-03-22 | End: 2025-03-22 | Stop reason: SDUPTHER

## 2025-03-22 RX ORDER — FLUOCINONIDE 0.5 MG/G
OINTMENT TOPICAL 2 TIMES DAILY
Start: 2025-03-22

## 2025-03-22 RX ORDER — AMLODIPINE BESYLATE 10 MG/1
10 TABLET ORAL DAILY
Start: 2025-03-22 | End: 2025-03-31 | Stop reason: HOSPADM

## 2025-03-22 RX ORDER — INSULIN LISPRO 100 [IU]/ML
0-5 INJECTION, SOLUTION INTRAVENOUS; SUBCUTANEOUS
Status: DISCONTINUED | OUTPATIENT
Start: 2025-03-23 | End: 2025-03-31 | Stop reason: HOSPADM

## 2025-03-22 RX ORDER — GUAIFENESIN 600 MG/1
600 TABLET, EXTENDED RELEASE ORAL EVERY 12 HOURS PRN
Status: DISCONTINUED | OUTPATIENT
Start: 2025-03-22 | End: 2025-03-31 | Stop reason: HOSPADM

## 2025-03-22 RX ORDER — ERGOCALCIFEROL 1.25 MG/1
50000 CAPSULE ORAL WEEKLY
Status: DISCONTINUED | OUTPATIENT
Start: 2025-03-24 | End: 2025-03-22 | Stop reason: SDUPTHER

## 2025-03-22 RX ORDER — ACETAMINOPHEN 160 MG/5ML
650 SOLUTION ORAL EVERY 4 HOURS PRN
Status: DISCONTINUED | OUTPATIENT
Start: 2025-03-22 | End: 2025-03-31 | Stop reason: HOSPADM

## 2025-03-22 RX ORDER — CEFTRIAXONE 2 G/50ML
2 INJECTION, SOLUTION INTRAVENOUS EVERY 24 HOURS
Start: 2025-03-22 | End: 2025-03-31 | Stop reason: HOSPADM

## 2025-03-22 RX ORDER — ALLOPURINOL 100 MG/1
100 TABLET ORAL DAILY
Status: DISCONTINUED | OUTPATIENT
Start: 2025-03-23 | End: 2025-03-31 | Stop reason: HOSPADM

## 2025-03-22 RX ORDER — TALC
3 POWDER (GRAM) TOPICAL NIGHTLY PRN
Status: DISCONTINUED | OUTPATIENT
Start: 2025-03-22 | End: 2025-03-31 | Stop reason: HOSPADM

## 2025-03-22 RX ORDER — AMLODIPINE BESYLATE 10 MG/1
10 TABLET ORAL DAILY
Status: DISCONTINUED | OUTPATIENT
Start: 2025-03-23 | End: 2025-03-22 | Stop reason: SDUPTHER

## 2025-03-22 RX ORDER — DOXYCYCLINE HYCLATE 100 MG
100 TABLET ORAL EVERY 12 HOURS SCHEDULED
Status: COMPLETED | OUTPATIENT
Start: 2025-03-22 | End: 2025-03-22

## 2025-03-22 RX ORDER — FLUOCINONIDE 0.5 MG/G
OINTMENT TOPICAL 2 TIMES DAILY
Status: DISCONTINUED | OUTPATIENT
Start: 2025-03-22 | End: 2025-03-22 | Stop reason: SDUPTHER

## 2025-03-22 RX ORDER — NITROGLYCERIN 0.4 MG/1
0.4 TABLET SUBLINGUAL EVERY 5 MIN PRN
Status: DISCONTINUED | OUTPATIENT
Start: 2025-03-22 | End: 2025-03-31 | Stop reason: HOSPADM

## 2025-03-22 RX ORDER — CEFTRIAXONE 2 G/50ML
2 INJECTION, SOLUTION INTRAVENOUS EVERY 24 HOURS
Status: DISCONTINUED | OUTPATIENT
Start: 2025-03-23 | End: 2025-03-23

## 2025-03-22 RX ORDER — SEVELAMER CARBONATE 800 MG/1
1600 TABLET, FILM COATED ORAL
Status: DISCONTINUED | OUTPATIENT
Start: 2025-03-23 | End: 2025-03-31 | Stop reason: HOSPADM

## 2025-03-22 RX ADMIN — CEFTRIAXONE SODIUM 2 G: 2 INJECTION, SOLUTION INTRAVENOUS at 16:12

## 2025-03-22 RX ADMIN — SITAGLIPTIN 25 MG: 50 TABLET, FILM COATED ORAL at 16:17

## 2025-03-22 RX ADMIN — EZETIMIBE 10 MG: 10 TABLET ORAL at 16:24

## 2025-03-22 RX ADMIN — GABAPENTIN 300 MG: 300 CAPSULE ORAL at 21:29

## 2025-03-22 RX ADMIN — ALLOPURINOL 100 MG: 100 TABLET ORAL at 16:19

## 2025-03-22 RX ADMIN — SEVELAMER CARBONATE 1600 MG: 800 TABLET, FILM COATED ORAL at 16:17

## 2025-03-22 RX ADMIN — DOXYCYCLINE HYCLATE 100 MG: 100 CAPSULE ORAL at 16:18

## 2025-03-22 RX ADMIN — FLUOCINONIDE: 0.5 CREAM TOPICAL at 21:31

## 2025-03-22 RX ADMIN — ASPIRIN 81 MG: 81 TABLET, COATED ORAL at 16:17

## 2025-03-22 RX ADMIN — ISOSORBIDE MONONITRATE 60 MG: 60 TABLET, EXTENDED RELEASE ORAL at 16:24

## 2025-03-22 RX ADMIN — DOXYCYCLINE HYCLATE 100 MG: 100 TABLET, COATED ORAL at 21:29

## 2025-03-22 RX ADMIN — ATORVASTATIN CALCIUM 80 MG: 80 TABLET, FILM COATED ORAL at 21:29

## 2025-03-22 RX ADMIN — CARVEDILOL 6.25 MG: 6.25 TABLET, FILM COATED ORAL at 16:18

## 2025-03-22 RX ADMIN — AMLODIPINE BESYLATE 10 MG: 10 TABLET ORAL at 16:19

## 2025-03-22 RX ADMIN — PANTOPRAZOLE SODIUM 40 MG: 40 TABLET, DELAYED RELEASE ORAL at 16:18

## 2025-03-22 ASSESSMENT — COGNITIVE AND FUNCTIONAL STATUS - GENERAL
MOBILITY SCORE: 18
DAILY ACTIVITIY SCORE: 18
DRESSING REGULAR LOWER BODY CLOTHING: A LITTLE
CLIMB 3 TO 5 STEPS WITH RAILING: A LITTLE
DAILY ACTIVITIY SCORE: 24
MOVING TO AND FROM BED TO CHAIR: A LITTLE
MOBILITY SCORE: 23
EATING MEALS: A LITTLE
PERSONAL GROOMING: A LITTLE
TURNING FROM BACK TO SIDE WHILE IN FLAT BAD: A LITTLE
DRESSING REGULAR UPPER BODY CLOTHING: A LITTLE
CLIMB 3 TO 5 STEPS WITH RAILING: A LITTLE
TOILETING: A LITTLE
HELP NEEDED FOR BATHING: A LITTLE
WALKING IN HOSPITAL ROOM: A LITTLE
STANDING UP FROM CHAIR USING ARMS: A LITTLE
MOVING FROM LYING ON BACK TO SITTING ON SIDE OF FLAT BED WITH BEDRAILS: A LITTLE

## 2025-03-22 ASSESSMENT — PAIN SCALES - WONG BAKER: WONGBAKER_NUMERICALRESPONSE: NO HURT

## 2025-03-22 ASSESSMENT — PAIN SCALES - GENERAL
PAINLEVEL_OUTOF10: 0 - NO PAIN

## 2025-03-22 ASSESSMENT — PAIN - FUNCTIONAL ASSESSMENT
PAIN_FUNCTIONAL_ASSESSMENT: 0-10
PAIN_FUNCTIONAL_ASSESSMENT: 0-10
PAIN_FUNCTIONAL_ASSESSMENT: NO/DENIES PAIN

## 2025-03-22 NOTE — CARE PLAN
The patient's goals for the shift include remain free from falls and get some rest    The clinical goals for the shift include free from falls

## 2025-03-22 NOTE — PROGRESS NOTES
"William A Franke \"Narciso\" is a 74 y.o. male on day 5 of admission presenting with Pleural effusion.    Subjective   Seen for end-stage kidney disease he is seen and examined on dialysis therapy of a drop in his blood pressure to the 80s systolic UF was turned off and he was given 250 cc of normal saline blood pressure is 111 he feels well no dizziness no chest pain shortness of breath       Objective     Physical Exam  Neck:      Vascular: No carotid bruit.   Cardiovascular:      Rate and Rhythm: Normal rate and regular rhythm.      Heart sounds: No murmur heard.     No friction rub. No gallop.   Pulmonary:      Breath sounds: No wheezing, rhonchi or rales.   Chest:      Chest wall: No tenderness.   Abdominal:      General: There is no distension.      Tenderness: There is no abdominal tenderness. There is no guarding or rebound.   Musculoskeletal:         General: No swelling or tenderness.      Cervical back: Neck supple.      Right lower leg: No edema.      Left lower leg: No edema.   Lymphadenopathy:      Cervical: No cervical adenopathy.         Last Recorded Vitals  Blood pressure 148/52, pulse 70, temperature 36.2 °C (97.2 °F), temperature source Temporal, resp. rate 17, height 1.727 m (5' 8\"), weight 62.9 kg (138 lb 10.7 oz), SpO2 98%.    Intake/Output last 3 Shifts:  I/O last 3 completed shifts:  In: 200 (3.2 mL/kg) [P.O.:200]  Out: - (0 mL/kg)   Weight: 62.9 kg     Current Facility-Administered Medications:     acetaminophen (Tylenol) tablet 650 mg, 650 mg, oral, q4h PRN **OR** acetaminophen (Tylenol) oral liquid 650 mg, 650 mg, oral, q4h PRN **OR** acetaminophen (Tylenol) suppository 650 mg, 650 mg, rectal, q4h PRN, Marianna Galloway MD    allopurinol (Zyloprim) tablet 100 mg, 100 mg, oral, Daily, Marianna Galloway MD, 100 mg at 03/21/25 0818    amLODIPine (Norvasc) tablet 10 mg, 10 mg, oral, Daily, Marco Woodson MD, 10 mg at 03/21/25 0819    aspirin EC tablet 81 mg, 81 mg, oral, Daily, Marianna Galloway MD, 81 mg at " 03/21/25 0818    atorvastatin (Lipitor) tablet 80 mg, 80 mg, oral, Nightly, Marianna Galloway MD, 80 mg at 03/21/25 2116    benzocaine-menthol (Cepastat Sore Throat) lozenge 1 lozenge, 1 lozenge, Mouth/Throat, q2h PRN, Marianna Galloway MD    carvedilol (Coreg) tablet 6.25 mg, 6.25 mg, oral, BID, Marco Woodson MD, 6.25 mg at 03/19/25 1853    cefTRIAXone (Rocephin) 2 g in dextrose (iso) IV 50 mL, 2 g, intravenous, q24h, Alice Salgado MD, Stopped at 03/21/25 1846    dextromethorphan-guaifenesin (Robitussin DM)  mg/5 mL oral liquid 5 mL, 5 mL, oral, q4h PRN, Marianna Galloway MD    docusate sodium (Colace) capsule 100 mg, 100 mg, oral, BID, Marianna Galloway MD, 100 mg at 03/19/25 0942    doxycycline (Vibramycin) capsule 100 mg, 100 mg, oral, q12h LUKE, Alice Salgado MD, 100 mg at 03/21/25 2116    [START ON 3/24/2025] ergocalciferol (Vitamin D-2) capsule 50,000 Units, 50,000 Units, oral, Weekly, Marianna Galloway MD    ezetimibe (Zetia) tablet 10 mg, 10 mg, oral, Daily, Marianna Galloway MD, 10 mg at 03/21/25 0819    fluocinonide (Lidex) 0.05 % ointment, , Topical, BID, Yasmin Son, Given at 03/19/25 0941    gabapentin (Neurontin) capsule 300 mg, 300 mg, oral, Nightly, Marianna Galloway MD, 300 mg at 03/21/25 2116    guaiFENesin (Mucinex) 12 hr tablet 600 mg, 600 mg, oral, q12h PRN, Marianna Galloway MD    heparin (porcine) injection 5,000 Units, 5,000 Units, subcutaneous, q8h, Marianna Galloway MD    insulin lispro injection 0-5 Units, 0-5 Units, subcutaneous, TID AC, Marianna Galloway MD    isosorbide mononitrate ER (Imdur) 24 hr tablet 60 mg, 60 mg, oral, Daily, Marianna Galloway MD, 60 mg at 03/21/25 0819    melatonin tablet 3 mg, 3 mg, oral, Nightly PRN, Marianna Galloway MD, 3 mg at 03/21/25 2116    nitroglycerin (Nitrostat) SL tablet 0.4 mg, 0.4 mg, sublingual, q5 min PRN, Marianna Galloway MD    oxygen (O2) therapy, , inhalation, Continuous PRN - O2/gases, Marianna Galloway MD    pantoprazole (ProtoNix) EC tablet 40 mg, 40 mg, oral, Daily, Marianna Galloway MD, 40 mg at  03/21/25 0818    polyethylene glycol (Glycolax, Miralax) packet 17 g, 17 g, oral, Daily, Marianna Galloway MD    sevelamer carbonate (Renvela) tablet 1,600 mg, 1,600 mg, oral, TID, Marianna Galloway MD, 1,600 mg at 03/21/25 1757    SITagliptin phosphate (Januvia) tablet 25 mg, 25 mg, oral, Daily, Marianna Galloway MD, 25 mg at 03/21/25 0819    sodium chloride 0.9 % bolus 200 mL, 200 mL, intravenous, Once, Marianna Galloway MD, Last Rate: 200 mL/hr at 03/22/25 1208, Restarted at 03/22/25 1208   Relevant Results    Results for orders placed or performed during the hospital encounter of 03/17/25 (from the past 96 hours)   POCT GLUCOSE   Result Value Ref Range    POCT Glucose 92 74 - 99 mg/dL   Blood Culture    Specimen: Peripheral Venipuncture; Blood culture   Result Value Ref Range    Blood Culture No growth at 3 days    Blood Culture    Specimen: Peripheral Venipuncture; Blood culture   Result Value Ref Range    Blood Culture No growth at 3 days    Staphylococcus aureus/MRSA colonization, Culture    Specimen: Anterior Nares; Swab   Result Value Ref Range    Staph/MRSA Screen Culture No Staphylococcus aureus isolated    Legionella Antigen, Urine    Specimen: Clean Catch/Voided; Urine   Result Value Ref Range    L. pneumophila Urine Ag Negative Negative   Streptococcus pneumoniae Antigen, Urine    Specimen: Clean Catch/Voided; Urine   Result Value Ref Range    Streptococcus pneumoniae Ag, Urine Negative Negative   Cytology (Non-Gynecologic)   Result Value Ref Range    Case Report       Non-gynecologic Cytology                          Case: [id number]                                   Authorizing Provider:  Alice Salgado MD         Collected:           03/19/2025 1448              Ordering Location:     [facility]       Received:            03/19/2025 1507                                     [address]                                                                Pathologist:           Stone Everett MD                                                            Specimen:    PLEURAL FLUID RIGHT SIDE                                                                   Final Cytological Interpretation         A. PLEURAL FLUID RIGHT SIDE, 1 THINPREP AND CELL BLOCK:   No malignant cells seen, see comment.    Comment: Specimen includes lymphocytes, eosinophils and neutrophils.                    Slide(s) initially screened by LAKEISHA Hunter at [facility] [id number] [facility] [id number]  By the signature on this report, the individual or group listed as making the Final Interpretation/Diagnosis certifies that they have reviewed this case.       Clinical History       Recurrentl pleural effusion, rule out malignancy      Specimen Description       A. PLEURAL FLUID RIGHT SIDE.  Received 60 ml red cloudy fluid with particles in sterile cup. A total volume of 60 ml has been sent for cytological analysis to the cytology department at [facility].        Specimen Processing Detail       A1 Slides Only (No Block)   A1-1 Pap Stain NGYN ThinPrep   A2 Cell Block   A2-1 H&E      Sterile Fluid Culture/Smear    Specimen: Pleural; Fluid   Result Value Ref Range    Sterile Fluid Culture/Smear No growth aerobically and anaerobically     Gram Stain (1+) Rare Polymorphonuclear leukocytes     Gram Stain No organisms seen    AFB Culture/Smear    Specimen: Pleural; Fluid   Result Value Ref Range    AFB Culture       Culture in progress and will be examined weekly. A result will be issued either when positive or after 8 weeks incubation.    AFB Stain No acid fast bacilli seen    Fungal Culture/Smear    Specimen: Pleural; Fluid   Result Value Ref Range    Fungal Culture/Smear       Culture in progress, a report will be issued when positive or after 2 weeks of incubation.    Fungal Smear No fungal elements seen    Lactate Dehydrogenase, Body Fluid   Result Value Ref Range    LD, Fluid 281 Not established.  U/L   Glucose, Body Fluid   Result Value Ref Range    Glucose, Fluid 35 Not established mg/dL   Protein, Total, Body Fluid   Result Value Ref Range    Protein, Total Fluid 3.8 Not established g/dL   Triglycerides, Body Fluid   Result Value Ref Range    Triglycerides, Fluid 24 No established mg/dL   Amylase, Body Fluid   Result Value Ref Range    Amylase, Fluid 29 Not established. U/L   AFB Processed   Result Value Ref Range    Extra Tube Hold for add-ons.    Body Fluid Cell Count   Result Value Ref Range    Color, Fluid Red (A) Colorless, Straw, Yellow    Clarity, Fluid Cloudy (A) Clear    WBC, Fluid 1,292 See Comment /uL    RBC, Fluid 255,000 see comment /uL   Body Fluid Differential   Result Value Ref Range    Neutrophils %, Manual, Fluid 23 see comment %    Lymphocytes %, Manual, Fluid 68 see comment %    Mono/Macrophages %, Manual, Fluid 2 see comment %    Eosinophils %, Manual, Fluid 6 see comment %    Basophils %, Manual, Fluid 1 not established %    Immature Granulocytes %, Manual, Fluid 0 not established %    Blasts %, Manual, Fluid 0 not established %    Unclassified Cells %, Manual, Fluid 0 not established %    Plasma Cells %, Manual, Fluid 0 not established %    Total Cells Counted, Fluid 100    pH, Body Fluid   Result Value Ref Range    pH, Fluid 8.00 See Below   Lavender Top   Result Value Ref Range    Extra Tube Hold for add-ons.    CBC   Result Value Ref Range    WBC 7.0 4.4 - 11.3 x10*3/uL    nRBC 0.0 0.0 - 0.0 /100 WBCs    RBC 3.63 (L) 4.50 - 5.90 x10*6/uL    Hemoglobin 10.4 (L) 13.5 - 17.5 g/dL    Hematocrit 32.7 (L) 41.0 - 52.0 %    MCV 90 80 - 100 fL    MCH 28.7 26.0 - 34.0 pg    MCHC 31.8 (L) 32.0 - 36.0 g/dL    RDW 16.3 (H) 11.5 - 14.5 %    Platelets 244 150 - 450 x10*3/uL   CBC and Auto Differential   Result Value Ref Range    WBC 7.3 4.4 - 11.3 x10*3/uL    nRBC 0.0 0.0 - 0.0 /100 WBCs    RBC 3.79 (L) 4.50 - 5.90 x10*6/uL    Hemoglobin 10.7 (L) 13.5 - 17.5 g/dL    Hematocrit 33.7 (L) 41.0 -  52.0 %    MCV 89 80 - 100 fL    MCH 28.2 26.0 - 34.0 pg    MCHC 31.8 (L) 32.0 - 36.0 g/dL    RDW 16.4 (H) 11.5 - 14.5 %    Platelets 268 150 - 450 x10*3/uL    Neutrophils % 67.2 40.0 - 80.0 %    Immature Granulocytes %, Automated 0.3 0.0 - 0.9 %    Lymphocytes % 16.9 13.0 - 44.0 %    Monocytes % 9.8 2.0 - 10.0 %    Eosinophils % 4.8 0.0 - 6.0 %    Basophils % 1.0 0.0 - 2.0 %    Neutrophils Absolute 4.92 1.60 - 5.50 x10*3/uL    Immature Granulocytes Absolute, Automated 0.02 0.00 - 0.50 x10*3/uL    Lymphocytes Absolute 1.24 0.80 - 3.00 x10*3/uL    Monocytes Absolute 0.72 0.05 - 0.80 x10*3/uL    Eosinophils Absolute 0.35 0.00 - 0.40 x10*3/uL    Basophils Absolute 0.07 0.00 - 0.10 x10*3/uL   Comprehensive Metabolic Panel   Result Value Ref Range    Glucose 88 74 - 99 mg/dL    Sodium 137 136 - 145 mmol/L    Potassium 4.9 3.5 - 5.3 mmol/L    Chloride 96 (L) 98 - 107 mmol/L    Bicarbonate 28 21 - 32 mmol/L    Anion Gap 18 10 - 20 mmol/L    Urea Nitrogen 52 (H) 6 - 23 mg/dL    Creatinine 9.06 (H) 0.50 - 1.30 mg/dL    eGFR 6 (L) >60 mL/min/1.73m*2    Calcium 9.9 8.6 - 10.3 mg/dL    Albumin 3.6 3.4 - 5.0 g/dL    Alkaline Phosphatase 77 33 - 136 U/L    Total Protein 6.6 6.4 - 8.2 g/dL    AST 15 9 - 39 U/L    Bilirubin, Total 0.5 0.0 - 1.2 mg/dL    ALT 9 (L) 10 - 52 U/L   POCT GLUCOSE   Result Value Ref Range    POCT Glucose 145 (H) 74 - 99 mg/dL   CBC   Result Value Ref Range    WBC 7.9 4.4 - 11.3 x10*3/uL    nRBC 0.0 0.0 - 0.0 /100 WBCs    RBC 4.07 (L) 4.50 - 5.90 x10*6/uL    Hemoglobin 11.6 (L) 13.5 - 17.5 g/dL    Hematocrit 36.2 (L) 41.0 - 52.0 %    MCV 89 80 - 100 fL    MCH 28.5 26.0 - 34.0 pg    MCHC 32.0 32.0 - 36.0 g/dL    RDW 16.4 (H) 11.5 - 14.5 %    Platelets 264 150 - 450 x10*3/uL   Basic Metabolic Panel   Result Value Ref Range    Glucose 98 74 - 99 mg/dL    Sodium 138 136 - 145 mmol/L    Potassium 4.2 3.5 - 5.3 mmol/L    Chloride 97 (L) 98 - 107 mmol/L    Bicarbonate 28 21 - 32 mmol/L    Anion Gap 17 10 - 20  mmol/L    Urea Nitrogen 31 (H) 6 - 23 mg/dL    Creatinine 7.05 (H) 0.50 - 1.30 mg/dL    eGFR 7 (L) >60 mL/min/1.73m*2    Calcium 9.7 8.6 - 10.3 mg/dL   Phosphorus   Result Value Ref Range    Phosphorus 5.1 (H) 2.5 - 4.9 mg/dL   POCT GLUCOSE   Result Value Ref Range    POCT Glucose 139 (H) 74 - 99 mg/dL   POCT GLUCOSE   Result Value Ref Range    POCT Glucose 120 (H) 74 - 99 mg/dL   POCT GLUCOSE   Result Value Ref Range    POCT Glucose 182 (H) 74 - 99 mg/dL   CBC   Result Value Ref Range    WBC 7.5 4.4 - 11.3 x10*3/uL    nRBC 0.0 0.0 - 0.0 /100 WBCs    RBC 4.11 (L) 4.50 - 5.90 x10*6/uL    Hemoglobin 11.5 (L) 13.5 - 17.5 g/dL    Hematocrit 36.8 (L) 41.0 - 52.0 %    MCV 90 80 - 100 fL    MCH 28.0 26.0 - 34.0 pg    MCHC 31.3 (L) 32.0 - 36.0 g/dL    RDW 16.4 (H) 11.5 - 14.5 %    Platelets 257 150 - 450 x10*3/uL   Basic Metabolic Panel   Result Value Ref Range    Glucose 98 74 - 99 mg/dL    Sodium 140 136 - 145 mmol/L    Potassium 4.6 3.5 - 5.3 mmol/L    Chloride 96 (L) 98 - 107 mmol/L    Bicarbonate 29 21 - 32 mmol/L    Anion Gap 20 10 - 20 mmol/L    Urea Nitrogen 47 (H) 6 - 23 mg/dL    Creatinine 9.85 (H) 0.50 - 1.30 mg/dL    eGFR 5 (L) >60 mL/min/1.73m*2    Calcium 10.1 8.6 - 10.3 mg/dL       Assessment/Plan   End-stage renal disease continue dialysis Tuesday Thursday Saturday  Right-sided large pleural effusion much improved  Hypertension  Anemia    Elier Woodson MD

## 2025-03-22 NOTE — POST-PROCEDURE NOTE
Report to Receiving RN:    Report To: Alejandra Fay RN  Time Report Called: 13:59   Hand-Off Communication: Pt awake no s/s of distress pt stable tolerated full treatment 1.2L of fluid removed bp 120/54 hr 61 Temp 36.8  Complications During Treatment: Yes, See MAR  Ultrafiltration Treatment: No  Medications Administered During Dialysis: No  Blood Products Administered During Dialysis: No  Labs Sent During Dialysis: No  Heparin Drip Rate Changes: No  Dialysis Catheter Dressing: No  Last Dressing Change: N/A    Electronic Signatures:    PINA Cross, T    Last Updated: 1:59 PM by IRAM MAURICE

## 2025-03-22 NOTE — PROGRESS NOTES
Bill A Franke is a 74 y.o. male on day 5 of admission presenting with Pleural effusion.    Subjective   Interval History:    Patient seen and examined  Awaiting transfer  Undergoing dialysis when seen  Afebrile, no chills  No cough, chest pain or shortness of breath  No nausea vomiting or diarrhea    Review of Systems   All other systems reviewed and are negative.      Objective   Range of Vitals (last 24 hours)  Heart Rate:  [53-70]   Temp:  [36.1 °C (97 °F)-36.4 °C (97.5 °F)]   Resp:  [17-18]   BP: (133-148)/(52-58)   Weight:  [62.9 kg (138 lb 10.7 oz)]   SpO2:  [94 %-99 %]   Daily Weight  03/22/25 : 62.9 kg (138 lb 10.7 oz)    Body mass index is 21.08 kg/m².    Physical Exam  Constitutional:       Appearance: Normal appearance.   HENT:      Head: Normocephalic and atraumatic.   Eyes:      Extraocular Movements: Extraocular movements intact.      Conjunctiva/sclera: Conjunctivae normal.   Cardiovascular:      Rate and Rhythm: Normal rate and regular rhythm.   Pulmonary:      Effort: Pulmonary effort is normal.      Comments: Diminished bilaterally  Abdominal:      General: Bowel sounds are normal.      Palpations: Abdomen is soft.      Tenderness: There is no abdominal tenderness.   Musculoskeletal:         General: No swelling. Normal range of motion.      Cervical back: Normal range of motion.   Skin:     General: Skin is warm and dry.   Neurological:      General: No focal deficit present.      Mental Status: He is alert.   Psychiatric:         Mood and Affect: Mood normal.         Behavior: Behavior normal.        Antibiotics  cefTRIAXone - 2 gram/50 mL  doxycycline - 100 mg    Relevant Results  Labs  Results from last 72 hours   Lab Units 03/22/25  0553 03/21/25  0547 03/20/25  0540   WBC AUTO x10*3/uL 7.5 7.9 7.3   HEMOGLOBIN g/dL 11.5* 11.6* 10.7*   HEMATOCRIT % 36.8* 36.2* 33.7*   PLATELETS AUTO x10*3/uL 257 264 268   NEUTROS PCT AUTO %  --   --  67.2   LYMPHS PCT AUTO %  --   --  16.9   MONOS PCT AUTO %   "--   --  9.8   EOS PCT AUTO %  --   --  4.8     Results from last 72 hours   Lab Units 03/22/25  0553 03/21/25  0547 03/20/25  0540   SODIUM mmol/L 140 138 137   POTASSIUM mmol/L 4.6 4.2 4.9   CHLORIDE mmol/L 96* 97* 96*   CO2 mmol/L 29 28 28   BUN mg/dL 47* 31* 52*   CREATININE mg/dL 9.85* 7.05* 9.06*   GLUCOSE mg/dL 98 98 88   CALCIUM mg/dL 10.1 9.7 9.9   ANION GAP mmol/L 20 17 18   EGFR mL/min/1.73m*2 5* 7* 6*   PHOSPHORUS mg/dL  --  5.1*  --      Results from last 72 hours   Lab Units 03/20/25  0540   ALK PHOS U/L 77   BILIRUBIN TOTAL mg/dL 0.5   PROTEIN TOTAL g/dL 6.6   ALT U/L 9*   AST U/L 15   ALBUMIN g/dL 3.6     Estimated Creatinine Clearance: 5.7 mL/min (A) (by C-G formula based on SCr of 9.85 mg/dL (H)).  No results found for: \"CRP\"  Microbiology  Reviewed-negative pleural fluid culture  Imaging  XR chest 1 view    Result Date: 3/20/2025  Interpreted By:  Angy Henry, STUDY: XR CHEST 1 VIEW 3/20/2025 3:30 pm   INDICATION: Evaluate for trapped lung, recurrence of effusion   COMPARISON: 03/19/2025   ACCESSION NUMBER(S): [id number]   ORDERING CLINICIAN: HUNTER CHÁVEZ   TECHNIQUE: AP erect view of the chest   FINDINGS: Persistent right-sided hydropneumothorax is seen with increase in the amount of pleural fluid on the right with multiple air-fluid levels seen. The size of the right hydropneumothorax has not changed significantly. There is a noncompliant right lung identified.   The left lung is clear with no left-sided pleural abnormality. The cardiac size is within normal limits.       Although the right hydropneumothorax has not changed significantly in size, there is increased amount of pleural fluid within the right pleural space with a number of air-fluid levels. The right lung appears noncompliant.   Signed by: Angy Henry 3/20/2025 3:55 PM Dictation workstation:   [id number]    CT chest wo IV contrast    Result Date: 3/20/2025  Interpreted By:  Bobbi Stacy, STUDY: CT CHEST WO IV CONTRAST;  " 3/20/2025 5:07 am   INDICATION: Signs/Symptoms:exudative effusion, evaluate for fluid remaining, trapped lung physiology vs pneumothorax.     COMPARISON: CT chest 02/14/2025 CT abdomen pelvis 04/26/2021   ACCESSION NUMBER(S): [id number]   ORDERING CLINICIAN: HUNTER CHÁVEZ   TECHNIQUE: Helical data acquisition of the chest was obtained  without IV contrast material.  Images were reformatted in axial, coronal, and sagittal planes.   FINDINGS: LUNGS AND AIRWAYS: New moderate right hydropneumothorax is seen. Similar mild diffuse centrilobular micronodular ground-glass opacities, likely respiratory bronchiolitis. Trachea and right and left main bronchi are patent.   MEDIASTINUM AND YAAKOV, LOWER NECK AND AXILLA: The visualized thyroid gland is within normal limits.   Stable mildly prominent 14 x 14 mm right hilar lymph node on series 4, image 133. no new enlarged thoracic lymph node.   Esophagus is within normal limits.   HEART AND VESSELS: The thoracic aorta is of normal course and caliber with mild-to-moderate vascular calcifications.   Main pulmonary artery and its branches are normal in caliber.   There are severe coronary artery calcifications versus stent. The study is not optimized for evaluation of coronary arteries.   The cardiac chambers are not enlarged.   No evidence of pericardial effusion.   UPPER ABDOMEN: 20 mm hypoattenuating lesion in the hepatic dome, stable dating back to April 2021.   CHEST WALL AND OSSEOUS STRUCTURES: No suspicious osseous lesions. Multilevel degenerative changes are present.       1. New moderate right hydropneumothorax. Given interval thoracentesis and exudative fluid, findings are suspicious for ex vacuo hydropneumothorax (trapped lung). However, correlation with fluid culture is recommended to exclude empyema, though felt to be less likely given the negative gram stain and normal WBC count. Bronchopleural fistula is felt to be unlikely. Underlying malignant effusion is possible,  please correlate with cytology. Further evaluation with PET-CT could be considered if there is no known history of malignancy or if there is no known cause of patient's exudative fluid. The pleural fluid will likely not show FDG avidity even if malignant, however other sites of potential disease could be identified or excluded. 2. Stable mildly prominent right hilar lymph node. 3. Additional chronic and incidental findings as detailed above.   MACRO: Critical Finding:  See findings. Notification was initiated on 3/20/2025 at 9:35 am by  Bobbi Stacy.  (**-YCF-**) Instructions:   Signed by: Bobbi Stacy 3/20/2025 9:36 AM Dictation workstation:   [id number]    XR chest 2 views    Result Date: 3/20/2025  Interpreted By:  Tk Kyle, STUDY: XR CHEST 2 VIEWS; 3/19/2025 6:05 pm   INDICATION: Signs/Symptoms:Post thoracentesis.   COMPARISON: 03/18/2025   ACCESSION NUMBER(S): [id number]   ORDERING CLINICIAN: HUNTER CHÁVEZ   TECHNIQUE: AP and lateral views of the chest were obtained.   FINDINGS: The cardiac size is indeterminate due to the AP projection. Patient is status post right-sided thoracentesis with a loculated pneumothorax at the right lung base in association with right basilar partial collapse/atelectasis. The pneumothorax is likely from failure of re-expansion of the lung at the right base. Left hemithorax is clear.       Loculated right basilar pneumothorax likely from failure of re-expansion of the right lung. Compressive atelectasis of the right lung base is noted.   MACRO: none   Signed by: Tk Kyle 3/20/2025 7:43 AM Dictation workstation:   [id number]    XR chest 1 view    Result Date: 3/19/2025  Interpreted By:  Jose R Murray, STUDY: XR CHEST 1 VIEW;  3/19/2025 9:20 pm   INDICATION: Signs/Symptoms:evalaute for pneumothorax vs ex vacuo.     COMPARISON: Study performed earlier the same day   ACCESSION NUMBER(S): [id number]   ORDERING CLINICIAN: HUNTER CHÁVEZ   FINDINGS: The patient is  status post right-sided thoracentesis. Lucency over the right lung base consistent with a basilar pneumothorax. Interval decrease in size of the effusion which is now small. Right basilar airspace disease present. The left lung is clear.       Moderate-sized right basilar pneumothorax. Interval decrease in right effusion after thoracentesis. Persistent small effusion and right basilar airspace disease present   MACRO: Jose R Murray discussed the significance and urgency of this critical finding by telephone with  the clinical team taking care of the patient on 3/19/2025 at 9:53 pm.  (**-RCF-**) Findings:  See findings.   Signed by: Jose R Murray 3/19/2025 9:53 PM Dictation workstation:   [id number]    US thoracentesis    Result Date: 3/19/2025  Interpreted By:  Bassam Brown, STUDY: US THORACENTESIS; 3/19/2025 2:45 pm   INDICATION: Signs/Symptoms:Right thoracentesis diagnostic and therapeutic, hx of ESRD, recurrent effusion, concern for malignancy vs infection.   COMPARISON: None   ACCESSION NUMBER(S): [id number]   ORDERING CLINICIAN: HUNTER CHÁVEZ   TECHNIQUE: Informed consent obtained. Patient positionedsitting upright. Skin prepped, draped and anesthetized. Ultrasound right chest demonstrates moderate complex effusion. Under ultrasound guidance, a centesis catheter/needle was advanced into rightpleural cavity.   FINDINGS: A total of 950 cc of bloody fluid was aspirated. A sample was sent for analysis. The patient tolerated the procedure well.       Ultrasound-guided right thoracentesis.   Signed by: Bassam Brown 3/19/2025 3:18 PM Dictation workstation:   [id number]    XR chest 2 views    Result Date: 3/18/2025  Interpreted By:  Angy Henry, STUDY: XR CHEST 2 VIEWS 3/18/2025 8:59 am   INDICATION: Signs/Symptoms:Shortness of breath   COMPARISON: 12/28/2024   ACCESSION NUMBER(S): [id number]   ORDERING CLINICIAN: DIANNE LAWRENCE   TECHNIQUE: PA and lateral views of the chest were acquired.   FINDINGS: There  is a moderately large right pleural effusion now seen with no left-sided pleural abnormality observed.   The cardiac size is within normal limits with calcified plaque in the aortic knob and with coronary artery stent graft identified.   The left lung is clear but there is compressive atelectasis and infiltrate in the right lower lobe.   A stent graft within the left axillary region is seen.       Moderately large right pleural effusion which has developed since the prior study. There is probable atelectasis and perhaps some infiltrate within the right lower lobe as well.   Signed by: Angy Henry 3/18/2025 9:07 AM Dictation workstation:   [id number]     Assessment/Plan   Acute hypoxic respiratory failure-resolved, on room air  Exudative right-sided pleural effusion  Possible pneumonia  End-stage renal disease on hemodialysis    Continue ceftriaxone  Oxygen as needed  Thoracic surgery c follow-up  Follow-up pleural fluid workup  Monitor temperature and WBC  Transfer for evaluation by thoracic surgery      Dave Pathak MD

## 2025-03-22 NOTE — LETTER
William Franke (MRN [id number],  1950) was directly admitted on 3/17/25 from his outpatient PCP visit due to significantly worsening cough and shortness of breath. He was diagnosed with acute hypoxic respiratory failure secondary to pneumonia and recurrent pleural effusion on 3/19/25, which requires urgent surgery during this hospitalization. He was transferred to [facility] for thoracic surgery evaluation on 3/22/25, and is planned for Right VATS biopsy +/- PleurX placement +/- decortication with Dr. Sade Herrera on 3/26/25. He will likely remain inpatient for at least 3 days postoperatively.    Upon discharge, patient will be homebound for 2-3 weeks until outpatient follow-up appointment with thoracic surgeon. Mr. Franke is currently physically unable to attend a concert while inpatient and after discharge until clearance by thoracic surgeon.    If you have any further questions, my contact information is below:  4th Floor Intensive Care / Step Down Unit Phone: [phone]  Email:       [email or web address]    Thank you for your attention to this matter.    Charo Huggins PA-C  Cardiothoracic Surgery and Critical Care  [facility]

## 2025-03-22 NOTE — PROGRESS NOTES
"William A Franke \"Narciso\" is a 74 y.o. male on day 5 of admission presenting with Pleural effusion.    Subjective   Patient breathing okay  Objective     Physical Exam  Vitals reviewed.   Constitutional:       Appearance: Normal appearance.   HENT:      Head: Normocephalic and atraumatic.      Right Ear: Tympanic membrane, ear canal and external ear normal.      Left Ear: Tympanic membrane, ear canal and external ear normal.      Nose: Nose normal.      Mouth/Throat:      Pharynx: Oropharynx is clear.   Eyes:      Extraocular Movements: Extraocular movements intact.      Conjunctiva/sclera: Conjunctivae normal.      Pupils: Pupils are equal, round, and reactive to light.   Cardiovascular:      Rate and Rhythm: Normal rate and regular rhythm.      Pulses: Normal pulses.      Heart sounds: Normal heart sounds.   Pulmonary:      Effort: Pulmonary effort is normal.      Breath sounds: Rales present.   Abdominal:      General: Abdomen is flat. Bowel sounds are normal.      Palpations: Abdomen is soft.   Musculoskeletal:      Cervical back: Normal range of motion and neck supple.   Skin:     General: Skin is warm and dry.   Neurological:      General: No focal deficit present.      Mental Status: He is alert and oriented to person, place, and time.   Psychiatric:         Mood and Affect: Mood normal.         Last Recorded Vitals  Blood pressure 142/64, pulse 61, temperature 37.1 °C (98.8 °F), temperature source Temporal, resp. rate 17, height 1.727 m (5' 8\"), weight 62.9 kg (138 lb 10.7 oz), SpO2 96%.  Intake/Output last 3 Shifts:  I/O last 3 completed shifts:  In: 200 (3.2 mL/kg) [P.O.:200]  Out: - (0 mL/kg)   Weight: 62.9 kg     Relevant Results               Scheduled medications  allopurinol, 100 mg, oral, Daily  amLODIPine, 10 mg, oral, Daily  aspirin, 81 mg, oral, Daily  atorvastatin, 80 mg, oral, Nightly  carvedilol, 6.25 mg, oral, BID  cefTRIAXone, 2 g, intravenous, q24h  docusate sodium, 100 mg, oral, " BID  doxycylcine, 100 mg, oral, q12h [facility]  [START ON 3/24/2025] ergocalciferol, 50,000 Units, oral, Weekly  ezetimibe, 10 mg, oral, Daily  fluocinonide, , Topical, BID  gabapentin, 300 mg, oral, Nightly  heparin (porcine), 5,000 Units, subcutaneous, q8h  insulin lispro, 0-5 Units, subcutaneous, TID AC  isosorbide mononitrate ER, 60 mg, oral, Daily  pantoprazole, 40 mg, oral, Daily  polyethylene glycol, 17 g, oral, Daily  sevelamer carbonate, 1,600 mg, oral, TID  SITagliptin phosphate, 25 mg, oral, Daily  sodium chloride, 200 mL, intravenous, Once      Continuous medications     PRN medications  PRN medications: acetaminophen **OR** acetaminophen **OR** acetaminophen, benzocaine-menthol, dextromethorphan-guaifenesin, guaiFENesin, melatonin, nitroglycerin, oxygen  Results for orders placed or performed during the hospital encounter of 03/17/25 (from the past 24 hours)   POCT GLUCOSE   Result Value Ref Range    POCT Glucose 182 (H) 74 - 99 mg/dL   CBC   Result Value Ref Range    WBC 7.5 4.4 - 11.3 x10*3/uL    nRBC 0.0 0.0 - 0.0 /100 WBCs    RBC 4.11 (L) 4.50 - 5.90 x10*6/uL    Hemoglobin 11.5 (L) 13.5 - 17.5 g/dL    Hematocrit 36.8 (L) 41.0 - 52.0 %    MCV 90 80 - 100 fL    MCH 28.0 26.0 - 34.0 pg    MCHC 31.3 (L) 32.0 - 36.0 g/dL    RDW 16.4 (H) 11.5 - 14.5 %    Platelets 257 150 - 450 x10*3/uL   Basic Metabolic Panel   Result Value Ref Range    Glucose 98 74 - 99 mg/dL    Sodium 140 136 - 145 mmol/L    Potassium 4.6 3.5 - 5.3 mmol/L    Chloride 96 (L) 98 - 107 mmol/L    Bicarbonate 29 21 - 32 mmol/L    Anion Gap 20 10 - 20 mmol/L    Urea Nitrogen 47 (H) 6 - 23 mg/dL    Creatinine 9.85 (H) 0.50 - 1.30 mg/dL    eGFR 5 (L) >60 mL/min/1.73m*2    Calcium 10.1 8.6 - 10.3 mg/dL     *Note: Due to a large number of results and/or encounters for the requested time period, some results have not been displayed. A complete set of results can be found in Results Review.   No results found.           Assessment/Plan   Assessment &  Plan  Pleural effusion    Benign essential hypertension    BPH with obstruction/lower urinary tract symptoms    CAD (coronary artery disease)    ESRD on hemodialysis (Multi)    Hypertension    Hyperlipidemia    Prostate cancer (Multi)    Hydropneumothorax      He needs  VATS  Continue antibiotic  Continue oxygen to keep sats above 100%  Dialysis per nephrology  Blood pressure stable  Blood sugar okay  Waiting for transfer to [facility]         I spent  minutes in the professional and overall care of this patient.      Marianna Galloway MD

## 2025-03-22 NOTE — DISCHARGE SUMMARY
"Discharge Diagnosis  Pleural effusion    Issues Requiring Follow-Up  Exudative right sided pleural effusion  Possible pneumonia  End-stage renal disease  Diabetes  Coronary artery disease    Test Results Pending At Discharge  Pending Labs       Order Current Status    Hepatitis B surface antibody In process    Hepatitis B surface antigen In process    AFB Culture/Smear Preliminary result    Blood Culture Preliminary result    Blood Culture Preliminary result    Fungal Culture/Smear Preliminary result            Hospital Course   William A Franke \"Narciso\" is a 74 y.o. male presenting with increasing shortness of breath and cough.  Patient has history of end-stage renal disease on hemodialysis Tuesday Thursday Saturday, hypertension, diabetes, coronary artery disease, CHF, left arm AV fistula, anemia,  .  In December 2024 patient was admitted for transudative pleural effusion s/p thoracocentesis on 12/27/2024.  NIDA negative for endocarditis.  MSSA bacteremia source unclear negative indium scan.  Treated with IV cefazolin for 6 weeks.  He had a CAT scan done on 2/14/2025 by ID which showed reaccumulation of right pleural effusion.  Patient followed up with me yesterday in the office complaining of increasing cough and increasing shortness of breath.  Patient was directly admitted for his symptoms and to repeat thoracocentesis and decide if patient will need Pleurx catheter or pleurodesis.  Patient remains afebrile.  He is on room air  Patient had paracentesis done almost a liter of bloody fluid removed.  Cultures are still pending negative so far.  Cytology pending.  Started on antibiotics  Patient needs VATS.  Patient transferred to [facility] for thoracic surgery consult    Pertinent Physical Exam At Time of Discharge  Physical Exam  Vitals reviewed.   Constitutional:       Appearance: Normal appearance.   HENT:      Head: Normocephalic and atraumatic.      Right Ear: Tympanic membrane, ear canal and external ear normal.    "   Left Ear: Tympanic membrane, ear canal and external ear normal.      Nose: Nose normal.      Mouth/Throat:      Pharynx: Oropharynx is clear.   Eyes:      Extraocular Movements: Extraocular movements intact.      Conjunctiva/sclera: Conjunctivae normal.      Pupils: Pupils are equal, round, and reactive to light.   Cardiovascular:      Rate and Rhythm: Normal rate and regular rhythm.      Pulses: Normal pulses.      Heart sounds: Normal heart sounds.   Pulmonary:      Effort: Pulmonary effort is normal.      Breath sounds: Normal breath sounds.   Abdominal:      General: Abdomen is flat. Bowel sounds are normal.      Palpations: Abdomen is soft.   Musculoskeletal:      Cervical back: Normal range of motion and neck supple.   Skin:     General: Skin is warm and dry.   Neurological:      General: No focal deficit present.      Mental Status: He is alert and oriented to person, place, and time.   Psychiatric:         Mood and Affect: Mood normal.         Home Medications     Medication List      START taking these medications     cefTRIAXone 2 gram/50 mL IV; Commonly known as: Rocephin; Infuse 50 mL   (2 g) at 100 mL/hr over 30 minutes into a venous catheter once every 24   hours.   doxycycline 100 mg capsule; Commonly known as: Vibramycin; Take 1   capsule (100 mg) by mouth every 12 hours for 3 doses. Take with at least 8   ounces (large glass) of water, do not lie down for 30 minutes after   fluocinonide 0.05 % ointment; Commonly known as: Lidex; Apply topically   2 times a day.   oxygen gas therapy; Commonly known as: O2; Inhale 2 L/min once every 24   hours.     CHANGE how you take these medications     amLODIPine 10 mg tablet; Commonly known as: Norvasc; Take 1 tablet (10   mg) by mouth once daily.; What changed: medication strength, how much to   take     CONTINUE taking these medications     allopurinol 100 mg tablet; Commonly known as: Zyloprim; Take 1 tablet   (100 mg) by mouth once daily.   aspirin 81 mg EC  "tablet   atorvastatin 80 mg tablet; Commonly known as: Lipitor; Take 1 tablet (80   mg) by mouth once daily at bedtime.   B complex-vitamin C-folic acid 0.8 mg tablet; Commonly known as:   Nephro-Shea   BD Ultra-Fine Mini Pen Needle 31 gauge x 3/16\" needle; Generic drug: pen   needle, diabetic; USE AS DIRECTED 4 times a day   carvedilol 6.25 mg tablet; Commonly known as: Coreg; Take 1 tablet (6.25   mg) by mouth 2 times daily (morning and late afternoon).   ergocalciferol 1250 mcg (50,000 units) capsule; Commonly known as:   Vitamin D-2   ezetimibe 10 mg tablet; Commonly known as: Zetia; Take 1 tablet (10 mg)   by mouth once daily.   gabapentin 300 mg capsule; Commonly known as: Neurontin   insulin lispro 100 unit/mL pen; Commonly known as: HumaLOG   isosorbide mononitrate ER 60 mg 24 hr tablet; Commonly known as: Imdur;   Take 1 tablet (60 mg) by mouth once daily. Do not crush or chew.   Januvia 25 mg tablet; Generic drug: SITagliptin phosphate; Take 1 tablet   (25 mg) by mouth once daily.   nitroglycerin 0.4 mg SL tablet; Commonly known as: Nitrostat   pantoprazole 40 mg EC tablet; Commonly known as: ProtoNix; Take 1 tablet   (40 mg) by mouth once daily.   sevelamer carbonate 800 mg tablet; Commonly known as: Renvela   triamcinolone 0.5 % cream; Commonly known as: Kenalog; Apply topically 3   times a day.       Outpatient Follow-Up  Future Appointments   Date Time Provider Department Center   5/30/2025  8:30 AM ERIKA Austin [id number] [facility]   7/11/2025 10:30 AM ERIKA Barnes [id number] [facility]   8/22/2025 10:30 AM ERIKA Austin [id number] [facility]   8/25/2025  8:45 AM Abad Briceno MD [id number] [facility]       Marianna Galloway MD  "

## 2025-03-22 NOTE — CARE PLAN
The patient's goals for the shift include remain free from falls and get some rest    The clinical goals for the shift include No falls, no skin breakdown    Over the shift, the patient did not make progress toward the following goals. Barriers to progression include  Recommendations to address these barriers include   Problem: Diabetes  Goal: Achieve decreasing blood glucose levels by end of shift  Outcome: Progressing  Goal: Increase stability of blood glucose readings by end of shift  Outcome: Progressing  Goal: Decrease in ketones present in urine by end of shift  Outcome: Progressing  Goal: Maintain electrolyte levels within acceptable range throughout shift  Outcome: Progressing  Goal: Maintain glucose levels >70mg/dl to <250mg/dl throughout shift  Outcome: Progressing  Goal: No changes in neurological exam by end of shift  Outcome: Progressing  Goal: Learn about and adhere to nutrition recommendations by end of shift  Outcome: Progressing  Goal: Vital signs within normal range for age by end of shift  Outcome: Progressing  Goal: Increase self care and/or family involovement by end of shift  Outcome: Progressing  Goal: Receive DSME education by end of shift  Outcome: Progressing

## 2025-03-22 NOTE — PRE-PROCEDURE NOTE
Report from Sending RN:    Report From: Alejandra/RN  Recent Surgery of Procedure: No  Baseline Level of Consciousness (LOC): Alert X4  Oxygen Use: Yes/ 2L  Type: nasal cannula  Diabetic: No  Last BP Med Given Day of Dialysis: See Mar  Last Pain Med Given: See Mar  Lab Tests to be Obtained with Dialysis: No  Blood Transfusion to be Given During Dialysis: No  Available IV Access: Yes  Medications to be Administered During Dialysis: No  Continuous IV Infusion Running: No  Restraints on Currently or in the Last 24 Hours: No  Hand-Off Communication: Verbal  Dialysis Catheter Dressing: n/a  Last Dressing Change: AVF

## 2025-03-23 VITALS
TEMPERATURE: 97.4 F | DIASTOLIC BLOOD PRESSURE: 70 MMHG | HEIGHT: 68 IN | SYSTOLIC BLOOD PRESSURE: 111 MMHG | RESPIRATION RATE: 17 BRPM | HEART RATE: 65 BPM | BODY MASS INDEX: 22.13 KG/M2 | OXYGEN SATURATION: 98 % | WEIGHT: 146 LBS

## 2025-03-23 LAB
BACTERIA BLD CULT: NORMAL
BACTERIA BLD CULT: NORMAL
GLUCOSE BLD MANUAL STRIP-MCNC: 100 MG/DL (ref 74–99)
GLUCOSE BLD MANUAL STRIP-MCNC: 115 MG/DL (ref 74–99)
GLUCOSE BLD MANUAL STRIP-MCNC: 134 MG/DL (ref 74–99)

## 2025-03-23 PROCEDURE — 2500000004 HC RX 250 GENERAL PHARMACY W/ HCPCS (ALT 636 FOR OP/ED): Performed by: INTERNAL MEDICINE

## 2025-03-23 PROCEDURE — 82947 ASSAY GLUCOSE BLOOD QUANT: CPT

## 2025-03-23 PROCEDURE — 2500000001 HC RX 250 WO HCPCS SELF ADMINISTERED DRUGS (ALT 637 FOR MEDICARE OP): Performed by: INTERNAL MEDICINE

## 2025-03-23 PROCEDURE — 99221 1ST HOSP IP/OBS SF/LOW 40: CPT | Performed by: INTERNAL MEDICINE

## 2025-03-23 PROCEDURE — 2060000001 HC INTERMEDIATE ICU ROOM DAILY

## 2025-03-23 PROCEDURE — 9420000001 HC RT PATIENT EDUCATION 5 MIN

## 2025-03-23 PROCEDURE — 94668 MNPJ CHEST WALL SBSQ: CPT

## 2025-03-23 PROCEDURE — 2500000002 HC RX 250 W HCPCS SELF ADMINISTERED DRUGS (ALT 637 FOR MEDICARE OP, ALT 636 FOR OP/ED): Performed by: INTERNAL MEDICINE

## 2025-03-23 PROCEDURE — 99222 1ST HOSP IP/OBS MODERATE 55: CPT | Performed by: INTERNAL MEDICINE

## 2025-03-23 RX ORDER — ONDANSETRON HYDROCHLORIDE 2 MG/ML
4 INJECTION, SOLUTION INTRAVENOUS EVERY 6 HOURS PRN
Status: DISCONTINUED | OUTPATIENT
Start: 2025-03-23 | End: 2025-03-31 | Stop reason: HOSPADM

## 2025-03-23 RX ADMIN — GABAPENTIN 300 MG: 300 CAPSULE ORAL at 20:37

## 2025-03-23 RX ADMIN — DOXYCYCLINE HYCLATE 100 MG: 100 TABLET, FILM COATED ORAL at 10:26

## 2025-03-23 RX ADMIN — SITAGLIPTIN 25 MG: 25 TABLET, FILM COATED ORAL at 10:27

## 2025-03-23 RX ADMIN — SEVELAMER CARBONATE 1600 MG: 800 TABLET, FILM COATED ORAL at 12:20

## 2025-03-23 RX ADMIN — CARVEDILOL 6.25 MG: 6.25 TABLET, FILM COATED ORAL at 10:26

## 2025-03-23 RX ADMIN — ERGOCALCIFEROL 50000 UNITS: 1.25 CAPSULE ORAL at 10:27

## 2025-03-23 RX ADMIN — CARVEDILOL 6.25 MG: 6.25 TABLET, FILM COATED ORAL at 17:38

## 2025-03-23 RX ADMIN — FLUOCINONIDE: 0.5 CREAM TOPICAL at 20:38

## 2025-03-23 RX ADMIN — DOXYCYCLINE HYCLATE 100 MG: 100 TABLET, FILM COATED ORAL at 20:37

## 2025-03-23 RX ADMIN — AMLODIPINE BESYLATE 10 MG: 10 TABLET ORAL at 10:27

## 2025-03-23 RX ADMIN — EZETIMIBE 10 MG: 10 TABLET ORAL at 10:27

## 2025-03-23 RX ADMIN — ASPIRIN 81 MG: 81 TABLET, COATED ORAL at 10:27

## 2025-03-23 RX ADMIN — ISOSORBIDE MONONITRATE 60 MG: 30 TABLET, EXTENDED RELEASE ORAL at 10:26

## 2025-03-23 RX ADMIN — PANTOPRAZOLE SODIUM 40 MG: 40 TABLET, DELAYED RELEASE ORAL at 10:27

## 2025-03-23 RX ADMIN — SEVELAMER CARBONATE 1600 MG: 800 TABLET, FILM COATED ORAL at 10:26

## 2025-03-23 RX ADMIN — ALLOPURINOL 100 MG: 100 TABLET ORAL at 10:27

## 2025-03-23 RX ADMIN — ATORVASTATIN CALCIUM 80 MG: 80 TABLET, FILM COATED ORAL at 20:37

## 2025-03-23 RX ADMIN — CEFTRIAXONE 2 G: 2 INJECTION, POWDER, FOR SOLUTION INTRAMUSCULAR; INTRAVENOUS at 15:34

## 2025-03-23 RX ADMIN — FLUOCINONIDE: 0.5 CREAM TOPICAL at 10:35

## 2025-03-23 RX ADMIN — SEVELAMER CARBONATE 1600 MG: 800 TABLET, FILM COATED ORAL at 17:38

## 2025-03-23 ASSESSMENT — ENCOUNTER SYMPTOMS
SINUS PRESSURE: 0
SEIZURES: 0
VOMITING: 0
AGITATION: 0
BLOOD IN STOOL: 0
EYE DISCHARGE: 0
STRIDOR: 0
TROUBLE SWALLOWING: 0
SORE THROAT: 0
ACTIVITY CHANGE: 0
EYE PAIN: 0
POLYPHAGIA: 0
FACIAL ASYMMETRY: 0
NECK STIFFNESS: 0
MYALGIAS: 0
WEAKNESS: 0
DIFFICULTY URINATING: 0
FLANK PAIN: 0
ABDOMINAL DISTENTION: 0
CONFUSION: 0
VOICE CHANGE: 0
EYE REDNESS: 0
NECK PAIN: 0
DYSURIA: 0
PHOTOPHOBIA: 0
BRUISES/BLEEDS EASILY: 0
CONSTIPATION: 0
DECREASED CONCENTRATION: 0
SHORTNESS OF BREATH: 0
PALPITATIONS: 0
ADENOPATHY: 0
FREQUENCY: 0
NERVOUS/ANXIOUS: 0
SINUS PAIN: 0
HYPERACTIVE: 0
DYSPHORIC MOOD: 0
RECTAL PAIN: 0
COLOR CHANGE: 0
HEADACHES: 0
CHILLS: 0
UNEXPECTED WEIGHT CHANGE: 0
FATIGUE: 0
POLYDIPSIA: 0
NAUSEA: 0
FEVER: 0
DIZZINESS: 0
WOUND: 0
CHEST TIGHTNESS: 0
ARTHRALGIAS: 0
RHINORRHEA: 0
CHOKING: 0
SPEECH DIFFICULTY: 0
EYE ITCHING: 0
TREMORS: 0
HALLUCINATIONS: 0
SLEEP DISTURBANCE: 0
ABDOMINAL PAIN: 0
ANAL BLEEDING: 0
DIARRHEA: 0
WHEEZING: 0
FACIAL SWELLING: 0
BACK PAIN: 0
APNEA: 0
COUGH: 0
NUMBNESS: 0
DIAPHORESIS: 0
LIGHT-HEADEDNESS: 0
JOINT SWELLING: 0
HEMATURIA: 0
APPETITE CHANGE: 0

## 2025-03-23 ASSESSMENT — COGNITIVE AND FUNCTIONAL STATUS - GENERAL
CLIMB 3 TO 5 STEPS WITH RAILING: A LITTLE
MOBILITY SCORE: 23
MOBILITY SCORE: 23
CLIMB 3 TO 5 STEPS WITH RAILING: A LITTLE
DAILY ACTIVITIY SCORE: 24
DAILY ACTIVITIY SCORE: 24

## 2025-03-23 ASSESSMENT — PAIN SCALES - GENERAL
PAINLEVEL_OUTOF10: 0 - NO PAIN

## 2025-03-23 ASSESSMENT — PAIN - FUNCTIONAL ASSESSMENT
PAIN_FUNCTIONAL_ASSESSMENT: 0-10

## 2025-03-23 NOTE — CONSULTS
CONSULT: NEPHROLOGY SERVICE    REASON FOR CONSULT: ESKD   Admit Date: 3/22/2025  6:21 PM       HPI: Patient is a 74 y.o. male admitted 3/22/2025 ESKD on HD, HTN, HFpEF hypertension, coming from [facility] due to cough and SOB. Known R pleural effusion, s/p Right Thoracentesis 3/19- 950ml of exudative fluid. Here for right side hydropneumothorax for VATS decort  No complaints    - dialysis at [facility], Dr Gomez, TTS schedule, last HD yesterday before hospital transfer. 3.45h/F180    Past Medical History:   Diagnosis Date    Chronic kidney disease     Diabetes mellitus (Multi)     ESRD (end stage renal disease) on dialysis (Multi)     Heart murmur     HLD (hyperlipidemia)     Hypertension     Personal history of other endocrine, nutritional and metabolic disease     History of hypercholesterolemia    Personal history of other specified conditions 08/28/2020    History of chest pain     Allergies: Patient has no known allergies.     Past Surgical History:   Procedure Laterality Date    AV FISTULA PLACEMENT Left 07/31/2024    left brachial artery to axillary vein AV graft on 7/31/2024    KNEE ARTHROSCOPY W/ DEBRIDEMENT  04/15/2013    Arthroscopy Knee    SHOULDER SURGERY  04/15/2013    Shoulder Surgery       Family History   Problem Relation Name Age of Onset    Alzheimer's disease Mother      Heart attack Mother      Diabetes Father      Heart attack Father         Social History  He reports that he has never smoked. He has never used smokeless tobacco. He reports that he does not currently use alcohol. He reports that he does not use drugs.    Review of Systems  As above     CURRENT HOSP MEDS:    Current Facility-Administered Medications:     acetaminophen (Tylenol) tablet 650 mg, 650 mg, oral, q4h PRN **OR** acetaminophen (Tylenol) oral liquid 650 mg, 650 mg, oral, q4h PRN **OR** acetaminophen (Tylenol) suppository 650 mg, 650 mg, rectal, q4h PRN, Marianna Galloway MD    allopurinol (Zyloprim) tablet 100 mg,  100 mg, oral, Daily, Marianna Galloway MD    amLODIPine (Norvasc) tablet 10 mg, 10 mg, oral, Daily, Marianna Galloway MD    aspirin EC tablet 81 mg, 81 mg, oral, Daily, Marianna Galloway MD    atorvastatin (Lipitor) tablet 80 mg, 80 mg, oral, Nightly, Marianna Galloway MD, 80 mg at 03/22/25 2129    benzocaine-menthol (Cepastat Sore Throat) lozenge 1 lozenge, 1 lozenge, Mouth/Throat, q2h PRN, Marianna Galloway MD    carvedilol (Coreg) tablet 6.25 mg, 6.25 mg, oral, BID, Marianna Galloway MD    cefTRIAXone (Rocephin) 2 g in dextrose 5% IV 50 mL, 2 g, intravenous, q24h, Marianna Galloway MD    dextromethorphan-guaifenesin (Robitussin DM)  mg/5 mL oral liquid 5 mL, 5 mL, oral, q4h PRN, Marianna Galloway MD    docusate sodium (Colace) capsule 100 mg, 100 mg, oral, BID, Marianna Galloway MD    doxycycline (Vibra-Tabs) tablet 100 mg, 100 mg, oral, q12h LUKE, Marianna Galloway MD    ergocalciferol (Vitamin D-2) capsule 50,000 Units, 50,000 Units, oral, q14 days, Marianna Galloway MD    ezetimibe (Zetia) tablet 10 mg, 10 mg, oral, Daily, Marianna Galloway MD    fluocinonide (Lidex) 0.05 % cream, , Topical, BID, Marianna Galloway MD, Given at 03/22/25 2131    gabapentin (Neurontin) capsule 300 mg, 300 mg, oral, Nightly, Marianna Galloway MD, 300 mg at 03/22/25 2129    guaiFENesin (Mucinex) 12 hr tablet 600 mg, 600 mg, oral, q12h PRN, Marianna Galloway MD    heparin (porcine) injection 5,000 Units, 5,000 Units, subcutaneous, q8h LUKE, Marianna Galloway MD    insulin lispro injection 0-5 Units, 0-5 Units, subcutaneous, TID AC, Marianna Galloway MD    isosorbide mononitrate ER (Imdur) 24 hr tablet 60 mg, 60 mg, oral, Daily, Marianna Galloway MD    melatonin tablet 3 mg, 3 mg, oral, Nightly PRN, Marianna Galloway MD    nitroglycerin (Nitrostat) SL tablet 0.4 mg, 0.4 mg, sublingual, q5 min PRN, Marianna Galloway MD    oxygen (O2) therapy, 2 L/min, inhalation, q24h, Marianna Galloway MD, Stopped at 03/22/25 2027    oxygen (O2) therapy, , inhalation, Continuous PRN - O2/gases, Marianna Galloway MD    pantoprazole (ProtoNix) EC tablet 40  "mg, 40 mg, oral, Daily, Marianna Galloway MD    polyethylene glycol (Glycolax, Miralax) packet 17 g, 17 g, oral, Daily, Marianna Galloway MD    sevelamer carbonate (Renvela) tablet 1,600 mg, 1,600 mg, oral, TID, Marianna Galloway MD    SITagliptin phosphate (Januvia) tablet 25 mg, 25 mg, oral, Daily, Marianna Galloway MD     PHYSICAL EXAM:  /67 (BP Location: Right arm, Patient Position: Sitting)   Pulse 71   Temp 36.1 °C (97 °F) (Temporal) Comment: Student nurse  Resp 16   Ht 1.727 m (5' 8\")   Wt 66.2 kg (146 lb)   SpO2 96%   BMI 22.20 kg/m²   No intake or output data in the 24 hours ending 03/23/25 1004  Gen: AAO, NAD  Neck: No JVD  Cardiac: RRR  Resp: decrease BS R base  Abd: Soft, non tender, +BS, non distended   Ext: No edema   Access: LUE AVF  Neuro: moves 4 ext  Peripheral Pulses: Capillary refill <2secs, strong peripheral pulses.  Skin: Skin color, texture, turgor normal, no suspicious rashes or lesions.    LABS:   Results for orders placed or performed during the hospital encounter of 03/22/25 (from the past 24 hours)   POCT GLUCOSE   Result Value Ref Range    POCT Glucose 100 (H) 74 - 99 mg/dL     *Note: Due to a large number of results and/or encounters for the requested time period, some results have not been displayed. A complete set of results can be found in Results Review.       DATA:   Diagnostic tests reviewed for today's visit:    Labs and meds    ASSESSMENT AND PLAN:  - ESKD on HD: last HD yesterday  Maintaining TTS schedule, euvolemic  HTN: controlled  Hb 11.5    Greatly appreciate the opportunity to assist in the care of this patient. Will continue to follow.     Signature: Hudson Leon MD  Division of Nephrology and Hypertension   "

## 2025-03-23 NOTE — CONSULTS
Reason For Consult  Right VATS, Decortication     History Of Present Illness  Bill A Franke is a 74 y.o. male with Pmhx  ESRD, through left arm AV fistula, HTN, HFpEF hypertension, history of MSSA bacteremia in December 2024, NIDA negative for endocarditis in December 2024, exudative pleural effusion status post thoracentesis on 12- admitted on 3/17/2025 @ [facility] due to cough and SOB. He was just at his PCP office and was a direct admit for planned thoracentesis  from the , 2/14 Ct Scan: Re accumulation of Right  Pleural Effusion. He is s/p Right Thoracentesis 3/19- 950ml of exudative fluid. Cytology pending,   During his admission he was treated for Hypoxic Resp Failure due to PNA, with ATB. ARF resolved now on Room.     CT surgery is consulted for persistent right side hyropnuemothorax for VATS decort with Dr Herrera.     Past Medical History  He has a past medical history of Chronic kidney disease, Diabetes mellitus (Multi), ESRD (end stage renal disease) on dialysis (Multi), Heart murmur, HLD (hyperlipidemia), Hypertension, Personal history of other endocrine, nutritional and metabolic disease, and Personal history of other specified conditions (08/28/2020).    Surgical History  He has a past surgical history that includes Shoulder surgery (04/15/2013); Knee arthroscopy w/ debridement (04/15/2013); and AV fistula placement (Left, 07/31/2024).     Social History  He reports that he has never smoked. He has never used smokeless tobacco. He reports that he does not currently use alcohol. He reports that he does not use drugs.    Family History  Family History   Problem Relation Name Age of Onset    Alzheimer's disease Mother      Heart attack Mother      Diabetes Father      Heart attack Father          Allergies  Patient has no known allergies.         Physical Exam  Physical Exam  HENT:      Mouth/Throat:      Mouth: Mucous membranes are moist.   Eyes:      Extraocular Movements: Extraocular  "movements intact.      Pupils: Pupils are equal, round, and reactive to light.   Cardiovascular:      Rate and Rhythm: Normal rate.   Pulmonary:      Effort: Pulmonary effort is normal.      Breath sounds: Normal breath sounds.   Neurological:      General: No focal deficit present.      Mental Status: He is alert and oriented to person, place, and time. Mental status is at baseline.           Last Recorded Vitals  Blood pressure 131/52, pulse 71, temperature 36.3 °C (97.4 °F), temperature source Temporal, resp. rate 16, height 1.727 m (5' 8\"), weight 66.2 kg (146 lb), SpO2 96%.    Scheduled medications  [START ON 3/23/2025] allopurinol, 100 mg, oral, Daily  [START ON 3/23/2025] amLODIPine, 10 mg, oral, Daily  [START ON 3/23/2025] aspirin, 81 mg, oral, Daily  atorvastatin, 80 mg, oral, Nightly  [START ON 3/23/2025] carvedilol, 6.25 mg, oral, BID  [START ON 3/23/2025] cefTRIAXone, 2 g, intravenous, q24h  docusate sodium, 100 mg, oral, BID  [START ON 3/23/2025] doxycycline, 100 mg, oral, q12h LUKE  doxycylcine, 100 mg, oral, q12h LUKE  [START ON 3/23/2025] ergocalciferol, 50,000 Units, oral, q14 days  [START ON 3/23/2025] ezetimibe, 10 mg, oral, Daily  fluocinonide, , Topical, BID  gabapentin, 300 mg, oral, Nightly  heparin (porcine), 5,000 Units, subcutaneous, q8h LUKE  [START ON 3/23/2025] insulin lispro, 0-5 Units, subcutaneous, TID AC  [START ON 3/23/2025] isosorbide mononitrate ER, 60 mg, oral, Daily  oxygen, 2 L/min, inhalation, q24h  [START ON 3/23/2025] pantoprazole, 40 mg, oral, Daily  [START ON 3/23/2025] polyethylene glycol, 17 g, oral, Daily  [START ON 3/23/2025] sevelamer carbonate, 1,600 mg, oral, TID  [START ON 3/23/2025] SITagliptin phosphate, 25 mg, oral, Daily      Continuous medications     PRN medications  PRN medications: acetaminophen **OR** acetaminophen **OR** acetaminophen, benzocaine-menthol, dextromethorphan-guaifenesin, guaiFENesin, melatonin, nitroglycerin, oxygen     Relevant " Results  Results for orders placed or performed during the hospital encounter of 03/17/25 (from the past 24 hours)   CBC   Result Value Ref Range    WBC 7.5 4.4 - 11.3 x10*3/uL    nRBC 0.0 0.0 - 0.0 /100 WBCs    RBC 4.11 (L) 4.50 - 5.90 x10*6/uL    Hemoglobin 11.5 (L) 13.5 - 17.5 g/dL    Hematocrit 36.8 (L) 41.0 - 52.0 %    MCV 90 80 - 100 fL    MCH 28.0 26.0 - 34.0 pg    MCHC 31.3 (L) 32.0 - 36.0 g/dL    RDW 16.4 (H) 11.5 - 14.5 %    Platelets 257 150 - 450 x10*3/uL   Basic Metabolic Panel   Result Value Ref Range    Glucose 98 74 - 99 mg/dL    Sodium 140 136 - 145 mmol/L    Potassium 4.6 3.5 - 5.3 mmol/L    Chloride 96 (L) 98 - 107 mmol/L    Bicarbonate 29 21 - 32 mmol/L    Anion Gap 20 10 - 20 mmol/L    Urea Nitrogen 47 (H) 6 - 23 mg/dL    Creatinine 9.85 (H) 0.50 - 1.30 mg/dL    eGFR 5 (L) >60 mL/min/1.73m*2    Calcium 10.1 8.6 - 10.3 mg/dL   Hepatitis B surface antibody   Result Value Ref Range    Hepatitis B Surface AB <3.1 <10.0 mIU/mL   Hepatitis B surface antigen   Result Value Ref Range    Hepatitis B Surface AG Nonreactive Nonreactive   POCT GLUCOSE   Result Value Ref Range    POCT Glucose 97 74 - 99 mg/dL     *Note: Due to a large number of results and/or encounters for the requested time period, some results have not been displayed. A complete set of results can be found in Results Review.      XR chest 1 view    Result Date: 3/20/2025  Interpreted By:  Angy Henry, STUDY: XR CHEST 1 VIEW 3/20/2025 3:30 pm   INDICATION: Evaluate for trapped lung, recurrence of effusion   COMPARISON: 03/19/2025   ACCESSION NUMBER(S): [id number]   ORDERING CLINICIAN: HUNTER CHÁVEZ   TECHNIQUE: AP erect view of the chest   FINDINGS: Persistent right-sided hydropneumothorax is seen with increase in the amount of pleural fluid on the right with multiple air-fluid levels seen. The size of the right hydropneumothorax has not changed significantly. There is a noncompliant right lung identified.   The left lung is clear with  no left-sided pleural abnormality. The cardiac size is within normal limits.       Although the right hydropneumothorax has not changed significantly in size, there is increased amount of pleural fluid within the right pleural space with a number of air-fluid levels. The right lung appears noncompliant.   Signed by: Angy Henry 3/20/2025 3:55 PM Dictation workstation:   [id number]    CT chest wo IV contrast    Result Date: 3/20/2025  Interpreted By:  Bobbi Stacy, STUDY: CT CHEST WO IV CONTRAST;  3/20/2025 5:07 am   INDICATION: Signs/Symptoms:exudative effusion, evaluate for fluid remaining, trapped lung physiology vs pneumothorax.     COMPARISON: CT chest 02/14/2025 CT abdomen pelvis 04/26/2021   ACCESSION NUMBER(S): [id number]   ORDERING CLINICIAN: HUNTER CHÁVEZ   TECHNIQUE: Helical data acquisition of the chest was obtained  without IV contrast material.  Images were reformatted in axial, coronal, and sagittal planes.   FINDINGS: LUNGS AND AIRWAYS: New moderate right hydropneumothorax is seen. Similar mild diffuse centrilobular micronodular ground-glass opacities, likely respiratory bronchiolitis. Trachea and right and left main bronchi are patent.   MEDIASTINUM AND YAAKOV, LOWER NECK AND AXILLA: The visualized thyroid gland is within normal limits.   Stable mildly prominent 14 x 14 mm right hilar lymph node on series 4, image 133. no new enlarged thoracic lymph node.   Esophagus is within normal limits.   HEART AND VESSELS: The thoracic aorta is of normal course and caliber with mild-to-moderate vascular calcifications.   Main pulmonary artery and its branches are normal in caliber.   There are severe coronary artery calcifications versus stent. The study is not optimized for evaluation of coronary arteries.   The cardiac chambers are not enlarged.   No evidence of pericardial effusion.   UPPER ABDOMEN: 20 mm hypoattenuating lesion in the hepatic dome, stable dating back to April 2021.   CHEST WALL AND  OSSEOUS STRUCTURES: No suspicious osseous lesions. Multilevel degenerative changes are present.       1. New moderate right hydropneumothorax. Given interval thoracentesis and exudative fluid, findings are suspicious for ex vacuo hydropneumothorax (trapped lung). However, correlation with fluid culture is recommended to exclude empyema, though felt to be less likely given the negative gram stain and normal WBC count. Bronchopleural fistula is felt to be unlikely. Underlying malignant effusion is possible, please correlate with cytology. Further evaluation with PET-CT could be considered if there is no known history of malignancy or if there is no known cause of patient's exudative fluid. The pleural fluid will likely not show FDG avidity even if malignant, however other sites of potential disease could be identified or excluded. 2. Stable mildly prominent right hilar lymph node. 3. Additional chronic and incidental findings as detailed above.   MACRO: Critical Finding:  See findings. Notification was initiated on 3/20/2025 at 9:35 am by  Bobbi Stacy.  (**-YCF-**) Instructions:   Signed by: Bobbi Stacy 3/20/2025 9:36 AM Dictation workstation:   [id number]    XR chest 2 views    Result Date: 3/20/2025  Interpreted By:  Tk Kyle, STUDY: XR CHEST 2 VIEWS; 3/19/2025 6:05 pm   INDICATION: Signs/Symptoms:Post thoracentesis.   COMPARISON: 03/18/2025   ACCESSION NUMBER(S): [id number]   ORDERING CLINICIAN: HUNTER CHÁVEZ   TECHNIQUE: AP and lateral views of the chest were obtained.   FINDINGS: The cardiac size is indeterminate due to the AP projection. Patient is status post right-sided thoracentesis with a loculated pneumothorax at the right lung base in association with right basilar partial collapse/atelectasis. The pneumothorax is likely from failure of re-expansion of the lung at the right base. Left hemithorax is clear.       Loculated right basilar pneumothorax likely from failure of re-expansion of the  right lung. Compressive atelectasis of the right lung base is noted.   MACRO: none   Signed by: Tk Kyle 3/20/2025 7:43 AM Dictation workstation:   [id number]    XR chest 1 view    Result Date: 3/19/2025  Interpreted By:  Jose R Murray, STUDY: XR CHEST 1 VIEW;  3/19/2025 9:20 pm   INDICATION: Signs/Symptoms:evalaute for pneumothorax vs ex vacuo.     COMPARISON: Study performed earlier the same day   ACCESSION NUMBER(S): [id number]   ORDERING CLINICIAN: HUNTER CHÁVEZ   FINDINGS: The patient is status post right-sided thoracentesis. Lucency over the right lung base consistent with a basilar pneumothorax. Interval decrease in size of the effusion which is now small. Right basilar airspace disease present. The left lung is clear.       Moderate-sized right basilar pneumothorax. Interval decrease in right effusion after thoracentesis. Persistent small effusion and right basilar airspace disease present   MACRO: Jose R Murray discussed the significance and urgency of this critical finding by telephone with  the clinical team taking care of the patient on 3/19/2025 at 9:53 pm.  (**-RCF-**) Findings:  See findings.   Signed by: Jose R Murray 3/19/2025 9:53 PM Dictation workstation:   [id number]    US thoracentesis    Result Date: 3/19/2025  Interpreted By:  Bassam Brown, STUDY: US THORACENTESIS; 3/19/2025 2:45 pm   INDICATION: Signs/Symptoms:Right thoracentesis diagnostic and therapeutic, hx of ESRD, recurrent effusion, concern for malignancy vs infection.   COMPARISON: None   ACCESSION NUMBER(S): [id number]   ORDERING CLINICIAN: HUNTER CHÁVEZ   TECHNIQUE: Informed consent obtained. Patient positionedsitting upright. Skin prepped, draped and anesthetized. Ultrasound right chest demonstrates moderate complex effusion. Under ultrasound guidance, a centesis catheter/needle was advanced into rightpleural cavity.   FINDINGS: A total of 950 cc of bloody fluid was aspirated. A sample was sent for analysis.  The patient tolerated the procedure well.       Ultrasound-guided right thoracentesis.   Signed by: Bassam Brown 3/19/2025 3:18 PM Dictation workstation:   [id number]    XR chest 2 views    Result Date: 3/18/2025  Interpreted By:  Angy Henry, STUDY: XR CHEST 2 VIEWS 3/18/2025 8:59 am   INDICATION: Signs/Symptoms:Shortness of breath   COMPARISON: 12/28/2024   ACCESSION NUMBER(S): [id number]   ORDERING CLINICIAN: DIANNE LAWRENCE   TECHNIQUE: PA and lateral views of the chest were acquired.   FINDINGS: There is a moderately large right pleural effusion now seen with no left-sided pleural abnormality observed.   The cardiac size is within normal limits with calcified plaque in the aortic knob and with coronary artery stent graft identified.   The left lung is clear but there is compressive atelectasis and infiltrate in the right lower lobe.   A stent graft within the left axillary region is seen.       Moderately large right pleural effusion which has developed since the prior study. There is probable atelectasis and perhaps some infiltrate within the right lower lobe as well.   Signed by: Angy Henry 3/18/2025 9:07 AM Dictation workstation:   [id number]       Assessment/Plan   Right Side Recurrent Pleural Effusion   S/p Thoracentesis 12/26- 700ml  Clear yellow flui, 3/19: Thoracentesis 950CC exudative drain, pending Cytology  Hx of ESRD on HD  Pneumonia    -Dr Herrera has been notified regarding Right VATS decort, Pending Schedule: we will continue to follow  -HD per Nephrology  -PNA; continue treatment per primary. He is currently on room air @ 98%         I spent 30 minutes in the professional and overall care of this patient.      Mat Mckeon, APRN-CNP

## 2025-03-23 NOTE — H&P (VIEW-ONLY)
Reason For Consult  Right VATS, Decortication     History Of Present Illness  Bill A Franke is a 74 y.o. male with Pmhx  ESRD, through left arm AV fistula, HTN, HFpEF hypertension, history of MSSA bacteremia in December 2024, NIDA negative for endocarditis in December 2024, exudative pleural effusion status post thoracentesis on 12- admitted on 3/17/2025 @ [facility] due to cough and SOB. He was just at his PCP office and was a direct admit for planned thoracentesis  from the , 2/14 Ct Scan: Re accumulation of Right  Pleural Effusion. He is s/p Right Thoracentesis 3/19- 950ml of exudative fluid. Cytology pending,   During his admission he was treated for Hypoxic Resp Failure due to PNA, with ATB. ARF resolved now on Room.     CT surgery is consulted for persistent right side hyropnuemothorax for VATS decort with Dr Herrera.     Past Medical History  He has a past medical history of Chronic kidney disease, Diabetes mellitus (Multi), ESRD (end stage renal disease) on dialysis (Multi), Heart murmur, HLD (hyperlipidemia), Hypertension, Personal history of other endocrine, nutritional and metabolic disease, and Personal history of other specified conditions (08/28/2020).    Surgical History  He has a past surgical history that includes Shoulder surgery (04/15/2013); Knee arthroscopy w/ debridement (04/15/2013); and AV fistula placement (Left, 07/31/2024).     Social History  He reports that he has never smoked. He has never used smokeless tobacco. He reports that he does not currently use alcohol. He reports that he does not use drugs.    Family History  Family History   Problem Relation Name Age of Onset    Alzheimer's disease Mother      Heart attack Mother      Diabetes Father      Heart attack Father          Allergies  Patient has no known allergies.         Physical Exam  Physical Exam  HENT:      Mouth/Throat:      Mouth: Mucous membranes are moist.   Eyes:      Extraocular Movements: Extraocular  "movements intact.      Pupils: Pupils are equal, round, and reactive to light.   Cardiovascular:      Rate and Rhythm: Normal rate.   Pulmonary:      Effort: Pulmonary effort is normal.      Breath sounds: Normal breath sounds.   Neurological:      General: No focal deficit present.      Mental Status: He is alert and oriented to person, place, and time. Mental status is at baseline.           Last Recorded Vitals  Blood pressure 131/52, pulse 71, temperature 36.3 °C (97.4 °F), temperature source Temporal, resp. rate 16, height 1.727 m (5' 8\"), weight 66.2 kg (146 lb), SpO2 96%.    Scheduled medications  [START ON 3/23/2025] allopurinol, 100 mg, oral, Daily  [START ON 3/23/2025] amLODIPine, 10 mg, oral, Daily  [START ON 3/23/2025] aspirin, 81 mg, oral, Daily  atorvastatin, 80 mg, oral, Nightly  [START ON 3/23/2025] carvedilol, 6.25 mg, oral, BID  [START ON 3/23/2025] cefTRIAXone, 2 g, intravenous, q24h  docusate sodium, 100 mg, oral, BID  [START ON 3/23/2025] doxycycline, 100 mg, oral, q12h LUKE  doxycylcine, 100 mg, oral, q12h LUKE  [START ON 3/23/2025] ergocalciferol, 50,000 Units, oral, q14 days  [START ON 3/23/2025] ezetimibe, 10 mg, oral, Daily  fluocinonide, , Topical, BID  gabapentin, 300 mg, oral, Nightly  heparin (porcine), 5,000 Units, subcutaneous, q8h LUKE  [START ON 3/23/2025] insulin lispro, 0-5 Units, subcutaneous, TID AC  [START ON 3/23/2025] isosorbide mononitrate ER, 60 mg, oral, Daily  oxygen, 2 L/min, inhalation, q24h  [START ON 3/23/2025] pantoprazole, 40 mg, oral, Daily  [START ON 3/23/2025] polyethylene glycol, 17 g, oral, Daily  [START ON 3/23/2025] sevelamer carbonate, 1,600 mg, oral, TID  [START ON 3/23/2025] SITagliptin phosphate, 25 mg, oral, Daily      Continuous medications     PRN medications  PRN medications: acetaminophen **OR** acetaminophen **OR** acetaminophen, benzocaine-menthol, dextromethorphan-guaifenesin, guaiFENesin, melatonin, nitroglycerin, oxygen     Relevant " Results  Results for orders placed or performed during the hospital encounter of 03/17/25 (from the past 24 hours)   CBC   Result Value Ref Range    WBC 7.5 4.4 - 11.3 x10*3/uL    nRBC 0.0 0.0 - 0.0 /100 WBCs    RBC 4.11 (L) 4.50 - 5.90 x10*6/uL    Hemoglobin 11.5 (L) 13.5 - 17.5 g/dL    Hematocrit 36.8 (L) 41.0 - 52.0 %    MCV 90 80 - 100 fL    MCH 28.0 26.0 - 34.0 pg    MCHC 31.3 (L) 32.0 - 36.0 g/dL    RDW 16.4 (H) 11.5 - 14.5 %    Platelets 257 150 - 450 x10*3/uL   Basic Metabolic Panel   Result Value Ref Range    Glucose 98 74 - 99 mg/dL    Sodium 140 136 - 145 mmol/L    Potassium 4.6 3.5 - 5.3 mmol/L    Chloride 96 (L) 98 - 107 mmol/L    Bicarbonate 29 21 - 32 mmol/L    Anion Gap 20 10 - 20 mmol/L    Urea Nitrogen 47 (H) 6 - 23 mg/dL    Creatinine 9.85 (H) 0.50 - 1.30 mg/dL    eGFR 5 (L) >60 mL/min/1.73m*2    Calcium 10.1 8.6 - 10.3 mg/dL   Hepatitis B surface antibody   Result Value Ref Range    Hepatitis B Surface AB <3.1 <10.0 mIU/mL   Hepatitis B surface antigen   Result Value Ref Range    Hepatitis B Surface AG Nonreactive Nonreactive   POCT GLUCOSE   Result Value Ref Range    POCT Glucose 97 74 - 99 mg/dL     *Note: Due to a large number of results and/or encounters for the requested time period, some results have not been displayed. A complete set of results can be found in Results Review.      XR chest 1 view    Result Date: 3/20/2025  Interpreted By:  Angy Henry, STUDY: XR CHEST 1 VIEW 3/20/2025 3:30 pm   INDICATION: Evaluate for trapped lung, recurrence of effusion   COMPARISON: 03/19/2025   ACCESSION NUMBER(S): [id number]   ORDERING CLINICIAN: HUNTER CHÁVEZ   TECHNIQUE: AP erect view of the chest   FINDINGS: Persistent right-sided hydropneumothorax is seen with increase in the amount of pleural fluid on the right with multiple air-fluid levels seen. The size of the right hydropneumothorax has not changed significantly. There is a noncompliant right lung identified.   The left lung is clear with  no left-sided pleural abnormality. The cardiac size is within normal limits.       Although the right hydropneumothorax has not changed significantly in size, there is increased amount of pleural fluid within the right pleural space with a number of air-fluid levels. The right lung appears noncompliant.   Signed by: Angy Henry 3/20/2025 3:55 PM Dictation workstation:   [id number]    CT chest wo IV contrast    Result Date: 3/20/2025  Interpreted By:  Bobbi Stacy, STUDY: CT CHEST WO IV CONTRAST;  3/20/2025 5:07 am   INDICATION: Signs/Symptoms:exudative effusion, evaluate for fluid remaining, trapped lung physiology vs pneumothorax.     COMPARISON: CT chest 02/14/2025 CT abdomen pelvis 04/26/2021   ACCESSION NUMBER(S): [id number]   ORDERING CLINICIAN: HUNTER CHÁVEZ   TECHNIQUE: Helical data acquisition of the chest was obtained  without IV contrast material.  Images were reformatted in axial, coronal, and sagittal planes.   FINDINGS: LUNGS AND AIRWAYS: New moderate right hydropneumothorax is seen. Similar mild diffuse centrilobular micronodular ground-glass opacities, likely respiratory bronchiolitis. Trachea and right and left main bronchi are patent.   MEDIASTINUM AND YAAKOV, LOWER NECK AND AXILLA: The visualized thyroid gland is within normal limits.   Stable mildly prominent 14 x 14 mm right hilar lymph node on series 4, image 133. no new enlarged thoracic lymph node.   Esophagus is within normal limits.   HEART AND VESSELS: The thoracic aorta is of normal course and caliber with mild-to-moderate vascular calcifications.   Main pulmonary artery and its branches are normal in caliber.   There are severe coronary artery calcifications versus stent. The study is not optimized for evaluation of coronary arteries.   The cardiac chambers are not enlarged.   No evidence of pericardial effusion.   UPPER ABDOMEN: 20 mm hypoattenuating lesion in the hepatic dome, stable dating back to April 2021.   CHEST WALL AND  OSSEOUS STRUCTURES: No suspicious osseous lesions. Multilevel degenerative changes are present.       1. New moderate right hydropneumothorax. Given interval thoracentesis and exudative fluid, findings are suspicious for ex vacuo hydropneumothorax (trapped lung). However, correlation with fluid culture is recommended to exclude empyema, though felt to be less likely given the negative gram stain and normal WBC count. Bronchopleural fistula is felt to be unlikely. Underlying malignant effusion is possible, please correlate with cytology. Further evaluation with PET-CT could be considered if there is no known history of malignancy or if there is no known cause of patient's exudative fluid. The pleural fluid will likely not show FDG avidity even if malignant, however other sites of potential disease could be identified or excluded. 2. Stable mildly prominent right hilar lymph node. 3. Additional chronic and incidental findings as detailed above.   MACRO: Critical Finding:  See findings. Notification was initiated on 3/20/2025 at 9:35 am by  Bobbi Stacy.  (**-YCF-**) Instructions:   Signed by: Bobbi Stacy 3/20/2025 9:36 AM Dictation workstation:   [id number]    XR chest 2 views    Result Date: 3/20/2025  Interpreted By:  Tk Kyle, STUDY: XR CHEST 2 VIEWS; 3/19/2025 6:05 pm   INDICATION: Signs/Symptoms:Post thoracentesis.   COMPARISON: 03/18/2025   ACCESSION NUMBER(S): [id number]   ORDERING CLINICIAN: HUNTER CHÁVEZ   TECHNIQUE: AP and lateral views of the chest were obtained.   FINDINGS: The cardiac size is indeterminate due to the AP projection. Patient is status post right-sided thoracentesis with a loculated pneumothorax at the right lung base in association with right basilar partial collapse/atelectasis. The pneumothorax is likely from failure of re-expansion of the lung at the right base. Left hemithorax is clear.       Loculated right basilar pneumothorax likely from failure of re-expansion of the  right lung. Compressive atelectasis of the right lung base is noted.   MACRO: none   Signed by: Tk Kyle 3/20/2025 7:43 AM Dictation workstation:   [id number]    XR chest 1 view    Result Date: 3/19/2025  Interpreted By:  Jose R Murray, STUDY: XR CHEST 1 VIEW;  3/19/2025 9:20 pm   INDICATION: Signs/Symptoms:evalaute for pneumothorax vs ex vacuo.     COMPARISON: Study performed earlier the same day   ACCESSION NUMBER(S): [id number]   ORDERING CLINICIAN: HUNTER CHÁVEZ   FINDINGS: The patient is status post right-sided thoracentesis. Lucency over the right lung base consistent with a basilar pneumothorax. Interval decrease in size of the effusion which is now small. Right basilar airspace disease present. The left lung is clear.       Moderate-sized right basilar pneumothorax. Interval decrease in right effusion after thoracentesis. Persistent small effusion and right basilar airspace disease present   MACRO: Jose R Murray discussed the significance and urgency of this critical finding by telephone with  the clinical team taking care of the patient on 3/19/2025 at 9:53 pm.  (**-RCF-**) Findings:  See findings.   Signed by: Jose R Murray 3/19/2025 9:53 PM Dictation workstation:   [id number]    US thoracentesis    Result Date: 3/19/2025  Interpreted By:  Bassam Brown, STUDY: US THORACENTESIS; 3/19/2025 2:45 pm   INDICATION: Signs/Symptoms:Right thoracentesis diagnostic and therapeutic, hx of ESRD, recurrent effusion, concern for malignancy vs infection.   COMPARISON: None   ACCESSION NUMBER(S): [id number]   ORDERING CLINICIAN: HUNTER CHÁVEZ   TECHNIQUE: Informed consent obtained. Patient positionedsitting upright. Skin prepped, draped and anesthetized. Ultrasound right chest demonstrates moderate complex effusion. Under ultrasound guidance, a centesis catheter/needle was advanced into rightpleural cavity.   FINDINGS: A total of 950 cc of bloody fluid was aspirated. A sample was sent for analysis.  The patient tolerated the procedure well.       Ultrasound-guided right thoracentesis.   Signed by: Bassam Brown 3/19/2025 3:18 PM Dictation workstation:   [id number]    XR chest 2 views    Result Date: 3/18/2025  Interpreted By:  Angy Henry, STUDY: XR CHEST 2 VIEWS 3/18/2025 8:59 am   INDICATION: Signs/Symptoms:Shortness of breath   COMPARISON: 12/28/2024   ACCESSION NUMBER(S): [id number]   ORDERING CLINICIAN: DIANNE LAWRENCE   TECHNIQUE: PA and lateral views of the chest were acquired.   FINDINGS: There is a moderately large right pleural effusion now seen with no left-sided pleural abnormality observed.   The cardiac size is within normal limits with calcified plaque in the aortic knob and with coronary artery stent graft identified.   The left lung is clear but there is compressive atelectasis and infiltrate in the right lower lobe.   A stent graft within the left axillary region is seen.       Moderately large right pleural effusion which has developed since the prior study. There is probable atelectasis and perhaps some infiltrate within the right lower lobe as well.   Signed by: Angy Henry 3/18/2025 9:07 AM Dictation workstation:   [id number]       Assessment/Plan   Right Side Recurrent Pleural Effusion   S/p Thoracentesis 12/26- 700ml  Clear yellow flui, 3/19: Thoracentesis 950CC exudative drain, pending Cytology  Hx of ESRD on HD  Pneumonia    -Dr Herrera has been notified regarding Right VATS decort, Pending Schedule: we will continue to follow  -HD per Nephrology  -PNA; continue treatment per primary. He is currently on room air @ 98%         I spent 30 minutes in the professional and overall care of this patient.      Mat Mckeon, APRN-CNP

## 2025-03-23 NOTE — CARE PLAN
The patient's goals for the shift include      The clinical goals for the shift include maintain pt safety    Over the shift, the patient did not make progress toward the following goals. Barriers to progression include . Recommendations to address these barriers include   Problem: Pain - Adult  Goal: Verbalizes/displays adequate comfort level or baseline comfort level  Outcome: Progressing     Problem: Safety - Adult  Goal: Free from fall injury  Outcome: Progressing     Problem: Nutrition  Goal: Nutrient intake appropriate for maintaining nutritional needs  Outcome: Progressing     Problem: Fall/Injury  Goal: Not fall by end of shift  Outcome: Progressing  Goal: Be free from injury by end of the shift  Outcome: Progressing  Goal: Verbalize understanding of personal risk factors for fall in the hospital  Outcome: Progressing  Goal: Verbalize understanding of risk factor reduction measures to prevent injury from fall in the home  Outcome: Progressing  Goal: Use assistive devices by end of the shift  Outcome: Progressing  Goal: Pace activities to prevent fatigue by end of the shift  Outcome: Progressing     Problem: Skin  Goal: Decreased wound size/increased tissue granulation at next dressing change  Outcome: Progressing  Goal: Participates in plan/prevention/treatment measures  Outcome: Progressing  Goal: Prevent/manage excess moisture  Outcome: Progressing  Goal: Prevent/minimize sheer/friction injuries  Outcome: Progressing  Goal: Promote/optimize nutrition  Outcome: Progressing  Goal: Promote skin healing  Outcome: Progressing   .

## 2025-03-23 NOTE — SIGNIFICANT EVENT
Bill A Franke is a 74 y.o. male with Pmhx  ESRD, through left arm AV fistula, HTN, HFpEF hypertension, history of MSSA bacteremia in December 2024, NIDA negative for endocarditis in December 2024, exudative pleural effusion status post thoracentesis on 12- admitted on 3/17/2025 @ [facility] due to cough and SOB. He was just at his PCP office and was a direct admit for planned thoracentesis  from the , 2/14 Ct Scan: Re accumulation of Right  Pleural Effusion. He is s/p Right Thoracentesis 3/19- 950ml of exudative fluid. Cytology pending,   During his admission he was treated for Hypoxic Resp Failure due to PNA, with ATB. ARF resolved now on Room.     Seen today  laying in bed, RA, resting comfortably, denies sob, or chest pain  Reports productive cough, [place], and increased weakness since being hospitalized.    Images reviewed and case discussed with Dr Herrera  Patient added on case for Wednesday for VATS with biopsy +/- Pleurx, +/- Decortication  PT/OT ordered     Patient and wife updated on current plan.   All questions answered.     Time spent on the assessment of patient, gathering and interpreting data, review of medical record/patient history, personally reviewing radiographic imaging and formulation of this note. With greater than 50% spent in personal discussion with patient/ family.  Time: 30 minutes    Nereida Miguel, VERÓNICA-CNP

## 2025-03-23 NOTE — PROGRESS NOTES
Subjective   Patient ID: Bill A Franke is a 74 y.o. male who presents for Sick Visit.    Med Refill    Patient is here with complaints of increasing cough for past few weeks.  Also feeling very tired and fatigued.  He had a CAT scan done by infectious disease doctor in February but he did not follow-up with pulmonologist.  Patient was admitted in the hospital in December for pneumonia and pleural effusion.  He had pleural effusion removed was treated with IV ceftriaxone for 6 weeks.  After that he had a CAT scan done by ID and message was left at my office as a FYI that patient is referred 20 pulmonologist.  Patient has not seen the pulmonologist yet       Past recap  patient is here for Medicare wellness exam  Patient is here for follow-up on diabetes hypertension high cholesterol end-stage renal disease on hemodialysis  Overall patient is doing well  He needs refills on fenofibric gabapentin anxiety  His neuropathy is stable  Staying active and going to gym.    Patient is here for follow-up on diabetes hypertension high cholesterol  He is not on any aspirin or any blood thinner because he bleeds during dialysis  He is worried about his stents closing up  Overall he is doing well  He manages his diabetes by taking extra insulin whenever he eats at home.  He does not watch his diet  He needs diabetic shoes because he has neuropathy and having some calluses buildup    Past recap   patient is here for follow-up  Follow-up on diabetes hypertension high cholesterol  Doing hemodialysis for end-stage renal disease     patient is here for follow-up  His fistula still has bleeding off-and-on but not as much  Follow-up on diabetes hypertension high cholesterol  He is getting dialysis 3 times a week  Overall doing fine      patient is here for hospital follow-up  He was admitted for bleeding AV fistula.  Required blood transfusion.  Needs refill on gabapentin and Zetia  Complains of having stiffness in the hands  Doing  hemodialysis 3 times a week    patient was hospitalized and had another heart attack at [facility]  He is still complaining of having a lot of shortness of breath and cough  His kidney functions did deteriorate but because he is making urine did not get started on the dialysis yet  Is doing blood work today to reevaluate his kidney function  But his main concern is his cough     Patient went to the hospital and had MI  He was catheterized again and had stent placed  Since he came home he is having very poor appetite not eating drinking not feeling good and blood pressure is running high in 180s and 200s  He had stent placed 3 weeks ago     Patient here for follow-up on diabetes hypertension high cholesterol chronic kidney disease and anemia  Follow-up on blood work  Patient was hospitalized for another non-ST elevation MI  Since discharge his chest pain is doing better but he still getting off-and-on chest pain  Feels very anxious  Feels very limited in his activity        Patient is here with complaints of rectal bleed this morning he started bleeding did not realize his bleeding until it messed up his underwear and pants. He had similar episode 5 days ago  He is having some discomfort in the lower abdomen denies any fever or chills  He had problems with hemorrhoids many years ago. But he has no pain and denies constipation     Patient is here for hospital follow-up  He presented with chest pain and acute MI. He had to have cardiac cath done with close monitoring of kidney function  Cardiac stable  Patient had stent placed and did not require dialysis  He is here for follow-up on blood work for kidney  His blood sugars were doing better in the hospital. Now they are running high again  He is getting Procrit every 2 weeks            Virtual visit  Patient here for follow-up on diabetes   Patient is eating little better but not able to exercise because  Blood sugars are running high  The pressure is running  "little high but she is coming to see Dr. Ardon on Thursday  He is managing with his insulin blood sugar little better  Finished radiation treatment for prostate cancer  Works outdoors  Did blood work needs medications refilled   refuses to see the dietitian  Does not take Humalog insulin because of fear has appointment with the urologist        Review of Systems    Objective   /70   Ht 1.727 m (5' 8\")   Wt 74.8 kg (165 lb)   BMI 25.09 kg/m²     Physical Exam  Vitals reviewed.   Constitutional:       Appearance: Normal appearance.   HENT:      Head: Normocephalic and atraumatic.      Right Ear: Tympanic membrane, ear canal and external ear normal.      Left Ear: Tympanic membrane, ear canal and external ear normal.      Nose: Nose normal.      Mouth/Throat:      Pharynx: Oropharynx is clear.   Eyes:      Extraocular Movements: Extraocular movements intact.      Conjunctiva/sclera: Conjunctivae normal.      Pupils: Pupils are equal, round, and reactive to light.   Cardiovascular:      Rate and Rhythm: Normal rate and regular rhythm.      Pulses: Normal pulses.      Heart sounds: Normal heart sounds.   Pulmonary:      Effort: Pulmonary effort is normal.      Breath sounds: Rales present.   Abdominal:      General: Abdomen is flat. Bowel sounds are normal.      Palpations: Abdomen is soft.   Musculoskeletal:      Cervical back: Normal range of motion and neck supple.   Skin:     General: Skin is warm and dry.      Comments: Callus on the bottom of the feet   Neurological:      General: No focal deficit present.      Mental Status: He is alert and oriented to person, place, and time.   Psychiatric:         Mood and Affect: Mood normal.         Assessment/Plan   Problem List Items Addressed This Visit          Pulmonary and Pneumonias    Pleural effusion    Relevant Orders    Initiate Request to another  Facility or Exempt Unit ([facility], [facility], Hospice) (Completed)     Other Visit " Diagnoses       Dysphagia, unspecified type        Relevant Orders    Initiate Request to another  Facility or Exempt Unit ([facility], [facility], Hospice) (Completed)          10/14  Call Dr. Palmer  He is trying to arrange outpatient follow-up for cardiac catheterization and carotid stent which she needs  Because of his anemia and severe kidney disease he is planning in stages  He asked me to order CBC BMP and he will call tomorrow to schedule for Next week discussed with the patient and the wife agreed with the plan  Advised him to go to the emergency room if symptoms persist  Continue diet exercise follow-up in 3 months     11/2  Concerned that patient might be getting anemic or his kidney function would be getting worse  Stat CBC BMP ordered  Increase Coreg to 2 tablets twice a day  Blood work results reviewed  Kidney functions are in fact better anemia stable  Patient could be not feeling good because of elevated blood pressure  Recheck in 2 weeks     9/9  Will get x-ray chest  Tessalon cough drops albuterol inhaler  Cholesterol medication refill  Blood pressure is stable  Will see if kidney functions looks okay to hold off dialysis  Patient wife is planning to be able to go to [place] before start of dialysis     5/1/2023  Will order blood work to make sure anemia is stable  Patient is under care of endocrinologist for diabetes  He is under care of nephrologist for kidneys  He follows up with cardiology regularly and stable  Refills given on iron and gabapentin and Zetia     5/19/2023  Clinically patient is stable blood pressure is stable  Routine blood work ordered for 3 months  His diabetes is doing better  Medications refilled    8/18/2023  Blood work reviewed  A1c still high  Blood pressure stable  Cholesterol okay  Discussed different food options  Patient eats a lot of processed food and to eat out a lot  Discussed better options  Continue current medications  Follow-up blood work  in 3 months    11/10/2023  Blood work reviewed  Triglycerides have gone up to 388  A1c still 7.7 patient is under care of endocrinologist  Start fenofibrate  Discussed high triglycerides this will put him at risk for blockage in coronary arteries at this time  Again discussed diet and exercise  Follow-up blood work in 3 months    2/12/2024  Blood work reviewed  A1c down to 6.9 anemia stable  Cholesterol good but triglycerides always higher  Follow-up blood work in 3 months continue current medications  Patient is doing dialysis    5/31/2024  Blood pressure stable  Cardiac status is stable.  Patient denies any angina  A1c 7.1 well-controlled  Hemoglobin 9.1 stable gets epo shots  Triglycerides elevated  Discussed diet and exercise  Stable on current medications  Diabetic shoes ordered for diabetic neuropathy and callus issues  Follow-up blood work in 3 months    8/19/2024  Patient's blood work reviewed  A1c 6.5 well-controlled  Cholesterol well-controlled triglycerides slightly elevated  Chronic anemia stable  End-stage renal disease on hemodialysis  Medications refilled  Neuropathy stable with gabapentin  Follow-up blood work in 3 months    11/11/2024  Blood work reviewed  A1c down to 6.3  Triglycerides 172 cholesterol well-controlled  CAD stable  Blood pressure repeat reading stable  Dialysis scheduled for tomorrow which helps with his blood pressure  Medications refilled  Follow-up in 3 months    2/10/2025  Medicare annual wellness exam  Mini-Mental status perfect  Blood pressure stable  Cardiac status stable  Anemia doing better  Continue with the hemodialysis  Follow-up blood work in 3 months    8/17/2025  CAT scan results reviewed.  Patient has significant fluid level effusion  In December patient had exudative pleural fluid  Concern for pneumonia again  Advised patient to be hospitalized as he needs immediate attention.  He needs possibly VATS of Pleurx catheter which can be only done inpatient  Patient agreed  and patient was admitted at [facility]  Direct admission arranged

## 2025-03-24 LAB
FUNGUS SPEC CULT: NORMAL
FUNGUS SPEC FUNGUS STN: NORMAL
GLUCOSE BLD MANUAL STRIP-MCNC: 107 MG/DL (ref 74–99)
GLUCOSE BLD MANUAL STRIP-MCNC: 149 MG/DL (ref 74–99)
GLUCOSE BLD MANUAL STRIP-MCNC: 165 MG/DL (ref 74–99)

## 2025-03-24 PROCEDURE — 82947 ASSAY GLUCOSE BLOOD QUANT: CPT

## 2025-03-24 PROCEDURE — 2500000002 HC RX 250 W HCPCS SELF ADMINISTERED DRUGS (ALT 637 FOR MEDICARE OP, ALT 636 FOR OP/ED): Performed by: INTERNAL MEDICINE

## 2025-03-24 PROCEDURE — 2060000001 HC INTERMEDIATE ICU ROOM DAILY

## 2025-03-24 PROCEDURE — 97161 PT EVAL LOW COMPLEX 20 MIN: CPT | Mod: GP | Performed by: PHYSICAL THERAPIST

## 2025-03-24 PROCEDURE — 99233 SBSQ HOSP IP/OBS HIGH 50: CPT | Performed by: STUDENT IN AN ORGANIZED HEALTH CARE EDUCATION/TRAINING PROGRAM

## 2025-03-24 PROCEDURE — 2500000001 HC RX 250 WO HCPCS SELF ADMINISTERED DRUGS (ALT 637 FOR MEDICARE OP): Performed by: INTERNAL MEDICINE

## 2025-03-24 PROCEDURE — 97535 SELF CARE MNGMENT TRAINING: CPT | Mod: GO

## 2025-03-24 PROCEDURE — 99231 SBSQ HOSP IP/OBS SF/LOW 25: CPT | Performed by: INTERNAL MEDICINE

## 2025-03-24 PROCEDURE — 2500000004 HC RX 250 GENERAL PHARMACY W/ HCPCS (ALT 636 FOR OP/ED): Performed by: INTERNAL MEDICINE

## 2025-03-24 PROCEDURE — 97165 OT EVAL LOW COMPLEX 30 MIN: CPT | Mod: GO

## 2025-03-24 RX ORDER — AMLODIPINE BESYLATE 10 MG/1
10 TABLET ORAL DAILY
Qty: 90 TABLET | Refills: 3 | Status: SHIPPED | OUTPATIENT
Start: 2025-03-24 | End: 2026-03-24

## 2025-03-24 RX ADMIN — ASPIRIN 81 MG: 81 TABLET, COATED ORAL at 09:00

## 2025-03-24 RX ADMIN — DOXYCYCLINE HYCLATE 100 MG: 100 TABLET, FILM COATED ORAL at 09:00

## 2025-03-24 RX ADMIN — ISOSORBIDE MONONITRATE 60 MG: 30 TABLET, EXTENDED RELEASE ORAL at 08:59

## 2025-03-24 RX ADMIN — ATORVASTATIN CALCIUM 80 MG: 80 TABLET, FILM COATED ORAL at 21:31

## 2025-03-24 RX ADMIN — CARVEDILOL 6.25 MG: 6.25 TABLET, FILM COATED ORAL at 09:00

## 2025-03-24 RX ADMIN — INSULIN LISPRO 1 UNITS: 100 INJECTION, SOLUTION INTRAVENOUS; SUBCUTANEOUS at 12:36

## 2025-03-24 RX ADMIN — SITAGLIPTIN 25 MG: 25 TABLET, FILM COATED ORAL at 09:00

## 2025-03-24 RX ADMIN — FLUOCINONIDE: 0.5 CREAM TOPICAL at 09:00

## 2025-03-24 RX ADMIN — PANTOPRAZOLE SODIUM 40 MG: 40 TABLET, DELAYED RELEASE ORAL at 08:59

## 2025-03-24 RX ADMIN — CARVEDILOL 6.25 MG: 6.25 TABLET, FILM COATED ORAL at 17:49

## 2025-03-24 RX ADMIN — GABAPENTIN 300 MG: 300 CAPSULE ORAL at 21:31

## 2025-03-24 RX ADMIN — SEVELAMER CARBONATE 1600 MG: 800 TABLET, FILM COATED ORAL at 17:49

## 2025-03-24 RX ADMIN — SEVELAMER CARBONATE 1600 MG: 800 TABLET, FILM COATED ORAL at 12:36

## 2025-03-24 RX ADMIN — ALLOPURINOL 100 MG: 100 TABLET ORAL at 09:00

## 2025-03-24 RX ADMIN — CEFTRIAXONE 2 G: 2 INJECTION, POWDER, FOR SOLUTION INTRAMUSCULAR; INTRAVENOUS at 17:49

## 2025-03-24 RX ADMIN — EZETIMIBE 10 MG: 10 TABLET ORAL at 09:00

## 2025-03-24 RX ADMIN — FLUOCINONIDE: 0.5 CREAM TOPICAL at 21:35

## 2025-03-24 RX ADMIN — AMLODIPINE BESYLATE 10 MG: 10 TABLET ORAL at 09:00

## 2025-03-24 RX ADMIN — SEVELAMER CARBONATE 1600 MG: 800 TABLET, FILM COATED ORAL at 08:59

## 2025-03-24 RX ADMIN — DOXYCYCLINE HYCLATE 100 MG: 100 TABLET, FILM COATED ORAL at 21:31

## 2025-03-24 SDOH — ECONOMIC STABILITY: TRANSPORTATION INSECURITY: IN THE PAST 12 MONTHS, HAS LACK OF TRANSPORTATION KEPT YOU FROM MEDICAL APPOINTMENTS OR FROM GETTING MEDICATIONS?: NO

## 2025-03-24 SDOH — ECONOMIC STABILITY: HOUSING INSECURITY: IN THE LAST 12 MONTHS, WAS THERE A TIME WHEN YOU WERE NOT ABLE TO PAY THE MORTGAGE OR RENT ON TIME?: NO

## 2025-03-24 SDOH — ECONOMIC STABILITY: HOUSING INSECURITY: AT ANY TIME IN THE PAST 12 MONTHS, WERE YOU HOMELESS OR LIVING IN A SHELTER (INCLUDING NOW)?: NO

## 2025-03-24 SDOH — ECONOMIC STABILITY: FOOD INSECURITY: HOW HARD IS IT FOR YOU TO PAY FOR THE VERY BASICS LIKE FOOD, HOUSING, MEDICAL CARE, AND HEATING?: NOT HARD AT ALL

## 2025-03-24 SDOH — HEALTH STABILITY: PHYSICAL HEALTH: ON AVERAGE, HOW MANY DAYS PER WEEK DO YOU ENGAGE IN MODERATE TO STRENUOUS EXERCISE (LIKE A BRISK WALK)?: 3 DAYS

## 2025-03-24 SDOH — ECONOMIC STABILITY: HOUSING INSECURITY: IN THE PAST 12 MONTHS, HOW MANY TIMES HAVE YOU MOVED WHERE YOU WERE LIVING?: 0

## 2025-03-24 SDOH — HEALTH STABILITY: PHYSICAL HEALTH: ON AVERAGE, HOW MANY MINUTES DO YOU ENGAGE IN EXERCISE AT THIS LEVEL?: 40 MIN

## 2025-03-24 ASSESSMENT — PAIN SCALES - GENERAL
PAINLEVEL_OUTOF10: 0 - NO PAIN

## 2025-03-24 ASSESSMENT — COGNITIVE AND FUNCTIONAL STATUS - GENERAL
WALKING IN HOSPITAL ROOM: A LITTLE
MOBILITY SCORE: 23
CLIMB 3 TO 5 STEPS WITH RAILING: A LITTLE
MOBILITY SCORE: 22
DAILY ACTIVITIY SCORE: 24
CLIMB 3 TO 5 STEPS WITH RAILING: A LITTLE
MOBILITY SCORE: 23
DRESSING REGULAR LOWER BODY CLOTHING: A LITTLE
PERSONAL GROOMING: A LITTLE
DAILY ACTIVITIY SCORE: 22
CLIMB 3 TO 5 STEPS WITH RAILING: A LITTLE
DAILY ACTIVITIY SCORE: 24

## 2025-03-24 ASSESSMENT — ACTIVITIES OF DAILY LIVING (ADL)
ADL_ASSISTANCE: INDEPENDENT
LACK_OF_TRANSPORTATION: NO
HOME_MANAGEMENT_TIME_ENTRY: 8
LACK_OF_TRANSPORTATION: NO

## 2025-03-24 ASSESSMENT — PAIN - FUNCTIONAL ASSESSMENT
PAIN_FUNCTIONAL_ASSESSMENT: 0-10

## 2025-03-24 NOTE — CARE PLAN
The patient's goals for the shift include      The clinical goals for the shift include patient will remain hemodynamically stable throughout shift      Problem: Pain - Adult  Goal: Verbalizes/displays adequate comfort level or baseline comfort level  Outcome: Progressing     Problem: Safety - Adult  Goal: Free from fall injury  Outcome: Progressing     Problem: Discharge Planning  Goal: Discharge to home or other facility with appropriate resources  Outcome: Progressing     Problem: Chronic Conditions and Co-morbidities  Goal: Patient's chronic conditions and co-morbidity symptoms are monitored and maintained or improved  Outcome: Progressing     Problem: Nutrition  Goal: Nutrient intake appropriate for maintaining nutritional needs  Outcome: Progressing     Problem: Fall/Injury  Goal: Not fall by end of shift  Outcome: Progressing  Goal: Be free from injury by end of the shift  Outcome: Progressing  Goal: Verbalize understanding of personal risk factors for fall in the hospital  Outcome: Progressing  Goal: Verbalize understanding of risk factor reduction measures to prevent injury from fall in the home  Outcome: Progressing  Goal: Use assistive devices by end of the shift  Outcome: Progressing  Goal: Pace activities to prevent fatigue by end of the shift  Outcome: Progressing     Problem: Skin  Goal: Decreased wound size/increased tissue granulation at next dressing change  Outcome: Progressing  Goal: Participates in plan/prevention/treatment measures  Outcome: Progressing  Goal: Prevent/manage excess moisture  Outcome: Progressing  Goal: Prevent/minimize sheer/friction injuries  Outcome: Progressing  Goal: Promote/optimize nutrition  Outcome: Progressing  Goal: Promote skin healing  Outcome: Progressing     Problem: Pain  Goal: Takes deep breaths with improved pain control throughout the shift  Outcome: Progressing  Goal: Turns in bed with improved pain control throughout the shift  Outcome: Progressing  Goal:  Walks with improved pain control throughout the shift  Outcome: Progressing  Goal: Performs ADL's with improved pain control throughout shift  Outcome: Progressing  Goal: Participates in PT with improved pain control throughout the shift  Outcome: Progressing  Goal: Free from opioid side effects throughout the shift  Outcome: Progressing  Goal: Free from acute confusion related to pain meds throughout the shift  Outcome: Progressing

## 2025-03-24 NOTE — PROGRESS NOTES
03/24/25 1604   [facility] Disability Status   Are you deaf or do you have serious difficulty hearing? N   Are you blind or do you have serious difficulty seeing, even when wearing glasses? N   Because of a physical, mental, or emotional condition, do you have serious difficulty concentrating, remembering, or making decisions? (5 years old or older) N   Do you have serious difficulty walking or climbing stairs? N   Do you have serious difficulty dressing or bathing? N   Because of a physical, mental, or emotional condition, do you have serious difficulty doing errands alone such as visiting the doctor? N

## 2025-03-24 NOTE — PROGRESS NOTES
"William A Franke \"Narciso\" is a 74 y.o. male on day 2 of admission presenting with No Principal Problem: There is no principal problem currently on the Problem List. Please update the Problem List and refresh..      Subjective   Patient seen and examined.  Resting comfortably in bed.  KIERSTEN.  Chart/labs/meds/notes/imaging/VS reviewed.       Objective          Vitals 24HR  Heart Rate:  [65-67]   Temp:  [36.2 °C (97.1 °F)-37 °C (98.6 °F)]   Resp:  [16-19]   BP: (111-168)/(60-70)   SpO2:  [94 %-98 %]     Intake/Output last 3 Shifts:    Intake/Output Summary (Last 24 hours) at 3/24/2025 1152  Last data filed at 3/24/2025 0900  Gross per 24 hour   Intake 240 ml   Output --   Net 240 ml       Physical Exam  GENERAL: normal appearance. well appearing.  In no acute distress.  HEAD: Normocephalic, atraumatic.  OROPHARYNX: Moist mucosa.  NECK: no JVD, supple.  LUNGS: Clear to auscultation bilaterally.  No wheezes, rales or rhonchi.  CARDIAC: normal S1 and S2; no gallops, rubs or murmurs. Regular rate and rhythm.  EXTREMITIES: No edema.  No swollen joints.  Left arm aVF with positive thrill/bruit  NEURO: Cranial nerves II through XII grossly intact.  No asterixis.  Nonfocal.  SKIN: Skin turgor normal. No rashes or lesions.   PSYCH: Normal affect and behavior.    Scheduled Medications  allopurinol, 100 mg, oral, Daily  amLODIPine, 10 mg, oral, Daily  aspirin, 81 mg, oral, Daily  atorvastatin, 80 mg, oral, Nightly  carvedilol, 6.25 mg, oral, BID  cefTRIAXone, 2 g, intravenous, q24h  docusate sodium, 100 mg, oral, BID  doxycycline, 100 mg, oral, q12h LUKE  ergocalciferol, 50,000 Units, oral, q14 days  ezetimibe, 10 mg, oral, Daily  fluocinonide, , Topical, BID  gabapentin, 300 mg, oral, Nightly  heparin (porcine), 5,000 Units, subcutaneous, q8h LUKE  insulin lispro, 0-5 Units, subcutaneous, TID AC  isosorbide mononitrate ER, 60 mg, oral, Daily  oxygen, 2 L/min, inhalation, q24h  pantoprazole, 40 mg, oral, Daily  polyethylene glycol, 17 " g, oral, Daily  sevelamer carbonate, 1,600 mg, oral, TID  SITagliptin phosphate, 25 mg, oral, Daily      Continuous medications       PRN medications: acetaminophen **OR** acetaminophen **OR** acetaminophen, benzocaine-menthol, dextromethorphan-guaifenesin, guaiFENesin, melatonin, nitroglycerin, ondansetron, oxygen     Relevant Results  Results from last 7 days   Lab Units 03/22/25  0553 03/21/25  0547 03/20/25  0540   WBC AUTO x10*3/uL 7.5 7.9 7.3   HEMOGLOBIN g/dL 11.5* 11.6* 10.7*   HEMATOCRIT % 36.8* 36.2* 33.7*   PLATELETS AUTO x10*3/uL 257 264 268   NEUTROS PCT AUTO %  --   --  67.2   LYMPHS PCT AUTO %  --   --  16.9   MONOS PCT AUTO %  --   --  9.8   EOS PCT AUTO %  --   --  4.8     Results from last 7 days   Lab Units 03/22/25  0553 03/21/25  0547 03/20/25  0540   SODIUM mmol/L 140 138 137   POTASSIUM mmol/L 4.6 4.2 4.9   CHLORIDE mmol/L 96* 97* 96*   CO2 mmol/L 29 28 28   BUN mg/dL 47* 31* 52*   CREATININE mg/dL 9.85* 7.05* 9.06*   GLUCOSE mg/dL 98 98 88   CALCIUM mg/dL 10.1 9.7 9.9       No orders to display            Assessment/Plan      Bill A Franke is a 74 y.o. male with a past medical history of ESRD on hemodialysis Tuesday Thursday Saturday via left arm aVF at [facility] under Dr. Gomez, hypertension, HFpEF, recent MSSA bacteremia in December 2024 and an exudative pleural effusion who underwent a thoracentesis on 12/27/2024.  Repeat CT imaging on 2/17/2025 showed a persistent moderate to large right-sided effusion.  He was admitted to [facility] on 3/18.  Had a thoracentesis on 3/19 with 950 cc of bloody fluid aspirated.  Negative cytology and cultures.  He was transferred to [facility] for VATS procedure tentatively planned for Wednesday.  Nephrology is consulted for ESRD care.  Mr. Franke has acceptable electrolytes and volume status.  He was last dialyzed on Saturday.  There is no acute indication for dialysis today.  We will plan on IHD tomorrow per his routine schedule.  His hemoglobin is at target  therefore no need for erythropoietin.  Will check a phosphorus level.  His blood pressures are controlled.  Nephrology will follow with his care.        Assessment & Plan  Anemia    CAD (coronary artery disease)    ESRD on hemodialysis (Multi)    Benign essential hypertension    Gastroesophageal reflux disease    Anxiety    Pleural effusion    Hydropneumothorax         I spent 40 minutes in the professional and overall care of this patient.      Hilario Garner, DO

## 2025-03-24 NOTE — PROGRESS NOTES
"Physical Therapy    Physical Therapy Evaluation    Patient Name: William A Franke \"Narciso\"  MRN: [id number]  Department: [place] [id number]  Room: [address] [place] [address]  Today's Date: 3/24/2025   Time Calculation  Start Time: 1119  Stop Time: 1133  Time Calculation (min): 14 min    Completion of this session and documentation performed under the supervision of Raquel Murdock PT.    Assessment/Plan   PT Assessment  PT Assessment Results: Decreased endurance, Impaired balance, Decreased mobility  Rehab Prognosis: Excellent  Barriers to Discharge Home: No anticipated barriers  Evaluation/Treatment Tolerance: Patient tolerated treatment well  Strengths: Ability to acquire knowledge, Access to adaptive/assistive products, Attitude of self, Capable of completing ADLs semi/independent, Housing layout, Insight into problems  Barriers to Participation: Comorbidities  End of Session Communication: Bedside nurse  Assessment Comment: 74 year-old M presents with generalized weakness and mild unsteadiness; can benefit from skilled PT intervention to assist with discharge planning and address the aforementioned issues to enable him to return to his prior level of function, which was indep at home. Pt scored 21 on Tinetti indicating moderate fall risk. Patient is undergoing VATS procedure on Wednesday, will follow up Thursday or Friday post VATS.   End of Session Patient Position: Up in chair, Alarm off, not on at start of session  IP OR SWING BED PT PLAN  Inpatient or Swing Bed: Inpatient  PT Plan  Treatment/Interventions: Transfer training, Gait training, Endurance training, Therapeutic activity  PT Plan: Ongoing PT  PT Frequency: 2 times per week  PT Discharge Recommendations: Low intensity level of continued care  Equipment Recommended upon Discharge: Other (comment) (none)  PT Recommended Transfer Status: Independent  PT - OK to Discharge: Yes    Subjective   General Visit Information:  General  Reason for Referral: 74 y.o. male presenting with " increasing shortness of breath and cough. At [facility] patient had thoracocentesis done which again was exudative.  Started on antibiotic thoracic surgery consulted. They recommended VATS but thoracic surgery is not available at [facility] patient was transferred to [facility]. Pt reporting increased weakness and fatigue.  Referred By: VERÓNICA Webb-CNP  Past Medical History Relevant to Rehab:   Past Medical History:   Diagnosis Date    Chronic kidney disease     Diabetes mellitus (Multi)     ESRD (end stage renal disease) on dialysis (Multi)     Heart murmur     HLD (hyperlipidemia)     Hypertension     Personal history of other endocrine, nutritional and metabolic disease     History of hypercholesterolemia    Personal history of other specified conditions 08/28/2020    History of chest pain     Past Surgical History:   Procedure Laterality Date    AV FISTULA PLACEMENT Left 07/31/2024    left brachial artery to axillary vein AV graft on 7/31/2024    KNEE ARTHROSCOPY W/ DEBRIDEMENT  04/15/2013    Arthroscopy Knee    SHOULDER SURGERY  04/15/2013    Shoulder Surgery     Family/Caregiver Present: No  Co-Treatment: OT  Co-Treatment Reason: overlapping eval with OT to maximize safety and participation.  Prior to Session Communication: Bedside nurse  Patient Position Received: Up in chair, Alarm off, not on at start of session  Preferred Learning Style: auditory, kinesthetic  General Comment: Pt presents up in chair working with OT, pleasant and cooperative with PT; reporting increased weakness  Home Living:  Home Living  Type of Home: House  Lives With: Spouse  Home Adaptive Equipment: Cane  Home Layout: One level, Laundry in basement, Stairs to alternate level with rails  Alternate Level Stairs-Rails: Right  Alternate Level Stairs-Number of Steps: ~10 (1 flight)  Home Access: Stairs to enter without rails  Entrance Stairs-Rails: None  Entrance Stairs-Number of Steps: 1  Bathroom Shower/Tub:  Tub/shower unit (1 flight of stairs down to basement; second shower on first floor-WIS)  Bathroom Toilet: Standard  Bathroom Equipment: None  Prior Level of Function:  Prior Function Per Pt/Caregiver Report  Level of [place]: Independent with ADLs and functional transfers, Independent with homemaking with ambulation  Receives Help From: Family (spouse helps at home, brother lives nearby to assist at home after d/c)  Prior Function Comments: denies h/o falls; no AD at baseline  Precautions:  Precautions  Medical Precautions: Fall precautions         Objective   Pain:  Pain Assessment  Pain Assessment: 0-10  0-10 (Numeric) Pain Score: 0 - No pain  Cognition:  Cognition  Overall Cognitive Status: Within Functional Limits  Orientation Level: Oriented X4  Safety/Judgement: Within Functional Limits  Insight: Within function limits    General Assessments:  General Observation  General Observation: pt reports max distance he can ambulate is within room, if he needed to walk down the miguel he would need to use a RW, however, states he prefers to not use AD upon d/c at home       Activity Tolerance  Endurance: Tolerates 10 - 20 min exercise with multiple rests  Activity Tolerance Comments: pt reports mild fatigue and weakness after walking ~20'    Static Sitting Balance  Static Sitting-Balance Support: No upper extremity supported, Feet supported  Static Sitting-Level of Assistance: Independent  Static Sitting-Comment/Number of Minutes: up in chair  Dynamic Sitting Balance  Dynamic Sitting-Balance Support: No upper extremity supported, Feet supported  Dynamic Sitting-Level of Assistance: Independent  Dynamic Sitting-Balance: Trunk control activities  Dynamic Sitting-Comments: up in chair    Static Standing Balance  Static Standing-Balance Support: No upper extremity supported  Static Standing-Level of Assistance: Distant supervision  Dynamic Standing Balance  Dynamic Standing-Balance Support: No upper extremity  supported  Dynamic Standing-Level of Assistance: Close supervision  Dynamic Standing-Balance: Turning  Functional Assessments:  Bed Mobility  Bed Mobility: Yes  Bed Mobility 1  Bed Mobility 1: Supine to sitting, Sitting to supine  Level of Assistance 1: Independent  Bed Mobility Comments 1: bed flat with no difficulty reported by pt    Transfers  Transfer: Yes  Transfer 1  Transfer From 1: Sit to, Stand to  Transfer to 1: Sit, Stand  Technique 1: Sit to stand, Stand to sit  Transfer Level of Assistance 1: Modified independent (uses arm rests to push off)    Ambulation/Gait Training  Ambulation/Gait Training Performed: Yes  Ambulation/Gait Training 1  Surface 1: Level tile  Device 1: No device  Assistance 1: Close supervision  Quality of Gait 1: Decreased step length, Forward flexed posture  Comments/Distance (ft) 1: ambulated ~40' within room; pt had mild imbalances with frequent use of bed rails and bed side tables to use for balance    Stairs  Stairs: No  Extremity/Trunk Assessments:        RLE   RLE : Within Functional Limits  LLE   LLE : Within Functional Limits  Outcome Measures:  [facility] Basic Mobility  Turning from your back to your side while in a flat bed without using bedrails: None  Moving from lying on your back to sitting on the side of a flat bed without using bedrails: None  Moving to and from bed to chair (including a wheelchair): None  Standing up from a chair using your arms (e.g. wheelchair or bedside chair): None  To walk in hospital room: A little  Climbing 3-5 steps with railing: A little  Basic Mobility - Total Score: 22    Tinetti  Sitting Balance: Steady, safe  Arises: Able, uses arms to help  Attempts to Arise: Able to arise, one attempt  Immediate Standing Balance (First 5 Seconds): Steady without walker or other support  Standing Balance: Steady but wide stance, uses cane or other support  Nudged: Steady without walker or other support  Eyes Closed: Steady  Turned 360 Degrees: Steadiness:  Steady  Turned 360 Degrees: Continuity of Steps: Discontinuous steps  Sitting Down: Uses arms or not a smooth motion  Balance Score: 12  Initiation of Gait: No hesitancy  Step Height: R Swing Foot: Right foot complete clears floor  Step Length: R Swing Foot: Passes left stance foot  Step Height: L Swing Foot: Left foot complete clears floor  Step Length: L Swing Foot: Passes right stance foot  Step Symmetry: Right and left step appear equal  Step Continuity: Steps appear continuous  Path: Mild/moderate deviation or uses walking aid  Trunk: No sway but flexion of knees or back or spreads arms out while walking  Walking Time: Heels apart  Gait Score: 9  Total Score: 21    Encounter Problems       Encounter Problems (Active)       Balance       STG - Maintains dynamic standing balance without upper extremity support indep for >5 min       Start:  03/24/25       INTERVENTIONS:1. Practice standing with minimal support.2. Educate patient about standing tolerance.3. Educate patient about independence with gait, transfers, and ADL's.4. Educate patient about use of assistive device.5. Educate patient about self-directed care.         STG - Maintains static standing balance without upper extremity support indep for >10 min       Start:  03/24/25       INTERVENTIONS:1. Practice standing with minimal support.2. Educate patient about maintaining total hip precautions while maintaining balance.3. Educate patient about standing tolerance.4. Educate patient about independence with gait, transfers, and ADL's.5. Educate patient about use of assistive device.6. Educate patient about self-directed care.            Mobility       STG - Patient will ambulate 200' indep with LRAD or no assistive device for household ambulation       Start:  03/24/25            STG - Patient will ascend and descend 1step indep to enter home       Start:  03/24/25               Safety       Patient will increase Tinetti score =>24 to reduce fall risk        Start:  03/24/25                   Education Documentation  Body Mechanics, taught by ADALGISA Yanes at 3/24/2025  1:35 PM.  Learner: Patient  Readiness: Acceptance  Method: Explanation  Response: Verbalizes Understanding  Comment: See above    Mobility Training, taught by ADALGISA Yanes at 3/24/2025  1:35 PM.  Learner: Patient  Readiness: Acceptance  Method: Explanation  Response: Verbalizes Understanding  Comment: See above    Education Comments  No comments found.

## 2025-03-24 NOTE — PROGRESS NOTES
"William A Franke \"Narciso\" is a 74 y.o. male on day 2 of admission presenting with No Principal Problem: There is no principal problem currently on the Problem List. Please update the Problem List and refresh..    Subjective   C/o exertional fatigue without SOB       Objective     Physical Exam  Constitutional:       General: He is not in acute distress.     Appearance: Normal appearance. He is not ill-appearing.   Cardiovascular:      Rate and Rhythm: Normal rate and regular rhythm.   Pulmonary:      Effort: Pulmonary effort is normal.      Breath sounds: Normal breath sounds.   Abdominal:      General: There is no distension.      Palpations: Abdomen is soft.      Tenderness: There is no abdominal tenderness.   Skin:     General: Skin is warm and dry.   Neurological:      General: No focal deficit present.      Mental Status: He is alert and oriented to person, place, and time.   Psychiatric:         Mood and Affect: Mood normal.         Behavior: Behavior normal.       Last Recorded Vitals  Blood pressure 130/64, pulse 65, temperature 36.6 °C (97.9 °F), temperature source Temporal, resp. rate 17, height 1.727 m (5' 8\"), weight 66.2 kg (146 lb), SpO2 98%.  Intake/Output last 3 Shifts:  No intake/output data recorded.    Relevant Results  Scheduled medications  allopurinol, 100 mg, oral, Daily  amLODIPine, 10 mg, oral, Daily  aspirin, 81 mg, oral, Daily  atorvastatin, 80 mg, oral, Nightly  carvedilol, 6.25 mg, oral, BID  cefTRIAXone, 2 g, intravenous, q24h  docusate sodium, 100 mg, oral, BID  doxycycline, 100 mg, oral, q12h LUKE  ergocalciferol, 50,000 Units, oral, q14 days  ezetimibe, 10 mg, oral, Daily  fluocinonide, , Topical, BID  gabapentin, 300 mg, oral, Nightly  heparin (porcine), 5,000 Units, subcutaneous, q8h LUKE  insulin lispro, 0-5 Units, subcutaneous, TID AC  isosorbide mononitrate ER, 60 mg, oral, Daily  oxygen, 2 L/min, inhalation, q24h  pantoprazole, 40 mg, oral, Daily  polyethylene glycol, 17 g, oral, " Daily  sevelamer carbonate, 1,600 mg, oral, TID  SITagliptin phosphate, 25 mg, oral, Daily      Continuous medications     PRN medications  PRN medications: acetaminophen **OR** acetaminophen **OR** acetaminophen, benzocaine-menthol, dextromethorphan-guaifenesin, guaiFENesin, melatonin, nitroglycerin, ondansetron, oxygen    LABS:  CMP:  Results from last 7 days   Lab Units 03/22/25  0553 03/21/25  0547 03/20/25  0540 03/18/25  0523   SODIUM mmol/L 140 138 137 141   POTASSIUM mmol/L 4.6 4.2 4.9 4.9   CHLORIDE mmol/L 96* 97* 96* 103   CO2 mmol/L 29 28 28 28   ANION GAP mmol/L 20 17 18 15   BUN mg/dL 47* 31* 52* 58*   CREATININE mg/dL 9.85* 7.05* 9.06* 9.54*   EGFR mL/min/1.73m*2 5* 7* 6* 5*   ALBUMIN g/dL  --   --  3.6 3.3*   ALT U/L  --   --  9* 8*   AST U/L  --   --  15 12   BILIRUBIN TOTAL mg/dL  --   --  0.5 0.4     CBC:  Results from last 7 days   Lab Units 03/22/25  0553 03/21/25  0547 03/20/25  0540 03/19/25 2222 03/18/25  0523   WBC AUTO x10*3/uL 7.5 7.9 7.3 7.0 7.3   HEMOGLOBIN g/dL 11.5* 11.6* 10.7* 10.4* 9.1*   HEMATOCRIT % 36.8* 36.2* 33.7* 32.7* 29.1*   PLATELETS AUTO x10*3/uL 257 264 268 244 262   MCV fL 90 89 89 90 91     COAG:     HEME/ENDO:     CARDIAC:       No orders to display          Assessment/Plan   Assessment & Plan  Anemia    CAD (coronary artery disease)    ESRD on hemodialysis (Multi)    Benign essential hypertension    Gastroesophageal reflux disease    Anxiety    Pleural effusion    Hydropneumothorax    Bill Franke is a 73yo male who presented with cough and SOB to his PCP. From there, he was directly admitted to [facility] for planned thoracentesis d/t reaccumulation of Rt pleural effusion, and treated for acute hypoxic respiratory failure d/t PNA s/p abx, now resolved. Pt was transferred to [facility] for thoracic surgery availability. PMHx exudative pleural effusion (drained 12/2024), ESRD (Lt AVF), HFpEF, HTN, hx MSSA bacteremia (12/2024). Patient has (+) workplace exposure  as he was an  for a [employer]. Thoracic surgery consulted for surgical evaluation of recurrent Rt pleural effusion.    # Recurrent Rt pleural effusion  - Case confirmed for VATS biopsy +/- PleurX +/- decortication on Wednesday 3/26  - PT/OT    Discussed with Dr. Herrera via telephone.    Time spent in patient encounter may include but are not limited to: review of external notes, prescription drug management, management of social determinants of health, order and/or independent review and interpretation of test results, discussion with other services/medical professionals, and discussion regarding major surgery, hospitalization and/or level of care needed, and/or code status.    LEVEL OF COMPLEXITY: low    TIME: 25 minutes         Charo Huggins PA-C

## 2025-03-24 NOTE — H&P
"History Of Present Illness  William A Franke \"Narciso\" is a 74 y.o. male presenting with  increasing shortness of breath and cough.  Patient has history of end-stage renal disease on hemodialysis Tuesday Thursday Saturday, hypertension, diabetes, coronary artery disease, CHF, left arm AV fistula, anemia,  .  In December 2024 patient was admitted for transudative pleural effusion s/p thoracocentesis on 12/27/2024.  NIDA negative for endocarditis.  MSSA bacteremia source unclear negative indium scan.  Treated with IV cefazolin for 6 weeks.  He had a CAT scan done on 2/14/2025 by ID which showed reaccumulation of right pleural effusion.  Patient followed up with me yesterday in the office complaining of increasing cough and increasing shortness of breath.  Patient was directly admitted for his symptoms and to repeat thoracocentesis and decide if patient will need Pleurx catheter or pleurodesis.    At [facility] patient had thoracocentesis done which again was exudative.  Started on antibiotic thoracic surgery consulted.  They recommended VATS but thoracic surgery is not available at [facility] patient was transferred to [facility].  Patient refused to go to [facility]     Past Medical History  Past Medical History:   Diagnosis Date    Chronic kidney disease     Diabetes mellitus (Multi)     ESRD (end stage renal disease) on dialysis (Multi)     Heart murmur     HLD (hyperlipidemia)     Hypertension     Personal history of other endocrine, nutritional and metabolic disease     History of hypercholesterolemia    Personal history of other specified conditions 08/28/2020    History of chest pain       Surgical History  Past Surgical History:   Procedure Laterality Date    AV FISTULA PLACEMENT Left 07/31/2024    left brachial artery to axillary vein AV graft on 7/31/2024    KNEE ARTHROSCOPY W/ DEBRIDEMENT  04/15/2013    Arthroscopy Knee    SHOULDER SURGERY  04/15/2013    Shoulder Surgery        Social History  He reports " that he has never smoked. He has never used smokeless tobacco. He reports that he does not currently use alcohol. He reports that he does not use drugs.    Family History  Family History   Problem Relation Name Age of Onset    Alzheimer's disease Mother      Heart attack Mother      Diabetes Father      Heart attack Father          Allergies  Patient has no known allergies.    Review of Systems   Constitutional:  Negative for activity change, appetite change, chills, diaphoresis, fatigue, fever and unexpected weight change.   HENT:  Negative for congestion, dental problem, drooling, ear discharge, ear pain, facial swelling, hearing loss, mouth sores, nosebleeds, postnasal drip, rhinorrhea, sinus pressure, sinus pain, sneezing, sore throat, tinnitus, trouble swallowing and voice change.    Eyes:  Negative for photophobia, pain, discharge, redness, itching and visual disturbance.   Respiratory:  Negative for apnea, cough, choking, chest tightness, shortness of breath, wheezing and stridor.    Cardiovascular:  Negative for chest pain, palpitations and leg swelling.   Gastrointestinal:  Negative for abdominal distention, abdominal pain, anal bleeding, blood in stool, constipation, diarrhea, nausea, rectal pain and vomiting.   Endocrine: Negative for cold intolerance, heat intolerance, polydipsia, polyphagia and polyuria.   Genitourinary:  Negative for decreased urine volume, difficulty urinating, dysuria, enuresis, flank pain, frequency, genital sores, hematuria and urgency.   Musculoskeletal:  Negative for arthralgias, back pain, gait problem, joint swelling, myalgias, neck pain and neck stiffness.   Skin:  Negative for color change, pallor, rash and wound.   Allergic/Immunologic: Negative for environmental allergies, food allergies and immunocompromised state.   Neurological:  Negative for dizziness, tremors, seizures, syncope, facial asymmetry, speech difficulty, weakness, light-headedness, numbness and headaches.  "  Hematological:  Negative for adenopathy. Does not bruise/bleed easily.   Psychiatric/Behavioral:  Negative for agitation, behavioral problems, confusion, decreased concentration, dysphoric mood, hallucinations, self-injury, sleep disturbance and suicidal ideas. The patient is not nervous/anxious and is not hyperactive.         Physical Exam  Vitals reviewed.   Constitutional:       Appearance: Normal appearance.   HENT:      Head: Normocephalic and atraumatic.      Right Ear: Tympanic membrane, ear canal and external ear normal.      Left Ear: Tympanic membrane, ear canal and external ear normal.      Nose: Nose normal.      Mouth/Throat:      Pharynx: Oropharynx is clear.   Eyes:      Extraocular Movements: Extraocular movements intact.      Conjunctiva/sclera: Conjunctivae normal.      Pupils: Pupils are equal, round, and reactive to light.   Cardiovascular:      Rate and Rhythm: Normal rate and regular rhythm.      Pulses: Normal pulses.      Heart sounds: Normal heart sounds.   Pulmonary:      Effort: Pulmonary effort is normal.      Breath sounds: Normal breath sounds.   Abdominal:      General: Abdomen is flat. Bowel sounds are normal.      Palpations: Abdomen is soft.   Musculoskeletal:      Cervical back: Normal range of motion and neck supple.   Skin:     General: Skin is warm and dry.   Neurological:      General: No focal deficit present.      Mental Status: He is alert and oriented to person, place, and time.   Psychiatric:         Mood and Affect: Mood normal.          Last Recorded Vitals  Blood pressure 124/67, pulse 65, temperature 36.2 °C (97.1 °F), resp. rate 18, height 1.727 m (5' 8\"), weight 66.2 kg (146 lb), SpO2 98%.    Relevant Results        Scheduled medications  allopurinol, 100 mg, oral, Daily  amLODIPine, 10 mg, oral, Daily  aspirin, 81 mg, oral, Daily  atorvastatin, 80 mg, oral, Nightly  carvedilol, 6.25 mg, oral, BID  cefTRIAXone, 2 g, intravenous, q24h  docusate sodium, 100 mg, oral, " BID  doxycycline, 100 mg, oral, q12h LUKE  ergocalciferol, 50,000 Units, oral, q14 days  ezetimibe, 10 mg, oral, Daily  fluocinonide, , Topical, BID  gabapentin, 300 mg, oral, Nightly  heparin (porcine), 5,000 Units, subcutaneous, q8h LUKE  insulin lispro, 0-5 Units, subcutaneous, TID AC  isosorbide mononitrate ER, 60 mg, oral, Daily  oxygen, 2 L/min, inhalation, q24h  pantoprazole, 40 mg, oral, Daily  polyethylene glycol, 17 g, oral, Daily  sevelamer carbonate, 1,600 mg, oral, TID  SITagliptin phosphate, 25 mg, oral, Daily      Continuous medications     PRN medications  PRN medications: acetaminophen **OR** acetaminophen **OR** acetaminophen, benzocaine-menthol, dextromethorphan-guaifenesin, guaiFENesin, melatonin, nitroglycerin, ondansetron, oxygen  Results for orders placed or performed during the hospital encounter of 03/22/25 (from the past 24 hours)   POCT GLUCOSE   Result Value Ref Range    POCT Glucose 100 (H) 74 - 99 mg/dL   POCT GLUCOSE   Result Value Ref Range    POCT Glucose 115 (H) 74 - 99 mg/dL   POCT GLUCOSE   Result Value Ref Range    POCT Glucose 134 (H) 74 - 99 mg/dL     *Note: Due to a large number of results and/or encounters for the requested time period, some results have not been displayed. A complete set of results can be found in Results Review.     No results found.       Assessment/Plan   Assessment & Plan  Pleural effusion    Hydropneumothorax    Anemia    Anxiety    Benign essential hypertension    CAD (coronary artery disease)    Gastroesophageal reflux disease    ESRD on hemodialysis (Multi)      Thoracic surgery consulted  Consult nephrology to continue dialysis  Continue home medications  Continue monitoring blood sugars  See orders for details       I spent  minutes in the professional and overall care of this patient.      Marianna Galloway MD

## 2025-03-24 NOTE — PROGRESS NOTES
"Occupational Therapy    Evaluation/Treatment    Patient Name: William A Franke \"Narciso\"  MRN: [id number]  Department: [place] [id number]  Room: [address] [place] [address]  Today's Date: 03/24/25  Time Calculation  Start Time: 1057  Stop Time: 1125  Time Calculation (min): 28 min       Assessment:  OT Assessment: Pt with high emotions requiring coping skills at beginning of OT session. Pt reporting weakness and decreased activity tolerance. Pt functioning close to baseline for ADLs, SBA for safety.  Prognosis: Good  Barriers to Discharge Home: No anticipated barriers  Evaluation/Treatment Tolerance: Patient tolerated treatment well, Patient limited by fatigue  Medical Staff Made Aware: Yes  End of Session Communication: Bedside nurse, Physician  End of Session Patient Position: Up in chair, Alarm off, not on at start of session (RN notified of alarm off, PT present with pt)  OT Assessment Results: Decreased ADL status, Decreased endurance, Decreased functional mobility, Decreased IADLs  Prognosis: Good  Barriers to Discharge: None  Evaluation/Treatment Tolerance: Patient tolerated treatment well, Patient limited by fatigue  Medical Staff Made Aware: Yes  Strengths: Ability to acquire knowledge, Access to adaptive/assistive products, Attitude of self, Capable of completing ADLs semi/independent, Coping skills, Financial security, Housing layout, Insight into problems, Leisure activity  Barriers to Participation: Comorbidities  Plan:  Treatment Interventions: ADL retraining, UE strengthening/ROM, Endurance training, Compensatory technique education  OT Frequency: 2 times per week  OT Discharge Recommendations: No OT needed after discharge  Equipment Recommended upon Discharge:  (none)  OT Recommended Transfer Status: Assist of 1, Stand by assist  OT - OK to Discharge: Yes  Treatment Interventions: ADL retraining, UE strengthening/ROM, Endurance training, Compensatory technique education    Subjective   Current Problem:  1. Hydropneumothorax  Case " Request Operating Room: THORACOSCOPY, VIDEO-ASSISTED, DECORTICATION, LUNG, INSERTION, CATHETER, PLEURAL    Case Request Operating Room: THORACOSCOPY, VIDEO-ASSISTED, DECORTICATION, LUNG, INSERTION, CATHETER, PLEURAL      2. Pleural effusion  Case Request Operating Room: THORACOSCOPY, VIDEO-ASSISTED, DECORTICATION, LUNG, INSERTION, CATHETER, PLEURAL    Case Request Operating Room: THORACOSCOPY, VIDEO-ASSISTED, DECORTICATION, LUNG, INSERTION, CATHETER, PLEURAL        General:   OT Received On: 03/24/25  General  Reason for Referral: 74 y.o. male presenting with  increasing shortness of breath and cough. At [facility] patient had thoracocentesis done which again was exudative.  Started on antibiotic thoracic surgery consulted.  They recommended VATS but thoracic surgery is not available at [facility] [place], operated by [facility] patient was transferred to [facility]. Pt reporting increased weakness and fatigue.  Referred By: VERÓNICA Webb-CNP  Past Medical History Relevant to Rehab:   Past Medical History:   Diagnosis Date    Chronic kidney disease     Diabetes mellitus (Multi)     ESRD (end stage renal disease) on dialysis (Multi)     Heart murmur     HLD (hyperlipidemia)     Hypertension     Personal history of other endocrine, nutritional and metabolic disease     History of hypercholesterolemia    Personal history of other specified conditions 08/28/2020    History of chest pain     Family/Caregiver Present: No  Co-Treatment: PT  Co-Treatment Reason: overlapping eval with PT to maximize safety and participation.  Prior to Session Communication: Bedside nurse  Patient Position Received: Up in chair, Alarm off, not on at start of session (pt cleared by RN to have alarm off, pt is SBA)  Preferred Learning Style: auditory, kinesthetic  General Comment: Pt agreeable to OT, reporting increased weakness   Precautions:  Medical Precautions: No known precautions/limitation    Pain:  Pain Assessment  Pain Assessment: 0-10  0-10  (Numeric) Pain Score: 0 - No pain    Objective   Cognition:  Overall Cognitive Status: Within Functional Limits  Orientation Level: Oriented X4  Safety/Judgement: Within Functional Limits  Insight: Within function limits     Home Living:  Type of Home: House  Lives With: Spouse  Home Adaptive Equipment: Cane  Home Layout: One level, Laundry in basement, Stairs to alternate level with rails  Alternate Level Stairs-Rails: Right  Alternate Level Stairs-Number of Steps: flight  Home Access: Stairs to enter without rails  Entrance Stairs-Rails: None  Entrance Stairs-Number of Steps: 1  Bathroom Shower/Tub: Tub/shower unit (flight down to basement; second shower on 1st floor- WIS)  Bathroom Toilet: Standard  Bathroom Equipment: None  Prior Function:  Level of [place]: Independent with ADLs and functional transfers, Independent with homemaking with ambulation  Receives Help From: Family (spouse, brother lives 10 mi., pt reports brother may assist at d/c home)  ADL Assistance: Independent  Homemaking Assistance: Independent (shares with spouse)  Ambulatory Assistance: Independent (no AD)  Vocational: Retired  Prior Function Comments: denies h/o falls     ADL:  Grooming Assistance: Stand by  ADL Comments: pt completing ADLs with SBA  Activities of Daily Living: Grooming  Grooming Level of Assistance: Distant supervision  Grooming Where Assessed: Standing sinkside  Grooming Comments: pt brushed his teeth standing at the sink, pt able to tolerate standing for 3-4 minutes  Activity Tolerance:  Endurance: Tolerates 10 - 20 min exercise with multiple rests  Activity Tolerance Comments: pt reporting fatigue with standing activities following 3-5 mintues     Bed Mobility/Transfers: Transfers  Transfer: Yes  Transfer 1  Technique 1: Sit to stand, Stand to sit  Transfer Level of Assistance 1: Modified independent    Functional Mobility:  Functional Mobility  Functional Mobility Performed: Yes  Functional Mobility 1  Surface 1:  Level tile  Device 1: No device  Assistance 1: Distant supervision  Comments 1: pt completed functional ambulation in room and to the bathroom with SBA, no LOB, slower gait     Standing Balance:  Static Standing Balance  Static Standing-Balance Support: No upper extremity supported  Static Standing-Level of Assistance: Distant supervision  Sensation:  Light Touch: No apparent deficits  Strength:  Strength Comments: SAMI WINEMERITA's 4/5MMT     Perception:  Inattention/Neglect: Appears intact  Coordination:  Movements are Fluid and Coordinated: Yes   Hand Function:  Hand Function  Gross Grasp: Functional  Coordination: Functional  Extremities: RUE   RUE : Within Functional Limits and LUE   LUE: Within Functional Limits    Outcome Measures: [facility] Daily Activity  Putting on and taking off regular lower body clothing: A little  Bathing (including washing, rinsing, drying): None  Putting on and taking off regular upper body clothing: None  Toileting, which includes using toilet, bedpan or urinal: None  Taking care of personal grooming such as brushing teeth: A little  Eating Meals: None  Daily Activity - Total Score: 22    Education Documentation  Body Mechanics, taught by Cheyenne Estrella OT at 3/24/2025 12:13 PM.  Learner: Patient  Readiness: Acceptance  Method: Explanation, Demonstration  Response: Verbalizes Understanding, Demonstrated Understanding    ADL Training, taught by Cheyenne Estrella OT at 3/24/2025 12:13 PM.  Learner: Patient  Readiness: Acceptance  Method: Explanation, Demonstration  Response: Verbalizes Understanding, Demonstrated Understanding    Education Comments  No comments found.           Goals:  Encounter Problems       Encounter Problems (Active)       ADLs       Patient with complete lower body dressing with modified independent level of assistance donning and doffing all LE clothes.       Start:  03/24/25            Patient will complete daily grooming tasks brushing teeth and washing face/hair with modified  independent level of assistance and PRN adaptive equipment while standing.       Start:  03/24/25               MOBILITY       Patient will perform Functional mobility mod  Household distances/Community Distances with modified independent level of assistance and least restrictive device in order to improve safety and functional mobility.       Start:  03/24/25

## 2025-03-24 NOTE — CARE PLAN
The patient's goals for the shift include      The clinical goals for the shift include maintain pt safety    Over the shift, the patient did not make progress toward the following goals. Barriers to progression include . Recommendations to address these barriers include   Problem: Pain - Adult  Goal: Verbalizes/displays adequate comfort level or baseline comfort level  Outcome: Progressing     Problem: Safety - Adult  Goal: Free from fall injury  Outcome: Progressing     Problem: Nutrition  Goal: Nutrient intake appropriate for maintaining nutritional needs  Outcome: Progressing     Problem: Discharge Planning  Goal: Discharge to home or other facility with appropriate resources  Outcome: Progressing     Problem: Fall/Injury  Goal: Not fall by end of shift  Outcome: Progressing  Goal: Be free from injury by end of the shift  Outcome: Progressing  Goal: Verbalize understanding of personal risk factors for fall in the hospital  Outcome: Progressing  Goal: Verbalize understanding of risk factor reduction measures to prevent injury from fall in the home  Outcome: Progressing  Goal: Use assistive devices by end of the shift  Outcome: Progressing  Goal: Pace activities to prevent fatigue by end of the shift  Outcome: Progressing   .

## 2025-03-24 NOTE — PROGRESS NOTES
"Pharmacy Medication History     Source of Information: patient/wife at bedside    Additional concerns with the patient's PTA list.   N/a  The following updates were made to the Prior to Admission medication list:     Medications ADDED:   N/a  Medications CHANGED:  N/a  Medications REMOVED:   N/a  Medications NOT TAKING:   N/a    Allergy reviewed : Yes    Meds 2 Beds : No    Outpatient pharmacy confirmed and updated in chart : Yes    Pharmacy name: [facility] [place]     The list below reflectives the updated PTA list. Please review each medication in order reconciliation for additional clarification and justification.    Prior to Admission Medications   Prescriptions Last Dose Informant   B complex-vitamin C-folic acid (Nephro-Shea) 0.8 mg tablet Unknown Self, Spouse/Significant Other   Sig: Take 1 tablet by mouth once daily.   BD Ultra-Fine Mini Pen Needle 31 gauge x 3/16\" needle  Self, Spouse/Significant Other   Sig: USE AS DIRECTED 4 times a day   Januvia 25 mg tablet Unknown Self, Spouse/Significant Other   Sig: Take 1 tablet (25 mg) by mouth once daily.   allopurinol (Zyloprim) 100 mg tablet Unknown Self, Spouse/Significant Other   Sig: Take 1 tablet (100 mg) by mouth once daily.   amLODIPine (Norvasc) 10 mg tablet Unknown Self, Spouse/Significant Other   Sig: Take 1 tablet (10 mg) by mouth once daily.   aspirin 81 mg EC tablet Unknown Self, Spouse/Significant Other   Sig: Take 1 tablet (81 mg) by mouth once daily.   atorvastatin (Lipitor) 80 mg tablet Unknown Self, Spouse/Significant Other   Sig: Take 1 tablet (80 mg) by mouth once daily at bedtime.   carvedilol (Coreg) 6.25 mg tablet Unknown Self, Spouse/Significant Other   Sig: Take 1 tablet (6.25 mg) by mouth 2 times daily (morning and late afternoon).   cefTRIAXone (Rocephin) 2 gram/50 mL IV Unknown Self, Spouse/Significant Other   Sig: Infuse 50 mL (2 g) at 100 mL/hr over 30 minutes into a venous catheter once every 24 hours.   doxycycline (Vibramycin) " 100 mg capsule Unknown Self, Spouse/Significant Other   Sig: Take 1 capsule (100 mg) by mouth every 12 hours for 3 doses. Take with at least 8 ounces (large glass) of water, do not lie down for 30 minutes after   ergocalciferol (Vitamin D-2) 1.25 MG (71513 UT) capsule Unknown Self, Spouse/Significant Other   Sig: Take 1 capsule (50,000 Units) by mouth every 14 (fourteen) days. Takes every other Sunday   ezetimibe (Zetia) 10 mg tablet Unknown Self, Spouse/Significant Other   Sig: Take 1 tablet (10 mg) by mouth once daily.   fluocinonide (Lidex) 0.05 % ointment Unknown Self, Spouse/Significant Other   Sig: Apply topically 2 times a day.   gabapentin (Neurontin) 300 mg capsule Unknown Self, Spouse/Significant Other   Sig: Take 1 capsule (300 mg) by mouth once daily at bedtime.   insulin lispro (HumaLOG) 100 unit/mL injection Unknown Self, Spouse/Significant Other   Sig: Inject 10 Units under the skin 3 times daily (morning, midday, late afternoon).   isosorbide mononitrate ER (Imdur) 60 mg 24 hr tablet Unknown Self, Spouse/Significant Other   Sig: Take 1 tablet (60 mg) by mouth once daily. Do not crush or chew.   nitroglycerin (Nitrostat) 0.4 mg SL tablet Unknown Self, Spouse/Significant Other   Sig: Place 1 tablet (0.4 mg) under the tongue every 5 minutes if needed for chest pain.   oxygen (O2) gas therapy Unknown Self, Spouse/Significant Other   Sig: Inhale 2 L/min once every 24 hours.   pantoprazole (ProtoNix) 40 mg EC tablet Unknown Self, Spouse/Significant Other   Sig: Take 1 tablet (40 mg) by mouth once daily.   sevelamer carbonate (Renvela) 800 mg tablet Unknown Self, Spouse/Significant Other   Sig: Take 2 tablets (1,600 mg) by mouth 3 times daily (morning, midday, late afternoon). Three times daily with meals   triamcinolone (Kenalog) 0.5 % cream Unknown Self, Spouse/Significant Other   Sig: Apply topically 3 times a day.      Facility-Administered Medications: None       The list below reflectives the updated  allergy list. Please review each documented allergy for additional clarification and justification.    No Known Allergies       03/24/25 at 1:39 PM - [place]

## 2025-03-24 NOTE — PROGRESS NOTES
03/24/25 1605   Discharge Planning   Living Arrangements Spouse/significant other   Support Systems Spouse/significant other   Assistance Needed Independent, drives   Type of Residence Private residence   Number of Stairs to Enter Residence 1   Number of Stairs Within Residence 0   Do you have animals or pets at home? No   Who is requesting discharge planning? Provider   Home or Post Acute Services None   Expected Discharge Disposition Home   Does the patient need discharge transport arranged? Yes   RoundTrip coordination needed? Yes   Has discharge transport been arranged? No   Financial Resource Strain   How hard is it for you to pay for the very basics like food, housing, medical care, and heating? Not hard   Housing Stability   In the last 12 months, was there a time when you were not able to pay the mortgage or rent on time? N   In the past 12 months, how many times have you moved where you were living? 0   At any time in the past 12 months, were you homeless or living in a shelter (including now)? N   Transportation Needs   In the past 12 months, has lack of transportation kept you from medical appointments or from getting medications? no   In the past 12 months, has lack of transportation kept you from meetings, work, or from getting things needed for daily living? No   Patient Choice   Provider Choice list and CMS website (https://medicare.gov/care-compare#search) for post-acute Quality and Resource Measure Data were provided and reviewed with: Patient   Patient / Family choosing to utilize agency / facility established prior to hospitalization No   Stroke Family Assessment   Stroke Family Assessment Needed No   Intensity of Service   Intensity of Service 0-30 min        Met with patient at bedside and explained my role as care coordinator. He lives in the house with his wife. He is independent with his care at home. He drives. Patient denies use of any ambulatory devices. No oxygen in use at home.  Patient is dialysis patient and he goes to [facility] on T-Th-Sat and his chair time is 5 am. His PCP is Dr. Marianna Gallowya and he seen her yesterday. Pharmacy he uses is [facility] in [place]. He is able to afford medications and to get to his doctors appointments. Patient came with hydropneumothorax and is scheduled for right VATS on Wednesday. Patient denies any needs going home.

## 2025-03-24 NOTE — CARE PLAN
The patient's goals for the shift include      The clinical goals for the shift include patient will remain hemodynamically stable throughout shift      Problem: Pain - Adult  Goal: Verbalizes/displays adequate comfort level or baseline comfort level  3/24/2025 1048 by Betty Smiley RN  Outcome: Progressing  3/24/2025 1046 by Betty Smiley RN  Outcome: Progressing     Problem: Safety - Adult  Goal: Free from fall injury  3/24/2025 1048 by Betty Smiley RN  Outcome: Progressing  3/24/2025 1046 by Betty Smiley RN  Outcome: Progressing     Problem: Discharge Planning  Goal: Discharge to home or other facility with appropriate resources  3/24/2025 1048 by Betty Smiley RN  Outcome: Progressing  3/24/2025 1046 by Betty Smiley RN  Outcome: Progressing     Problem: Chronic Conditions and Co-morbidities  Goal: Patient's chronic conditions and co-morbidity symptoms are monitored and maintained or improved  3/24/2025 1048 by Betty Smiley RN  Outcome: Progressing  3/24/2025 1046 by Betty Smiley RN  Outcome: Progressing     Problem: Nutrition  Goal: Nutrient intake appropriate for maintaining nutritional needs  3/24/2025 1048 by Betty Smiley RN  Outcome: Progressing  3/24/2025 1046 by Betty Smiley RN  Outcome: Progressing     Problem: Fall/Injury  Goal: Not fall by end of shift  3/24/2025 1048 by Betty Smiley RN  Outcome: Progressing  3/24/2025 1046 by Betty Smiley RN  Outcome: Progressing  Goal: Be free from injury by end of the shift  3/24/2025 1048 by Betty Smiley RN  Outcome: Progressing  3/24/2025 1046 by Betty Smiley RN  Outcome: Progressing  Goal: Verbalize understanding of personal risk factors for fall in the hospital  3/24/2025 1048 by Betty Smiley RN  Outcome: Progressing  3/24/2025 1046 by Betty Smiley RN  Outcome: Progressing  Goal: Verbalize understanding of risk factor reduction measures to prevent injury from fall in the home  3/24/2025 1048 by Betty Smiley RN  Outcome:  Progressing  3/24/2025 1046 by Betty Smiley RN  Outcome: Progressing  Goal: Use assistive devices by end of the shift  3/24/2025 1048 by Betty Smiley RN  Outcome: Progressing  3/24/2025 1046 by Betty Smiley RN  Outcome: Progressing  Goal: Pace activities to prevent fatigue by end of the shift  3/24/2025 1048 by Betty Smiley RN  Outcome: Progressing  3/24/2025 1046 by Betty Smiley RN  Outcome: Progressing     Problem: Skin  Goal: Decreased wound size/increased tissue granulation at next dressing change  3/24/2025 1048 by Betty Smiley RN  Outcome: Progressing  3/24/2025 1046 by Betty Smiley RN  Outcome: Progressing  Goal: Participates in plan/prevention/treatment measures  3/24/2025 1048 by Betty Smiley RN  Outcome: Progressing  3/24/2025 1046 by Betty Smiley RN  Outcome: Progressing  Goal: Prevent/manage excess moisture  3/24/2025 1048 by Betty Smiley RN  Outcome: Progressing  3/24/2025 1046 by Betty Smiley RN  Outcome: Progressing  Goal: Prevent/minimize sheer/friction injuries  3/24/2025 1048 by Betty Smiley RN  Outcome: Progressing  3/24/2025 1046 by Betty Smiley RN  Outcome: Progressing  Goal: Promote/optimize nutrition  3/24/2025 1048 by Betty Smiley RN  Outcome: Progressing  3/24/2025 1046 by Betty Smiley RN  Outcome: Progressing  Goal: Promote skin healing  3/24/2025 1048 by Betty Smiley RN  Outcome: Progressing  3/24/2025 1046 by Betty Smiley RN  Outcome: Progressing     Problem: Pain  Goal: Takes deep breaths with improved pain control throughout the shift  3/24/2025 1048 by Betty Smiley RN  Outcome: Progressing  3/24/2025 1046 by Betty Smiley RN  Outcome: Progressing  Goal: Turns in bed with improved pain control throughout the shift  3/24/2025 1048 by Betty Smiley RN  Outcome: Progressing  3/24/2025 1046 by Betty Smiley RN  Outcome: Progressing  Goal: Walks with improved pain control throughout the shift  3/24/2025 1048 by Betty Smiley RN  Outcome:  Progressing  3/24/2025 1046 by Betty Smiley RN  Outcome: Progressing  Goal: Performs ADL's with improved pain control throughout shift  3/24/2025 1048 by Betty Smiley RN  Outcome: Progressing  3/24/2025 1046 by Betty Smiley RN  Outcome: Progressing  Goal: Participates in PT with improved pain control throughout the shift  3/24/2025 1048 by Betty Smiley RN  Outcome: Progressing  3/24/2025 1046 by Betty Smiley RN  Outcome: Progressing  Goal: Free from opioid side effects throughout the shift  3/24/2025 1048 by Betty mSiley RN  Outcome: Progressing  3/24/2025 1046 by Betty Smiley RN  Outcome: Progressing  Goal: Free from acute confusion related to pain meds throughout the shift  3/24/2025 1048 by Betty Smiley RN  Outcome: Progressing  3/24/2025 1046 by Betty Smiley RN  Outcome: Progressing

## 2025-03-25 ENCOUNTER — APPOINTMENT (OUTPATIENT)
Dept: DIALYSIS | Facility: HOSPITAL | Age: 75
End: 2025-03-25
Payer: MEDICARE

## 2025-03-25 ENCOUNTER — ANESTHESIA EVENT (OUTPATIENT)
Dept: OPERATING ROOM | Facility: HOSPITAL | Age: 75
End: 2025-03-25
Payer: MEDICARE

## 2025-03-25 LAB
ANION GAP SERPL CALC-SCNC: 23 MMOL/L (ref 10–20)
BUN SERPL-MCNC: 72 MG/DL (ref 6–23)
CALCIUM SERPL-MCNC: 9.7 MG/DL (ref 8.6–10.3)
CHLORIDE SERPL-SCNC: 92 MMOL/L (ref 98–107)
CO2 SERPL-SCNC: 28 MMOL/L (ref 21–32)
CREAT SERPL-MCNC: 12.75 MG/DL (ref 0.5–1.3)
EGFRCR SERPLBLD CKD-EPI 2021: 4 ML/MIN/1.73M*2
ERYTHROCYTE [DISTWIDTH] IN BLOOD BY AUTOMATED COUNT: 16.4 % (ref 11.5–14.5)
GLUCOSE BLD MANUAL STRIP-MCNC: 109 MG/DL (ref 74–99)
GLUCOSE BLD MANUAL STRIP-MCNC: 116 MG/DL (ref 74–99)
GLUCOSE BLD MANUAL STRIP-MCNC: 181 MG/DL (ref 74–99)
GLUCOSE SERPL-MCNC: 83 MG/DL (ref 74–99)
HCT VFR BLD AUTO: 35.6 % (ref 41–52)
HGB BLD-MCNC: 10.8 G/DL (ref 13.5–17.5)
MCH RBC QN AUTO: 26.7 PG (ref 26–34)
MCHC RBC AUTO-ENTMCNC: 30.3 G/DL (ref 32–36)
MCV RBC AUTO: 88 FL (ref 80–100)
NRBC BLD-RTO: 0 /100 WBCS (ref 0–0)
PLATELET # BLD AUTO: 237 X10*3/UL (ref 150–450)
POTASSIUM SERPL-SCNC: 4.6 MMOL/L (ref 3.5–5.3)
RBC # BLD AUTO: 4.04 X10*6/UL (ref 4.5–5.9)
SODIUM SERPL-SCNC: 138 MMOL/L (ref 136–145)
WBC # BLD AUTO: 8.7 X10*3/UL (ref 4.4–11.3)

## 2025-03-25 PROCEDURE — 2500000004 HC RX 250 GENERAL PHARMACY W/ HCPCS (ALT 636 FOR OP/ED): Performed by: INTERNAL MEDICINE

## 2025-03-25 PROCEDURE — 82947 ASSAY GLUCOSE BLOOD QUANT: CPT

## 2025-03-25 PROCEDURE — 0BNC4ZZ RELEASE RIGHT UPPER LUNG LOBE, PERCUTANEOUS ENDOSCOPIC APPROACH: ICD-10-PCS | Performed by: INTERNAL MEDICINE

## 2025-03-25 PROCEDURE — 99231 SBSQ HOSP IP/OBS SF/LOW 25: CPT | Performed by: INTERNAL MEDICINE

## 2025-03-25 PROCEDURE — 36415 COLL VENOUS BLD VENIPUNCTURE: CPT | Performed by: INTERNAL MEDICINE

## 2025-03-25 PROCEDURE — 8010000001 HC DIALYSIS - HEMODIALYSIS PER DAY

## 2025-03-25 PROCEDURE — 0BDN4ZX EXTRACTION OF RIGHT PLEURA, PERCUTANEOUS ENDOSCOPIC APPROACH, DIAGNOSTIC: ICD-10-PCS | Performed by: INTERNAL MEDICINE

## 2025-03-25 PROCEDURE — 2500000001 HC RX 250 WO HCPCS SELF ADMINISTERED DRUGS (ALT 637 FOR MEDICARE OP): Performed by: INTERNAL MEDICINE

## 2025-03-25 PROCEDURE — 5A1D70Z PERFORMANCE OF URINARY FILTRATION, INTERMITTENT, LESS THAN 6 HOURS PER DAY: ICD-10-PCS | Performed by: INTERNAL MEDICINE

## 2025-03-25 PROCEDURE — 80048 BASIC METABOLIC PNL TOTAL CA: CPT | Performed by: INTERNAL MEDICINE

## 2025-03-25 PROCEDURE — 85027 COMPLETE CBC AUTOMATED: CPT | Performed by: INTERNAL MEDICINE

## 2025-03-25 PROCEDURE — 0B9N40Z DRAINAGE OF RIGHT PLEURA WITH DRAINAGE DEVICE, PERCUTANEOUS ENDOSCOPIC APPROACH: ICD-10-PCS | Performed by: INTERNAL MEDICINE

## 2025-03-25 PROCEDURE — 0BND4ZZ RELEASE RIGHT MIDDLE LUNG LOBE, PERCUTANEOUS ENDOSCOPIC APPROACH: ICD-10-PCS | Performed by: INTERNAL MEDICINE

## 2025-03-25 PROCEDURE — 2500000002 HC RX 250 W HCPCS SELF ADMINISTERED DRUGS (ALT 637 FOR MEDICARE OP, ALT 636 FOR OP/ED): Performed by: INTERNAL MEDICINE

## 2025-03-25 PROCEDURE — 2060000001 HC INTERMEDIATE ICU ROOM DAILY

## 2025-03-25 RX ADMIN — FLUOCINONIDE: 0.5 CREAM TOPICAL at 13:06

## 2025-03-25 RX ADMIN — ATORVASTATIN CALCIUM 80 MG: 80 TABLET, FILM COATED ORAL at 21:17

## 2025-03-25 RX ADMIN — ASPIRIN 81 MG: 81 TABLET, COATED ORAL at 13:05

## 2025-03-25 RX ADMIN — CARVEDILOL 6.25 MG: 6.25 TABLET, FILM COATED ORAL at 13:05

## 2025-03-25 RX ADMIN — DOXYCYCLINE HYCLATE 100 MG: 100 TABLET, FILM COATED ORAL at 13:06

## 2025-03-25 RX ADMIN — SITAGLIPTIN 25 MG: 25 TABLET, FILM COATED ORAL at 13:06

## 2025-03-25 RX ADMIN — CEFTRIAXONE 2 G: 2 INJECTION, POWDER, FOR SOLUTION INTRAMUSCULAR; INTRAVENOUS at 16:44

## 2025-03-25 RX ADMIN — PANTOPRAZOLE SODIUM 40 MG: 40 TABLET, DELAYED RELEASE ORAL at 13:06

## 2025-03-25 RX ADMIN — SEVELAMER CARBONATE 1600 MG: 800 TABLET, FILM COATED ORAL at 16:04

## 2025-03-25 RX ADMIN — FLUOCINONIDE 1 APPLICATION: 0.5 CREAM TOPICAL at 21:22

## 2025-03-25 RX ADMIN — INSULIN LISPRO 1 UNITS: 100 INJECTION, SOLUTION INTRAVENOUS; SUBCUTANEOUS at 16:03

## 2025-03-25 RX ADMIN — CARVEDILOL 6.25 MG: 6.25 TABLET, FILM COATED ORAL at 16:03

## 2025-03-25 RX ADMIN — GUAIFENESIN 600 MG: 600 TABLET ORAL at 21:17

## 2025-03-25 RX ADMIN — ISOSORBIDE MONONITRATE 60 MG: 30 TABLET, EXTENDED RELEASE ORAL at 13:06

## 2025-03-25 RX ADMIN — GABAPENTIN 300 MG: 300 CAPSULE ORAL at 21:18

## 2025-03-25 RX ADMIN — AMLODIPINE BESYLATE 10 MG: 10 TABLET ORAL at 13:05

## 2025-03-25 RX ADMIN — ALLOPURINOL 100 MG: 100 TABLET ORAL at 13:06

## 2025-03-25 RX ADMIN — EZETIMIBE 10 MG: 10 TABLET ORAL at 13:06

## 2025-03-25 RX ADMIN — DOXYCYCLINE HYCLATE 100 MG: 100 TABLET, FILM COATED ORAL at 21:18

## 2025-03-25 ASSESSMENT — PAIN SCALES - GENERAL
PAINLEVEL_OUTOF10: 0 - NO PAIN

## 2025-03-25 ASSESSMENT — COGNITIVE AND FUNCTIONAL STATUS - GENERAL
CLIMB 3 TO 5 STEPS WITH RAILING: A LITTLE
MOBILITY SCORE: 23
DAILY ACTIVITIY SCORE: 24

## 2025-03-25 ASSESSMENT — PAIN - FUNCTIONAL ASSESSMENT
PAIN_FUNCTIONAL_ASSESSMENT: 0-10
PAIN_FUNCTIONAL_ASSESSMENT: NO/DENIES PAIN
PAIN_FUNCTIONAL_ASSESSMENT: 0-10

## 2025-03-25 NOTE — PROCEDURES
"Patient seen on dialysis.  Patient is dialyzing on an F160 x 3.5 hours, BFR//600 mL/minute, 2K bath, 2.5 calcium with a target UF set for 1.5 L as tolerated.  His hemoglobin is at target therefore no need for erythropoietin.  Phosphorus binder is ordered.    /68 (BP Location: Right arm, Patient Position: Lying)   Pulse 57   Temp 35.9 °C (96.6 °F) (Temporal)   Resp 17   Ht 1.727 m (5' 8\")   Wt 66.2 kg (146 lb)   SpO2 95%   BMI 22.20 kg/m²       No current facility-administered medications on file prior to encounter.     Current Outpatient Medications on File Prior to Encounter   Medication Sig Dispense Refill    allopurinol (Zyloprim) 100 mg tablet Take 1 tablet (100 mg) by mouth once daily. 90 tablet 0    amLODIPine (Norvasc) 10 mg tablet Take 1 tablet (10 mg) by mouth once daily.      aspirin 81 mg EC tablet Take 1 tablet (81 mg) by mouth once daily.      atorvastatin (Lipitor) 80 mg tablet Take 1 tablet (80 mg) by mouth once daily at bedtime. 90 tablet 0    B complex-vitamin C-folic acid (Nephro-Shea) 0.8 mg tablet Take 1 tablet by mouth once daily.      BD Ultra-Fine Mini Pen Needle 31 gauge x 316\" needle USE AS DIRECTED 4 times a day 400 each 2    carvedilol (Coreg) 6.25 mg tablet Take 1 tablet (6.25 mg) by mouth 2 times daily (morning and late afternoon). 180 tablet 3    cefTRIAXone (Rocephin) 2 gram/50 mL IV Infuse 50 mL (2 g) at 100 mL/hr over 30 minutes into a venous catheter once every 24 hours.      [] doxycycline (Vibramycin) 100 mg capsule Take 1 capsule (100 mg) by mouth every 12 hours for 3 doses. Take with at least 8 ounces (large glass) of water, do not lie down for 30 minutes after 3 capsule 0    ergocalciferol (Vitamin D-2) 1.25 MG (70266 UT) capsule Take 1 capsule (50,000 Units) by mouth every 14 (fourteen) days. Takes every other       ezetimibe (Zetia) 10 mg tablet Take 1 tablet (10 mg) by mouth once daily. 90 tablet 0    fluocinonide (Lidex) 0.05 % ointment " Apply topically 2 times a day.      gabapentin (Neurontin) 300 mg capsule Take 1 capsule (300 mg) by mouth once daily at bedtime.      insulin lispro (HumaLOG) 100 unit/mL injection Inject 10 Units under the skin 3 times daily (morning, midday, late afternoon).      isosorbide mononitrate ER (Imdur) 60 mg 24 hr tablet Take 1 tablet (60 mg) by mouth once daily. Do not crush or chew. 90 tablet 3    Januvia 25 mg tablet Take 1 tablet (25 mg) by mouth once daily. 90 tablet 3    nitroglycerin (Nitrostat) 0.4 mg SL tablet Place 1 tablet (0.4 mg) under the tongue every 5 minutes if needed for chest pain.      oxygen (O2) gas therapy Inhale 2 L/min once every 24 hours.      pantoprazole (ProtoNix) 40 mg EC tablet Take 1 tablet (40 mg) by mouth once daily. 90 tablet 0    sevelamer carbonate (Renvela) 800 mg tablet Take 2 tablets (1,600 mg) by mouth 3 times daily (morning, midday, late afternoon). Three times daily with meals      triamcinolone (Kenalog) 0.5 % cream Apply topically 3 times a day. 30 g 0    [DISCONTINUED] amLODIPine (Norvasc) 5 mg tablet Take 1 tablet (5 mg) by mouth once daily.

## 2025-03-25 NOTE — PROGRESS NOTES
"Gregory's scheduled for Wednesday William A Franke \"Narciso\" is a 74 y.o. male on day 2 of admission presenting with Hydropneumothorax.    Subjective   Patient breathing okay  Objective     Physical Exam  Vitals reviewed.   Constitutional:       Appearance: Normal appearance.   HENT:      Head: Normocephalic and atraumatic.      Right Ear: Tympanic membrane, ear canal and external ear normal.      Left Ear: Tympanic membrane, ear canal and external ear normal.      Nose: Nose normal.      Mouth/Throat:      Pharynx: Oropharynx is clear.   Eyes:      Extraocular Movements: Extraocular movements intact.      Conjunctiva/sclera: Conjunctivae normal.      Pupils: Pupils are equal, round, and reactive to light.   Cardiovascular:      Rate and Rhythm: Normal rate and regular rhythm.      Pulses: Normal pulses.      Heart sounds: Normal heart sounds.   Pulmonary:      Effort: Pulmonary effort is normal.      Breath sounds: Rales present.   Abdominal:      General: Abdomen is flat. Bowel sounds are normal.      Palpations: Abdomen is soft.   Musculoskeletal:      Cervical back: Normal range of motion and neck supple.   Skin:     General: Skin is warm and dry.   Neurological:      General: No focal deficit present.      Mental Status: He is alert and oriented to person, place, and time.   Psychiatric:         Mood and Affect: Mood normal.         Last Recorded Vitals  Blood pressure 125/66, pulse 68, temperature 36.3 °C (97.3 °F), temperature source Temporal, resp. rate 19, height 1.727 m (5' 8\"), weight 66.2 kg (146 lb), SpO2 96%.  Intake/Output last 3 Shifts:  I/O last 3 completed shifts:  In: 340 (5.1 mL/kg) [P.O.:240; IV Piggyback:100]  Out: - (0 mL/kg)   Weight: 66.2 kg     Relevant Results            This patient currently has cardiac telemetry ordered; if you would like to modify or discontinue the telemetry order, click here to go to the orders activity to modify/discontinue the order.  Scheduled " medications  allopurinol, 100 mg, oral, Daily  amLODIPine, 10 mg, oral, Daily  aspirin, 81 mg, oral, Daily  atorvastatin, 80 mg, oral, Nightly  carvedilol, 6.25 mg, oral, BID  cefTRIAXone, 2 g, intravenous, q24h  docusate sodium, 100 mg, oral, BID  doxycycline, 100 mg, oral, q12h LUKE  ergocalciferol, 50,000 Units, oral, q14 days  ezetimibe, 10 mg, oral, Daily  fluocinonide, , Topical, BID  gabapentin, 300 mg, oral, Nightly  heparin (porcine), 5,000 Units, subcutaneous, q8h LUKE  insulin lispro, 0-5 Units, subcutaneous, TID AC  isosorbide mononitrate ER, 60 mg, oral, Daily  oxygen, 2 L/min, inhalation, q24h  pantoprazole, 40 mg, oral, Daily  polyethylene glycol, 17 g, oral, Daily  sevelamer carbonate, 1,600 mg, oral, TID  SITagliptin phosphate, 25 mg, oral, Daily      Continuous medications     PRN medications  PRN medications: acetaminophen **OR** acetaminophen **OR** acetaminophen, benzocaine-menthol, dextromethorphan-guaifenesin, guaiFENesin, melatonin, nitroglycerin, ondansetron, oxygen  Results for orders placed or performed during the hospital encounter of 03/22/25 (from the past 24 hours)   POCT GLUCOSE   Result Value Ref Range    POCT Glucose 107 (H) 74 - 99 mg/dL   POCT GLUCOSE   Result Value Ref Range    POCT Glucose 165 (H) 74 - 99 mg/dL   POCT GLUCOSE   Result Value Ref Range    POCT Glucose 149 (H) 74 - 99 mg/dL     *Note: Due to a large number of results and/or encounters for the requested time period, some results have not been displayed. A complete set of results can be found in Results Review.   No results found.  Scheduled medications  allopurinol, 100 mg, oral, Daily  amLODIPine, 10 mg, oral, Daily  aspirin, 81 mg, oral, Daily  atorvastatin, 80 mg, oral, Nightly  carvedilol, 6.25 mg, oral, BID  cefTRIAXone, 2 g, intravenous, q24h  docusate sodium, 100 mg, oral, BID  doxycycline, 100 mg, oral, q12h LUKE  ergocalciferol, 50,000 Units, oral, q14 days  ezetimibe, 10 mg, oral, Daily  fluocinonide, ,  Topical, BID  gabapentin, 300 mg, oral, Nightly  heparin (porcine), 5,000 Units, subcutaneous, q8h LUKE  insulin lispro, 0-5 Units, subcutaneous, TID AC  isosorbide mononitrate ER, 60 mg, oral, Daily  oxygen, 2 L/min, inhalation, q24h  pantoprazole, 40 mg, oral, Daily  polyethylene glycol, 17 g, oral, Daily  sevelamer carbonate, 1,600 mg, oral, TID  SITagliptin phosphate, 25 mg, oral, Daily      Continuous medications     PRN medications  PRN medications: acetaminophen **OR** acetaminophen **OR** acetaminophen, benzocaine-menthol, dextromethorphan-guaifenesin, guaiFENesin, melatonin, nitroglycerin, ondansetron, oxygen  Results for orders placed or performed during the hospital encounter of 03/22/25 (from the past 24 hours)   POCT GLUCOSE   Result Value Ref Range    POCT Glucose 107 (H) 74 - 99 mg/dL   POCT GLUCOSE   Result Value Ref Range    POCT Glucose 165 (H) 74 - 99 mg/dL   POCT GLUCOSE   Result Value Ref Range    POCT Glucose 149 (H) 74 - 99 mg/dL     *Note: Due to a large number of results and/or encounters for the requested time period, some results have not been displayed. A complete set of results can be found in Results Review.     No results found.           Assessment/Plan   Assessment & Plan  Pleural effusion    Hydropneumothorax    Anemia    Anxiety    Benign essential hypertension    CAD (coronary artery disease)    Gastroesophageal reflux disease    ESRD on hemodialysis (Multi)      VATS scheduled for Wednesday  Continue antibiotic  Continue oxygen to keep sats above 100%  Dialysis per nephrology  Blood pressure stable  Blood sugar okay           I spent  minutes in the professional and overall care of this patient.      Marianna Galloway MD

## 2025-03-25 NOTE — PROGRESS NOTES
03/25/25 1404   Discharge Planning   Expected Discharge Disposition Home          Patient went to dialysis this morning. He is scheduled for right VATS procedure tomorrow. When patient is medically ready will go home, no needs identified.    1535     Revisited patient at bedside and explained PT/OT rec for low and HHC going home. He agreed and he would like [facility]. Requested from Dr. Galloway to place internal referral to [facility].

## 2025-03-25 NOTE — CARE PLAN
The patient's goals for the shift include      The clinical goals for the shift include pt will remain Hemodynamically stable    Over the shift, the patient did not make progress toward the following goals. Barriers to progression include . Recommendations to address these barriers include   Problem: Safety - Adult  Goal: Free from fall injury  Outcome: Progressing     Problem: Nutrition  Goal: Nutrient intake appropriate for maintaining nutritional needs  Outcome: Progressing     Problem: Fall/Injury  Goal: Not fall by end of shift  Outcome: Progressing  Goal: Be free from injury by end of the shift  Outcome: Progressing  Goal: Verbalize understanding of personal risk factors for fall in the hospital  Outcome: Progressing  Goal: Verbalize understanding of risk factor reduction measures to prevent injury from fall in the home  Outcome: Progressing  Goal: Use assistive devices by end of the shift  Outcome: Progressing  Goal: Pace activities to prevent fatigue by end of the shift  Outcome: Progressing     Problem: Skin  Goal: Decreased wound size/increased tissue granulation at next dressing change  Outcome: Progressing  Goal: Participates in plan/prevention/treatment measures  Outcome: Progressing  Goal: Prevent/manage excess moisture  Outcome: Progressing  Goal: Prevent/minimize sheer/friction injuries  Outcome: Progressing  Goal: Promote/optimize nutrition  Outcome: Progressing  Goal: Promote skin healing  Outcome: Progressing   .

## 2025-03-25 NOTE — PRE-PROCEDURE NOTE
..Report from Sending RN:    Report From: Betty TORIBIO RN  Recent Surgery of Procedure: No  Baseline Level of Consciousness (LOC): A&Ox4  Oxygen Use: No  Type: room air  Diabetic: Yes  Last BP Med Given Day of Dialysis: see MAR  Last Pain Med Given: see MAR  Lab Tests to be Obtained with Dialysis: No  Blood Transfusion to be Given During Dialysis: No  Available IV Access: Yes  Medications to be Administered During Dialysis: No  Continuous IV Infusion Running: No  Restraints on Currently or in the Last 24 Hours: No  Hand-Off Communication: pt is stable to come to dialysis unit for treatment.  Dialysis Catheter Dressing: N/A, pt has left AVF  Last Dressing Change: N/A

## 2025-03-25 NOTE — PROCEDURES
Report to Receiving RN:    Report To: Betty Smiley RN- messaged through [employer]  Time Report Called: [id number]  Hand-Off Communication: HD tx ended 32 minutes early due to clotting in his machine, Dr. Garner is aware, he got off 1L of fluid and his bp started getting low towards the end of dialysis but once his blood was returned he was stable. His post BP was 125/63 65HR. He denies complaints and states he feels fine. VSS  Complications During Treatment: Yes, Clotting; low bp   Ultrafiltration Treatment: No  Medications Administered During Dialysis: No  Blood Products Administered During Dialysis: No  Labs Sent During Dialysis: No  Heparin Drip Rate Changes: No  Dialysis Catheter Dressing: AVG    Electronic Signatures:   (Too Weaver )   Authored:    (Too Weaver )   Authored:     Last Updated: 12:06 PM by TOO WEAVER

## 2025-03-25 NOTE — CARE PLAN
The patient's goals for the shift include      The clinical goals for the shift include patient will remain free from injuries throughout shift      Problem: Safety - Adult  Goal: Free from fall injury  Outcome: Progressing     Problem: Discharge Planning  Goal: Discharge to home or other facility with appropriate resources  Outcome: Progressing     Problem: Chronic Conditions and Co-morbidities  Goal: Patient's chronic conditions and co-morbidity symptoms are monitored and maintained or improved  Outcome: Progressing     Problem: Nutrition  Goal: Nutrient intake appropriate for maintaining nutritional needs  Outcome: Progressing     Problem: Fall/Injury  Goal: Not fall by end of shift  Outcome: Progressing  Goal: Be free from injury by end of the shift  Outcome: Progressing  Goal: Verbalize understanding of personal risk factors for fall in the hospital  Outcome: Progressing  Goal: Verbalize understanding of risk factor reduction measures to prevent injury from fall in the home  Outcome: Progressing  Goal: Use assistive devices by end of the shift  Outcome: Progressing  Goal: Pace activities to prevent fatigue by end of the shift  Outcome: Progressing     Problem: Skin  Goal: Decreased wound size/increased tissue granulation at next dressing change  Outcome: Progressing  Goal: Participates in plan/prevention/treatment measures  Outcome: Progressing  Goal: Prevent/manage excess moisture  Outcome: Progressing  Goal: Prevent/minimize sheer/friction injuries  Outcome: Progressing  Goal: Promote/optimize nutrition  Outcome: Progressing  Goal: Promote skin healing  Outcome: Progressing     Problem: Pain  Goal: Takes deep breaths with improved pain control throughout the shift  Outcome: Progressing  Goal: Turns in bed with improved pain control throughout the shift  Outcome: Progressing  Goal: Walks with improved pain control throughout the shift  Outcome: Progressing  Goal: Performs ADL's with improved pain control  throughout shift  Outcome: Progressing  Goal: Participates in PT with improved pain control throughout the shift  Outcome: Progressing  Goal: Free from opioid side effects throughout the shift  Outcome: Progressing  Goal: Free from acute confusion related to pain meds throughout the shift  Outcome: Progressing

## 2025-03-26 ENCOUNTER — ANESTHESIA (OUTPATIENT)
Dept: OPERATING ROOM | Facility: HOSPITAL | Age: 75
End: 2025-03-26
Payer: MEDICARE

## 2025-03-26 LAB
ABO GROUP (TYPE) IN BLOOD: NORMAL
ACID FAST STN SPEC: NORMAL
ANTIBODY SCREEN: NORMAL
GLUCOSE BLD MANUAL STRIP-MCNC: 122 MG/DL (ref 74–99)
GLUCOSE BLD MANUAL STRIP-MCNC: 80 MG/DL (ref 74–99)
GLUCOSE BLD MANUAL STRIP-MCNC: 84 MG/DL (ref 74–99)
GLUCOSE BLD MANUAL STRIP-MCNC: 96 MG/DL (ref 74–99)
MYCOBACTERIUM SPEC CULT: NORMAL
RH FACTOR (ANTIGEN D): NORMAL

## 2025-03-26 PROCEDURE — 2060000001 HC INTERMEDIATE ICU ROOM DAILY

## 2025-03-26 PROCEDURE — 2500000004 HC RX 250 GENERAL PHARMACY W/ HCPCS (ALT 636 FOR OP/ED): Performed by: INTERNAL MEDICINE

## 2025-03-26 PROCEDURE — 2500000002 HC RX 250 W HCPCS SELF ADMINISTERED DRUGS (ALT 637 FOR MEDICARE OP, ALT 636 FOR OP/ED): Performed by: INTERNAL MEDICINE

## 2025-03-26 PROCEDURE — 99231 SBSQ HOSP IP/OBS SF/LOW 25: CPT | Performed by: INTERNAL MEDICINE

## 2025-03-26 PROCEDURE — 82947 ASSAY GLUCOSE BLOOD QUANT: CPT

## 2025-03-26 PROCEDURE — 36415 COLL VENOUS BLD VENIPUNCTURE: CPT | Performed by: ANESTHESIOLOGY

## 2025-03-26 PROCEDURE — 97530 THERAPEUTIC ACTIVITIES: CPT | Mod: GO

## 2025-03-26 PROCEDURE — 2500000001 HC RX 250 WO HCPCS SELF ADMINISTERED DRUGS (ALT 637 FOR MEDICARE OP): Performed by: INTERNAL MEDICINE

## 2025-03-26 PROCEDURE — 86850 RBC ANTIBODY SCREEN: CPT | Performed by: ANESTHESIOLOGY

## 2025-03-26 RX ADMIN — SEVELAMER CARBONATE 1600 MG: 800 TABLET, FILM COATED ORAL at 12:50

## 2025-03-26 RX ADMIN — CARVEDILOL 6.25 MG: 6.25 TABLET, FILM COATED ORAL at 16:30

## 2025-03-26 RX ADMIN — DOCUSATE SODIUM 100 MG: 100 CAPSULE, LIQUID FILLED ORAL at 20:59

## 2025-03-26 RX ADMIN — DOXYCYCLINE HYCLATE 100 MG: 100 TABLET, FILM COATED ORAL at 21:00

## 2025-03-26 RX ADMIN — DOCUSATE SODIUM 100 MG: 100 CAPSULE, LIQUID FILLED ORAL at 09:13

## 2025-03-26 RX ADMIN — SITAGLIPTIN 25 MG: 25 TABLET, FILM COATED ORAL at 09:14

## 2025-03-26 RX ADMIN — AMLODIPINE BESYLATE 10 MG: 10 TABLET ORAL at 09:13

## 2025-03-26 RX ADMIN — PANTOPRAZOLE SODIUM 40 MG: 40 TABLET, DELAYED RELEASE ORAL at 09:14

## 2025-03-26 RX ADMIN — CEFTRIAXONE 2 G: 2 INJECTION, POWDER, FOR SOLUTION INTRAMUSCULAR; INTRAVENOUS at 16:33

## 2025-03-26 RX ADMIN — FLUOCINONIDE: 0.5 CREAM TOPICAL at 09:16

## 2025-03-26 RX ADMIN — FLUOCINONIDE: 0.5 CREAM TOPICAL at 21:02

## 2025-03-26 RX ADMIN — ATORVASTATIN CALCIUM 80 MG: 80 TABLET, FILM COATED ORAL at 21:00

## 2025-03-26 RX ADMIN — ISOSORBIDE MONONITRATE 60 MG: 30 TABLET, EXTENDED RELEASE ORAL at 09:14

## 2025-03-26 RX ADMIN — ALLOPURINOL 100 MG: 100 TABLET ORAL at 09:14

## 2025-03-26 RX ADMIN — CARVEDILOL 6.25 MG: 6.25 TABLET, FILM COATED ORAL at 09:14

## 2025-03-26 RX ADMIN — GABAPENTIN 300 MG: 300 CAPSULE ORAL at 21:00

## 2025-03-26 RX ADMIN — DOXYCYCLINE HYCLATE 100 MG: 100 TABLET, FILM COATED ORAL at 09:14

## 2025-03-26 RX ADMIN — EZETIMIBE 10 MG: 10 TABLET ORAL at 09:13

## 2025-03-26 RX ADMIN — SEVELAMER CARBONATE 1600 MG: 800 TABLET, FILM COATED ORAL at 16:30

## 2025-03-26 RX ADMIN — SEVELAMER CARBONATE 1600 MG: 800 TABLET, FILM COATED ORAL at 09:13

## 2025-03-26 RX ADMIN — ASPIRIN 81 MG: 81 TABLET, COATED ORAL at 09:13

## 2025-03-26 ASSESSMENT — COGNITIVE AND FUNCTIONAL STATUS - GENERAL
PERSONAL GROOMING: A LITTLE
DAILY ACTIVITIY SCORE: 23
MOBILITY SCORE: 24
DAILY ACTIVITIY SCORE: 24

## 2025-03-26 ASSESSMENT — PAIN SCALES - GENERAL
PAINLEVEL_OUTOF10: 0 - NO PAIN

## 2025-03-26 ASSESSMENT — PAIN - FUNCTIONAL ASSESSMENT
PAIN_FUNCTIONAL_ASSESSMENT: 0-10
PAIN_FUNCTIONAL_ASSESSMENT: 0-10

## 2025-03-26 NOTE — PROGRESS NOTES
"Gregory's scheduled for Wednesday William A Franke \"Narciso\" is a 74 y.o. male on day 3 of admission presenting with Hydropneumothorax.    Subjective   Patient breathing okay SOB on minimal exertion  Objective     Physical Exam  Vitals reviewed.   Constitutional:       Appearance: Normal appearance.   HENT:      Head: Normocephalic and atraumatic.      Right Ear: Tympanic membrane, ear canal and external ear normal.      Left Ear: Tympanic membrane, ear canal and external ear normal.      Nose: Nose normal.      Mouth/Throat:      Pharynx: Oropharynx is clear.   Eyes:      Extraocular Movements: Extraocular movements intact.      Conjunctiva/sclera: Conjunctivae normal.      Pupils: Pupils are equal, round, and reactive to light.   Cardiovascular:      Rate and Rhythm: Normal rate and regular rhythm.      Pulses: Normal pulses.      Heart sounds: Normal heart sounds.   Pulmonary:      Effort: Pulmonary effort is normal.      Breath sounds: Rales present.   Abdominal:      General: Abdomen is flat. Bowel sounds are normal.      Palpations: Abdomen is soft.   Musculoskeletal:      Cervical back: Normal range of motion and neck supple.   Skin:     General: Skin is warm and dry.   Neurological:      General: No focal deficit present.      Mental Status: He is alert and oriented to person, place, and time.   Psychiatric:         Mood and Affect: Mood normal.         Last Recorded Vitals  Blood pressure 101/51, pulse 61, temperature 36.4 °C (97.5 °F), resp. rate 16, height 1.727 m (5' 8\"), weight 66.2 kg (146 lb), SpO2 95%.  Intake/Output last 3 Shifts:  I/O last 3 completed shifts:  In: 1830 (27.6 mL/kg) [P.O.:680; I.V.:600 (9.1 mL/kg); Other:400; IV Piggyback:150]  Out: 1532 (23.1 mL/kg) [Other:1532]  Weight: 66.2 kg     Relevant Results            This patient currently has cardiac telemetry ordered; if you would like to modify or discontinue the telemetry order, click here to go to the orders activity to " modify/discontinue the order.  Scheduled medications  allopurinol, 100 mg, oral, Daily  amLODIPine, 10 mg, oral, Daily  aspirin, 81 mg, oral, Daily  atorvastatin, 80 mg, oral, Nightly  carvedilol, 6.25 mg, oral, BID  cefTRIAXone, 2 g, intravenous, q24h  docusate sodium, 100 mg, oral, BID  doxycycline, 100 mg, oral, q12h LUKE  ergocalciferol, 50,000 Units, oral, q14 days  ezetimibe, 10 mg, oral, Daily  fluocinonide, , Topical, BID  gabapentin, 300 mg, oral, Nightly  heparin (porcine), 5,000 Units, subcutaneous, q8h LUKE  insulin lispro, 0-5 Units, subcutaneous, TID AC  isosorbide mononitrate ER, 60 mg, oral, Daily  oxygen, 2 L/min, inhalation, q24h  pantoprazole, 40 mg, oral, Daily  polyethylene glycol, 17 g, oral, Daily  sevelamer carbonate, 1,600 mg, oral, TID  SITagliptin phosphate, 25 mg, oral, Daily      Continuous medications     PRN medications  PRN medications: acetaminophen **OR** acetaminophen **OR** acetaminophen, benzocaine-menthol, dextromethorphan-guaifenesin, guaiFENesin, melatonin, nitroglycerin, ondansetron, oxygen  Results for orders placed or performed during the hospital encounter of 03/22/25 (from the past 24 hours)   CBC   Result Value Ref Range    WBC 8.7 4.4 - 11.3 x10*3/uL    nRBC 0.0 0.0 - 0.0 /100 WBCs    RBC 4.04 (L) 4.50 - 5.90 x10*6/uL    Hemoglobin 10.8 (L) 13.5 - 17.5 g/dL    Hematocrit 35.6 (L) 41.0 - 52.0 %    MCV 88 80 - 100 fL    MCH 26.7 26.0 - 34.0 pg    MCHC 30.3 (L) 32.0 - 36.0 g/dL    RDW 16.4 (H) 11.5 - 14.5 %    Platelets 237 150 - 450 x10*3/uL   Basic Metabolic Panel   Result Value Ref Range    Glucose 83 74 - 99 mg/dL    Sodium 138 136 - 145 mmol/L    Potassium 4.6 3.5 - 5.3 mmol/L    Chloride 92 (L) 98 - 107 mmol/L    Bicarbonate 28 21 - 32 mmol/L    Anion Gap 23 (H) 10 - 20 mmol/L    Urea Nitrogen 72 (H) 6 - 23 mg/dL    Creatinine 12.75 (H) 0.50 - 1.30 mg/dL    eGFR 4 (L) >60 mL/min/1.73m*2    Calcium 9.7 8.6 - 10.3 mg/dL   POCT GLUCOSE   Result Value Ref Range    POCT  Glucose 116 (H) 74 - 99 mg/dL   POCT GLUCOSE   Result Value Ref Range    POCT Glucose 181 (H) 74 - 99 mg/dL   POCT GLUCOSE   Result Value Ref Range    POCT Glucose 109 (H) 74 - 99 mg/dL     *Note: Due to a large number of results and/or encounters for the requested time period, some results have not been displayed. A complete set of results can be found in Results Review.   No results found.  Scheduled medications  allopurinol, 100 mg, oral, Daily  amLODIPine, 10 mg, oral, Daily  aspirin, 81 mg, oral, Daily  atorvastatin, 80 mg, oral, Nightly  carvedilol, 6.25 mg, oral, BID  cefTRIAXone, 2 g, intravenous, q24h  docusate sodium, 100 mg, oral, BID  doxycycline, 100 mg, oral, q12h LUKE  ergocalciferol, 50,000 Units, oral, q14 days  ezetimibe, 10 mg, oral, Daily  fluocinonide, , Topical, BID  gabapentin, 300 mg, oral, Nightly  heparin (porcine), 5,000 Units, subcutaneous, q8h LUKE  insulin lispro, 0-5 Units, subcutaneous, TID AC  isosorbide mononitrate ER, 60 mg, oral, Daily  oxygen, 2 L/min, inhalation, q24h  pantoprazole, 40 mg, oral, Daily  polyethylene glycol, 17 g, oral, Daily  sevelamer carbonate, 1,600 mg, oral, TID  SITagliptin phosphate, 25 mg, oral, Daily      Continuous medications     PRN medications  PRN medications: acetaminophen **OR** acetaminophen **OR** acetaminophen, benzocaine-menthol, dextromethorphan-guaifenesin, guaiFENesin, melatonin, nitroglycerin, ondansetron, oxygen  Results for orders placed or performed during the hospital encounter of 03/22/25 (from the past 24 hours)   CBC   Result Value Ref Range    WBC 8.7 4.4 - 11.3 x10*3/uL    nRBC 0.0 0.0 - 0.0 /100 WBCs    RBC 4.04 (L) 4.50 - 5.90 x10*6/uL    Hemoglobin 10.8 (L) 13.5 - 17.5 g/dL    Hematocrit 35.6 (L) 41.0 - 52.0 %    MCV 88 80 - 100 fL    MCH 26.7 26.0 - 34.0 pg    MCHC 30.3 (L) 32.0 - 36.0 g/dL    RDW 16.4 (H) 11.5 - 14.5 %    Platelets 237 150 - 450 x10*3/uL   Basic Metabolic Panel   Result Value Ref Range    Glucose 83 74 - 99  mg/dL    Sodium 138 136 - 145 mmol/L    Potassium 4.6 3.5 - 5.3 mmol/L    Chloride 92 (L) 98 - 107 mmol/L    Bicarbonate 28 21 - 32 mmol/L    Anion Gap 23 (H) 10 - 20 mmol/L    Urea Nitrogen 72 (H) 6 - 23 mg/dL    Creatinine 12.75 (H) 0.50 - 1.30 mg/dL    eGFR 4 (L) >60 mL/min/1.73m*2    Calcium 9.7 8.6 - 10.3 mg/dL   POCT GLUCOSE   Result Value Ref Range    POCT Glucose 116 (H) 74 - 99 mg/dL   POCT GLUCOSE   Result Value Ref Range    POCT Glucose 181 (H) 74 - 99 mg/dL   POCT GLUCOSE   Result Value Ref Range    POCT Glucose 109 (H) 74 - 99 mg/dL     *Note: Due to a large number of results and/or encounters for the requested time period, some results have not been displayed. A complete set of results can be found in Results Review.     No results found.           Assessment/Plan   Assessment & Plan  Pleural effusion    Hydropneumothorax    Anemia    Anxiety    Benign essential hypertension    CAD (coronary artery disease)    Gastroesophageal reflux disease    ESRD on hemodialysis (Multi)      VATS scheduled for later today  Continue antibiotic  Continue oxygen to keep sats above 100%  Dialysis per nephrology  Blood pressure stable  Blood sugar okay           I spent  minutes in the professional and overall care of this patient.      Marianna Galloway MD

## 2025-03-26 NOTE — PROGRESS NOTES
03/26/25 1535   Discharge Planning   Expected Discharge Disposition Home H          Revisited patient and his wife at bedside and spoke to them about discharge plan. Patient's biopsy and a procedure was postponed for tomorrow morning. Explained to them about PT/OT rec low and only HHC for home that he would not qualify for placement. Possible pleurx drain placement.

## 2025-03-26 NOTE — ANESTHESIA PREPROCEDURE EVALUATION
"Patient: William A Franke \"Bill\"    Procedure Information       Date/Time: 03/26/25 1500    Procedure: Right Video Assisted Thoracoscopy; Possible Lung Decortication; Possible Pleural Catheter Insertion (Right: Chest) - *Surgeon is available anytime*    Location: [facility] OR 01 / [facility] OR    Surgeons: Sade Herrera, DO            Relevant Problems   Cardiac   (+) Acute non-ST segment elevation myocardial infarction (Multi)   (+) Aortic stenosis   (+) Benign essential hypertension   (+) CAD (coronary artery disease)   (+) Chest pain   (+) Chest pain, unspecified type   (+) Congestive heart failure   (+) Coronary artery disease involving native heart without angina pectoris   (+) Hyperlipidemia   (+) Hypertension   (+) Hypertensive urgency   (+) Murmur, cardiac   (+) Stable angina pectoris due to arteriosclerosis of coronary artery (CMS-HCC)   (+) Stented coronary artery      Pulmonary   (+) LIAM (obstructive sleep apnea)   (+) Pulmonary hypertension (Multi)      Neuro   (+) Anxiety   (+) Peripheral neuropathy      GI   (+) Esophageal reflux   (+) Gastroesophageal reflux disease   (+) Rectal hemorrhage      /Renal   (+) BPH with obstruction/lower urinary tract symptoms   (+) ESRD on dialysis (Multi)   (+) ESRD on hemodialysis (Multi)   (+) Kidney stones   (+) Prostate cancer (Multi)      Hematology   (+) Anemia   (+) Anemia of chronic disease      Musculoskeletal   (+) Osteoarthritis of hands, bilateral      ID   (+) Coronavirus infection     Echo generally wnl  97% on RA  Clinical information reviewed:    Allergies  Meds               NPO Detail:  No data recorded     Physical Exam    Airway  Mallampati: III     Cardiovascular    Dental    Pulmonary    Abdominal            Anesthesia Plan    History of general anesthesia?: yes  History of complications of general anesthesia?: no    ASA 4     general     intravenous induction   Anesthetic plan and risks discussed with patient.      "

## 2025-03-26 NOTE — PROGRESS NOTES
"Occupational Therapy Treatment    Name: William A Franke \"Narciso\"  MRN: [id number]  Department: [place] [address]  Room: [address] [place] [address]  Date: 03/26/25  Time Calculation  Start Time: 1548  Stop Time: 1558  Time Calculation (min): 10 min    Assessment:  OT Assessment: Pt demonstrates understanding of education provided. Recommend d/c to Low intensity  Prognosis: Good  Evaluation/Treatment Tolerance: Patient tolerated treatment well, Patient limited by fatigue  Medical Staff Made Aware: Yes  End of Session Communication: Bedside nurse  End of Session Patient Position: Bed, 2 rail up (CTA)  Plan:  Treatment Interventions: ADL retraining, UE strengthening/ROM, Endurance training, Compensatory technique education  OT Frequency: 2 times per week  OT Discharge Recommendations: No OT needed after discharge  Equipment Recommended upon Discharge:  (none)  OT Recommended Transfer Status: Assist of 1, Stand by assist  OT - OK to Discharge: Yes    Subjective   Previous Visit Info:  OT Last Visit  OT Received On: 03/26/25    General:  General  Reason for Referral: 74 y.o. male presenting with  increasing shortness of breath and cough. At [facility] patient had thoracocentesis done which again was exudative.  Started on antibiotic thoracic surgery consulted.  They recommended VATS but thoracic [facility] is not available at [facility] Pt reporting increased weakness and fatigue.  Referred By: VERÓNICA Webb-CNP  Past Medical History Relevant to Rehab:   Past Medical History:   Diagnosis Date    Chronic kidney disease     Diabetes mellitus (Multi)     ESRD (end stage renal disease) on dialysis (Multi)     Heart murmur     HLD (hyperlipidemia)     Hypertension     Personal history of other endocrine, nutritional and metabolic disease     History of hypercholesterolemia    Personal history of other specified conditions 08/28/2020    History of chest pain      Prior to Session Communication: " Bedside nurse  Patient Position Received: Bed, 2 rail up, Alarm off, not on at start of session  General Comment: Cleared and agreeable to participate in OT    Precautions:  Medical Precautions: Fall precautions     Date/Time Vitals Session Patient Position Pulse Resp SpO2 BP MAP (mmHg)    03/26/25 1605 --  --  58  18  97 %  119/52  74                Pain Assessment:  Pain Assessment  Pain Assessment: 0-10  0-10 (Numeric) Pain Score: 0 - No pain     Objective     Cognition:  Overall Cognitive Status: Within Functional Limits    Activities of Daily Living: LE Dressing  LE Dressing: Yes  Sock Level of Assistance: Modified independent  LE Dressing Where Assessed: Edge of bed  LE Dressing Comments: EOB with ankle over knee technique       Bed Mobility/Transfers: Bed Mobility 1  Bed Mobility 1: Supine to sitting, Sitting to supine  Level of Assistance 1: Independent    Sitting Balance:  Static Sitting Balance  Static Sitting-Level of Assistance: Independent  Dynamic Sitting Balance  Dynamic Sitting-Level of Assistance: Independent     Therapy/Activity: Therapeutic Activity  Therapeutic Activity Performed:  (Session focused on energy conservation training (pace, plan, prioritize, position) and hand out provided)       Outcome Measures:  [facility] Daily Activity  Putting on and taking off regular lower body clothing: None  Bathing (including washing, rinsing, drying): None  Putting on and taking off regular upper body clothing: None  Toileting, which includes using toilet, bedpan or urinal: None  Taking care of personal grooming such as brushing teeth: A little  Eating Meals: None  Daily Activity - Total Score: 23        Education Documentation  Body Mechanics, taught by Molly Lopez OT at 3/26/2025  4:11 PM.  Learner: Family, Patient  Readiness: Acceptance  Method: Explanation  Response: Verbalizes Understanding  Comment: Educated on energy conservation techniques    ADL Training, taught by Molly Lopez OT at  3/26/2025  4:11 PM.  Learner: Family, Patient  Readiness: Acceptance  Method: Explanation  Response: Verbalizes Understanding  Comment: Educated on energy conservation techniques    Education Comments  No comments found.      Goals:  Encounter Problems       Encounter Problems (Active)       ADLs       Patient with complete lower body dressing with modified independent level of assistance donning and doffing all LE clothes. (Met)       Start:  03/24/25    Resolved:  03/26/25         Patient will complete daily grooming tasks brushing teeth and washing face/hair with modified independent level of assistance and PRN adaptive equipment while standing.       Start:  03/24/25               MOBILITY       Patient will perform Functional mobility mod  Household distances/Community Distances with modified independent level of assistance and least restrictive device in order to improve safety and functional mobility.       Start:  03/24/25

## 2025-03-26 NOTE — PROGRESS NOTES
"Gregory's scheduled for Wednesday William A Franke \"Narciso\" is a 74 y.o. male on day 3 of admission presenting with Hydropneumothorax.    Subjective   Patient breathing okay  Objective     Physical Exam  Vitals reviewed.   Constitutional:       Appearance: Normal appearance.   HENT:      Head: Normocephalic and atraumatic.      Right Ear: Tympanic membrane, ear canal and external ear normal.      Left Ear: Tympanic membrane, ear canal and external ear normal.      Nose: Nose normal.      Mouth/Throat:      Pharynx: Oropharynx is clear.   Eyes:      Extraocular Movements: Extraocular movements intact.      Conjunctiva/sclera: Conjunctivae normal.      Pupils: Pupils are equal, round, and reactive to light.   Cardiovascular:      Rate and Rhythm: Normal rate and regular rhythm.      Pulses: Normal pulses.      Heart sounds: Normal heart sounds.   Pulmonary:      Effort: Pulmonary effort is normal.      Breath sounds: Rales present.   Abdominal:      General: Abdomen is flat. Bowel sounds are normal.      Palpations: Abdomen is soft.   Musculoskeletal:      Cervical back: Normal range of motion and neck supple.   Skin:     General: Skin is warm and dry.   Neurological:      General: No focal deficit present.      Mental Status: He is alert and oriented to person, place, and time.   Psychiatric:         Mood and Affect: Mood normal.         Last Recorded Vitals  Blood pressure 101/51, pulse 61, temperature 36.4 °C (97.5 °F), resp. rate 16, height 1.727 m (5' 8\"), weight 66.2 kg (146 lb), SpO2 95%.  Intake/Output last 3 Shifts:  I/O last 3 completed shifts:  In: 1830 (27.6 mL/kg) [P.O.:680; I.V.:600 (9.1 mL/kg); Other:400; IV Piggyback:150]  Out: 1532 (23.1 mL/kg) [Other:1532]  Weight: 66.2 kg     Relevant Results            This patient currently has cardiac telemetry ordered; if you would like to modify or discontinue the telemetry order, click here to go to the orders activity to modify/discontinue the order.  Scheduled " medications  allopurinol, 100 mg, oral, Daily  amLODIPine, 10 mg, oral, Daily  aspirin, 81 mg, oral, Daily  atorvastatin, 80 mg, oral, Nightly  carvedilol, 6.25 mg, oral, BID  cefTRIAXone, 2 g, intravenous, q24h  docusate sodium, 100 mg, oral, BID  doxycycline, 100 mg, oral, q12h LUKE  ergocalciferol, 50,000 Units, oral, q14 days  ezetimibe, 10 mg, oral, Daily  fluocinonide, , Topical, BID  gabapentin, 300 mg, oral, Nightly  heparin (porcine), 5,000 Units, subcutaneous, q8h LUKE  insulin lispro, 0-5 Units, subcutaneous, TID AC  isosorbide mononitrate ER, 60 mg, oral, Daily  oxygen, 2 L/min, inhalation, q24h  pantoprazole, 40 mg, oral, Daily  polyethylene glycol, 17 g, oral, Daily  sevelamer carbonate, 1,600 mg, oral, TID  SITagliptin phosphate, 25 mg, oral, Daily      Continuous medications     PRN medications  PRN medications: acetaminophen **OR** acetaminophen **OR** acetaminophen, benzocaine-menthol, dextromethorphan-guaifenesin, guaiFENesin, melatonin, nitroglycerin, ondansetron, oxygen  Results for orders placed or performed during the hospital encounter of 03/22/25 (from the past 24 hours)   CBC   Result Value Ref Range    WBC 8.7 4.4 - 11.3 x10*3/uL    nRBC 0.0 0.0 - 0.0 /100 WBCs    RBC 4.04 (L) 4.50 - 5.90 x10*6/uL    Hemoglobin 10.8 (L) 13.5 - 17.5 g/dL    Hematocrit 35.6 (L) 41.0 - 52.0 %    MCV 88 80 - 100 fL    MCH 26.7 26.0 - 34.0 pg    MCHC 30.3 (L) 32.0 - 36.0 g/dL    RDW 16.4 (H) 11.5 - 14.5 %    Platelets 237 150 - 450 x10*3/uL   Basic Metabolic Panel   Result Value Ref Range    Glucose 83 74 - 99 mg/dL    Sodium 138 136 - 145 mmol/L    Potassium 4.6 3.5 - 5.3 mmol/L    Chloride 92 (L) 98 - 107 mmol/L    Bicarbonate 28 21 - 32 mmol/L    Anion Gap 23 (H) 10 - 20 mmol/L    Urea Nitrogen 72 (H) 6 - 23 mg/dL    Creatinine 12.75 (H) 0.50 - 1.30 mg/dL    eGFR 4 (L) >60 mL/min/1.73m*2    Calcium 9.7 8.6 - 10.3 mg/dL   POCT GLUCOSE   Result Value Ref Range    POCT Glucose 116 (H) 74 - 99 mg/dL   POCT GLUCOSE    Result Value Ref Range    POCT Glucose 181 (H) 74 - 99 mg/dL   POCT GLUCOSE   Result Value Ref Range    POCT Glucose 109 (H) 74 - 99 mg/dL     *Note: Due to a large number of results and/or encounters for the requested time period, some results have not been displayed. A complete set of results can be found in Results Review.   No results found.  Scheduled medications  allopurinol, 100 mg, oral, Daily  amLODIPine, 10 mg, oral, Daily  aspirin, 81 mg, oral, Daily  atorvastatin, 80 mg, oral, Nightly  carvedilol, 6.25 mg, oral, BID  cefTRIAXone, 2 g, intravenous, q24h  docusate sodium, 100 mg, oral, BID  doxycycline, 100 mg, oral, q12h LUKE  ergocalciferol, 50,000 Units, oral, q14 days  ezetimibe, 10 mg, oral, Daily  fluocinonide, , Topical, BID  gabapentin, 300 mg, oral, Nightly  heparin (porcine), 5,000 Units, subcutaneous, q8h LUKE  insulin lispro, 0-5 Units, subcutaneous, TID AC  isosorbide mononitrate ER, 60 mg, oral, Daily  oxygen, 2 L/min, inhalation, q24h  pantoprazole, 40 mg, oral, Daily  polyethylene glycol, 17 g, oral, Daily  sevelamer carbonate, 1,600 mg, oral, TID  SITagliptin phosphate, 25 mg, oral, Daily      Continuous medications     PRN medications  PRN medications: acetaminophen **OR** acetaminophen **OR** acetaminophen, benzocaine-menthol, dextromethorphan-guaifenesin, guaiFENesin, melatonin, nitroglycerin, ondansetron, oxygen  Results for orders placed or performed during the hospital encounter of 03/22/25 (from the past 24 hours)   CBC   Result Value Ref Range    WBC 8.7 4.4 - 11.3 x10*3/uL    nRBC 0.0 0.0 - 0.0 /100 WBCs    RBC 4.04 (L) 4.50 - 5.90 x10*6/uL    Hemoglobin 10.8 (L) 13.5 - 17.5 g/dL    Hematocrit 35.6 (L) 41.0 - 52.0 %    MCV 88 80 - 100 fL    MCH 26.7 26.0 - 34.0 pg    MCHC 30.3 (L) 32.0 - 36.0 g/dL    RDW 16.4 (H) 11.5 - 14.5 %    Platelets 237 150 - 450 x10*3/uL   Basic Metabolic Panel   Result Value Ref Range    Glucose 83 74 - 99 mg/dL    Sodium 138 136 - 145 mmol/L    Potassium  4.6 3.5 - 5.3 mmol/L    Chloride 92 (L) 98 - 107 mmol/L    Bicarbonate 28 21 - 32 mmol/L    Anion Gap 23 (H) 10 - 20 mmol/L    Urea Nitrogen 72 (H) 6 - 23 mg/dL    Creatinine 12.75 (H) 0.50 - 1.30 mg/dL    eGFR 4 (L) >60 mL/min/1.73m*2    Calcium 9.7 8.6 - 10.3 mg/dL   POCT GLUCOSE   Result Value Ref Range    POCT Glucose 116 (H) 74 - 99 mg/dL   POCT GLUCOSE   Result Value Ref Range    POCT Glucose 181 (H) 74 - 99 mg/dL   POCT GLUCOSE   Result Value Ref Range    POCT Glucose 109 (H) 74 - 99 mg/dL     *Note: Due to a large number of results and/or encounters for the requested time period, some results have not been displayed. A complete set of results can be found in Results Review.     No results found.           Assessment/Plan   Assessment & Plan  Pleural effusion    Hydropneumothorax    Anemia    Anxiety    Benign essential hypertension    CAD (coronary artery disease)    Gastroesophageal reflux disease    ESRD on hemodialysis (Multi)      VATS scheduled for Wednesday  Continue antibiotic  Continue oxygen to keep sats above 100%  Dialysis per nephrology  Blood pressure stable  Blood sugar okay           I spent  minutes in the professional and overall care of this patient.      Marianna Galloway MD

## 2025-03-26 NOTE — PROGRESS NOTES
"William A Franke \"Bill\" is a 74 y.o. male who was referred to the Clinical Pharmacy Team to complete a Transitions of Care encounter for discharge medication optimization. The patient was referred for their T2DM.    Attending: Marianna Galloway MD    PCP: Marianna Galloway MD    _______________________________________________________________________  PHARMACY ASSESSMENT    Home Pharmacy: [facility] [place], [place], [place]    Affordability/Accessibility: Patient reports affordability  Adherence/Organization: Patient reports adherence  Adverse Effects: Patient denies any adverse effects  _______________________________________________________________________  DIABETES ASSESSMENT    CURRENT PHARMACOTHERAPY  - Januvia 25 mg  - Humalog 100 unit/mL, 10 units TID    Additional Contributory Factors [medications, comorbidities]   - HTN, HLD, CKD    SECONDARY PREVENTION  - Statin? Atorvastatin 80 mg  - Aspirin? Aspirin 81 mg    Lab Results   Component Value Date    HGBA1C 6.3 (H) 11/08/2024       Lab Results   Component Value Date    CHOL 123 11/08/2024    CHOL 102 08/09/2024    CHOL 121 05/29/2024     Lab Results   Component Value Date    HDL 36.7 11/08/2024    HDL 30.6 08/09/2024    HDL 30.7 05/29/2024     Lab Results   Component Value Date    LDLCALC 52 11/08/2024    LDLCALC 33 08/09/2024    LDLCALC 37 05/29/2024     Lab Results   Component Value Date    TRIG 170 (H) 11/08/2024    TRIG 193 (H) 08/09/2024    TRIG 266 (H) 05/29/2024     No components found for: \"CHOLHDL\"  ___________________________________________________________________  PATIENT EDUCATION/GOALS  - Spoke with the patient about the clinical pharmacy services and provided the post-discharge medication management program document.   - Introduced post-discharge pharmacy team follow up and benefits of calls  - Answered all patient questions and concerns to best of my ability  _______________________________________________________________________  RECOMMENDATIONS/PLAN  Patient " is not currently interested in being followed by clinical pharmacy team  Continue all medications per medical team  Please send prescriptions to [facility] pharmacy for assistance on insurance prior authorization and copay. Prescriptions will be delivered to the patient's bedside prior to discharge with the Meds to Beds program.   Continuity of care will be provided by PCP and clinical pharmacy team      Ale Hoskins PharmD     Verbal consent to manage patient's drug therapy was obtained from an individual authorized to act on behalf of the patient. They were informed they may decline to participate or withdraw from participation in pharmacy services at any time.

## 2025-03-26 NOTE — SIGNIFICANT EVENT
Surgery rescheduled for tomorrow first start 0730 due to emergency requiring Dr. Herrera. Patient notified, regular diet now, NPO at midnight. Still plan for dialysis after surgery per nephrology.    Discussed with Dr. Herrera via telephone.

## 2025-03-27 ENCOUNTER — APPOINTMENT (OUTPATIENT)
Dept: RADIOLOGY | Facility: HOSPITAL | Age: 75
DRG: 163 | End: 2025-03-27
Payer: MEDICARE

## 2025-03-27 ENCOUNTER — APPOINTMENT (OUTPATIENT)
Dept: CARDIOLOGY | Facility: HOSPITAL | Age: 75
DRG: 163 | End: 2025-03-27
Payer: MEDICARE

## 2025-03-27 ENCOUNTER — ANESTHESIA (OUTPATIENT)
Dept: OPERATING ROOM | Facility: HOSPITAL | Age: 75
End: 2025-03-27
Payer: MEDICARE

## 2025-03-27 ENCOUNTER — ANESTHESIA EVENT (OUTPATIENT)
Dept: OPERATING ROOM | Facility: HOSPITAL | Age: 75
End: 2025-03-27
Payer: MEDICARE

## 2025-03-27 PROBLEM — J18.9 PNEUMONIA: Status: ACTIVE | Noted: 2025-03-27

## 2025-03-27 LAB
GLUCOSE BLD MANUAL STRIP-MCNC: 104 MG/DL (ref 74–99)
GLUCOSE BLD MANUAL STRIP-MCNC: 149 MG/DL (ref 74–99)
GLUCOSE BLD MANUAL STRIP-MCNC: 202 MG/DL (ref 74–99)
GLUCOSE BLD MANUAL STRIP-MCNC: 93 MG/DL (ref 74–99)

## 2025-03-27 PROCEDURE — 71045 X-RAY EXAM CHEST 1 VIEW: CPT

## 2025-03-27 PROCEDURE — A32651 PR THORACOSCOPY SURG PART PULM DECORT: Performed by: ANESTHESIOLOGY

## 2025-03-27 PROCEDURE — 82947 ASSAY GLUCOSE BLOOD QUANT: CPT

## 2025-03-27 PROCEDURE — 2720000007 HC OR 272 NO HCPCS: Performed by: STUDENT IN AN ORGANIZED HEALTH CARE EDUCATION/TRAINING PROGRAM

## 2025-03-27 PROCEDURE — 32550 INSERT PLEURAL CATH: CPT | Performed by: STUDENT IN AN ORGANIZED HEALTH CARE EDUCATION/TRAINING PROGRAM

## 2025-03-27 PROCEDURE — 2500000004 HC RX 250 GENERAL PHARMACY W/ HCPCS (ALT 636 FOR OP/ED): Performed by: NURSE PRACTITIONER

## 2025-03-27 PROCEDURE — 7100000001 HC RECOVERY ROOM TIME - INITIAL BASE CHARGE: Performed by: STUDENT IN AN ORGANIZED HEALTH CARE EDUCATION/TRAINING PROGRAM

## 2025-03-27 PROCEDURE — 7100000002 HC RECOVERY ROOM TIME - EACH INCREMENTAL 1 MINUTE: Performed by: STUDENT IN AN ORGANIZED HEALTH CARE EDUCATION/TRAINING PROGRAM

## 2025-03-27 PROCEDURE — 2500000004 HC RX 250 GENERAL PHARMACY W/ HCPCS (ALT 636 FOR OP/ED): Performed by: STUDENT IN AN ORGANIZED HEALTH CARE EDUCATION/TRAINING PROGRAM

## 2025-03-27 PROCEDURE — 3700000001 HC GENERAL ANESTHESIA TIME - INITIAL BASE CHARGE: Performed by: STUDENT IN AN ORGANIZED HEALTH CARE EDUCATION/TRAINING PROGRAM

## 2025-03-27 PROCEDURE — 2500000005 HC RX 250 GENERAL PHARMACY W/O HCPCS: Performed by: ANESTHESIOLOGY

## 2025-03-27 PROCEDURE — 32651 THORACOSCOPY REMOVE CORTEX: CPT | Performed by: STUDENT IN AN ORGANIZED HEALTH CARE EDUCATION/TRAINING PROGRAM

## 2025-03-27 PROCEDURE — 3600000009 HC OR TIME - EACH INCREMENTAL 1 MINUTE - PROCEDURE LEVEL FOUR: Performed by: STUDENT IN AN ORGANIZED HEALTH CARE EDUCATION/TRAINING PROGRAM

## 2025-03-27 PROCEDURE — 2500000004 HC RX 250 GENERAL PHARMACY W/ HCPCS (ALT 636 FOR OP/ED): Performed by: ANESTHESIOLOGIST ASSISTANT

## 2025-03-27 PROCEDURE — C1729 CATH, DRAINAGE: HCPCS | Performed by: STUDENT IN AN ORGANIZED HEALTH CARE EDUCATION/TRAINING PROGRAM

## 2025-03-27 PROCEDURE — 99100 ANES PT EXTEME AGE<1 YR&>70: CPT | Performed by: ANESTHESIOLOGY

## 2025-03-27 PROCEDURE — 2500000005 HC RX 250 GENERAL PHARMACY W/O HCPCS: Performed by: STUDENT IN AN ORGANIZED HEALTH CARE EDUCATION/TRAINING PROGRAM

## 2025-03-27 PROCEDURE — 93005 ELECTROCARDIOGRAM TRACING: CPT

## 2025-03-27 PROCEDURE — 2500000001 HC RX 250 WO HCPCS SELF ADMINISTERED DRUGS (ALT 637 FOR MEDICARE OP): Performed by: NURSE PRACTITIONER

## 2025-03-27 PROCEDURE — 3600000004 HC OR TIME - INITIAL BASE CHARGE - PROCEDURE LEVEL FOUR: Performed by: STUDENT IN AN ORGANIZED HEALTH CARE EDUCATION/TRAINING PROGRAM

## 2025-03-27 PROCEDURE — 2500000002 HC RX 250 W HCPCS SELF ADMINISTERED DRUGS (ALT 637 FOR MEDICARE OP, ALT 636 FOR OP/ED): Performed by: NURSE PRACTITIONER

## 2025-03-27 PROCEDURE — 2500000004 HC RX 250 GENERAL PHARMACY W/ HCPCS (ALT 636 FOR OP/ED): Performed by: INTERNAL MEDICINE

## 2025-03-27 PROCEDURE — 3700000002 HC GENERAL ANESTHESIA TIME - EACH INCREMENTAL 1 MINUTE: Performed by: STUDENT IN AN ORGANIZED HEALTH CARE EDUCATION/TRAINING PROGRAM

## 2025-03-27 PROCEDURE — 2060000001 HC INTERMEDIATE ICU ROOM DAILY

## 2025-03-27 PROCEDURE — A32651 PR THORACOSCOPY SURG PART PULM DECORT: Performed by: ANESTHESIOLOGIST ASSISTANT

## 2025-03-27 PROCEDURE — 99233 SBSQ HOSP IP/OBS HIGH 50: CPT | Performed by: INTERNAL MEDICINE

## 2025-03-27 PROCEDURE — 87075 CULTR BACTERIA EXCEPT BLOOD: CPT | Mod: AHULAB | Performed by: STUDENT IN AN ORGANIZED HEALTH CARE EDUCATION/TRAINING PROGRAM

## 2025-03-27 RX ORDER — OXYCODONE HYDROCHLORIDE 5 MG/1
10 TABLET ORAL EVERY 4 HOURS PRN
Status: DISCONTINUED | OUTPATIENT
Start: 2025-03-27 | End: 2025-03-31 | Stop reason: HOSPADM

## 2025-03-27 RX ORDER — ACETAMINOPHEN 325 MG/1
650 TABLET ORAL EVERY 6 HOURS
Status: DISCONTINUED | OUTPATIENT
Start: 2025-03-27 | End: 2025-03-27 | Stop reason: HOSPADM

## 2025-03-27 RX ORDER — LIDOCAINE HYDROCHLORIDE 10 MG/ML
0.1 INJECTION, SOLUTION EPIDURAL; INFILTRATION; INTRACAUDAL; PERINEURAL ONCE
Status: DISCONTINUED | OUTPATIENT
Start: 2025-03-27 | End: 2025-03-27 | Stop reason: HOSPADM

## 2025-03-27 RX ORDER — PANTOPRAZOLE SODIUM 40 MG/10ML
40 INJECTION, POWDER, LYOPHILIZED, FOR SOLUTION INTRAVENOUS DAILY
Status: DISCONTINUED | OUTPATIENT
Start: 2025-03-27 | End: 2025-03-31 | Stop reason: HOSPADM

## 2025-03-27 RX ORDER — DIPHENHYDRAMINE HYDROCHLORIDE 50 MG/ML
25 INJECTION, SOLUTION INTRAMUSCULAR; INTRAVENOUS EVERY 6 HOURS PRN
Status: DISCONTINUED | OUTPATIENT
Start: 2025-03-27 | End: 2025-03-31 | Stop reason: HOSPADM

## 2025-03-27 RX ORDER — PROPOFOL 10 MG/ML
INJECTION, EMULSION INTRAVENOUS AS NEEDED
Status: DISCONTINUED | OUTPATIENT
Start: 2025-03-27 | End: 2025-03-27

## 2025-03-27 RX ORDER — LIDOCAINE HYDROCHLORIDE 20 MG/ML
INJECTION, SOLUTION INFILTRATION; PERINEURAL AS NEEDED
Status: DISCONTINUED | OUTPATIENT
Start: 2025-03-27 | End: 2025-03-27

## 2025-03-27 RX ORDER — OXYCODONE HYDROCHLORIDE 5 MG/1
5 TABLET ORAL EVERY 4 HOURS PRN
Status: DISCONTINUED | OUTPATIENT
Start: 2025-03-27 | End: 2025-03-27 | Stop reason: HOSPADM

## 2025-03-27 RX ORDER — CEFAZOLIN 1 G/1
INJECTION, POWDER, FOR SOLUTION INTRAVENOUS AS NEEDED
Status: DISCONTINUED | OUTPATIENT
Start: 2025-03-27 | End: 2025-03-27

## 2025-03-27 RX ORDER — MEPERIDINE HYDROCHLORIDE 25 MG/ML
12.5 INJECTION INTRAMUSCULAR; INTRAVENOUS; SUBCUTANEOUS EVERY 10 MIN PRN
Status: DISCONTINUED | OUTPATIENT
Start: 2025-03-27 | End: 2025-03-27 | Stop reason: HOSPADM

## 2025-03-27 RX ORDER — FENTANYL CITRATE 50 UG/ML
INJECTION, SOLUTION INTRAMUSCULAR; INTRAVENOUS AS NEEDED
Status: DISCONTINUED | OUTPATIENT
Start: 2025-03-27 | End: 2025-03-27

## 2025-03-27 RX ORDER — MIDAZOLAM HYDROCHLORIDE 1 MG/ML
INJECTION INTRAMUSCULAR; INTRAVENOUS AS NEEDED
Status: DISCONTINUED | OUTPATIENT
Start: 2025-03-27 | End: 2025-03-27

## 2025-03-27 RX ORDER — ROCURONIUM BROMIDE 10 MG/ML
INJECTION, SOLUTION INTRAVENOUS AS NEEDED
Status: DISCONTINUED | OUTPATIENT
Start: 2025-03-27 | End: 2025-03-27

## 2025-03-27 RX ORDER — CEFAZOLIN SODIUM 2 G/100ML
2 INJECTION, SOLUTION INTRAVENOUS EVERY 8 HOURS
Status: DISPENSED | OUTPATIENT
Start: 2025-03-27 | End: 2025-03-28

## 2025-03-27 RX ORDER — SODIUM CHLORIDE 0.9 G/100ML
INJECTION, SOLUTION IRRIGATION AS NEEDED
Status: DISCONTINUED | OUTPATIENT
Start: 2025-03-27 | End: 2025-03-27 | Stop reason: HOSPADM

## 2025-03-27 RX ORDER — BUPIVACAINE HCL/EPINEPHRINE 0.25-.0005
VIAL (ML) INJECTION AS NEEDED
Status: DISCONTINUED | OUTPATIENT
Start: 2025-03-27 | End: 2025-03-27 | Stop reason: HOSPADM

## 2025-03-27 RX ORDER — PHENYLEPHRINE HCL IN 0.9% NACL 1 MG/10 ML
SYRINGE (ML) INTRAVENOUS AS NEEDED
Status: DISCONTINUED | OUTPATIENT
Start: 2025-03-27 | End: 2025-03-27

## 2025-03-27 RX ORDER — ONDANSETRON HYDROCHLORIDE 2 MG/ML
4 INJECTION, SOLUTION INTRAVENOUS ONCE AS NEEDED
Status: DISCONTINUED | OUTPATIENT
Start: 2025-03-27 | End: 2025-03-27 | Stop reason: HOSPADM

## 2025-03-27 RX ORDER — NALOXONE HYDROCHLORIDE 0.4 MG/ML
0.2 INJECTION, SOLUTION INTRAMUSCULAR; INTRAVENOUS; SUBCUTANEOUS EVERY 5 MIN PRN
Status: DISCONTINUED | OUTPATIENT
Start: 2025-03-27 | End: 2025-03-31 | Stop reason: HOSPADM

## 2025-03-27 RX ORDER — OXYCODONE HYDROCHLORIDE 5 MG/1
5 TABLET ORAL EVERY 4 HOURS PRN
Status: DISCONTINUED | OUTPATIENT
Start: 2025-03-27 | End: 2025-03-31 | Stop reason: HOSPADM

## 2025-03-27 RX ADMIN — CEFAZOLIN SODIUM 2 G: 2 INJECTION, SOLUTION INTRAVENOUS at 17:10

## 2025-03-27 RX ADMIN — ATORVASTATIN CALCIUM 80 MG: 80 TABLET, FILM COATED ORAL at 21:26

## 2025-03-27 RX ADMIN — CEFAZOLIN 2 G: 330 INJECTION, POWDER, FOR SOLUTION INTRAMUSCULAR; INTRAVENOUS at 08:08

## 2025-03-27 RX ADMIN — PROPOFOL 150 MG: 10 INJECTION, EMULSION INTRAVENOUS at 08:05

## 2025-03-27 RX ADMIN — DOXYCYCLINE HYCLATE 100 MG: 100 TABLET, FILM COATED ORAL at 21:26

## 2025-03-27 RX ADMIN — DOCUSATE SODIUM 100 MG: 100 CAPSULE, LIQUID FILLED ORAL at 21:26

## 2025-03-27 RX ADMIN — SODIUM CHLORIDE, POTASSIUM CHLORIDE, SODIUM LACTATE AND CALCIUM CHLORIDE: 600; 310; 30; 20 INJECTION, SOLUTION INTRAVENOUS at 07:56

## 2025-03-27 RX ADMIN — Medication 6 L/MIN: at 08:58

## 2025-03-27 RX ADMIN — GABAPENTIN 300 MG: 300 CAPSULE ORAL at 21:26

## 2025-03-27 RX ADMIN — Medication 200 MCG: at 08:33

## 2025-03-27 RX ADMIN — FENTANYL CITRATE 50 MCG: 50 INJECTION, SOLUTION INTRAMUSCULAR; INTRAVENOUS at 08:05

## 2025-03-27 RX ADMIN — INSULIN LISPRO 2 UNITS: 100 INJECTION, SOLUTION INTRAVENOUS; SUBCUTANEOUS at 17:09

## 2025-03-27 RX ADMIN — Medication 100 MCG: at 08:28

## 2025-03-27 RX ADMIN — Medication 3 L/MIN: at 09:15

## 2025-03-27 RX ADMIN — SEVELAMER CARBONATE 1600 MG: 800 TABLET, FILM COATED ORAL at 17:07

## 2025-03-27 RX ADMIN — DEXAMETHASONE SODIUM PHOSPHATE 4 MG: 4 INJECTION, SOLUTION INTRAMUSCULAR; INTRAVENOUS at 08:10

## 2025-03-27 RX ADMIN — CARVEDILOL 6.25 MG: 6.25 TABLET, FILM COATED ORAL at 17:07

## 2025-03-27 RX ADMIN — FLUOCINONIDE: 0.5 CREAM TOPICAL at 21:26

## 2025-03-27 RX ADMIN — CEFTRIAXONE 2 G: 2 INJECTION, POWDER, FOR SOLUTION INTRAMUSCULAR; INTRAVENOUS at 17:09

## 2025-03-27 RX ADMIN — LIDOCAINE HYDROCHLORIDE 100 MG: 20 INJECTION, SOLUTION INFILTRATION; PERINEURAL at 08:05

## 2025-03-27 RX ADMIN — SUGAMMADEX 200 MG: 100 INJECTION, SOLUTION INTRAVENOUS at 08:50

## 2025-03-27 RX ADMIN — MIDAZOLAM HYDROCHLORIDE 1 MG: 1 INJECTION, SOLUTION INTRAMUSCULAR; INTRAVENOUS at 08:05

## 2025-03-27 RX ADMIN — ROCURONIUM BROMIDE 70 MG: 10 INJECTION, SOLUTION INTRAVENOUS at 08:05

## 2025-03-27 RX ADMIN — ONDANSETRON 4 MG: 2 INJECTION, SOLUTION INTRAMUSCULAR; INTRAVENOUS at 08:39

## 2025-03-27 RX ADMIN — MIDAZOLAM HYDROCHLORIDE 1 MG: 1 INJECTION, SOLUTION INTRAMUSCULAR; INTRAVENOUS at 07:56

## 2025-03-27 SDOH — HEALTH STABILITY: MENTAL HEALTH: CURRENT SMOKER: 0

## 2025-03-27 ASSESSMENT — COGNITIVE AND FUNCTIONAL STATUS - GENERAL
MOBILITY SCORE: 24
DAILY ACTIVITIY SCORE: 24

## 2025-03-27 ASSESSMENT — PAIN SCALES - GENERAL
PAINLEVEL_OUTOF10: 0 - NO PAIN

## 2025-03-27 ASSESSMENT — PAIN - FUNCTIONAL ASSESSMENT
PAIN_FUNCTIONAL_ASSESSMENT: 0-10
PAIN_FUNCTIONAL_ASSESSMENT: UNABLE TO SELF-REPORT
PAIN_FUNCTIONAL_ASSESSMENT: 0-10

## 2025-03-27 NOTE — OP NOTE
"Right Video Assisted Thoracoscopy; Pleural Biospy; Pleural Catheter Insertion (R) Operative Note     Date: 3/27/2025  OR Location: [facility] A OR    Name: William A Franke \"Narciso\", : 1950, Age: 74 y.o., MRN: [id number], Sex: male    Diagnosis  Pre-op Diagnosis      * Hydropneumothorax [J94.8]     * Pleural effusion [J90] Post-op Diagnosis     * Hydropneumothorax [J94.8]     * Pleural effusion [J90]     Procedures  Right Video Assisted Thoracoscopy; partial decortication with Pleural Biospy; PleurX insertion      Surgeons      * Sade Herrera - Primary    Resident/Fellow/Other Assistant:  Surgeons and Role:  * No surgeons found with a matching role *    Staff:   Circulator: Donna  Scrub Person: Rosita Jayub Person: Bonny    Anesthesia Staff: Anesthesiologist: Marin Rodríguez MD  C-AA: SALMA Saravia; SALMA Toledo    Procedure Summary  Anesthesia: General  ASA: II  Estimated Blood Loss: 50mL  Intra-op Medications:   Administrations occurring from 0730 to 1000 on 25:   Medication Name Total Dose   BUPivacaine-EPINEPHrine (Marcaine w/EPI) 0.25 %-1:200,000 injection 30 mL   sodium chloride 0.9 % irrigation solution 1,000 mL   fluocinonide (Lidex) 0.05 % cream Cannot be calculated   ondansetron (Zofran) injection 4 mg 4 mg   ceFAZolin (Ancef) vial 1 g 2 g   dexAMETHasone (Decadron) 4 mg/mL 4 mg   fentaNYL (Sublimaze) injection 50 mcg/mL 50 mcg   LR bolus Cannot be calculated   lidocaine (Xylocaine) injection 2 % 100 mg   midazolam PF (Versed) injection 1 mg/mL 2 mg   oxygen (O2) therapy 147 L   phenylephrine 100 mcg/mL syringe 10 mL (prefilled) 300 mcg   propofol (Diprivan) injection 10 mg/mL 150 mg   rocuronium (ZeMuron) 50 mg/5 mL injection 70 mg   sugammadex (Bridion) 200 mg/2 mL injection 200 mg              Anesthesia Record               Intraprocedure I/O Totals          Intake    LR bolus 300.00 mL    Total Intake 300 mL       Output    Est. Blood Loss 15 mL    Total Output 15 mL       Net " "   Net Volume 285 mL          Specimen:   ID Type Source Tests Collected by Time   1 : Right Pleural Biopsy Tissue PLEURA BIOPSY RIGHT SURGICAL PATHOLOGY EXAM Donna Membreno RN 3/27/2025 0831   A : Right Pleural Rine Tissue PLEURA TISSUE RESECTION RIGHT TISSUE/WOUND CULTURE/SMEAR Donna Membreno RN 3/27/2025 0824                 Drains and/or Catheters:   Chest Tube Right Pleural  (Active)   Function -20 cm H2O 03/27/25 1021   Chest Tube Air Leak No 03/27/25 0955   Drainage Description Serosanguineous 03/27/25 1021   Dressing Status Clean;Dry;Occlusive 03/27/25 1021   Site Assessment Clean;Dry;Intact 03/27/25 1021   Surrounding Skin Intact 03/27/25 1021   Output (mL) 60 mL 03/27/25 0955       Tourniquet Times:         Implants:     Findings: trapped lung, primarily lower lobe, part of the middle lobe. Thickened parietal pleura, bloody effusion    Indications: William A Franke \"Narciso\" is an 74 y.o. male who is having surgery for Hydropneumothorax [J94.8]  Pleural effusion [J90].  Patient has a history of end-stage renal disease on HD.  Has undergone several prior right thoracentesis procedures for a right pleural effusion.  Most recent imaging showed a loculated hydropneumothorax at the right lateral chest and along the base.  Thickening of the visceral pleura concerning for possible trapped lung.  We discussed surgical intervention for more definitive management with pleurodesis versus Pleurx catheter and patient was agreeable.    The patient was seen in the preoperative area. The risks, benefits, complications, treatment options, non-operative alternatives, expected recovery and outcomes were discussed with the patient. The possibilities of reaction to medication, pulmonary aspiration, injury to surrounding structures, bleeding, recurrent infection, the need for additional procedures, failure to diagnose a condition, and creating a complication requiring transfusion or operation were discussed with the patient. The " patient concurred with the proposed plan, giving informed consent.  The site of surgery was properly noted/marked if necessary per policy. The patient has been actively warmed in preoperative area. Preoperative antibiotics have been ordered and given within 1 hours of incision. Venous thrombosis prophylaxis have been ordered including bilateral sequential compression devices    Procedure Details: Patient was brought to the operating suite and procedural information was confirmed.  General anesthesia was induced and a double-lumen endotracheal tube was placed.  He was transition to the left lateral decubitus position and his right chest was prepped and draped in typical sterile fashion.  We made a 12 mm incision along the eighth intercostal space at the posterior axillary line and inserted the camera.  There were thin webs causing loculations of the pleural fluid which were gently decorticated thereby freeing pockets of fluid into the lower chest.  The effusion was primarily bloody appearing, dark.  We sent the pleural rind for culture however the fluid itself was quite thin and not turbid.  We also obtained a pleural biopsy and sent this for permanent pathology.  There was no obvious studding just diffuse thickening of the parietal pleura.  We were able to sweep part of the upper and middle lobe away from the chest wall.  Once the chest was thoroughly irrigated it was obvious that there was a thickened rind along the entire lower lobe and part of the middle lobe of the right lung.  Given his other medical issues and the comorbidity associated with completely decorticating the fibrothorax we did elect to leave a Pleurx catheter in place instead as previously discussed with the patient.  This was tunneled toward the right subcostal margin and laid along the posterior and inferior right hemithorax.  The lung obviously did not fully inflate.  Hemostasis was confirmed and intercostal nerve blocks were performed.   Additional local anesthetic was injected along the catheter site.  An incision was closed in layers over the catheter and the catheter was secured in place to an atrium.  Patient was placed supine, extubated, and taken to PACU in stable condition.    Complications:  None; patient tolerated the procedure well.    Disposition: PACU - hemodynamically stable.  Condition: stable                 Additional Details: to PACU then step down   CT to water seal  HD today  OOB/ambulate/IS  ADAT  Ok for SQH/Lovenox  Multimodal pain control  CXR in PACU and AM  PT to eval for homegoing    Ok for discharge planning. Will arrange [facility] on discharge for PleurX drainage/teaching    Attending Attestation: I was present and scrubbed for the entire procedure.    Sade Herrera  Phone Number: [phone]

## 2025-03-27 NOTE — CARE PLAN
Problem: Safety - Adult  Goal: Free from fall injury  Outcome: Progressing     Problem: Discharge Planning  Goal: Discharge to home or other facility with appropriate resources  Outcome: Progressing     Problem: Chronic Conditions and Co-morbidities  Goal: Patient's chronic conditions and co-morbidity symptoms are monitored and maintained or improved  Outcome: Progressing     Problem: Nutrition  Goal: Nutrient intake appropriate for maintaining nutritional needs  Outcome: Progressing     Problem: Fall/Injury  Goal: Not fall by end of shift  Outcome: Progressing  Goal: Be free from injury by end of the shift  Outcome: Progressing  Goal: Verbalize understanding of personal risk factors for fall in the hospital  Outcome: Progressing  Goal: Verbalize understanding of risk factor reduction measures to prevent injury from fall in the home  Outcome: Progressing  Goal: Use assistive devices by end of the shift  Outcome: Progressing  Goal: Pace activities to prevent fatigue by end of the shift  Outcome: Progressing     Problem: Skin  Goal: Decreased wound size/increased tissue granulation at next dressing change  Outcome: Progressing  Goal: Participates in plan/prevention/treatment measures  Outcome: Progressing  Goal: Prevent/manage excess moisture  Outcome: Progressing  Goal: Prevent/minimize sheer/friction injuries  Outcome: Progressing  Goal: Promote/optimize nutrition  Outcome: Progressing  Goal: Promote skin healing  Outcome: Progressing     Problem: Pain  Goal: Takes deep breaths with improved pain control throughout the shift  Outcome: Progressing  Goal: Turns in bed with improved pain control throughout the shift  Outcome: Progressing  Goal: Walks with improved pain control throughout the shift  Outcome: Progressing  Goal: Performs ADL's with improved pain control throughout shift  Outcome: Progressing  Goal: Participates in PT with improved pain control throughout the shift  Outcome: Progressing  Goal: Free from  opioid side effects throughout the shift  Outcome: Progressing  Goal: Free from acute confusion related to pain meds throughout the shift  Outcome: Progressing   The patient's goals for the shift include      The clinical goals for the shift include pt will remain HDS during shift

## 2025-03-27 NOTE — NURSING NOTE
Pt returned back to unit from a VATs procedure, pt has a right chest tube, dressing clean and intact. Drainage noted in tube/container. Pt is still drowsy from procedure but is responds to voice. Current bp 140/60 map 87, hr 56, 98 on 2l nc, 97.8f.

## 2025-03-27 NOTE — PROGRESS NOTES
03/27/25 1248   Discharge Planning   Expected Discharge Disposition Home H         Patient had right VATS procedure done and pleurX chest tube was inserted and is to water seal. Revisited patient and his wife at bedside,. Patient is little sleepy but doing ok. Plan is for patient to go home with [facility] when medically ready.

## 2025-03-27 NOTE — ANESTHESIA PREPROCEDURE EVALUATION
"Patient: William A Franke \"Bill\"    Procedure Information       Date/Time: 03/27/25 9730    Procedure: Right Video Assisted Thoracoscopy; Possible Lung Decortication; Possible Pleural Catheter Insertion (Right: Chest)    Location: [facility] OR 01 / Virtual [facility] OR    Surgeons: Sade Herrera, DO            Relevant Problems   Cardiac   (+) Acute non-ST segment elevation myocardial infarction (Multi)   (+) Aortic stenosis   (+) Benign essential hypertension   (+) CAD (coronary artery disease)   (+) Chest pain   (+) Chest pain, unspecified type   (+) Congestive heart failure   (+) Coronary artery disease involving native heart without angina pectoris   (+) Hyperlipidemia   (+) Hypertension   (+) Hypertensive urgency   (+) Murmur, cardiac   (+) Stable angina pectoris due to arteriosclerosis of coronary artery (CMS-HCC)   (+) Stented coronary artery      Pulmonary   (+) LIAM (obstructive sleep apnea)   (+) Pulmonary hypertension (Multi)      Neuro   (+) Anxiety   (+) Peripheral neuropathy      GI   (+) Esophageal reflux   (+) Gastroesophageal reflux disease   (+) Rectal hemorrhage      /Renal   (+) BPH with obstruction/lower urinary tract symptoms   (+) ESRD on dialysis (Multi)   (+) ESRD on hemodialysis (Multi)   (+) Kidney stones   (+) Prostate cancer (Multi)      Hematology   (+) Anemia   (+) Anemia of chronic disease      Musculoskeletal   (+) Osteoarthritis of hands, bilateral      ID   (+) Coronavirus infection       Clinical information reviewed:    Allergies  Meds                Past Medical History:   Diagnosis Date   • Chronic kidney disease    • Diabetes mellitus (Multi)    • ESRD (end stage renal disease) on dialysis (Multi)    • Heart murmur    • HLD (hyperlipidemia)    • Hypertension    • Personal history of other endocrine, nutritional and metabolic disease     History of hypercholesterolemia   • Personal history of other specified conditions 08/28/2020    History of chest pain      Past Surgical " "History:   Procedure Laterality Date   • AV FISTULA PLACEMENT Left 07/31/2024    left brachial artery to axillary vein AV graft on 7/31/2024   • KNEE ARTHROSCOPY W/ DEBRIDEMENT  04/15/2013    Arthroscopy Knee   • SHOULDER SURGERY  04/15/2013    Shoulder Surgery     Social History     Tobacco Use   • Smoking status: Never   • Smokeless tobacco: Never   Substance Use Topics   • Alcohol use: Not Currently     Comment: rarely   • Drug use: Never      Current Outpatient Medications   Medication Instructions   • allopurinol (ZYLOPRIM) 100 mg, oral, Daily   • amLODIPine (NORVASC) 10 mg, oral, Daily   • amLODIPine (NORVASC) 10 mg, oral, Daily   • aspirin 81 mg, oral, Daily   • atorvastatin (LIPITOR) 80 mg, oral, Nightly   • B complex-vitamin C-folic acid (Nephro-Shea) 0.8 mg tablet 0.8 mg, oral, Daily   • BD Ultra-Fine Mini Pen Needle 31 gauge x 3/16\" needle USE AS DIRECTED 4 times a day   • carvedilol (COREG) 6.25 mg, oral, 2 times daily (morning and late afternoon)   • cefTRIAXone (Rocephin) 2 gram/50 mL IV 2 g, intravenous, Every 24 hours   • ergocalciferol (VITAMIN D-2) 50,000 Units, oral, Every 14 days, Takes every other Sunday   • ezetimibe (ZETIA) 10 mg, oral, Daily   • fluocinonide (Lidex) 0.05 % ointment Topical, 2 times daily   • gabapentin (NEURONTIN) 300 mg, oral, Nightly   • insulin lispro (HUMALOG) 10 Units, subcutaneous, 3 times daily (morning, midday, late afternoon)   • isosorbide mononitrate ER (IMDUR) 60 mg, oral, Daily, Do not crush or chew.   • Januvia 25 mg, oral, Daily   • nitroglycerin (NITROSTAT) 0.4 mg, sublingual, Every 5 min PRN   • oxygen (O2) 2 L/min, inhalation, Every 24 hours   • pantoprazole (PROTONIX) 40 mg, oral, Daily   • sevelamer carbonate (Renvela) 800 mg tablet Take 2 tablets (1,600 mg) by mouth 3 times daily (morning, midday, late afternoon). Three times daily with meals   • triamcinolone (Kenalog) 0.5 % cream Topical, 3 times daily      No Known Allergies     Chemistry    Lab " "Results   Component Value Date/Time     03/25/2025 0515     02/05/2025 0932    K 4.6 03/25/2025 0515    K 4.5 02/05/2025 0932    CL 92 (L) 03/25/2025 0515    CL 96 (L) 02/05/2025 0932    CO2 28 03/25/2025 0515    CO2 27 02/05/2025 0932    BUN 72 (H) 03/25/2025 0515    BUN 33 (H) 02/05/2025 0932    CREATININE 12.75 (H) 03/25/2025 0515    CREATININE 8.98 (H) 02/05/2025 0932    Lab Results   Component Value Date/Time    CALCIUM 9.7 03/25/2025 0515    CALCIUM 9.5 02/05/2025 0932    ALKPHOS 77 03/20/2025 0540    ALKPHOS 112 02/05/2025 0932    AST 15 03/20/2025 0540    AST 16 02/05/2025 0932    ALT 9 (L) 03/20/2025 0540    ALT <3 (L) 02/05/2025 0932    BILITOT 0.5 03/20/2025 0540    BILITOT 0.6 02/05/2025 0932          Lab Results   Component Value Date    HGBA1C 6.3 (H) 11/08/2024     Lab Results   Component Value Date/Time    WBC 8.7 03/25/2025 0515    WBC 7.4 02/05/2025 0932    HGB 10.8 (L) 03/25/2025 0515    HGB 12.0 (L) 02/05/2025 0932    HCT 35.6 (L) 03/25/2025 0515    HCT 36.7 (L) 02/05/2025 0932     03/25/2025 0515     02/05/2025 0932     Lab Results   Component Value Date/Time    PROTIME 12.8 (H) 04/13/2023 1252    INR 1.2 (H) 04/13/2023 1252     No results found for: \"ABORH\"  Encounter Date: 12/21/24   Electrocardiogram, 12-lead ACS symptoms   Result Value    Ventricular Rate 75    Atrial Rate 75    DE Interval 168    QRS Duration 92    QT Interval 388    QTC Calculation(Bazett) 433    P Axis 42    R Axis -7    T Axis 81    QRS Count 12    Q Onset 219    P Onset 135    P Offset 183    T Offset 413    QTC Fredericia 417    Narrative    Normal sinus rhythm  Left ventricular hypertrophy with repolarization abnormality ( R in aVL , Sokolow-Barriga , [place] product , Romhilt-Thornton )  Abnormal ECG  No previous ECGs available  Confirmed by Jonathan Masters ([id number]) on 12/24/2024 2:27:57 PM     No results found for this or any previous visit from the past 1095 days.       Visit Vitals  /89 (BP " "Location: Right arm, Patient Position: Lying)   Pulse 62   Temp 36.4 °C (97.5 °F) (Temporal)   Resp 18   Ht 1.727 m (5' 8\")   Wt 66.2 kg (146 lb)   SpO2 98%   BMI 22.20 kg/m²   Smoking Status Never   BSA 1.78 m²     No data recorded    Physical Exam    Airway  Mallampati: II  TM distance: >3 FB  Neck ROM: full     Cardiovascular - normal exam     Dental - normal exam     Pulmonary - normal exam     Abdominal - normal exam         Anesthesia Plan    History of general anesthesia?: yes  History of complications of general anesthesia?: no    ASA 2     general     The patient is not a current smoker.    intravenous induction   Postoperative administration of opioids is intended.  Anesthetic plan and risks discussed with patient.  Use of blood products discussed with patient who.    Plan discussed with attending and CAA.    "

## 2025-03-27 NOTE — PROGRESS NOTES
"William A Franke \"Narciso\" is a 74 y.o. male on day 5 of admission presenting with Hydropneumothorax.      Subjective   Patient seen and evaluated in PACU following VATS.   Resting in NAD.   Chart/labs/meds/notes/imaging/VS reviewed.       Objective          Vitals 24HR  Heart Rate:  [46-63]   Temp:  [35.9 °C (96.6 °F)-36.7 °C (98.1 °F)]   Resp:  [12-22]   BP: ()/(39-89)   SpO2:  [95 %-100 %]     Intake/Output last 3 Shifts:    Intake/Output Summary (Last 24 hours) at 3/27/2025 1124  Last data filed at 3/27/2025 0955  Gross per 24 hour   Intake 500 ml   Output 76 ml   Net 424 ml       Physical Exam  GENERAL: In no acute distress.  HEAD: Normocephalic, atraumatic.  OROPHARYNX: Moist mucosa.  NECK: no JVD, supple.  LUNGS: Clear to auscultation bilaterally.  No wheezes, rales or rhonchi.  CARDIAC: normal S1 and S2; no gallops, rubs or murmurs. Regular rate and rhythm.  EXTREMITIES: No edema.  No swollen joints.  Left arm aVF with positive thrill/bruit  NEURO: Cranial nerves II through XII grossly intact.  No asterixis.  Nonfocal.  SKIN: Skin turgor normal. No rashes or lesions.   PSYCH: Normal affect and behavior.    Scheduled Medications  allopurinol, 100 mg, oral, Daily  amLODIPine, 10 mg, oral, Daily  aspirin, 81 mg, oral, Daily  atorvastatin, 80 mg, oral, Nightly  carvedilol, 6.25 mg, oral, BID  ceFAZolin, 2 g, intravenous, q8h  cefTRIAXone, 2 g, intravenous, q24h  docusate sodium, 100 mg, oral, BID  doxycycline, 100 mg, oral, q12h LUKE  ergocalciferol, 50,000 Units, oral, q14 days  ezetimibe, 10 mg, oral, Daily  fluocinonide, , Topical, BID  gabapentin, 300 mg, oral, Nightly  heparin (porcine), 5,000 Units, subcutaneous, q8h LUKE  insulin lispro, 0-5 Units, subcutaneous, TID AC  isosorbide mononitrate ER, 60 mg, oral, Daily  oxygen, 2 L/min, inhalation, q24h  pantoprazole, 40 mg, oral, Daily  pantoprazole, 40 mg, intravenous, Daily  polyethylene glycol, 17 g, oral, Daily  sevelamer carbonate, 1,600 mg, oral, " TID  SITagliptin phosphate, 25 mg, oral, Daily      Continuous medications       PRN medications: acetaminophen **OR** acetaminophen **OR** acetaminophen, benzocaine-menthol, dextromethorphan-guaifenesin, diphenhydrAMINE, guaiFENesin, HYDROmorphone, melatonin, naloxone, nitroglycerin, ondansetron, oxyCODONE, oxyCODONE, oxygen     Relevant Results  Results from last 7 days   Lab Units 03/25/25  0515 03/22/25  0553 03/21/25  0547   WBC AUTO x10*3/uL 8.7 7.5 7.9   HEMOGLOBIN g/dL 10.8* 11.5* 11.6*   HEMATOCRIT % 35.6* 36.8* 36.2*   PLATELETS AUTO x10*3/uL 237 257 264     Results from last 7 days   Lab Units 03/25/25  0515 03/22/25  0553 03/21/25  0547   SODIUM mmol/L 138 140 138   POTASSIUM mmol/L 4.6 4.6 4.2   CHLORIDE mmol/L 92* 96* 97*   CO2 mmol/L 28 29 28   BUN mg/dL 72* 47* 31*   CREATININE mg/dL 12.75* 9.85* 7.05*   GLUCOSE mg/dL 83 98 98   CALCIUM mg/dL 9.7 10.1 9.7       XR chest 1 view   Final Result   Interval placement of right chest tube with decrease in right pleural   fluid and right basilar atelectasis        Small pneumothorax is present lateral right lower hemithorax which is   overall similar in size to the prior study.        MACRO:   None.        Signed by: Cristina Toney 3/27/2025 9:37 AM   Dictation workstation:   [id number]               Assessment/Plan   This patient currently has cardiac telemetry ordered; if you would like to modify or discontinue the telemetry order, click here to go to the orders activity to modify/discontinue the order.  Bill A Franke is a 74 y.o. male with a past medical history of ESRD on hemodialysis Tuesday Thursday Saturday via left arm aVF at [facility] under Dr. Gomez, hypertension, HFpEF, recent MSSA bacteremia in December 2024 and an exudative pleural effusion who underwent a thoracentesis on 12/27/2024.  Repeat CT imaging on 2/17/2025 showed a persistent moderate to large right-sided effusion.  He was admitted to [facility] on 3/18.  Had a thoracentesis on 3/19  with 950 cc of bloody fluid aspirated.  Negative cytology and cultures.  He was transferred to [facility] for VATS which he had this morning.  Nephrology is consulted for ESRD care.      Mr. Franke had dialysis on Tuesday uneventfully. I held his dialysis this morning in light of the procedure. His blood pressures are soft at the moment. We will re-evaluate him after he wakes up and make a determination on dialysis today at bedside. Otherwise, his hemoglobin is at target therefore no need for erythropoietin. Nephrology will follow with his care.        Assessment & Plan  Anemia    CAD (coronary artery disease)    ESRD on hemodialysis (Multi)    Benign essential hypertension    Gastroesophageal reflux disease    Anxiety    Pleural effusion    Hydropneumothorax         I spent 40 minutes in the professional and overall care of this patient.      Hilario Garner, DO

## 2025-03-27 NOTE — ANESTHESIA POSTPROCEDURE EVALUATION
"Patient: William A Franke \"Bill\"    Procedure Summary       Date: 03/27/25 Room / Location: [facility] OR 01 / Virtual [facility] OR    Anesthesia Start: 0756 Anesthesia Stop: 0902    Procedure: Right Video Assisted Thoracoscopy; Pleural Biospy; Pleural Catheter Insertion (Right: Chest) Diagnosis:       Hydropneumothorax      Pleural effusion      (Hydropneumothorax [J94.8])      (Pleural effusion [J90])    Surgeons: Sade Herrera DO Responsible Provider: Marin Rodríguez MD    Anesthesia Type: general ASA Status: 2            Anesthesia Type: general    Vitals Value Taken Time   /44 03/27/25 0951   Temp 36 °C (96.8 °F) 03/27/25 0930   Pulse 48 03/27/25 0952   Resp 12 03/27/25 0951   SpO2 100 % 03/27/25 0952   Vitals shown include unfiled device data.    Anesthesia Post Evaluation    Patient location during evaluation: PACU  Patient participation: complete - patient participated  Level of consciousness: awake  Pain management: adequate  Airway patency: patent  Cardiovascular status: acceptable  Respiratory status: acceptable  Hydration status: acceptable  Postoperative Nausea and Vomiting: none    No notable events documented.    "

## 2025-03-27 NOTE — ANESTHESIA PROCEDURE NOTES
Airway  Date/Time: 3/27/2025 8:07 AM  Urgency: elective    Airway not difficult    Staffing  Performed: SALMA   Authorized by: Marin Rodríguez MD    Performed by: SALMA [place]  Patient location during procedure: OR    Indications and Patient Condition  Indications for airway management: anesthesia and airway protection  Spontaneous Ventilation: absent  Sedation level: deep  Preoxygenated: yes  Patient position: sniffing  MILS not maintained throughout  Mask difficulty assessment: 2 - vent by mask + OA or adjuvant +/- NMBA  Planned trial extubation    Final Airway Details  Final airway type: endotracheal airway      Successful airway: ETT - double lumen left  Cuffed: yes   Successful intubation technique: direct laryngoscopy  Facilitating devices/methods: intubating stylet  Endotracheal tube insertion site: oral  Blade: [place]  Blade size: #4  ETT DL size (fr): 37  Cormack-Lehane Classification: grade IIa - partial view of glottis  Placement verified by: chest auscultation, capnometry and palpation of cuff   Measured from: teeth  ETT to teeth (cm): 28  Number of attempts at approach: 1

## 2025-03-28 ENCOUNTER — APPOINTMENT (OUTPATIENT)
Dept: RADIOLOGY | Facility: HOSPITAL | Age: 75
DRG: 163 | End: 2025-03-28
Payer: MEDICARE

## 2025-03-28 ENCOUNTER — APPOINTMENT (OUTPATIENT)
Dept: DIALYSIS | Facility: HOSPITAL | Age: 75
End: 2025-03-28
Payer: MEDICARE

## 2025-03-28 LAB
ANION GAP SERPL CALC-SCNC: 29 MMOL/L (ref 10–20)
ATRIAL RATE: 52 BPM
BUN SERPL-MCNC: 90 MG/DL (ref 6–23)
CALCIUM SERPL-MCNC: 10.2 MG/DL (ref 8.6–10.3)
CHLORIDE SERPL-SCNC: 94 MMOL/L (ref 98–107)
CO2 SERPL-SCNC: 22 MMOL/L (ref 21–32)
CREAT SERPL-MCNC: 14.06 MG/DL (ref 0.5–1.3)
EGFRCR SERPLBLD CKD-EPI 2021: 3 ML/MIN/1.73M*2
ERYTHROCYTE [DISTWIDTH] IN BLOOD BY AUTOMATED COUNT: 16.2 % (ref 11.5–14.5)
GLUCOSE BLD MANUAL STRIP-MCNC: 110 MG/DL (ref 74–99)
GLUCOSE BLD MANUAL STRIP-MCNC: 114 MG/DL (ref 74–99)
GLUCOSE BLD MANUAL STRIP-MCNC: 174 MG/DL (ref 74–99)
GLUCOSE BLD MANUAL STRIP-MCNC: 181 MG/DL (ref 74–99)
GLUCOSE SERPL-MCNC: 108 MG/DL (ref 74–99)
HCT VFR BLD AUTO: 33.4 % (ref 41–52)
HGB BLD-MCNC: 10.3 G/DL (ref 13.5–17.5)
MAGNESIUM SERPL-MCNC: 1.97 MG/DL (ref 1.6–2.4)
MCH RBC QN AUTO: 26.8 PG (ref 26–34)
MCHC RBC AUTO-ENTMCNC: 30.8 G/DL (ref 32–36)
MCV RBC AUTO: 87 FL (ref 80–100)
NRBC BLD-RTO: 0 /100 WBCS (ref 0–0)
P AXIS: 13 DEGREES
P OFFSET: 181 MS
P ONSET: 123 MS
PLATELET # BLD AUTO: 270 X10*3/UL (ref 150–450)
POTASSIUM SERPL-SCNC: 5.3 MMOL/L (ref 3.5–5.3)
PR INTERVAL: 182 MS
Q ONSET: 214 MS
QRS COUNT: 8 BEATS
QRS DURATION: 120 MS
QT INTERVAL: 484 MS
QTC CALCULATION(BAZETT): 450 MS
QTC FREDERICIA: 461 MS
R AXIS: 48 DEGREES
RBC # BLD AUTO: 3.85 X10*6/UL (ref 4.5–5.9)
SODIUM SERPL-SCNC: 140 MMOL/L (ref 136–145)
T AXIS: 39 DEGREES
T OFFSET: 456 MS
VENTRICULAR RATE: 52 BPM
WBC # BLD AUTO: 13.2 X10*3/UL (ref 4.4–11.3)

## 2025-03-28 PROCEDURE — 2060000001 HC INTERMEDIATE ICU ROOM DAILY

## 2025-03-28 PROCEDURE — 85027 COMPLETE CBC AUTOMATED: CPT | Performed by: NURSE PRACTITIONER

## 2025-03-28 PROCEDURE — 6350000001 HC RX 635 EPOETIN >10,000 UNITS: Performed by: INTERNAL MEDICINE

## 2025-03-28 PROCEDURE — 2500000002 HC RX 250 W HCPCS SELF ADMINISTERED DRUGS (ALT 637 FOR MEDICARE OP, ALT 636 FOR OP/ED): Performed by: NURSE PRACTITIONER

## 2025-03-28 PROCEDURE — 99232 SBSQ HOSP IP/OBS MODERATE 35: CPT | Performed by: INTERNAL MEDICINE

## 2025-03-28 PROCEDURE — 2500000004 HC RX 250 GENERAL PHARMACY W/ HCPCS (ALT 636 FOR OP/ED): Performed by: NURSE PRACTITIONER

## 2025-03-28 PROCEDURE — 36415 COLL VENOUS BLD VENIPUNCTURE: CPT | Performed by: NURSE PRACTITIONER

## 2025-03-28 PROCEDURE — 8010000001 HC DIALYSIS - HEMODIALYSIS PER DAY

## 2025-03-28 PROCEDURE — 99232 SBSQ HOSP IP/OBS MODERATE 35: CPT | Performed by: NURSE PRACTITIONER

## 2025-03-28 PROCEDURE — 80048 BASIC METABOLIC PNL TOTAL CA: CPT | Performed by: NURSE PRACTITIONER

## 2025-03-28 PROCEDURE — 2500000001 HC RX 250 WO HCPCS SELF ADMINISTERED DRUGS (ALT 637 FOR MEDICARE OP): Performed by: NURSE PRACTITIONER

## 2025-03-28 PROCEDURE — 97168 OT RE-EVAL EST PLAN CARE: CPT | Mod: GO

## 2025-03-28 PROCEDURE — 71045 X-RAY EXAM CHEST 1 VIEW: CPT

## 2025-03-28 PROCEDURE — 97116 GAIT TRAINING THERAPY: CPT | Mod: GP | Performed by: PHYSICAL THERAPIST

## 2025-03-28 PROCEDURE — 82947 ASSAY GLUCOSE BLOOD QUANT: CPT

## 2025-03-28 PROCEDURE — 2500000005 HC RX 250 GENERAL PHARMACY W/O HCPCS: Performed by: INTERNAL MEDICINE

## 2025-03-28 PROCEDURE — 71045 X-RAY EXAM CHEST 1 VIEW: CPT | Performed by: RADIOLOGY

## 2025-03-28 PROCEDURE — 94760 N-INVAS EAR/PLS OXIMETRY 1: CPT

## 2025-03-28 PROCEDURE — 97530 THERAPEUTIC ACTIVITIES: CPT | Mod: GO

## 2025-03-28 PROCEDURE — 97164 PT RE-EVAL EST PLAN CARE: CPT | Mod: GP | Performed by: PHYSICAL THERAPIST

## 2025-03-28 PROCEDURE — 83735 ASSAY OF MAGNESIUM: CPT | Performed by: NURSE PRACTITIONER

## 2025-03-28 RX ADMIN — ASPIRIN 81 MG: 81 TABLET, COATED ORAL at 08:43

## 2025-03-28 RX ADMIN — SEVELAMER CARBONATE 1600 MG: 800 TABLET, FILM COATED ORAL at 08:43

## 2025-03-28 RX ADMIN — EPOETIN ALFA-EPBX 8000 UNITS: 10000 INJECTION, SOLUTION INTRAVENOUS; SUBCUTANEOUS at 18:05

## 2025-03-28 RX ADMIN — ISOSORBIDE MONONITRATE 60 MG: 30 TABLET, EXTENDED RELEASE ORAL at 08:43

## 2025-03-28 RX ADMIN — CARVEDILOL 6.25 MG: 6.25 TABLET, FILM COATED ORAL at 08:43

## 2025-03-28 RX ADMIN — GABAPENTIN 300 MG: 300 CAPSULE ORAL at 21:18

## 2025-03-28 RX ADMIN — POLYETHYLENE GLYCOL 3350 17 G: 17 POWDER, FOR SOLUTION ORAL at 08:43

## 2025-03-28 RX ADMIN — DOXYCYCLINE HYCLATE 100 MG: 100 TABLET, FILM COATED ORAL at 21:18

## 2025-03-28 RX ADMIN — CARVEDILOL 6.25 MG: 6.25 TABLET, FILM COATED ORAL at 17:58

## 2025-03-28 RX ADMIN — AMLODIPINE BESYLATE 10 MG: 10 TABLET ORAL at 08:43

## 2025-03-28 RX ADMIN — DOCUSATE SODIUM 100 MG: 100 CAPSULE, LIQUID FILLED ORAL at 21:22

## 2025-03-28 RX ADMIN — FLUOCINONIDE: 0.5 CREAM TOPICAL at 08:54

## 2025-03-28 RX ADMIN — INSULIN LISPRO 1 UNITS: 100 INJECTION, SOLUTION INTRAVENOUS; SUBCUTANEOUS at 12:35

## 2025-03-28 RX ADMIN — FLUOCINONIDE: 0.5 CREAM TOPICAL at 21:18

## 2025-03-28 RX ADMIN — ATORVASTATIN CALCIUM 80 MG: 80 TABLET, FILM COATED ORAL at 21:18

## 2025-03-28 RX ADMIN — ALLOPURINOL 100 MG: 100 TABLET ORAL at 08:43

## 2025-03-28 RX ADMIN — DOCUSATE SODIUM 100 MG: 100 CAPSULE, LIQUID FILLED ORAL at 08:48

## 2025-03-28 RX ADMIN — SEVELAMER CARBONATE 1600 MG: 800 TABLET, FILM COATED ORAL at 17:58

## 2025-03-28 RX ADMIN — SITAGLIPTIN 25 MG: 25 TABLET, FILM COATED ORAL at 08:43

## 2025-03-28 RX ADMIN — Medication 2 L/MIN: at 21:36

## 2025-03-28 RX ADMIN — EZETIMIBE 10 MG: 10 TABLET ORAL at 08:43

## 2025-03-28 RX ADMIN — PANTOPRAZOLE SODIUM 40 MG: 40 TABLET, DELAYED RELEASE ORAL at 08:43

## 2025-03-28 RX ADMIN — DOXYCYCLINE HYCLATE 100 MG: 100 TABLET, FILM COATED ORAL at 08:43

## 2025-03-28 RX ADMIN — CEFTRIAXONE 2 G: 2 INJECTION, POWDER, FOR SOLUTION INTRAMUSCULAR; INTRAVENOUS at 18:32

## 2025-03-28 SDOH — SOCIAL STABILITY: SOCIAL INSECURITY: WITHIN THE LAST YEAR, HAVE YOU BEEN HUMILIATED OR EMOTIONALLY ABUSED IN OTHER WAYS BY YOUR PARTNER OR EX-PARTNER?: NO

## 2025-03-28 SDOH — SOCIAL STABILITY: SOCIAL INSECURITY: DO YOU FEEL ANYONE HAS EXPLOITED OR TAKEN ADVANTAGE OF YOU FINANCIALLY OR OF YOUR PERSONAL PROPERTY?: NO

## 2025-03-28 SDOH — SOCIAL STABILITY: SOCIAL INSECURITY: WITHIN THE LAST YEAR, HAVE YOU BEEN AFRAID OF YOUR PARTNER OR EX-PARTNER?: NO

## 2025-03-28 SDOH — ECONOMIC STABILITY: FOOD INSECURITY: WITHIN THE PAST 12 MONTHS, YOU WORRIED THAT YOUR FOOD WOULD RUN OUT BEFORE YOU GOT THE MONEY TO BUY MORE.: NEVER TRUE

## 2025-03-28 SDOH — SOCIAL STABILITY: SOCIAL INSECURITY: HAS ANYONE EVER THREATENED TO HURT YOUR FAMILY OR YOUR PETS?: NO

## 2025-03-28 SDOH — SOCIAL STABILITY: SOCIAL INSECURITY: DOES ANYONE TRY TO KEEP YOU FROM HAVING/CONTACTING OTHER FRIENDS OR DOING THINGS OUTSIDE YOUR HOME?: NO

## 2025-03-28 SDOH — SOCIAL STABILITY: SOCIAL INSECURITY: WERE YOU ABLE TO COMPLETE ALL THE BEHAVIORAL HEALTH SCREENINGS?: YES

## 2025-03-28 SDOH — ECONOMIC STABILITY: FOOD INSECURITY: WITHIN THE PAST 12 MONTHS, THE FOOD YOU BOUGHT JUST DIDN'T LAST AND YOU DIDN'T HAVE MONEY TO GET MORE.: NEVER TRUE

## 2025-03-28 SDOH — SOCIAL STABILITY: SOCIAL INSECURITY: ABUSE: ADULT

## 2025-03-28 SDOH — ECONOMIC STABILITY: INCOME INSECURITY: IN THE PAST 12 MONTHS HAS THE ELECTRIC, GAS, OIL, OR WATER COMPANY THREATENED TO SHUT OFF SERVICES IN YOUR HOME?: NO

## 2025-03-28 SDOH — SOCIAL STABILITY: SOCIAL INSECURITY: ARE YOU OR HAVE YOU BEEN THREATENED OR ABUSED PHYSICALLY, EMOTIONALLY, OR SEXUALLY BY ANYONE?: NO

## 2025-03-28 SDOH — SOCIAL STABILITY: SOCIAL INSECURITY: ARE THERE ANY APPARENT SIGNS OF INJURIES/BEHAVIORS THAT COULD BE RELATED TO ABUSE/NEGLECT?: NO

## 2025-03-28 SDOH — SOCIAL STABILITY: SOCIAL INSECURITY: HAVE YOU HAD ANY THOUGHTS OF HARMING ANYONE ELSE?: NO

## 2025-03-28 SDOH — SOCIAL STABILITY: SOCIAL INSECURITY: DO YOU FEEL UNSAFE GOING BACK TO THE PLACE WHERE YOU ARE LIVING?: NO

## 2025-03-28 SDOH — SOCIAL STABILITY: SOCIAL INSECURITY: HAVE YOU HAD THOUGHTS OF HARMING ANYONE ELSE?: NO

## 2025-03-28 ASSESSMENT — COGNITIVE AND FUNCTIONAL STATUS - GENERAL
CLIMB 3 TO 5 STEPS WITH RAILING: TOTAL
HELP NEEDED FOR BATHING: A LOT
DAILY ACTIVITIY SCORE: 24
WALKING IN HOSPITAL ROOM: A LITTLE
WALKING IN HOSPITAL ROOM: A LITTLE
EATING MEALS: A LITTLE
DRESSING REGULAR LOWER BODY CLOTHING: A LOT
DAILY ACTIVITIY SCORE: 16
TOILETING: A LITTLE
MOVING FROM LYING ON BACK TO SITTING ON SIDE OF FLAT BED WITH BEDRAILS: A LITTLE
PERSONAL GROOMING: A LITTLE
STANDING UP FROM CHAIR USING ARMS: A LITTLE
TURNING FROM BACK TO SIDE WHILE IN FLAT BAD: A LITTLE
DRESSING REGULAR UPPER BODY CLOTHING: A LITTLE
MOBILITY SCORE: 22
CLIMB 3 TO 5 STEPS WITH RAILING: A LITTLE
PATIENT BASELINE BEDBOUND: NO
MOBILITY SCORE: 16
MOVING TO AND FROM BED TO CHAIR: A LITTLE

## 2025-03-28 ASSESSMENT — ACTIVITIES OF DAILY LIVING (ADL)
TOILETING: INDEPENDENT
FEEDING YOURSELF: INDEPENDENT
HEARING - LEFT EAR: FUNCTIONAL
PATIENT'S MEMORY ADEQUATE TO SAFELY COMPLETE DAILY ACTIVITIES?: YES
WALKS IN HOME: INDEPENDENT
ASSISTIVE_DEVICE: EYEGLASSES
HOME_MANAGEMENT_TIME_ENTRY: 7
GROOMING: INDEPENDENT
HEARING - RIGHT EAR: FUNCTIONAL
DRESSING YOURSELF: INDEPENDENT
JUDGMENT_ADEQUATE_SAFELY_COMPLETE_DAILY_ACTIVITIES: NO
LACK_OF_TRANSPORTATION: NO
ADEQUATE_TO_COMPLETE_ADL: YES
BATHING: INDEPENDENT

## 2025-03-28 ASSESSMENT — PATIENT HEALTH QUESTIONNAIRE - PHQ9
2. FEELING DOWN, DEPRESSED OR HOPELESS: NOT AT ALL
1. LITTLE INTEREST OR PLEASURE IN DOING THINGS: NOT AT ALL
SUM OF ALL RESPONSES TO PHQ9 QUESTIONS 1 & 2: 0

## 2025-03-28 ASSESSMENT — PAIN SCALES - GENERAL
PAINLEVEL_OUTOF10: 0 - NO PAIN

## 2025-03-28 ASSESSMENT — PAIN - FUNCTIONAL ASSESSMENT
PAIN_FUNCTIONAL_ASSESSMENT: 0-10
PAIN_FUNCTIONAL_ASSESSMENT: NO/DENIES PAIN
PAIN_FUNCTIONAL_ASSESSMENT: NO/DENIES PAIN
PAIN_FUNCTIONAL_ASSESSMENT: 0-10

## 2025-03-28 ASSESSMENT — LIFESTYLE VARIABLES
HOW OFTEN DO YOU HAVE 6 OR MORE DRINKS ON ONE OCCASION: NEVER
SKIP TO QUESTIONS 9-10: 1
HOW MANY STANDARD DRINKS CONTAINING ALCOHOL DO YOU HAVE ON A TYPICAL DAY: PATIENT DOES NOT DRINK
AUDIT-C TOTAL SCORE: 0
AUDIT-C TOTAL SCORE: 0
HOW OFTEN DO YOU HAVE A DRINK CONTAINING ALCOHOL: NEVER

## 2025-03-28 NOTE — PROGRESS NOTES
"William A Franke \"Narciso\" is a 74 y.o. male on day 6 of admission presenting with Hydropneumothorax    Subjective   CRISTIN   CT remains to waterseal, 250cc out   Objective     Physical Exam  Vitals reviewed.   Constitutional:       Appearance: Normal appearance.      Comments: Awake alert offers no complaints   Cardiovascular:      Pulses: Normal pulses.   Pulmonary:      Comments: Pleurx in place 250cc bloody/ss drainage  No air leak or crepitus  NAD  Genitourinary:     Comments: anauric  Musculoskeletal:         General: No swelling.   Skin:     General: Skin is warm and dry.   Neurological:      General: No focal deficit present.      Mental Status: He is alert and oriented to person, place, and time.   Psychiatric:         Mood and Affect: Mood normal.         Behavior: Behavior normal.       Review of Systems  Denies chest pain, palpations, light headedness, dizziness sob, n/v fever, chills    Last Recorded Vitals   Blood pressure 168/70, pulse 60, temperature 36.6 °C (97.8 °F), temperature source Temporal, resp. rate 18, height 1.727 m (5' 8\"), weight 66.2 kg (146 lb), SpO2 97%.    Intake/Output Last 3 Shifts  I/O last 3 completed shifts:  In: 420 (6.3 mL/kg) [P.O.:120; IV Piggyback:300]  Out: 215 (3.2 mL/kg) [Blood:15; Chest Tube:200]  Weight: 66.2 kg     Lines  Chest Tube Right Pleural  (Active)   Placement Date/Time: 03/27/25 0842   Placed by: Sinarlene  Chest Tube Orientation: Right  Chest Tube Location: Pleural  Chest Tube Drain Tube Size (Fr): (c)   Chest Tube Drainage System: Dry seal chest drain   Number of days: 1        Recent Labs  CMP:  Results from last 7 days   Lab Units 03/28/25  0645 03/25/25  0515 03/22/25  0553   SODIUM mmol/L 140 138 140   POTASSIUM mmol/L 5.3 4.6 4.6   CHLORIDE mmol/L 94* 92* 96*   CO2 mmol/L 22 28 29   ANION GAP mmol/L 29* 23* 20   BUN mg/dL 90* 72* 47*   CREATININE mg/dL 14.06* 12.75* 9.85*   EGFR mL/min/1.73m*2 3* 4* 5*   MAGNESIUM mg/dL 1.97  --   --      CBC:  Results from " last 7 days   Lab Units 03/28/25  0645 03/25/25  0515 03/22/25  0553   WBC AUTO x10*3/uL 13.2* 8.7 7.5   HEMOGLOBIN g/dL 10.3* 10.8* 11.5*   HEMATOCRIT % 33.4* 35.6* 36.8*   PLATELETS AUTO x10*3/uL 270 237 257   MCV fL 87 88 90     COAG:     HEME/ENDO:     CARDIAC:        Imaging  XR chest 1 view   Final Result   1.  Right chest tube with slightly decreased right basilar   pneumothorax.                  MACRO:   None        Signed by: Eden Little 3/28/2025 5:51 AM   Dictation workstation:   [id number]      XR chest 1 view   Final Result   Interval placement of right chest tube with decrease in right pleural   fluid and right basilar atelectasis        Small pneumothorax is present lateral right lower hemithorax which is   overall similar in size to the prior study.        MACRO:   None.        Signed by: Cristina Toney 3/27/2025 9:37 AM   Dictation workstation:   [id number]           Medications  Scheduled medications  allopurinol, 100 mg, oral, Daily  amLODIPine, 10 mg, oral, Daily  aspirin, 81 mg, oral, Daily  atorvastatin, 80 mg, oral, Nightly  carvedilol, 6.25 mg, oral, BID  cefTRIAXone, 2 g, intravenous, q24h  docusate sodium, 100 mg, oral, BID  doxycycline, 100 mg, oral, q12h LUKE  epoetin jeison or biosimilar, 100 Units/kg, subcutaneous, Every Mon/Wed/Fri  ergocalciferol, 50,000 Units, oral, q14 days  ezetimibe, 10 mg, oral, Daily  fluocinonide, , Topical, BID  gabapentin, 300 mg, oral, Nightly  heparin (porcine), 5,000 Units, subcutaneous, q8h LUKE  insulin lispro, 0-5 Units, subcutaneous, TID AC  isosorbide mononitrate ER, 60 mg, oral, Daily  oxygen, 2 L/min, inhalation, q24h  pantoprazole, 40 mg, oral, Daily  pantoprazole, 40 mg, intravenous, Daily  polyethylene glycol, 17 g, oral, Daily  sevelamer carbonate, 1,600 mg, oral, TID  SITagliptin phosphate, 25 mg, oral, Daily      Continuous medications     PRN medications  PRN medications: acetaminophen **OR** acetaminophen **OR** acetaminophen,  "benzocaine-menthol, dextromethorphan-guaifenesin, diphenhydrAMINE, guaiFENesin, HYDROmorphone, melatonin, naloxone, nitroglycerin, ondansetron, oxyCODONE, oxyCODONE, oxygen    Assessment/Plan   Assessment & Plan  Hydropneumothorax    Anemia    CAD (coronary artery disease)    ESRD on hemodialysis (Multi)    Benign essential hypertension    Gastroesophageal reflux disease    Anxiety    Pleural effusion    Pneumonia  William A Franke \"Narciso\" is an 74 y.o. male who is having surgery for Hydropneumothorax [J94.8]  Pleural effusion [J90].  Patient has a history of end-stage renal disease on HD.  Has undergone several prior right thoracentesis procedures for a right pleural effusion.  Most recent imaging showed a loculated hydropneumothorax at the right lateral chest and along the base.  Thickening of the visceral pleura concerning for possible trapped lung.     S/p Right Video Assisted Thoracoscopy; partial decortication with Pleural Biospy; PleurX insertion with Dr Herrera 3/27/2025  Findings: trapped lung, primarily lower lobe, part of the middle lobe. Thickened parietal pleura, bloody effusion     CT 250cc old bloody drainage  CXR slightly improved, HD stable    Cap Pleurx today  Home care requested   Discharge instructions updated- Drain 3x week as needed  Follow up appointment requested     - 2 weeks with Dr Herrera  Pleurx starter kit ordered to bedside  May discharge from thoracic standpoint      Discussed with Dr Herrera, patient and family    Time spent on the assessment of patient, gathering and interpreting data, review of medical record/patient history, personally reviewing radiographic imaging and formulation of this note. With greater than 50% spent in personal discussion with patient/ family.    Time:40 minutes  Nereida Miguel, APRN-CNP    "

## 2025-03-28 NOTE — PROGRESS NOTES
"Occupational Therapy    Re-Evaluation and OT Treatment    Patient Name: William A Franke \"Narciso\"  MRN: [id number]  Department: [place] [address] [facility]  Room: [address] [place] [address]  Today's Date: 3/28/2025  Time Calculation  Start Time: 1300  Stop Time: 1337  Time Calculation (min): 37 min        Assessment:  OT Assessment: Pt demonstrating change in status from previous OT treatment requiring OT re-eval. Rec updated to mod intensity due to deficits in stength, balance, and cognition impairing safety.  Prognosis: Good  Barriers to Discharge Home: No anticipated barriers  Evaluation/Treatment Tolerance: Patient tolerated treatment well, Patient limited by fatigue  Medical Staff Made Aware: Yes  End of Session Communication: Bedside nurse  End of Session Patient Position: Alarm on, Up in chair  OT Assessment Results: Decreased ADL status, Decreased safe judgment during ADL, Decreased cognition, Decreased endurance, Decreased functional mobility, Decreased gross motor control, Decreased IADLs  Prognosis: Good  Barriers to Discharge: None  Evaluation/Treatment Tolerance: Patient tolerated treatment well, Patient limited by fatigue  Medical Staff Made Aware: Yes  Strengths: Physical health  Barriers to Participation: Ability to acquire knowledge, Access to adaptive/assistive products, Attitude of self, Capable of completing ADLs semi/independent, Insight into problems  Plan:  Treatment Interventions: ADL retraining, Functional transfer training, UE strengthening/ROM, Endurance training, Cognitive reorientation, Compensatory technique education  OT Frequency: 3 times per week  OT Discharge Recommendations: Moderate intensity level of continued care  Equipment Recommended upon Discharge: Wheeled walker  OT Recommended Transfer Status: Assist of 1  OT - OK to Discharge: Yes  Treatment Interventions: ADL retraining, Functional transfer training, UE strengthening/ROM, Endurance training, Cognitive reorientation, Compensatory technique " education    Subjective   Previous Visit Info:  OT Last Visit  OT Received On: 03/28/25  General:  General  Reason for Referral: 74 y.o. male presenting with  increasing shortness of breath and cough. At [facility] patient had thoracocentesis done which again was exudative.  Started on antibiotic thoracic surgery consulted.  They recommended VATS but thoracic surgery is not available at [facility] patient was transferred to [facility]. Pt reporting increased weakness and fatigue.  Referred By: Nereida Miguel, APRN-CNP  Past Medical History Relevant to Rehab:   Past Medical History:   Diagnosis Date    Chronic kidney disease     Diabetes mellitus (Multi)     ESRD (end stage renal disease) on dialysis (Multi)     Heart murmur     HLD (hyperlipidemia)     Hypertension     Personal history of other endocrine, nutritional and metabolic disease     History of hypercholesterolemia    Personal history of other specified conditions 08/28/2020    History of chest pain       Family/Caregiver Present: Yes  Caregiver Feedback: wife and pt's brother present  Co-Treatment: PT  Co-Treatment Reason: overlapping re-eval with PT to maximize safety and participation.  Prior to Session Communication: Bedside nurse  Patient Position Received: Alarm on, Bed, 3 rail up  Preferred Learning Style: auditory, kinesthetic  General Comment: Pt agreeable to OT, pt confused throughout, intermittent agitation  Precautions:  Medical Precautions: Fall precautions  Precautions Comment: chest tube     Date/Time Vitals Session Patient Position Pulse Resp SpO2 BP MAP (mmHg)    03/28/25 1524 --  --  61  16  --  120/65  62           Pain:  Pain Assessment  Pain Assessment: 0-10  0-10 (Numeric) Pain Score: 0 - No pain    Objective    Cognition:  Cognition  Overall Cognitive Status: Impaired  Orientation Level: Disoriented to place, Disoriented to situation (pt unable to answer when provided a list, at end of session pt able to state location  "of hospital)  Attention: Exceptions to WFL  Memory: Exceptions to WFL  Short-Term Memory: Impaired  Working Memory: Impaired  Memory Comments: pt stating trouble with memory, increased time to respond to orientation questions, pt unsure of situation  Problem Solving: Exceptions to WFL  Complex Functional Tasks: Impaired  Safety/Judgement: Exceptions to WFL  Complex Functional Tasks: Moderate  Insight: Moderate  Processing Speed: Delayed  Cognitive Skill Development:  Cognitive Skill Development Activity 1: Pt completed cognitive training due to confusion. Pt unable to sequence multi-step commands. Pt asking for directions throughout session, unable to sequence numbers 20-0 backwards stating \"ABC\" when asked to complete. Following functional ambulation task pt able to report \"place\" he is at.    Outcome Measures:[facility] Daily Activity  Putting on and taking off regular lower body clothing: A lot  Bathing (including washing, rinsing, drying): A lot  Putting on and taking off regular upper body clothing: A little  Toileting, which includes using toilet, bedpan or urinal: A little  Taking care of personal grooming such as brushing teeth: A little  Eating Meals: A little  Daily Activity - Total Score: 16    Education Documentation  Body Mechanics, taught by Cheyenne Estrella OT at 3/28/2025  4:01 PM.  Learner: Family, Patient  Readiness: Acceptance  Method: Demonstration, Explanation  Response: Verbalizes Understanding, Needs Reinforcement    ADL Training, taught by Cheyenne Estrella OT at 3/28/2025  4:01 PM.  Learner: Family, Patient  Readiness: Acceptance  Method: Demonstration, Explanation  Response: Verbalizes Understanding, Needs Reinforcement    Education Comments  No comments found.           Goals:  Encounter Problems       Encounter Problems (Active)       ADLs       Patient with complete lower body dressing with modified independent level of assistance donning and doffing all LE clothes. (Met)       Start:  03/24/25    " Resolved:  03/26/25         Patient will complete daily grooming tasks brushing teeth and washing face/hair with modified independent level of assistance and PRN adaptive equipment while standing.       Start:  03/24/25    Expected End:  04/11/25               ADLs       Patient will perform UB and LB bathing with modified independent level of assistance.       Start:  03/28/25    Expected End:  04/11/25            Patient with complete upper body dressing with modified independent level of assistance donning and doffing all UE clothes .       Start:  03/28/25    Expected End:  04/11/25            Patient with complete lower body dressing with modified independent level of assistance donning and doffing all LE clothes.       Start:  03/28/25    Expected End:  04/11/25               COGNITION/SAFETY       Patient will follow 100% Multistep commands to allow improved ADL performance.       Start:  03/28/25    Expected End:  04/11/25            Patient will demonstrated orientation x 4 with no cues.       Start:  03/28/25    Expected End:  04/11/25       ORIENTATION            MOBILITY       Patient will perform Functional mobility mod  Household distances/Community Distances with modified independent level of assistance and least restrictive device in order to improve safety and functional mobility.       Start:  03/24/25    Expected End:  04/11/25               MOBILITY       Patient will perform Functional mobility mod  Household distances/Community Distances with stand by assist level of assistance and least restrictive device in order to improve safety and functional mobility.       Start:  03/28/25    Expected End:  04/11/25

## 2025-03-28 NOTE — PROGRESS NOTES
"   03/28/25 1608   Discharge Planning   Expected Discharge Disposition SNF     Met with patient, wife and brother at bedside. Discussed change in PT OT recommendation from low to mod. Patient states he would still like to  go home, but left decision up to family. Wife expressed concern about getting patient to and from dialysis in vehicle and noted that patient's brother is going out of town and wont be able to assist. Patient has previously been to [facility] (they have onsite HD) and had a \"fine\" experience. Brother and wife identified [facility] as FOC and were agreeable to auth being started today. However, they both noted that if patient has big improvements prior to dc then they will take him home with [facility].     Auth for [facility] started.  "

## 2025-03-28 NOTE — CONSULTS
INFECTIOUS DISEASE INPATIENT INITIAL CONSULTATION    Referred By: Marianna Galloway    Reason For Consult:  Pneumonia and exudative pleural effusion    HPI:  This is a 74 y.o. male with PMH of ESRD on HD via LUE AVF, HFpEF, HTN, hx MSSA bacteremia and hx effusion s/p IV abx 12/2024 who presented with shortness of breath.    Has been admitted here after transfer from [facility] on 3/22. Came here for VATS given right side effusion. Went to OR on 3/27 and found to have trapped lung with thick pleura and bloody effusion. Blood cx x2 from 3/18 negative. MRSA nares PCR negative. OR bacterial culture finalized negative.     Allergies:  Patient has no known allergies.     Vitals (Last 24 Hours):  Heart Rate:  [46-60]   Temp:  [36 °C (96.8 °F)-36.7 °C (98.1 °F)]   Resp:  [12-18]   BP: ()/(39-70)   SpO2:  [96 %-100 %]      PHYSICAL EXAM:  Gen - NAD  Heart - RRR, no murmurs  Lungs - right chest tube with some bloody fluid draining  Abd - soft, no ttp, BS present  Skin - no rash    MEDS:    Current Facility-Administered Medications:     acetaminophen (Tylenol) tablet 650 mg, 650 mg, oral, q4h PRN **OR** acetaminophen (Tylenol) oral liquid 650 mg, 650 mg, oral, q4h PRN **OR** acetaminophen (Tylenol) suppository 650 mg, 650 mg, rectal, q4h PRN, Nereida Miguel, APRN-CNP    allopurinol (Zyloprim) tablet 100 mg, 100 mg, oral, Daily, Nereida Miguel, APRN-CNP, 100 mg at 03/28/25 0843    amLODIPine (Norvasc) tablet 10 mg, 10 mg, oral, Daily, Nereida Miguel, APRN-CNP, 10 mg at 03/28/25 0843    aspirin EC tablet 81 mg, 81 mg, oral, Daily, Nereida Miguel, APRN-CNP, 81 mg at 03/28/25 0843    atorvastatin (Lipitor) tablet 80 mg, 80 mg, oral, Nightly, Nereida Miguel, APRN-CNP, 80 mg at 03/27/25 2126    benzocaine-menthol (Cepastat Sore Throat) lozenge 1 lozenge, 1 lozenge, Mouth/Throat, q2h PRN, ERIKA Webb    carvedilol (Coreg) tablet 6.25 mg, 6.25 mg, oral, BID, ERIKA Webb, 6.25 mg at  03/28/25 0843    cefTRIAXone (Rocephin) 2 g in dextrose 5% IV 50 mL, 2 g, intravenous, q24h, Nereida Miguel APRN-CNP, Stopped at 03/27/25 1746    dextromethorphan-guaifenesin (Robitussin DM)  mg/5 mL oral liquid 5 mL, 5 mL, oral, q4h PRN, Nereida Miguel, APRN-CNP    diphenhydrAMINE (BENADryl) injection 25 mg, 25 mg, intravenous, q6h PRN, Nereida Miguel, APRN-CNP    docusate sodium (Colace) capsule 100 mg, 100 mg, oral, BID, Nereida Miguel, VERÓNICA-CNP, 100 mg at 03/28/25 0848    doxycycline (Vibra-Tabs) tablet 100 mg, 100 mg, oral, q12h LUKE, Nereida Miguel, APRN-CNP, 100 mg at 03/28/25 0843    ergocalciferol (Vitamin D-2) capsule 50,000 Units, 50,000 Units, oral, q14 days, Nereida Miguel, APREDITH-CNP, 50,000 Units at 03/23/25 1027    ezetimibe (Zetia) tablet 10 mg, 10 mg, oral, Daily, Nereida Miguel, VERÓNICA-CNP, 10 mg at 03/28/25 0843    fluocinonide (Lidex) 0.05 % cream, , Topical, BID, VERÓNICA Webb-CNP, Given at 03/28/25 0854    gabapentin (Neurontin) capsule 300 mg, 300 mg, oral, Nightly, Nereida Miguel, APRN-CNP, 300 mg at 03/27/25 2126    guaiFENesin (Mucinex) 12 hr tablet 600 mg, 600 mg, oral, q12h PRN, Nereida Miguel, APRN-CNP, 600 mg at 03/25/25 2117    heparin (porcine) injection 5,000 Units, 5,000 Units, subcutaneous, q8h LKUE, Nereida Miguel, APRN-CNP    HYDROmorphone (Dilaudid) injection 0.2 mg, 0.2 mg, intravenous, q4h PRN, ERIKA Webb    insulin lispro injection 0-5 Units, 0-5 Units, subcutaneous, TID AC, ERIKA Webb, 2 Units at 03/27/25 1709    isosorbide mononitrate ER (Imdur) 24 hr tablet 60 mg, 60 mg, oral, Daily, ERIKA Webb, 60 mg at 03/28/25 0843    melatonin tablet 3 mg, 3 mg, oral, Nightly PRN, ERIKA Webb    naloxone (Narcan) injection 0.2 mg, 0.2 mg, intravenous, q5 min PRN, ERIKA Webb    nitroglycerin (Nitrostat) SL tablet 0.4 mg, 0.4 mg, sublingual, q5 min  PRN, ERIKA Webb    ondansetron (Zofran) injection 4 mg, 4 mg, intravenous, q6h PRN, VERÓNICA Webb-CNP, 4 mg at 03/27/25 0839    oxyCODONE (Roxicodone) immediate release tablet 10 mg, 10 mg, oral, q4h PRN, VERÓNICA Webb-CNP    oxyCODONE (Roxicodone) immediate release tablet 5 mg, 5 mg, oral, q4h PRN, ERIKA Webb    oxygen (O2) therapy, 2 L/min, inhalation, q24h, Marianna Galloway MD, Stopped at 03/22/25 2027    oxygen (O2) therapy, , inhalation, Continuous PRN - O2/gases, Marianna Galloway MD    pantoprazole (ProtoNix) EC tablet 40 mg, 40 mg, oral, Daily, ERIKA Webb, 40 mg at 03/28/25 0843    pantoprazole (Protonix) injection 40 mg, 40 mg, intravenous, Daily, ERIKA Webb    polyethylene glycol (Glycolax, Miralax) packet 17 g, 17 g, oral, Daily, ERIKA Webb, 17 g at 03/28/25 0843    sevelamer carbonate (Renvela) tablet 1,600 mg, 1,600 mg, oral, TID, ERIKA Webb, 1,600 mg at 03/28/25 0843    SITagliptin phosphate (Januvia) tablet 25 mg, 25 mg, oral, Daily, ERIKA Webb, 25 mg at 03/28/25 0843     LABS:  Lab Results   Component Value Date    WBC 13.2 (H) 03/28/2025    HGB 10.3 (L) 03/28/2025    HCT 33.4 (L) 03/28/2025    MCV 87 03/28/2025     03/28/2025      Results from last 72 hours   Lab Units 03/28/25  0645   SODIUM mmol/L 140   POTASSIUM mmol/L 5.3   CHLORIDE mmol/L 94*   CO2 mmol/L 22   BUN mg/dL 90*   CREATININE mg/dL 14.06*   GLUCOSE mg/dL 108*   CALCIUM mg/dL 10.2   ANION GAP mmol/L 29*   EGFR mL/min/1.73m*2 3*         Estimated Creatinine Clearance: 4.2 mL/min (A) (by C-G formula based on SCr of 14.06 mg/dL (H)).    IMAGING:  CXR 3/28  Impression:     1.  Right chest tube with slightly decreased right basilar  pneumothorax.     CT Chest 3/20  Impression:     1. New moderate right hydropneumothorax. Given interval thoracentesis  and exudative fluid, findings are suspicious  for ex vacuo  hydropneumothorax (trapped lung). However, correlation with fluid  culture is recommended to exclude empyema, though felt to be less  likely given the negative gram stain and normal WBC count.  Bronchopleural fistula is felt to be unlikely. Underlying malignant  effusion is possible, please correlate with cytology. Further  evaluation with PET-CT could be considered if there is no known  history of malignancy or if there is no known cause of patient's  exudative fluid. The pleural fluid will likely not show FDG avidity  even if malignant, however other sites of potential disease could be  identified or excluded.  2. Stable mildly prominent right hilar lymph node.  3. Additional chronic and incidental findings as detailed above.       ASSESSMENT/PLAN:    Right Lung Exudative Effusion - s/p VATS  Hx MSSA Bacteremia 12/2024  ESRD on HD LUE AVF    IV CTX 2g Q24H here is fine. When ready to go home suggest another 10D of PO Augmentin 875mg BID from when the chest tube is removed.    Monitoring for adverse effects of abx such as rash/itching/diarrhea - none.    Will sign off. Please call back with questions. Thanks!    Antoine Franks MD  [facility]  Office #[phone]

## 2025-03-28 NOTE — PROGRESS NOTES
"Gregory's scheduled for Wednesday William A Franke \"Narciso\" is a 74 y.o. male on day 3 of admission presenting with Hydropneumothorax.    Subjective   Patient had VATS done and Pleurx chest tube was inserted.  Patient is little drowsy  Objective     Physical Exam  Vitals reviewed.   Constitutional:       Appearance: Normal appearance.   HENT:      Head: Normocephalic and atraumatic.      Right Ear: Tympanic membrane, ear canal and external ear normal.      Left Ear: Tympanic membrane, ear canal and external ear normal.      Nose: Nose normal.      Mouth/Throat:      Pharynx: Oropharynx is clear.   Eyes:      Extraocular Movements: Extraocular movements intact.      Conjunctiva/sclera: Conjunctivae normal.      Pupils: Pupils are equal, round, and reactive to light.   Cardiovascular:      Rate and Rhythm: Normal rate and regular rhythm.      Pulses: Normal pulses.      Heart sounds: Normal heart sounds.   Pulmonary:      Effort: Pulmonary effort is normal.      Breath sounds: Rales present.   Abdominal:      General: Abdomen is flat. Bowel sounds are normal.      Palpations: Abdomen is soft.   Musculoskeletal:      Cervical back: Normal range of motion and neck supple.   Skin:     General: Skin is warm and dry.   Neurological:      General: No focal deficit present.      Mental Status: He is alert and oriented to person, place, and time.   Psychiatric:         Mood and Affect: Mood normal.         Last Recorded Vitals  Blood pressure 101/51, pulse 61, temperature 36.4 °C (97.5 °F), resp. rate 16, height 1.727 m (5' 8\"), weight 66.2 kg (146 lb), SpO2 95%.  Intake/Output last 3 Shifts:  I/O last 3 completed shifts:  In: 1830 (27.6 mL/kg) [P.O.:680; I.V.:600 (9.1 mL/kg); Other:400; IV Piggyback:150]  Out: 1532 (23.1 mL/kg) [Other:1532]  Weight: 66.2 kg     Relevant Results            This patient currently has cardiac telemetry ordered; if you would like to modify or discontinue the telemetry order, click here to go to " the orders activity to modify/discontinue the order.  Scheduled medications  allopurinol, 100 mg, oral, Daily  amLODIPine, 10 mg, oral, Daily  aspirin, 81 mg, oral, Daily  atorvastatin, 80 mg, oral, Nightly  carvedilol, 6.25 mg, oral, BID  cefTRIAXone, 2 g, intravenous, q24h  docusate sodium, 100 mg, oral, BID  doxycycline, 100 mg, oral, q12h LUKE  ergocalciferol, 50,000 Units, oral, q14 days  ezetimibe, 10 mg, oral, Daily  fluocinonide, , Topical, BID  gabapentin, 300 mg, oral, Nightly  heparin (porcine), 5,000 Units, subcutaneous, q8h LUKE  insulin lispro, 0-5 Units, subcutaneous, TID AC  isosorbide mononitrate ER, 60 mg, oral, Daily  oxygen, 2 L/min, inhalation, q24h  pantoprazole, 40 mg, oral, Daily  polyethylene glycol, 17 g, oral, Daily  sevelamer carbonate, 1,600 mg, oral, TID  SITagliptin phosphate, 25 mg, oral, Daily      Continuous medications     PRN medications  PRN medications: acetaminophen **OR** acetaminophen **OR** acetaminophen, benzocaine-menthol, dextromethorphan-guaifenesin, guaiFENesin, melatonin, nitroglycerin, ondansetron, oxygen  Results for orders placed or performed during the hospital encounter of 03/22/25 (from the past 24 hours)   CBC   Result Value Ref Range    WBC 8.7 4.4 - 11.3 x10*3/uL    nRBC 0.0 0.0 - 0.0 /100 WBCs    RBC 4.04 (L) 4.50 - 5.90 x10*6/uL    Hemoglobin 10.8 (L) 13.5 - 17.5 g/dL    Hematocrit 35.6 (L) 41.0 - 52.0 %    MCV 88 80 - 100 fL    MCH 26.7 26.0 - 34.0 pg    MCHC 30.3 (L) 32.0 - 36.0 g/dL    RDW 16.4 (H) 11.5 - 14.5 %    Platelets 237 150 - 450 x10*3/uL   Basic Metabolic Panel   Result Value Ref Range    Glucose 83 74 - 99 mg/dL    Sodium 138 136 - 145 mmol/L    Potassium 4.6 3.5 - 5.3 mmol/L    Chloride 92 (L) 98 - 107 mmol/L    Bicarbonate 28 21 - 32 mmol/L    Anion Gap 23 (H) 10 - 20 mmol/L    Urea Nitrogen 72 (H) 6 - 23 mg/dL    Creatinine 12.75 (H) 0.50 - 1.30 mg/dL    eGFR 4 (L) >60 mL/min/1.73m*2    Calcium 9.7 8.6 - 10.3 mg/dL   POCT GLUCOSE   Result  Value Ref Range    POCT Glucose 116 (H) 74 - 99 mg/dL   POCT GLUCOSE   Result Value Ref Range    POCT Glucose 181 (H) 74 - 99 mg/dL   POCT GLUCOSE   Result Value Ref Range    POCT Glucose 109 (H) 74 - 99 mg/dL     *Note: Due to a large number of results and/or encounters for the requested time period, some results have not been displayed. A complete set of results can be found in Results Review.   No results found.  Scheduled medications  allopurinol, 100 mg, oral, Daily  amLODIPine, 10 mg, oral, Daily  aspirin, 81 mg, oral, Daily  atorvastatin, 80 mg, oral, Nightly  carvedilol, 6.25 mg, oral, BID  cefTRIAXone, 2 g, intravenous, q24h  docusate sodium, 100 mg, oral, BID  doxycycline, 100 mg, oral, q12h LUKE  ergocalciferol, 50,000 Units, oral, q14 days  ezetimibe, 10 mg, oral, Daily  fluocinonide, , Topical, BID  gabapentin, 300 mg, oral, Nightly  heparin (porcine), 5,000 Units, subcutaneous, q8h LUKE  insulin lispro, 0-5 Units, subcutaneous, TID AC  isosorbide mononitrate ER, 60 mg, oral, Daily  oxygen, 2 L/min, inhalation, q24h  pantoprazole, 40 mg, oral, Daily  polyethylene glycol, 17 g, oral, Daily  sevelamer carbonate, 1,600 mg, oral, TID  SITagliptin phosphate, 25 mg, oral, Daily      Continuous medications     PRN medications  PRN medications: acetaminophen **OR** acetaminophen **OR** acetaminophen, benzocaine-menthol, dextromethorphan-guaifenesin, guaiFENesin, melatonin, nitroglycerin, ondansetron, oxygen  Results for orders placed or performed during the hospital encounter of 03/22/25 (from the past 24 hours)   CBC   Result Value Ref Range    WBC 8.7 4.4 - 11.3 x10*3/uL    nRBC 0.0 0.0 - 0.0 /100 WBCs    RBC 4.04 (L) 4.50 - 5.90 x10*6/uL    Hemoglobin 10.8 (L) 13.5 - 17.5 g/dL    Hematocrit 35.6 (L) 41.0 - 52.0 %    MCV 88 80 - 100 fL    MCH 26.7 26.0 - 34.0 pg    MCHC 30.3 (L) 32.0 - 36.0 g/dL    RDW 16.4 (H) 11.5 - 14.5 %    Platelets 237 150 - 450 x10*3/uL   Basic Metabolic Panel   Result Value Ref Range     Glucose 83 74 - 99 mg/dL    Sodium 138 136 - 145 mmol/L    Potassium 4.6 3.5 - 5.3 mmol/L    Chloride 92 (L) 98 - 107 mmol/L    Bicarbonate 28 21 - 32 mmol/L    Anion Gap 23 (H) 10 - 20 mmol/L    Urea Nitrogen 72 (H) 6 - 23 mg/dL    Creatinine 12.75 (H) 0.50 - 1.30 mg/dL    eGFR 4 (L) >60 mL/min/1.73m*2    Calcium 9.7 8.6 - 10.3 mg/dL   POCT GLUCOSE   Result Value Ref Range    POCT Glucose 116 (H) 74 - 99 mg/dL   POCT GLUCOSE   Result Value Ref Range    POCT Glucose 181 (H) 74 - 99 mg/dL   POCT GLUCOSE   Result Value Ref Range    POCT Glucose 109 (H) 74 - 99 mg/dL     *Note: Due to a large number of results and/or encounters for the requested time period, some results have not been displayed. A complete set of results can be found in Results Review.     No results found.           Assessment/Plan   Assessment & Plan  Pleural effusion    Hydropneumothorax    Anemia    Anxiety    Benign essential hypertension    CAD (coronary artery disease)    Gastroesophageal reflux disease    ESRD on hemodialysis (Multi)    Pneumonia      VATS done  Continue antibiotic  ID consult for duration of antibiotic for pneumonia and transudate  Continue oxygen to keep sats above 100%  Dialysis per nephrology  Blood pressure stable  Blood sugar adrianna           I spent  minutes in the professional and overall care of this patient.      Marianna Galloway MD

## 2025-03-28 NOTE — PROGRESS NOTES
"William A Franke \"Narciso\" is a 74 y.o. male on day 6 of admission presenting with Hydropneumothorax.      Subjective   Patient seen and evaluated.  Right chest tube in place. KIERSTEN.   He does not offer specific complaints.   Chart/labs/meds/notes/imaging/VS reviewed.       Objective          Vitals 24HR  Heart Rate:  [56-60]   Temp:  [36.5 °C (97.7 °F)-36.7 °C (98.1 °F)]   Resp:  [16-18]   BP: (127-168)/(58-70)   SpO2:  [96 %-98 %]     Intake/Output last 3 Shifts:    Intake/Output Summary (Last 24 hours) at 3/28/2025 1344  Last data filed at 3/27/2025 2137  Gross per 24 hour   Intake 120 ml   Output 140 ml   Net -20 ml       Physical Exam  GENERAL: In no acute distress. Awake. Non toxic appearing.   HEAD: Normocephalic, atraumatic.  OROPHARYNX: Moist mucosa.  NECK: no JVD, supple.  LUNGS: Clear to auscultation bilaterally.  No wheezes, rales or rhonchi. Right chest tube with bloody drainage.   CARDIAC: normal S1 and S2; no gallops, rubs or murmurs. Regular rate and rhythm.  EXTREMITIES: No edema.  No swollen joints.  Left arm aVF with positive thrill/bruit  NEURO: Cranial nerves II through XII grossly intact.  No asterixis.  Nonfocal.  SKIN: Skin turgor normal. No rashes or lesions.   PSYCH: Normal affect and behavior.    Scheduled Medications  allopurinol, 100 mg, oral, Daily  amLODIPine, 10 mg, oral, Daily  aspirin, 81 mg, oral, Daily  atorvastatin, 80 mg, oral, Nightly  carvedilol, 6.25 mg, oral, BID  cefTRIAXone, 2 g, intravenous, q24h  docusate sodium, 100 mg, oral, BID  doxycycline, 100 mg, oral, q12h LUKE  epoetin jeison or biosimilar, 100 Units/kg, subcutaneous, Every Mon/Wed/Fri  ergocalciferol, 50,000 Units, oral, q14 days  ezetimibe, 10 mg, oral, Daily  fluocinonide, , Topical, BID  gabapentin, 300 mg, oral, Nightly  heparin (porcine), 5,000 Units, subcutaneous, q8h LUKE  insulin lispro, 0-5 Units, subcutaneous, TID AC  isosorbide mononitrate ER, 60 mg, oral, Daily  oxygen, 2 L/min, inhalation, " q24h  pantoprazole, 40 mg, oral, Daily  pantoprazole, 40 mg, intravenous, Daily  polyethylene glycol, 17 g, oral, Daily  sevelamer carbonate, 1,600 mg, oral, TID  SITagliptin phosphate, 25 mg, oral, Daily      Continuous medications       PRN medications: acetaminophen **OR** acetaminophen **OR** acetaminophen, benzocaine-menthol, dextromethorphan-guaifenesin, diphenhydrAMINE, guaiFENesin, HYDROmorphone, melatonin, naloxone, nitroglycerin, ondansetron, oxyCODONE, oxyCODONE, oxygen     Relevant Results  Results from last 7 days   Lab Units 03/28/25  0645 03/25/25  0515 03/22/25  0553   WBC AUTO x10*3/uL 13.2* 8.7 7.5   HEMOGLOBIN g/dL 10.3* 10.8* 11.5*   HEMATOCRIT % 33.4* 35.6* 36.8*   PLATELETS AUTO x10*3/uL 270 237 257     Results from last 7 days   Lab Units 03/28/25  0645 03/25/25  0515 03/22/25  0553   SODIUM mmol/L 140 138 140   POTASSIUM mmol/L 5.3 4.6 4.6   CHLORIDE mmol/L 94* 92* 96*   CO2 mmol/L 22 28 29   BUN mg/dL 90* 72* 47*   CREATININE mg/dL 14.06* 12.75* 9.85*   GLUCOSE mg/dL 108* 83 98   CALCIUM mg/dL 10.2 9.7 10.1       XR chest 1 view   Final Result   1.  Right chest tube with slightly decreased right basilar   pneumothorax.                  MACRO:   None        Signed by: Eden Little 3/28/2025 5:51 AM   Dictation workstation:   [id number]      XR chest 1 view   Final Result   Interval placement of right chest tube with decrease in right pleural   fluid and right basilar atelectasis        Small pneumothorax is present lateral right lower hemithorax which is   overall similar in size to the prior study.        MACRO:   None.        Signed by: Cristina Toney 3/27/2025 9:37 AM   Dictation workstation:   [id number]               Assessment/Plan   This patient currently has cardiac telemetry ordered; if you would like to modify or discontinue the telemetry order, click here to go to the orders activity to modify/discontinue the order.  Bill A Franke is a 74 y.o. male with a past medical  history of ESRD on hemodialysis Tuesday Thursday Saturday via left arm aVF at [facility] [place] under Dr. Gomez, hypertension, HFpEF, recent MSSA bacteremia in December 2024 and an exudative pleural effusion who underwent a thoracentesis on 12/27/2024.  Repeat CT imaging on 2/17/2025 showed a persistent moderate to large right-sided effusion.  He was admitted to [facility] [place] on 3/18.  Had a thoracentesis on 3/19 with 950 cc of bloody fluid aspirated.  Negative cytology and cultures.  He was transferred to [facility] for VATS.  Nephrology is consulted for ESRD care.      Mr. Franke had dialysis on Tuesday uneventfully. I held his dialysis yesterday morning in light of the right VATS in the setting of hydropneumothorax and pleural effusion with Pleurx drain placement. His blood pressure was soft. Instead, he will have dialysis today and again tomorrow placing him back on schedule.  Blood pressures are acceptable. He will get erythropoietin. Nephrology will follow with his care.        Assessment & Plan  Anemia    CAD (coronary artery disease)    ESRD on hemodialysis (Multi)    Benign essential hypertension    Gastroesophageal reflux disease    Anxiety    Pleural effusion    Hydropneumothorax    Pneumonia         I spent 40 minutes in the professional and overall care of this patient.      Hilario Garner, DO

## 2025-03-28 NOTE — POST-PROCEDURE NOTE
Report to Receiving RN:    Report To: Leti  Time Report Called: [id number]    Hand-Off Communication: pt tolerated tx well, no fluid removed, post /64 HR 56  Complications During Treatment: No  Ultrafiltration Treatment: No  Medications Administered During Dialysis: No  Blood Products Administered During Dialysis: No  Labs Sent During Dialysis: No  Heparin Drip Rate Changes: No  Dialysis Catheter Dressing: NA  Last Dressing Change: NA    Electronic Signatures:  Dash Christensen RN (Signed )   Authored:    (Signed )   Authored:     Last Updated: 5:38 PM by DASH CHRISTENSEN

## 2025-03-28 NOTE — CARE PLAN
"The patient's goals for the shift include \"do what I need to do\"    The clinical goals for the shift include remain hemodynamically stable s/p VATS by end of shift    "

## 2025-03-28 NOTE — PROGRESS NOTES
Physical Therapy    Physical Therapy Re-Evaluation & Treatment    Patient Name: Bill A Franke  MRN: [id number]  Department: [place] [address] ICU  Room: [address] [place], [place] [address]  Today's Date: 3/28/2025   Time Calculation  Start Time: 1306  Stop Time: 1336  Time Calculation (min): 30 min    Completion of this session and documentation performed under the supervision of Raquel Murdock PT.    Assessment/Plan   PT Assessment  PT Assessment Results: Decreased endurance, Impaired balance, Decreased mobility, Decreased cognition  Rehab Prognosis: Good  Barriers to Discharge Home: Cognition needs, Physical needs  Cognition Needs: Cognition-related high falls risk, 24hr supervision for safety awareness needed, Recollection or understanding of precautions/restrictions limited  Physical Needs: Stair navigation into home limited by function/safety, Stair navigation to access bed limited by function/safety, Stair navigation to access bath limited by function/safety, 24hr mobility assistance needed, High falls risk due to function or environment  Evaluation/Treatment Tolerance: Patient tolerated treatment well  Medical Staff Made Aware: Yes  Strengths: Capable of completing ADLs semi/independent, Premorbid level of function, Support and attitude of living partners, Support of extended family/friends  Barriers to Participation: Ability to acquire knowledge, Attitude of self, Comorbidities, Coping skills, Insight into problems  End of Session Communication: Bedside nurse  Assessment Comment: 74 year-old M POD1 VATS procedure presents with generalized weakness and mild unsteadiness with moderate cognitive impairments and intermittent agitation which is a decline in status from initial evaluation; pt has pleurx chest tube placed from procedure yesterday 3/27. Tinetti unable to be tested today due to placed chest tube, per pt's brother chest tube is going to get taken out today and capped; pt can benefit from skilled PT intervention to assist with discharge  planning and address the aforementioned issues to enable him to return to his prior level of function, which was indep at home.   End of Session Patient Position: Bed, 3 rail up, Alarm on   IP OR SWING BED PT PLAN  Inpatient or Swing Bed: Inpatient  PT Plan  Treatment/Interventions: Bed mobility, Transfer training, Gait training, Stair training, Endurance training, Therapeutic activity  PT Plan: Ongoing PT  PT Frequency: 4 times per week  PT Discharge Recommendations: Moderate intensity level of continued care  Equipment Recommended upon Discharge: Wheeled walker (if home going)  PT Recommended Transfer Status: Stand by assist, Assistive device  PT - OK to Discharge: Yes      Subjective     General Visit Information:  General  Reason for Referral: 74 y.o. male presenting with  increasing shortness of breath and cough. At [facility] patient had thoracocentesis done which again was exudative. VATS procedure done yesterday, 3/27  Referred By: VERÓNICA Webb-CNP  Past Medical History Relevant to Rehab:   Past Medical History:   Diagnosis Date    Chronic kidney disease     Diabetes mellitus (Multi)     ESRD (end stage renal disease) on dialysis (Multi)     Heart murmur     HLD (hyperlipidemia)     Hypertension     Personal history of other endocrine, nutritional and metabolic disease     History of hypercholesterolemia    Personal history of other specified conditions 08/28/2020    History of chest pain     Past Surgical History:   Procedure Laterality Date    AV FISTULA PLACEMENT Left 07/31/2024    left brachial artery to axillary vein AV graft on 7/31/2024    KNEE ARTHROSCOPY W/ DEBRIDEMENT  04/15/2013    Arthroscopy Knee    SHOULDER SURGERY  04/15/2013    Shoulder Surgery     Family/Caregiver Present: Yes  Caregiver Feedback: wife and pt's brother present; pt's brother tried to encourage pt to be cooperative with PT  Co-Treatment: OT  Co-Treatment Reason: overlapping re-eval with OT to maximize  safety and participation.  Prior to Session Communication: Bedside nurse  Patient Position Received: Alarm on, Bed, 3 rail up  Preferred Learning Style: auditory, kinesthetic  General Comment: Pt presents supine in bed, agreeable to PT reeval; pt confused throughout session with intermittent agitation  Home Living:  Home Living  Type of Home: House  Lives With: Spouse  Home Adaptive Equipment: Cane  Home Layout: One level, Laundry in basement, Stairs to alternate level with rails  Alternate Level Stairs-Rails: Right  Alternate Level Stairs-Number of Steps: ~10 (1 flight)  Home Access: Stairs to enter without rails  Entrance Stairs-Rails: None  Entrance Stairs-Number of Steps: 1  Bathroom Shower/Tub: Tub/shower unit (1 flight of stairs down to basement; second shower on first floor-WIS)  Bathroom Toilet: Standard  Bathroom Equipment: None  Prior Level of Function:  Prior Function Per Pt/Caregiver Report  Level of Hind[place]: Independent with ADLs and functional transfers, Independent with homemaking with ambulation  Receives Help From: Family (spouse helps at home, brother lives nearby to assist at home after d/c)  Prior Function Comments: denies h/o falls; no AD at baseline  Precautions:  Precautions  Medical Precautions: Fall precautions  Precautions Comment: chest tube     Date/Time Vitals Session Patient Position Pulse Resp SpO2 BP MAP (mmHg)    03/28/25 1345 --  --  --  18  100 %  129/48  65     03/28/25 1400 --  --  63  --  --  --  --     03/28/25 1524 --  --  61  16  --  120/65  62           Objective   Pain:  Pain Assessment  Pain Assessment: 0-10  0-10 (Numeric) Pain Score: 0 - No pain  Cognition:  Cognition  Overall Cognitive Status: Impaired  Orientation Level: Disoriented to place, Disoriented to situation (pt unable to answer when provided a list, at end of session pt able to state location of hospital; increased time to answer orientation questions)  Memory: Exceptions to WFL  Memory Comments: pt  reports having trouble with memory; unable to identify wife and brother (and their names) when asked  Safety/Judgement: Exceptions to WFL  Complex Functional Tasks: Moderate  Insight: Moderate  Impulsive: Moderately  Processing Speed: Delayed    General Assessments:  General Observation  General Observation: (+) pleurx chest tube; pt demonstrated intermittent agitation during this session, denied physical assistance from therapist for mobility multiple times; decreased safety awareness alongside cognitive impairments; pt visibly upset during session and started crying d/t frustration of situation; dialysis tech present at end of PT/OT session     Activity Tolerance  Endurance: Tolerates 10 - 20 min exercise with multiple rests  Activity Tolerance Comments: ambulated in hallway, required seated break about MCC before walking back to room    Static Sitting Balance  Static Sitting-Balance Support: Bilateral upper extremity supported, Feet supported  Static Sitting-Level of Assistance: Close supervision  Static Sitting-Comment/Number of Minutes: on EOB    Static Standing Balance  Static Standing-Balance Support: Bilateral upper extremity supported (RW)  Static Standing-Level of Assistance: Close supervision, Contact guard  Static Standing-Comment/Number of Minutes: pt required CGA and verbal cueing one time during ambulation to stand upright  as he started to lean forward  Dynamic Standing Balance  Dynamic Standing-Balance Support: Bilateral upper extremity supported (RW)  Dynamic Standing-Level of Assistance: Close supervision  Dynamic Standing-Balance: Turning    Extremity/Trunk Assessments:  RLE   RLE : Within Functional Limits  LLE   LLE : Within Functional Limits  Treatments:  Bed Mobility  Bed Mobility: Yes  Bed Mobility 1  Bed Mobility 1: Supine to sitting  Level of Assistance 1: Close supervision, Maximum verbal cues  Bed Mobility Comments 1: max verbal cues and repetition to complete due to confusion; HOB  slightly elevated    Ambulation/Gait Training  Ambulation/Gait Training Performed: Yes  Ambulation/Gait Training 1  Surface 1: Level tile  Device 1: Rolling walker  Assistance 1: Contact guard  Quality of Gait 1: Decreased step length, Diminished heel strike, Forward flexed posture  Comments/Distance (ft) 1: 100' with RW, 100' back to room with RW and WC follow d/t pt reports of fatigue  Transfers  Transfer: Yes  Transfer 1  Technique 1: Sit to stand, Stand to sit  Transfer Device 1: Walker  Transfer Level of Assistance 1: Contact guard  Trials/Comments 1: CGA for safety; verbal cueing for proper hand placement during transfers, multiple transfers completed during session  Transfers 2  Transfer From 2: Stand to  Transfer to 2: Chair with arms  Technique 2: Stand to sit  Transfer Level of Assistance 2: Contact guard, Minimal verbal cues  Outcome Measures:  [facility] Basic Mobility  Turning from your back to your side while in a flat bed without using bedrails: A little  Moving from lying on your back to sitting on the side of a flat bed without using bedrails: A little  Moving to and from bed to chair (including a wheelchair): A little  Standing up from a chair using your arms (e.g. wheelchair or bedside chair): A little  To walk in hospital room: A little  Climbing 3-5 steps with railing: Total  Basic Mobility - Total Score: 16    Encounter Problems       Encounter Problems (Active)       Balance       STG - Maintains dynamic standing balance without upper extremity support indep for >5 min       Start:  03/24/25    Expected End:  04/04/25       INTERVENTIONS:1. Practice standing with minimal support.2. Educate patient about standing tolerance.3. Educate patient about independence with gait, transfers, and ADL's.4. Educate patient about use of assistive device.5. Educate patient about self-directed care.         STG - Maintains static standing balance without upper extremity support indep for >10 min       Start:   03/24/25    Expected End:  04/04/25       INTERVENTIONS:1. Practice standing with minimal support.2. Educate patient about maintaining total hip precautions while maintaining balance.3. Educate patient about standing tolerance.4. Educate patient about independence with gait, transfers, and ADL's.5. Educate patient about use of assistive device.6. Educate patient about self-directed care.            Mobility       STG - Patient will ambulate 200' indep with LRAD or no assistive device for household ambulation       Start:  03/24/25    Expected End:  04/04/25            STG - Patient will ascend and descend 1step indep to enter home       Start:  03/24/25    Expected End:  04/04/25               PT Transfers       STG - Patient will perform bed mobility from flat bed indep       Start:  03/28/25            STG - Patient will transfer sit to and from stand indep with LRAD       Start:  03/28/25               Safety       Patient will increase Tinetti score =>24/28 to reduce fall risk       Start:  03/24/25    Expected End:  04/04/25                   Education Documentation  Body Mechanics, taught by ADALGISA Yanes at 3/28/2025  2:58 PM.  Learner: Patient  Readiness: Acceptance  Method: Explanation  Response: Verbalizes Understanding  Comment: See above    Mobility Training, taught by ADALGISA Yanes at 3/28/2025  2:58 PM.  Learner: Patient  Readiness: Acceptance  Method: Explanation  Response: Verbalizes Understanding  Comment: See above    Education Comments  No comments found.

## 2025-03-28 NOTE — ASSESSMENT & PLAN NOTE
"William A Franke \"Narciso\" is an 74 y.o. male who is having surgery for Hydropneumothorax [J94.8]  Pleural effusion [J90].  Patient has a history of end-stage renal disease on HD.  Has undergone several prior right thoracentesis procedures for a right pleural effusion.  Most recent imaging showed a loculated hydropneumothorax at the right lateral chest and along the base.  Thickening of the visceral pleura concerning for possible trapped lung.     S/p Right Video Assisted Thoracoscopy; partial decortication with Pleural Biospy; PleurX insertion with Dr Herrera 3/27/2025  Findings: trapped lung, primarily lower lobe, part of the middle lobe. Thickened parietal pleura, bloody effusion     CT 250cc old bloody drainage  CXR slightly improved, HD stable    Cap Pleurx today  Home care requested   Discharge instructions updated- Drain 3x week as needed  Follow up appointment requested     - 2 weeks with Dr Herrera  Pleurx starter kit ordered to bedside  May discharge from thoracic standpoint      "

## 2025-03-29 ENCOUNTER — APPOINTMENT (OUTPATIENT)
Dept: RADIOLOGY | Facility: HOSPITAL | Age: 75
DRG: 163 | End: 2025-03-29
Payer: MEDICARE

## 2025-03-29 ENCOUNTER — DOCUMENTATION (OUTPATIENT)
Dept: HOME HEALTH SERVICES | Facility: HOME HEALTH | Age: 75
End: 2025-03-29

## 2025-03-29 ENCOUNTER — APPOINTMENT (OUTPATIENT)
Dept: DIALYSIS | Facility: HOSPITAL | Age: 75
End: 2025-03-29
Payer: MEDICARE

## 2025-03-29 LAB
ANION GAP SERPL CALC-SCNC: 24 MMOL/L (ref 10–20)
BACTERIA SPEC CULT: NORMAL
BUN SERPL-MCNC: 58 MG/DL (ref 6–23)
CALCIUM SERPL-MCNC: 9.5 MG/DL (ref 8.6–10.3)
CHLORIDE SERPL-SCNC: 98 MMOL/L (ref 98–107)
CO2 SERPL-SCNC: 23 MMOL/L (ref 21–32)
CREAT SERPL-MCNC: 10.63 MG/DL (ref 0.5–1.3)
EGFRCR SERPLBLD CKD-EPI 2021: 5 ML/MIN/1.73M*2
ERYTHROCYTE [DISTWIDTH] IN BLOOD BY AUTOMATED COUNT: 16.6 % (ref 11.5–14.5)
GLUCOSE BLD MANUAL STRIP-MCNC: 101 MG/DL (ref 74–99)
GLUCOSE BLD MANUAL STRIP-MCNC: 105 MG/DL (ref 74–99)
GLUCOSE BLD MANUAL STRIP-MCNC: 82 MG/DL (ref 74–99)
GLUCOSE BLD MANUAL STRIP-MCNC: 95 MG/DL (ref 74–99)
GLUCOSE SERPL-MCNC: 74 MG/DL (ref 74–99)
GRAM STN SPEC: NORMAL
GRAM STN SPEC: NORMAL
HCT VFR BLD AUTO: 32.9 % (ref 41–52)
HGB BLD-MCNC: 9.8 G/DL (ref 13.5–17.5)
MAGNESIUM SERPL-MCNC: 1.88 MG/DL (ref 1.6–2.4)
MCH RBC QN AUTO: 27.1 PG (ref 26–34)
MCHC RBC AUTO-ENTMCNC: 29.8 G/DL (ref 32–36)
MCV RBC AUTO: 91 FL (ref 80–100)
NRBC BLD-RTO: 0 /100 WBCS (ref 0–0)
PLATELET # BLD AUTO: 202 X10*3/UL (ref 150–450)
POTASSIUM SERPL-SCNC: 5.5 MMOL/L (ref 3.5–5.3)
RBC # BLD AUTO: 3.61 X10*6/UL (ref 4.5–5.9)
SODIUM SERPL-SCNC: 139 MMOL/L (ref 136–145)
WBC # BLD AUTO: 9.2 X10*3/UL (ref 4.4–11.3)

## 2025-03-29 PROCEDURE — 2500000002 HC RX 250 W HCPCS SELF ADMINISTERED DRUGS (ALT 637 FOR MEDICARE OP, ALT 636 FOR OP/ED): Performed by: NURSE PRACTITIONER

## 2025-03-29 PROCEDURE — 2060000001 HC INTERMEDIATE ICU ROOM DAILY

## 2025-03-29 PROCEDURE — 8010000001 HC DIALYSIS - HEMODIALYSIS PER DAY

## 2025-03-29 PROCEDURE — 83735 ASSAY OF MAGNESIUM: CPT | Performed by: NURSE PRACTITIONER

## 2025-03-29 PROCEDURE — 71045 X-RAY EXAM CHEST 1 VIEW: CPT

## 2025-03-29 PROCEDURE — 71045 X-RAY EXAM CHEST 1 VIEW: CPT | Performed by: STUDENT IN AN ORGANIZED HEALTH CARE EDUCATION/TRAINING PROGRAM

## 2025-03-29 PROCEDURE — 36415 COLL VENOUS BLD VENIPUNCTURE: CPT | Performed by: NURSE PRACTITIONER

## 2025-03-29 PROCEDURE — 2500000004 HC RX 250 GENERAL PHARMACY W/ HCPCS (ALT 636 FOR OP/ED): Performed by: NURSE PRACTITIONER

## 2025-03-29 PROCEDURE — 2500000004 HC RX 250 GENERAL PHARMACY W/ HCPCS (ALT 636 FOR OP/ED)

## 2025-03-29 PROCEDURE — 94760 N-INVAS EAR/PLS OXIMETRY 1: CPT

## 2025-03-29 PROCEDURE — 2500000001 HC RX 250 WO HCPCS SELF ADMINISTERED DRUGS (ALT 637 FOR MEDICARE OP): Performed by: NURSE PRACTITIONER

## 2025-03-29 PROCEDURE — 85027 COMPLETE CBC AUTOMATED: CPT | Performed by: NURSE PRACTITIONER

## 2025-03-29 PROCEDURE — 80048 BASIC METABOLIC PNL TOTAL CA: CPT | Performed by: NURSE PRACTITIONER

## 2025-03-29 PROCEDURE — 82947 ASSAY GLUCOSE BLOOD QUANT: CPT

## 2025-03-29 PROCEDURE — 99232 SBSQ HOSP IP/OBS MODERATE 35: CPT | Performed by: INTERNAL MEDICINE

## 2025-03-29 PROCEDURE — 2500000005 HC RX 250 GENERAL PHARMACY W/O HCPCS: Performed by: INTERNAL MEDICINE

## 2025-03-29 RX ORDER — HALOPERIDOL LACTATE 5 MG/ML
2.5 INJECTION, SOLUTION INTRAMUSCULAR ONCE
Status: COMPLETED | OUTPATIENT
Start: 2025-03-29 | End: 2025-03-29

## 2025-03-29 RX ORDER — HALOPERIDOL LACTATE 5 MG/ML
INJECTION, SOLUTION INTRAMUSCULAR
Status: COMPLETED
Start: 2025-03-29 | End: 2025-03-29

## 2025-03-29 RX ADMIN — SEVELAMER CARBONATE 1600 MG: 800 TABLET, FILM COATED ORAL at 18:03

## 2025-03-29 RX ADMIN — DOCUSATE SODIUM 100 MG: 100 CAPSULE, LIQUID FILLED ORAL at 21:45

## 2025-03-29 RX ADMIN — GABAPENTIN 300 MG: 300 CAPSULE ORAL at 21:45

## 2025-03-29 RX ADMIN — SITAGLIPTIN 25 MG: 25 TABLET, FILM COATED ORAL at 14:37

## 2025-03-29 RX ADMIN — SEVELAMER CARBONATE 1600 MG: 800 TABLET, FILM COATED ORAL at 14:33

## 2025-03-29 RX ADMIN — EZETIMIBE 10 MG: 10 TABLET ORAL at 14:34

## 2025-03-29 RX ADMIN — DOXYCYCLINE HYCLATE 100 MG: 100 TABLET, FILM COATED ORAL at 21:45

## 2025-03-29 RX ADMIN — ISOSORBIDE MONONITRATE 60 MG: 30 TABLET, EXTENDED RELEASE ORAL at 14:34

## 2025-03-29 RX ADMIN — PANTOPRAZOLE SODIUM 40 MG: 40 TABLET, DELAYED RELEASE ORAL at 14:36

## 2025-03-29 RX ADMIN — HALOPERIDOL LACTATE 2.5 MG: 5 INJECTION, SOLUTION INTRAMUSCULAR at 00:40

## 2025-03-29 RX ADMIN — Medication 2 L/MIN: at 21:52

## 2025-03-29 RX ADMIN — ALLOPURINOL 100 MG: 100 TABLET ORAL at 14:36

## 2025-03-29 RX ADMIN — DOXYCYCLINE HYCLATE 100 MG: 100 TABLET, FILM COATED ORAL at 14:34

## 2025-03-29 RX ADMIN — CEFTRIAXONE 2 G: 2 INJECTION, POWDER, FOR SOLUTION INTRAMUSCULAR; INTRAVENOUS at 18:03

## 2025-03-29 RX ADMIN — DOCUSATE SODIUM 100 MG: 100 CAPSULE, LIQUID FILLED ORAL at 14:36

## 2025-03-29 RX ADMIN — ASPIRIN 81 MG: 81 TABLET, COATED ORAL at 14:37

## 2025-03-29 RX ADMIN — ATORVASTATIN CALCIUM 80 MG: 80 TABLET, FILM COATED ORAL at 21:45

## 2025-03-29 ASSESSMENT — COGNITIVE AND FUNCTIONAL STATUS - GENERAL
EATING MEALS: A LITTLE
DAILY ACTIVITIY SCORE: 24
MOBILITY SCORE: 22
DRESSING REGULAR UPPER BODY CLOTHING: A LITTLE
HELP NEEDED FOR BATHING: A LITTLE
DAILY ACTIVITIY SCORE: 18
TOILETING: A LITTLE
STANDING UP FROM CHAIR USING ARMS: A LITTLE
PERSONAL GROOMING: A LITTLE
CLIMB 3 TO 5 STEPS WITH RAILING: A LITTLE
MOBILITY SCORE: 23
WALKING IN HOSPITAL ROOM: A LITTLE
DRESSING REGULAR LOWER BODY CLOTHING: A LITTLE

## 2025-03-29 ASSESSMENT — PAIN - FUNCTIONAL ASSESSMENT: PAIN_FUNCTIONAL_ASSESSMENT: 0-10

## 2025-03-29 ASSESSMENT — PAIN SCALES - GENERAL: PAINLEVEL_OUTOF10: 0 - NO PAIN

## 2025-03-29 NOTE — POST-PROCEDURE NOTE
Report to Receiving RN:    Report To: Megan Willig  Time Report Called: 1230  Hand-Off Communication: Verbal  Complications During Treatment: No  Ultrafiltration Treatment: No  Medications Administered During Dialysis: No  Blood Products Administered During Dialysis: No  Labs Sent During Dialysis: No  Heparin Drip Rate Changes: No  Dialysis Catheter Dressing: N/A  Last Dressing Change: N/A    Electronic Signatures:  Dillon Olea    Last Updated: 2:08 PM by DILLON OLEA

## 2025-03-29 NOTE — DISCHARGE INSTRUCTIONS
Caring for Your PleurX Drainage Catheter  To care for your PleurX drainage catheter, you will:  Inspect your catheter every day.  Drain the fluid from your pleural space every day or as directed by your healthcare provider.  Change your dressing with every drainage  Always change your dressing as soon as possible if it's loose, wet, or dirty.  Try to plan ahead and change your dressing when you're draining your pleural space. That way, you'll only need to open 1 drainage kit.  Your nurse will teach you how to do these things before your procedure. It's best if your caregiver learns with you so they can help you.  After your procedure, a home care nurse may visit you to help you care for your catheter. Their main job is to help you and your caregiver get comfortable caring for your PleurX catheter on your own. You can use the information in this resource to help you remember what to do.  Inspecting your catheter  Inspect your PleurX catheter every day. You can use a handheld mirror or have your caregiver help you.  Check the dressing over your catheter exit site. If it's wet, dirty, loose, or has started to lift from your skin, change it. Follow the instructions in the section “Changing your PleurX dressing.”  Check for kinks (bends) in your catheter. If it's kinked, straighten it.  Check if your catheter is damaged, cut, or broken. If it is:  Pinch the catheter closed between your fingers.  Open a drainage kit and take out the blue emergency slide clamp. Push it onto the catheter until the catheter is pinched closed. If you don't have a slide clamp, bend the catheter and tape it in this position.  Call your doctor's office.  Draining your pleural space  Drain your PleurX catheter 3 times a week (Tues/Thurs/Sat) and as needed for Shortness of Breath. Please drain up to 1000 mL each time.  Don't drain more than 1,500 milliliters (mL). Your goal should be to drain your PleurX catheter on a regular schedule, not to  drain a certain amount of fluid.  If you drain 200 mL or less for 3 days in a row, call your doctor's office to tell them. They may tell you to start draining your catheter less often. If you find that you're draining less and less fluid over time, your doctor may recommend that your PleurX catheter be removed.     Drainage log  Every time you drain your PleurX catheter, write down:  The date and time  The amount of fluid drained  The color of the fluid  Any symptoms you have (such as discomfort)  This will help your doctor develop a drainage schedule that's right for you. It will also help you notice differences in your drainage. You can use the drainage log at the end of this resource, or you can make your own.  Bring your drainage log to your appointments. Have it nearby if you need to call your doctor's office about your PleurX catheter.  How to drain your PleurX catheter    Figure 3. Vacuum bottle  To drain the fluid from your pleural space, you'll attach your PleurX catheter to a vacuum bottle (see Figure 3). The vacuum will pull the fluid from your pleural space into the bottle. It's best to have your caregiver help you, especially at first.  If you have pain when you drain your PleurX catheter, take pain medication 30 minutes before you drain it. Follow your healthcare provider's instructions.     Gather your supplies  Before you start, set up your supplies on a clean, open surface. You'll need:  A PleurX drainage kit. Don't use a different brand drainage kit without talking with your healthcare team first. The kit includes:  1 vacuum bottle (500 mL or 1,000 mL)  1 PleurX Procedure Pack. The pack includes:  1 self-adhesive dressing  1 pair of medical gloves  3 alcohol wipes  1 valve replacement cap  4 (4-inch) square gauze pads  1 foam catheter pad  1 emergency clamp  Only use the emergency clamp if your PleurX catheter is broken or leaking.  An extra pair of medical gloves (if you're also changing your  dressing)  Extra alcohol wipes  A trash can  Your drainage log  A pen  Clean your hands  Clean your hands with soap and warm water or an alcohol-based hand . If your caregiver is helping you, they should clean their hands too.  If you're washing your hands with soap and water, wet your hands and apply soap. Rub your hands together for 20 seconds, then rinse. Dry your hands with a paper towel and use that same towel to turn off the faucet.  If you're using an alcohol-based hand , be sure to cover your hands with it. Then, rub your hands together until they're dry.  If you're also changing your dressing, take it off  If you're also changing your PleurX dressing, take it off before you get your supplies ready. To do this:  Put on a pair of medical gloves.  Hold your catheter in place with your non-dominant hand (the hand you don't write with). Using your other hand, gently remove the clear dressing over your catheter and the foam pad underneath your catheter. Throw them away.  Take off the gloves and throw them away.  Clean your hands.  Get your supplies ready  Once your hands are clean, get your supplies ready. If your caregiver is helping you, they should do this part for you.  Open the PleurX drainage kit and remove the PleurX Procedure Pack pouch.  Open the Procedure Pack pouch. Set the self-adhesive dressing aside.  Spread open the blue wrapping in the PleurX Procedure Pack so you can see the supplies inside. If you're both draining your catheter and changing your dressing, you'll use all the supplies. If you're only draining your catheter, you'll only use the alcohol wipes and valve replacement cap.  Take out the vacuum bottle and look at the end of the drainage line. Make sure the hard plastic access tip is covered with a soft plastic sleeve (see Figure 4). The sleeve keeps the access tip clean while you're setting up.  Be careful not to touch the access tip. Don't let the tip touch anything  besides the blue wrap.  If the plastic sleeve is missing, start over with a new drainage kit.    Figure 4. Plastic sleeve  Set the bottle near the blue wrap. Take off the paper holding the drainage line in a coil. Throw the paper away. Place the access tip on the blue wrap.  Put on the gloves in the PleurX Procedure pack. Be careful not to touch anything else in the pack.  Tear open the 3 alcohol pads, but don't remove the pads from their pouches. Place them on the blue wrap.  Close the roller clamp on the drainage line by rolling the wheel towards the drainage bottle (see Figure 5). Make sure it's completely closed.    Figure 5. Tighten the clamp to close it  Remove the soft plastic cover from the access tip by twisting it and pulling gently. Throw away the cover. Set the access tip back on the blue wrap.  Clean your PleurX valve and connect the drainage line  If your caregiver is helping you, hold your catheter away from your body while they clean the valve and connect the drainage line for you. If you're draining your PleurX catheter yourself, use your non-dominant hand (the hand you don't write with) to hold your catheter away from your body. Use your other hand to clean the valve and connect the drainage line.   the end of your catheter and hold it away from your body.  Twist off the valve cap and throw it away (see Figure 6). Keep holding your catheter away from your body. Make sure the valve doesn't touch anything.    Figure 6. Take off the valve cap  Use an alcohol wipe to clean the valve well for 15 seconds. Then, throw the wipe away. Keep holding your catheter away from your body. Make sure the valve doesn't touch anything.  Push the access tip of the drainage line into the clean catheter valve. You'll hear and feel a snap when the tip and valve are locked together (see Figure 7). Never put anything other than the access tip into the catheter.    Figure 7. Connect the drainage line  Remove the  support clip beneath the T-plunger by holding the flat part and pulling outward (see Figure 8). Throw the clip away. Don't press the T-plunger down yet.    Figure 8. Remove the support clip  Hold the drainage bottle with one hand. Push down on the T-plunger to puncture the seal and activate the vacuum in the bottle (see Figure 9).    Figure 9. Activate the vacuum bottle  Drain the pleural fluid  You may want to do the following part yourself, even if your caregiver is helping you. This lets you adjust the flow of fluid if you feel discomfort.  Slowly roll the wheel on the roller clamp away from the bottle (see Figure 10). Fluid from your pleural space should start flowing into the drainage line.    Figure 10. Release the clamp  When fluid starts to flow into the drainage line, you can partially close the roller clamp to slow the flow of fluid by rolling the wheel on the roller clamp toward the bottle.  As you drain the fluid, the flow will start to slow down. This happens either because your pleural space is completely drained or because the bottle has lost its suction. This is normal. You may also notice more foam or bubbles as the flow gets slower.  If you feel pain or start to cough, slow down the flow of fluid. If you still have pain, stop draining. Call your doctor's office after you disconnect and empty the drainage bottle.  When the flow stops or the bottle is filled, close the roller clamp by rolling the wheel towards the bottle as far as it will go.  If your doctor told you to drain more than 500 mL, you're using a 500 mL bottle, and the bottle is full or has lost its suction, you'll need to connect another bottle and finish draining. Get the drainage bottle ready the same way you did before:  Open another drainage kit.  Check to make sure the drainage line's hard plastic access tip is covered by a soft plastic cover.  Remove and throw out the paper holding the drainage line in a coil.  Completely close the  roller clamp by rolling the wheel towards the bottle.  Remove the support clip beneath the T-plunger.  Disconnect the drainage bottle  If your caregiver is helping you, hold your catheter away from your body while they disconnect the bottle for you. If you're draining your PleurX catheter yourself, use your non-dominant hand to hold your catheter away from your body. Use your other hand to disconnect the drainage bottle.  Check that the roller clamp is completely closed.   the end of your catheter and hold it away from your body.  Pull the access tip of the drainage line out of the valve in a firm, smooth motion (see Figure 11). Set the access tip down. Keep holding your catheter away from your body. Make sure the valve doesn't touch anything.    Figure 11. Disconnect the drainage line  If you're connecting a second drainage bottle, clean the catheter valve with an alcohol pad and connect the drainage line to your catheter the same way you did before. Follow steps 13 to 21.  Once you're all done draining and have disconnected the drainage line, use an alcohol wipe to clean the valve well for 15 seconds. Then, throw away the wipe. Keep holding your catheter away from your body. Make sure the valve doesn't touch anything.   the replacement valve cap. Don't touch the inside. Place the new cap over the clean catheter valve and twist the cap clockwise (to the right) until it snaps into its locked position (see Figure 12).    Figure 12. Lock the replacement valve cap  If you touch the inside of the valve cap, throw it away. Keep holding your catheter away from your body. Open another drainage kit and PleurX Procedure Pack and use a new valve replacement cap.  You're now done draining your PleurX catheter. If you're also changing your PleurX dressing, keep your gloves on. Follow steps 5 to 15 in the section “How to change your PleurX dressing.”  Empty the drainage and throw away the drainage bottle(s)  Make  sure the clamp on the vacuum drainage bottle is tightly closed.  Remove the top part of the bottle by pushing up on the rounded end of the bottle opener.  Remove the bottle opener from the drainage line by squeezing the flexible cap and pulling the bottle opener off. Use the pointed end of the bottle opener to widen the foil opening in the drainage bottle. This will make it easier to empty the bottle.  Empty the drainage into the toilet.  Place the drainage bottle and tubing in a plastic bag. Seal the bag tightly. Throw it away with your household garbage.  Remember to fill out your drainage log. If the drainage amount, color, or thickness is different from the last time you drained your catheter, call your doctor's office.  Managing problems with draining your catheter  If no fluid drains from your chest into the vacuum bottle:  Make sure the T-plunger at the top of the vacuum bottle is pushed all the way down.  Make sure the drainage line is securely connected to the PleurX catheter valve.  Make sure the roller clamp on the drainage line is open.  Make sure there aren't any kinks in your catheter.  Check if your PleurX catheter is clogged. If it is, roll it between your fingers. This will help loosen anything that's blocking the drainage flow.  If these steps don't work, repeat the drainage procedure with a new PleurX drainage kit. If fluid still isn't draining, you may not have enough fluid in your pleural space to drain. Call your doctor if fluid isn't draining.  How to change your PleurX dressing  It's best to have someone help you change your dressing. In these instructions, the word “you” refers to the person changing the dressing.  Gather your supplies  Before you start, set up your supplies on a clean, open surface. If you just drained your PleurX catheter, use the supplies from that drainage kit. Otherwise, open a new PleurX drainage kit and PleurX Procedure Pack. You'll need:  1 alcohol pad  4 (4-inch)  square gauze pads  1 foam catheter pad  1 self-adhesive dressing with 3 layers:  The printed liner. This covers the adhesive (sticky) side of the dressing.  The clear wound dressing. This is what will stay over your catheter exit site once you're done.  The paper center panel and frame. These make it easier to handle the dressing while you're putting it on.  2 pairs of medical gloves (if you haven't already taken off your dressing)  A trash can  Clean your hands  If you're not already wearing gloves from draining your catheter, clean your hands with soap and warm water or an alcohol-based hand . If your caregiver is helping you, they should clean their hands too.  If you're washing your hands with soap and water, wet your hands, apply soap, rub your hands together well for 20 seconds, then rinse. Dry your hands with a disposable towel and use that same towel to turn off the faucet.  If you're using an alcohol-based hand , be sure to cover your hands with it, then rub them together until they're dry.  Take off your dressing  Put on a pair of medical gloves, if needed.  Hold the catheter in place with your non-dominant hand (the hand you don't write with). Using your other hand, gently remove the clear dressing and gauze over the catheter and the foam pad underneath the catheter. Throw them away.  Take off your gloves and clean your hands.  Put on a new pair of medical gloves.  Clean the skin around your catheter  Use an alcohol pad to clean the skin around the catheter (see Figure 13).    Figure 13. Clean around the catheter  Examine the skin around the PleurX catheter. There should be no redness, areas of broken skin, rash, or leaking fluid. If there is, call your healthcare provider after you change the dressing.  Let the skin around the catheter air dry for 30 seconds.  Put on a new dressing  Once the skin is dry, place the new foam pad under the catheter (see Figure 14). Your healthcare provider  will tell you how to position your catheter on top of the foam pad. Follow their instructions.    Figure 14. Place catheter pad  Place the new gauze pad over the catheter and foam pad.  If your caregiver is helping you change your dressing, it's helpful if you hold the gauze in place while they do the next steps.  Take off the medical gloves.  Peel the printed liner from the self-adhesive dressing to expose the sticky surface. Throw the printed liner away.  Center the self-adhesive dressing over the gauze pads and press it down against your skin. Make sure you don't stretch the dressing when you put it on. Your healthcare provider will tell you if the tip of your PleurX catheter should be underneath or outside the dressing (see Figure 15). Follow their instructions.    Figure 15. End of catheter outside dressing  Slowly peel off the paper center panel and frame. Smooth and press the dressing down onto the skin to make sure it's completely secure. Throw the paper center panel and frame away.  Clean your hands.  You're now done changing your PleurX dressing. If you also drained your PleurX catheter, empty the drainage and throw away the drainage bottle(s). Follow steps 24 to 18 in the section “How to drain your PleurX catheter.”  Showering with your PleurX catheter  You can shower with your PleurX catheter in place using a one-time-use waterproof cover that goes over your dressing (such as Aquaguard®). You can buy waterproof covers online or from [employer]. If you want to buy it from [employer], ask your nurse. It's also helpful to use a hand-held shower to direct the water away from your dressing.  Each time you shower, cover your dressing completely with a new waterproof cover to keep it from getting wet. To put on the waterproof cover:  Peel off the top and side strips.  Place the top edge above your dressing. Don't let the tape on the waterproof cover touch your dressing. It can lift your dressing when you remove the  waterproof cover after showering. Smooth the cover down over your dressing.  Peel off the bottom strip. Make sure the bottom edge of the waterproof cover is below your dressing and the end of your PleurX catheter is tucked into the waterproof cover and completely covered. Smooth the bottom edge down.  Don't shower for longer than 15 minutes. Use warm water, not hot water. This will help keep the waterproof cover from coming off.  After your shower, dry the waterproof cover before you take it off. If your dressing gets wet, change it. If a wet dressing is left against your skin, it can make your skin irritated and sore.  When to Call Your Healthcare Provider  Call your healthcare provider if:  You have a fever of 101 °F (38 °C) or higher  The drainage changes color or thickness  The drainage is cloudy or smells bad  The amount of drainage changes  You have pain when you drain the catheter  You have pain after you drain your catheter  You have redness, swelling, drainage, or pain in the area around your catheter  Your catheter is damaged, cut, broken, or falls out  Liquid is leaking from your valve or catheter  You have any concerns about your catheter

## 2025-03-29 NOTE — POST-PROCEDURE NOTE
Bandages removed from site, tube and catheter tip cleaned with alcohol/chlorhexidine from proximal to distal. Sterile gloves donned, tube clamped,  from atrium and additional tubing, cleaned again with alcohol/chlorhexidine. PleurX catheter capped, coiled on foam padding, covered with gauze and Tegaderm.  Pt tolerated well, no s/s complications. RN aware.    Expected discharge Monday-Tuesday per Malgorzata ESTRADA. Will require PleurX drainage T/R/Sa, no more than 1L per drainage attempt. Discharge instructions and orders updated with PleurX instructions, HHC orders (will require after SNF/AR discharge).    Thoracic surgery will sign off. Please reach out with questions, concerns, or changes in status. Please reach out on Tuesday 4/1 if patient is not going to be discharged that day. Thank you for this consult!

## 2025-03-29 NOTE — HH CARE COORDINATION
Home Care received a referral for Nursing, Physical Therapy, and Occupational Therapy. Unfortunately, we are unable to accept and process the referral at this time.    Reason:  Patient is discharging to a Post-Acute Care Facility    Patients, please reach out to the referring provider or your PCP to assist in obtaining an alternative home care agency and/or guidance to meet your needs.    Providers, please reach out to [facility] at [phone] with any questions regarding the declined referral.

## 2025-03-29 NOTE — SIGNIFICANT EVENT
[facility]   ICU - Rapid Response    Patient Name: William A Franke  MRN: [id number]  : 1950  AGE: 74 y.o.   GENDER: male  ==============================================================================  Reason for the Call:  Agitation  74 year old M who is being admitted s/p R VATS, Pleural Biopsy and pleurx placement for a recurrent  pleural effusion.     Arrived at the bedside patient was visibly agitated and needed continues redirection.  Per nursing they were rechecking his vital sign and  was awaken, agitated and was attempting to inflict harm to staff. Pulling pleurx Cath  Code violet was called.     He was redirected and reoriented immediately.  Given X1 dose of 2.5mg of Haldol.   RN to notify attending.

## 2025-03-29 NOTE — NURSING NOTE
Rapid Response Nurse Note:     Bill A Franke is a 74 y.o. male on day 7 of admission presenting with Hydropneumothorax.    Rounded on patient due to recent code violet, reviewed patient chart and checked with bedside nurse for any questions or concerns none voiced at this time.    Patient current RADAR score is 0    BP (!) 125/45   Pulse 65   Temp 36.3 °C (97.4 °F) (Temporal)   Resp 19   Wt 66.2 kg (146 lb)   SpO2 97%     No signs/symptoms of distress at this time. Bedside nurse notified to escalate concerns if patient condition changes      Radha Carty RN   Debridement Text: Skin edges were excised to create perpendicular edges.

## 2025-03-29 NOTE — PRE-PROCEDURE NOTE
Report from Sending RN:    Report From: Megan Willig  Recent Surgery of Procedure: No  Baseline Level of Consciousness (LOC): Alert and orient x4  Oxygen Use: Yes  Type: Nasal Canula  Diabetic: No  Last BP Med Given Day of Dialysis: N/a  Last Pain Med Given: N?A  Lab Tests to be Obtained with Dialysis: No  Blood Transfusion to be Given During Dialysis: No  Available IV Access: Yes  Medications to be Administered During Dialysis: No  Continuous IV Infusion Running: No  Restraints on Currently or in the Last 24 Hours: No  Hand-Off Communication: Verbal  Dialysis Catheter Dressing: N/A  Last Dressing Change: N/A

## 2025-03-29 NOTE — NURSING NOTE
"Code violet initiated by this RN for attempted assault on staff by patient while obtaining 0000 vital signs. Patient became increasingly agitated and grabbed this RN's wrists, stating \"let me fucking leave! You better not touch me or you'll be sorry\" and attempted to jump out of bed. Attempts to ambulate contraindicated per nursing judgment due to hypotension. Attempts made to redirect and reorient patient to surroundings were unsuccessful as patient became more aggressive and physical with staff. Code violet called at this time. 2.5 mg haldol IV ordered by responding physician and administered without issue.  Patient safety maintained with bed alarm on and active and call light within reach.  "

## 2025-03-29 NOTE — PROGRESS NOTES
"Gregory's scheduled for Wednesday William A Franke \"Narciso\" is a 74 y.o. male on day 3 of admission presenting with Hydropneumothorax.    Subjective   Patient had VATS done and Pleurx chest tube was inserted.  Patient is little drowsy.  Some confusion at night  Objective     Physical Exam  Vitals reviewed.   Constitutional:       Appearance: Normal appearance.   HENT:      Head: Normocephalic and atraumatic.      Right Ear: Tympanic membrane, ear canal and external ear normal.      Left Ear: Tympanic membrane, ear canal and external ear normal.      Nose: Nose normal.      Mouth/Throat:      Pharynx: Oropharynx is clear.   Eyes:      Extraocular Movements: Extraocular movements intact.      Conjunctiva/sclera: Conjunctivae normal.      Pupils: Pupils are equal, round, and reactive to light.   Cardiovascular:      Rate and Rhythm: Normal rate and regular rhythm.      Pulses: Normal pulses.      Heart sounds: Normal heart sounds.   Pulmonary:      Effort: Pulmonary effort is normal.      Breath sounds: Rales present.   Abdominal:      General: Abdomen is flat. Bowel sounds are normal.      Palpations: Abdomen is soft.   Musculoskeletal:      Cervical back: Normal range of motion and neck supple.   Skin:     General: Skin is warm and dry.   Neurological:      General: No focal deficit present.      Mental Status: He is alert and oriented to person, place, and time.   Psychiatric:         Mood and Affect: Mood normal.         Last Recorded Vitals  Blood pressure 101/51, pulse 61, temperature 36.4 °C (97.5 °F), resp. rate 16, height 1.727 m (5' 8\"), weight 66.2 kg (146 lb), SpO2 95%.  Intake/Output last 3 Shifts:  I/O last 3 completed shifts:  In: 1830 (27.6 mL/kg) [P.O.:680; I.V.:600 (9.1 mL/kg); Other:400; IV Piggyback:150]  Out: 1532 (23.1 mL/kg) [Other:1532]  Weight: 66.2 kg     Relevant Results            This patient currently has cardiac telemetry ordered; if you would like to modify or discontinue the telemetry " order, click here to go to the orders activity to modify/discontinue the order.  Scheduled medications  allopurinol, 100 mg, oral, Daily  amLODIPine, 10 mg, oral, Daily  aspirin, 81 mg, oral, Daily  atorvastatin, 80 mg, oral, Nightly  carvedilol, 6.25 mg, oral, BID  cefTRIAXone, 2 g, intravenous, q24h  docusate sodium, 100 mg, oral, BID  doxycycline, 100 mg, oral, q12h LUKE  ergocalciferol, 50,000 Units, oral, q14 days  ezetimibe, 10 mg, oral, Daily  fluocinonide, , Topical, BID  gabapentin, 300 mg, oral, Nightly  heparin (porcine), 5,000 Units, subcutaneous, q8h LUKE  insulin lispro, 0-5 Units, subcutaneous, TID AC  isosorbide mononitrate ER, 60 mg, oral, Daily  oxygen, 2 L/min, inhalation, q24h  pantoprazole, 40 mg, oral, Daily  polyethylene glycol, 17 g, oral, Daily  sevelamer carbonate, 1,600 mg, oral, TID  SITagliptin phosphate, 25 mg, oral, Daily      Continuous medications     PRN medications  PRN medications: acetaminophen **OR** acetaminophen **OR** acetaminophen, benzocaine-menthol, dextromethorphan-guaifenesin, guaiFENesin, melatonin, nitroglycerin, ondansetron, oxygen  Results for orders placed or performed during the hospital encounter of 03/22/25 (from the past 24 hours)   CBC   Result Value Ref Range    WBC 8.7 4.4 - 11.3 x10*3/uL    nRBC 0.0 0.0 - 0.0 /100 WBCs    RBC 4.04 (L) 4.50 - 5.90 x10*6/uL    Hemoglobin 10.8 (L) 13.5 - 17.5 g/dL    Hematocrit 35.6 (L) 41.0 - 52.0 %    MCV 88 80 - 100 fL    MCH 26.7 26.0 - 34.0 pg    MCHC 30.3 (L) 32.0 - 36.0 g/dL    RDW 16.4 (H) 11.5 - 14.5 %    Platelets 237 150 - 450 x10*3/uL   Basic Metabolic Panel   Result Value Ref Range    Glucose 83 74 - 99 mg/dL    Sodium 138 136 - 145 mmol/L    Potassium 4.6 3.5 - 5.3 mmol/L    Chloride 92 (L) 98 - 107 mmol/L    Bicarbonate 28 21 - 32 mmol/L    Anion Gap 23 (H) 10 - 20 mmol/L    Urea Nitrogen 72 (H) 6 - 23 mg/dL    Creatinine 12.75 (H) 0.50 - 1.30 mg/dL    eGFR 4 (L) >60 mL/min/1.73m*2    Calcium 9.7 8.6 - 10.3 mg/dL    POCT GLUCOSE   Result Value Ref Range    POCT Glucose 116 (H) 74 - 99 mg/dL   POCT GLUCOSE   Result Value Ref Range    POCT Glucose 181 (H) 74 - 99 mg/dL   POCT GLUCOSE   Result Value Ref Range    POCT Glucose 109 (H) 74 - 99 mg/dL     *Note: Due to a large number of results and/or encounters for the requested time period, some results have not been displayed. A complete set of results can be found in Results Review.   No results found.  Scheduled medications  allopurinol, 100 mg, oral, Daily  amLODIPine, 10 mg, oral, Daily  aspirin, 81 mg, oral, Daily  atorvastatin, 80 mg, oral, Nightly  carvedilol, 6.25 mg, oral, BID  cefTRIAXone, 2 g, intravenous, q24h  docusate sodium, 100 mg, oral, BID  doxycycline, 100 mg, oral, q12h LUKE  ergocalciferol, 50,000 Units, oral, q14 days  ezetimibe, 10 mg, oral, Daily  fluocinonide, , Topical, BID  gabapentin, 300 mg, oral, Nightly  heparin (porcine), 5,000 Units, subcutaneous, q8h LUKE  insulin lispro, 0-5 Units, subcutaneous, TID AC  isosorbide mononitrate ER, 60 mg, oral, Daily  oxygen, 2 L/min, inhalation, q24h  pantoprazole, 40 mg, oral, Daily  polyethylene glycol, 17 g, oral, Daily  sevelamer carbonate, 1,600 mg, oral, TID  SITagliptin phosphate, 25 mg, oral, Daily      Continuous medications     PRN medications  PRN medications: acetaminophen **OR** acetaminophen **OR** acetaminophen, benzocaine-menthol, dextromethorphan-guaifenesin, guaiFENesin, melatonin, nitroglycerin, ondansetron, oxygen  Results for orders placed or performed during the hospital encounter of 03/22/25 (from the past 24 hours)   CBC   Result Value Ref Range    WBC 8.7 4.4 - 11.3 x10*3/uL    nRBC 0.0 0.0 - 0.0 /100 WBCs    RBC 4.04 (L) 4.50 - 5.90 x10*6/uL    Hemoglobin 10.8 (L) 13.5 - 17.5 g/dL    Hematocrit 35.6 (L) 41.0 - 52.0 %    MCV 88 80 - 100 fL    MCH 26.7 26.0 - 34.0 pg    MCHC 30.3 (L) 32.0 - 36.0 g/dL    RDW 16.4 (H) 11.5 - 14.5 %    Platelets 237 150 - 450 x10*3/uL   Basic Metabolic Panel    Result Value Ref Range    Glucose 83 74 - 99 mg/dL    Sodium 138 136 - 145 mmol/L    Potassium 4.6 3.5 - 5.3 mmol/L    Chloride 92 (L) 98 - 107 mmol/L    Bicarbonate 28 21 - 32 mmol/L    Anion Gap 23 (H) 10 - 20 mmol/L    Urea Nitrogen 72 (H) 6 - 23 mg/dL    Creatinine 12.75 (H) 0.50 - 1.30 mg/dL    eGFR 4 (L) >60 mL/min/1.73m*2    Calcium 9.7 8.6 - 10.3 mg/dL   POCT GLUCOSE   Result Value Ref Range    POCT Glucose 116 (H) 74 - 99 mg/dL   POCT GLUCOSE   Result Value Ref Range    POCT Glucose 181 (H) 74 - 99 mg/dL   POCT GLUCOSE   Result Value Ref Range    POCT Glucose 109 (H) 74 - 99 mg/dL     *Note: Due to a large number of results and/or encounters for the requested time period, some results have not been displayed. A complete set of results can be found in Results Review.     No results found.           Assessment/Plan   Assessment & Plan  Pleural effusion    Hydropneumothorax    Anemia    Anxiety    Benign essential hypertension    CAD (coronary artery disease)    Gastroesophageal reflux disease    ESRD on hemodialysis (Multi)    Pneumonia      VATS done  Continue antibiotic  ID consult for duration of antibiotic for pneumonia and right lung exudative effusion  Continue oxygen to keep sats above 100%  Dialysis per nephrology  Blood pressure stable  Blood sugar okay  PT OT and  consult as patient may need rehab placement         I spent  minutes in the professional and overall care of this patient.      Marianna Galloway MD

## 2025-03-29 NOTE — PROGRESS NOTES
"William A Franke \"Narciso\" is a 74 y.o. male on day 7 of admission presenting with Hydropneumothorax.      Subjective   Patient was seen, examined and evaluated today while undergoing hemodialysis via his left arm AV graft       Objective        Vitals 24HR  Heart Rate:  [47-61]   Temp:  [36.3 °C (97.4 °F)-36.8 °C (98.2 °F)]   Resp:  [16-19]   BP: ()/(43-69)   SpO2:  [97 %-100 %]     Intake/Output last 3 Shifts:    Intake/Output Summary (Last 24 hours) at 3/29/2025 1416  Last data filed at 3/29/2025 1413  Gross per 24 hour   Intake 1840 ml   Output 1450 ml   Net 390 ml       Physical Exam    General appearance: Awake and alert no acute distress  Head and ENT: Normocephalic/atraumatic/supple neck/no JVD  Lungs; CTA  Heart: RRR  Abdomen; soft no organomegaly no tenderness  Extremities: No edema    Dialysis access= left arm AV graft                Relevant Results     Results from last 7 days   Lab Units 03/29/25  0609 03/28/25  0645 03/25/25  0515   WBC AUTO x10*3/uL 9.2 13.2* 8.7   HEMOGLOBIN g/dL 9.8* 10.3* 10.8*   HEMATOCRIT % 32.9* 33.4* 35.6*   PLATELETS AUTO x10*3/uL 202 270 237      Results from last 7 days   Lab Units 03/29/25  0609 03/28/25  0645 03/25/25  0515   SODIUM mmol/L 139 140 138   POTASSIUM mmol/L 5.5* 5.3 4.6   CHLORIDE mmol/L 98 94* 92*   CO2 mmol/L 23 22 28   BUN mg/dL 58* 90* 72*   CREATININE mg/dL 10.63* 14.06* 12.75*   GLUCOSE mg/dL 74 108* 83   CALCIUM mg/dL 9.5 10.2 9.7        Current Facility-Administered Medications:     acetaminophen (Tylenol) tablet 650 mg, 650 mg, oral, q4h PRN **OR** acetaminophen (Tylenol) oral liquid 650 mg, 650 mg, oral, q4h PRN **OR** acetaminophen (Tylenol) suppository 650 mg, 650 mg, rectal, q4h PRN, Nereida S Miguel, APRN-CNP    allopurinol (Zyloprim) tablet 100 mg, 100 mg, oral, Daily, VERÓNICA Webb-CNP, 100 mg at 03/28/25 0843    [Held by provider] amLODIPine (Norvasc) tablet 10 mg, 10 mg, oral, Daily, VERÓNICA Webb-CNP, 10 mg at " 03/28/25 0843    aspirin EC tablet 81 mg, 81 mg, oral, Daily, Nereida Miguel, APRN-COREY, 81 mg at 03/28/25 0843    atorvastatin (Lipitor) tablet 80 mg, 80 mg, oral, Nightly, Nereida Miguel, VERÓNICA-CNP, 80 mg at 03/28/25 2118    benzocaine-menthol (Cepastat Sore Throat) lozenge 1 lozenge, 1 lozenge, Mouth/Throat, q2h PRN, VERÓNICA Webb-CNP    [Held by provider] carvedilol (Coreg) tablet 6.25 mg, 6.25 mg, oral, BID, Nereida Miguel, VERÓNICA-CNP, 6.25 mg at 03/28/25 1758    cefTRIAXone (Rocephin) 2 g in dextrose 5% IV 50 mL, 2 g, intravenous, q24h, VERÓNICA Webb-CNP, Stopped at 03/28/25 1908    dextromethorphan-guaifenesin (Robitussin DM)  mg/5 mL oral liquid 5 mL, 5 mL, oral, q4h PRN, Nereida Miguel, APRN-CNP    diphenhydrAMINE (BENADryl) injection 25 mg, 25 mg, intravenous, q6h PRN, Nereida Miguel, APRN-CNP    docusate sodium (Colace) capsule 100 mg, 100 mg, oral, BID, Nereida Miguel APRN-CNP, 100 mg at 03/28/25 2122    doxycycline (Vibra-Tabs) tablet 100 mg, 100 mg, oral, q12h LUKE, Nereida Miguel, VERÓNICA-CNP, 100 mg at 03/28/25 2118    epoetin jeison-epbx (Retacrit) injection 8,000 Units, 100 Units/kg, subcutaneous, Every Mon/Wed/Fri, Hilario Garner DO, 8,000 Units at 03/28/25 1805    ergocalciferol (Vitamin D-2) capsule 50,000 Units, 50,000 Units, oral, q14 days, VERÓNICA Webb-CNP, 50,000 Units at 03/23/25 1027    ezetimibe (Zetia) tablet 10 mg, 10 mg, oral, Daily, VERÓNICA Webb-CNP, 10 mg at 03/28/25 0843    fluocinonide (Lidex) 0.05 % cream, , Topical, BID, VERÓNICA Webb-CNP, Given at 03/28/25 2118    gabapentin (Neurontin) capsule 300 mg, 300 mg, oral, Nightly, VERÓNICA Webb-CNP, 300 mg at 03/28/25 2118    guaiFENesin (Mucinex) 12 hr tablet 600 mg, 600 mg, oral, q12h PRN, Nereida Miguel, VERÓNICA-CNP, 600 mg at 03/25/25 2117    heparin (porcine) injection 5,000 Units, 5,000 Units, subcutaneous, q8h [facility], Nereida Miguel,  VERÓNICA-CNP    HYDROmorphone (Dilaudid) injection 0.2 mg, 0.2 mg, intravenous, q4h PRN, VERÓNICA Webb-CNP    insulin lispro injection 0-5 Units, 0-5 Units, subcutaneous, TID AC, VERÓNICA Webb-CNP, 1 Units at 03/28/25 1235    isosorbide mononitrate ER (Imdur) 24 hr tablet 60 mg, 60 mg, oral, Daily, VERÓNICA Webb-CNP, 60 mg at 03/28/25 0843    melatonin tablet 3 mg, 3 mg, oral, Nightly PRN, VERÓNICA Webb-CNP    naloxone (Narcan) injection 0.2 mg, 0.2 mg, intravenous, q5 min PRN, VERÓNICA Webb-CNP    nitroglycerin (Nitrostat) SL tablet 0.4 mg, 0.4 mg, sublingual, q5 min PRN, VERÓNICA Webb-CNP    ondansetron (Zofran) injection 4 mg, 4 mg, intravenous, q6h PRN, VERÓNICA Webb-CNP, 4 mg at 03/27/25 0839    oxyCODONE (Roxicodone) immediate release tablet 10 mg, 10 mg, oral, q4h PRN, VERÓNICA Webb-CNP    oxyCODONE (Roxicodone) immediate release tablet 5 mg, 5 mg, oral, q4h PRN, VERÓNICA Webb-CNP    oxygen (O2) therapy, 2 L/min, inhalation, q24h, Marianna Galloway MD, Last Rate: 0 mL/hr at 03/27/25 2134, 2 L/min at 03/28/25 2136    oxygen (O2) therapy, , inhalation, Continuous PRN - O2/gases, Marianna Galloway MD    pantoprazole (ProtoNix) EC tablet 40 mg, 40 mg, oral, Daily, VERÓNICA Webb-CNP, 40 mg at 03/28/25 0843    pantoprazole (Protonix) injection 40 mg, 40 mg, intravenous, Daily, Nereida S Miguel, APRN-CNP    polyethylene glycol (Glycolax, Miralax) packet 17 g, 17 g, oral, Daily, VERÓNICA Webb-CNP, 17 g at 03/28/25 0843    sevelamer carbonate (Renvela) tablet 1,600 mg, 1,600 mg, oral, TID, ERIKA Webb, 1,600 mg at 03/28/25 0748    SITagliptin phosphate (Januvia) tablet 25 mg, 25 mg, oral, Daily, ERIKA Webb, 25 mg at 03/28/25 0843           Assessment/Plan   1.  ESKD on hemodialysis, patient was seen while undergoing hemodialysis today  2.  Pneumothorax status post right chest  tube placement.  3.  Essential hypertension continue current management  4.  Coronary disease continue follow-up with cardiology  5.  Anemia due to CKD: Will continue follow-up iron stores and Epogen administration as needed    Daily BMP and CBC will Patient daily and review all other consultants input  Assessment & Plan  Anemia    CAD (coronary artery disease)    ESRD on hemodialysis (Multi)    Benign essential hypertension    Gastroesophageal reflux disease    Anxiety    Pleural effusion    Hydropneumothorax    Pneumonia              I spent 35 minutes in the professional and overall care of this patient.      Jeff Arellano MD

## 2025-03-29 NOTE — CARE PLAN
"The patient's goals for the shift include \"do what I'm supposed to\"    The clinical goals for the shift include remain hemodynamically stable by end of shift    "

## 2025-03-30 ENCOUNTER — APPOINTMENT (OUTPATIENT)
Dept: RADIOLOGY | Facility: HOSPITAL | Age: 75
DRG: 163 | End: 2025-03-30
Payer: MEDICARE

## 2025-03-30 VITALS
SYSTOLIC BLOOD PRESSURE: 120 MMHG | OXYGEN SATURATION: 98 % | HEIGHT: 68 IN | HEART RATE: 60 BPM | BODY MASS INDEX: 22.13 KG/M2 | DIASTOLIC BLOOD PRESSURE: 63 MMHG | RESPIRATION RATE: 17 BRPM | WEIGHT: 146 LBS | TEMPERATURE: 98.1 F

## 2025-03-30 LAB
ANION GAP SERPL CALC-SCNC: 18 MMOL/L (ref 10–20)
BUN SERPL-MCNC: 43 MG/DL (ref 6–23)
CALCIUM SERPL-MCNC: 9.4 MG/DL (ref 8.6–10.3)
CHLORIDE SERPL-SCNC: 96 MMOL/L (ref 98–107)
CO2 SERPL-SCNC: 28 MMOL/L (ref 21–32)
CREAT SERPL-MCNC: 8.59 MG/DL (ref 0.5–1.3)
EGFRCR SERPLBLD CKD-EPI 2021: 6 ML/MIN/1.73M*2
ERYTHROCYTE [DISTWIDTH] IN BLOOD BY AUTOMATED COUNT: 16.7 % (ref 11.5–14.5)
GLUCOSE BLD MANUAL STRIP-MCNC: 121 MG/DL (ref 74–99)
GLUCOSE BLD MANUAL STRIP-MCNC: 134 MG/DL (ref 74–99)
GLUCOSE BLD MANUAL STRIP-MCNC: 92 MG/DL (ref 74–99)
GLUCOSE SERPL-MCNC: 75 MG/DL (ref 74–99)
HCT VFR BLD AUTO: 34.1 % (ref 41–52)
HGB BLD-MCNC: 10.6 G/DL (ref 13.5–17.5)
MAGNESIUM SERPL-MCNC: 1.8 MG/DL (ref 1.6–2.4)
MCH RBC QN AUTO: 27.3 PG (ref 26–34)
MCHC RBC AUTO-ENTMCNC: 31.1 G/DL (ref 32–36)
MCV RBC AUTO: 88 FL (ref 80–100)
NRBC BLD-RTO: 0 /100 WBCS (ref 0–0)
PLATELET # BLD AUTO: 207 X10*3/UL (ref 150–450)
POTASSIUM SERPL-SCNC: 4.4 MMOL/L (ref 3.5–5.3)
RBC # BLD AUTO: 3.88 X10*6/UL (ref 4.5–5.9)
SODIUM SERPL-SCNC: 138 MMOL/L (ref 136–145)
WBC # BLD AUTO: 7.7 X10*3/UL (ref 4.4–11.3)

## 2025-03-30 PROCEDURE — 2500000004 HC RX 250 GENERAL PHARMACY W/ HCPCS (ALT 636 FOR OP/ED): Performed by: NURSE PRACTITIONER

## 2025-03-30 PROCEDURE — 83735 ASSAY OF MAGNESIUM: CPT | Performed by: NURSE PRACTITIONER

## 2025-03-30 PROCEDURE — 80048 BASIC METABOLIC PNL TOTAL CA: CPT | Performed by: NURSE PRACTITIONER

## 2025-03-30 PROCEDURE — 85027 COMPLETE CBC AUTOMATED: CPT | Performed by: NURSE PRACTITIONER

## 2025-03-30 PROCEDURE — 97110 THERAPEUTIC EXERCISES: CPT | Mod: GP,CQ

## 2025-03-30 PROCEDURE — 2500000001 HC RX 250 WO HCPCS SELF ADMINISTERED DRUGS (ALT 637 FOR MEDICARE OP): Performed by: NURSE PRACTITIONER

## 2025-03-30 PROCEDURE — 82947 ASSAY GLUCOSE BLOOD QUANT: CPT

## 2025-03-30 PROCEDURE — 36415 COLL VENOUS BLD VENIPUNCTURE: CPT | Performed by: NURSE PRACTITIONER

## 2025-03-30 PROCEDURE — 97116 GAIT TRAINING THERAPY: CPT | Mod: GP,CQ

## 2025-03-30 PROCEDURE — 2500000005 HC RX 250 GENERAL PHARMACY W/O HCPCS: Performed by: INTERNAL MEDICINE

## 2025-03-30 PROCEDURE — 2500000002 HC RX 250 W HCPCS SELF ADMINISTERED DRUGS (ALT 637 FOR MEDICARE OP, ALT 636 FOR OP/ED): Performed by: NURSE PRACTITIONER

## 2025-03-30 PROCEDURE — 2060000001 HC INTERMEDIATE ICU ROOM DAILY

## 2025-03-30 PROCEDURE — 99232 SBSQ HOSP IP/OBS MODERATE 35: CPT | Performed by: INTERNAL MEDICINE

## 2025-03-30 PROCEDURE — 71045 X-RAY EXAM CHEST 1 VIEW: CPT

## 2025-03-30 PROCEDURE — 71045 X-RAY EXAM CHEST 1 VIEW: CPT | Performed by: RADIOLOGY

## 2025-03-30 RX ADMIN — FLUOCINONIDE: 0.5 CREAM TOPICAL at 08:57

## 2025-03-30 RX ADMIN — DOXYCYCLINE HYCLATE 100 MG: 100 TABLET, FILM COATED ORAL at 08:56

## 2025-03-30 RX ADMIN — DOXYCYCLINE HYCLATE 100 MG: 100 TABLET, FILM COATED ORAL at 21:13

## 2025-03-30 RX ADMIN — ISOSORBIDE MONONITRATE 60 MG: 30 TABLET, EXTENDED RELEASE ORAL at 08:56

## 2025-03-30 RX ADMIN — PANTOPRAZOLE SODIUM 40 MG: 40 INJECTION, POWDER, FOR SOLUTION INTRAVENOUS at 08:56

## 2025-03-30 RX ADMIN — ASPIRIN 81 MG: 81 TABLET, COATED ORAL at 08:56

## 2025-03-30 RX ADMIN — CEFTRIAXONE 2 G: 2 INJECTION, POWDER, FOR SOLUTION INTRAMUSCULAR; INTRAVENOUS at 16:54

## 2025-03-30 RX ADMIN — EZETIMIBE 10 MG: 10 TABLET ORAL at 08:56

## 2025-03-30 RX ADMIN — ATORVASTATIN CALCIUM 80 MG: 80 TABLET, FILM COATED ORAL at 21:13

## 2025-03-30 RX ADMIN — DOCUSATE SODIUM 100 MG: 100 CAPSULE, LIQUID FILLED ORAL at 21:13

## 2025-03-30 RX ADMIN — Medication 2 L/MIN: at 21:19

## 2025-03-30 RX ADMIN — SITAGLIPTIN 25 MG: 25 TABLET, FILM COATED ORAL at 08:56

## 2025-03-30 RX ADMIN — SEVELAMER CARBONATE 1600 MG: 800 TABLET, FILM COATED ORAL at 16:56

## 2025-03-30 RX ADMIN — GABAPENTIN 300 MG: 300 CAPSULE ORAL at 21:13

## 2025-03-30 RX ADMIN — ALLOPURINOL 100 MG: 100 TABLET ORAL at 08:56

## 2025-03-30 RX ADMIN — SEVELAMER CARBONATE 1600 MG: 800 TABLET, FILM COATED ORAL at 13:27

## 2025-03-30 RX ADMIN — SEVELAMER CARBONATE 1600 MG: 800 TABLET, FILM COATED ORAL at 08:56

## 2025-03-30 ASSESSMENT — COGNITIVE AND FUNCTIONAL STATUS - GENERAL
STANDING UP FROM CHAIR USING ARMS: A LITTLE
STANDING UP FROM CHAIR USING ARMS: A LITTLE
DRESSING REGULAR LOWER BODY CLOTHING: A LITTLE
DAILY ACTIVITIY SCORE: 18
MOBILITY SCORE: 16
MOBILITY SCORE: 23
STANDING UP FROM CHAIR USING ARMS: A LITTLE
EATING MEALS: A LITTLE
EATING MEALS: A LITTLE
MOVING TO AND FROM BED TO CHAIR: A LITTLE
TOILETING: A LITTLE
DAILY ACTIVITIY SCORE: 18
MOBILITY SCORE: 23
TURNING FROM BACK TO SIDE WHILE IN FLAT BAD: A LITTLE
PERSONAL GROOMING: A LITTLE
MOVING FROM LYING ON BACK TO SITTING ON SIDE OF FLAT BED WITH BEDRAILS: A LITTLE
HELP NEEDED FOR BATHING: A LITTLE
WALKING IN HOSPITAL ROOM: A LITTLE
DRESSING REGULAR UPPER BODY CLOTHING: A LITTLE
CLIMB 3 TO 5 STEPS WITH RAILING: TOTAL
PERSONAL GROOMING: A LITTLE
TOILETING: A LITTLE
HELP NEEDED FOR BATHING: A LITTLE
DRESSING REGULAR UPPER BODY CLOTHING: A LITTLE
DRESSING REGULAR LOWER BODY CLOTHING: A LITTLE

## 2025-03-30 ASSESSMENT — PAIN SCALES - WONG BAKER
WONGBAKER_NUMERICALRESPONSE: NO HURT
WONGBAKER_NUMERICALRESPONSE: NO HURT

## 2025-03-30 ASSESSMENT — PAIN SCALES - GENERAL
PAINLEVEL_OUTOF10: 0 - NO PAIN

## 2025-03-30 ASSESSMENT — PAIN - FUNCTIONAL ASSESSMENT: PAIN_FUNCTIONAL_ASSESSMENT: 0-10

## 2025-03-30 NOTE — CARE PLAN
"  Problem: Safety - Adult  Goal: Free from fall injury  Outcome: Progressing   The patient's goals for the shift include \"do what I'm supposed to\"    The clinical goals for the shift include remain hemodynamically stable by end of shift    Over the shift, the patient did not make progress toward the following goals. Barriers to progression include . Recommendations to address these barriers include .    "

## 2025-03-30 NOTE — PROGRESS NOTES
"William A Franke \"Narciso\" is a 74 y.o. male on day 8 of admission presenting with Hydropneumothorax.      Subjective   Overall doing better no new complaints       Objective        Vitals 24HR  Heart Rate:  [60-78]   Temp:  [36.1 °C (97 °F)-36.6 °C (97.9 °F)]   Resp:  [16-18]   BP: ()/(45-68)   SpO2:  [98 %]     Intake/Output last 3 Shifts:    Intake/Output Summary (Last 24 hours) at 3/30/2025 1133  Last data filed at 3/29/2025 1833  Gross per 24 hour   Intake 450 ml   Output 1000 ml   Net -550 ml       Physical Exam      General appearance: Awake and alert no acute distress  Head and ENT: Normocephalic/atraumatic/supple neck/no JVD  Lungs; CTA  Heart: RRR  Abdomen; soft no organomegaly no tenderness  Extremities: No edema     Dialysis access= left arm AV graft                 Relevant Results     Results from last 7 days   Lab Units 03/30/25  0544 03/29/25  0609 03/28/25  0645   WBC AUTO x10*3/uL 7.7 9.2 13.2*   HEMOGLOBIN g/dL 10.6* 9.8* 10.3*   HEMATOCRIT % 34.1* 32.9* 33.4*   PLATELETS AUTO x10*3/uL 207 202 270      Results from last 7 days   Lab Units 03/30/25  0544 03/29/25  0609 03/28/25  0645   SODIUM mmol/L 138 139 140   POTASSIUM mmol/L 4.4 5.5* 5.3   CHLORIDE mmol/L 96* 98 94*   CO2 mmol/L 28 23 22   BUN mg/dL 43* 58* 90*   CREATININE mg/dL 8.59* 10.63* 14.06*   GLUCOSE mg/dL 75 74 108*   CALCIUM mg/dL 9.4 9.5 10.2        Current Facility-Administered Medications:     acetaminophen (Tylenol) tablet 650 mg, 650 mg, oral, q4h PRN **OR** acetaminophen (Tylenol) oral liquid 650 mg, 650 mg, oral, q4h PRN **OR** acetaminophen (Tylenol) suppository 650 mg, 650 mg, rectal, q4h PRN, Nereida Miguel, APRN-CNP    allopurinol (Zyloprim) tablet 100 mg, 100 mg, oral, Daily, VERÓNICA Webb-CNP, 100 mg at 03/30/25 0856    [Held by provider] amLODIPine (Norvasc) tablet 10 mg, 10 mg, oral, Daily, ERIKA Webb, 10 mg at 03/28/25 0843    aspirin EC tablet 81 mg, 81 mg, oral, Daily, Nereida SUTTON" VERÓNICA Miguel-COREY, 81 mg at 03/30/25 0856    atorvastatin (Lipitor) tablet 80 mg, 80 mg, oral, Nightly, VERÓNICA Webb-CNP, 80 mg at 03/29/25 2145    benzocaine-menthol (Cepastat Sore Throat) lozenge 1 lozenge, 1 lozenge, Mouth/Throat, q2h PRN, VERÓNICA Webb-CNP    [Held by provider] carvedilol (Coreg) tablet 6.25 mg, 6.25 mg, oral, BID, VERÓNICA Webb-CNP, 6.25 mg at 03/28/25 1758    cefTRIAXone (Rocephin) 2 g in dextrose 5% IV 50 mL, 2 g, intravenous, q24h, VERÓNICA Webb-CNP, Stopped at 03/29/25 1833    dextromethorphan-guaifenesin (Robitussin DM)  mg/5 mL oral liquid 5 mL, 5 mL, oral, q4h PRN, VERÓNICA Webb-COREY    diphenhydrAMINE (BENADryl) injection 25 mg, 25 mg, intravenous, q6h PRN, VERÓNICA Webb-CNP    docusate sodium (Colace) capsule 100 mg, 100 mg, oral, BID, VERÓNICA Webb-CNP, 100 mg at 03/29/25 2145    doxycycline (Vibra-Tabs) tablet 100 mg, 100 mg, oral, q12h LUKE, VERÓNICA Webb-CNP, 100 mg at 03/30/25 0856    epoetin jeison-epbx (Retacrit) injection 8,000 Units, 100 Units/kg, subcutaneous, Every Mon/Wed/Fri, Hilario Garner DO, 8,000 Units at 03/28/25 1805    ergocalciferol (Vitamin D-2) capsule 50,000 Units, 50,000 Units, oral, q14 days, VERÓNICA Webb-CNP, 50,000 Units at 03/23/25 1027    ezetimibe (Zetia) tablet 10 mg, 10 mg, oral, Daily, VERÓNICA Webb-CNP, 10 mg at 03/30/25 0856    fluocinonide (Lidex) 0.05 % cream, , Topical, BID, ERIKA Webb, Given at 03/30/25 0857    gabapentin (Neurontin) capsule 300 mg, 300 mg, oral, Nightly, ERIKA Webb, 300 mg at 03/29/25 2145    guaiFENesin (Mucinex) 12 hr tablet 600 mg, 600 mg, oral, q12h PRN, VERÓNICA Webb-CNP, 600 mg at 03/25/25 2117    heparin (porcine) injection 5,000 Units, 5,000 Units, subcutaneous, q8h [facility], ERIKA Webb    HYDROmorphone (Dilaudid) injection 0.2 mg, 0.2 mg,  intravenous, q4h PRN, VERÓNICA Webb-CNP    insulin lispro injection 0-5 Units, 0-5 Units, subcutaneous, TID AC, VERÓNICA Webb-CNP, 1 Units at 03/28/25 1235    isosorbide mononitrate ER (Imdur) 24 hr tablet 60 mg, 60 mg, oral, Daily, VERÓNICA Webb-CNP, 60 mg at 03/30/25 0856    melatonin tablet 3 mg, 3 mg, oral, Nightly PRN, Nereida Miguel, APRN-CNP    naloxone (Narcan) injection 0.2 mg, 0.2 mg, intravenous, q5 min PRN, VERÓNICA Webb-CNP    nitroglycerin (Nitrostat) SL tablet 0.4 mg, 0.4 mg, sublingual, q5 min PRN, VERÓNICA Webb-CNP    ondansetron (Zofran) injection 4 mg, 4 mg, intravenous, q6h PRN, VERÓNICA Webb-CNP, 4 mg at 03/27/25 0839    oxyCODONE (Roxicodone) immediate release tablet 10 mg, 10 mg, oral, q4h PRN, VERÓNICA Webb-CNP    oxyCODONE (Roxicodone) immediate release tablet 5 mg, 5 mg, oral, q4h PRN, VERÓNICA Webb-CNP    oxygen (O2) therapy, 2 L/min, inhalation, q24h, Marianna Galloway MD, Last Rate: 0 mL/hr at 03/27/25 2134, 2 L/min at 03/29/25 2152    oxygen (O2) therapy, , inhalation, Continuous PRN - O2/gases, Marianna Galloway MD    pantoprazole (ProtoNix) EC tablet 40 mg, 40 mg, oral, Daily, VERÓNICA Webb-CNP, 40 mg at 03/29/25 1436    pantoprazole (Protonix) injection 40 mg, 40 mg, intravenous, Daily, VERÓNICA Webb-CNP, 40 mg at 03/30/25 0856    polyethylene glycol (Glycolax, Miralax) packet 17 g, 17 g, oral, Daily, ERIKA Webb, 17 g at 03/28/25 0843    sevelamer carbonate (Renvela) tablet 1,600 mg, 1,600 mg, oral, TID, ERIKA Webb, 1,600 mg at 03/30/25 0856    SITagliptin phosphate (Januvia) tablet 25 mg, 25 mg, oral, Daily, ERIKA Webb, 25 mg at 03/30/25 0856           Assessment/Plan         1.  ESKD on hemodialysis, on Tuesday/Thursday/Saturday at [facility]  [facility].  Next dialysis was performed on  Tuesday.  2.  Pneumothorax status post right chest tube placement.  3.  Essential hypertension continue current management  4.  Coronary disease continue follow-up with cardiology  5.  Anemia due to CKD: Will continue follow-up iron stores and Epogen administration as needed     Daily BMP and CBC will Patient daily and review all other consultants input             Assessment & Plan  Anemia    CAD (coronary artery disease)    ESRD on hemodialysis (Multi)    Benign essential hypertension    Gastroesophageal reflux disease    Anxiety    Pleural effusion    Hydropneumothorax    Pneumonia              I spent 35 minutes in the professional and overall care of this patient.      Jeff Arellano MD

## 2025-03-30 NOTE — PROGRESS NOTES
"Physical Therapy    Physical Therapy Treatment    Patient Name: William A Franke \"Narciso\"  MRN: [id number]  Department: [facility] [address] ICU  Room: [address] [place] [address]  Today's Date: 3/30/2025  Time Calculation  Start Time: 1150  Stop Time: 1214  Time Calculation (min): 24 min         Assessment/Plan   PT Assessment  End of Session Communication: Bedside nurse  Assessment Comment: Pt continues to need cueing for safe activity and body mechanics.  PT Plan  Inpatient/Swing Bed or Outpatient: Inpatient  PT Plan  Treatment/Interventions: Bed mobility, Transfer training, Gait training  PT Plan: Ongoing PT  PT Frequency: 4 times per week  PT Discharge Recommendations: Moderate intensity level of continued care  Equipment Recommended upon Discharge: Wheeled walker (if home going)  PT Recommended Transfer Status: Stand by assist, Assistive device  PT - OK to Discharge: Yes (per POC)      General Visit Information:   PT  Visit  PT Received On: 03/30/25  General  Reason for Referral: 74 y.o. male presenting with  increasing shortness of breath and cough. At [facility] patient had thoracocentesis done which again was exudative. VATS procedure done yesterday, 3/27  Referred By: Nereida Miguel, VERÓNICA-CNP  Prior to Session Communication: Bedside nurse  Patient Position Received: Alarm off, not on at start of session, Bed, 3 rail up  Preferred Learning Style: auditory, kinesthetic  General Comment: Pt supine in bed upon arrival, agreeable to tx    Subjective   Precautions:  Precautions  Medical Precautions: Fall precautions  Objective   Pain:  Pain Assessment  Pain Assessment: 0-10  0-10 (Numeric) Pain Score: 0 - No pain  Cognition:  Cognition  Orientation Level: Oriented X4  Treatments:  Therapeutic Exercise  Therapeutic Exercise Performed: Yes  Therapeutic Exercise Activity 1: Pt performed AP, LAQ, Marches x 10 each with cues given for proper sequencing and form.    Bed Mobility  Bed Mobility: Yes  Bed Mobility 1  Bed Mobility 1: Supine to " sitting  Level of Assistance 1: Close supervision  Bed Mobility Comments 1: Pt performed with HOB slightly elevated and with cues given for proper sequencing.    Ambulation/Gait Training  Ambulation/Gait Training Performed: Yes  Ambulation/Gait Training 1  Surface 1: Level tile  Device 1: Rolling walker  Assistance 1: Contact guard  Quality of Gait 1: Decreased step length, Diminished heel strike, Forward flexed posture  Comments/Distance (ft) 1: Pt ambulated [id number] with cues given for upright posture and forward gaze. Declines further ambulation due to stating that he does not want to get too tired.  Transfers  Transfer: Yes  Transfer 1  Technique 1: Sit to stand, Stand to sit  Transfer Device 1: Walker  Transfer Level of Assistance 1: Contact guard  Trials/Comments 1: Pt performed with safety cues given for hand placement before sitting and standing.    Outcome Measures:  [facility] Basic Mobility  Turning from your back to your side while in a flat bed without using bedrails: A little  Moving from lying on your back to sitting on the side of a flat bed without using bedrails: A little  Moving to and from bed to chair (including a wheelchair): A little  Standing up from a chair using your arms (e.g. wheelchair or bedside chair): A little  To walk in hospital room: A little  Climbing 3-5 steps with railing: Total  Basic Mobility - Total Score: 16    Education Documentation  Body Mechanics, taught by Nhung Stauffer PTA at 3/30/2025 12:31 PM.  Learner: Patient  Readiness: Acceptance  Method: Explanation  Response: Verbalizes Understanding    Mobility Training, taught by Nhung Stauffer PTA at 3/30/2025 12:31 PM.  Learner: Patient  Readiness: Acceptance  Method: Explanation  Response: Verbalizes Understanding    Education Comments  No comments found.        OP EDUCATION:  Outpatient Education  Education Comment: educated on the importance of OOB activity    Encounter Problems       Encounter Problems (Active)        Balance       STG - Maintains dynamic standing balance without upper extremity support indep for >5 min (Progressing)       Start:  03/24/25    Expected End:  04/04/25       INTERVENTIONS:1. Practice standing with minimal support.2. Educate patient about standing tolerance.3. Educate patient about independence with gait, transfers, and ADL's.4. Educate patient about use of assistive device.5. Educate patient about self-directed care.         STG - Maintains static standing balance without upper extremity support indep for >10 min       Start:  03/24/25    Expected End:  04/04/25       INTERVENTIONS:1. Practice standing with minimal support.2. Educate patient about maintaining total hip precautions while maintaining balance.3. Educate patient about standing tolerance.4. Educate patient about independence with gait, transfers, and ADL's.5. Educate patient about use of assistive device.6. Educate patient about self-directed care.            Mobility       STG - Patient will ambulate 200' indep with LRAD or no assistive device for household ambulation (Progressing)       Start:  03/24/25    Expected End:  04/04/25            STG - Patient will ascend and descend 1step indep to enter home       Start:  03/24/25    Expected End:  04/04/25               PT Transfers       STG - Patient will perform bed mobility from flat bed indep (Progressing)       Start:  03/28/25            STG - Patient will transfer sit to and from stand indep with LRAD       Start:  03/28/25               Safety       Patient will increase Tinetti score =>24/28 to reduce fall risk (Progressing)       Start:  03/24/25    Expected End:  04/04/25

## 2025-03-30 NOTE — CARE PLAN
"The patient's goals for the shift include \"do what I'm supposed to\"    The clinical goals for the shift include Remain HDS    "

## 2025-03-30 NOTE — PROGRESS NOTES
"Gregory's scheduled for Wednesday William A Franke \"Narciso\" is a 74 y.o. male on day 3 of admission presenting with Hydropneumothorax.    Subjective   Patient had VATS done and Pleurx chest tube was inserted.  Patient is little drowsy.  Some confusion at night  Objective     Physical Exam  Vitals reviewed.   Constitutional:       Appearance: Normal appearance.   HENT:      Head: Normocephalic and atraumatic.      Right Ear: Tympanic membrane, ear canal and external ear normal.      Left Ear: Tympanic membrane, ear canal and external ear normal.      Nose: Nose normal.      Mouth/Throat:      Pharynx: Oropharynx is clear.   Eyes:      Extraocular Movements: Extraocular movements intact.      Conjunctiva/sclera: Conjunctivae normal.      Pupils: Pupils are equal, round, and reactive to light.   Cardiovascular:      Rate and Rhythm: Normal rate and regular rhythm.      Pulses: Normal pulses.      Heart sounds: Normal heart sounds.   Pulmonary:      Effort: Pulmonary effort is normal.      Breath sounds: Rales present.   Abdominal:      General: Abdomen is flat. Bowel sounds are normal.      Palpations: Abdomen is soft.   Musculoskeletal:      Cervical back: Normal range of motion and neck supple.   Skin:     General: Skin is warm and dry.   Neurological:      General: No focal deficit present.      Mental Status: He is alert and oriented to person, place, and time.   Psychiatric:         Mood and Affect: Mood normal.         Last Recorded Vitals  Blood pressure 101/51, pulse 61, temperature 36.4 °C (97.5 °F), resp. rate 16, height 1.727 m (5' 8\"), weight 66.2 kg (146 lb), SpO2 95%.  Intake/Output last 3 Shifts:  I/O last 3 completed shifts:  In: 1830 (27.6 mL/kg) [P.O.:680; I.V.:600 (9.1 mL/kg); Other:400; IV Piggyback:150]  Out: 1532 (23.1 mL/kg) [Other:1532]  Weight: 66.2 kg     Relevant Results            This patient currently has cardiac telemetry ordered; if you would like to modify or discontinue the telemetry " order, click here to go to the orders activity to modify/discontinue the order.  Scheduled medications  allopurinol, 100 mg, oral, Daily  amLODIPine, 10 mg, oral, Daily  aspirin, 81 mg, oral, Daily  atorvastatin, 80 mg, oral, Nightly  carvedilol, 6.25 mg, oral, BID  cefTRIAXone, 2 g, intravenous, q24h  docusate sodium, 100 mg, oral, BID  doxycycline, 100 mg, oral, q12h LUKE  ergocalciferol, 50,000 Units, oral, q14 days  ezetimibe, 10 mg, oral, Daily  fluocinonide, , Topical, BID  gabapentin, 300 mg, oral, Nightly  heparin (porcine), 5,000 Units, subcutaneous, q8h LUKE  insulin lispro, 0-5 Units, subcutaneous, TID AC  isosorbide mononitrate ER, 60 mg, oral, Daily  oxygen, 2 L/min, inhalation, q24h  pantoprazole, 40 mg, oral, Daily  polyethylene glycol, 17 g, oral, Daily  sevelamer carbonate, 1,600 mg, oral, TID  SITagliptin phosphate, 25 mg, oral, Daily      Continuous medications     PRN medications  PRN medications: acetaminophen **OR** acetaminophen **OR** acetaminophen, benzocaine-menthol, dextromethorphan-guaifenesin, guaiFENesin, melatonin, nitroglycerin, ondansetron, oxygen  Results for orders placed or performed during the hospital encounter of 03/22/25 (from the past 24 hours)   CBC   Result Value Ref Range    WBC 8.7 4.4 - 11.3 x10*3/uL    nRBC 0.0 0.0 - 0.0 /100 WBCs    RBC 4.04 (L) 4.50 - 5.90 x10*6/uL    Hemoglobin 10.8 (L) 13.5 - 17.5 g/dL    Hematocrit 35.6 (L) 41.0 - 52.0 %    MCV 88 80 - 100 fL    MCH 26.7 26.0 - 34.0 pg    MCHC 30.3 (L) 32.0 - 36.0 g/dL    RDW 16.4 (H) 11.5 - 14.5 %    Platelets 237 150 - 450 x10*3/uL   Basic Metabolic Panel   Result Value Ref Range    Glucose 83 74 - 99 mg/dL    Sodium 138 136 - 145 mmol/L    Potassium 4.6 3.5 - 5.3 mmol/L    Chloride 92 (L) 98 - 107 mmol/L    Bicarbonate 28 21 - 32 mmol/L    Anion Gap 23 (H) 10 - 20 mmol/L    Urea Nitrogen 72 (H) 6 - 23 mg/dL    Creatinine 12.75 (H) 0.50 - 1.30 mg/dL    eGFR 4 (L) >60 mL/min/1.73m*2    Calcium 9.7 8.6 - 10.3 mg/dL    POCT GLUCOSE   Result Value Ref Range    POCT Glucose 116 (H) 74 - 99 mg/dL   POCT GLUCOSE   Result Value Ref Range    POCT Glucose 181 (H) 74 - 99 mg/dL   POCT GLUCOSE   Result Value Ref Range    POCT Glucose 109 (H) 74 - 99 mg/dL     *Note: Due to a large number of results and/or encounters for the requested time period, some results have not been displayed. A complete set of results can be found in Results Review.   No results found.  Scheduled medications  allopurinol, 100 mg, oral, Daily  amLODIPine, 10 mg, oral, Daily  aspirin, 81 mg, oral, Daily  atorvastatin, 80 mg, oral, Nightly  carvedilol, 6.25 mg, oral, BID  cefTRIAXone, 2 g, intravenous, q24h  docusate sodium, 100 mg, oral, BID  doxycycline, 100 mg, oral, q12h LUKE  ergocalciferol, 50,000 Units, oral, q14 days  ezetimibe, 10 mg, oral, Daily  fluocinonide, , Topical, BID  gabapentin, 300 mg, oral, Nightly  heparin (porcine), 5,000 Units, subcutaneous, q8h LUKE  insulin lispro, 0-5 Units, subcutaneous, TID AC  isosorbide mononitrate ER, 60 mg, oral, Daily  oxygen, 2 L/min, inhalation, q24h  pantoprazole, 40 mg, oral, Daily  polyethylene glycol, 17 g, oral, Daily  sevelamer carbonate, 1,600 mg, oral, TID  SITagliptin phosphate, 25 mg, oral, Daily      Continuous medications     PRN medications  PRN medications: acetaminophen **OR** acetaminophen **OR** acetaminophen, benzocaine-menthol, dextromethorphan-guaifenesin, guaiFENesin, melatonin, nitroglycerin, ondansetron, oxygen  Results for orders placed or performed during the hospital encounter of 03/22/25 (from the past 24 hours)   CBC   Result Value Ref Range    WBC 8.7 4.4 - 11.3 x10*3/uL    nRBC 0.0 0.0 - 0.0 /100 WBCs    RBC 4.04 (L) 4.50 - 5.90 x10*6/uL    Hemoglobin 10.8 (L) 13.5 - 17.5 g/dL    Hematocrit 35.6 (L) 41.0 - 52.0 %    MCV 88 80 - 100 fL    MCH 26.7 26.0 - 34.0 pg    MCHC 30.3 (L) 32.0 - 36.0 g/dL    RDW 16.4 (H) 11.5 - 14.5 %    Platelets 237 150 - 450 x10*3/uL   Basic Metabolic Panel    Result Value Ref Range    Glucose 83 74 - 99 mg/dL    Sodium 138 136 - 145 mmol/L    Potassium 4.6 3.5 - 5.3 mmol/L    Chloride 92 (L) 98 - 107 mmol/L    Bicarbonate 28 21 - 32 mmol/L    Anion Gap 23 (H) 10 - 20 mmol/L    Urea Nitrogen 72 (H) 6 - 23 mg/dL    Creatinine 12.75 (H) 0.50 - 1.30 mg/dL    eGFR 4 (L) >60 mL/min/1.73m*2    Calcium 9.7 8.6 - 10.3 mg/dL   POCT GLUCOSE   Result Value Ref Range    POCT Glucose 116 (H) 74 - 99 mg/dL   POCT GLUCOSE   Result Value Ref Range    POCT Glucose 181 (H) 74 - 99 mg/dL   POCT GLUCOSE   Result Value Ref Range    POCT Glucose 109 (H) 74 - 99 mg/dL     *Note: Due to a large number of results and/or encounters for the requested time period, some results have not been displayed. A complete set of results can be found in Results Review.     No results found.           Assessment/Plan   Assessment & Plan  Pleural effusion    Hydropneumothorax    Anemia    Anxiety    Benign essential hypertension    CAD (coronary artery disease)    Gastroesophageal reflux disease    ESRD on hemodialysis (Multi)    Pneumonia      Code violet was called this morning  Patient is calm  Since procedure patient is showing signs of sundowning  At baseline he does have some dementia  VATS done  Continue antibiotic  ID consult for duration of antibiotic for pneumonia and right lung exudative effusion  Dialysis today   blood pressure stable  Blood sugar okay  PT OT and  consult as patient may need rehab placement         I spent  minutes in the professional and overall care of this patient.      Marianna Galloway MD

## 2025-03-31 ENCOUNTER — APPOINTMENT (OUTPATIENT)
Dept: RADIOLOGY | Facility: HOSPITAL | Age: 75
DRG: 163 | End: 2025-03-31
Payer: MEDICARE

## 2025-03-31 VITALS
SYSTOLIC BLOOD PRESSURE: 152 MMHG | HEIGHT: 68 IN | DIASTOLIC BLOOD PRESSURE: 72 MMHG | BODY MASS INDEX: 22.13 KG/M2 | RESPIRATION RATE: 18 BRPM | OXYGEN SATURATION: 95 % | HEART RATE: 63 BPM | TEMPERATURE: 97 F | WEIGHT: 146 LBS

## 2025-03-31 LAB
ANION GAP SERPL CALC-SCNC: 21 MMOL/L (ref 10–20)
BUN SERPL-MCNC: 61 MG/DL (ref 6–23)
CALCIUM SERPL-MCNC: 9.9 MG/DL (ref 8.6–10.3)
CHLORIDE SERPL-SCNC: 97 MMOL/L (ref 98–107)
CO2 SERPL-SCNC: 27 MMOL/L (ref 21–32)
CREAT SERPL-MCNC: 11.49 MG/DL (ref 0.5–1.3)
EGFRCR SERPLBLD CKD-EPI 2021: 4 ML/MIN/1.73M*2
ERYTHROCYTE [DISTWIDTH] IN BLOOD BY AUTOMATED COUNT: 16.4 % (ref 11.5–14.5)
FUNGUS SPEC CULT: NORMAL
FUNGUS SPEC FUNGUS STN: NORMAL
GLUCOSE BLD MANUAL STRIP-MCNC: 111 MG/DL (ref 74–99)
GLUCOSE BLD MANUAL STRIP-MCNC: 88 MG/DL (ref 74–99)
GLUCOSE SERPL-MCNC: 89 MG/DL (ref 74–99)
HCT VFR BLD AUTO: 30.6 % (ref 41–52)
HGB BLD-MCNC: 10.1 G/DL (ref 13.5–17.5)
MAGNESIUM SERPL-MCNC: 1.71 MG/DL (ref 1.6–2.4)
MCH RBC QN AUTO: 27.4 PG (ref 26–34)
MCHC RBC AUTO-ENTMCNC: 33 G/DL (ref 32–36)
MCV RBC AUTO: 83 FL (ref 80–100)
NRBC BLD-RTO: 0 /100 WBCS (ref 0–0)
PLATELET # BLD AUTO: 174 X10*3/UL (ref 150–450)
POTASSIUM SERPL-SCNC: 4.6 MMOL/L (ref 3.5–5.3)
RBC # BLD AUTO: 3.69 X10*6/UL (ref 4.5–5.9)
SODIUM SERPL-SCNC: 140 MMOL/L (ref 136–145)
WBC # BLD AUTO: 7.8 X10*3/UL (ref 4.4–11.3)

## 2025-03-31 PROCEDURE — 2500000002 HC RX 250 W HCPCS SELF ADMINISTERED DRUGS (ALT 637 FOR MEDICARE OP, ALT 636 FOR OP/ED): Performed by: NURSE PRACTITIONER

## 2025-03-31 PROCEDURE — 83735 ASSAY OF MAGNESIUM: CPT | Performed by: NURSE PRACTITIONER

## 2025-03-31 PROCEDURE — 82947 ASSAY GLUCOSE BLOOD QUANT: CPT

## 2025-03-31 PROCEDURE — 99239 HOSP IP/OBS DSCHRG MGMT >30: CPT | Performed by: INTERNAL MEDICINE

## 2025-03-31 PROCEDURE — 2500000001 HC RX 250 WO HCPCS SELF ADMINISTERED DRUGS (ALT 637 FOR MEDICARE OP): Performed by: NURSE PRACTITIONER

## 2025-03-31 PROCEDURE — 71045 X-RAY EXAM CHEST 1 VIEW: CPT | Performed by: STUDENT IN AN ORGANIZED HEALTH CARE EDUCATION/TRAINING PROGRAM

## 2025-03-31 PROCEDURE — 85027 COMPLETE CBC AUTOMATED: CPT | Performed by: NURSE PRACTITIONER

## 2025-03-31 PROCEDURE — 80048 BASIC METABOLIC PNL TOTAL CA: CPT | Performed by: NURSE PRACTITIONER

## 2025-03-31 PROCEDURE — 2500000004 HC RX 250 GENERAL PHARMACY W/ HCPCS (ALT 636 FOR OP/ED): Performed by: NURSE PRACTITIONER

## 2025-03-31 PROCEDURE — 36415 COLL VENOUS BLD VENIPUNCTURE: CPT | Performed by: NURSE PRACTITIONER

## 2025-03-31 PROCEDURE — 71045 X-RAY EXAM CHEST 1 VIEW: CPT

## 2025-03-31 RX ORDER — DOXYCYCLINE 100 MG/1
100 CAPSULE ORAL EVERY 12 HOURS SCHEDULED
Qty: 3 CAPSULE | Refills: 0 | Status: SHIPPED | OUTPATIENT
Start: 2025-03-31 | End: 2025-04-02

## 2025-03-31 RX ADMIN — SEVELAMER CARBONATE 1600 MG: 800 TABLET, FILM COATED ORAL at 09:28

## 2025-03-31 RX ADMIN — PANTOPRAZOLE SODIUM 40 MG: 40 TABLET, DELAYED RELEASE ORAL at 09:29

## 2025-03-31 RX ADMIN — ASPIRIN 81 MG: 81 TABLET, COATED ORAL at 09:27

## 2025-03-31 RX ADMIN — ISOSORBIDE MONONITRATE 60 MG: 30 TABLET, EXTENDED RELEASE ORAL at 09:28

## 2025-03-31 RX ADMIN — DOXYCYCLINE HYCLATE 100 MG: 100 TABLET, FILM COATED ORAL at 09:34

## 2025-03-31 RX ADMIN — DOCUSATE SODIUM 100 MG: 100 CAPSULE, LIQUID FILLED ORAL at 09:29

## 2025-03-31 RX ADMIN — SEVELAMER CARBONATE 1600 MG: 800 TABLET, FILM COATED ORAL at 12:25

## 2025-03-31 RX ADMIN — ALLOPURINOL 100 MG: 100 TABLET ORAL at 09:29

## 2025-03-31 RX ADMIN — FLUOCINONIDE: 0.5 CREAM TOPICAL at 09:31

## 2025-03-31 RX ADMIN — SITAGLIPTIN 25 MG: 25 TABLET, FILM COATED ORAL at 09:28

## 2025-03-31 RX ADMIN — EZETIMIBE 10 MG: 10 TABLET ORAL at 09:28

## 2025-03-31 ASSESSMENT — PAIN SCALES - GENERAL: PAINLEVEL_OUTOF10: 0 - NO PAIN

## 2025-03-31 NOTE — PROGRESS NOTES
03/31/25 1258   Discharge Planning   Expected Discharge Disposition SNF          Patient is approved for SNF and SNF also got approval for dialysis. Revisited patient and informed him. He agreed to go to SNF. Spoke with Lucie, patient's wife and she was made aware.  Sent message to [facility] to make sure they can take today and answer was yes. Requested from [facility] to set up transport and was confirmed for 2 pm. Doctor, bedside nurse, , facility and patient's wife called and all were made aware of pick upat 2 pm for patient to go to [facility].    1305  Patient is medically ready for discharge  They are being discharged to: SNF  _Transport_________ will pick patient up  [facility]                           Facility- [facility]             Nurse/ family notified-- yes             Report number-   [phone]             Transport time-- 2 pm                 Nurse instructed to provide dressing supplies for 2 days- yes, pleurX drain bottles               Patient denies any other needs

## 2025-03-31 NOTE — PROGRESS NOTES
"Gregory's scheduled for Wednesday William A Franke \"Narciso\" is a 74 y.o. male on day 3 of admission presenting with Hydropneumothorax.    Subjective   Patient had VATS done and Pleurx chest tube was inserted.  Patient is doing better today.  Confusion resolved   objective     Physical Exam  Vitals reviewed.   Constitutional:       Appearance: Normal appearance.   HENT:      Head: Normocephalic and atraumatic.      Right Ear: Tympanic membrane, ear canal and external ear normal.      Left Ear: Tympanic membrane, ear canal and external ear normal.      Nose: Nose normal.      Mouth/Throat:      Pharynx: Oropharynx is clear.   Eyes:      Extraocular Movements: Extraocular movements intact.      Conjunctiva/sclera: Conjunctivae normal.      Pupils: Pupils are equal, round, and reactive to light.   Cardiovascular:      Rate and Rhythm: Normal rate and regular rhythm.      Pulses: Normal pulses.      Heart sounds: Normal heart sounds.   Pulmonary:      Effort: Pulmonary effort is normal.      Breath sounds: Rales present.   Abdominal:      General: Abdomen is flat. Bowel sounds are normal.      Palpations: Abdomen is soft.   Musculoskeletal:      Cervical back: Normal range of motion and neck supple.   Skin:     General: Skin is warm and dry.   Neurological:      General: No focal deficit present.      Mental Status: He is alert and oriented to person, place, and time.   Psychiatric:         Mood and Affect: Mood normal.         Last Recorded Vitals  Blood pressure 101/51, pulse 61, temperature 36.4 °C (97.5 °F), resp. rate 16, height 1.727 m (5' 8\"), weight 66.2 kg (146 lb), SpO2 95%.  Intake/Output last 3 Shifts:  I/O last 3 completed shifts:  In: 1830 (27.6 mL/kg) [P.O.:680; I.V.:600 (9.1 mL/kg); Other:400; IV Piggyback:150]  Out: 1532 (23.1 mL/kg) [Other:1532]  Weight: 66.2 kg     Relevant Results            This patient currently has cardiac telemetry ordered; if you would like to modify or discontinue the telemetry " order, click here to go to the orders activity to modify/discontinue the order.  Scheduled medications  allopurinol, 100 mg, oral, Daily  amLODIPine, 10 mg, oral, Daily  aspirin, 81 mg, oral, Daily  atorvastatin, 80 mg, oral, Nightly  carvedilol, 6.25 mg, oral, BID  cefTRIAXone, 2 g, intravenous, q24h  docusate sodium, 100 mg, oral, BID  doxycycline, 100 mg, oral, q12h LUKE  ergocalciferol, 50,000 Units, oral, q14 days  ezetimibe, 10 mg, oral, Daily  fluocinonide, , Topical, BID  gabapentin, 300 mg, oral, Nightly  heparin (porcine), 5,000 Units, subcutaneous, q8h LUKE  insulin lispro, 0-5 Units, subcutaneous, TID AC  isosorbide mononitrate ER, 60 mg, oral, Daily  oxygen, 2 L/min, inhalation, q24h  pantoprazole, 40 mg, oral, Daily  polyethylene glycol, 17 g, oral, Daily  sevelamer carbonate, 1,600 mg, oral, TID  SITagliptin phosphate, 25 mg, oral, Daily      Continuous medications     PRN medications  PRN medications: acetaminophen **OR** acetaminophen **OR** acetaminophen, benzocaine-menthol, dextromethorphan-guaifenesin, guaiFENesin, melatonin, nitroglycerin, ondansetron, oxygen  Results for orders placed or performed during the hospital encounter of 03/22/25 (from the past 24 hours)   CBC   Result Value Ref Range    WBC 8.7 4.4 - 11.3 x10*3/uL    nRBC 0.0 0.0 - 0.0 /100 WBCs    RBC 4.04 (L) 4.50 - 5.90 x10*6/uL    Hemoglobin 10.8 (L) 13.5 - 17.5 g/dL    Hematocrit 35.6 (L) 41.0 - 52.0 %    MCV 88 80 - 100 fL    MCH 26.7 26.0 - 34.0 pg    MCHC 30.3 (L) 32.0 - 36.0 g/dL    RDW 16.4 (H) 11.5 - 14.5 %    Platelets 237 150 - 450 x10*3/uL   Basic Metabolic Panel   Result Value Ref Range    Glucose 83 74 - 99 mg/dL    Sodium 138 136 - 145 mmol/L    Potassium 4.6 3.5 - 5.3 mmol/L    Chloride 92 (L) 98 - 107 mmol/L    Bicarbonate 28 21 - 32 mmol/L    Anion Gap 23 (H) 10 - 20 mmol/L    Urea Nitrogen 72 (H) 6 - 23 mg/dL    Creatinine 12.75 (H) 0.50 - 1.30 mg/dL    eGFR 4 (L) >60 mL/min/1.73m*2    Calcium 9.7 8.6 - 10.3 mg/dL    POCT GLUCOSE   Result Value Ref Range    POCT Glucose 116 (H) 74 - 99 mg/dL   POCT GLUCOSE   Result Value Ref Range    POCT Glucose 181 (H) 74 - 99 mg/dL   POCT GLUCOSE   Result Value Ref Range    POCT Glucose 109 (H) 74 - 99 mg/dL     *Note: Due to a large number of results and/or encounters for the requested time period, some results have not been displayed. A complete set of results can be found in Results Review.   No results found.  Scheduled medications  allopurinol, 100 mg, oral, Daily  amLODIPine, 10 mg, oral, Daily  aspirin, 81 mg, oral, Daily  atorvastatin, 80 mg, oral, Nightly  carvedilol, 6.25 mg, oral, BID  cefTRIAXone, 2 g, intravenous, q24h  docusate sodium, 100 mg, oral, BID  doxycycline, 100 mg, oral, q12h LUKE  ergocalciferol, 50,000 Units, oral, q14 days  ezetimibe, 10 mg, oral, Daily  fluocinonide, , Topical, BID  gabapentin, 300 mg, oral, Nightly  heparin (porcine), 5,000 Units, subcutaneous, q8h LUKE  insulin lispro, 0-5 Units, subcutaneous, TID AC  isosorbide mononitrate ER, 60 mg, oral, Daily  oxygen, 2 L/min, inhalation, q24h  pantoprazole, 40 mg, oral, Daily  polyethylene glycol, 17 g, oral, Daily  sevelamer carbonate, 1,600 mg, oral, TID  SITagliptin phosphate, 25 mg, oral, Daily      Continuous medications     PRN medications  PRN medications: acetaminophen **OR** acetaminophen **OR** acetaminophen, benzocaine-menthol, dextromethorphan-guaifenesin, guaiFENesin, melatonin, nitroglycerin, ondansetron, oxygen  Results for orders placed or performed during the hospital encounter of 03/22/25 (from the past 24 hours)   CBC   Result Value Ref Range    WBC 8.7 4.4 - 11.3 x10*3/uL    nRBC 0.0 0.0 - 0.0 /100 WBCs    RBC 4.04 (L) 4.50 - 5.90 x10*6/uL    Hemoglobin 10.8 (L) 13.5 - 17.5 g/dL    Hematocrit 35.6 (L) 41.0 - 52.0 %    MCV 88 80 - 100 fL    MCH 26.7 26.0 - 34.0 pg    MCHC 30.3 (L) 32.0 - 36.0 g/dL    RDW 16.4 (H) 11.5 - 14.5 %    Platelets 237 150 - 450 x10*3/uL   Basic Metabolic Panel    Result Value Ref Range    Glucose 83 74 - 99 mg/dL    Sodium 138 136 - 145 mmol/L    Potassium 4.6 3.5 - 5.3 mmol/L    Chloride 92 (L) 98 - 107 mmol/L    Bicarbonate 28 21 - 32 mmol/L    Anion Gap 23 (H) 10 - 20 mmol/L    Urea Nitrogen 72 (H) 6 - 23 mg/dL    Creatinine 12.75 (H) 0.50 - 1.30 mg/dL    eGFR 4 (L) >60 mL/min/1.73m*2    Calcium 9.7 8.6 - 10.3 mg/dL   POCT GLUCOSE   Result Value Ref Range    POCT Glucose 116 (H) 74 - 99 mg/dL   POCT GLUCOSE   Result Value Ref Range    POCT Glucose 181 (H) 74 - 99 mg/dL   POCT GLUCOSE   Result Value Ref Range    POCT Glucose 109 (H) 74 - 99 mg/dL     *Note: Due to a large number of results and/or encounters for the requested time period, some results have not been displayed. A complete set of results can be found in Results Review.     No results found.           Assessment/Plan   Assessment & Plan  Pleural effusion    Hydropneumothorax    Anemia    Anxiety    Benign essential hypertension    CAD (coronary artery disease)    Gastroesophageal reflux disease    ESRD on hemodialysis (Multi)    Pneumonia        Since procedure patient is showing signs of sundowning  At baseline he does have some dementia  VATS done  Continue antibiotic  ID input noted regarding discharge antibiotics  blood pressure stable  Blood sugar okay  PT OT and  consult as patient may need rehab placement         I spent  minutes in the professional and overall care of this patient.      Marianna Galloway MD

## 2025-03-31 NOTE — CARE PLAN
"  Problem: Safety - Adult  Goal: Free from fall injury  Outcome: Progressing   The patient's goals for the shift include \"do what I'm supposed to\"    The clinical goals for the shift include pt. will remain HDS    Over the shift, the patient did not make progress toward the following goals. Barriers to progression include . Recommendations to address these barriers include .    "

## 2025-04-01 ENCOUNTER — NURSING HOME VISIT (OUTPATIENT)
Dept: POST ACUTE CARE | Facility: EXTERNAL LOCATION | Age: 75
End: 2025-04-01
Payer: MEDICARE

## 2025-04-01 DIAGNOSIS — I10 HYPERTENSION, UNSPECIFIED TYPE: ICD-10-CM

## 2025-04-01 DIAGNOSIS — E78.5 HYPERLIPIDEMIA, UNSPECIFIED HYPERLIPIDEMIA TYPE: ICD-10-CM

## 2025-04-01 DIAGNOSIS — K21.9 GASTROESOPHAGEAL REFLUX DISEASE WITHOUT ESOPHAGITIS: ICD-10-CM

## 2025-04-01 DIAGNOSIS — J94.8 HYDROPNEUMOTHORAX: Primary | ICD-10-CM

## 2025-04-01 DIAGNOSIS — F41.9 ANXIETY: ICD-10-CM

## 2025-04-01 DIAGNOSIS — D64.9 ANEMIA, UNSPECIFIED TYPE: ICD-10-CM

## 2025-04-01 DIAGNOSIS — Z91.81 AT RISK FOR FALLING: ICD-10-CM

## 2025-04-01 DIAGNOSIS — N18.6 END STAGE RENAL DISEASE (MULTI): ICD-10-CM

## 2025-04-01 DIAGNOSIS — R53.1 WEAKNESS: ICD-10-CM

## 2025-04-01 DIAGNOSIS — J90 PLEURAL EFFUSION: ICD-10-CM

## 2025-04-01 DIAGNOSIS — I25.10 CORONARY ARTERY DISEASE, UNSPECIFIED VESSEL OR LESION TYPE, UNSPECIFIED WHETHER ANGINA PRESENT, UNSPECIFIED WHETHER NATIVE OR TRANSPLANTED HEART: ICD-10-CM

## 2025-04-01 PROCEDURE — 99306 1ST NF CARE HIGH MDM 50: CPT | Performed by: INTERNAL MEDICINE

## 2025-04-01 NOTE — PROGRESS NOTES
"Gregory's scheduled for Wednesday William A Franke \"Narciso\" is a 74 y.o. male on day 3 of admission presenting with Hydropneumothorax.    Subjective   Patient had VATS done and Pleurx chest tube was inserted.  Patient is doing better today.  Confusion doing better still has some memory issues  objective     Physical Exam  Vitals reviewed.   Constitutional:       Appearance: Normal appearance.   HENT:      Head: Normocephalic and atraumatic.      Right Ear: Tympanic membrane, ear canal and external ear normal.      Left Ear: Tympanic membrane, ear canal and external ear normal.      Nose: Nose normal.      Mouth/Throat:      Pharynx: Oropharynx is clear.   Eyes:      Extraocular Movements: Extraocular movements intact.      Conjunctiva/sclera: Conjunctivae normal.      Pupils: Pupils are equal, round, and reactive to light.   Cardiovascular:      Rate and Rhythm: Normal rate and regular rhythm.      Pulses: Normal pulses.      Heart sounds: Normal heart sounds.   Pulmonary:      Effort: Pulmonary effort is normal.      Breath sounds: Rales present.   Abdominal:      General: Abdomen is flat. Bowel sounds are normal.      Palpations: Abdomen is soft.   Musculoskeletal:      Cervical back: Normal range of motion and neck supple.   Skin:     General: Skin is warm and dry.   Neurological:      General: No focal deficit present.      Mental Status: He is alert and oriented to person, place, and time.   Psychiatric:         Mood and Affect: Mood normal.         Last Recorded Vitals  Blood pressure 101/51, pulse 61, temperature 36.4 °C (97.5 °F), resp. rate 16, height 1.727 m (5' 8\"), weight 66.2 kg (146 lb), SpO2 95%.  Intake/Output last 3 Shifts:  I/O last 3 completed shifts:  In: 1830 (27.6 mL/kg) [P.O.:680; I.V.:600 (9.1 mL/kg); Other:400; IV Piggyback:150]  Out: 1532 (23.1 mL/kg) [Other:1532]  Weight: 66.2 kg     Relevant Results            This patient currently has cardiac telemetry ordered; if you would like to modify " or discontinue the telemetry order, click here to go to the orders activity to modify/discontinue the order.  Scheduled medications  allopurinol, 100 mg, oral, Daily  amLODIPine, 10 mg, oral, Daily  aspirin, 81 mg, oral, Daily  atorvastatin, 80 mg, oral, Nightly  carvedilol, 6.25 mg, oral, BID  cefTRIAXone, 2 g, intravenous, q24h  docusate sodium, 100 mg, oral, BID  doxycycline, 100 mg, oral, q12h LUKE  ergocalciferol, 50,000 Units, oral, q14 days  ezetimibe, 10 mg, oral, Daily  fluocinonide, , Topical, BID  gabapentin, 300 mg, oral, Nightly  heparin (porcine), 5,000 Units, subcutaneous, q8h LUKE  insulin lispro, 0-5 Units, subcutaneous, TID AC  isosorbide mononitrate ER, 60 mg, oral, Daily  oxygen, 2 L/min, inhalation, q24h  pantoprazole, 40 mg, oral, Daily  polyethylene glycol, 17 g, oral, Daily  sevelamer carbonate, 1,600 mg, oral, TID  SITagliptin phosphate, 25 mg, oral, Daily      Continuous medications     PRN medications  PRN medications: acetaminophen **OR** acetaminophen **OR** acetaminophen, benzocaine-menthol, dextromethorphan-guaifenesin, guaiFENesin, melatonin, nitroglycerin, ondansetron, oxygen  Results for orders placed or performed during the hospital encounter of 03/22/25 (from the past 24 hours)   CBC   Result Value Ref Range    WBC 8.7 4.4 - 11.3 x10*3/uL    nRBC 0.0 0.0 - 0.0 /100 WBCs    RBC 4.04 (L) 4.50 - 5.90 x10*6/uL    Hemoglobin 10.8 (L) 13.5 - 17.5 g/dL    Hematocrit 35.6 (L) 41.0 - 52.0 %    MCV 88 80 - 100 fL    MCH 26.7 26.0 - 34.0 pg    MCHC 30.3 (L) 32.0 - 36.0 g/dL    RDW 16.4 (H) 11.5 - 14.5 %    Platelets 237 150 - 450 x10*3/uL   Basic Metabolic Panel   Result Value Ref Range    Glucose 83 74 - 99 mg/dL    Sodium 138 136 - 145 mmol/L    Potassium 4.6 3.5 - 5.3 mmol/L    Chloride 92 (L) 98 - 107 mmol/L    Bicarbonate 28 21 - 32 mmol/L    Anion Gap 23 (H) 10 - 20 mmol/L    Urea Nitrogen 72 (H) 6 - 23 mg/dL    Creatinine 12.75 (H) 0.50 - 1.30 mg/dL    eGFR 4 (L) >60 mL/min/1.73m*2     Calcium 9.7 8.6 - 10.3 mg/dL   POCT GLUCOSE   Result Value Ref Range    POCT Glucose 116 (H) 74 - 99 mg/dL   POCT GLUCOSE   Result Value Ref Range    POCT Glucose 181 (H) 74 - 99 mg/dL   POCT GLUCOSE   Result Value Ref Range    POCT Glucose 109 (H) 74 - 99 mg/dL     *Note: Due to a large number of results and/or encounters for the requested time period, some results have not been displayed. A complete set of results can be found in Results Review.   No results found.  Scheduled medications  allopurinol, 100 mg, oral, Daily  amLODIPine, 10 mg, oral, Daily  aspirin, 81 mg, oral, Daily  atorvastatin, 80 mg, oral, Nightly  carvedilol, 6.25 mg, oral, BID  cefTRIAXone, 2 g, intravenous, q24h  docusate sodium, 100 mg, oral, BID  doxycycline, 100 mg, oral, q12h LUKE  ergocalciferol, 50,000 Units, oral, q14 days  ezetimibe, 10 mg, oral, Daily  fluocinonide, , Topical, BID  gabapentin, 300 mg, oral, Nightly  heparin (porcine), 5,000 Units, subcutaneous, q8h LUKE  insulin lispro, 0-5 Units, subcutaneous, TID AC  isosorbide mononitrate ER, 60 mg, oral, Daily  oxygen, 2 L/min, inhalation, q24h  pantoprazole, 40 mg, oral, Daily  polyethylene glycol, 17 g, oral, Daily  sevelamer carbonate, 1,600 mg, oral, TID  SITagliptin phosphate, 25 mg, oral, Daily      Continuous medications     PRN medications  PRN medications: acetaminophen **OR** acetaminophen **OR** acetaminophen, benzocaine-menthol, dextromethorphan-guaifenesin, guaiFENesin, melatonin, nitroglycerin, ondansetron, oxygen  Results for orders placed or performed during the hospital encounter of 03/22/25 (from the past 24 hours)   CBC   Result Value Ref Range    WBC 8.7 4.4 - 11.3 x10*3/uL    nRBC 0.0 0.0 - 0.0 /100 WBCs    RBC 4.04 (L) 4.50 - 5.90 x10*6/uL    Hemoglobin 10.8 (L) 13.5 - 17.5 g/dL    Hematocrit 35.6 (L) 41.0 - 52.0 %    MCV 88 80 - 100 fL    MCH 26.7 26.0 - 34.0 pg    MCHC 30.3 (L) 32.0 - 36.0 g/dL    RDW 16.4 (H) 11.5 - 14.5 %    Platelets 237 150 - 450  x10*3/uL   Basic Metabolic Panel   Result Value Ref Range    Glucose 83 74 - 99 mg/dL    Sodium 138 136 - 145 mmol/L    Potassium 4.6 3.5 - 5.3 mmol/L    Chloride 92 (L) 98 - 107 mmol/L    Bicarbonate 28 21 - 32 mmol/L    Anion Gap 23 (H) 10 - 20 mmol/L    Urea Nitrogen 72 (H) 6 - 23 mg/dL    Creatinine 12.75 (H) 0.50 - 1.30 mg/dL    eGFR 4 (L) >60 mL/min/1.73m*2    Calcium 9.7 8.6 - 10.3 mg/dL   POCT GLUCOSE   Result Value Ref Range    POCT Glucose 116 (H) 74 - 99 mg/dL   POCT GLUCOSE   Result Value Ref Range    POCT Glucose 181 (H) 74 - 99 mg/dL   POCT GLUCOSE   Result Value Ref Range    POCT Glucose 109 (H) 74 - 99 mg/dL     *Note: Due to a large number of results and/or encounters for the requested time period, some results have not been displayed. A complete set of results can be found in Results Review.     No results found.           Assessment/Plan   Assessment & Plan  Pleural effusion    Hydropneumothorax    Anemia    Anxiety    Benign essential hypertension    CAD (coronary artery disease)    Gastroesophageal reflux disease    ESRD on hemodialysis (Multi)    Pneumonia        Since procedure patient is showing signs of sundowning  At baseline he does have some dementia  VATS done  Continue antibiotic  ID input noted regarding discharge antibiotics  blood pressure stable  Blood sugar okay  PT OT and  consult as patient may need rehab placement  Discharge today if cleared by cardiology and ID       I spent  minutes in the professional and overall care of this patient.      Marianna Galloway MD

## 2025-04-01 NOTE — DISCHARGE SUMMARY
"Discharge Diagnosis  Hydropneumothorax    Issues Requiring Follow-Up  End-stage renal disease  Exudative right pleural effusion    Test Results Pending At Discharge  Pending Labs       Order Current Status    Surgical Pathology Exam In process            Hospital Course   William A Franke \"Narciso\" is a 74 y.o. male presenting with  increasing shortness of breath and cough.  Patient has history of end-stage renal disease on hemodialysis Tuesday Thursday Saturday, hypertension, diabetes, coronary artery disease, CHF, left arm AV fistula, anemia,  .  In December 2024 patient was admitted for transudative pleural effusion s/p thoracocentesis on 12/27/2024.  NIDA negative for endocarditis.  MSSA bacteremia source unclear negative indium scan.  Treated with IV cefazolin for 6 weeks.  He had a CAT scan done on 2/14/2025 by ID which showed reaccumulation of right pleural effusion.  Patient followed up with me yesterday in the office complaining of increasing cough and increasing shortness of breath.  Patient was directly admitted for his symptoms and to repeat thoracocentesis and decide if patient will need Pleurx catheter or pleurodesis.    At [facility] patient had thoracocentesis done which again was exudative.  Started on antibiotic thoracic surgery consulted.  They recommended VATS but thoracic surgery is not available at [facility] patient was transferred to [facility].    Patient underwent chemical pleurodesis and Pleurx catheter placement.  He did have some more confusion after the procedure.  Otherwise he remained stable.  Antibiotic continued work for 1 more week after discharge per ID.  Discharged to rehab facility [facility]    Pertinent Physical Exam At Time of Discharge  Physical Exam  Vitals reviewed.   Constitutional:       Appearance: Normal appearance.   HENT:      Head: Normocephalic and atraumatic.      Right Ear: Tympanic membrane, ear canal and external ear normal.      Left Ear: " "Tympanic membrane, ear canal and external ear normal.      Nose: Nose normal.      Mouth/Throat:      Pharynx: Oropharynx is clear.   Eyes:      Extraocular Movements: Extraocular movements intact.      Conjunctiva/sclera: Conjunctivae normal.      Pupils: Pupils are equal, round, and reactive to light.   Cardiovascular:      Rate and Rhythm: Normal rate and regular rhythm.      Pulses: Normal pulses.      Heart sounds: Normal heart sounds.   Pulmonary:      Effort: Pulmonary effort is normal.      Breath sounds: Normal breath sounds.      Comments: Pleurx catheter in place on the right side  Abdominal:      General: Abdomen is flat. Bowel sounds are normal.      Palpations: Abdomen is soft.   Musculoskeletal:      Cervical back: Normal range of motion and neck supple.   Skin:     General: Skin is warm and dry.   Neurological:      General: No focal deficit present.      Mental Status: He is alert and oriented to person, place, and time.   Psychiatric:         Mood and Affect: Mood normal.         Home Medications     Medication List      START taking these medications     cefTRIAXone 2 gram/50 mL IV; Commonly known as: Rocephin; Infuse 50 mL   (2 g) at 100 mL/hr over 30 minutes into a venous catheter once every 24   hours for 7 days.   epoetin jeison-epbx 10,000 unit/mL injection; Commonly known as: Retacrit;   Inject 0.66 mL (6,600 Units) under the skin once a day on Monday,   Wednesday, and Friday.     CONTINUE taking these medications     allopurinol 100 mg tablet; Commonly known as: Zyloprim; Take 1 tablet   (100 mg) by mouth once daily.   amLODIPine 10 mg tablet; Commonly known as: Norvasc; Take 1 tablet (10   mg) by mouth once daily.   aspirin 81 mg EC tablet   atorvastatin 80 mg tablet; Commonly known as: Lipitor; Take 1 tablet (80   mg) by mouth once daily at bedtime.   B complex-vitamin C-folic acid 0.8 mg tablet; Commonly known as:   Nephro-Shea   BD Ultra-Fine Mini Pen Needle 31 gauge x 3/16\" needle; " Generic drug: pen   needle, diabetic; USE AS DIRECTED 4 times a day   carvedilol 6.25 mg tablet; Commonly known as: Coreg; Take 1 tablet (6.25   mg) by mouth 2 times daily (morning and late afternoon).   doxycycline 100 mg capsule; Commonly known as: Vibramycin; Take 1   capsule (100 mg) by mouth every 12 hours for 3 doses. Take with at least 8   ounces (large glass) of water, do not lie down for 30 minutes after   ergocalciferol 1250 mcg (50,000 units) capsule; Commonly known as:   Vitamin D-2   ezetimibe 10 mg tablet; Commonly known as: Zetia; Take 1 tablet (10 mg)   by mouth once daily.   fluocinonide 0.05 % ointment; Commonly known as: Lidex; Apply topically   2 times a day.   gabapentin 300 mg capsule; Commonly known as: Neurontin   insulin lispro 100 unit/mL pen; Commonly known as: HumaLOG   isosorbide mononitrate ER 60 mg 24 hr tablet; Commonly known as: Imdur;   Take 1 tablet (60 mg) by mouth once daily. Do not crush or chew.   Januvia 25 mg tablet; Generic drug: SITagliptin phosphate; Take 1 tablet   (25 mg) by mouth once daily.   nitroglycerin 0.4 mg SL tablet; Commonly known as: Nitrostat   oxygen gas therapy; Commonly known as: O2; Inhale 2 L/min once every 24   hours.   pantoprazole 40 mg EC tablet; Commonly known as: ProtoNix; Take 1 tablet   (40 mg) by mouth once daily.   sevelamer carbonate 800 mg tablet; Commonly known as: Renvela   triamcinolone 0.5 % cream; Commonly known as: Kenalog; Apply topically 3   times a day.     STOP taking these medications     cefTRIAXone 2 gram/50 mL IV; Commonly known as: Rocephin       Outpatient Follow-Up  Future Appointments   Date Time Provider Department Center   4/15/2025 11:30 AM Sade Herrera DO [id number]   5/30/2025  8:30 AM ERIKA Austin [id number] [facility]   7/11/2025 10:30 AM ERIKA Barnes [id number] [facility]   8/22/2025 10:30 AM ERIKA Austin [id number] [facility]   8/25/2025  8:45 AM Abad Briceno MD  [id number] Lázaro Galloway MD

## 2025-04-01 NOTE — LETTER
Patient: Bill Franke  : 1950    Encounter Date: 2025    PLACE OF SERVICE:  [facility]    This is new/initial history and physical.    Subjective  Patient ID: Bill A Franke is a 74 y.o. male who presents for Annual Exam and new/initial history.    Mr. William Franke is a 74-year-old male with history of end-stage renal disease on hemodialysis.  He has developed hydropneumothorax and has undergone decortication.  He is unable to care for himself and requires supportive care.    Review of Systems   Constitutional:  Negative for chills and fever.   Cardiovascular:  Negative for chest pain.   All other systems reviewed and are negative.    Objective  /82   Pulse 82   Temp 36.6 °C (97.9 °F)   Resp 18     Physical Exam  Vitals reviewed.   Constitutional:       Comments: This is a well-developed, well-nourished male, lying in bed, appearing weak   HENT:      Right Ear: Tympanic membrane, ear canal and external ear normal.      Left Ear: Tympanic membrane, ear canal and external ear normal.   Eyes:      General: No scleral icterus.     Pupils: Pupils are equal, round, and reactive to light.   Neck:      Vascular: No carotid bruit.   Cardiovascular:      Heart sounds: Normal heart sounds, S1 normal and S2 normal. No murmur heard.     No friction rub.   Pulmonary:      Breath sounds: Decreased breath sounds (throughout) present.   Abdominal:      Palpations: There is no hepatomegaly, splenomegaly or mass.   Musculoskeletal:         General: No swelling or deformity. Normal range of motion.      Cervical back: Neck supple.      Right lower leg: Edema present.      Left lower leg: Edema present.   Lymphadenopathy:      Cervical: No cervical adenopathy.      Upper Body:      Right upper body: No axillary adenopathy.      Left upper body: No axillary adenopathy.      Lower Body: No right inguinal adenopathy. No left inguinal adenopathy.   Neurological:      Mental Status: He is oriented to  person, place, and time. He is lethargic.      Cranial Nerves: Cranial nerves 2-12 are intact. No cranial nerve deficit.      Sensory: No sensory deficit.      Motor: Motor function is intact. No weakness.      Gait: Gait is intact.      Deep Tendon Reflexes: Reflexes normal.   Psychiatric:         Mood and Affect: Mood normal. Mood is not anxious or depressed. Affect is not angry.         Behavior: Behavior is not agitated.         Thought Content: Thought content normal.         Judgment: Judgment normal.     LAB WORK:  Laboratory studies were reviewed.    Assessment/Plan  Problem List Items Addressed This Visit             ICD-10-CM    Anemia D64.9    Hypertension I10    CAD (coronary artery disease) I25.10    Hyperlipidemia E78.5    Gastroesophageal reflux disease K21.9    Anxiety F41.9    Pleural effusion J90    Hydropneumothorax - Primary J94.8     Other Visit Diagnoses         Codes    End stage renal disease (Multi)     N18.6    Weakness     R53.1    At risk for falling     Z91.81        1. Hydropneumothorax, status post decortication.  Follow with thoracic surgery.  2. End-stage renal disease, on hemodialysis.  3. Hypertension, medically controlled.  4. Hyperlipidemia, on statin.  5. Coronary artery disease, on aspirin.  6. GERD, on PPI.  7. Anemia.  Follow CBC.  8. Pleural effusion.  Follow chest x-ray.  9. Anxiety, on medication.  10. Weakness, on PT/OT  11. Fall risk, on fall precautions.    Scribe Attestation  By signing my name below, IJulia Scribe attest that this documentation has been prepared under the direction and in the presence of Candido Galloway MD.     All medical record entries made by the scribe were personally dictated by me I have reviewed the chart and agree the record accurately reflects my personal performance of his history physical examination and management      Electronically Signed By: Candido Galloway MD   4/14/25 11:55 PM

## 2025-04-02 LAB
ACID FAST STN SPEC: NORMAL
MYCOBACTERIUM SPEC CULT: NORMAL

## 2025-04-04 ENCOUNTER — TELEPHONE (OUTPATIENT)
Dept: SLEEP MEDICINE | Facility: CLINIC | Age: 75
End: 2025-04-04
Payer: MEDICARE

## 2025-04-04 LAB
LABORATORY COMMENT REPORT: NORMAL
PATH REPORT.FINAL DX SPEC: NORMAL
PATH REPORT.GROSS SPEC: NORMAL
PATH REPORT.RELEVANT HX SPEC: NORMAL
PATH REPORT.TOTAL CANCER: NORMAL

## 2025-04-07 ENCOUNTER — NURSING HOME VISIT (OUTPATIENT)
Dept: POST ACUTE CARE | Facility: EXTERNAL LOCATION | Age: 75
End: 2025-04-07
Payer: MEDICARE

## 2025-04-07 DIAGNOSIS — K21.9 GASTROESOPHAGEAL REFLUX DISEASE WITHOUT ESOPHAGITIS: ICD-10-CM

## 2025-04-07 DIAGNOSIS — F41.9 ANXIETY: ICD-10-CM

## 2025-04-07 DIAGNOSIS — D64.9 ANEMIA, UNSPECIFIED TYPE: ICD-10-CM

## 2025-04-07 DIAGNOSIS — I10 HYPERTENSION, UNSPECIFIED TYPE: ICD-10-CM

## 2025-04-07 DIAGNOSIS — E78.5 HYPERLIPIDEMIA, UNSPECIFIED HYPERLIPIDEMIA TYPE: ICD-10-CM

## 2025-04-07 DIAGNOSIS — Z91.81 AT RISK FOR FALLING: ICD-10-CM

## 2025-04-07 DIAGNOSIS — I25.10 CORONARY ARTERY DISEASE, UNSPECIFIED VESSEL OR LESION TYPE, UNSPECIFIED WHETHER ANGINA PRESENT, UNSPECIFIED WHETHER NATIVE OR TRANSPLANTED HEART: ICD-10-CM

## 2025-04-07 DIAGNOSIS — N18.6 END STAGE RENAL DISEASE (MULTI): Primary | ICD-10-CM

## 2025-04-07 DIAGNOSIS — R53.1 WEAKNESS: ICD-10-CM

## 2025-04-07 DIAGNOSIS — J94.8 HYDROPNEUMOTHORAX: ICD-10-CM

## 2025-04-07 DIAGNOSIS — J90 PLEURAL EFFUSION: ICD-10-CM

## 2025-04-07 LAB
FUNGUS SPEC CULT: NORMAL
FUNGUS SPEC FUNGUS STN: NORMAL

## 2025-04-07 PROCEDURE — 99309 SBSQ NF CARE MODERATE MDM 30: CPT | Performed by: INTERNAL MEDICINE

## 2025-04-07 NOTE — LETTER
Patient: Bill Franke  : 1950    Encounter Date: 2025    PLACE OF SERVICE:  [facility]    This is a subsequent visit.    Subjective  Patient ID: Bill A Franke is a 74 y.o. male who presents for Follow-up.    Mr. William Franke is a 74-year-old male with recent history of pleural effusion with hydropneumothorax. He has undergone decortication.  He is unable to care for himself and requires supportive care.    Review of Systems   Constitutional:  Negative for chills and fever.   Cardiovascular:  Negative for chest pain.   All other systems reviewed and are negative.    Objective  /80   Pulse 82   Temp 36.7 °C (98.1 °F)   Resp 18     Physical Exam  Vitals reviewed.   Constitutional:       Comments: This is a well-developed, well-nourished male, lying in bed, appearing weak.   HENT:      Right Ear: Tympanic membrane, ear canal and external ear normal.      Left Ear: Tympanic membrane, ear canal and external ear normal.   Eyes:      General: No scleral icterus.     Pupils: Pupils are equal, round, and reactive to light.   Neck:      Vascular: No carotid bruit.   Cardiovascular:      Heart sounds: Normal heart sounds, S1 normal and S2 normal. No murmur heard.     No friction rub.   Pulmonary:      Breath sounds: Decreased breath sounds (throughout) present.   Abdominal:      Palpations: There is no hepatomegaly, splenomegaly or mass.   Musculoskeletal:         General: No swelling or deformity. Normal range of motion.      Cervical back: Neck supple.      Right lower leg: Edema present.      Left lower leg: Edema present.   Lymphadenopathy:      Cervical: No cervical adenopathy.      Upper Body:      Right upper body: No axillary adenopathy.      Left upper body: No axillary adenopathy.      Lower Body: No right inguinal adenopathy. No left inguinal adenopathy.   Neurological:      Mental Status: He is oriented to person, place, and time. He is lethargic.      Cranial Nerves: Cranial  nerves 2-12 are intact. No cranial nerve deficit.      Sensory: No sensory deficit.      Motor: Motor function is intact. No weakness.      Gait: Gait is intact.      Deep Tendon Reflexes: Reflexes normal.   Psychiatric:         Mood and Affect: Mood normal. Mood is not anxious or depressed. Affect is not angry.         Behavior: Behavior is not agitated.         Thought Content: Thought content normal.         Judgment: Judgment normal.     LAB WORK:  Laboratory studies were reviewed.    Assessment/Plan  Problem List Items Addressed This Visit             ICD-10-CM    Anemia D64.9    Hypertension I10    CAD (coronary artery disease) I25.10    Hyperlipidemia E78.5    Gastroesophageal reflux disease K21.9    Anxiety F41.9    Pleural effusion J90    Hydropneumothorax J94.8     Other Visit Diagnoses         Codes    End stage renal disease (Multi)    -  Primary N18.6    Weakness     R53.1    At risk for falling     Z91.81        1. End-stage renal disease, on hemodialysis.  2. Recent hydropneumothorax, status post decortication.  Follow with thoracic surgery.  3. Pleural effusion.  Follow chest x-ray.  4. Hypertension, medically controlled.  5. Anemia.  Follow CBC.  6. Hyperlipidemia, on statin.  7. Coronary artery disease, on aspirin.  8. GERD, on PPI.  9. Anxiety, on medication.  10. Weakness, on PT/OT.  11. Fall risk, on fall precautions.    Scribe Attestation  By signing my name below, IJulia Scribe attest that this documentation has been prepared under the direction and in the presence of Candido Galloway MD.     All medical record entries made by the scribe were personally dictated by me I have reviewed the chart and agree the record accurately reflects my personal performance of his history physical examination and management      Electronically Signed By: Candido Galloway MD   4/14/25 11:57 PM

## 2025-04-08 LAB
ATRIAL RATE: 52 BPM
P AXIS: 13 DEGREES
P OFFSET: 181 MS
P ONSET: 123 MS
PR INTERVAL: 182 MS
Q ONSET: 214 MS
QRS COUNT: 8 BEATS
QRS DURATION: 120 MS
QT INTERVAL: 484 MS
QTC CALCULATION(BAZETT): 450 MS
QTC FREDERICIA: 461 MS
R AXIS: 48 DEGREES
T AXIS: 39 DEGREES
T OFFSET: 456 MS
VENTRICULAR RATE: 52 BPM

## 2025-04-09 LAB
ACID FAST STN SPEC: NORMAL
MYCOBACTERIUM SPEC CULT: NORMAL

## 2025-04-11 ENCOUNTER — DOCUMENTATION (OUTPATIENT)
Dept: CARE COORDINATION | Facility: CLINIC | Age: 75
End: 2025-04-11
Payer: MEDICARE

## 2025-04-11 ENCOUNTER — HOSPITAL ENCOUNTER (EMERGENCY)
Facility: HOSPITAL | Age: 75
Discharge: HOME | End: 2025-04-11
Payer: MEDICARE

## 2025-04-11 VITALS
OXYGEN SATURATION: 98 % | BODY MASS INDEX: 25.01 KG/M2 | HEIGHT: 68 IN | SYSTOLIC BLOOD PRESSURE: 131 MMHG | RESPIRATION RATE: 17 BRPM | WEIGHT: 165 LBS | DIASTOLIC BLOOD PRESSURE: 47 MMHG | HEART RATE: 70 BPM | TEMPERATURE: 97.3 F

## 2025-04-11 DIAGNOSIS — J90 PLEURAL EFFUSION: Primary | ICD-10-CM

## 2025-04-11 PROCEDURE — 99284 EMERGENCY DEPT VISIT MOD MDM: CPT

## 2025-04-11 ASSESSMENT — PAIN SCALES - GENERAL: PAINLEVEL_OUTOF10: 0 - NO PAIN

## 2025-04-11 ASSESSMENT — PAIN - FUNCTIONAL ASSESSMENT: PAIN_FUNCTIONAL_ASSESSMENT: 0-10

## 2025-04-11 NOTE — DISCHARGE INSTRUCTIONS
Please keep your scheduled appointments and your dialysis appointment.  Please return with any other concerning symptoms.  Home health care is to start on Monday.

## 2025-04-11 NOTE — PROGRESS NOTES
1:27pm Called Dr. SOLOMON Galloway and left a voice message detailing the needs for Bill.   The message was to Dr Galloway and lso to Brittny Galloway MA.  She was going to call me back after speaking with the doctor this morning at 11:47am.    Will inform Mrs. Franke of the events of the day.  Estephania AVALOS, RN, [facility]  O: [phone]

## 2025-04-11 NOTE — PROGRESS NOTES
Received patient from rehab discharge list.  Patient recently discharged from [facility].  Spoke with his wife, she stated he has a chest tube and a visiting nurse was supposed to come out to the house to help with the dressing change.  She is worried about doing the dressing change.   I called the rehab facility at 11:38 am and left a voice message--there was no answer.  I then called Dr. Carmen office for some guidance/assistance in arranging home care for this patient.  I spoke with Dr Bennie Mart, who will explain the request and needs of the patient.  Brittny stated she will call me back when she knows what the next steps are for Bill.  Will continue to follow.  Estephania AVALOS, RN, [facility]  Accountable Care Organization  O: [phone]

## 2025-04-11 NOTE — ED PROVIDER NOTES
HPI   Chief Complaint   Patient presents with    Device Check     Right side chest tube       74-year-old male presents today with concern for dressing change for a right chest tube placement for pleural effusion that occurred on April 2.  He is denying chest pain.  He is denying dyspnea.  He is denying fever or chills.  He was also concerned about getting home health care set up for physical therapy.  All vital signs were normal and stable in triage.  He had no other cause for concern or complaint.  He is a dialysis patient.  I confirmed that he has dialysis scheduled for tomorrow morning.  He receives his dialysis on Tuesday Thursdays and Saturdays.      History provided by:  Patient and spouse   used: No            Patient History   Past Medical History:   Diagnosis Date    Chronic kidney disease     Diabetes mellitus (Multi)     ESRD (end stage renal disease) on dialysis (Multi)     Heart murmur     HLD (hyperlipidemia)     Hypertension     Personal history of other endocrine, nutritional and metabolic disease     History of hypercholesterolemia    Personal history of other specified conditions 08/28/2020    History of chest pain     Past Surgical History:   Procedure Laterality Date    AV FISTULA PLACEMENT Left 07/31/2024    left brachial artery to axillary vein AV graft on 7/31/2024    KNEE ARTHROSCOPY W/ DEBRIDEMENT  04/15/2013    Arthroscopy Knee    LUNG DECORTICATION Right 03/27/2025    Right Video Assisted Thoracoscopy; partial decortication with Pleural Biospy; PleurX insertion    PERCUTANEOUS CHEST DRAIN INSERTION Right 03/27/2025    Right Video Assisted Thoracoscopy; partial decortication with Pleural Biospy; PleurX insertion    SHOULDER SURGERY  04/15/2013    Shoulder Surgery     Family History   Problem Relation Name Age of Onset    Alzheimer's disease Mother      Heart attack Mother      Diabetes Father      Heart attack Father       Social History     Tobacco Use    Smoking  status: Never    Smokeless tobacco: Never   Substance Use Topics    Alcohol use: Not Currently     Comment: rarely    Drug use: Never       Physical Exam   ED Triage Vitals [04/11/25 1624]   Temperature Heart Rate Respirations BP   36.3 °C (97.3 °F) 70 17 (!) 131/47      Pulse Ox Temp Source Heart Rate Source Patient Position   98 % Temporal Monitor Sitting      BP Location FiO2 (%)     -- --       Physical Exam  Constitutional:       Appearance: Normal appearance.   HENT:      Head: Normocephalic and atraumatic.   Cardiovascular:      Rate and Rhythm: Normal rate and regular rhythm.      Pulses: Normal pulses.      Heart sounds: Normal heart sounds.   Pulmonary:      Effort: Pulmonary effort is normal.      Breath sounds: Normal breath sounds.   Abdominal:      General: Abdomen is flat.   Musculoskeletal:         General: Normal range of motion.      Cervical back: Normal range of motion and neck supple.   Skin:     General: Skin is warm.      Capillary Refill: Capillary refill takes less than 2 seconds.   Neurological:      General: No focal deficit present.      Mental Status: He is alert and oriented to person, place, and time.   Psychiatric:         Mood and Affect: Mood normal.         Behavior: Behavior normal.           ED Course & MDM   Diagnoses as of 04/11/25 1656   Pleural effusion                 No data recorded     [place] Coma Scale Score: 15 (04/11/25 1626 : Sheldon Vick RN)                           Medical Decision Making  Patient did not appear to be in acute distress.  Chest tube dressing was changed by nursing.  Physical exam appeared normal.  He has home health care now confirmed on Monday coming out to his home.  Safely discharged home after dressing was changed by nursing.  At this time I did not feel he required lab work or any other further workup.        Procedure  Procedures     ERIKA Mohan  04/11/25 1645       ERIKA Mohan  04/11/25 1656

## 2025-04-11 NOTE — ED TRIAGE NOTES
Pt to ED for dressing change and drained for right chest tube. Chest tube placed 04/02 for pleural effusion.     Denies other complaints when asked.

## 2025-04-11 NOTE — PROGRESS NOTES
Called and spoke with Mrs. Franke, she stated [facility] called and Mrs. Franke stated she is trying to get [facility].   I explained to Mrs. Franke to call them back and book them, I have not heard from Dr. SOLOMON Galloway office to confirm if she will order home care or not.  She said she would call them now.   Also instructed her to take Bill to urgent care to do the chest tube dressing change, Mrs. Franke said she looked on youtube and will try doing the dressing change herself.  No further needs at this time  ish ANDRADEN, RN, [facility]  Accountable Care Organization  O: [phone]

## 2025-04-11 NOTE — PROGRESS NOTES
Called [facility] again in hopes to speak with Celestina Mayorga about Bill.  Left a message with the  that this was an urgent need to speak with her.    Called  Lucy with [facility] and left a message stating that I left a message for Celestina and hopefully will speak with her soon.  Called Mrs Franke, left her a voice message to please let me know what transpired-last we spoke she was going to Dr Avery office for assistance.    Julisa ANDRADEN, RN, [facility]  Accountable Care Organization  O: [phone]

## 2025-04-11 NOTE — PROGRESS NOTES
Mrs. Franke returned my call and stated that there was a 4 way conversation between Lucy Thorpe from [employer], Celestina Mayorga and Mrs. Franke.  They discussed his need for home care, Dr. Galloway approved and now they have approval for home care visit.  Mrs. Franke will take him to the emergency room to drain him and do the dressing change, will follow up with Lucy when home care will come out.  Lucy called me and said home care can come out Monday, Bill does have dialysis in the morning.    Called Mrs Franke and confirmed she is taking him to the hospital and that home care will be out on Monday.   She agreed and had no further needs at this time.  Estephania ANDRADEN, RN, [facility]  Accountable Care Organization  O: [phone]

## 2025-04-11 NOTE — PROGRESS NOTES
Mrs Franke called and stated that Lucy from [facility] stated that he does need an order from the primary care doctor, or Dr. Herrera.  She is very worried and doesn't know where to turn.    I called Lucy from [facility] [phone] and left a detailed message for her to call me back.  I then called [facility] number (if emergency) [phone] to inquire if a nurse is going out to see Bill, and what is the issue with this order.  I spoke with Nataliya  and she sated Lucy was in a meeting.  1:23 pm called Lucy again, because Nataliya said she left that meeting.  Left another voice message.  Estephania ANDRADEN, RN, [facility]  Accountable Care Organization  O: [phone]

## 2025-04-14 ENCOUNTER — PATIENT OUTREACH (OUTPATIENT)
Dept: CARE COORDINATION | Facility: CLINIC | Age: 75
End: 2025-04-14
Payer: MEDICARE

## 2025-04-14 VITALS
HEART RATE: 82 BPM | RESPIRATION RATE: 18 BRPM | TEMPERATURE: 97.9 F | SYSTOLIC BLOOD PRESSURE: 130 MMHG | DIASTOLIC BLOOD PRESSURE: 82 MMHG

## 2025-04-14 VITALS
SYSTOLIC BLOOD PRESSURE: 130 MMHG | DIASTOLIC BLOOD PRESSURE: 80 MMHG | HEART RATE: 82 BPM | RESPIRATION RATE: 18 BRPM | TEMPERATURE: 98.1 F

## 2025-04-14 SDOH — ECONOMIC STABILITY: GENERAL: WOULD YOU LIKE HELP WITH ANY OF THE FOLLOWING NEEDS?: I DONT NEED HELP WITH ANY OF THESE

## 2025-04-14 ASSESSMENT — ENCOUNTER SYMPTOMS
FEVER: 0
CHILLS: 0
CHILLS: 0
FEVER: 0

## 2025-04-14 NOTE — PROGRESS NOTES
PLACE OF SERVICE:  [facility]    This is a subsequent visit.    Subjective   Patient ID: Bill A Franke is a 74 y.o. male who presents for Follow-up.    Mr. William Franke is a 74-year-old male with recent history of pleural effusion with hydropneumothorax. He has undergone decortication.  He is unable to care for himself and requires supportive care.    Review of Systems   Constitutional:  Negative for chills and fever.   Cardiovascular:  Negative for chest pain.   All other systems reviewed and are negative.    Objective   /80   Pulse 82   Temp 36.7 °C (98.1 °F)   Resp 18     Physical Exam  Vitals reviewed.   Constitutional:       Comments: This is a well-developed, well-nourished male, lying in bed, appearing weak.   HENT:      Right Ear: Tympanic membrane, ear canal and external ear normal.      Left Ear: Tympanic membrane, ear canal and external ear normal.   Eyes:      General: No scleral icterus.     Pupils: Pupils are equal, round, and reactive to light.   Neck:      Vascular: No carotid bruit.   Cardiovascular:      Heart sounds: Normal heart sounds, S1 normal and S2 normal. No murmur heard.     No friction rub.   Pulmonary:      Breath sounds: Decreased breath sounds (throughout) present.   Abdominal:      Palpations: There is no hepatomegaly, splenomegaly or mass.   Musculoskeletal:         General: No swelling or deformity. Normal range of motion.      Cervical back: Neck supple.      Right lower leg: Edema present.      Left lower leg: Edema present.   Lymphadenopathy:      Cervical: No cervical adenopathy.      Upper Body:      Right upper body: No axillary adenopathy.      Left upper body: No axillary adenopathy.      Lower Body: No right inguinal adenopathy. No left inguinal adenopathy.   Neurological:      Mental Status: He is oriented to person, place, and time. He is lethargic.      Cranial Nerves: Cranial nerves 2-12 are intact. No cranial nerve deficit.      Sensory: No  sensory deficit.      Motor: Motor function is intact. No weakness.      Gait: Gait is intact.      Deep Tendon Reflexes: Reflexes normal.   Psychiatric:         Mood and Affect: Mood normal. Mood is not anxious or depressed. Affect is not angry.         Behavior: Behavior is not agitated.         Thought Content: Thought content normal.         Judgment: Judgment normal.     LAB WORK:  Laboratory studies were reviewed.    Assessment/Plan   Problem List Items Addressed This Visit             ICD-10-CM    Anemia D64.9    Hypertension I10    CAD (coronary artery disease) I25.10    Hyperlipidemia E78.5    Gastroesophageal reflux disease K21.9    Anxiety F41.9    Pleural effusion J90    Hydropneumothorax J94.8     Other Visit Diagnoses         Codes    End stage renal disease (Multi)    -  Primary N18.6    Weakness     R53.1    At risk for falling     Z91.81        1. End-stage renal disease, on hemodialysis.  2. Recent hydropneumothorax, status post decortication.  Follow with thoracic surgery.  3. Pleural effusion.  Follow chest x-ray.  4. Hypertension, medically controlled.  5. Anemia.  Follow CBC.  6. Hyperlipidemia, on statin.  7. Coronary artery disease, on aspirin.  8. GERD, on PPI.  9. Anxiety, on medication.  10. Weakness, on PT/OT.  11. Fall risk, on fall precautions.    Scribe Attestation  By signing my name below, IJulia Scribe attest that this documentation has been prepared under the direction and in the presence of Candido Galloway MD.     All medical record entries made by the scribe were personally dictated by me I have reviewed the chart and agree the record accurately reflects my personal performance of his history physical examination and management

## 2025-04-14 NOTE — PROGRESS NOTES
PLACE OF SERVICE:  [facility]    This is new/initial history and physical.    Subjective   Patient ID: Bill A Franke is a 74 y.o. male who presents for Annual Exam and new/initial history.    Mr. William Franke is a 74-year-old male with history of end-stage renal disease on hemodialysis.  He has developed hydropneumothorax and has undergone decortication.  He is unable to care for himself and requires supportive care.    Review of Systems   Constitutional:  Negative for chills and fever.   Cardiovascular:  Negative for chest pain.   All other systems reviewed and are negative.    Objective   /82   Pulse 82   Temp 36.6 °C (97.9 °F)   Resp 18     Physical Exam  Vitals reviewed.   Constitutional:       Comments: This is a well-developed, well-nourished male, lying in bed, appearing weak   HENT:      Right Ear: Tympanic membrane, ear canal and external ear normal.      Left Ear: Tympanic membrane, ear canal and external ear normal.   Eyes:      General: No scleral icterus.     Pupils: Pupils are equal, round, and reactive to light.   Neck:      Vascular: No carotid bruit.   Cardiovascular:      Heart sounds: Normal heart sounds, S1 normal and S2 normal. No murmur heard.     No friction rub.   Pulmonary:      Breath sounds: Decreased breath sounds (throughout) present.   Abdominal:      Palpations: There is no hepatomegaly, splenomegaly or mass.   Musculoskeletal:         General: No swelling or deformity. Normal range of motion.      Cervical back: Neck supple.      Right lower leg: Edema present.      Left lower leg: Edema present.   Lymphadenopathy:      Cervical: No cervical adenopathy.      Upper Body:      Right upper body: No axillary adenopathy.      Left upper body: No axillary adenopathy.      Lower Body: No right inguinal adenopathy. No left inguinal adenopathy.   Neurological:      Mental Status: He is oriented to person, place, and time. He is lethargic.      Cranial Nerves: Cranial  nerves 2-12 are intact. No cranial nerve deficit.      Sensory: No sensory deficit.      Motor: Motor function is intact. No weakness.      Gait: Gait is intact.      Deep Tendon Reflexes: Reflexes normal.   Psychiatric:         Mood and Affect: Mood normal. Mood is not anxious or depressed. Affect is not angry.         Behavior: Behavior is not agitated.         Thought Content: Thought content normal.         Judgment: Judgment normal.     LAB WORK:  Laboratory studies were reviewed.    Assessment/Plan   Problem List Items Addressed This Visit             ICD-10-CM    Anemia D64.9    Hypertension I10    CAD (coronary artery disease) I25.10    Hyperlipidemia E78.5    Gastroesophageal reflux disease K21.9    Anxiety F41.9    Pleural effusion J90    Hydropneumothorax - Primary J94.8     Other Visit Diagnoses         Codes    End stage renal disease (Multi)     N18.6    Weakness     R53.1    At risk for falling     Z91.81        1. Hydropneumothorax, status post decortication.  Follow with thoracic surgery.  2. End-stage renal disease, on hemodialysis.  3. Hypertension, medically controlled.  4. Hyperlipidemia, on statin.  5. Coronary artery disease, on aspirin.  6. GERD, on PPI.  7. Anemia.  Follow CBC.  8. Pleural effusion.  Follow chest x-ray.  9. Anxiety, on medication.  10. Weakness, on PT/OT  11. Fall risk, on fall precautions.    Scribe Attestation  By signing my name below, IJulia Scribe attest that this documentation has been prepared under the direction and in the presence of Candido Galloway MD.     All medical record entries made by the scribe were personally dictated by me I have reviewed the chart and agree the record accurately reflects my personal performance of his history physical examination and management

## 2025-04-14 NOTE — PROGRESS NOTES
Outreach call to patient to support a smooth transition of care from recent admission.  Spoke with patient, reviewed discharge medications, discharge instructions, assessed social needs, and provided education on importance of follow-up appointment with provider.  Will continue to monitor through transition period.  Medications  Medications reviewed with patient/caregiver?: Yes (4/14/2025 11:26 AM)  Is the patient having any side effects they believe may be caused by any medication additions or changes?: No (4/14/2025 11:26 AM)  Does the patient have all medications ordered at discharge?: Yes (4/14/2025 11:26 AM)  Care Management Interventions: Provided patient education (4/14/2025 11:26 AM)  Is the patient taking all medications as directed (includes completed medication regime)?: Yes (4/14/2025 11:26 AM)    Appointments  Does the patient have a primary care provider?: Yes (4/14/2025 11:26 AM)  Care Management Interventions: Verified appointment date/time/provider (4/14/2025 11:26 AM)  Care Management Interventions: Advised patient to keep appointment (4/14/2025 11:26 AM)    Self Management  What is the home health agency?: [facility] (4/14/2025 11:26 AM)  Has home health visited the patient within 72 hours of discharge?: No (please read notes hjm) (4/14/2025 11:26 AM)  Has all Durable Medical Equipment (DME) been delivered?: Yes (4/14/2025 11:26 AM)  Follow Up Tasks: Home Health (4/14/2025 11:26 AM)    Patient Teaching  Does the patient have access to their discharge instructions?: Yes (4/14/2025 11:26 AM)  Care Management Interventions: Reviewed instructions with patient (4/14/2025 11:26 AM)  What is the patient's perception of their health status since discharge?: Improving (4/14/2025 11:26 AM)      Julisa AVALOS, RN, [facility] Organization  O: [phone]

## 2025-04-15 ENCOUNTER — OFFICE VISIT (OUTPATIENT)
Dept: SURGERY | Facility: CLINIC | Age: 75
End: 2025-04-15
Payer: MEDICARE

## 2025-04-15 ENCOUNTER — HOSPITAL ENCOUNTER (OUTPATIENT)
Dept: RADIOLOGY | Facility: CLINIC | Age: 75
Discharge: HOME | End: 2025-04-15
Payer: MEDICARE

## 2025-04-15 VITALS
SYSTOLIC BLOOD PRESSURE: 170 MMHG | DIASTOLIC BLOOD PRESSURE: 75 MMHG | HEIGHT: 68 IN | BODY MASS INDEX: 25.09 KG/M2 | TEMPERATURE: 98.4 F | OXYGEN SATURATION: 91 % | HEART RATE: 74 BPM

## 2025-04-15 DIAGNOSIS — J90 PLEURAL EFFUSION: ICD-10-CM

## 2025-04-15 DIAGNOSIS — J98.19 TRAPPED LUNG: Primary | ICD-10-CM

## 2025-04-15 PROCEDURE — 1123F ACP DISCUSS/DSCN MKR DOCD: CPT | Performed by: STUDENT IN AN ORGANIZED HEALTH CARE EDUCATION/TRAINING PROGRAM

## 2025-04-15 PROCEDURE — 71046 X-RAY EXAM CHEST 2 VIEWS: CPT | Performed by: RADIOLOGY

## 2025-04-15 PROCEDURE — 1111F DSCHRG MED/CURRENT MED MERGE: CPT | Performed by: STUDENT IN AN ORGANIZED HEALTH CARE EDUCATION/TRAINING PROGRAM

## 2025-04-15 PROCEDURE — 32552 REMOVE LUNG CATHETER: CPT | Performed by: STUDENT IN AN ORGANIZED HEALTH CARE EDUCATION/TRAINING PROGRAM

## 2025-04-15 PROCEDURE — 99211 OFF/OP EST MAY X REQ PHY/QHP: CPT | Performed by: STUDENT IN AN ORGANIZED HEALTH CARE EDUCATION/TRAINING PROGRAM

## 2025-04-15 PROCEDURE — 71046 X-RAY EXAM CHEST 2 VIEWS: CPT

## 2025-04-15 PROCEDURE — 1159F MED LIST DOCD IN RCRD: CPT | Performed by: STUDENT IN AN ORGANIZED HEALTH CARE EDUCATION/TRAINING PROGRAM

## 2025-04-15 PROCEDURE — 3077F SYST BP >= 140 MM HG: CPT | Performed by: STUDENT IN AN ORGANIZED HEALTH CARE EDUCATION/TRAINING PROGRAM

## 2025-04-15 PROCEDURE — 3078F DIAST BP <80 MM HG: CPT | Performed by: STUDENT IN AN ORGANIZED HEALTH CARE EDUCATION/TRAINING PROGRAM

## 2025-04-15 RX ORDER — BENZONATATE 100 MG/1
100 CAPSULE ORAL 3 TIMES DAILY PRN
Qty: 20 CAPSULE | Refills: 0 | Status: SHIPPED | OUTPATIENT
Start: 2025-04-15 | End: 2025-04-22

## 2025-04-15 RX ORDER — DOXYCYCLINE 100 MG/1
100 CAPSULE ORAL 2 TIMES DAILY
Qty: 28 CAPSULE | Refills: 0 | Status: SHIPPED | OUTPATIENT
Start: 2025-04-15 | End: 2025-04-29

## 2025-04-15 NOTE — PATIENT INSTRUCTIONS
Dr. Herrera would like you to get a chest xray at any  location in about 2 weeks (around 4/28).  There is no appointment for this.  Dr. Herrera will review and call with results and to see how you are feeling.  Call our office with any questions. [phone]

## 2025-04-15 NOTE — PROGRESS NOTES
"Chief complaint  Post-operative visit    History Of Present Illness  William A Franke \"Bill\" is a 74 y.o. male presenting for their first post-operative visit after undergoing a Rt VATS pleural biopsy + PleurX catheter placement on 3/27/25 -- intra op found trapped lung, patient was on room air with multiple co-morbidities so aggressive surgical decortication was not pursued. Patient did well post-operatively overall; discharged to rehab on 3/31/25. The final pathology showed: pleuritis only.     He had a poor experience in rehab, continues to feel quite weak and has had a significant productive cough since last week. No fevers/chills. Had PleurX drained ?once in rehab, once at the [facility] ED (125mL) and once with [facility] the other day with minimal output. He feels minimally different. Biggest complaint is cough causing HA and weakness.     Pmhx ESRD, through left arm AV fistula, HTN, HFpEF hypertension, history of MSSA bacteremia in December 2024, NIDA negative for endocarditis in December 2024, exudative pleural effusion status post thoracentesis on 12- admitted on 3/17/2025 @ [facility] due to cough and SOB. He was just at his PCP office and was a direct admit for planned thoracentesis from the , 2/14 Ct Scan: Re accumulation of Right Pleural Effusion. He is s/p Right Thoracentesis 3/19- 950ml of exudative fluid. Cytology was negative. He was [place] [facility] [place] on 3/22/25     Past Medical History  He has a past medical history of Acquired absence of lung, Anemia, Anxiety, At risk for falling, CAD (coronary artery disease), Chronic kidney disease, Diabetes mellitus (Multi), ESRD (end stage renal disease) on dialysis (Multi), GERD (gastroesophageal reflux disease), Heart murmur, HLD (hyperlipidemia), Hydropneumothorax, Hypertension, Personal history of other endocrine, nutritional and metabolic disease, Personal history of other specified conditions (08/28/2020), Pleural effusion, " "and Weakness.    Surgical History  He has a past surgical history that includes Shoulder surgery (04/15/2013); Knee arthroscopy w/ debridement (04/15/2013); AV fistula placement (Left, 07/31/2024); Percutaneous chest drain insertion (Right, 03/27/2025); and Lung decortication (Right, 03/27/2025).     Social History  He reports that he has never smoked. He has never used smokeless tobacco. He reports that he does not currently use alcohol. He reports that he does not use drugs.    Medications    Current Outpatient Medications:     allopurinol (Zyloprim) 100 mg tablet, Take 1 tablet (100 mg) by mouth once daily., Disp: 90 tablet, Rfl: 0    amLODIPine (Norvasc) 10 mg tablet, Take 1 tablet (10 mg) by mouth once daily., Disp: 90 tablet, Rfl: 3    aspirin 81 mg EC tablet, Take 1 tablet (81 mg) by mouth once daily., Disp: , Rfl:     atorvastatin (Lipitor) 80 mg tablet, Take 1 tablet (80 mg) by mouth once daily at bedtime., Disp: 90 tablet, Rfl: 0    B complex-vitamin C-folic acid (Nephro-Shea) 0.8 mg tablet, Take 1 tablet by mouth once daily., Disp: , Rfl:     BD Ultra-Fine Mini Pen Needle 31 gauge x 3/16\" needle, USE AS DIRECTED 4 times a day, Disp: 400 each, Rfl: 2    carvedilol (Coreg) 6.25 mg tablet, Take 1 tablet (6.25 mg) by mouth 2 times daily (morning and late afternoon)., Disp: 180 tablet, Rfl: 3    epoetin jeison-epbx (Retacrit) 10,000 unit/mL injection, Inject 0.66 mL (6,600 Units) under the skin once a day on Monday, Wednesday, and Friday., Disp: , Rfl:     ergocalciferol (Vitamin D-2) 1.25 MG (52493 UT) capsule, Take 1 capsule (50,000 Units) by mouth every 14 (fourteen) days. Takes every other Sunday, Disp: , Rfl:     ezetimibe (Zetia) 10 mg tablet, Take 1 tablet (10 mg) by mouth once daily., Disp: 90 tablet, Rfl: 0    fluocinonide (Lidex) 0.05 % ointment, Apply topically 2 times a day., Disp: , Rfl:     gabapentin (Neurontin) 300 mg capsule, Take 1 capsule (300 mg) by mouth once daily at bedtime., Disp: , Rfl: "     insulin lispro (HumaLOG) 100 unit/mL injection, Inject 10 Units under the skin 3 times daily (morning, midday, late afternoon)., Disp: , Rfl:     isosorbide mononitrate ER (Imdur) 60 mg 24 hr tablet, Take 1 tablet (60 mg) by mouth once daily. Do not crush or chew., Disp: 90 tablet, Rfl: 3    Januvia 25 mg tablet, Take 1 tablet (25 mg) by mouth once daily., Disp: 90 tablet, Rfl: 3    nitroglycerin (Nitrostat) 0.4 mg SL tablet, Place 1 tablet (0.4 mg) under the tongue every 5 minutes if needed for chest pain., Disp: , Rfl:     oxygen (O2) gas therapy, Inhale 2 L/min once every 24 hours., Disp: , Rfl:     pantoprazole (ProtoNix) 40 mg EC tablet, Take 1 tablet (40 mg) by mouth once daily., Disp: 90 tablet, Rfl: 0    sevelamer carbonate (Renvela) 800 mg tablet, Take 2 tablets (1,600 mg) by mouth 3 times daily (morning, midday, late afternoon). Three times daily with meals, Disp: , Rfl:     triamcinolone (Kenalog) 0.5 % cream, Apply topically 3 times a day., Disp: 30 g, Rfl: 0    benzonatate (Tessalon) 100 mg capsule, Take 1 capsule (100 mg) by mouth 3 times a day as needed for cough for up to 7 days. Do not crush or chew., Disp: 20 capsule, Rfl: 0    doxycycline (Monodox) 100 mg capsule, Take 1 capsule (100 mg) by mouth 2 times a day for 14 days. Take with at least 8 ounces (large glass) of water, do not lie down for 30 minutes after, Disp: 28 capsule, Rfl: 0    Review of Systems:  Review of Systems   Constitutional: No fevers, chills, unexpected weight change  HENT: No sore throat, congestion, or nasal drainage  Eyes: No visual changes or eye itching  Respiratory: see HPI. No worsening dyspnea, wheezing. (+) Cough  Cardiac: No chest pain, palpitations, or lower extremity edema  Gastrointestinal: No nausea, vomiting, diarrhea. No abdominal pain  Genitourinary: No dysuria or hematuria  Musculoskeletal: No back pain. No significant myalgias or arthralgias  Neurologic: No headaches, dizziness, or seizures.  Hematologic:  "No east bleeding or bruising.  Psychiatric: No anxiety or depression.    Physical Exam:  Physical Exam  /75   Pulse 74   Temp 36.9 °C (98.4 °F)   Ht 1.727 m (5' 8\")   SpO2 91%   BMI 25.09 kg/m²   Constitutional:       General: Patient is not in acute distress.     Appearance: Normal appearance; not ill-appearing.   HENT:      Head: Normocephalic.      Nose: No congestion or rhinorrhea.   Cardiovascular:      Rate and Rhythm: Normal rate and regular rhythm.      Pulses: Normal pulses.   Pulmonary:      Effort: Pulmonary effort is normal. No respiratory distress.  No conversational dyspnea     Breath sounds: No stridor. No wheezing. Right VATS incisions are c/d/I. PleurX site without significant erythema  Abdominal:      General: There is no distension.      Palpations: Abdomen is soft.      Tenderness: There is no abdominal tenderness.   Musculoskeletal:         General: No swelling, tenderness or deformity. Normal range of motion.      Cervical back: Normal range of motion. No rigidity.   Lymphadenopathy:      Cervical: No cervical adenopathy.   Skin:     General: Skin is warm and dry.   Neurological:      General: No focal deficit present.      Mental Status: Patient is alert and oriented to person, place, and time.   Psychiatric:         Mood and Affect: Mood normal.     Relevant Results:    Pathology:  Surgical Pathology Exam: [id number]  Order: [id number]   Collected 3/27/2025 08:31       Status: Final result       Visible to patient: Yes (not seen)       Dx: Hydropneumothorax; Pleural effusion    0 Result Notes       Component  Resulting Agency   FINAL DIAGNOSIS   A. PLEURA TISSUE, RIGHT; BIOPSY:      -- Acute and organizing fibrinous pleuritis      Electronically signed by Jose Alfredo Roach MD on 4/4/2025 at 1717      [facility]          Procedure:   PleurX removal:  The patient's Pleurx bandage was removed and site was cleansed.  The cuff of the tube was just outside the skin, The tube was removed with gentle " traction during exhalation.  Occlusive bandage placed.  Recommendation to keep dressing in place x48 hours.  Patient tolerated procedure well.      Imaging:  Imaging  No results found.    Cardiology, Vascular, and Other Imaging  No other imaging results found for the past 7 days       CXR personally reviewed     Assessment/Plan   Problem List Items Addressed This Visit             ICD-10-CM    Pleural effusion J90    Relevant Medications    doxycycline (Monodox) 100 mg capsule    benzonatate (Tessalon) 100 mg capsule    Other Relevant Orders    XR chest 2 views    XR chest 2 views    Trapped lung - Primary J98.19       Mr. Franke is a 74 year old male with recurrent right pleural effusion, found to have a trapped lung on VATS end of March, PleurX placed at that time, biopsies showed pleuritis only. He presents today for follow up - has had minimal drainage from his tube with a lot of leakage around. He feels no significant improvement in respiratory sxs with drainage of the catheter. Cuff was already working it's way out on examination today so tube was removed.   Given his significant cough and recent stay in rehab will rx doxycycline x2 weeks + tessalon pearls. Will get repeat CXR in 2 weeks + phone check in at that time.        Sade Herrera, DO  Thoracic & Esophageal Surgery

## 2025-04-16 LAB
ACID FAST STN SPEC: NORMAL
MYCOBACTERIUM SPEC CULT: NORMAL

## 2025-04-17 ENCOUNTER — PATIENT OUTREACH (OUTPATIENT)
Dept: CARE COORDINATION | Facility: CLINIC | Age: 75
End: 2025-04-17
Payer: MEDICARE

## 2025-04-17 NOTE — PROGRESS NOTES
Attempted outreach call to patient following up on an appointment with the care provider.  Left a voice message with my contact information.   Will continue to follow.        Julisa AVALOS, RN, [facility]  O: [phone]

## 2025-04-21 ENCOUNTER — OFFICE VISIT (OUTPATIENT)
Dept: NEPHROLOGY | Facility: CLINIC | Age: 75
End: 2025-04-21
Payer: MEDICARE

## 2025-04-21 VITALS
HEART RATE: 70 BPM | TEMPERATURE: 97.2 F | SYSTOLIC BLOOD PRESSURE: 131 MMHG | OXYGEN SATURATION: 96 % | DIASTOLIC BLOOD PRESSURE: 76 MMHG | BODY MASS INDEX: 23.72 KG/M2 | WEIGHT: 156 LBS

## 2025-04-21 DIAGNOSIS — N18.6 ESRD (END STAGE RENAL DISEASE) (MULTI): Primary | ICD-10-CM

## 2025-04-21 ASSESSMENT — PAIN SCALES - GENERAL: PAINLEVEL_OUTOF10: 0-NO PAIN

## 2025-04-21 NOTE — PROGRESS NOTES
Subjective   Mr. Franke is a very pleasant 74-year-old man with a history of end-stage kidney disease on hemodialysis.  He has known coronary artery disease, as of late we have dealt with issues pertaining to right sided pleural effusion.  He was seen by thoracic surgery, had a VATS procedure, has trapped lung.  He did not have a decortication.  He has minimal shortness of breath and denies chest pain, nausea, vomiting, or abdominal pain.    Objective       Physical Exam  Constitutional:       Appearance: Normal appearance.   HENT:      Mouth/Throat:      Mouth: Mucous membranes are moist.   Eyes:      Extraocular Movements: Extraocular movements intact.      Pupils: Pupils are equal, round, and reactive to light.   Cardiovascular:      Rate and Rhythm: Regular rhythm.      Heart sounds: S1 normal and S2 normal.  Systolic murmur heard throughout  Pulmonary:      Breath sounds: Normal breath sounds.   Abdominal:      Comments: Soft, NT/ND, no masses, normal bowel sounds   Genitourinary:     Comments: No sun  Musculoskeletal:      Right lower leg: No edema.      Left lower leg: No edema.   Skin:     General: Skin is warm and dry.   Neurological:      General: No focal deficit present.      Mental Status: She is alert and oriented to person, place, and time.   Psychiatric:         Behavior: Behavior normal.    Left upper AV graft within normal limits.  Thrill and bruit.  Meds  Current Medications[1]   There are no discontinued medications.     Allergies  RX Allergies[2]     Last Recorded Vitals  Blood pressure 131/76, pulse 70, temperature 36.2 °C (97.2 °F), temperature source Temporal, weight 70.8 kg (156 lb), SpO2 96%.  Intake/Output last 3 Shifts:  No intake/output data recorded.    Last 24 hour Results  No results found. However, due to the size of the patient record, not all encounters were searched. Please check Results Review for a complete set of results.     Imaging results  Imaging  XR chest 2 views  Result  "Date: 4/16/2025  1. Postoperative changes, as above. 2. Right-sided pleural effusion and right basilar airspace consolidation, as above. Clinical correlation and continued follow-up until clearing is recommended.   MACRO: None.   Signed by: Yimi Mckeon 4/16/2025 6:03 PM Dictation workstation:   [id number]      Cardiology, Vascular, and Other Imaging  No other imaging results found for the past 7 days       Assessment and Plan  I reviewed Mr. Franke's data in the [employer] system.  He has had good adequacy, no dialysis access issues.  We monitor the 25 vitamin D which is high, I asked him to stop taking it for a number of months.  Intact PTH is noted, hemoglobin is well.  No changes from my perspective and I will see him in approximately 3 months.  We will adjust his dry weight as necessary at the unit.  Blood pressures have looked good.         Gavin Gomez MD         [1]   Current Outpatient Medications:     allopurinol (Zyloprim) 100 mg tablet, Take 1 tablet (100 mg) by mouth once daily., Disp: 90 tablet, Rfl: 0    amLODIPine (Norvasc) 10 mg tablet, Take 1 tablet (10 mg) by mouth once daily., Disp: 90 tablet, Rfl: 3    aspirin 81 mg EC tablet, Take 1 tablet (81 mg) by mouth once daily., Disp: , Rfl:     atorvastatin (Lipitor) 80 mg tablet, Take 1 tablet (80 mg) by mouth once daily at bedtime., Disp: 90 tablet, Rfl: 0    B complex-vitamin C-folic acid (Nephro-Shea) 0.8 mg tablet, Take 1 tablet by mouth once daily., Disp: , Rfl:     BD Ultra-Fine Mini Pen Needle 31 gauge x 3/16\" needle, USE AS DIRECTED 4 times a day, Disp: 400 each, Rfl: 2    benzonatate (Tessalon) 100 mg capsule, Take 1 capsule (100 mg) by mouth 3 times a day as needed for cough for up to 7 days. Do not crush or chew., Disp: 20 capsule, Rfl: 0    carvedilol (Coreg) 6.25 mg tablet, Take 1 tablet (6.25 mg) by mouth 2 times daily (morning and late afternoon)., Disp: 180 tablet, Rfl: 3    doxycycline (Monodox) 100 mg capsule, Take 1 capsule (100 mg) by " mouth 2 times a day for 14 days. Take with at least 8 ounces (large glass) of water, do not lie down for 30 minutes after, Disp: 28 capsule, Rfl: 0    epoetin jeison-epbx (Retacrit) 10,000 unit/mL injection, Inject 0.66 mL (6,600 Units) under the skin once a day on Monday, Wednesday, and Friday., Disp: , Rfl:     ergocalciferol (Vitamin D-2) 1.25 MG (69272 UT) capsule, Take 1 capsule (50,000 Units) by mouth every 14 (fourteen) days. Takes every other Sunday, Disp: , Rfl:     ezetimibe (Zetia) 10 mg tablet, Take 1 tablet (10 mg) by mouth once daily., Disp: 90 tablet, Rfl: 0    fluocinonide (Lidex) 0.05 % ointment, Apply topically 2 times a day., Disp: , Rfl:     gabapentin (Neurontin) 300 mg capsule, Take 1 capsule (300 mg) by mouth once daily at bedtime., Disp: , Rfl:     insulin lispro (HumaLOG) 100 unit/mL injection, Inject 10 Units under the skin 3 times daily (morning, midday, late afternoon)., Disp: , Rfl:     isosorbide mononitrate ER (Imdur) 60 mg 24 hr tablet, Take 1 tablet (60 mg) by mouth once daily. Do not crush or chew., Disp: 90 tablet, Rfl: 3    Januvia 25 mg tablet, Take 1 tablet (25 mg) by mouth once daily., Disp: 90 tablet, Rfl: 3    nitroglycerin (Nitrostat) 0.4 mg SL tablet, Place 1 tablet (0.4 mg) under the tongue every 5 minutes if needed for chest pain., Disp: , Rfl:     oxygen (O2) gas therapy, Inhale 2 L/min once every 24 hours., Disp: , Rfl:     pantoprazole (ProtoNix) 40 mg EC tablet, Take 1 tablet (40 mg) by mouth once daily., Disp: 90 tablet, Rfl: 0    sevelamer carbonate (Renvela) 800 mg tablet, Take 2 tablets (1,600 mg) by mouth 3 times daily (morning, midday, late afternoon). Three times daily with meals, Disp: , Rfl:     triamcinolone (Kenalog) 0.5 % cream, Apply topically 3 times a day., Disp: 30 g, Rfl: 0  [2] No Known Allergies

## 2025-04-22 DIAGNOSIS — M1A.9XX0 CHRONIC GOUT WITHOUT TOPHUS, UNSPECIFIED CAUSE, UNSPECIFIED SITE: ICD-10-CM

## 2025-04-23 LAB
ACID FAST STN SPEC: NORMAL
MYCOBACTERIUM SPEC CULT: NORMAL

## 2025-04-28 ENCOUNTER — HOSPITAL ENCOUNTER (OUTPATIENT)
Dept: RADIOLOGY | Facility: HOSPITAL | Age: 75
Discharge: HOME | End: 2025-04-28
Payer: MEDICARE

## 2025-04-28 DIAGNOSIS — J90 PLEURAL EFFUSION: ICD-10-CM

## 2025-04-28 PROCEDURE — 71046 X-RAY EXAM CHEST 2 VIEWS: CPT | Performed by: RADIOLOGY

## 2025-04-28 PROCEDURE — 71046 X-RAY EXAM CHEST 2 VIEWS: CPT

## 2025-04-28 RX ORDER — ALLOPURINOL 100 MG/1
100 TABLET ORAL DAILY
Qty: 90 TABLET | Refills: 0 | Status: SHIPPED | OUTPATIENT
Start: 2025-04-28

## 2025-04-29 LAB
ALBUMIN SERPL-MCNC: 3.8 G/DL (ref 3.6–5.1)
ALP SERPL-CCNC: 116 U/L (ref 35–144)
ALT SERPL-CCNC: 10 U/L (ref 9–46)
ANION GAP SERPL CALCULATED.4IONS-SCNC: 15 MMOL/L (CALC) (ref 7–17)
AST SERPL-CCNC: 16 U/L (ref 10–35)
BASOPHILS # BLD AUTO: 59 CELLS/UL (ref 0–200)
BASOPHILS NFR BLD AUTO: 0.7 %
BILIRUB SERPL-MCNC: 0.4 MG/DL (ref 0.2–1.2)
BUN SERPL-MCNC: 40 MG/DL (ref 7–25)
CALCIUM SERPL-MCNC: 9.2 MG/DL (ref 8.6–10.3)
CHLORIDE SERPL-SCNC: 103 MMOL/L (ref 98–110)
CO2 SERPL-SCNC: 27 MMOL/L (ref 20–32)
CREAT SERPL-MCNC: 8.7 MG/DL (ref 0.7–1.28)
EGFRCR SERPLBLD CKD-EPI 2021: 6 ML/MIN/1.73M2
EOSINOPHIL # BLD AUTO: 286 CELLS/UL (ref 15–500)
EOSINOPHIL NFR BLD AUTO: 3.4 %
ERYTHROCYTE [DISTWIDTH] IN BLOOD BY AUTOMATED COUNT: 17.4 % (ref 11–15)
GLUCOSE SERPL-MCNC: 147 MG/DL (ref 65–99)
HCT VFR BLD AUTO: 28.5 % (ref 38.5–50)
HGB BLD-MCNC: 9 G/DL (ref 13.2–17.1)
LYMPHOCYTES # BLD AUTO: 1336 CELLS/UL (ref 850–3900)
LYMPHOCYTES NFR BLD AUTO: 15.9 %
MCH RBC QN AUTO: 28.5 PG (ref 27–33)
MCHC RBC AUTO-ENTMCNC: 31.6 G/DL (ref 32–36)
MCV RBC AUTO: 90.2 FL (ref 80–100)
MONOCYTES # BLD AUTO: 672 CELLS/UL (ref 200–950)
MONOCYTES NFR BLD AUTO: 8 %
NEUTROPHILS # BLD AUTO: 6048 CELLS/UL (ref 1500–7800)
NEUTROPHILS NFR BLD AUTO: 72 %
PLATELET # BLD AUTO: 293 THOUSAND/UL (ref 140–400)
PMV BLD REES-ECKER: 9.1 FL (ref 7.5–12.5)
POTASSIUM SERPL-SCNC: 5.5 MMOL/L (ref 3.5–5.3)
PROT SERPL-MCNC: 6 G/DL (ref 6.1–8.1)
RBC # BLD AUTO: 3.16 MILLION/UL (ref 4.2–5.8)
SODIUM SERPL-SCNC: 145 MMOL/L (ref 135–146)
WBC # BLD AUTO: 8.4 THOUSAND/UL (ref 3.8–10.8)

## 2025-04-30 LAB
ACID FAST STN SPEC: NORMAL
MYCOBACTERIUM SPEC CULT: NORMAL

## 2025-05-02 ENCOUNTER — OFFICE VISIT (OUTPATIENT)
Dept: PRIMARY CARE | Facility: CLINIC | Age: 75
End: 2025-05-02
Payer: MEDICARE

## 2025-05-02 VITALS
SYSTOLIC BLOOD PRESSURE: 116 MMHG | WEIGHT: 157 LBS | DIASTOLIC BLOOD PRESSURE: 60 MMHG | HEIGHT: 68 IN | BODY MASS INDEX: 23.79 KG/M2

## 2025-05-02 DIAGNOSIS — I10 HYPERTENSION, UNSPECIFIED TYPE: ICD-10-CM

## 2025-05-02 DIAGNOSIS — E11.9 TYPE 2 DIABETES MELLITUS WITHOUT COMPLICATION, WITHOUT LONG-TERM CURRENT USE OF INSULIN: ICD-10-CM

## 2025-05-02 DIAGNOSIS — E78.2 MIXED HYPERLIPIDEMIA: ICD-10-CM

## 2025-05-02 DIAGNOSIS — R53.83 FATIGUE, UNSPECIFIED TYPE: ICD-10-CM

## 2025-05-02 DIAGNOSIS — D63.8 ANEMIA OF CHRONIC DISEASE: ICD-10-CM

## 2025-05-02 DIAGNOSIS — J90 PLEURAL EFFUSION: ICD-10-CM

## 2025-05-02 PROCEDURE — 1123F ACP DISCUSS/DSCN MKR DOCD: CPT | Performed by: INTERNAL MEDICINE

## 2025-05-02 PROCEDURE — 1036F TOBACCO NON-USER: CPT | Performed by: INTERNAL MEDICINE

## 2025-05-02 PROCEDURE — 3074F SYST BP LT 130 MM HG: CPT | Performed by: INTERNAL MEDICINE

## 2025-05-02 PROCEDURE — 1159F MED LIST DOCD IN RCRD: CPT | Performed by: INTERNAL MEDICINE

## 2025-05-02 PROCEDURE — 99214 OFFICE O/P EST MOD 30 MIN: CPT | Performed by: INTERNAL MEDICINE

## 2025-05-02 PROCEDURE — 3078F DIAST BP <80 MM HG: CPT | Performed by: INTERNAL MEDICINE

## 2025-05-02 RX ORDER — FERROUS SULFATE 325(65) MG
325 TABLET, DELAYED RELEASE (ENTERIC COATED) ORAL
Qty: 30 TABLET | Refills: 11 | Status: SHIPPED | OUTPATIENT
Start: 2025-05-02 | End: 2026-05-02

## 2025-05-03 NOTE — PROGRESS NOTES
Subjective   Patient ID: Bill A Franke is a 74 y.o. male who presents for Follow-up.    Med Refill    Patient is here for follow-up from hospital stay and rehab stay  His Pleurx catheter was leaking so it was taken.  He did have a chest x-ray done.  He is not having symptoms right now  He needs refill on ferrous sulfate  Overall he is doing fine he is going for his dialysis regularly     Past recap  Patient is here with complaints of increasing cough for past few weeks.  Also feeling very tired and fatigued.  He had a CAT scan done by infectious disease doctor in February but he did not follow-up with pulmonologist.  Patient was admitted in the hospital in December for pneumonia and pleural effusion.  He had pleural effusion removed was treated with IV ceftriaxone for 6 weeks.  After that he had a CAT scan done by ID and message was left at my office as a FYI that patient is referred 20 pulmonologist.  Patient has not seen the pulmonologist yet       Past recap  patient is here for Medicare wellness exam  Patient is here for follow-up on diabetes hypertension high cholesterol end-stage renal disease on hemodialysis  Overall patient is doing well  He needs refills on fenofibric gabapentin anxiety  His neuropathy is stable  Staying active and going to gym.    Patient is here for follow-up on diabetes hypertension high cholesterol  He is not on any aspirin or any blood thinner because he bleeds during dialysis  He is worried about his stents closing up  Overall he is doing well  He manages his diabetes by taking extra insulin whenever he eats at home.  He does not watch his diet  He needs diabetic shoes because he has neuropathy and having some calluses buildup    Past recap   patient is here for follow-up  Follow-up on diabetes hypertension high cholesterol  Doing hemodialysis for end-stage renal disease     patient is here for follow-up  His fistula still has bleeding off-and-on but not as much  Follow-up on diabetes  hypertension high cholesterol  He is getting dialysis 3 times a week  Overall doing fine      patient is here for hospital follow-up  He was admitted for bleeding AV fistula.  Required blood transfusion.  Needs refill on gabapentin and Zetia  Complains of having stiffness in the hands  Doing hemodialysis 3 times a week    patient was hospitalized and had another heart attack at [facility]  He is still complaining of having a lot of shortness of breath and cough  His kidney functions did deteriorate but because he is making urine did not get started on the dialysis yet  Is doing blood work today to reevaluate his kidney function  But his main concern is his cough     Patient went to the hospital and had MI  He was catheterized again and had stent placed  Since he came home he is having very poor appetite not eating drinking not feeling good and blood pressure is running high in 180s and 200s  He had stent placed 3 weeks ago     Patient here for follow-up on diabetes hypertension high cholesterol chronic kidney disease and anemia  Follow-up on blood work  Patient was hospitalized for another non-ST elevation MI  Since discharge his chest pain is doing better but he still getting off-and-on chest pain  Feels very anxious  Feels very limited in his activity        Patient is here with complaints of rectal bleed this morning he started bleeding did not realize his bleeding until it messed up his underwear and pants. He had similar episode 5 days ago  He is having some discomfort in the lower abdomen denies any fever or chills  He had problems with hemorrhoids many years ago. But he has no pain and denies constipation     Patient is here for hospital follow-up  He presented with chest pain and acute MI. He had to have cardiac cath done with close monitoring of kidney function  Cardiac stable  Patient had stent placed and did not require dialysis  He is here for follow-up on blood work for kidney  His blood sugars  "were doing better in the hospital. Now they are running high again  He is getting Procrit every 2 weeks            Virtual visit  Patient here for follow-up on diabetes   Patient is eating little better but not able to exercise because  Blood sugars are running high  The pressure is running little high but she is coming to see Dr. Ardon on Thursday  He is managing with his insulin blood sugar little better  Finished radiation treatment for prostate cancer  Works outdoors  Did blood work needs medications refilled   refuses to see the dietitian  Does not take Humalog insulin because of fear has appointment with the urologist        Review of Systems    Objective   /60   Ht 1.727 m (5' 8\")   Wt 71.2 kg (157 lb)   BMI 23.87 kg/m²     Physical Exam  Vitals reviewed.   Constitutional:       Appearance: Normal appearance.   HENT:      Head: Normocephalic and atraumatic.      Right Ear: Tympanic membrane, ear canal and external ear normal.      Left Ear: Tympanic membrane, ear canal and external ear normal.      Nose: Nose normal.      Mouth/Throat:      Pharynx: Oropharynx is clear.   Eyes:      Extraocular Movements: Extraocular movements intact.      Conjunctiva/sclera: Conjunctivae normal.      Pupils: Pupils are equal, round, and reactive to light.   Cardiovascular:      Rate and Rhythm: Normal rate and regular rhythm.      Pulses: Normal pulses.      Heart sounds: Normal heart sounds.   Pulmonary:      Effort: Pulmonary effort is normal.      Breath sounds: Rales present.   Abdominal:      General: Abdomen is flat. Bowel sounds are normal.      Palpations: Abdomen is soft.   Musculoskeletal:      Cervical back: Normal range of motion and neck supple.   Skin:     General: Skin is warm and dry.      Comments: Callus on the bottom of the feet   Neurological:      General: No focal deficit present.      Mental Status: He is alert and oriented to person, place, and time.   Psychiatric:         Mood and Affect: " Mood normal.         Assessment/Plan   Problem List Items Addressed This Visit          Cardiac and Vasculature    Hypertension    Relevant Orders    CBC    Comprehensive Metabolic Panel    Hyperlipidemia    Relevant Orders    Lipid Panel       Endocrine/Metabolic    Diabetes mellitus (Multi)    Relevant Orders    Hemoglobin A1C       Hematology and Neoplasia    Anemia of chronic disease    Relevant Medications    ferrous sulfate 325 (65 Fe) mg EC tablet       Pulmonary and Pneumonias    Pleural effusion    Relevant Orders    XR chest 2 views       Symptoms and Signs    Fatigue    Relevant Orders    Thyroid Stimulating Hormone     10/14  Call Dr. Palmer  He is trying to arrange outpatient follow-up for cardiac catheterization and carotid stent which she needs  Because of his anemia and severe kidney disease he is planning in stages  He asked me to order CBC BMP and he will call tomorrow to schedule for Next week discussed with the patient and the wife agreed with the plan  Advised him to go to the emergency room if symptoms persist  Continue diet exercise follow-up in 3 months     11/2  Concerned that patient might be getting anemic or his kidney function would be getting worse  Stat CBC BMP ordered  Increase Coreg to 2 tablets twice a day  Blood work results reviewed  Kidney functions are in fact better anemia stable  Patient could be not feeling good because of elevated blood pressure  Recheck in 2 weeks     9/9  Will get x-ray chest  Tessalon cough drops albuterol inhaler  Cholesterol medication refill  Blood pressure is stable  Will see if kidney functions looks okay to hold off dialysis  Patient wife is planning to be able to go to [place] before start of dialysis     5/1/2023  Will order blood work to make sure anemia is stable  Patient is under care of endocrinologist for diabetes  He is under care of nephrologist for kidneys  He follows up with cardiology regularly and stable  Refills given on iron and  gabapentin and Zetia     5/19/2023  Clinically patient is stable blood pressure is stable  Routine blood work ordered for 3 months  His diabetes is doing better  Medications refilled    8/18/2023  Blood work reviewed  A1c still high  Blood pressure stable  Cholesterol okay  Discussed different food options  Patient eats a lot of processed food and to eat out a lot  Discussed better options  Continue current medications  Follow-up blood work in 3 months    11/10/2023  Blood work reviewed  Triglycerides have gone up to 388  A1c still 7.7 patient is under care of endocrinologist  Start fenofibrate  Discussed high triglycerides this will put him at risk for blockage in coronary arteries at this time  Again discussed diet and exercise  Follow-up blood work in 3 months    2/12/2024  Blood work reviewed  A1c down to 6.9 anemia stable  Cholesterol good but triglycerides always higher  Follow-up blood work in 3 months continue current medications  Patient is doing dialysis    5/31/2024  Blood pressure stable  Cardiac status is stable.  Patient denies any angina  A1c 7.1 well-controlled  Hemoglobin 9.1 stable gets epo shots  Triglycerides elevated  Discussed diet and exercise  Stable on current medications  Diabetic shoes ordered for diabetic neuropathy and callus issues  Follow-up blood work in 3 months    8/19/2024  Patient's blood work reviewed  A1c 6.5 well-controlled  Cholesterol well-controlled triglycerides slightly elevated  Chronic anemia stable  End-stage renal disease on hemodialysis  Medications refilled  Neuropathy stable with gabapentin  Follow-up blood work in 3 months    11/11/2024  Blood work reviewed  A1c down to 6.3  Triglycerides 172 cholesterol well-controlled  CAD stable  Blood pressure repeat reading stable  Dialysis scheduled for tomorrow which helps with his blood pressure  Medications refilled  Follow-up in 3 months    2/10/2025  Medicare annual wellness exam  Mini-Mental status perfect  Blood  pressure stable  Cardiac status stable  Anemia doing better  Continue with the hemodialysis  Follow-up blood work in 3 months    8/17/2025  CAT scan results reviewed.  Patient has significant fluid level effusion  In December patient had exudative pleural fluid  Concern for pneumonia again  Advised patient to be hospitalized as he needs immediate attention.  He needs possibly VATS of Pleurx catheter which can be only done inpatient  Patient agreed and patient was admitted at [facility]  Direct admission arranged    5/2/2025  Chest x-ray does show some increase in pleural effusion  Will do a follow-up in 1 month  Ferrous sulfate prescribed  Continue physical therapy at home  Home care orders signed  Blood pressure stable    Follow-up blood work in 3 months

## 2025-05-06 DIAGNOSIS — E78.5 HYPERLIPIDEMIA: ICD-10-CM

## 2025-05-06 RX ORDER — ATORVASTATIN CALCIUM 80 MG/1
80 TABLET, FILM COATED ORAL NIGHTLY
Qty: 90 TABLET | Refills: 0 | Status: SHIPPED | OUTPATIENT
Start: 2025-05-06 | End: 2026-05-06

## 2025-05-07 LAB
ACID FAST STN SPEC: NORMAL
MYCOBACTERIUM SPEC CULT: NORMAL

## 2025-05-09 ENCOUNTER — PATIENT OUTREACH (OUTPATIENT)
Dept: CARE COORDINATION | Facility: CLINIC | Age: 75
End: 2025-05-09
Payer: MEDICARE

## 2025-05-09 NOTE — PROGRESS NOTES
Outreach call to patient to check in 30 days after hospital discharge to support smooth transition of care.  Patient with no additional needs noted. No additional outreach needed at this time.     Julisa ANDRADEN, RN, [facility]  O: [phone]

## 2025-05-23 ENCOUNTER — APPOINTMENT (OUTPATIENT)
Dept: CARDIOLOGY | Facility: CLINIC | Age: 75
End: 2025-05-23
Payer: MEDICARE

## 2025-05-30 ENCOUNTER — OFFICE VISIT (OUTPATIENT)
Dept: CARDIOLOGY | Facility: CLINIC | Age: 75
End: 2025-05-30
Payer: MEDICARE

## 2025-05-30 VITALS
DIASTOLIC BLOOD PRESSURE: 66 MMHG | HEART RATE: 59 BPM | WEIGHT: 157.7 LBS | SYSTOLIC BLOOD PRESSURE: 135 MMHG | BODY MASS INDEX: 23.9 KG/M2 | OXYGEN SATURATION: 98 % | HEIGHT: 68 IN

## 2025-05-30 DIAGNOSIS — Z99.2 ESRD ON HEMODIALYSIS (MULTI): ICD-10-CM

## 2025-05-30 DIAGNOSIS — N18.4 BENIGN HYPERTENSION WITH CKD (CHRONIC KIDNEY DISEASE) STAGE IV (MULTI): Primary | ICD-10-CM

## 2025-05-30 DIAGNOSIS — E11.9 TYPE 2 DIABETES MELLITUS WITHOUT COMPLICATION, UNSPECIFIED WHETHER LONG TERM INSULIN USE: ICD-10-CM

## 2025-05-30 DIAGNOSIS — E11.22 TYPE 2 DIABETES MELLITUS WITH DIABETIC CHRONIC KIDNEY DISEASE, UNSPECIFIED CKD STAGE, UNSPECIFIED WHETHER LONG TERM INSULIN USE: ICD-10-CM

## 2025-05-30 DIAGNOSIS — I11.0 HYPERTENSIVE HEART DISEASE WITH HEART FAILURE: ICD-10-CM

## 2025-05-30 DIAGNOSIS — N18.5 CHRONIC KIDNEY DISEASE, STAGE 5 (MULTI): ICD-10-CM

## 2025-05-30 DIAGNOSIS — E11.21 TYPE 2 DIABETES MELLITUS WITH DIABETIC NEPHROPATHY, UNSPECIFIED WHETHER LONG TERM INSULIN USE: ICD-10-CM

## 2025-05-30 DIAGNOSIS — I12.0 HYPERTENSIVE CHRONIC KIDNEY DISEASE WITH STAGE 5 CHRONIC KIDNEY DISEASE OR END STAGE RENAL DISEASE: ICD-10-CM

## 2025-05-30 DIAGNOSIS — I25.10 CORONARY ARTERY DISEASE INVOLVING NATIVE HEART WITHOUT ANGINA PECTORIS, UNSPECIFIED VESSEL OR LESION TYPE: ICD-10-CM

## 2025-05-30 DIAGNOSIS — I50.9 HEART FAILURE, UNSPECIFIED HF CHRONICITY, UNSPECIFIED HEART FAILURE TYPE: ICD-10-CM

## 2025-05-30 DIAGNOSIS — E11.40 TYPE 2 DIABETES MELLITUS WITH DIABETIC NEUROPATHY, UNSPECIFIED WHETHER LONG TERM INSULIN USE (MULTI): ICD-10-CM

## 2025-05-30 DIAGNOSIS — I50.32 CHRONIC DIASTOLIC (CONGESTIVE) HEART FAILURE: ICD-10-CM

## 2025-05-30 DIAGNOSIS — I13.2 HYPERTENSIVE HEART AND CHRONIC KIDNEY DISEASE WITH HEART FAILURE AND WITH STAGE 5 CHRONIC KIDNEY DISEASE, OR END STAGE RENAL DISEASE: ICD-10-CM

## 2025-05-30 DIAGNOSIS — Z79.4 LONG TERM (CURRENT) USE OF INSULIN (MULTI): ICD-10-CM

## 2025-05-30 DIAGNOSIS — I50.43 ACUTE ON CHRONIC COMBINED SYSTOLIC (CONGESTIVE) AND DIASTOLIC (CONGESTIVE) HEART FAILURE: ICD-10-CM

## 2025-05-30 DIAGNOSIS — I12.9 BENIGN HYPERTENSION WITH CKD (CHRONIC KIDNEY DISEASE) STAGE IV (MULTI): Primary | ICD-10-CM

## 2025-05-30 DIAGNOSIS — I13.0 HYPERTENSIVE HEART AND CHRONIC KIDNEY DISEASE WITH HEART FAILURE AND STAGE 1 THROUGH STAGE 4 CHRONIC KIDNEY DISEASE, OR UNSPECIFIED CHRONIC KIDNEY DISEASE: ICD-10-CM

## 2025-05-30 DIAGNOSIS — N18.6 ESRD ON HEMODIALYSIS (MULTI): ICD-10-CM

## 2025-05-30 DIAGNOSIS — E11.3293 TYPE 2 DIABETES MELLITUS WITH MILD NONPROLIFERATIVE RETINOPATHY OF BOTH EYES WITHOUT MACULAR EDEMA, UNSPECIFIED WHETHER LONG TERM INSULIN USE: ICD-10-CM

## 2025-05-30 PROCEDURE — 1126F AMNT PAIN NOTED NONE PRSNT: CPT | Performed by: NURSE PRACTITIONER

## 2025-05-30 PROCEDURE — 1160F RVW MEDS BY RX/DR IN RCRD: CPT | Performed by: NURSE PRACTITIONER

## 2025-05-30 PROCEDURE — 99214 OFFICE O/P EST MOD 30 MIN: CPT | Performed by: NURSE PRACTITIONER

## 2025-05-30 PROCEDURE — G2211 COMPLEX E/M VISIT ADD ON: HCPCS | Performed by: NURSE PRACTITIONER

## 2025-05-30 PROCEDURE — 1159F MED LIST DOCD IN RCRD: CPT | Performed by: NURSE PRACTITIONER

## 2025-05-30 PROCEDURE — 1036F TOBACCO NON-USER: CPT | Performed by: NURSE PRACTITIONER

## 2025-05-30 PROCEDURE — 3078F DIAST BP <80 MM HG: CPT | Performed by: NURSE PRACTITIONER

## 2025-05-30 PROCEDURE — 3075F SYST BP GE 130 - 139MM HG: CPT | Performed by: NURSE PRACTITIONER

## 2025-05-30 ASSESSMENT — ENCOUNTER SYMPTOMS
CARDIOVASCULAR NEGATIVE: 1
MUSCULOSKELETAL NEGATIVE: 1
NEUROLOGICAL NEGATIVE: 1
RESPIRATORY NEGATIVE: 1
GASTROINTESTINAL NEGATIVE: 1

## 2025-05-30 ASSESSMENT — PATIENT HEALTH QUESTIONNAIRE - PHQ9
2. FEELING DOWN, DEPRESSED OR HOPELESS: NOT AT ALL
1. LITTLE INTEREST OR PLEASURE IN DOING THINGS: NOT AT ALL
SUM OF ALL RESPONSES TO PHQ9 QUESTIONS 1 AND 2: 0

## 2025-05-30 ASSESSMENT — PAIN SCALES - GENERAL: PAINLEVEL_OUTOF10: 0-NO PAIN

## 2025-05-30 NOTE — PROGRESS NOTES
"Chief Complaint:   Follow-up    History Of Present Illness:    .Mr Franke returns in follow up.  Denies chest pain, sob, palpitations or pedal edema. Had some hospital stays.  Is fatigued per his wife who is here with him.            Last Recorded Vitals:  Blood pressure 135/66, pulse 59, height 1.727 m (5' 8\"), weight 71.5 kg (157 lb 11.2 oz), SpO2 98%.     Past Medical History:  Medical History[1]     Past Surgical History:  Surgical History[2]    Social History:  Social History[3]    Family History:  Family History[4]      Allergies:  Patient has no known allergies.    Outpatient Medications:  Current Medications[5]     Physical Exam:  Cardiovascular:      PMI at left midclavicular line. Normal rate. Regular rhythm. Normal S1. Normal S2.       Murmurs: There is a grade 2/6 systolic murmur.      No gallop.  No click. No rub.   Pulses:     Intact distal pulses.   Edema:     Peripheral edema absent.       ROS:  Review of Systems   Constitutional: Positive for malaise/fatigue.   Cardiovascular: Negative.    Respiratory: Negative.     Skin: Negative.    Musculoskeletal: Negative.    Gastrointestinal: Negative.    Genitourinary: Negative.    Neurological: Negative.           Last Labs: reviewed  CBC -  Lab Results   Component Value Date    WBC 8.4 04/28/2025    HGB 9.0 (L) 04/28/2025    HCT 28.5 (L) 04/28/2025    MCV 90.2 04/28/2025     04/28/2025       CMP -  Lab Results   Component Value Date    CALCIUM 9.2 04/28/2025    PHOS 5.1 (H) 03/21/2025    PROT 6.0 (L) 04/28/2025    ALBUMIN 3.8 04/28/2025    AST 16 04/28/2025    ALT 10 04/28/2025    ALKPHOS 116 04/28/2025    BILITOT 0.4 04/28/2025       LIPID PANEL -   Lab Results   Component Value Date    CHOL 123 11/08/2024    TRIG 170 (H) 11/08/2024    HDL 36.7 11/08/2024    CHHDL 3.4 11/08/2024    LDLF 13 08/14/2023    VLDL 34 11/08/2024    NHDL 86 11/08/2024       RENAL FUNCTION PANEL -   Lab Results   Component Value Date    GLUCOSE 147 (H) 04/28/2025     " 04/28/2025    K 5.5 (H) 04/28/2025     04/28/2025    CO2 27 04/28/2025    ANIONGAP 15 04/28/2025    BUN 40 (H) 04/28/2025    CREATININE 8.7 (H) 04/28/2025    GFRMALE 5 (A) 08/14/2023    CALCIUM 9.2 04/28/2025    PHOS 5.1 (H) 03/21/2025    ALBUMIN 3.8 04/28/2025        Lab Results   Component Value Date     (H) 11/13/2024    HGBA1C 6.3 (H) 11/08/2024         Assessment/Plan   Problem List Items Addressed This Visit    None      1. Hypertension. The patient's blood pressure is elevated on atenolol 25 mg daily, amlodipine 10 mg daily, and Benicar HCT 20/12.5 mg daily. The patient's recent lab work from 04/17/2020 shows some ongoing progression of chronic kidney disease and his creatinine is now 3.34 The patient completed a course of treatment for prostate carcinoma. He has noted some increased lower extremity edema. Given his progressing chronic kidney disease the benicar and furosemide were stopped. He wias started on hydralazine 50 mg twice a day and continue amlodipine 10 mg daily. He had an echocardiogram done 9/2/2020 which showed an EF of 65%, left atrium mild to moderately dilated with a PASP of 36 mmHg. The patient's blood pressure presently is well within the range of normal. He presently is on high-dose hydralazine 100 mg 3 times daily isosorbide mononitrate 120 mg daily amlodipine 5 mg daily carvedilol 12.5 mg twice daily. If blood pressure readings remained in current range may be able to reduce the dose of hydralazine and possibly the isosorbide as well. Hold hydralazine. Is off isosorbide.  NIDA 12/2024  CONCLUSIONS:   1. Left ventricular ejection fraction is normal, by visual estimate at 55-60%.   2. There is normal right ventricular global systolic function.   3. No mitral valve vegetation visualized.   4. No tricuspid valve vegetation.   5. No aortic valve vegetation visualized.   6. No pulmonic valve vegetation visualized.     Lexiscan   IMPRESSION:  1. Findings suggestive of mild ischemia  involving the cardiac apex.  2. Normal-sized left ventricle, with mildly depressed systolic left  ventricular function and a calculated ejection fraction of 46 %.   ECG portion showed LVH.   Denies chest pain or sob.  2. Hyperlipidemia Recent lipid panel from 11/2020 includes cholesterol 192 LDL 96 HDL 37 triglyceride 289. These results are somewhat worse than previous lipid panels and may be related to the fact that the patient for uncertain reason is taking atorvastatin 10 mg daily rather than the intended 40 mg daily. The dose will be increased to 80 mg daily. Will add Zetia. Recent FLP was optimized.  Recent lipid panel from 11/8/2024 satisfactory cholesterol 123 LDL 52 HDL 36 triglyceride 170.  Will have repeat FLP with pcp later this summer.  Continue atorvastatin and Zetia.     3. Coronary artery disease status post multiple PCI procedures. Patient had a stress test in 2008 that was negative for ischemia. The patient had a CT score of 447.8. The patient did have a subsequent nuclear exercise treadmill stress test on 02/18/2019 which demonstrated normal myocardial perfusion. Cardiac cath done 09/22/2020 for angina showed triple-vessel CAD with proximal left anterior descending involvement, 90% ostial stenosis of first diagonal branch, middle third of the proximal circumflex coronary artery showed 10 to 30% stenosis, middle third of the mid right coronary artery showed 60% stenosis. Patient was seen by Dr. Montez Palmer to consider possible PCI. Medical management was initially recommended but then the patient was admitted to [facility] on 2/28/2021 with a non-STEMI. He was transferred to [facility] and on 2/24/2021 underwent PCI and stent deployment to the proximal and mid LAD and a [place] angioplasty of a ostial first diagonal branch. An echocardiogram performed at that time demonstrated a preserved LV ejection fraction at 60-65% with moderate left ventricular hypertrophy and  severe left atrial enlargement with mild aortic valve stenosis and mild elevation of the PA systolic pressure. The patient presented back to [facility] emergency room on 9/12/2021 with intermittent chest discomfort. Of 2 weeks. The high-sensitivity troponins elevated and fashion consistent with a non-STEMI. EKG showed left ventricular hypertrophy with anterolateral downsloping ST abnormality consistent with either left ventricular repolarization abnormality versus ischemia. The patient underwent cardiac catheterization and this identified a probable non-STEMI related to a culprit high-grade proximal LCx stenosis that had significantly progressed since the previous study. The patient was transferred to [facility] and on 9/17/2021 underwent a successful OCT guided PCI of a culprit LCx stenosis with deployment of a drug-eluting stent. A staged PCI to the RCA was recommended on an outpatient basis due to the patient's advanced chronic kidney disease. The patient had an echocardiogram performed 9/18/2021 which again confirmed preserved LV ejection fraction 60% with moderate concentric LV hypertrophy mild aortic valve stenosis. The patient was readmitted to [facility] on 10/6/2021 with recurrent anginal type chest discomfort marginal troponin elevation. His creatinine was stable at 3.9 and he had a chronic anemia with hematocrit of 25.4. He was continued on dual antiplatelet therapy given intravenous iron infusion. He subsequently was transferred to [facility] and on 10/20/2021 underwent successful OCT guided PCI with rotational atherectomy of a severely calcified 90% mid to distal RCA stenosis and deployment of a 3.0 x 38 mm Synergy drug-eluting stent. The patient is actually doing clinically quite well and is without anginal symptoms. He feels improved and has not required the use of nitroglycerin. He is going to the [facility] routinely in the mornings and riding a bike without any  symptoms.  The patient did have a pharmacological nuclear stress test on 8/30/2022 which showed mild ischemia involving the cardiac apex normal LV chamber size and her LV ejection fraction of 46%.  Patient currently experiencing some very minor chest discomfort.  The patient had been raking leaves several days ago currently has some chest congestion and cough over the past 24 hours along with some minor diarrhea.  The chest pain is actually been present for approximately 1 month but only happens 1-2 times every few weeks and comes and goes.  He is not certain as to whether or not it resembles his original anginal syndrome.  Will defer repeat stress testing or cardiac catheterization pending further additional follow-up.  These symptoms have subsided. He will return in 3 months for to reassess.       4. Type two diabetes He will continue to follow up with his primary care doctor.  Lab work 05/2025 includes acceptable glycohemoglobin of 6.3%.     5. GERD      6. History of gout the patient is currently on Allopurinol 300 mg daily but the dose will be reduced to 100 mg daily because of his progressive kidney disease.     7. History of shoulder surgery      8. Chronic kidney disease. The patient's most recent creatinine was 5.64 when checked recently. He also has chronic anemia most likely due to his chronic kidney disease. Has appointment with Dr Gomez. Is on procrit and iron tablets. H&H is improving. Most recent lab work from 1/14/2022 includes creatinine 4.80 hemoglobin 10.4. He is having his hemoglobin checked every other week and will receive a Procrit injection for hemoglobins of less than 10. Has fistula to left arm.  Patient had dialysis port removed in early 11/2024 and has a fully functional AV graft in the left upper extremity.  Lab work 04/2025 includes an SMA panel creatinine is 8.7.  Has had hospital visits with syncope post dialysis.     9. Arthroscopic left knee surgery with Dr. Menard on June 21, 2016  at [facility].     10. Prostate carcinoma. The patient had a prostate biopsy in 11/25/2019 which was positive for the presence of carcinoma. MRI of the prostate in 01/04/2020 which evidently was negative for any evidence of metastatic disease. He subsequently underwent a 5 week course of external radiation therapy which was completed in mid 4/2020. His PSA is declining from a maximum value of 8.0 now at 3.66 and presumably declining.     11. Carotid US done 09/2020 showed < 50% stenosis.      12. Hx of covid-19 vaccine #1 and #2.      13. History of rectal bleeding. Patient was at [facility] April 2021 to receive 1 unit packed red blood cells. He complains of rectal bleeding x2 episodes and underwent a colonoscopy and found to have internal hemorrhoids and 2 polyps. He had a polypectomy done and required 2 clips.     14. Chronic anemia.          15.  Hyperkalemia.    16.  Pleural effusion.   Hospital visit excerpt 03/2025     In December 2024 patient was admitted for transudative pleural effusion s/p thoracocentesis on 12/27/2024.  NIDA negative for endocarditis.  MSSA bacteremia source unclear negative indium scan.  Treated with IV cefazolin for 6 weeks.  He had a CAT scan done on 2/14/2025 by ID which showed reaccumulation of right pleural effusion.  Patient followed up with me yesterday in the office complaining of increasing cough and increasing shortness of breath.  Patient was directly admitted for his symptoms and to repeat thoracocentesis and decide if patient will need Pleurx catheter or pleurodesis.  Patient remains afebrile.  He is on room air  Patient had paracentesis done almost a liter of bloody fluid removed.  Cultures are still pending negative so far.  Cytology pending.  Started on antibiotics  Patient needs VATS.  Patient transferred to [facility] for thoracic surgery consult  Patient underwent chemical pleurodesis and Pleurx catheter placement. He did have some more confusion  after the procedure. Otherwise he remained stable. Antibiotic continued work for 1 more week after discharge per ID. Discharged to rehab facility [facility]      Catheter has been removed.  CXR with pcp next week.  Return in 3 months.  VERÓNICA Austin-CNP       [1]   Past Medical History:  Diagnosis Date    Acquired absence of lung     decortication    Anemia     Anxiety     At risk for falling     CAD (coronary artery disease)     Chronic kidney disease     Diabetes mellitus (Multi)     ESRD (end stage renal disease) on dialysis (Multi)     GERD (gastroesophageal reflux disease)     Heart murmur     HLD (hyperlipidemia)     Hydropneumothorax     Hypertension     Personal history of other endocrine, nutritional and metabolic disease     History of hypercholesterolemia    Personal history of other specified conditions 08/28/2020    History of chest pain    Pleural effusion     Weakness    [2]   Past Surgical History:  Procedure Laterality Date    AV FISTULA PLACEMENT Left 07/31/2024    left brachial artery to axillary vein AV graft on 7/31/2024    KNEE ARTHROSCOPY W/ DEBRIDEMENT  04/15/2013    Arthroscopy Knee    LUNG DECORTICATION Right 03/27/2025    Right Video Assisted Thoracoscopy; partial decortication with Pleural Biospy; PleurX insertion    PERCUTANEOUS CHEST DRAIN INSERTION Right 03/27/2025    Right Video Assisted Thoracoscopy; partial decortication with Pleural Biospy; PleurX insertion    SHOULDER SURGERY  04/15/2013    Shoulder Surgery   [3]   Social History  Socioeconomic History    Marital status:    Tobacco Use    Smoking status: Never    Smokeless tobacco: Never   Substance and Sexual Activity    Alcohol use: Not Currently     Comment: rarely    Drug use: Never     Social Drivers of Health     Financial Resource Strain: Low Risk  (3/28/2025)    Overall Financial Resource Strain (CARDIA)     Difficulty of Paying Living Expenses: Not hard at all   Food Insecurity: No Food  Insecurity (3/28/2025)    Hunger Vital Sign     Worried About Running Out of Food in the Last Year: Never true     Ran Out of Food in the Last Year: Never true   Transportation Needs: No Transportation Needs (3/28/2025)    PRAPARE - Transportation     Lack of Transportation (Medical): No     Lack of Transportation (Non-Medical): No   Physical Activity: Insufficiently Active (3/24/2025)    Exercise Vital Sign     Days of Exercise per Week: 3 days     Minutes of Exercise per Session: 40 min   Stress: No Stress Concern Present (3/17/2025)    [place] citizen [place] of Occupational Health - Occupational Stress Questionnaire     Feeling of Stress : Not at all   Social Connections: Moderately Integrated (3/17/2025)    Social Connection and Isolation Panel [NHANES]     Frequency of Communication with Friends and Family: More than three times a week     Frequency of Social Gatherings with Friends and Family: More than three times a week     Attends Bahai Services: More than 4 times per year     Active Member of Clubs or Organizations: No     Attends Club or Organization Meetings: Never     Marital Status:    Intimate Partner Violence: Not At Risk (3/28/2025)    Humiliation, Afraid, Rape, and Kick questionnaire     Fear of Current or Ex-Partner: No     Emotionally Abused: No     Physically Abused: No     Sexually Abused: No   Housing Stability: Low Risk  (3/28/2025)    Housing Stability Vital Sign     Unable to Pay for Housing in the Last Year: No     Number of Times Moved in the Last Year: 0     Homeless in the Last Year: No   [4]   Family History  Problem Relation Name Age of Onset    Alzheimer's disease Mother      Heart attack Mother      Diabetes Father      Heart attack Father      Heart disease Other      Diabetes Other     [5]   Current Outpatient Medications   Medication Sig Dispense Refill    allopurinol (Zyloprim) 100 mg tablet Take 1 tablet (100 mg) by mouth once daily. 90 tablet 0    amLODIPine (Norvasc) 10  "mg tablet Take 1 tablet (10 mg) by mouth once daily. 90 tablet 3    aspirin 81 mg EC tablet Take 1 tablet (81 mg) by mouth once daily.      atorvastatin (Lipitor) 80 mg tablet Take 1 tablet (80 mg) by mouth once daily at bedtime. 90 tablet 0    B complex-vitamin C-folic acid (Nephro-Shea) 0.8 mg tablet Take 1 tablet by mouth once daily.      BD Ultra-Fine Mini Pen Needle 31 gauge x 3/16\" needle USE AS DIRECTED 4 times a day 400 each 2    carvedilol (Coreg) 6.25 mg tablet Take 1 tablet (6.25 mg) by mouth 2 times daily (morning and late afternoon). 180 tablet 3    epoetin jeison-epbx (Retacrit) 10,000 unit/mL injection Inject 0.66 mL (6,600 Units) under the skin once a day on Monday, Wednesday, and Friday.      ergocalciferol (Vitamin D-2) 1.25 MG (35566 UT) capsule Take 1 capsule (50,000 Units) by mouth every 14 (fourteen) days. Takes every other Sunday      ezetimibe (Zetia) 10 mg tablet Take 1 tablet (10 mg) by mouth once daily. 90 tablet 0    ferrous sulfate 325 (65 Fe) mg EC tablet Take 1 tablet by mouth once daily with breakfast. Do not crush, chew, or split. 30 tablet 11    fluocinonide (Lidex) 0.05 % ointment Apply topically 2 times a day.      gabapentin (Neurontin) 300 mg capsule Take 1 capsule (300 mg) by mouth once daily at bedtime.      insulin lispro (HumaLOG) 100 unit/mL injection Inject 10 Units under the skin 3 times daily (morning, midday, late afternoon).      Januvia 25 mg tablet Take 1 tablet (25 mg) by mouth once daily. 90 tablet 3    nitroglycerin (Nitrostat) 0.4 mg SL tablet Place 1 tablet (0.4 mg) under the tongue every 5 minutes if needed for chest pain.      oxygen (O2) gas therapy Inhale 2 L/min once every 24 hours.      pantoprazole (ProtoNix) 40 mg EC tablet Take 1 tablet (40 mg) by mouth once daily. 90 tablet 0    sevelamer carbonate (Renvela) 800 mg tablet Take 2 tablets (1,600 mg) by mouth 3 times daily (morning, midday, late afternoon). Three times daily with meals      triamcinolone " (Kenalog) 0.5 % cream Apply topically 3 times a day. 30 g 0     No current facility-administered medications for this visit.

## 2025-06-09 ENCOUNTER — HOSPITAL ENCOUNTER (OUTPATIENT)
Dept: RADIOLOGY | Facility: HOSPITAL | Age: 75
Discharge: HOME | End: 2025-06-09
Payer: MEDICARE

## 2025-06-09 DIAGNOSIS — J90 PLEURAL EFFUSION: ICD-10-CM

## 2025-06-09 PROCEDURE — 71046 X-RAY EXAM CHEST 2 VIEWS: CPT | Performed by: RADIOLOGY

## 2025-06-09 PROCEDURE — 71046 X-RAY EXAM CHEST 2 VIEWS: CPT

## 2025-06-11 ENCOUNTER — TELEPHONE (OUTPATIENT)
Dept: NEPHROLOGY | Facility: CLINIC | Age: 75
End: 2025-06-11
Payer: MEDICARE

## 2025-06-20 ENCOUNTER — TELEPHONE (OUTPATIENT)
Dept: PRIMARY CARE | Facility: CLINIC | Age: 75
End: 2025-06-20
Payer: MEDICARE

## 2025-06-20 DIAGNOSIS — M19.042 OSTEOARTHRITIS OF BOTH HANDS, UNSPECIFIED OSTEOARTHRITIS TYPE: ICD-10-CM

## 2025-06-20 DIAGNOSIS — M19.041 OSTEOARTHRITIS OF BOTH HANDS, UNSPECIFIED OSTEOARTHRITIS TYPE: ICD-10-CM

## 2025-06-20 DIAGNOSIS — E78.5 HYPERLIPIDEMIA: ICD-10-CM

## 2025-06-20 RX ORDER — GABAPENTIN 300 MG/1
300 CAPSULE ORAL NIGHTLY
Qty: 30 CAPSULE | Refills: 3 | Status: SHIPPED | OUTPATIENT
Start: 2025-06-20

## 2025-06-27 RX ORDER — ATORVASTATIN CALCIUM 80 MG/1
80 TABLET, FILM COATED ORAL NIGHTLY
Qty: 90 TABLET | Refills: 0 | Status: SHIPPED | OUTPATIENT
Start: 2025-06-27 | End: 2026-06-27

## 2025-07-09 ENCOUNTER — TELEMEDICINE (OUTPATIENT)
Dept: PRIMARY CARE | Facility: CLINIC | Age: 75
End: 2025-07-09
Payer: MEDICARE

## 2025-07-09 DIAGNOSIS — R09.89 CHEST CONGESTION: ICD-10-CM

## 2025-07-09 DIAGNOSIS — R05.9 COUGH, UNSPECIFIED TYPE: ICD-10-CM

## 2025-07-09 DIAGNOSIS — U07.1 COVID: ICD-10-CM

## 2025-07-09 PROCEDURE — 99213 OFFICE O/P EST LOW 20 MIN: CPT | Performed by: INTERNAL MEDICINE

## 2025-07-09 RX ORDER — NIRMATRELVIR AND RITONAVIR 150-100 MG
2 KIT ORAL 2 TIMES DAILY
Qty: 20 TABLET | Refills: 0 | Status: SHIPPED | OUTPATIENT
Start: 2025-07-09 | End: 2025-07-19

## 2025-07-09 NOTE — PROGRESS NOTES
Subjective   Patient ID: Bill A Franke is a 74 y.o. male who presents for No chief complaint on file..    HPI   Patient is positive for COVID today.  His wife came down with COVID few days ago.  His son came down with COVID a week ago.  He is having bodyaches generalized weakness some cough shortness of breath    Review of Systems    Objective   There were no vitals taken for this visit.        Assessment/Plan   Problem List Items Addressed This Visit           ICD-10-CM       Pulmonary and Pneumonias    Cough R05.9    Relevant Orders    XR chest 2 views     Other Visit Diagnoses         Codes      COVID     U07.1    Relevant Medications    nirmatrelvir-ritonavir (Paxlovid) 150 mg (10)- 100 mg (10) tablet therapy pack    Other Relevant Orders    XR chest 2 views      Chest congestion     R09.89    Relevant Orders    XR chest 2 views        Will treat with renal dose of Paxlovid  With patient's history of pneumonia and empyema will get x-ray done

## 2025-07-10 ENCOUNTER — HOSPITAL ENCOUNTER (OUTPATIENT)
Dept: RADIOLOGY | Facility: HOSPITAL | Age: 75
Discharge: HOME | End: 2025-07-10
Payer: MEDICARE

## 2025-07-10 DIAGNOSIS — U07.1 COVID: ICD-10-CM

## 2025-07-10 DIAGNOSIS — R09.89 CHEST CONGESTION: ICD-10-CM

## 2025-07-10 DIAGNOSIS — R05.9 COUGH, UNSPECIFIED TYPE: ICD-10-CM

## 2025-07-10 DIAGNOSIS — E78.49 OTHER HYPERLIPIDEMIA: ICD-10-CM

## 2025-07-10 PROCEDURE — 71046 X-RAY EXAM CHEST 2 VIEWS: CPT | Performed by: RADIOLOGY

## 2025-07-10 PROCEDURE — 71046 X-RAY EXAM CHEST 2 VIEWS: CPT

## 2025-07-10 RX ORDER — EZETIMIBE 10 MG/1
10 TABLET ORAL DAILY
Qty: 90 TABLET | Refills: 0 | Status: SHIPPED | OUTPATIENT
Start: 2025-07-10 | End: 2025-08-09

## 2025-07-11 ENCOUNTER — APPOINTMENT (OUTPATIENT)
Dept: RADIATION ONCOLOGY | Facility: HOSPITAL | Age: 75
End: 2025-07-11
Payer: MEDICARE

## 2025-07-22 ENCOUNTER — LAB (OUTPATIENT)
Dept: LAB | Facility: CLINIC | Age: 75
End: 2025-07-22
Payer: MEDICARE

## 2025-07-22 DIAGNOSIS — M1A.9XX0 CHRONIC GOUT WITHOUT TOPHUS, UNSPECIFIED CAUSE, UNSPECIFIED SITE: ICD-10-CM

## 2025-07-22 LAB
ALBUMIN SERPL BCP-MCNC: 4.8 G/DL (ref 3.4–5)
ALP SERPL-CCNC: 113 U/L (ref 33–136)
ALT SERPL W P-5'-P-CCNC: 15 U/L (ref 10–52)
ANION GAP SERPL CALC-SCNC: 16 MMOL/L (ref 10–20)
AST SERPL W P-5'-P-CCNC: 23 U/L (ref 9–39)
BILIRUB SERPL-MCNC: 0.9 MG/DL (ref 0–1.2)
BUN SERPL-MCNC: 18 MG/DL (ref 6–23)
CALCIUM SERPL-MCNC: 9.9 MG/DL (ref 8.6–10.3)
CHLORIDE SERPL-SCNC: 95 MMOL/L (ref 98–107)
CO2 SERPL-SCNC: 30 MMOL/L (ref 21–32)
CREAT SERPL-MCNC: 4.74 MG/DL (ref 0.5–1.3)
EGFRCR SERPLBLD CKD-EPI 2021: 12 ML/MIN/1.73M*2
ERYTHROCYTE [DISTWIDTH] IN BLOOD BY AUTOMATED COUNT: 19.3 % (ref 11.5–14.5)
GLUCOSE SERPL-MCNC: 131 MG/DL (ref 74–99)
HCT VFR BLD AUTO: 39.5 % (ref 41–52)
HGB BLD-MCNC: 12.5 G/DL (ref 13.5–17.5)
MCH RBC QN AUTO: 29.1 PG (ref 26–34)
MCHC RBC AUTO-ENTMCNC: 31.6 G/DL (ref 32–36)
MCV RBC AUTO: 92 FL (ref 80–100)
NRBC BLD-RTO: 0.3 /100 WBCS (ref 0–0)
PLATELET # BLD AUTO: 189 X10*3/UL (ref 150–450)
POTASSIUM SERPL-SCNC: 4 MMOL/L (ref 3.5–5.3)
PROT SERPL-MCNC: 7.6 G/DL (ref 6.4–8.2)
RBC # BLD AUTO: 4.3 X10*6/UL (ref 4.5–5.9)
SODIUM SERPL-SCNC: 137 MMOL/L (ref 136–145)
WBC # BLD AUTO: 7.4 X10*3/UL (ref 4.4–11.3)

## 2025-07-22 PROCEDURE — 84153 ASSAY OF PSA TOTAL: CPT | Performed by: INTERNAL MEDICINE

## 2025-07-22 PROCEDURE — 80053 COMPREHEN METABOLIC PANEL: CPT | Performed by: INTERNAL MEDICINE

## 2025-07-22 PROCEDURE — 80061 LIPID PANEL: CPT | Performed by: INTERNAL MEDICINE

## 2025-07-22 PROCEDURE — 83036 HEMOGLOBIN GLYCOSYLATED A1C: CPT | Performed by: INTERNAL MEDICINE

## 2025-07-22 PROCEDURE — 85027 COMPLETE CBC AUTOMATED: CPT | Performed by: INTERNAL MEDICINE

## 2025-07-22 PROCEDURE — 84443 ASSAY THYROID STIM HORMONE: CPT | Performed by: INTERNAL MEDICINE

## 2025-07-22 RX ORDER — ALLOPURINOL 100 MG/1
100 TABLET ORAL DAILY
Qty: 90 TABLET | Refills: 0 | Status: SHIPPED | OUTPATIENT
Start: 2025-07-22

## 2025-07-23 LAB
CHOLEST SERPL-MCNC: 95 MG/DL (ref 0–199)
CHOLESTEROL/HDL RATIO: 2.3
EST. AVERAGE GLUCOSE BLD GHB EST-MCNC: 111 MG/DL
HBA1C MFR BLD: 5.5 % (ref ?–5.7)
HDLC SERPL-MCNC: 40.6 MG/DL
LDLC SERPL CALC-MCNC: 31 MG/DL
NON HDL CHOLESTEROL: 54 MG/DL (ref 0–149)
TRIGL SERPL-MCNC: 115 MG/DL (ref 0–149)
TSH SERPL-ACNC: 1.01 MIU/L (ref 0.44–3.98)
VLDL: 23 MG/DL (ref 0–40)

## 2025-07-24 ENCOUNTER — TELEPHONE (OUTPATIENT)
Dept: RADIATION ONCOLOGY | Facility: HOSPITAL | Age: 75
End: 2025-07-24
Payer: MEDICARE

## 2025-07-25 LAB — PSA SERPL-MCNC: 1.49 NG/ML

## 2025-07-25 ASSESSMENT — ENCOUNTER SYMPTOMS
DIFFICULTY URINATING: 0
SHORTNESS OF BREATH: 0
BLOOD IN STOOL: 0
DYSURIA: 0
COUGH: 0
PSYCHIATRIC NEGATIVE: 1
RECTAL PAIN: 0
DIARRHEA: 0
JOINT SWELLING: 0
ANAL BLEEDING: 0
DIZZINESS: 0
CONSTIPATION: 0
WEAKNESS: 0
FATIGUE: 0
FREQUENCY: 0
ARTHRALGIAS: 0
BACK PAIN: 0
CHEST TIGHTNESS: 0
UNEXPECTED WEIGHT CHANGE: 0
HEMATURIA: 0
PALPITATIONS: 0
ALLERGIC/IMMUNOLOGIC NEGATIVE: 1
FEVER: 0
ABDOMINAL PAIN: 0

## 2025-07-25 NOTE — PROGRESS NOTES
"Cancer synopsis:  Rad/onc: Dr. Gordon/ [place] [facility]    74 -year-old male returns to the radiation oncology clinic for ongoing consultation regarding his unfavorable intermediate risk prostate cancer.  MRI of  the pelvis was completed on 1/4/2020 which demonstrated a 5.3 x 3.8 x 3.7 cm prostate.  There was a PIRADS 4 lesion in the right peripheral zone extending from the mid gland to the apex.  There was no evidence of extra prosthetic extension.  There was  no involvement of the seminal vesicles.  There was no evidence of an enlarged pelvic lymph nodes.  As stated at the prior visit, the patient denies any prior history of cancer, chemotherapy, radiotherapy, autoimmune or connective tissue disorder, or cardiac  device.  He completed a screening colonoscopy in November 2018.     05/05/2022: Prostate and SV VMAT    PMH:  DM2, constipation, CKD III (dialysis), glaucoma, HLD, BPH, pulmonary HTN, LIAM, recurrent pulmonary effusions    Recent imaging:  none    History of presenting illness:    Patient ID: [id number]     William A Franke \"Narciso\" is a 74 y.o. male who presents for his UIR prostate cancer GS 4+3=7, IPSA <10 now s/p RT    RT Site: Prostate and SV  RT Date: 05/05/2022  Hormone therapy: No  Hot Flushes: Denies  Fatigue: Denies  Bone pain: Denies  SAMARA: n/a   ED: Denies changes in erectile function since last visit.  ED medications: No  IPSS: n/a virtual  Urinary symptoms: Denies dysuria, hematuria, frequency, urgency or urine leakage. Recently had dialysis port placed and currently is receiving dialysis, does still produce urine per patient.  Urinary Medications: No  Rectal bleeding: Denies  Colonoscopy: 04/2021: bleeding internal hemmoirds, 1 polyp removed next in 5yrs  Other systems: Denies SOB, CP or fever. Currently admitted for ongoing encephalopthy and sepsis. Currently has MSSA bacteremia. Denies any further signs of infection including SOB, CP, fever of further confusion. Is working out at gym again to " help with regaining strength.    Review of systems:  Review of Systems   Constitutional:  Negative for fatigue, fever and unexpected weight change.   Respiratory:  Negative for cough, chest tightness and shortness of breath.    Cardiovascular:  Negative for chest pain, palpitations and leg swelling.   Gastrointestinal:  Negative for abdominal pain, anal bleeding, blood in stool, constipation, diarrhea and rectal pain.   Endocrine: Negative for cold intolerance, heat intolerance and polyuria.   Genitourinary:  Negative for decreased urine volume, difficulty urinating, dysuria, frequency, hematuria and urgency.   Musculoskeletal:  Negative for arthralgias, back pain, gait problem and joint swelling.   Skin: Negative.    Allergic/Immunologic: Negative.    Neurological:  Negative for dizziness, syncope and weakness.   Psychiatric/Behavioral: Negative.       Past Medical history  Past Medical History:   Diagnosis Date    Acquired absence of lung     decortication    Anemia     Anxiety     At risk for falling     CAD (coronary artery disease)     Cancer (Multi)     Chronic kidney disease     Diabetes mellitus (Multi)     ESRD (end stage renal disease) on dialysis (Multi)     GERD (gastroesophageal reflux disease)     Heart murmur     HLD (hyperlipidemia)     Hydropneumothorax     Hypertension     Personal history of other endocrine, nutritional and metabolic disease     History of hypercholesterolemia    Personal history of other specified conditions 08/28/2020    History of chest pain    Pleural effusion     Prostate cancer (Multi)     Weakness       Surgical/family history  Family History   Problem Relation Name Age of Onset    Alzheimer's disease Mother      Heart attack Mother      Diabetes Father      Heart attack Father      Heart disease Other      Diabetes Other        Past Surgical History:   Procedure Laterality Date    AV FISTULA PLACEMENT Left 07/31/2024    left brachial artery to axillary vein AV graft on  "7/31/2024    KNEE ARTHROSCOPY W/ DEBRIDEMENT  04/15/2013    Arthroscopy Knee    LUNG DECORTICATION Right 03/27/2025    Right Video Assisted Thoracoscopy; partial decortication with Pleural Biospy; PleurX insertion    PERCUTANEOUS CHEST DRAIN INSERTION Right 03/27/2025    Right Video Assisted Thoracoscopy; partial decortication with Pleural Biospy; PleurX insertion    SHOULDER SURGERY  04/15/2013    Shoulder Surgery        Social History  Tobacco Use: Low Risk  (5/30/2025)    Patient History     Smoking Tobacco Use: Never     Smokeless Tobacco Use: Never     Passive Exposure: Not on file         Current med list:  Current Outpatient Medications   Medication Instructions    allopurinol (ZYLOPRIM) 100 mg, oral, Daily    amLODIPine (NORVASC) 10 mg, oral, Daily    aspirin 81 mg, Daily    atorvastatin (LIPITOR) 80 mg, oral, Nightly    B complex-vitamin C-folic acid (Nephro-Shea) 0.8 mg tablet 0.8 mg, Daily    BD Ultra-Fine Mini Pen Needle 31 gauge x 3/16\" needle USE AS DIRECTED 4 times a day    carvedilol (COREG) 6.25 mg, oral, 2 times daily (morning and late afternoon)    epoetin jeison-epbx (RETACRIT) 100 Units/kg, subcutaneous, Every Mon/Wed/Fri    ergocalciferol (VITAMIN D-2) 50,000 Units, Every 14 days    ezetimibe (ZETIA) 10 mg, oral, Daily    ferrous sulfate 325 (65 Fe) mg EC tablet 1 tablet, oral, Daily with breakfast, Do not crush, chew, or split.    fluocinonide (Lidex) 0.05 % ointment Topical, 2 times daily    gabapentin (NEURONTIN) 300 mg, oral, Nightly    insulin lispro (HUMALOG) 10 Units, 3 times daily (morning, midday, late afternoon)    Januvia 25 mg, oral, Daily    nitroglycerin (NITROSTAT) 0.4 mg, Every 5 min PRN    oxygen (O2) 2 L/min, inhalation, Every 24 hours    pantoprazole (PROTONIX) 40 mg, oral, Daily    sevelamer carbonate (Renvela) 800 mg tablet Take 2 tablets (1,600 mg) by mouth 3 times daily (morning, midday, late afternoon). Three times daily with meals    triamcinolone (Kenalog) 0.5 % cream " Topical, 3 times daily      Last recorded vital:  N/a virtual    Physical exam  N/a virtual    Pertinent labs:  CBC Differential Path Review   Date/Time Value Ref Range Status   10/20/2022 05:45 AM GINA  Final     Comment:      By her/his signature above, the Pathologist   listed as making the final interpretation   certifies that she/he has personally reviewed    this case.  ----------------------------------------------   MICROCYTIC ANEMIA WITH ANISOPOIKILOCYTOSIS AND POLYCHROMASIA.  IRON, B12 AND FOLATE STUDIES MAYBE CONSIDERED IF CLINICALLY INDICATED.       Prostate Specific AG   Date/Time Value Ref Range Status   12/10/2024 04:13 PM 0.55 <=4.00 ng/mL Final     Dx:  Problem List Items Addressed This Visit    None    Psa in process *** Will continue q6m PSA. Review of latent SE including rectal bleeding, hematuria, urinary strictures, ED where reviewed as well as how to contact office if s/s present. Denies latent SE. NCCN guidelines where reviewed and routine FUV of every 3m for first year and every 6m for four years for a total of five years was discussed. Patient verbalized understanding.      PLAN:  FUV 6m  Labs PSA in 6m  Imaging none  FUV other providers: PCP for routine evals    Please contact office with any concerns:  Minh Armando CNP  [phone]    I performed this visit using realtime telehealth tools, including an audio/video OR telephone connection between patient’s name and location Franke, William and Minh Samson CNP.    2. POS 10: Telehealth provided in patient's home.  o Patient is located in their home (which is a location other than a hospital or other facility where the patient receives care in a private residence) when receiving health services or health related services through telecommunication technology.

## 2025-07-28 ENCOUNTER — HOSPITAL ENCOUNTER (OUTPATIENT)
Dept: RADIATION ONCOLOGY | Facility: HOSPITAL | Age: 75
Setting detail: RADIATION/ONCOLOGY SERIES
Discharge: HOME | End: 2025-07-28
Payer: MEDICARE

## 2025-07-28 ENCOUNTER — APPOINTMENT (OUTPATIENT)
Dept: NEPHROLOGY | Facility: CLINIC | Age: 75
End: 2025-07-28
Payer: MEDICARE

## 2025-07-28 VITALS
HEIGHT: 68 IN | BODY MASS INDEX: 25.91 KG/M2 | WEIGHT: 171 LBS | HEART RATE: 56 BPM | OXYGEN SATURATION: 90 % | TEMPERATURE: 98.3 F | DIASTOLIC BLOOD PRESSURE: 68 MMHG | SYSTOLIC BLOOD PRESSURE: 172 MMHG

## 2025-07-28 DIAGNOSIS — C61 MALIGNANT NEOPLASM OF PROSTATE (MULTI): Primary | ICD-10-CM

## 2025-07-28 DIAGNOSIS — F03.918 UNSPECIFIED DEMENTIA, UNSPECIFIED SEVERITY, WITH OTHER BEHAVIORAL DISTURBANCE: ICD-10-CM

## 2025-07-28 DIAGNOSIS — F03.90 DEMENTIA WITHOUT BEHAVIORAL DISTURBANCE, PSYCHOTIC DISTURBANCE, MOOD DISTURBANCE, OR ANXIETY, UNSPECIFIED DEMENTIA SEVERITY, UNSPECIFIED DEMENTIA TYPE: ICD-10-CM

## 2025-07-28 DIAGNOSIS — N18.6 ESRD (END STAGE RENAL DISEASE) (MULTI): Primary | ICD-10-CM

## 2025-07-28 PROCEDURE — 3078F DIAST BP <80 MM HG: CPT | Performed by: INTERNAL MEDICINE

## 2025-07-28 PROCEDURE — 99214 OFFICE O/P EST MOD 30 MIN: CPT | Performed by: INTERNAL MEDICINE

## 2025-07-28 PROCEDURE — 1160F RVW MEDS BY RX/DR IN RCRD: CPT | Performed by: INTERNAL MEDICINE

## 2025-07-28 PROCEDURE — 1159F MED LIST DOCD IN RCRD: CPT | Performed by: INTERNAL MEDICINE

## 2025-07-28 PROCEDURE — 1036F TOBACCO NON-USER: CPT | Performed by: INTERNAL MEDICINE

## 2025-07-28 PROCEDURE — 3077F SYST BP >= 140 MM HG: CPT | Performed by: INTERNAL MEDICINE

## 2025-07-28 NOTE — PROGRESS NOTES
"Subjective   William A Franke \"Narciso\" is a 74 y.o. male who presented today to discuss his end-stage kidney disease on hemodialysis.  He is dialyzed on Tuesday, Thursday, and Saturday, he is adherent with his treatments.  He has known coronary artery disease, had a VATS procedure for trapped lung, continues to accumulate right-sided pleural effusions, had a chest tube recently.  He did not have a decortication.  But he denies shortness of breath, chest pain, nausea, vomiting, or abdominal pain.      ROS  As in Subjective, all other ROS are negative    Objective     Vital signs    Visit Vitals  /68 (BP Location: Right arm, Patient Position: Sitting, BP Cuff Size: Adult)   Pulse 56   Temp 36.8 °C (98.3 °F) (Oral)      Vitals:    07/28/25 0801   Weight: 77.6 kg (171 lb)        Physical Exam  Constitutional:       Appearance: Normal appearance.   HENT:      Mouth/Throat:      Mouth: Mucous membranes are moist.   Eyes:      Extraocular Movements: Extraocular movements intact.      Pupils: Pupils are equal, round, and reactive to light.   Cardiovascular:      Rate and Rhythm: Regular rhythm.  Systolic murmur heard throughout     Heart sounds: S1 normal and S2 normal.   Pulmonary:      Breath sounds: Dull in the right base, normal otherwise.   Abdominal:      Comments: Soft, NT/ND, no masses, normal bowel sounds   Genitourinary:     Comments: No sun  Musculoskeletal:      No synovitis  Edema:     Right lower leg: No edema.      Left lower leg: No edema.   Skin:     General: Skin is warm and dry.   Neurological:      General: No focal deficit present.      Mental Status: She is alert and oriented to person, place, and time.   Psychiatric:         Behavior: Behavior normal.    Left upper extremity thrill and bruit.  AV graft within normal limits  Meds  Current Medications[1]     Allergies  RX Allergies[2]     Results  Lab Results   Component Value Date    GLUCOSE 131 (H) 07/22/2025    GLUCOSE 147 (H) 04/28/2025    NA " "137 07/22/2025     04/28/2025    K 4.0 07/22/2025    K 5.5 (H) 04/28/2025    CL 95 (L) 07/22/2025     04/28/2025    CO2 30 07/22/2025    CO2 27 04/28/2025    ANIONGAP 16 07/22/2025    ANIONGAP 15 04/28/2025    BUN 18 07/22/2025    BUN 40 (H) 04/28/2025    CREATININE 4.74 (H) 07/22/2025    CREATININE 8.7 (H) 04/28/2025    CALCIUM 9.9 07/22/2025    CALCIUM 9.2 04/28/2025    MG 1.71 03/31/2025     Lab Results   Component Value Date    .0 (H) 03/16/2022    CALCIUM 9.9 07/22/2025    CALCIUM 9.2 04/28/2025     No results found for: \"ALBUR\", \"[id number]\"         @LABALLVALUEIP(CREATININE:*)@  @LABALLVALUEIP(NA:*)@    Imaging results  === 03/17/25 ===    US THORACENTESIS    - Impression -  Ultrasound-guided right thoracentesis.    Signed by: Bassam Brown 3/19/2025 3:18 PM  Dictation workstation:   [id number]     Assessment and Plan  William A Franke \"Bill\" has end-stage kidney disease on hemodialysis.  I am not certain whether a pleurodesis will be considered, if he requires recurrent thoracentesis but we will follow that issue.  On July 24 it seems as if he was given fluid with dialysis rather than being ultrafiltrated.  Since then, his weight is heavy and his blood pressure was up which is concerning.  He will avoid sodium, by tomorrow, certainly Thursday, I hope he is back down at his dry weight, he will check home blood pressures and communicate them.  Hemoglobin is coming up.  I instructed him on how to use the phosphorus binders, I expect that the phosphorus will improve.  I will check another 25 vitamin D.  Otherwise am pleased with how he is doing.       Problem List Items Addressed This Visit    None  Visit Diagnoses         ESRD (end stage renal disease) (Multi)    -  Primary    Relevant Orders    Follow Up In Nephrology           Gavin Gomez MD           [1]   Current Outpatient Medications:     allopurinol (Zyloprim) 100 mg tablet, Take 1 tablet (100 mg) by mouth once daily., Disp: 90 " "tablet, Rfl: 0    amLODIPine (Norvasc) 10 mg tablet, Take 1 tablet (10 mg) by mouth once daily., Disp: 90 tablet, Rfl: 3    aspirin 81 mg EC tablet, Take 1 tablet (81 mg) by mouth once daily., Disp: , Rfl:     atorvastatin (Lipitor) 80 mg tablet, Take 1 tablet (80 mg) by mouth once daily at bedtime., Disp: 90 tablet, Rfl: 0    B complex-vitamin C-folic acid (Nephro-Shea) 0.8 mg tablet, Take 1 tablet by mouth once daily., Disp: , Rfl:     BD Ultra-Fine Mini Pen Needle 31 gauge x 3/16\" needle, USE AS DIRECTED 4 times a day, Disp: 400 each, Rfl: 2    carvedilol (Coreg) 6.25 mg tablet, Take 1 tablet (6.25 mg) by mouth 2 times daily (morning and late afternoon)., Disp: 180 tablet, Rfl: 3    epoetin jeison-epbx (Retacrit) 10,000 unit/mL injection, Inject 0.66 mL (6,600 Units) under the skin once a day on Monday, Wednesday, and Friday., Disp: , Rfl:     ergocalciferol (Vitamin D-2) 1.25 MG (14776 UT) capsule, Take 1 capsule (50,000 Units) by mouth every 14 (fourteen) days. Takes every other Sunday, Disp: , Rfl:     ezetimibe (Zetia) 10 mg tablet, Take 1 tablet (10 mg) by mouth once daily., Disp: 90 tablet, Rfl: 0    ferrous sulfate 325 (65 Fe) mg EC tablet, Take 1 tablet by mouth once daily with breakfast. Do not crush, chew, or split., Disp: 30 tablet, Rfl: 11    fluocinonide (Lidex) 0.05 % ointment, Apply topically 2 times a day., Disp: , Rfl:     gabapentin (Neurontin) 300 mg capsule, Take 1 capsule (300 mg) by mouth once daily at bedtime., Disp: 30 capsule, Rfl: 3    insulin lispro (HumaLOG) 100 unit/mL injection, Inject 10 Units under the skin 3 times daily (morning, midday, late afternoon)., Disp: , Rfl:     Januvia 25 mg tablet, Take 1 tablet (25 mg) by mouth once daily., Disp: 90 tablet, Rfl: 3    nitroglycerin (Nitrostat) 0.4 mg SL tablet, Place 1 tablet (0.4 mg) under the tongue every 5 minutes if needed for chest pain., Disp: , Rfl:     oxygen (O2) gas therapy, Inhale 2 L/min once every 24 hours., Disp: , Rfl:     " pantoprazole (ProtoNix) 40 mg EC tablet, Take 1 tablet (40 mg) by mouth once daily., Disp: 90 tablet, Rfl: 0    sevelamer carbonate (Renvela) 800 mg tablet, Take 2 tablets (1,600 mg) by mouth 3 times daily (morning, midday, late afternoon). Three times daily with meals, Disp: , Rfl:     triamcinolone (Kenalog) 0.5 % cream, Apply topically 3 times a day., Disp: 30 g, Rfl: 0  [2] No Known Allergies

## 2025-07-29 ENCOUNTER — HOSPITAL ENCOUNTER (OUTPATIENT)
Dept: RADIATION ONCOLOGY | Facility: HOSPITAL | Age: 75
Setting detail: RADIATION/ONCOLOGY SERIES
Discharge: HOME | End: 2025-07-29
Payer: MEDICARE

## 2025-07-29 DIAGNOSIS — C61 MALIGNANT NEOPLASM OF PROSTATE (MULTI): Primary | ICD-10-CM

## 2025-07-29 PROCEDURE — 99213 OFFICE O/P EST LOW 20 MIN: CPT | Mod: 95

## 2025-07-29 PROCEDURE — 99213 OFFICE O/P EST LOW 20 MIN: CPT

## 2025-07-29 ASSESSMENT — ENCOUNTER SYMPTOMS
ABDOMINAL PAIN: 0
BACK PAIN: 0
SHORTNESS OF BREATH: 0
FEVER: 0
UNEXPECTED WEIGHT CHANGE: 0
FREQUENCY: 0
ALLERGIC/IMMUNOLOGIC NEGATIVE: 1
ANAL BLEEDING: 0
HEMATURIA: 0
JOINT SWELLING: 0
WEAKNESS: 0
DIFFICULTY URINATING: 0
DIARRHEA: 0
DYSURIA: 0
BLOOD IN STOOL: 0
ARTHRALGIAS: 0
DIZZINESS: 0
CHEST TIGHTNESS: 0
FATIGUE: 0
COUGH: 0
CONSTIPATION: 0
PSYCHIATRIC NEGATIVE: 1
PALPITATIONS: 0
RECTAL PAIN: 0

## 2025-07-29 NOTE — PROGRESS NOTES
"Cancer synopsis:  Rad/onc: Dr. Gordon/ [place] [facility]    74 -year-old male returns to the radiation oncology clinic for ongoing consultation regarding his unfavorable intermediate risk prostate cancer.  MRI of  the pelvis was completed on 1/4/2020 which demonstrated a 5.3 x 3.8 x 3.7 cm prostate.  There was a PIRADS 4 lesion in the right peripheral zone extending from the mid gland to the apex.  There was no evidence of extra prosthetic extension.  There was  no involvement of the seminal vesicles.  There was no evidence of an enlarged pelvic lymph nodes.  As stated at the prior visit, the patient denies any prior history of cancer, chemotherapy, radiotherapy, autoimmune or connective tissue disorder, or cardiac  device.  He completed a screening colonoscopy in November 2018.     05/05/2022: Prostate and SV VMAT    PMH:  DM2, constipation, CKD III (dialysis), glaucoma, HLD, BPH, pulmonary HTN, LIAM, recurrent pulmonary effusions    Recent imaging:  none    History of presenting illness:    Patient ID: [id number]     William A Franke \"Narciso\" is a 74 y.o. male who presents for his UIR prostate cancer GS 4+3=7, IPSA <10 now s/p RT    RT Site: Prostate and SV  RT Date: 05/05/2022  Hormone therapy: No  Hot Flushes: Denies  Fatigue: Denies  Bone pain: Denies  SAMARA: n/a   ED: Denies changes in erectile function since last visit.  ED medications: No  IPSS: n/a virtual  Urinary symptoms: Denies dysuria, hematuria, frequency, urgency or urine leakage. Recently had dialysis port placed and currently is receiving dialysis, does still produce urine per patient.  Urinary Medications: No  Rectal bleeding: Denies  Colonoscopy: 04/2021: bleeding internal hemmoirds, 1 polyp removed next in 5yrs  Other systems: Denies SOB, CP or fever. Currently admitted for ongoing encephalopthy and sepsis. Currently has MSSA bacteremia. Denies any further signs of infection including SOB, CP, fever of further confusion. Is working out at gym again to " help with regaining strength.    Review of systems:  Review of Systems   Constitutional:  Negative for fatigue, fever and unexpected weight change.   Respiratory:  Negative for cough, chest tightness and shortness of breath.    Cardiovascular:  Negative for chest pain, palpitations and leg swelling.   Gastrointestinal:  Negative for abdominal pain, anal bleeding, blood in stool, constipation, diarrhea and rectal pain.   Endocrine: Negative for cold intolerance, heat intolerance and polyuria.   Genitourinary:  Negative for decreased urine volume, difficulty urinating, dysuria, frequency, hematuria and urgency.   Musculoskeletal:  Negative for arthralgias, back pain, gait problem and joint swelling.   Skin: Negative.    Allergic/Immunologic: Negative.    Neurological:  Negative for dizziness, syncope and weakness.   Psychiatric/Behavioral: Negative.       Past Medical history  Past Medical History:   Diagnosis Date    Acquired absence of lung     decortication    Anemia     Anxiety     At risk for falling     CAD (coronary artery disease)     Cancer (Multi)     Chronic kidney disease     Diabetes mellitus (Multi)     ESRD (end stage renal disease) on dialysis (Multi)     GERD (gastroesophageal reflux disease)     Heart murmur     HLD (hyperlipidemia)     Hydropneumothorax     Hypertension     Personal history of other endocrine, nutritional and metabolic disease     History of hypercholesterolemia    Personal history of other specified conditions 08/28/2020    History of chest pain    Pleural effusion     Prostate cancer (Multi)     Weakness       Surgical/family history  Family History   Problem Relation Name Age of Onset    Alzheimer's disease Mother      Heart attack Mother      Diabetes Father      Heart attack Father      Heart disease Other      Diabetes Other        Past Surgical History:   Procedure Laterality Date    AV FISTULA PLACEMENT Left 07/31/2024    left brachial artery to axillary vein AV graft on  "7/31/2024    KNEE ARTHROSCOPY W/ DEBRIDEMENT  04/15/2013    Arthroscopy Knee    LUNG DECORTICATION Right 03/27/2025    Right Video Assisted Thoracoscopy; partial decortication with Pleural Biospy; PleurX insertion    PERCUTANEOUS CHEST DRAIN INSERTION Right 03/27/2025    Right Video Assisted Thoracoscopy; partial decortication with Pleural Biospy; PleurX insertion    SHOULDER SURGERY  04/15/2013    Shoulder Surgery        Social History  Tobacco Use: Low Risk  (7/28/2025)    Patient History     Smoking Tobacco Use: Never     Smokeless Tobacco Use: Never     Passive Exposure: Not on file         Current med list:  Current Outpatient Medications   Medication Instructions    allopurinol (ZYLOPRIM) 100 mg, oral, Daily    amLODIPine (NORVASC) 10 mg, oral, Daily    aspirin 81 mg, Daily    atorvastatin (LIPITOR) 80 mg, oral, Nightly    B complex-vitamin C-folic acid (Nephro-Shea) 0.8 mg tablet 0.8 mg, Daily    BD Ultra-Fine Mini Pen Needle 31 gauge x 3/16\" needle USE AS DIRECTED 4 times a day    carvedilol (COREG) 6.25 mg, oral, 2 times daily (morning and late afternoon)    epoetin jeison-epbx (RETACRIT) 100 Units/kg, subcutaneous, Every Mon/Wed/Fri    ergocalciferol (VITAMIN D-2) 50,000 Units, Every 14 days    ezetimibe (ZETIA) 10 mg, oral, Daily    ferrous sulfate 325 (65 Fe) mg EC tablet 1 tablet, oral, Daily with breakfast, Do not crush, chew, or split.    fluocinonide (Lidex) 0.05 % ointment Topical, 2 times daily    gabapentin (NEURONTIN) 300 mg, oral, Nightly    insulin lispro (HUMALOG) 10 Units, 3 times daily (morning, midday, late afternoon)    Januvia 25 mg, oral, Daily    nitroglycerin (NITROSTAT) 0.4 mg, Every 5 min PRN    oxygen (O2) 2 L/min, inhalation, Every 24 hours    pantoprazole (PROTONIX) 40 mg, oral, Daily    sevelamer carbonate (Renvela) 800 mg tablet Take 2 tablets (1,600 mg) by mouth 3 times daily (morning, midday, late afternoon). Three times daily with meals    triamcinolone (Kenalog) 0.5 % cream " Topical, 3 times daily      Last recorded vital:  N/a virtual    Physical exam  N/a virtual    Pertinent labs:  07/22/2025 1.49  12/10/2025: 0.55  05/29/2024 0.42  12/01/2023 0.26  05/26/2023 0.25 ([place])    Dx:  Problem List Items Addressed This Visit    None  Visit Diagnoses         Malignant neoplasm of prostate (Multi)    -  Primary        Psa 1.49  and has tripled almost since last visit. Discussed variable PSA after treatment nd close monitoring to ensure no further rise. If pt reaches PSA of 2.25 will meet PHENOIX criteria for potential BCR and need for Pet imaging.  Review of latent SE including rectal bleeding, hematuria, urinary strictures, ED where reviewed as well as how to contact office if s/s present. Denies latent SE. NCCN guidelines where reviewed and routine FUV of every 3m for first year and every 6m for four years for a total of five years was discussed. Patient verbalized understanding.      PLAN:  FUV 3m  Labs PSA in 3m  Imaging none  FUV other providers: PCP for routine evals    Please contact office with any concerns:  Minh Armando CNP  [phone]    I performed this visit using realtime telehealth tools, including an audio/video OR telephone connection between patient’s name and location Franke, William and Minh Samson CNP.    2. POS 10: Telehealth provided in patient's home.  o Patient is located in their home (which is a location other than a hospital or other facility where the patient receives care in a private residence) when receiving health services or health related services through telecommunication technology.

## 2025-08-04 ENCOUNTER — APPOINTMENT (OUTPATIENT)
Dept: PRIMARY CARE | Facility: CLINIC | Age: 75
End: 2025-08-04
Payer: MEDICARE

## 2025-08-07 ENCOUNTER — APPOINTMENT (OUTPATIENT)
Dept: RADIOLOGY | Facility: HOSPITAL | Age: 75
End: 2025-08-07
Payer: MEDICARE

## 2025-08-07 ENCOUNTER — HOSPITAL ENCOUNTER (EMERGENCY)
Facility: HOSPITAL | Age: 75
Discharge: HOME | End: 2025-08-07
Attending: EMERGENCY MEDICINE
Payer: MEDICARE

## 2025-08-07 ENCOUNTER — APPOINTMENT (OUTPATIENT)
Dept: CARDIOLOGY | Facility: HOSPITAL | Age: 75
End: 2025-08-07
Payer: MEDICARE

## 2025-08-07 VITALS
HEIGHT: 68 IN | RESPIRATION RATE: 18 BRPM | BODY MASS INDEX: 26.07 KG/M2 | OXYGEN SATURATION: 98 % | DIASTOLIC BLOOD PRESSURE: 60 MMHG | SYSTOLIC BLOOD PRESSURE: 165 MMHG | HEART RATE: 52 BPM | TEMPERATURE: 97.5 F | WEIGHT: 172 LBS

## 2025-08-07 DIAGNOSIS — R41.82 ALTERED MENTAL STATUS, UNSPECIFIED ALTERED MENTAL STATUS TYPE: Primary | ICD-10-CM

## 2025-08-07 DIAGNOSIS — I95.9 HYPOTENSION, UNSPECIFIED HYPOTENSION TYPE: ICD-10-CM

## 2025-08-07 LAB
ALBUMIN SERPL BCP-MCNC: 4.7 G/DL (ref 3.4–5)
ALP SERPL-CCNC: 96 U/L (ref 33–136)
ALT SERPL W P-5'-P-CCNC: 12 U/L (ref 10–52)
ANION GAP SERPL CALCULATED.3IONS-SCNC: 15 MMOL/L (ref 10–20)
AST SERPL W P-5'-P-CCNC: 18 U/L (ref 9–39)
BASOPHILS # BLD AUTO: 0.06 X10*3/UL (ref 0–0.1)
BASOPHILS NFR BLD AUTO: 0.8 %
BILIRUB SERPL-MCNC: 0.8 MG/DL (ref 0–1.2)
BUN SERPL-MCNC: 17 MG/DL (ref 6–23)
CALCIUM SERPL-MCNC: 9.7 MG/DL (ref 8.6–10.3)
CHLORIDE SERPL-SCNC: 95 MMOL/L (ref 98–107)
CO2 SERPL-SCNC: 31 MMOL/L (ref 21–32)
CREAT SERPL-MCNC: 4.83 MG/DL (ref 0.5–1.3)
EGFRCR SERPLBLD CKD-EPI 2021: 12 ML/MIN/1.73M*2
EOSINOPHIL # BLD AUTO: 0.3 X10*3/UL (ref 0–0.4)
EOSINOPHIL NFR BLD AUTO: 4.1 %
ERYTHROCYTE [DISTWIDTH] IN BLOOD BY AUTOMATED COUNT: 18.7 % (ref 11.5–14.5)
GLUCOSE BLD MANUAL STRIP-MCNC: 144 MG/DL (ref 74–99)
GLUCOSE SERPL-MCNC: 141 MG/DL (ref 74–99)
HCT VFR BLD AUTO: 39.8 % (ref 41–52)
HGB BLD-MCNC: 12.6 G/DL (ref 13.5–17.5)
IMM GRANULOCYTES # BLD AUTO: 0.04 X10*3/UL (ref 0–0.5)
IMM GRANULOCYTES NFR BLD AUTO: 0.5 % (ref 0–0.9)
LYMPHOCYTES # BLD AUTO: 1.11 X10*3/UL (ref 0.8–3)
LYMPHOCYTES NFR BLD AUTO: 15 %
MCH RBC QN AUTO: 28.5 PG (ref 26–34)
MCHC RBC AUTO-ENTMCNC: 31.7 G/DL (ref 32–36)
MCV RBC AUTO: 90 FL (ref 80–100)
MONOCYTES # BLD AUTO: 0.65 X10*3/UL (ref 0.05–0.8)
MONOCYTES NFR BLD AUTO: 8.8 %
NEUTROPHILS # BLD AUTO: 5.24 X10*3/UL (ref 1.6–5.5)
NEUTROPHILS NFR BLD AUTO: 70.8 %
NRBC BLD-RTO: 0 /100 WBCS (ref 0–0)
PLATELET # BLD AUTO: 209 X10*3/UL (ref 150–450)
POTASSIUM SERPL-SCNC: 3.9 MMOL/L (ref 3.5–5.3)
PROT SERPL-MCNC: 7.7 G/DL (ref 6.4–8.2)
RBC # BLD AUTO: 4.42 X10*6/UL (ref 4.5–5.9)
SODIUM SERPL-SCNC: 137 MMOL/L (ref 136–145)
WBC # BLD AUTO: 7.4 X10*3/UL (ref 4.4–11.3)

## 2025-08-07 PROCEDURE — 82947 ASSAY GLUCOSE BLOOD QUANT: CPT

## 2025-08-07 PROCEDURE — 93005 ELECTROCARDIOGRAM TRACING: CPT

## 2025-08-07 PROCEDURE — 70450 CT HEAD/BRAIN W/O DYE: CPT | Performed by: RADIOLOGY

## 2025-08-07 PROCEDURE — 85025 COMPLETE CBC W/AUTO DIFF WBC: CPT

## 2025-08-07 PROCEDURE — 70450 CT HEAD/BRAIN W/O DYE: CPT

## 2025-08-07 PROCEDURE — 84075 ASSAY ALKALINE PHOSPHATASE: CPT

## 2025-08-07 PROCEDURE — 99285 EMERGENCY DEPT VISIT HI MDM: CPT | Mod: 25 | Performed by: EMERGENCY MEDICINE

## 2025-08-07 PROCEDURE — 71045 X-RAY EXAM CHEST 1 VIEW: CPT

## 2025-08-07 PROCEDURE — 71045 X-RAY EXAM CHEST 1 VIEW: CPT | Mod: FOREIGN READ | Performed by: RADIOLOGY

## 2025-08-07 PROCEDURE — 36415 COLL VENOUS BLD VENIPUNCTURE: CPT

## 2025-08-07 RX ORDER — INSULIN GLARGINE 100 [IU]/ML
INJECTION, SOLUTION SUBCUTANEOUS
COMMUNITY

## 2025-08-07 ASSESSMENT — PAIN - FUNCTIONAL ASSESSMENT: PAIN_FUNCTIONAL_ASSESSMENT: 0-10

## 2025-08-07 ASSESSMENT — PAIN SCALES - GENERAL: PAINLEVEL_OUTOF10: 0 - NO PAIN

## 2025-08-07 NOTE — ED TRIAGE NOTES
During dialysis treatment patients bp dropped and patient developed some slurred speech and altered mental status, Patient received 3 hours of 3.5 hour treatment, states his only complaint was nausea with abd pain. Ems states he received IV fluids.

## 2025-08-07 NOTE — PROGRESS NOTES
"Pharmacy Medication History Review    William A Franke \"Narciso\" is a 74 y.o. male. Pharmacy reviewed the patient's [id number] medications and allergies for accuracy.    Medications ADDED:  Lantus   Medications CHANGED:  Insulin lispro - sliding scale  Oxygen - not taking  Triamcinolone - not taking  Medications REMOVED:   None      The list below reflects the updated PTA list. Comments regarding how patient may be taking medications differently can be found in the Admit Orders Activity  Prior to Admission Medications   Prescriptions Last Dose Informant   B complex-vitamin C-folic acid (Nephro-Shea) 0.8 mg tablet 8/7/2025 Self, Spouse/Significant Other   Sig: Take 1 tablet by mouth once daily.   BD Ultra-Fine Mini Pen Needle 31 gauge x 3/16\" needle  Self, Spouse/Significant Other   Sig: USE AS DIRECTED 4 times a day   Januvia 25 mg tablet 8/6/2025 Self, Spouse/Significant Other   Sig: Take 1 tablet (25 mg) by mouth once daily.   allopurinol (Zyloprim) 100 mg tablet 8/7/2025 Spouse/Significant Other, Self   Sig: Take 1 tablet (100 mg) by mouth once daily.   amLODIPine (Norvasc) 10 mg tablet 8/7/2025 Spouse/Significant Other, Self   Sig: Take 1 tablet (10 mg) by mouth once daily.   aspirin 81 mg EC tablet 8/7/2025 Self, Spouse/Significant Other   Sig: Take 1 tablet (81 mg) by mouth once daily.   atorvastatin (Lipitor) 80 mg tablet 8/6/2025 Spouse/Significant Other, Self   Sig: Take 1 tablet (80 mg) by mouth once daily at bedtime.   carvedilol (Coreg) 6.25 mg tablet 8/7/2025 Self, Spouse/Significant Other   Sig: Take 1 tablet (6.25 mg) by mouth 2 times daily (morning and late afternoon).   epoetin jeison-epbx (Retacrit) 10,000 unit/mL injection  Spouse/Significant Other, Self   Sig: Inject 0.66 mL (6,600 Units) under the skin once a day on Monday, Wednesday, and Friday.   ezetimibe (Zetia) 10 mg tablet 8/7/2025 Spouse/Significant Other, Self   Sig: Take 1 tablet (10 mg) by mouth once daily.   ferrous sulfate 325 (65 " Fe) mg EC tablet 8/7/2025 Spouse/Significant Other, Self   Sig: Take 1 tablet by mouth once daily with breakfast. Do not crush, chew, or split.   fluocinonide (Lidex) 0.05 % ointment Unknown Self, Spouse/Significant Other   Sig: Apply topically 2 times a day.   gabapentin (Neurontin) 300 mg capsule 8/6/2025 Spouse/Significant Other, Self   Sig: Take 1 capsule (300 mg) by mouth once daily at bedtime.   insulin glargine (Lantus U-100 Insulin) 100 unit/mL injection Unknown Spouse/Significant Other, Self   Sig: Inject under the skin every 24 (twenty four) hours if needed. Take as directed per insulin instructions.   insulin lispro (HumaLOG) 100 unit/mL injection  Self, Spouse/Significant Other   Sig: Inject 10 Units under the skin 3 times daily (morning, midday, late afternoon).   nitroglycerin (Nitrostat) 0.4 mg SL tablet Unknown Self, Spouse/Significant Other   Sig: Place 1 tablet (0.4 mg) under the tongue every 5 minutes if needed for chest pain.   pantoprazole (ProtoNix) 40 mg EC tablet 8/7/2025 Self, Spouse/Significant Other   Sig: Take 1 tablet (40 mg) by mouth once daily.   sevelamer carbonate (Renvela) 800 mg tablet 8/6/2025 Self, Spouse/Significant Other   Sig: Take 2 tablets (1,600 mg) by mouth 3 times daily (morning, midday, late afternoon). Three times daily with meals      Facility-Administered Medications: None        The list below reflects the updated allergy list. Please review each documented allergy for additional clarification and justification.  Allergies  Reviewed by Otilia Salgado CPhT on 8/7/2025   No Known Allergies         Pharmacy has been updated to [facility].    Sources used to complete the med history include dispense history, PTA medication list, patient interview. Informants are fair historians.    Below are additional concerns with the patient's PTA list.  none    Otilia Salgado CPhT-Adv  Please reach out via [email or web address] Secure Chat for questions

## 2025-08-07 NOTE — DISCHARGE INSTRUCTIONS
Please follow with your primary care provider.  Make sure that you enjoy the concert today.    Be sure to take all medications, over the counter medications or prescription medications only as directed.    Be sure to follow up as directed in 1-2 days. All of the details of your follow up instructions are detailed in the follow up section of this packet.    If you are being discharged with any pains medications or muscle relaxers (norco, Vicodin, hydrocodone products, Percocet, oxycodone products, flexeril, cyclobenzaprine, robaxin, norflex, brand or generic, or any other pain controlling medications with the exception of Ibuprofen and regular Tylenol, do not drive or operate machinery, climb ladders or participate in any activity that could potentially put yourself or others at risk should you get dizzy, or be/feel impaired at all.    Return to emergency room without delay for ANY new or worsening pains or for any other symptoms or concerns. Return with worsening pains, nausea, vomiting, trouble breathing, palpitations, shortness of breath, inability to pass stool or urine, loss of control of stool or urine, any numbness or tingling (that is not normal for you), uncontrolled fevers, the passing of blood or other material in stool or urine, rashes, pains or for any other symptoms or concerns you may have. You are always welcome to return to the ER at any time for any reason or for any other concerns you may have.

## 2025-08-09 LAB
ATRIAL RATE: 54 BPM
P AXIS: 77 DEGREES
P OFFSET: 179 MS
P ONSET: 144 MS
PR INTERVAL: 148 MS
Q ONSET: 218 MS
QRS COUNT: 9 BEATS
QRS DURATION: 96 MS
QT INTERVAL: 464 MS
QTC CALCULATION(BAZETT): 440 MS
QTC FREDERICIA: 447 MS
R AXIS: -1 DEGREES
T AXIS: 50 DEGREES
T OFFSET: 450 MS
VENTRICULAR RATE: 54 BPM

## 2025-08-16 LAB
ALBUMIN SERPL-MCNC: 4.6 G/DL (ref 3.6–5.1)
ALP SERPL-CCNC: 90 U/L (ref 35–144)
ALT SERPL-CCNC: 12 U/L (ref 9–46)
ANION GAP SERPL CALCULATED.4IONS-SCNC: 18 MMOL/L (CALC) (ref 7–17)
AST SERPL-CCNC: 24 U/L (ref 10–35)
BILIRUB SERPL-MCNC: 0.8 MG/DL (ref 0.2–1.2)
BUN SERPL-MCNC: 40 MG/DL (ref 7–25)
CALCIUM SERPL-MCNC: 10.3 MG/DL (ref 8.6–10.3)
CHLORIDE SERPL-SCNC: 98 MMOL/L (ref 98–110)
CHOLEST SERPL-MCNC: 76 MG/DL
CHOLEST/HDLC SERPL: 2.2 (CALC)
CO2 SERPL-SCNC: 25 MMOL/L (ref 20–32)
CREAT SERPL-MCNC: 8.56 MG/DL (ref 0.7–1.28)
EGFRCR SERPLBLD CKD-EPI 2021: 6 ML/MIN/1.73M2
ERYTHROCYTE [DISTWIDTH] IN BLOOD BY AUTOMATED COUNT: 16.9 % (ref 11–15)
EST. AVERAGE GLUCOSE BLD GHB EST-MCNC: 120 MG/DL
EST. AVERAGE GLUCOSE BLD GHB EST-SCNC: 6.6 MMOL/L
GLUCOSE SERPL-MCNC: 105 MG/DL (ref 65–99)
HBA1C MFR BLD: 5.8 %
HCT VFR BLD AUTO: 40.6 % (ref 38.5–50)
HDLC SERPL-MCNC: 35 MG/DL
HGB BLD-MCNC: 12.3 G/DL (ref 13.2–17.1)
LDLC SERPL CALC-MCNC: 24 MG/DL (CALC)
MCH RBC QN AUTO: 28.1 PG (ref 27–33)
MCHC RBC AUTO-ENTMCNC: 30.3 G/DL (ref 32–36)
MCV RBC AUTO: 92.9 FL (ref 80–100)
NONHDLC SERPL-MCNC: 41 MG/DL (CALC)
PLATELET # BLD AUTO: 169 THOUSAND/UL (ref 140–400)
PMV BLD REES-ECKER: 9.9 FL (ref 7.5–12.5)
POTASSIUM SERPL-SCNC: 5.7 MMOL/L (ref 3.5–5.3)
PROT SERPL-MCNC: 7 G/DL (ref 6.1–8.1)
RBC # BLD AUTO: 4.37 MILLION/UL (ref 4.2–5.8)
SODIUM SERPL-SCNC: 141 MMOL/L (ref 135–146)
TRIGL SERPL-MCNC: 83 MG/DL
TSH SERPL-ACNC: 0.73 MIU/L (ref 0.4–4.5)
WBC # BLD AUTO: 7.6 THOUSAND/UL (ref 3.8–10.8)

## 2025-08-20 ENCOUNTER — APPOINTMENT (OUTPATIENT)
Dept: PRIMARY CARE | Facility: CLINIC | Age: 75
End: 2025-08-20
Payer: MEDICARE

## 2025-08-20 VITALS
SYSTOLIC BLOOD PRESSURE: 166 MMHG | DIASTOLIC BLOOD PRESSURE: 64 MMHG | BODY MASS INDEX: 25.07 KG/M2 | HEIGHT: 68 IN | WEIGHT: 165.4 LBS

## 2025-08-20 DIAGNOSIS — E11.9 TYPE 2 DIABETES MELLITUS WITHOUT COMPLICATION, WITHOUT LONG-TERM CURRENT USE OF INSULIN: ICD-10-CM

## 2025-08-20 DIAGNOSIS — I25.10 CORONARY ARTERY DISEASE INVOLVING NATIVE HEART WITHOUT ANGINA PECTORIS, UNSPECIFIED VESSEL OR LESION TYPE: Primary | ICD-10-CM

## 2025-08-20 DIAGNOSIS — I10 HYPERTENSION, UNSPECIFIED TYPE: ICD-10-CM

## 2025-08-20 DIAGNOSIS — E78.2 MIXED HYPERLIPIDEMIA: ICD-10-CM

## 2025-08-20 DIAGNOSIS — Z99.2 ESRD ON HEMODIALYSIS (MULTI): ICD-10-CM

## 2025-08-20 DIAGNOSIS — N18.6 ESRD ON HEMODIALYSIS (MULTI): ICD-10-CM

## 2025-08-20 PROCEDURE — 99213 OFFICE O/P EST LOW 20 MIN: CPT | Performed by: INTERNAL MEDICINE

## 2025-08-20 PROCEDURE — 3077F SYST BP >= 140 MM HG: CPT | Performed by: INTERNAL MEDICINE

## 2025-08-20 PROCEDURE — 3078F DIAST BP <80 MM HG: CPT | Performed by: INTERNAL MEDICINE

## 2025-08-20 PROCEDURE — 1159F MED LIST DOCD IN RCRD: CPT | Performed by: INTERNAL MEDICINE

## 2025-08-22 ENCOUNTER — OFFICE VISIT (OUTPATIENT)
Dept: CARDIOLOGY | Facility: CLINIC | Age: 75
End: 2025-08-22
Payer: MEDICARE

## 2025-08-22 VITALS
OXYGEN SATURATION: 96 % | DIASTOLIC BLOOD PRESSURE: 57 MMHG | BODY MASS INDEX: 24.75 KG/M2 | SYSTOLIC BLOOD PRESSURE: 134 MMHG | WEIGHT: 163.3 LBS | HEIGHT: 68 IN | HEART RATE: 53 BPM

## 2025-08-22 DIAGNOSIS — R01.1 MURMUR, CARDIAC: ICD-10-CM

## 2025-08-22 DIAGNOSIS — I10 HYPERTENSION, UNSPECIFIED TYPE: Primary | ICD-10-CM

## 2025-08-22 PROCEDURE — G2211 COMPLEX E/M VISIT ADD ON: HCPCS | Performed by: NURSE PRACTITIONER

## 2025-08-22 PROCEDURE — 1159F MED LIST DOCD IN RCRD: CPT | Performed by: NURSE PRACTITIONER

## 2025-08-22 PROCEDURE — 99212 OFFICE O/P EST SF 10 MIN: CPT

## 2025-08-22 PROCEDURE — 99214 OFFICE O/P EST MOD 30 MIN: CPT | Performed by: NURSE PRACTITIONER

## 2025-08-22 PROCEDURE — 1036F TOBACCO NON-USER: CPT | Performed by: NURSE PRACTITIONER

## 2025-08-22 PROCEDURE — 3075F SYST BP GE 130 - 139MM HG: CPT | Performed by: NURSE PRACTITIONER

## 2025-08-22 PROCEDURE — 1126F AMNT PAIN NOTED NONE PRSNT: CPT | Performed by: NURSE PRACTITIONER

## 2025-08-22 PROCEDURE — 1160F RVW MEDS BY RX/DR IN RCRD: CPT | Performed by: NURSE PRACTITIONER

## 2025-08-22 PROCEDURE — 3078F DIAST BP <80 MM HG: CPT | Performed by: NURSE PRACTITIONER

## 2025-08-22 ASSESSMENT — ENCOUNTER SYMPTOMS
NEUROLOGICAL NEGATIVE: 1
CONSTITUTIONAL NEGATIVE: 1
CARDIOVASCULAR NEGATIVE: 1
GASTROINTESTINAL NEGATIVE: 1
RESPIRATORY NEGATIVE: 1
MUSCULOSKELETAL NEGATIVE: 1

## 2025-08-22 ASSESSMENT — PAIN SCALES - GENERAL: PAINLEVEL_OUTOF10: 0-NO PAIN

## 2025-08-25 ENCOUNTER — APPOINTMENT (OUTPATIENT)
Dept: ENDOCRINOLOGY | Facility: CLINIC | Age: 75
End: 2025-08-25
Payer: MEDICARE

## 2025-08-25 VITALS — BODY MASS INDEX: 25.39 KG/M2 | SYSTOLIC BLOOD PRESSURE: 136 MMHG | WEIGHT: 167 LBS | DIASTOLIC BLOOD PRESSURE: 72 MMHG

## 2025-08-25 DIAGNOSIS — Z79.4 TYPE 2 DIABETES MELLITUS WITH CHRONIC KIDNEY DISEASE ON CHRONIC DIALYSIS, WITH LONG-TERM CURRENT USE OF INSULIN (MULTI): Primary | ICD-10-CM

## 2025-08-25 DIAGNOSIS — E11.22 TYPE 2 DIABETES MELLITUS WITH CHRONIC KIDNEY DISEASE ON CHRONIC DIALYSIS, WITH LONG-TERM CURRENT USE OF INSULIN (MULTI): Primary | ICD-10-CM

## 2025-08-25 DIAGNOSIS — N18.6 TYPE 2 DIABETES MELLITUS WITH CHRONIC KIDNEY DISEASE ON CHRONIC DIALYSIS, WITH LONG-TERM CURRENT USE OF INSULIN (MULTI): Primary | ICD-10-CM

## 2025-08-25 DIAGNOSIS — Z99.2 TYPE 2 DIABETES MELLITUS WITH CHRONIC KIDNEY DISEASE ON CHRONIC DIALYSIS, WITH LONG-TERM CURRENT USE OF INSULIN (MULTI): Primary | ICD-10-CM

## 2025-08-25 DIAGNOSIS — I10 PRIMARY HYPERTENSION: ICD-10-CM

## 2025-08-25 DIAGNOSIS — E78.5 HYPERLIPIDEMIA, UNSPECIFIED HYPERLIPIDEMIA TYPE: ICD-10-CM

## 2025-08-25 PROCEDURE — 99214 OFFICE O/P EST MOD 30 MIN: CPT | Performed by: INTERNAL MEDICINE

## 2025-08-25 PROCEDURE — 3078F DIAST BP <80 MM HG: CPT | Performed by: INTERNAL MEDICINE

## 2025-08-25 PROCEDURE — 3075F SYST BP GE 130 - 139MM HG: CPT | Performed by: INTERNAL MEDICINE

## 2025-09-10 ENCOUNTER — APPOINTMENT (OUTPATIENT)
Dept: CARDIOLOGY | Facility: CLINIC | Age: 75
End: 2025-09-10
Payer: MEDICARE

## 2025-11-12 ENCOUNTER — APPOINTMENT (OUTPATIENT)
Dept: PRIMARY CARE | Facility: CLINIC | Age: 75
End: 2025-11-12
Payer: MEDICARE

## 2025-11-17 ENCOUNTER — APPOINTMENT (OUTPATIENT)
Dept: NEPHROLOGY | Facility: CLINIC | Age: 75
End: 2025-11-17
Payer: MEDICARE

## 2025-11-19 ENCOUNTER — APPOINTMENT (OUTPATIENT)
Dept: PRIMARY CARE | Facility: CLINIC | Age: 75
End: 2025-11-19
Payer: MEDICARE

## 2026-03-02 ENCOUNTER — APPOINTMENT (OUTPATIENT)
Dept: ENDOCRINOLOGY | Facility: CLINIC | Age: 76
End: 2026-03-02
Payer: MEDICARE

## (undated) DEVICE — SUTURE, VICRYL, 2-0, 36 IN, CT-1, UNDYED

## (undated) DEVICE — SHEET, SPLIT, CARDIOVASCULAR TIBURON

## (undated) DEVICE — DRESSING, GAUZE, DRAIN SPONGE, 6 PLY, EXCILON, 4 X 4 IN, STERILE

## (undated) DEVICE — DRESSING, NON-ADHERENT, TELFA, 3 X 8 IN, NS

## (undated) DEVICE — SUTURE, ETHIBOND EXCEL 1, TAPER POINT CT-1 GREEN 30 INCH

## (undated) DEVICE — DRESSING, ADHESIVE, ISLAND, TELFA, 2 X 3.75 IN, LF

## (undated) DEVICE — KIT, CATHETER, PLEURAL DENVER

## (undated) DEVICE — TOWEL PACK, STERILE, 4/PACK, BLUE

## (undated) DEVICE — CLEAN KIT, ANTIFOG SCOPE, SEE SHARP 150MM

## (undated) DEVICE — SUTURE, VICRYL 0, TAPER POINT, CT-1 VIOLET 27 INCH

## (undated) DEVICE — COVER, LIGHT HANDLE, SURGICAL, FLEXIBLE, DISPOSABLE, STERILE

## (undated) DEVICE — GLOVE, SURGICAL, PROTEXIS PI BLUE W/NEUTHERA, 6.5, PF, LF

## (undated) DEVICE — DRAPE, INCISE, ANTIMICROBIAL, IOBAN 2, LARGE, 17 X 23 IN, DISPOSABLE, STERILE

## (undated) DEVICE — ADHESIVE, SKIN, MASTISOL, 2/3 CC VIAL

## (undated) DEVICE — Device

## (undated) DEVICE — SUTURE, MONOCRYL, 3-0, 18 IN, PS2, UNDYED

## (undated) DEVICE — DRESSING, ISLAND, TELFA, 4 X 5 IN

## (undated) DEVICE — COLLECTION UNIT, DRAINAGE, THORACIC, SINGLE TUBE, DRY SUCTION, ATS COMPATIBLE, OASIS 3600, LF

## (undated) DEVICE — STRIP, SKIN CLOSURE, STERI STRIP, REINFORCED, 0.5 X 4 IN